# Patient Record
Sex: FEMALE | Race: WHITE | NOT HISPANIC OR LATINO | Employment: OTHER | URBAN - METROPOLITAN AREA
[De-identification: names, ages, dates, MRNs, and addresses within clinical notes are randomized per-mention and may not be internally consistent; named-entity substitution may affect disease eponyms.]

---

## 2023-03-31 ENCOUNTER — HOSPITAL ENCOUNTER (OUTPATIENT)
Dept: DATA CONVERSION | Facility: HOSPITAL | Age: 73
End: 2023-03-31
Attending: PHYSICAL MEDICINE & REHABILITATION | Admitting: PHYSICAL MEDICINE & REHABILITATION
Payer: MEDICARE

## 2023-03-31 DIAGNOSIS — M47.816 SPONDYLOSIS WITHOUT MYELOPATHY OR RADICULOPATHY, LUMBAR REGION: ICD-10-CM

## 2023-03-31 DIAGNOSIS — M54.16 RADICULOPATHY, LUMBAR REGION: ICD-10-CM

## 2023-04-28 ENCOUNTER — HOSPITAL ENCOUNTER (OUTPATIENT)
Dept: DATA CONVERSION | Facility: HOSPITAL | Age: 73
End: 2023-04-28
Attending: PHYSICAL MEDICINE & REHABILITATION | Admitting: PHYSICAL MEDICINE & REHABILITATION
Payer: MEDICARE

## 2023-04-28 DIAGNOSIS — M47.816 SPONDYLOSIS WITHOUT MYELOPATHY OR RADICULOPATHY, LUMBAR REGION: ICD-10-CM

## 2023-04-28 DIAGNOSIS — M47.26 OTHER SPONDYLOSIS WITH RADICULOPATHY, LUMBAR REGION: ICD-10-CM

## 2023-04-28 DIAGNOSIS — M48.062 SPINAL STENOSIS, LUMBAR REGION WITH NEUROGENIC CLAUDICATION: ICD-10-CM

## 2023-05-03 ENCOUNTER — HOSPITAL ENCOUNTER (OUTPATIENT)
Dept: DATA CONVERSION | Facility: HOSPITAL | Age: 73
End: 2023-05-03
Attending: PAIN MEDICINE | Admitting: PAIN MEDICINE
Payer: MEDICARE

## 2023-05-03 DIAGNOSIS — M54.50 LOW BACK PAIN, UNSPECIFIED: ICD-10-CM

## 2023-05-03 DIAGNOSIS — R10.9 UNSPECIFIED ABDOMINAL PAIN: ICD-10-CM

## 2023-05-03 DIAGNOSIS — R10.2 PELVIC AND PERINEAL PAIN: ICD-10-CM

## 2023-06-07 ENCOUNTER — HOSPITAL ENCOUNTER (OUTPATIENT)
Dept: DATA CONVERSION | Facility: HOSPITAL | Age: 73
End: 2023-06-07
Attending: PAIN MEDICINE | Admitting: PAIN MEDICINE
Payer: MEDICARE

## 2023-06-07 DIAGNOSIS — R10.9 UNSPECIFIED ABDOMINAL PAIN: ICD-10-CM

## 2023-06-07 DIAGNOSIS — R10.32 LEFT LOWER QUADRANT PAIN: ICD-10-CM

## 2023-06-07 LAB
ALANINE AMINOTRANSFERASE (SGPT) (U/L) IN SER/PLAS: 23 U/L (ref 7–45)
ALBUMIN (G/DL) IN SER/PLAS: 4.3 G/DL (ref 3.4–5)
ALKALINE PHOSPHATASE (U/L) IN SER/PLAS: 115 U/L (ref 33–136)
ANION GAP IN SER/PLAS: 14 MMOL/L (ref 10–20)
ASPARTATE AMINOTRANSFERASE (SGOT) (U/L) IN SER/PLAS: 24 U/L (ref 9–39)
BILIRUBIN TOTAL (MG/DL) IN SER/PLAS: 0.8 MG/DL (ref 0–1.2)
CALCIUM (MG/DL) IN SER/PLAS: 9.5 MG/DL (ref 8.6–10.3)
CARBON DIOXIDE, TOTAL (MMOL/L) IN SER/PLAS: 26 MMOL/L (ref 21–32)
CHLORIDE (MMOL/L) IN SER/PLAS: 107 MMOL/L (ref 98–107)
CHOLESTEROL (MG/DL) IN SER/PLAS: 184 MG/DL (ref 0–199)
CHOLESTEROL IN HDL (MG/DL) IN SER/PLAS: 44.8 MG/DL
CHOLESTEROL/HDL RATIO: 4.1
COBALAMIN (VITAMIN B12) (PG/ML) IN SER/PLAS: 440 PG/ML (ref 211–911)
CREATININE (MG/DL) IN SER/PLAS: 0.79 MG/DL (ref 0.5–1.05)
ERYTHROCYTE DISTRIBUTION WIDTH (RATIO) BY AUTOMATED COUNT: 12.8 % (ref 11.5–14.5)
ERYTHROCYTE MEAN CORPUSCULAR HEMOGLOBIN CONCENTRATION (G/DL) BY AUTOMATED: 32.7 G/DL (ref 32–36)
ERYTHROCYTE MEAN CORPUSCULAR VOLUME (FL) BY AUTOMATED COUNT: 98 FL (ref 80–100)
ERYTHROCYTES (10*6/UL) IN BLOOD BY AUTOMATED COUNT: 4.88 X10E12/L (ref 4–5.2)
GFR FEMALE: 80 ML/MIN/1.73M2
GLUCOSE (MG/DL) IN SER/PLAS: 94 MG/DL (ref 74–99)
HEMATOCRIT (%) IN BLOOD BY AUTOMATED COUNT: 48 % (ref 36–46)
HEMOGLOBIN (G/DL) IN BLOOD: 15.7 G/DL (ref 12–16)
LDL: 115 MG/DL (ref 0–99)
LEUKOCYTES (10*3/UL) IN BLOOD BY AUTOMATED COUNT: 5.8 X10E9/L (ref 4.4–11.3)
PLATELETS (10*3/UL) IN BLOOD AUTOMATED COUNT: 245 X10E9/L (ref 150–450)
POTASSIUM (MMOL/L) IN SER/PLAS: 4 MMOL/L (ref 3.5–5.3)
PROTEIN TOTAL: 7.2 G/DL (ref 6.4–8.2)
SODIUM (MMOL/L) IN SER/PLAS: 143 MMOL/L (ref 136–145)
THYROTROPIN (MIU/L) IN SER/PLAS BY DETECTION LIMIT <= 0.05 MIU/L: 1.91 MIU/L (ref 0.44–3.98)
TRIGLYCERIDE (MG/DL) IN SER/PLAS: 121 MG/DL (ref 0–149)
UREA NITROGEN (MG/DL) IN SER/PLAS: 23 MG/DL (ref 6–23)
VLDL: 24 MG/DL (ref 0–40)

## 2023-06-28 ENCOUNTER — PATIENT OUTREACH (OUTPATIENT)
Dept: CARE COORDINATION | Facility: CLINIC | Age: 73
End: 2023-06-28
Payer: MEDICARE

## 2023-06-28 SDOH — ECONOMIC STABILITY: GENERAL: WOULD YOU LIKE HELP WITH ANY OF THE FOLLOWING NEEDS?: I DONT NEED HELP WITH ANY OF THESE

## 2023-06-28 SDOH — ECONOMIC STABILITY: FOOD INSECURITY
ARE ANY OF YOUR NEEDS URGENT? FOR EXAMPLE, UNCERTAINTY OF WHERE YOU WILL GET YOUR NEXT MEAL OR NOT HAVING THE MEDICATIONS YOU NEED TO TAKE TOMORROW.: NO

## 2023-09-07 VITALS
DIASTOLIC BLOOD PRESSURE: 68 MMHG | BODY MASS INDEX: 33.61 KG/M2 | SYSTOLIC BLOOD PRESSURE: 138 MMHG | TEMPERATURE: 98.2 F | WEIGHT: 201.72 LBS | RESPIRATION RATE: 16 BRPM | HEIGHT: 65 IN | HEART RATE: 76 BPM

## 2023-09-07 VITALS
RESPIRATION RATE: 16 BRPM | SYSTOLIC BLOOD PRESSURE: 138 MMHG | DIASTOLIC BLOOD PRESSURE: 66 MMHG | BODY MASS INDEX: 33.5 KG/M2 | WEIGHT: 201.06 LBS | HEART RATE: 76 BPM | HEIGHT: 65 IN | TEMPERATURE: 97.7 F

## 2023-09-07 VITALS — WEIGHT: 197.53 LBS | HEIGHT: 65 IN | BODY MASS INDEX: 32.91 KG/M2

## 2023-09-14 NOTE — H&P
History of Present Illness:   History Present Illness:  Reason for surgery: Left lower back pain   HPI:    Left lower back pain refractory to conservative treatment, achy and burning, worse with activity, trying to avoid surgery, had excellent relief following her initial  MBB.  Continues to have left lower quadrant pain and understands this procedure may not/likely not improve that pain.  Rates her pain a 6/10.     Allergies:        Allergies:  ·  No Known Allergies :     Home Medication Review:   Home Medications Reviewed: yes     Impression/Procedure:   ·  Impression and Planned Procedure: Lumbar spondylosis with facet arthropathy  Plan for left L3, L4 medial branch and L5 dorsal rami block       ERAS (Enhanced Recovery After Surgery):  ·  ERAS Patient: no       Vital Signs:  Temperature C: 36.8 degrees C   Temperature F: 98.2 degrees F   Heart Rate: 76 beats per minute   Respiratory Rate: 16 breath per minute   Blood Pressure Systolic: 138 mm/Hg   Blood Pressure Diastolic: 68 mm/Hg     Physical Exam by System:    Constitutional: Well developed, awake/alert/oriented,  no distress, alert and cooperative   Eyes: EOMI, clear sclera   ENMT: mucous membranes moist, no apparent injury,  no lesions seen   Head/Neck: Neck supple, no apparent injury, trachea  midline   Respiratory/Thorax: Patent airways, non-labored breathing  with good chest expansion   Cardiovascular: no sig. edema   Extremities: normal extremities, no cyanosis edema,  contusions or wounds, no clubbing   Psychological: Appropriate mood and behavior   Skin: Warm and dry, no lesions, no rashes     Consent:   COVID-19 Consent:  ·  COVID-19 Risk Consent Surgeon has reviewed key risks related to the risk of alyse COVID-19 and if they contract COVID-19 what the risks are.       Electronic Signatures:  Alexa Terry)  (Signed 31-Mar-2023 10:45)   Authored: History of Present Illness, Allergies, Home  Medication Review, Impression/Procedure,  ERAS, Physical Exam, Consent, Note Completion      Last Updated: 31-Mar-2023 10:45 by Alexa Terry)

## 2023-09-14 NOTE — H&P
History of Present Illness:   History Present Illness:  Reason for surgery: Chronic left lower back pain   HPI:    Chronic left lower back pain refractory to conservative treatment, achy, excellent relief with prior medial branch blocks (over 80%), can be a 6-8/10.  Also has a  plan for pain management for her abdominal pain.     Allergies:        Allergies:  ·  No Known Allergies :     Home Medication Review:   Home Medications Reviewed: yes     Impression/Procedure:   ·  Impression and Planned Procedure: Lumbar spondylosis with facet arthropathy  Plan for left L2, L3, L4 medial branch and L5 dorsal rami RFA       ERAS (Enhanced Recovery After Surgery):  ·  ERAS Patient: no       Vital Signs:  Temperature C: 36.5 degrees C   Temperature F: 97.7 degrees F   Heart Rate: 76 beats per minute   Respiratory Rate: 16 breath per minute   Blood Pressure Systolic: 138 mm/Hg   Blood Pressure Diastolic: 66 mm/Hg     Physical Exam by System:    Constitutional: Well developed, awake/alert/oriented,  no distress, alert and cooperative   Eyes: EOMI, clear sclera   ENMT: mucous membranes moist, no apparent injury,  no lesions seen   Head/Neck: Neck supple, no apparent injury, trachea  midline   Respiratory/Thorax: Patent airways, non-labored breathing  with good chest expansion   Cardiovascular: no sig. edema   Extremities: normal extremities, no cyanosis edema,  contusions or wounds, no clubbing   Psychological: Appropriate mood and behavior   Skin: Warm and dry, no lesions, no rashes     Consent:   COVID-19 Consent:  ·  COVID-19 Risk Consent Surgeon has reviewed key risks related to the risk of alyse COVID-19 and if they contract COVID-19 what the risks are.       Electronic Signatures:  Alexa Terry)  (Signed 28-Apr-2023 11:51)   Authored: History of Present Illness, Allergies, Home  Medication Review, Impression/Procedure, ERAS, Physical Exam, Consent, Note Completion      Last Updated: 28-Apr-2023 11:51 by  Alexa Terry)    1.92

## 2023-10-02 NOTE — OP NOTE
"    Post Operative Note:     PreOp Diagnosis: Lumbar spondylosis with facet arthropathy   Post-Procedure Diagnosis: same   Procedure: 1. Left L2, L3, L4 medial branch and L5  dorsal rami RFA  2.   3.   4.   5.   Surgeon: ANDRES Terry MD   Resident/Fellow/Other Assistant: Danna Sanders DO   Estimated Blood Loss (mL): none   Specimen: no   Findings: NA     Operative Report Dictated:  Dictation: not applicable - note contains Operative  Report   Operative Report:    Procedure(s):Operation: Left L2,  L3 and L4 Medial branch and L5 Dorsal rami Radiofrequency ablation.- Cooled RFA - Tissue was heated to 80 deg. Celsius.      Pre-Op/Pre-Procedure Diagnosis: Lumbosacral spondylosis/facet arthropathy   Post-Op Diagnosis: same     Anesthesia:  Local  2 mg IV versed with intraservice monitoring time     Fluoroscopy time: see documented     Estimated Blood Loss: None  Specimens: None  Drains: None  Complications: None     INDICATIONS: The patient has been referred by my colleague with concordant subjective, objective, and radiologic findings of Facet arthropathy, referred for diagnostic and therapeutic L2, L3, L4 and L5 Medial branch/ Dorsal rami radiofrequency rhizotomy  with failure of prior conservative care with physical therapy and medications alone. At this time, the patient wishes to avoid surgery.     PROCEDURE: After obtaining both verbal and written informed consent, the patient was placed in a prone position on the fluoroscopic table in the procedure room, the patient's posterior lumbosacral spine was prepped and draped in usual sterile fashion  using chlorhexidine. The patient was connected to noninvasive blood pressure, EKG, pulse oximetry monitoring, and monitored by a registered interventional nurse throughout the procedure. Before initiating procedure, all relevant information was verified  in a \"time-out.\"      Skin wheal(s) were raised using 1% preservative-free lidocaine perpendicular to the mammillary " processes at near the junction of the transverse process and superior articular process and sacral ala which was localized by counting from the intersection  of the iliac crest.    A 17g 100 mm radiofrequency introducer needle was placed to the planned anatomic targets guided with intermittent fluoroscopy with a perpendicular approach to terminally place at the junction of the superior articular process and the transverse process  of the L3, L4 ,L5 and the base of the sacral ala for the L5 medial branch nerve. The stylets were removed and radiofrequency probes with a 4mm active tip were then inserted. Needle tip position of the probes was verified in the AP, oblique, and lateral  views . At each site, the medial branch nerve was stimulated at 2 Hz to a maximum 1 - 2 volts determined to finalize safe needle and electrode placement. The patient was awake and responsive during this portion of the procedure. Each target was anesthetized  with 1- 2 mL of  1% preservative free Lidocaine for anesthesia for lesioning and then each target was lesioned at 80 degrees Celsius for 2 minutes and 30 seconds. Tissue impedances were noted to be between 250 and 500 Ohms. Post lesioning we injected  a mix of 40 mg Depomedrol and 0.75% marcaine.  Electrodes and needles were then removed and band ages placed over the needle placement sites, the patient then returned to the supine position on a stretcher and transported to the recovery room without  hemodynamic, neurologic, or allergic reactions. Fluoroscopic images were printed for hard copy recording and digitally archived    There were no twitching or reproduction of leg pain leg pain experienced at any point during the procedure. Multifidus stimulation without sensory radiculopathy was observed on motor stimulation.     Care Instructions: Discharge per protocol.  Medications: See medication section  Appointment: Patient to return 1-2 weeks to clinic with pain diary.  Discharge  Condition: Good condition for discharge.  Patient discharged home when all discharge criterion met.      Attestation:   Note Completion:  Attending Attestation I was present for the entire procedure         Electronic Signatures:  Alexa Terry)  (Signed 28-Apr-2023 12:45)   Authored: Post Operative Note, Note Completion      Last Updated: 28-Apr-2023 12:45 by Alexa Terry)

## 2023-10-02 NOTE — OP NOTE
"    Post Operative Note:     PreOp Diagnosis: Lumbar spondylosis with facet arthropathy   Post-Procedure Diagnosis: same   Procedure: 1. Left L2, L3, L4 medial branch and L5  dorsal rami blocks  2.   3.   4.   5.   Surgeon: ANDRES Terry MD   Resident/Fellow/Other Assistant: NA   Estimated Blood Loss (mL): none   Specimen: no   Findings: NA     Operative Report Dictated:  Dictation: not applicable - note contains Operative  Report   Operative Report:    Procedure(s):Operation: Left L2, L3 and L4  Medial branch blockade and L5 Dorsal rami blockade.     Pre-Op/Pre-Procedure Diagnosis: Lumbosacral spondylosis without myelopathy  Post-Op Diagnosis: same     Anesthesia: Local only    Estimated Blood Loss: None  Specimens: None  Drains: None  Complications: None     INDICATIONS: The patient has been referred by my colleague with concordant subjective, objective, and radiologic findings of Facet arthropathy, referred for diagnostic and therapeutic MBB with failure of prior conservative care with physical therapy and  medications alone. At this time, the patient wishes to avoid surgery.      PROCEDURE: After obtaining both verbal and written informed consent, the patient was placed in a prone position on the fluoroscopic table in the procedure room, the patient's posterior lumbosacral spine was prepped and draped in usual sterile fashion  using chlorhexidine. The patient was connected to noninvasive blood pressure, EKG, pulse oximetry monitoring, and monitored by a registered interventional nurse throughout the procedure. Before initiating procedure, all relevant information was verified  in a \"time-out.\"      Skin wheal(s) were raised using 1% preservative-free lidocaine near the junction of the L4 and L5  transverse process and superior articular process and sacral ala which was localized by counting from the intersection of the iliac crest. Through the skin  wheal a 25-gauge, 3-1/2-inch curved Quincke-tip spinal " needle was inserted and advanced under direct fluoroscopic visualization in the AP, ipsilateral oblique and lateral planes, until the needle tip arrived until the needles arrived at the relevant positions  perpendicular to the L3 and L4  mammillary processes at the vertebral body and the  sacral ala. Proper needle placement was confirmed with 0.2 cc of Omnipaque-180M nonionic contrast confirming contrast near the nerve without any intravascular uptake of  contrast seen under live direct fluoroscopic visualization in the AP, oblique, and lateral planes. At this point 1 cc of a mixture of 0.75 Marcaine and 1.0 % preservative- free Lidocaine were infused at each level. Adequate hemostasis was obtained at  the needle puncture site. The patient's back was cleaned and a sterile dressing was applied. The patient was taken conscious and in stable condition to the recovery room. No complications as a result of this procedure. Post procedure precautions and instructions  were reviewed with the patient who verbalized understanding.     Significant Findings: None  Pre-Op Pain: 6/10  Post-Op Pain: 0-1/10     Care Instructions: Discharge per protocol.  Medications: See medication section  Appointment: Patient to return 1-2 weeks to clinic with pain diary.  Discharge Condition: Good condition for discharge.  Patient discharged home when all discharge criterion met.      Attestation:   Note Completion:  Attending Attestation I performed the procedure without a resident         Electronic Signatures:  Alexa Terry)  (Signed 31-Mar-2023 13:52)   Authored: Post Operative Note, Note Completion      Last Updated: 31-Mar-2023 13:52 by Alexa Terry)

## 2023-10-21 PROBLEM — E55.9 VITAMIN D DEFICIENCY: Status: ACTIVE | Noted: 2023-10-21

## 2023-10-21 PROBLEM — G56.03 BILATERAL CARPAL TUNNEL SYNDROME: Status: ACTIVE | Noted: 2023-10-21

## 2023-10-21 PROBLEM — L82.1 SEBORRHEIC KERATOSES: Status: ACTIVE | Noted: 2023-10-21

## 2023-10-21 PROBLEM — B02.29 HZV (HERPES ZOSTER VIRUS) POST HERPETIC NEURALGIA: Status: ACTIVE | Noted: 2023-10-21

## 2023-10-21 PROBLEM — H93.19 TINNITUS: Status: ACTIVE | Noted: 2023-10-21

## 2023-10-21 PROBLEM — M62.81 MUSCLE WEAKNESS (GENERALIZED): Status: ACTIVE | Noted: 2023-10-21

## 2023-10-21 PROBLEM — R19.5 POSITIVE COLORECTAL CANCER SCREENING USING COLOGUARD TEST: Status: ACTIVE | Noted: 2023-10-21

## 2023-10-21 PROBLEM — M17.11 PRIMARY OSTEOARTHRITIS OF RIGHT KNEE: Status: ACTIVE | Noted: 2023-10-21

## 2023-10-21 PROBLEM — N64.89 BREAST ASYMMETRY: Status: ACTIVE | Noted: 2023-10-21

## 2023-10-21 RX ORDER — HYALURONATE SODIUM 30 MG/2 ML
SYRINGE (ML) INTRAARTICULAR
COMMUNITY
Start: 2023-07-13 | End: 2023-10-24 | Stop reason: ALTCHOICE

## 2023-10-21 RX ORDER — ACETAMINOPHEN 500 MG
1000 TABLET ORAL EVERY 8 HOURS PRN
COMMUNITY
End: 2024-06-04

## 2023-10-21 RX ORDER — CHOLECALCIFEROL (VITAMIN D3) 25 MCG
1000 TABLET ORAL DAILY
Status: ON HOLD | COMMUNITY

## 2023-10-21 RX ORDER — PREDNISONE 5 MG/1
TABLET ORAL
COMMUNITY
Start: 2023-09-27 | End: 2023-10-24 | Stop reason: ALTCHOICE

## 2023-10-21 RX ORDER — PANTOPRAZOLE SODIUM 40 MG/1
40 TABLET, DELAYED RELEASE ORAL DAILY
COMMUNITY
Start: 2023-06-27 | End: 2023-10-24 | Stop reason: ALTCHOICE

## 2023-10-21 RX ORDER — GABAPENTIN 300 MG/1
600 CAPSULE ORAL 3 TIMES DAILY
COMMUNITY
Start: 2023-05-08 | End: 2023-10-24 | Stop reason: ALTCHOICE

## 2023-10-21 RX ORDER — OMEPRAZOLE 40 MG/1
CAPSULE, DELAYED RELEASE ORAL
COMMUNITY
End: 2023-10-24 | Stop reason: ALTCHOICE

## 2023-10-21 RX ORDER — MELOXICAM 15 MG/1
15 TABLET ORAL DAILY PRN
COMMUNITY
End: 2024-04-30 | Stop reason: WASHOUT

## 2023-10-21 RX ORDER — GABAPENTIN 100 MG/1
1 CAPSULE ORAL 3 TIMES DAILY
COMMUNITY
Start: 2023-01-11 | End: 2023-10-24 | Stop reason: ALTCHOICE

## 2023-10-21 RX ORDER — ACETAMINOPHEN AND PHENYLEPHRINE HCL 325; 5 MG/1; MG/1
1 TABLET ORAL DAILY
Status: ON HOLD | COMMUNITY

## 2023-10-24 ENCOUNTER — OFFICE VISIT (OUTPATIENT)
Dept: PRIMARY CARE | Facility: CLINIC | Age: 73
End: 2023-10-24
Payer: MEDICARE

## 2023-10-24 VITALS
HEART RATE: 69 BPM | HEIGHT: 66 IN | WEIGHT: 190.8 LBS | DIASTOLIC BLOOD PRESSURE: 76 MMHG | BODY MASS INDEX: 30.67 KG/M2 | TEMPERATURE: 97.1 F | SYSTOLIC BLOOD PRESSURE: 123 MMHG

## 2023-10-24 DIAGNOSIS — M54.2 CERVICALGIA: ICD-10-CM

## 2023-10-24 DIAGNOSIS — M62.838 CERVICAL PARASPINAL MUSCLE SPASM: ICD-10-CM

## 2023-10-24 DIAGNOSIS — Z78.9 NON-SMOKER: ICD-10-CM

## 2023-10-24 DIAGNOSIS — R10.9 LEFT SIDED ABDOMINAL PAIN: Primary | ICD-10-CM

## 2023-10-24 PROCEDURE — 1159F MED LIST DOCD IN RCRD: CPT | Performed by: FAMILY MEDICINE

## 2023-10-24 PROCEDURE — 3008F BODY MASS INDEX DOCD: CPT | Performed by: FAMILY MEDICINE

## 2023-10-24 PROCEDURE — 1160F RVW MEDS BY RX/DR IN RCRD: CPT | Performed by: FAMILY MEDICINE

## 2023-10-24 PROCEDURE — 99213 OFFICE O/P EST LOW 20 MIN: CPT | Performed by: FAMILY MEDICINE

## 2023-10-24 PROCEDURE — 1125F AMNT PAIN NOTED PAIN PRSNT: CPT | Performed by: FAMILY MEDICINE

## 2023-10-24 PROCEDURE — 1036F TOBACCO NON-USER: CPT | Performed by: FAMILY MEDICINE

## 2023-10-24 RX ORDER — DOCUSATE SODIUM 100 MG/1
100 CAPSULE, LIQUID FILLED ORAL DAILY
Status: ON HOLD | COMMUNITY
Start: 2010-06-29

## 2023-10-24 RX ORDER — CYCLOBENZAPRINE HCL 5 MG
5 TABLET ORAL 3 TIMES DAILY PRN
Qty: 30 TABLET | Refills: 0 | Status: SHIPPED | OUTPATIENT
Start: 2023-10-24 | End: 2023-12-23

## 2023-10-24 ASSESSMENT — PATIENT HEALTH QUESTIONNAIRE - PHQ9
2. FEELING DOWN, DEPRESSED OR HOPELESS: NOT AT ALL
SUM OF ALL RESPONSES TO PHQ9 QUESTIONS 1 AND 2: 0
1. LITTLE INTEREST OR PLEASURE IN DOING THINGS: NOT AT ALL

## 2023-10-24 NOTE — PROGRESS NOTES
Subjective     Patient ID: Khalida Barker is a 73 y.o. female who presents for Abdominal Pain (Patient has had left sided ABD pain for the past year.  ):    Problem List Items Addressed This Visit    None     Past Medical History:   Diagnosis Date    Body mass index (BMI) 31.0-31.9, adult 11/08/2021    BMI 31.0-31.9,adult    Personal history of other endocrine, nutritional and metabolic disease 11/08/2021    History of obesity      Past Surgical History:   Procedure Laterality Date    OTHER SURGICAL HISTORY  11/05/2021    Breast biopsy    OTHER SURGICAL HISTORY  11/05/2021    Anterior cervical vertebral fusion    OTHER SURGICAL HISTORY  11/05/2021    [place] tooth extraction    OTHER SURGICAL HISTORY  11/05/2021    Tonsillectomy with adenoidectomy    OTHER SURGICAL HISTORY  11/05/2021    Skin lesion excision    OTHER SURGICAL HISTORY  11/08/2021    Oral surgery    OTHER SURGICAL HISTORY  10/11/2022    Colonoscopy      Family History   Problem Relation Name Age of Onset    Alzheimer's disease Mother      Heart disease Father      Polymyositis Father      Hypertension Brother      Skin cancer Brother      Other (peripheral artery disease) Brother        Social History     Socioeconomic History    Marital status:      Spouse name: Not on file    Number of children: Not on file    Years of education: Not on file    Highest education level: Not on file   Occupational History    Not on file   Tobacco Use    Smoking status: Not on file    Smokeless tobacco: Not on file   Substance and Sexual Activity    Alcohol use: Not on file    Drug use: Not on file    Sexual activity: Not on file   Other Topics Concern    Not on file   Social History Narrative    Not on file     Social Determinants of Health     Financial Resource Strain: Not on file   Food Insecurity: Not on file   Transportation Needs: Not on file   Physical Activity: Not on file   Stress: Not on file   Social Connections: Not on file   Intimate Partner Violence:  Not on file   Housing Stability: Not on file      Patient has no known allergies.   Current Outpatient Medications   Medication Sig Dispense Refill    acetaminophen (Tylenol Extra Strength) 500 mg tablet 1 tablet (500 mg). EVERY 4 TO 6 HOURS AS NEEDED.      ASPIRIN-ACETAMINOPHEN-CAFFEINE ORAL Take 2 every morning and 1-2 more if needed      BIOTIN ORAL caps      cholecalciferol (Vitamin D-3) 50 mcg (2,000 unit) capsule Take 1 capsule (2,000 Units) by mouth once daily.      docusate sodium (Colace) 100 mg capsule Take 1 capsule (100 mg) by mouth twice a day.      meloxicam (Mobic) 15 mg tablet Take 1 tablet (15 mg) by mouth once daily as needed.      multivit-min/ferrous fumarate (MULTI VITAMIN ORAL) Take 1 tablet by mouth once daily.      polyvinyl alcohol (LIQUID TEARS OPHT) INSTILL 1-2 DROPS INTO AFFECTED EYE(S) 4 TIMES DAILY AS DIRECTED.       No current facility-administered medications for this visit.       Immunization History   Administered Date(s) Administered    Flu vaccine, quadrivalent, high-dose, preservative free, age 65y+ (FLUZONE) 12/07/2020    Influenza, High Dose Seasonal, Preservative Free 11/16/2017, 10/11/2022    Influenza, Seasonal, Quadrivalent, Adjuvanted 10/20/2023    Influenza, Unspecified 10/24/2019, 11/08/2021, 10/11/2022    Influenza, seasonal, injectable 11/04/2016    Moderna COVID-19 vaccine, Fall 2023, 12 yeasrs and older (50mcg/0.5mL) 10/06/2023    Moderna COVID-19 vaccine, bivalent, blue cap/gray label *Check age/dose* 09/08/2022    Moderna SARS-CoV-2 Vaccination 02/11/2021, 03/11/2021, 11/02/2021, 04/14/2022    Pneumococcal Conjugate PCV 7 09/24/2018    Pneumococcal polysaccharide vaccine, 23-valent, age 2 years and older (PNEUMOVAX 23) 10/24/2019    Zoster vaccine, recombinant, adult (SHINGRIX) 09/23/2021, 12/10/2021        Review of Systems     Vitals:    10/24/23 1323   BP: 123/76   Pulse: 69   Temp: 36.2 °C (97.1 °F)     Vitals:    10/24/23 1323   Weight: 86.5 kg (190 lb 12.8  oz)      Physical Exam     ASSESSMENT/PLAN:         Scribe Attestation  By signing my name below, I, Patricia Martinez LPN   , Scribe   attest that this documentation has been prepared under the direction and in the presence of Rocco Rushing MD.

## 2023-10-27 ASSESSMENT — ENCOUNTER SYMPTOMS
PSYCHIATRIC NEGATIVE: 1
CARDIOVASCULAR NEGATIVE: 1
EYES NEGATIVE: 1
ENDOCRINE NEGATIVE: 1
HEMATOLOGIC/LYMPHATIC NEGATIVE: 1
MUSCULOSKELETAL NEGATIVE: 1
RESPIRATORY NEGATIVE: 1
ALLERGIC/IMMUNOLOGIC NEGATIVE: 1
GASTROINTESTINAL NEGATIVE: 1
CONSTITUTIONAL NEGATIVE: 1

## 2023-10-27 NOTE — PROGRESS NOTES
Subjective Khalida is here today with complaints of persistent left-sided abdominal pain that seems to be in the posterior thoracic region and radiates to the left side.  This has been persistent for several months.  She has seen pain management and chiropractic care without help.  Gastroenterology evaluation was unremarkable.  MRI of the abdomen was unremarkable.  Patient also was complaining of neck pain in the left trapezius region with certain movements of her neck.  She has no arm pain numbness or tingling.  Past medical and social history as noted    Patient ID: Khalida Barker is a 73 y.o. female who presents for Abdominal Pain (Patient has had left sided ABD pain for the past year.  ):    Problem List Items Addressed This Visit       Left sided abdominal pain - Primary    Relevant Orders    Referral to Neurology    BMI 30.0-30.9,adult    Cervicalgia    Relevant Medications    cyclobenzaprine (Flexeril) 5 mg tablet    Cervical paraspinal muscle spasm    Relevant Medications    cyclobenzaprine (Flexeril) 5 mg tablet    Non-smoker      Past Medical History:   Diagnosis Date    Body mass index (BMI) 31.0-31.9, adult 11/08/2021    BMI 31.0-31.9,adult    Personal history of other endocrine, nutritional and metabolic disease 11/08/2021    History of obesity      Past Surgical History:   Procedure Laterality Date    OTHER SURGICAL HISTORY  11/05/2021    Breast biopsy    OTHER SURGICAL HISTORY  11/05/2021    Anterior cervical vertebral fusion    OTHER SURGICAL HISTORY  11/05/2021    [place] tooth extraction    OTHER SURGICAL HISTORY  11/05/2021    Tonsillectomy with adenoidectomy    OTHER SURGICAL HISTORY  11/05/2021    Skin lesion excision    OTHER SURGICAL HISTORY  11/08/2021    Oral surgery    OTHER SURGICAL HISTORY  10/11/2022    Colonoscopy      Family History   Problem Relation Name Age of Onset    Alzheimer's disease Mother      Heart disease Father      Polymyositis Father      Hypertension Brother      Skin cancer  Brother      Other (peripheral artery disease) Brother        Social History     Socioeconomic History    Marital status:      Spouse name: Not on file    Number of children: Not on file    Years of education: Not on file    Highest education level: Not on file   Occupational History    Not on file   Tobacco Use    Smoking status: Never    Smokeless tobacco: Never   Substance and Sexual Activity    Alcohol use: Not Currently    Drug use: Never    Sexual activity: Not on file   Other Topics Concern    Not on file   Social History Narrative    Not on file     Social Determinants of Health     Financial Resource Strain: Not on file   Food Insecurity: Not on file   Transportation Needs: Not on file   Physical Activity: Not on file   Stress: Not on file   Social Connections: Not on file   Intimate Partner Violence: Not on file   Housing Stability: Not on file      Patient has no known allergies.   Current Outpatient Medications   Medication Sig Dispense Refill    acetaminophen (Tylenol Extra Strength) 500 mg tablet 1 tablet (500 mg). EVERY 4 TO 6 HOURS AS NEEDED.      ASPIRIN-ACETAMINOPHEN-CAFFEINE ORAL Take 2 every morning and 1-2 more if needed      BIOTIN ORAL caps      cholecalciferol (Vitamin D-3) 50 mcg (2,000 unit) capsule Take 1 capsule (2,000 Units) by mouth once daily.      docusate sodium (Colace) 100 mg capsule Take 1 capsule (100 mg) by mouth twice a day.      meloxicam (Mobic) 15 mg tablet Take 1 tablet (15 mg) by mouth once daily as needed.      multivit-min/ferrous fumarate (MULTI VITAMIN ORAL) Take 1 tablet by mouth once daily.      polyvinyl alcohol (LIQUID TEARS OPHT) INSTILL 1-2 DROPS INTO AFFECTED EYE(S) 4 TIMES DAILY AS DIRECTED.      cyclobenzaprine (Flexeril) 5 mg tablet Take 1 tablet (5 mg) by mouth 3 times a day as needed for muscle spasms. 30 tablet 0     No current facility-administered medications for this visit.       Immunization History   Administered Date(s) Administered    Flu  vaccine, quadrivalent, high-dose, preservative free, age 65y+ (FLUZONE) 12/07/2020    Influenza, High Dose Seasonal, Preservative Free 11/16/2017, 10/11/2022    Influenza, Seasonal, Quadrivalent, Adjuvanted 10/20/2023    Influenza, Unspecified 10/24/2019, 11/08/2021, 10/11/2022    Influenza, seasonal, injectable 11/04/2016    Moderna COVID-19 vaccine, Fall 2023, 12 yeasrs and older (50mcg/0.5mL) 10/06/2023    Moderna COVID-19 vaccine, bivalent, blue cap/gray label *Check age/dose* 09/08/2022    Moderna SARS-CoV-2 Vaccination 02/11/2021, 03/11/2021, 11/02/2021, 04/14/2022    Pneumococcal Conjugate PCV 7 09/24/2018    Pneumococcal polysaccharide vaccine, 23-valent, age 2 years and older (PNEUMOVAX 23) 10/24/2019    Zoster vaccine, recombinant, adult (SHINGRIX) 09/23/2021, 12/10/2021        Review of Systems   Constitutional: Negative.    HENT: Negative.     Eyes: Negative.    Respiratory: Negative.     Cardiovascular: Negative.    Gastrointestinal: Negative.    Endocrine: Negative.    Genitourinary: Negative.    Musculoskeletal: Negative.    Skin: Negative.    Allergic/Immunologic: Negative.    Hematological: Negative.    Psychiatric/Behavioral: Negative.     All other systems reviewed and are negative.       Vitals:    10/24/23 1323   BP: 123/76   Pulse: 69   Temp: 36.2 °C (97.1 °F)     Vitals:    10/24/23 1323   Weight: 86.5 kg (190 lb 12.8 oz)      Physical Exam  Constitutional:       General: She is not in acute distress.     Appearance: Normal appearance.   Cardiovascular:      Rate and Rhythm: Normal rate and regular rhythm.      Pulses: Normal pulses.      Heart sounds: Normal heart sounds.   Pulmonary:      Effort: Pulmonary effort is normal.      Breath sounds: Normal breath sounds.   Neurological:      Mental Status: She is alert.     Neck-there is tenderness and spasm in the left trapezius muscle which does feel slightly more prominent in the right trapezius muscle.  Neck range of motion is full in all  directions without discomfort  ASSESSMENT/PLAN:  Nonspecific left sided flank pain  Neck pain with consideration of cervical strain    P-patient is referred to  neurology for further evaluation.  Continue current meds noted  Begin Flexeril 5 mg 3 times daily or at at bedtime.  We discussed potential side effects especially sedation  May apply moist heat to neck area as needed  Call if any worsening  We will follow-up in 1 to 2 months and call as needed

## 2023-11-13 NOTE — PROGRESS NOTES
Physical Therapy    Physical Therapy Evaluation and Treatment      Patient Name: Khalida Barker  MRN: [id number]  Today's Date: 11/15/2023  Time Calculation  Start Time: 0936  Stop Time: 1030  Time Calculation (min): 54 min    $30 Copay, $300 Ded, $2000 OOP Max, vs med nec, No auth, 1 Eval/180 Days    Assessment:  PT Assessment Results: Decreased strength, Decreased range of motion, Decreased endurance, Impaired balance, Decreased mobility, Pain  Rehab Prognosis: Good  Khalida Barker presents to physical therapy today s/p R TKA on 10/26/23 with resultant ROM and strength and mobility impairments. She will benefit from PT to address this.       Plan:  Treatment/Interventions: Cryotherapy, Education/ Instruction, Gait training, Manual therapy, Neuromuscular re-education, Therapeutic activities, Therapeutic exercises  PT Plan: Skilled PT  PT Frequency:  (1-2x/week, tapering to 1x/week as able)  Duration: 10 visits  Onset Date: 10/26/23  Certification Period Start Date: 11/15/23  Certification Period End Date: 02/13/24  Rehab Potential: Good  Plan of Care Agreement: Patient    Current Problem:   1. S/P total knee arthroplasty, right        2. Contracture, unspecified knee  Referral to Physical Therapy      3. Presence of unspecified artificial hip joint  Referral to Physical Therapy      4. Contracture, unspecified hip  Referral to Physical Therapy      5. Right knee pain, unspecified chronicity        6. Knee stiffness, right            Subjective    General:  General  Reason for Referral: M17.11 Unilateral Primary OA, R Knee & S/P R TKA  Referred By: Portillo Aguiar MD  Pt arrives at session with chief complaint of s/p R TKA 10/26/23 (Pt is 2.6 weeks post-op today). Pt reports following DC from the hospital she had HHCPT for 2 weeks for 3x/week.     Current [facility] PT HEP:   Ankle pumps, Quad sets, Knee extension stretch, LAQs, Seated knee flexion, Heel slides, SAQs, SLRs, Standing marching, Mini squats, Standing hip EXT,  "Standing hip ABD, Standing HS curls, Heel raises     Reports prior R knee ROM measures: 115 deg flexion.   Has been riding her bike at home, did for 20 min and is capable of performing full revolutions around bike.   Had been icing 2x/day for 20 min for the first 2 weeks.     After today's visit she plans on seeing neurologist to assess for her L side pain. Has been ongoing the last year. Has tried seeing a chiropractor for L side pain in which she has tried acupuncture and discussed LLD.     Also reports new onset of L sided neck pain that can feel like \"liquid fire\" going down her L arm.     Has been sleeping supine since TKA, however previously sleeps in sidelying due to knee pain. This has resulted in \"very tight\" buttock muscles in the morning due to sleeping on her back.     Has started driving and drove herself to PT today.     Pt has had 1 falls this year in which she fell at [employer] and fractured her tailbone. Reports she bent over and twisted and fell on her buttocks. Attributes this to the Gabapentin. This was also right after having a RFA for her LBP.   Another fall in 9/2023 in which she landed on her L knee on her deck and injured her L hand. Reports this was on concrete and tripped over her own feet when holding onto her watering can. Was wearing looser yard work clothes that were looser and had to cuff her pants.     Pain #: current: 3/10; at worst: 5/10   Location: R knee   Descriptor: soreness  Better: Excedrin, Tylenol, Meloxicam  Worse: activity, fatigue   Numbness/tingling: neuropathy in toes B- ongoing for a while  Difficult Activities: sidelying for sleep, going up/down stairs normally- in reciprocating pattern, getting up from the ground without kneeling on her R knee   Goals: \"To be able to walk up/down stairs normally and get up off the ground without damaging my new knee. Walk straighter.\"   Reported Functional Level: 50%   PMH/surgical history: cervicalgia, groin pain, abdominal pain L " "lower quadrant, B CTS, L hip pain, post-herpetic neuralgia, obesity, anterior cervical fusion C4-5 2010, skin CA, shingles- in R LE- feels like a sunburn on her R lateral thigh, colonoscopy 9/2022 & endoscopy and was told to take omeprezole- does feel has helped so far for the \"stich in her side\" sensation, neuropathy, headaches    Precautions:  Precautions  STEADI Fall Risk Score (The score of 4 or more indicates an increased risk of falling): 8  Vital Signs:     Pain:  Pain Assessment  Pain Assessment: 0-10  Pain Score: 3  Pain Location: Knee  Pain Orientation: Right  Pain Descriptors: Sore  Home Living:   Multistory home with stairs  Prior Level of Function:   Has been non-reciprocating on stairs for the last 8-9 mos secondary to R knee pain    Objective   Posture:   Fwd head, rounded shoulders, thoracic kyphosis    Extremity/Trunk Assessments:  Hip Musculoskeletal Exam  Gait  Gait additional comments: Pt ambulates MOD I with SPC with hurry cane base.     Range of Motion    Right      Active flexion: 100.       Passive internal rotation: 25.       Passive external rotation: 40.     Left      Passive flexion: 100.       Passive internal rotation: 30.       Passive external rotation: 45.     Range of motion additional comments: HS 90/90 WFL     Strength    Right      Flexion: 4+/5.     Left      Flexion: 4+/5.      Knee Musculoskeletal Exam  Gait  Gait additional comments: Pt ambulates MOD I with SPC with hurry cane base.     Inspection    Right      Erythema: mild        Edema: mild        Previous incision: anterior      Incision: clean, dry, intact and erythema    Range of Motion    Right      Right knee active extension: -1.      Right knee active flexion: 109.    Left      Passive extension: 0      Active flexion: 125    Strength    Right      Extension: 4+/5.       Flexion: 4+/5.     Left      Extension: 5/5.       Flexion: 5/5.     Foot/Ankle Musculoskeletal Exam  Gait  Gait additional comments: Pt ambulates " MOD I with SPC with hurry cane base.     Range of Motion    Right      Active Dorsiflexion: 5      Passive Dorsiflexion: 10    Left      Active Dorsiflexion: 5      Passive dorsiflexion: 8.    Strength    Right      Tibialis anterior: 5/5.     Left      Tibialis anterior: 5/5.     Outcome Measures:  Other Measures  Lower Extremity Funtional Score (LEFS): 31/80     Treatments:  Therapeutic Exercise (09004): 8 minutes  Instruction in initial home exercise program:   Seated HS curls   Continue with HHC exercises     OP EDUCATION:  Education  Individual(s) Educated: Patient  Education Provided: Body Mechanics, POC, Home Exercise Program  Risk and Benefits Discussed with Patient/Caregiver/Other: yes  Patient/Caregiver Demonstrated Understanding: yes  Plan of Care Discussed and Agreed Upon: yes  Patient Response to Education: Patient/Caregiver Verbalized Understanding of Information  Educated pt on importance of icing to manage swelling.   Educated pt on timeframe of typical healing.     Goals:  By discharge, pt will meet the following goals:     Pt will demonstrate independence with home exercise program.  Pt will tolerate increased exercise without adverse reaction.   Pt will increase ROM of R knee to 0 to 123 deg.    Pt will increase strength of R LE to at least 4 to 4+/5.   Pt will improve score of LEFS by 9 points to meet MCID.  Pt will report decrease in pain by 2 points to meet MCID.  Pt will ascend/descend FOS in reciprocating pattern MOD I with rail.   Pt will ambulate with LRAD for 1000ft prior to limitation.

## 2023-11-15 ENCOUNTER — EVALUATION (OUTPATIENT)
Dept: PHYSICAL THERAPY | Facility: CLINIC | Age: 73
End: 2023-11-15
Payer: MEDICARE

## 2023-11-15 DIAGNOSIS — Z96.651 S/P TOTAL KNEE ARTHROPLASTY, RIGHT: Primary | ICD-10-CM

## 2023-11-15 DIAGNOSIS — M24.559 CONTRACTURE, UNSPECIFIED HIP: ICD-10-CM

## 2023-11-15 DIAGNOSIS — M25.561 RIGHT KNEE PAIN, UNSPECIFIED CHRONICITY: ICD-10-CM

## 2023-11-15 DIAGNOSIS — Z96.649 PRESENCE OF UNSPECIFIED ARTIFICIAL HIP JOINT: ICD-10-CM

## 2023-11-15 DIAGNOSIS — M25.661 KNEE STIFFNESS, RIGHT: ICD-10-CM

## 2023-11-15 DIAGNOSIS — M24.569 CONTRACTURE, UNSPECIFIED KNEE: ICD-10-CM

## 2023-11-15 PROCEDURE — 97110 THERAPEUTIC EXERCISES: CPT | Mod: GP

## 2023-11-15 PROCEDURE — 97161 PT EVAL LOW COMPLEX 20 MIN: CPT | Mod: GP

## 2023-11-15 ASSESSMENT — ENCOUNTER SYMPTOMS
LOSS OF SENSATION IN FEET: 1
DEPRESSION: 0
OCCASIONAL FEELINGS OF UNSTEADINESS: 1

## 2023-11-15 ASSESSMENT — PAIN SCALES - GENERAL: PAINLEVEL_OUTOF10: 3

## 2023-11-15 ASSESSMENT — PAIN DESCRIPTION - DESCRIPTORS: DESCRIPTORS: SORE

## 2023-11-15 ASSESSMENT — PAIN - FUNCTIONAL ASSESSMENT: PAIN_FUNCTIONAL_ASSESSMENT: 0-10

## 2023-11-15 NOTE — Clinical Note
November 15, 2023     Patient: Khalida Barker   YOB: 1950   Date of Visit: 11/15/2023       To Whom it May Concern:    Khalida Barker was seen in my clinic on 11/15/2023. She {Return to school/sport:71799}.    If you have any questions or concerns, please don't hesitate to call.         Sincerely,          Lara Hsieh, PT        CC: No Recipients

## 2023-11-15 NOTE — Clinical Note
November 15, 2023     Patient: Khalida Barker   YOB: 1950   Date of Visit: 11/15/2023       To Whom It May Concern:    It is my medical opinion that Khalida Barker {Work release (duty restriction):64777}.    If you have any questions or concerns, please don't hesitate to call.         Sincerely,        Lara Hsieh, PT    CC: No Recipients

## 2023-11-15 NOTE — LETTER
November 15, 2023    Portillo Aguiar MD  [address] [address]    Patient: Khalida Barker   YOB: 1950   Date of Visit: 11/15/2023       Dear Portillo Aguiar Md  [address] [facility] [address]  [place] [address]    The attached plan of care is being sent to you because your patient’s medical reimbursement requires that you certify the plan of care. Your signature is required to allow uninterrupted insurance coverage.      You may indicate your approval by signing below and faxing this form back to us at Dept Fax: [phone].    Please call Dept: [phone] with any questions or concerns.    Thank you for this referral,        Lara Hsieh, PT  [facility] [address] [facility]  [address] [facility] [address]    Payer: Payor: [employer] / Plan: [employer]ICARE / Product Type: *No Product type* /                                                                         Date:     Dear Lara Hsieh, PT,     Re: Ms. Khalida Barker, MRN:[id number]    I certify that I have reviewed the attached plan of care and it is medically necessary for Ms. Khalida Barker (1950) who is under my care.          ______________________________________                    _________________  Provider name and credentials                                           Date and time                                                                                           Plan of Care 11/15/23   Effective from: 11/15/2023  Effective to: 2/13/2024    Plan ID: [id number]            Participants as of Finalize on 11/15/2023    Name Type Comments Contact Info    Portillo Aguiar MD Consulting Physician Pt with Medicare insurance. Please sign to certify her PT POC. [phone]    Lara Hsieh, PT Physical Therapist  [phone]       Last Plan Note     Author: Lara Hsieh PT Status: Sign when Signing Visit Last edited: 11/15/2023  9:30 AM       Physical  Therapy    Physical Therapy Evaluation and Treatment      Patient Name: Khalida Barker  MRN: [id number]  Today's Date: 11/15/2023  Time Calculation  Start Time: 0936  Stop Time: 1030  Time Calculation (min): 54 min    $30 Copay, $300 Ded, $2000 OOP Max, vs med nec, No auth, 1 Eval/180 Days    Assessment:  PT Assessment Results: Decreased strength, Decreased range of motion, Decreased endurance, Impaired balance, Decreased mobility, Pain  Rehab Prognosis: Good  Khalida Barker presents to physical therapy today s/p R TKA on 10/26/23 with resultant ROM and strength and mobility impairments. She will benefit from PT to address this.       Plan:  Treatment/Interventions: Cryotherapy, Education/ Instruction, Gait training, Manual therapy, Neuromuscular re-education, Therapeutic activities, Therapeutic exercises  PT Plan: Skilled PT  PT Frequency:  (1-2x/week, tapering to 1x/week as able)  Duration: 10 visits  Onset Date: 10/26/23  Certification Period Start Date: 11/15/23  Certification Period End Date: 02/13/24  Rehab Potential: Good  Plan of Care Agreement: Patient    Current Problem:   1. S/P total knee arthroplasty, right        2. Contracture, unspecified knee  Referral to Physical Therapy      3. Presence of unspecified artificial hip joint  Referral to Physical Therapy      4. Contracture, unspecified hip  Referral to Physical Therapy      5. Right knee pain, unspecified chronicity        6. Knee stiffness, right            Subjective   General:  General  Reason for Referral: M17.11 Unilateral Primary OA, R Knee & S/P R TKA  Referred By: Portillo Aguiar MD  Pt arrives at session with chief complaint of s/p R TKA 10/26/23 (Pt is 2.6 weeks post-op today). Pt reports following DC from the hospital she had HHCPT for 2 weeks for 3x/week.     Current [facility] PT HEP:   Ankle pumps, Quad sets, Knee extension stretch, LAQs, Seated knee flexion, Heel slides, SAQs, SLRs, Standing marching, Mini squats, Standing hip EXT, Standing hip  "ABD, Standing HS curls, Heel raises     Reports prior R knee ROM measures: 115 deg flexion.   Has been riding her bike at home, did for 20 min and is capable of performing full revolutions around bike.   Had been icing 2x/day for 20 min for the first 2 weeks.     After today's visit she plans on seeing neurologist to assess for her L side pain. Has been ongoing the last year. Has tried seeing a chiropractor for L side pain in which she has tried acupuncture and discussed LLD.     Also reports new onset of L sided neck pain that can feel like \"liquid fire\" going down her L arm.     Has been sleeping supine since TKA, however previously sleeps in sidelying due to knee pain. This has resulted in \"very tight\" buttock muscles in the morning due to sleeping on her back.     Has started driving and drove herself to PT today.     Pt has had 1 falls this year in which she fell at [employer] and fractured her tailbone. Reports she bent over and twisted and fell on her buttocks. Attributes this to the Gabapentin. This was also right after having a RFA for her LBP.   Another fall in 9/2023 in which she landed on her L knee on her deck and injured her L hand. Reports this was on concrete and tripped over her own feet when holding onto her watering can. Was wearing looser yard work clothes that were looser and had to cuff her pants.     Pain #: current: 3/10; at worst: 5/10   Location: R knee   Descriptor: soreness  Better: Excedrin, Tylenol, Meloxicam  Worse: activity, fatigue   Numbness/tingling: neuropathy in toes B- ongoing for a while  Difficult Activities: sidelying for sleep, going up/down stairs normally- in reciprocating pattern, getting up from the ground without kneeling on her R knee   Goals: \"To be able to walk up/down stairs normally and get up off the ground without damaging my new knee. Walk straighter.\"   Reported Functional Level: 50%   PMH/surgical history: cervicalgia, groin pain, abdominal pain L lower quadrant, B " "CTS, L hip pain, post-herpetic neuralgia, obesity, anterior cervical fusion C4-5 2010, skin CA, shingles- in R LE- feels like a sunburn on her R lateral thigh, colonoscopy 9/2022 & endoscopy and was told to take omeprezole- does feel has helped so far for the \"stich in her side\" sensation, neuropathy, headaches    Precautions:  Precautions  STEADI Fall Risk Score (The score of 4 or more indicates an increased risk of falling): 8  Vital Signs:     Pain:  Pain Assessment  Pain Assessment: 0-10  Pain Score: 3  Pain Location: Knee  Pain Orientation: Right  Pain Descriptors: Sore  Home Living:   Multistory home with stairs  Prior Level of Function:   Has been non-reciprocating on stairs for the last 8-9 mos secondary to R knee pain    Objective  Posture:   Fwd head, rounded shoulders, thoracic kyphosis    Extremity/Trunk Assessments:  Hip Musculoskeletal Exam  Gait  Gait additional comments: Pt ambulates MOD I with SPC with hurry cane base.     Range of Motion    Right      Active flexion: 100.       Passive internal rotation: 25.       Passive external rotation: 40.     Left      Passive flexion: 100.       Passive internal rotation: 30.       Passive external rotation: 45.     Range of motion additional comments: HS 90/90 WFL     Strength    Right      Flexion: 4+/5.     Left      Flexion: 4+/5.      Knee Musculoskeletal Exam  Gait  Gait additional comments: Pt ambulates MOD I with SPC with hurry cane base.     Inspection    Right      Erythema: mild        Edema: mild        Previous incision: anterior      Incision: clean, dry, intact and erythema    Range of Motion    Right      Right knee active extension: -1.      Right knee active flexion: 109.    Left      Passive extension: 0      Active flexion: 125    Strength    Right      Extension: 4+/5.       Flexion: 4+/5.     Left      Extension: 5/5.       Flexion: 5/5.     Foot/Ankle Musculoskeletal Exam  Gait  Gait additional comments: Pt ambulates MOD I with SPC " with hurry cane base.     Range of Motion    Right      Active Dorsiflexion: 5      Passive Dorsiflexion: 10    Left      Active Dorsiflexion: 5      Passive dorsiflexion: 8.    Strength    Right      Tibialis anterior: 5/5.     Left      Tibialis anterior: 5/5.     Outcome Measures:  Other Measures  Lower Extremity Funtional Score (LEFS): 31/80     Treatments:  Therapeutic Exercise (51249): 8 minutes  Instruction in initial home exercise program:   Seated HS curls   Continue with HHC exercises     OP EDUCATION:  Education  Individual(s) Educated: Patient  Education Provided: Body Mechanics, POC, Home Exercise Program  Risk and Benefits Discussed with Patient/Caregiver/Other: yes  Patient/Caregiver Demonstrated Understanding: yes  Plan of Care Discussed and Agreed Upon: yes  Patient Response to Education: Patient/Caregiver Verbalized Understanding of Information  Educated pt on importance of icing to manage swelling.   Educated pt on timeframe of typical healing.     Goals:  By discharge, pt will meet the following goals:     Pt will demonstrate independence with home exercise program.  Pt will tolerate increased exercise without adverse reaction.   Pt will increase ROM of R knee to 0 to 123 deg.    Pt will increase strength of R LE to at least 4 to 4+/5.   Pt will improve score of LEFS by 9 points to meet MCID.  Pt will report decrease in pain by 2 points to meet MCID.  Pt will ascend/descend FOS in reciprocating pattern MOD I with rail.   Pt will ambulate with LRAD for 1000ft prior to limitation.           Current Participants as of 11/15/2023    Name Type Comments Contact Info    Portillo Aguiar MD Consulting Physician Pt with Medicare insurance. Please sign to certify her PT POC. [phone]    Signature pending    Lara Hsieh, PT Physical Therapist  [phone]

## 2023-11-16 NOTE — PROGRESS NOTES
"Physical Therapy    Physical Therapy Treatment    Patient Name: Khalida Barker  MRN: [id number]  Today's Date: 11/17/2023     Time Calculation  Start Time: 0920  Stop Time: 1005  Time Calculation (min): 45 min  Visit #2 (of 10)     Assessment:   Pt tolerated session well without adverse effect and is making progress toward goals. Pt's R knee ROM improved to 0 to 120 deg this date. She performs her HEP very well, requiring only minimal cues for optimal performance. Pt will continue to benefit from PT to address her generalized strength and ROM of R LE.     Plan:   Assess & work on stairs.   Mini squats  Wall slides   TKEs  Posterior/lateral step downs short duration  Continue soft tissue   Reverse lunge with slider   Split squats     Current Problem  1. S/P total knee arthroplasty, right        2. Right knee pain, unspecified chronicity        3. Knee stiffness, right            General   Pt states she has noticed some \"zingers\" around the anterior knee/patellar region and occurs ~1x/week when tight in the morning or rarely when feeling fatigued at night or when sitting for a long time. Has been trying to get up hourly.   Is going to be hosting [place] this year however has a friend that is going to help her.   Saw neurology since last visit with [facility]- Dr. Colindres. Was told she has shingles on her abdomen. Was recommended to try Gabapentin vs Lyrica.     Subjective    Precautions  Precautions  STEADI Fall Risk Score (The score of 4 or more indicates an increased risk of falling): 8  Vital Signs     Pain  Pain Assessment  Pain Assessment: 0-10  Pain Score: 3  Pain Location: Knee  Pain Orientation: Right  Pain Descriptors: Sore    Objective   R knee ROM: 0 to 120 deg AAROM    Treatments:  Therapeutic Exercise (51198): 37 minutes  Reviewed Current HEP:   Seated HS stretch 2x30 sec holds    Reviewed Current [facility] PT HEP:   Ankle pumps x10  Quad sets x10 reps, cues for optimal quad contraction x5-10 reps   Knee extension " "stretch x30 sec hold  LAQs  Seated knee flexion x3  Supine Heel slides with strap AAROM x10 reps, cues for longer duration hold   SAQs x10 reps with 2# ankle weight x10 reps   SLRs x10 reps with 10 sec holds, cues to maintain full knee extension   Standing marching x10 reps B   Squats with UE support x8  Standing hip EXT x10 B   Standing hip ABD x10 B   Standing HS curls x10 reps, pt reports noticing \"tightness\" of anterior knee   Heel/toe raises x10    Manual Therapy (12932): 8 minutes  Patellar mobilizations x10 reps laterally & superior/inferior  Soft tissue of peripatellar region and distal quadriceps. X5 min. Pt in supine.       "

## 2023-11-17 ENCOUNTER — TREATMENT (OUTPATIENT)
Dept: PHYSICAL THERAPY | Facility: CLINIC | Age: 73
End: 2023-11-17
Payer: MEDICARE

## 2023-11-17 DIAGNOSIS — M25.561 RIGHT KNEE PAIN, UNSPECIFIED CHRONICITY: ICD-10-CM

## 2023-11-17 DIAGNOSIS — M25.661 KNEE STIFFNESS, RIGHT: ICD-10-CM

## 2023-11-17 DIAGNOSIS — Z96.651 S/P TOTAL KNEE ARTHROPLASTY, RIGHT: Primary | ICD-10-CM

## 2023-11-17 PROCEDURE — 97140 MANUAL THERAPY 1/> REGIONS: CPT | Mod: GP

## 2023-11-17 PROCEDURE — 97110 THERAPEUTIC EXERCISES: CPT | Mod: GP

## 2023-11-17 ASSESSMENT — PAIN SCALES - GENERAL: PAINLEVEL_OUTOF10: 3

## 2023-11-17 ASSESSMENT — PAIN DESCRIPTION - DESCRIPTORS: DESCRIPTORS: SORE

## 2023-11-17 ASSESSMENT — PAIN - FUNCTIONAL ASSESSMENT: PAIN_FUNCTIONAL_ASSESSMENT: 0-10

## 2023-11-19 NOTE — PROGRESS NOTES
"Physical Therapy    Physical Therapy Treatment    Patient Name: Khalida Barker  MRN: [id number]  Today's Date: 11/20/2023     Time Calculation  Start Time: 1332  Stop Time: 1413  Time Calculation (min): 41  Visit #3 (of 10)     Assessment:   Pt tolerated session well without adverse effect and is making progress toward goals. Pt was able to ascend/descend 5 steps today in reciprocating pattern. Also progressed LE strengthening to step ups and lateral step downs at 4\" step. She maintains R knee ROM to 0 to 120 deg. Pt will continue to benefit from PT to address her generalized strength and ROM of R LE.     Plan:   Mini squats  TKEs  Continue soft tissue  Reverse lunge with slider   Split squats     Current Problem  1. S/P total knee arthroplasty, right        2. Right knee pain, unspecified chronicity        3. Knee stiffness, right              General   Pt states she started the Lyrica and is noticing less pain with her L knee. Is still having the shooting pain distal to L patella, but is becoming less frequent.     Subjective    Precautions  Precautions  STEADI Fall Risk Score (The score of 4 or more indicates an increased risk of falling): 8  Vital Signs     Pain  Pain Assessment  Pain Assessment: 0-10  Pain Score: 3  Pain Location: Knee  Pain Orientation: Right  Pain Descriptors: Sore    Objective   R knee ROM: 0 to 120 deg AAROM at start of session  Pt was able to ascend/descend 5 steps with B rails in reciprocating pattern.     Treatments:  Therapeutic Exercise (88328): 36 minutes  Objective measures.     Step ups 3x10  Lateral step downs     Wall slides 2x10     Current HEP:   Seated HS stretch   Quad sets   Knee extension stretch   LAQs  Seated knee flexion   Supine Heel slides with strap AAROM   SLRs   Standing marching   Squats with UE support   Standing hip EXT  Standing hip ABD   Standing HS curls   Heel/toe raises   Step ups (added today)  Lateral step downs at 4\" height (added today)  Wall slides (pt " elects to perform for HEP)    Manual Therapy (87490): 5 minutes  Soft tissue of peripatellar region and distal quadriceps. X5 min. Pt in supine.

## 2023-11-20 ENCOUNTER — TREATMENT (OUTPATIENT)
Dept: PHYSICAL THERAPY | Facility: CLINIC | Age: 73
End: 2023-11-20
Payer: MEDICARE

## 2023-11-20 DIAGNOSIS — Z96.651 S/P TOTAL KNEE ARTHROPLASTY, RIGHT: Primary | ICD-10-CM

## 2023-11-20 DIAGNOSIS — M25.561 RIGHT KNEE PAIN, UNSPECIFIED CHRONICITY: ICD-10-CM

## 2023-11-20 DIAGNOSIS — M25.661 KNEE STIFFNESS, RIGHT: ICD-10-CM

## 2023-11-20 PROCEDURE — 97110 THERAPEUTIC EXERCISES: CPT | Mod: GP

## 2023-11-20 ASSESSMENT — PAIN SCALES - GENERAL: PAINLEVEL_OUTOF10: 3

## 2023-11-20 ASSESSMENT — PAIN DESCRIPTION - DESCRIPTORS: DESCRIPTORS: SORE

## 2023-11-20 ASSESSMENT — PAIN - FUNCTIONAL ASSESSMENT: PAIN_FUNCTIONAL_ASSESSMENT: 0-10

## 2023-11-21 NOTE — PROGRESS NOTES
Physical Therapy    Physical Therapy Treatment    Patient Name: Khalida Barker  MRN: [id number]  Today's Date: 11/22/2023     Time Calculation  Start Time: 0930  Stop Time: 1015   Time Calculation (min): 45  Visit #4 (of 10)     Assessment:   Pt tolerated session well without adverse effect and is making progress toward goals. Despite reported recent fall, pt is without pain and ROM improved to 125 deg. She tolerates progressions of strengthening. Pt will continue to benefit from PT to address her generalized strength and ROM of R LE.     Plan:   Mini squats  TKEs  Reverse lunge with slider   Split squats   FOS for strength/endurance.  Floor <> stand transfers  Walking on uneven    Current Problem  1. S/P total knee arthroplasty, right        2. Right knee pain, unspecified chronicity        3. Knee stiffness, right            General   Pt states she fell since last visit. Reports was in the living room and tripped. Denies pain. Unsure if she landed on her knee. Reports she fell forward and then landed on her back. Was carrying the leaf of the table to set up and tripped over it while walking. This occurred yesterday. Did lay down and put ice on right away. Was able to scoot over to chairs with chairs with arms and was able to use the chair and push up with her L LE. Pt's  was able to provide CGA.     Subjective    Precautions  Precautions  STEADI Fall Risk Score (The score of 4 or more indicates an increased risk of falling): 8  Vital Signs     Pain  Pain Assessment  Pain Assessment: 0-10  Pain Score: 3  Pain Location: Knee  Pain Orientation: Right  Pain Descriptors: Sore    Objective   R knee ROM: 0 to 125 deg AAROM at start of session  Pt was able to ascend/descend 5 steps with B rails in reciprocating pattern.     Treatments:  Therapeutic Exercise (39948): 30 minutes  Objective measures.     Reviewed:   Wall slides 2x10   Step ups 2x10  Lateral step downs 3x5-8 reps, cues for optimal form    Cybex leg press:  "Seat at 3, footplate at 4  2x10 with 70#  2x10 with 40# with R LE    Current HEP:   Seated HS stretch   Quad sets   Knee extension stretch   LAQs  Seated knee flexion   Supine Heel slides with strap AAROM   SLRs   Standing marching   Squats with UE support   Standing hip EXT  Standing hip ABD   Standing HS curls   Heel/toe raises   Step ups   Lateral step downs at 4\" height   Wall slides     Manual Therapy (24043): 15 minutes  Incisional massage to R side. Pt in supine. X10 min.    Soft tissue of peripatellar region and distal quadriceps. X5 min. Pt in supine.     "

## 2023-11-22 ENCOUNTER — TREATMENT (OUTPATIENT)
Dept: PHYSICAL THERAPY | Facility: CLINIC | Age: 73
End: 2023-11-22
Payer: MEDICARE

## 2023-11-22 DIAGNOSIS — Z96.651 S/P TOTAL KNEE ARTHROPLASTY, RIGHT: Primary | ICD-10-CM

## 2023-11-22 DIAGNOSIS — M25.661 KNEE STIFFNESS, RIGHT: ICD-10-CM

## 2023-11-22 DIAGNOSIS — M25.561 RIGHT KNEE PAIN, UNSPECIFIED CHRONICITY: ICD-10-CM

## 2023-11-22 PROCEDURE — 97110 THERAPEUTIC EXERCISES: CPT | Mod: GP

## 2023-11-22 PROCEDURE — 97140 MANUAL THERAPY 1/> REGIONS: CPT | Mod: GP

## 2023-11-22 ASSESSMENT — PAIN - FUNCTIONAL ASSESSMENT: PAIN_FUNCTIONAL_ASSESSMENT: 0-10

## 2023-11-22 ASSESSMENT — PAIN DESCRIPTION - DESCRIPTORS: DESCRIPTORS: SORE

## 2023-11-22 ASSESSMENT — PAIN SCALES - GENERAL: PAINLEVEL_OUTOF10: 3

## 2023-11-26 NOTE — PROGRESS NOTES
"Physical Therapy    Physical Therapy Treatment    Patient Name: Khalida Barker  MRN: [id number]  Today's Date: 11/27/2023     Time Calculation  Start Time: 0830   Stop Time: 0914   Time Calculation (min): 44   Visit #5 (of 10)     Assessment:   Pt tolerated session well without adverse effect and is making progress toward goals. Pt did have HS cramping which she reports is her baseline, especially earlier in the morning. She demo's hip compensations with lateral step downs and was challenged with Y balance quad drill. Pt will continue to benefit from PT to address her generalized strength and ROM of R LE.     Plan:   Walking on uneven surfaces: obstacle course   Floor <> stand transfers  Split squats in // bars  FOS for strength/endurance      Current Problem  1. S/P total knee arthroplasty, right        2. Right knee pain, unspecified chronicity        3. Knee stiffness, right            General   Pt states she did stop taking the Lyrica as this made her feel \"yucky\" and unsteady on her feet. Plans to tell her provider about this. Did notice swelling following eating salty foods. Does feel she tends to \"waddle\" in the morning. Is to see her surgeon 12/1/23.     Subjective    Precautions  Precautions  STEADI Fall Risk Score (The score of 4 or more indicates an increased risk of falling): 8  Vital Signs     Pain  Pain Assessment  Pain Assessment: 0-10  Pain Score: 0 - No pain    Objective   R knee ROM: 0 to 125 deg AAROM at start of session  Pt was able to ascend/descend 5 steps with B rails in reciprocating pattern.     Treatments:  Therapeutic Exercise ([id number]): 34 minutes  Objective measures.     Step ups with R LE x10 at 6\" step  Glute step ups at 6\" step with B UE support 2x10  Lateral step downs at 4\" step 2x10, pt continues to demo hip compensations     Y balance lunge with R LE in stance 2x5 reps all directions   Reverse lunge with slider 2x10    NOT TODAY:   Cybex leg press: Seat at 3, footplate at 4  2x10 with " "70#  2x10 with 40# with R LE    Current HEP:   Seated HS stretch   Quad sets   Knee extension stretch   LAQs  Seated knee flexion   Supine Heel slides with strap AAROM   SLRs   Standing marching   Squats with UE support   Standing hip EXT  Standing hip ABD   Standing HS curls   Heel/toe raises   Step ups   Lateral step downs at 4\" height   Wall slides     Manual Therapy (26325): 10 minutes  Incisional massage to R side. Pt in supine. X5 min.    Soft tissue of peripatellar region and distal quadriceps. X5 min. Pt in supine.     "

## 2023-11-27 ENCOUNTER — TREATMENT (OUTPATIENT)
Dept: PHYSICAL THERAPY | Facility: CLINIC | Age: 73
End: 2023-11-27
Payer: MEDICARE

## 2023-11-27 DIAGNOSIS — Z96.651 S/P TOTAL KNEE ARTHROPLASTY, RIGHT: Primary | ICD-10-CM

## 2023-11-27 DIAGNOSIS — M25.561 RIGHT KNEE PAIN, UNSPECIFIED CHRONICITY: ICD-10-CM

## 2023-11-27 DIAGNOSIS — M25.661 KNEE STIFFNESS, RIGHT: ICD-10-CM

## 2023-11-27 PROCEDURE — 97140 MANUAL THERAPY 1/> REGIONS: CPT | Mod: GP

## 2023-11-27 PROCEDURE — 97110 THERAPEUTIC EXERCISES: CPT | Mod: GP

## 2023-11-27 ASSESSMENT — PAIN SCALES - GENERAL: PAINLEVEL_OUTOF10: 0 - NO PAIN

## 2023-11-27 ASSESSMENT — PAIN - FUNCTIONAL ASSESSMENT: PAIN_FUNCTIONAL_ASSESSMENT: 0-10

## 2023-11-28 NOTE — PROGRESS NOTES
Physical Therapy    Physical Therapy Treatment    Patient Name: Khalida Barker  MRN: [id number]  Today's Date: 11/29/2023     Time Calculation  Start Time: 1434   Stop Time: 1515   Time Calculation (min): 41  Visit #6 (of 10)     Assessment:   Pt tolerated session well without adverse effect and is making progress toward goals. She benefits from cues throughout session for optimal performance of exercises throughout. She tolerates progressions of strengthening well today. Pt will continue to benefit from PT to address her generalized strength and ROM of R LE.     Plan:   Continue obstacle course- potentially harnessed to perform without UE support    Floor <> stand transfers  FOS for strength/endurance  Cybex equipment strengthening for Les     Current Problem  1. S/P total knee arthroplasty, right        2. Right knee pain, unspecified chronicity        3. Knee stiffness, right            General   Pt states she is noticing L lateral thigh pain which has began 2 days ago. She reports this has been pretty constant and tends to notice more when walking.   Has been able to do most of her exercises since last visit and was able to ride the bike for 25 min. She reports this feels like a nerve pain.   She is to follow up with neurology regarding her L side trunk pain on 12/6/23.   Is to have a CT scan for her L sided neck pain as well.   Notices improvement in comfort with sleeping on either side now.     Subjective    Precautions  Precautions  STEADI Fall Risk Score (The score of 4 or more indicates an increased risk of falling): 8  Vital Signs     Pain  Pain Assessment  Pain Assessment: 0-10  Pain Score:  (3-4/10)  Pain Location: Knee (& L buttock & lateral thigh)    Objective     Treatments:  Therapeutic Exercise (61625): 31 minutes  Objective measures.     Y balance lunge with R LE in stance x10 reps all directions, cues for optimal R LE alignment and to avoid dynamic valgus.   Reverse lunge with slider 2x10, cues to  "improve use of glutes throughout     Split squats in // bars with 2 foams stacked x5, progressed to 1 foam for cushion 2x10  Lateral lunges in // bars 2x10 reps each direction    NOT TODAY:  Step ups with R LE x10 at 6\" step  Glute step ups at 6\" step with B UE support 2x10  Lateral step downs at 4\" step 2x10, pt continues to demo hip compensations   Cybex leg press: Seat at 3, footplate at 4  2x10 with 70#  2x10 with 40# with R LE    Current HEP:   Seated HS stretch   Supine Heel slides with strap AAROM   Squats with UE support   Heel/toe raises   Step ups   Lateral step downs at 4\" height   Wall slides     Neuromuscular Re-education (30490): 10 minutes  Walking on uneven surfaces: obstacle course including foams, river rocks. Performed in // bars. Requires hand support, especially on river rocks.   "

## 2023-11-29 ENCOUNTER — TREATMENT (OUTPATIENT)
Dept: PHYSICAL THERAPY | Facility: CLINIC | Age: 73
End: 2023-11-29
Payer: MEDICARE

## 2023-11-29 DIAGNOSIS — Z96.651 S/P TOTAL KNEE ARTHROPLASTY, RIGHT: Primary | ICD-10-CM

## 2023-11-29 DIAGNOSIS — M25.661 KNEE STIFFNESS, RIGHT: ICD-10-CM

## 2023-11-29 DIAGNOSIS — M25.561 RIGHT KNEE PAIN, UNSPECIFIED CHRONICITY: ICD-10-CM

## 2023-11-29 PROCEDURE — 97112 NEUROMUSCULAR REEDUCATION: CPT | Mod: GP

## 2023-11-29 PROCEDURE — 97110 THERAPEUTIC EXERCISES: CPT | Mod: GP

## 2023-11-29 ASSESSMENT — PAIN - FUNCTIONAL ASSESSMENT: PAIN_FUNCTIONAL_ASSESSMENT: 0-10

## 2023-12-04 ENCOUNTER — TREATMENT (OUTPATIENT)
Dept: PHYSICAL THERAPY | Facility: CLINIC | Age: 73
End: 2023-12-04
Payer: MEDICARE

## 2023-12-04 DIAGNOSIS — M25.661 KNEE STIFFNESS, RIGHT: ICD-10-CM

## 2023-12-04 DIAGNOSIS — Z96.651 S/P TOTAL KNEE ARTHROPLASTY, RIGHT: Primary | ICD-10-CM

## 2023-12-04 DIAGNOSIS — M25.561 RIGHT KNEE PAIN, UNSPECIFIED CHRONICITY: ICD-10-CM

## 2023-12-04 PROCEDURE — 97112 NEUROMUSCULAR REEDUCATION: CPT | Mod: GP

## 2023-12-04 PROCEDURE — 97530 THERAPEUTIC ACTIVITIES: CPT | Mod: GP

## 2023-12-04 PROCEDURE — 97110 THERAPEUTIC EXERCISES: CPT | Mod: GP

## 2023-12-04 ASSESSMENT — PAIN - FUNCTIONAL ASSESSMENT: PAIN_FUNCTIONAL_ASSESSMENT: 0-10

## 2023-12-04 NOTE — PROGRESS NOTES
Physical Therapy    Physical Therapy Treatment    Patient Name: Khalida Barker  MRN: [id number]  Today's Date: 12/6/2023     Time Calculation  Start Time: 0932  Stop Time: 1011   Time Calculation (min): 39   Visit #8 (of 10)     Assessment:   Pt tolerated session well without adverse effect and is making progress toward goals. Pt tolerates all LE strengthening well this date and able to progress as expected. She performs harnessed obstacle course with very minimal need for hands on assist of therapist and without UE support. Pt will continue to benefit from PT to address her generalized strength and ROM of R LE.     Plan:   Re-assessment in 2 visits.   Continue with similar session.   Posterior step downs  Lateral step downs      Current Problem  1. S/P total knee arthroplasty, right        2. Right knee pain, unspecified chronicity        3. Knee stiffness, right              General   Pt states no new events. Continues to ice and elevate her R LE following greater activity and after her HEP.     Subjective    Precautions  Precautions  STEADI Fall Risk Score (The score of 4 or more indicates an increased risk of falling): 8  Vital Signs     Pain  Pain Assessment  Pain Assessment: 0-10    Objective     Treatments:  Therapeutic Exercise (34751): 20 minutes  Cybex leg press, single leg with R LE 1 x 15 reps with 40#; 1 x 15 with 50    Cybex knee extension- eccentric focus with 10# 3x10 reps with R LE only     Posterior step downs, performed with L LE x10 reps, discussed with pt performing at home as she feels her L side is now weaker than R with descending stairs.     NOT TODAY:   Y balance lunge with R LE in stance x10 reps all directions, cues for optimal R LE alignment and to avoid dynamic valgus.   Lateral lunges R LE in stance & L foot on slider     Current HEP:   Seated HS stretch   Supine Heel slides with strap AAROM   Squats with UE support   Heel/toe raises   Step ups   Posterior step downs (added today)   Wall  slides     Neuromuscular Re-education (56694): 19 minutes  Harnessed obstacle course: obstacle course including blue foams, river rocks, gray foam. Initial hand support, however was eventually able to perform without UE support. Performs in AP & lateral directions.

## 2023-12-04 NOTE — PROGRESS NOTES
Physical Therapy    Physical Therapy Treatment    Patient Name: Khalida Barker  MRN: [id number]  Today's Date: 12/4/2023     Time Calculation  Start Time: 0904   Stop Time: 0948  Time Calculation (min): 44   Visit #7 (of 10)     Assessment:   Pt tolerated session well without adverse effect and is making progress toward goals. Pt was able to perform floor <> stand transfers independently by end of session. She was able to navigate over river rocks and foams without UE support this date in // bars. Pt will continue to benefit from PT to address her generalized strength and ROM of R LE.     Plan:   Continue obstacle course- potentially harnessed to perform without UE support    FOS for strength/endurance  Cybex equipment strengthening for Les       Current Problem  1. S/P total knee arthroplasty, right        2. Right knee pain, unspecified chronicity        3. Knee stiffness, right            General   Pt states she saw her provider after last visit and was told has sciatica for the L lateral thigh pain. Was given prescription from Prednisone, however is going to wait to see how feels before   Was able to walk around a craft show over the weekend and later that day she did notice increases in pain/hurt when trying to sleep. Reports she did a lot of standing while there, not necessarily walking.   Also was able to iron and ride the bike- was able to tolerate for 30 minutes with resistance at 8/15.     Subjective    Precautions  Precautions  STEADI Fall Risk Score (The score of 4 or more indicates an increased risk of falling): 8  Vital Signs     Pain  Pain Assessment  Pain Assessment: 0-10  Pain Score:  (does not rank on VAS)  Pain Location:  (R knee & L lateral thigh)    Objective     Treatments:  Therapeutic Exercise (50149): 9 minutes  Upright bike level 5 x5 minutes. Seat at 5.     Objective measures. Pt requesting for therapist to assess leg length at end of session. Pt largely symmetrical. Mild appearance of R LE  "longer with R ASIS slightly lower in supine, however <1 cm difference.     NOT TODAY:  Y balance lunge with R LE in stance x10 reps all directions, cues for optimal R LE alignment and to avoid dynamic valgus.   Reverse lunge with slider 2x10, cues to improve use of glutes throughout   Split squats in // bars with 2 foams stacked x5, progressed to 1 foam for cushion 2x10  Lateral lunges in // bars 2x10 reps each direction  Step ups with R LE x10 at 6\" step  Glute step ups at 6\" step with B UE support 2x10  Lateral step downs at 4\" step 2x10, pt continues to demo hip compensations   Cybex leg press: Seat at 3, footplate at 4  2x10 with 70#  2x10 with 40# with R LE    Current HEP:   Seated HS stretch   Supine Heel slides with strap AAROM   Squats with UE support   Heel/toe raises   Step ups   Lateral step downs at 4\" height   Wall slides     Neuromuscular Re-education (35746): 20 minutes  Walking on uneven surfaces in // bars: obstacle course including foams, river rocks. Initial hand support, however was eventually able to perform without UE support. Performs in AP & lateral directions.       Therapeutic Activity (19438): 15 minutes  Worked with pt on floor <> stand transfers. Provided demonstration and cues for LE placement to avoid direct kneeling on R knee. Pt performed x3 reps of transfers, and was able to complete without need for UE support. Performs independently.   "

## 2023-12-06 ENCOUNTER — TREATMENT (OUTPATIENT)
Dept: PHYSICAL THERAPY | Facility: CLINIC | Age: 73
End: 2023-12-06
Payer: MEDICARE

## 2023-12-06 DIAGNOSIS — M25.561 RIGHT KNEE PAIN, UNSPECIFIED CHRONICITY: ICD-10-CM

## 2023-12-06 DIAGNOSIS — Z96.651 S/P TOTAL KNEE ARTHROPLASTY, RIGHT: Primary | ICD-10-CM

## 2023-12-06 DIAGNOSIS — M25.661 KNEE STIFFNESS, RIGHT: ICD-10-CM

## 2023-12-06 PROCEDURE — 97110 THERAPEUTIC EXERCISES: CPT | Mod: GP

## 2023-12-06 PROCEDURE — 97112 NEUROMUSCULAR REEDUCATION: CPT | Mod: GP

## 2023-12-06 ASSESSMENT — PAIN - FUNCTIONAL ASSESSMENT: PAIN_FUNCTIONAL_ASSESSMENT: 0-10

## 2023-12-10 NOTE — PROGRESS NOTES
Physical Therapy    Physical Therapy Treatment    Patient Name: Khalida Barker  MRN: [id number]  Today's Date: 12/11/2023     Time Calculation  Start Time: 0933  Stop Time: 1015  Time Calculation (min): 42   Visit #9 (of 10)     Assessment:   Pt tolerated session well without adverse effect and is making progress toward goals. Initial portion of session, pt inquires about new referral regarding cervical radiculopathy and planning associated with this. She demo's overall excellent improvement thus far regarding R TKA, and plans to re-assess next visit for likely DC, then to begin working on cervical radiculopathy under new episode of care.     Plan:   Re-assessment next visit, likely for DC.     Current Problem  1. S/P total knee arthroplasty, right        2. Right knee pain, unspecified chronicity        3. Knee stiffness, right            General   Pt states she had activities this weekend that required more sitting, and therefore felt more stiff. Had a greater difficulty ascending the steps when she got home in the evening.   Reports she has been able to ascend/descend steps in reciprocating manner, and does feel she needs to continue to use the rails. Reports the more she does it the easier it gets.       Quad strengthening then harnessed obstacle course []    Subjective    Precautions  Precautions  STEADI Fall Risk Score (The score of 4 or more indicates an increased risk of falling): 8  Vital Signs     Pain  Pain Assessment  Pain Assessment: 0-10  Pain Score: 4  Pain Descriptors: Other (Comment) (stiffness)    Objective     Treatments:  Therapeutic Exercise (52048): 32 minutes  Posterior step downs, discussed how to make glute dominant vs quadriceps dominant and performed 2x10 of each  Step ups, discussed how to make glute dominant vs quadriceps dominant and performed 2x10 of each    Side stepping with red TB resistance at knees x2 min bout    Current HEP:   Seated HS stretch   Supine Heel slides with strap AAROM    Squats with UE support   Heel/toe raises   Step ups   Posterior step downs   Wall slides   Side stepping with red TB (added today)     Neuromuscular Re-education (82736): 10 minutes  Obstacle course in // bars: obstacle course including blue foams, river rocks, gray foam. Initial hand support, however was eventually able to perform without UE support. Performs in AP & lateral directions.

## 2023-12-11 ENCOUNTER — TREATMENT (OUTPATIENT)
Dept: PHYSICAL THERAPY | Facility: CLINIC | Age: 73
End: 2023-12-11
Payer: MEDICARE

## 2023-12-11 DIAGNOSIS — M25.561 RIGHT KNEE PAIN, UNSPECIFIED CHRONICITY: ICD-10-CM

## 2023-12-11 DIAGNOSIS — M25.661 KNEE STIFFNESS, RIGHT: ICD-10-CM

## 2023-12-11 DIAGNOSIS — Z96.651 S/P TOTAL KNEE ARTHROPLASTY, RIGHT: Primary | ICD-10-CM

## 2023-12-11 PROCEDURE — 97110 THERAPEUTIC EXERCISES: CPT | Mod: GP

## 2023-12-11 PROCEDURE — 97112 NEUROMUSCULAR REEDUCATION: CPT | Mod: GP

## 2023-12-11 ASSESSMENT — PAIN - FUNCTIONAL ASSESSMENT: PAIN_FUNCTIONAL_ASSESSMENT: 0-10

## 2023-12-11 ASSESSMENT — PAIN DESCRIPTION - DESCRIPTORS: DESCRIPTORS: OTHER (COMMENT)

## 2023-12-11 ASSESSMENT — PAIN SCALES - GENERAL: PAINLEVEL_OUTOF10: 4

## 2023-12-12 NOTE — PROGRESS NOTES
"Physical Therapy    Physical Therapy Discharge    Patient Name: Khalida Barker  MRN: [id number]  Today's Date: 12/13/2023     Time Calculation  Start Time: 0932   Stop Time: 1007   Time Calculation (min): 35   Visit #10 (of 10) DISCHARGE    Assessment:   Pt has met all goals at this time. Her R LE strength and ROM now WFL. She is ambulating without device or with SPC for longer community distances. She is reciprocating on stairs with rail. Pain improved and under control. LEFS score improved by over 20 points from initial. She will be Dc'd to her HEP at this time. Pt in agreement.    By discharge, pt will meet the following goals:      Pt will demonstrate independence with home exercise program.--GOAL MET  Pt will tolerate increased exercise without adverse reaction.--GOAL MET  Pt will increase ROM of R knee to 0 to 123 deg.--GOAL MET   Pt will increase strength of R LE to at least 4 to 4+/5.--GOAL MET  Pt will improve score of LEFS by 9 points to meet MCID.--GOAL MET   Pt will report decrease in pain by 2 points to meet MCID.--GOAL MET  Pt will ascend/descend FOS in reciprocating pattern MOD I with rail.--GOAL MET  Pt will ambulate with LRAD for 1000ft prior to limitation.--GOAL MET    Plan:   DC from PT. Pt in agreement.     Current Problem  1. S/P total knee arthroplasty, right        2. Right knee pain, unspecified chronicity        3. Knee stiffness, right            General   Pt states she overall feels she has done well with PT. Reports her R knee function is improved compared to her TKA, and feels she can reach her 100% as she continues to heal. Feels she is ~70% functional level now, but does feel she can now manage independently on her own. At times notices that her gait can be off and feels she can walk \"like a penguin.\" This typically occurs after prolonged periods of sitting or inactivity, however she also reports there are times where she recognizes she is walking this way and feels she needs to focus on " this more. Reports pain rarely now exceeds 4/10, and feels stiffness is more the issue over pain. Reports she has to be more intentional about getting up and moving with prolonged sitting. Cites strategy of using timers to remind her to get up and move.   Reports she was able to walk for 1.5 hours at a time without needing a seated rest break using SPC.     Subjective    Precautions  Precautions  STEADI Fall Risk Score (The score of 4 or more indicates an increased risk of falling): 8  Vital Signs     Pain  Pain Assessment  Pain Assessment: 0-10  Pain Score: 2  Pain Location: Knee  Pain Descriptors: Tightness    Objective   Posture:   Fwd head, rounded shoulders, thoracic kyphosis     Knee Musculoskeletal Exam  Gait  Gait additional comments: Pt ambulates MOD I with SPC with hurry cane base.   CURRENT PATIENT AMBULATES INDEPENDENTLY      Range of Motion    Right      Right knee active extension: -1.; CURRENT 0 DEG      Right knee active flexion: 109.; CURRENT 125 DEG     Left      Passive extension: 0      Active flexion: 125     Strength    Right      Extension: 4+/5.; CURRENT 5/5      Flexion: 4+/5.; CURRENT 5/5     Left      Extension: 5/5.       Flexion: 5/5.      Outcome Measures:  Other Measures  Lower Extremity Funtional Score (LEFS): 31/80; CURRENT 52/80    Treatments:  Therapeutic Exercise (31080): 35 minutes  Objective measures for DC.     Reviewed Current HEP:   Seated HS stretch   Squats with UE support   Heel/toe raises   Step ups   Posterior step downs   Wall squat  Side stepping with red TB   Comments: provided updated handout with current HEP and tapered.

## 2023-12-13 ENCOUNTER — TREATMENT (OUTPATIENT)
Dept: PHYSICAL THERAPY | Facility: CLINIC | Age: 73
End: 2023-12-13
Payer: MEDICARE

## 2023-12-13 DIAGNOSIS — M25.661 KNEE STIFFNESS, RIGHT: ICD-10-CM

## 2023-12-13 DIAGNOSIS — Z96.651 S/P TOTAL KNEE ARTHROPLASTY, RIGHT: Primary | ICD-10-CM

## 2023-12-13 DIAGNOSIS — M25.561 RIGHT KNEE PAIN, UNSPECIFIED CHRONICITY: ICD-10-CM

## 2023-12-13 PROCEDURE — 97110 THERAPEUTIC EXERCISES: CPT | Mod: GP

## 2023-12-13 ASSESSMENT — PAIN - FUNCTIONAL ASSESSMENT: PAIN_FUNCTIONAL_ASSESSMENT: 0-10

## 2023-12-13 ASSESSMENT — PAIN SCALES - GENERAL: PAINLEVEL_OUTOF10: 2

## 2023-12-13 ASSESSMENT — PAIN DESCRIPTION - DESCRIPTORS: DESCRIPTORS: TIGHTNESS

## 2023-12-17 NOTE — PROGRESS NOTES
"Physical Therapy    Physical Therapy Evaluation and Treatment      Patient Name: Khalida Barker  MRN: [id number]  Today's Date: 12/18/2023  Time Calculation  Start Time: 0934  Stop Time: 1018  Time Calculation (min): 44 min  $30 Copay, $300 Ded, $2000 OOP Max, vs med nec, No auth, 1 Eval/180 Days     Assessment:  PT Assessment  PT Assessment Results: Decreased strength, Decreased range of motion, Pain  Rehab Prognosis: Good   Khalida Barker presents to physical therapy today with L sided pain and paresthesias at L pec minor region which appears consistent with subpectoral nerve entrapment vs more proximal at scalenes (TOS), etc. Pt also appears to have concomitant pain at L ACJ which appears consistent with OA, likely associated with scapular posturing and muscle dominance. Pt will benefit from PT to address these impairments.      Plan:  OP PT Plan  Treatment/Interventions: Dry needling, Education/ Instruction, Manual therapy, Neuromuscular re-education, Therapeutic activities, Therapeutic exercises, Cryotherapy  PT Plan: Skilled PT  PT Frequency: Other (Comment) (1-2x/week)  Duration: 10 visits  Onset Date: 08/01/23  Certification Period Start Date: 12/18/23  Certification Period End Date: 03/17/24  Rehab Potential: Good  Plan of Care Agreement: Patient    Current Problem:   1. Chronic left shoulder pain        2. Cervical radiculopathy  Referral to Physical Therapy      3. Neck pain            Subjective    General:  General  Reason for Referral: M54.12 (ICD-10-CM) - Cervical radiculopathy  Referred By: Donn Mortensen MD  Pt arrives at session with chief complaint of L sided neck pain and tingling which began a few months ago when in the shower. This has now transitioned into a burning pain at anterior chest/shoulder with shorter duration. Now feels more like \"twinges\". This occurred with horizontal adduction reaching and with L trunk rotation. These symptoms are more minor, but can experience symptoms as intense as " "\"liquid fire\".   Reports had a cervical CT scan and referring provider feels this is coming from her neck.   Has not had the \"fire\" symptoms for a while, but most recently occurred ~1 week ago.     Dizziness: (-); Diplopia: (-); Dysarthria: (-); Dysphagia: (-); Drop attacks: (-)    Pt reports R arm dominance.     Pain #: current: 0/10; at worst: 10/10; on average: 6-7/10 with twinges   Location: L anterior chest and medial aspect of shoulder, pec minor muscle  Descriptor: \"fire\" when at its worst; \"twinges\" otherwise  Better: rest  Worse: trunk rotations, horizontal adduction reaching, holding her cat with L arm, may start to occur with shoulder shrugs   Numbness/tingling: has experienced tingling in L UE- chronic carpal tunnel B   Difficult Activities: holding the cat, holding/carrying  Goals: \"To address my current pain.\"   PMH/surgical history: cervicalgia, groin pain, abdominal pain L lower quadrant, B CTS, L hip pain, post-herpetic neuralgia, obesity, anterior cervical fusion C4-5 2010, skin CA, shingles- in R LE- feels like a sunburn on her R lateral thigh, colonoscopy 9/2022 & endoscopy and was told to take omeprezole- does feel has helped so far for the \"stich in her side\" sensation, neuropathy, headaches     Precautions:  Precautions  STEADI Fall Risk Score (The score of 4 or more indicates an increased risk of falling): 3  Vital Signs:     Pain:  Pain Assessment  Pain Assessment: 0-10  Pain Score: 0 - No pain  Home Living:     Prior Level of Function:       Objective   Posture: fwd head, L scapula downwardly rotated, decreased upper thoracic spine mobility     Palpation: TTP over L ACJ, greater tenderness over L pec minor mm belly. Denies greater tenderness over coracoid process L side compared to R. Minor tenderness over levator scapulae     Extremity/Trunk Assessments:  Spine Musculoskeletal Exam    Range of Motion    Cervical Spine       Cervical flexion: normal. Cervical flexion detail: no pain.     " Cervical extension: reduced extension (26-50%). Cervical extension detail: no pain.       Right      Lateral bending: reduced bend (0-25%) (L side rotation during). Lateral bending detail: no pain.       Lateral rotation: reduced rotation (0-25%). Lateral rotation detail: no pain.       Left      Lateral bending: reduced bend (0-25%) (R side rotation during). Lateral bending detail: no pain.       Lateral rotation: reduced rotation (26-50%). Lateral rotation detail: no pain.      Shoulder Musculoskeletal Exam    Range of Motion    Right      Active forward elevation: 130.       Passive external rotation in abduction: 100.       Passive internal rotation in abduction: 30.     Left      Active forward elevation: 130.       Passive external rotation in abduction: 100.       Passive internal rotation in abduction: 30.     Range of motion additional comments: Behind neck reaching: T2 B   Behind back reaching: T10 B     Elbow Musculoskeletal Exam  Hand/Wrist Musculoskeletal Exam      MMT:   Joint Strength /5   Shoulder:  R L   Flexion     Scaption     Extension     ABD 5 4 to 4+, reports chronic nerve damage since her neck surgery    IR @ neutral 4+ 4+   ER @ neutral 4 to 4+ 4 to 4+   IR @ 90 deg ABD     ER @ 90 deg ABD     Horizontal ABD/Middle Trapezius     Lower Trapezius     Serratus Anterior          Elbow: R L   Extension     Flexion     Forearm supination     Forearm pronation          Wrist:  R L   Flexion 4+ to 4 4   Extension 5 4+ to 5   Radial Deviation     Ulnar Deviation          Hand:  R L    strength     Finger ABD/ADD WFL WFL   Thumb extension/ABD  4 4     Cervical Neuro Screen:     Dermatomes:   C4: L= greater sensitivity   Otherwise grossly WFL & same R to L side    Myotomes:     Reflexes:   Deep Tendon Reflexes:   Biceps: R= 2+; L= 2+  Brachioradialis: R= +; L= 2+  Triceps: R= 0; L= 2+    Outcome Measures:  Other Measures  Neck Disability Index: 4/50     EDUCATION:  Outpatient  "Education  Individual(s) Educated: Patient  Education Provided: Anatomy, Body Mechanics, POC  Risk and Benefits Discussed with Patient/Caregiver/Other: yes  Patient/Caregiver Demonstrated Understanding: yes  Plan of Care Discussed and Agreed Upon: yes  Patient Response to Education: Patient/Caregiver Verbalized Understanding of Information  Educated pt on basic anatomy of the neck and shoulder with visuals to facilitate understanding and with focus on pt's current symptoms.     Goals:  By discharge, pt will meet the following goals:     Pt will demonstrate independence with home exercise program.  Pt will tolerate increased exercise without adverse reaction.   Pt will report reduction in frequency or intensity of familiar symptoms by 50%.   Pt will report improved ability to lift and carry without production of familiar symptoms.   Pt will increase strength of L UE to at least 4 to 4+/5 all planes.   Pt will meet self reported goal of: \"Addressing my pain.\"       "

## 2023-12-18 ENCOUNTER — EVALUATION (OUTPATIENT)
Dept: PHYSICAL THERAPY | Facility: CLINIC | Age: 73
End: 2023-12-18
Payer: MEDICARE

## 2023-12-18 DIAGNOSIS — G89.29 CHRONIC LEFT SHOULDER PAIN: Primary | ICD-10-CM

## 2023-12-18 DIAGNOSIS — M25.512 CHRONIC LEFT SHOULDER PAIN: Primary | ICD-10-CM

## 2023-12-18 DIAGNOSIS — M54.12 CERVICAL RADICULOPATHY: ICD-10-CM

## 2023-12-18 DIAGNOSIS — M54.2 NECK PAIN: ICD-10-CM

## 2023-12-18 PROCEDURE — 97164 PT RE-EVAL EST PLAN CARE: CPT | Mod: GP

## 2023-12-18 ASSESSMENT — ENCOUNTER SYMPTOMS
DEPRESSION: 0
OCCASIONAL FEELINGS OF UNSTEADINESS: 0
LOSS OF SENSATION IN FEET: 1

## 2023-12-18 ASSESSMENT — PAIN SCALES - GENERAL: PAINLEVEL_OUTOF10: 0 - NO PAIN

## 2023-12-18 ASSESSMENT — PAIN - FUNCTIONAL ASSESSMENT: PAIN_FUNCTIONAL_ASSESSMENT: 0-10

## 2023-12-18 NOTE — LETTER
December 18, 2023    Donn Mortensen MD    Patient: Khalida Barker   YOB: 1950   Date of Visit: 12/18/2023       Dear Donn Mortensen Md  No address on file    The attached plan of care is being sent to you because your patient’s medical reimbursement requires that you certify the plan of care. Your signature is required to allow uninterrupted insurance coverage.      You may indicate your approval by signing below and faxing this form back to us at Dept Fax: [phone].    Please call Dept: [phone] with any questions or concerns.    Thank you for this referral,        Lara Hsieh, PT  [facility] [address]  [address] [facility] [address]    Payer: Payor: [employer] MEDICARE / Plan: [employer] GONZALEZ MEDICARE / Product Type: *No Product type* /                                                                         Date:     Dear Lara Hsieh PT,     Re: Ms. Khalida Barker, MRN:[id number]    I certify that I have reviewed the attached plan of care and it is medically necessary for Ms. Khalida Barker (1950) who is under my care.          ______________________________________                    _________________  Provider name and credentials                                           Date and time                                                                                           Plan of Care 12/18/23   Effective from: 12/18/2023  Effective to: 3/17/2024    Plan ID: [id number]            Participants as of Finalize on 12/18/2023    Name Type Comments Contact Info    Donn Mortensen MD Referring Provider Pt with Medicare insurance. Please sign to certify initial PT POC.     Lara Hsieh PT Physical Therapist  [phone]       Last Plan Note     Author: Lara Hsieh PT Status: Sign when Signing Visit Last edited: 12/18/2023  9:30 AM       Physical Therapy    Physical Therapy Evaluation and Treatment      Patient Name: Khalida Barker  MRN: [id number]  Today's Date:  "12/18/2023  Time Calculation  Start Time: 0934  Stop Time: 1018  Time Calculation (min): 44 min  $30 Copay, $300 Ded, $2000 OOP Max, vs med nec, No auth, 1 Eval/180 Days     Assessment:  PT Assessment  PT Assessment Results: Decreased strength, Decreased range of motion, Pain  Rehab Prognosis: Good   Khalida Barker presents to physical therapy today with L sided pain and paresthesias at L pec minor region which appears consistent with subpectoral nerve entrapment vs more proximal at scalenes (TOS), etc. Pt also appears to have concomitant pain at L ACJ which appears consistent with OA, likely associated with scapular posturing and muscle dominance. Pt will benefit from PT to address these impairments.      Plan:  OP PT Plan  Treatment/Interventions: Dry needling, Education/ Instruction, Manual therapy, Neuromuscular re-education, Therapeutic activities, Therapeutic exercises, Cryotherapy  PT Plan: Skilled PT  PT Frequency: Other (Comment) (1-2x/week)  Duration: 10 visits  Onset Date: 08/01/23  Certification Period Start Date: 12/18/23  Certification Period End Date: 03/17/24  Rehab Potential: Good  Plan of Care Agreement: Patient    Current Problem:   1. Chronic left shoulder pain        2. Cervical radiculopathy  Referral to Physical Therapy      3. Neck pain            Subjective    General:  General  Reason for Referral: M54.12 (ICD-10-CM) - Cervical radiculopathy  Referred By: Donn Mortensen MD  Pt arrives at session with chief complaint of L sided neck pain and tingling which began a few months ago when in the shower. This has now transitioned into a burning pain at anterior chest/shoulder with shorter duration. Now feels more like \"twinges\". This occurred with horizontal adduction reaching and with L trunk rotation. These symptoms are more minor, but can experience symptoms as intense as \"liquid fire\".   Reports had a cervical CT scan and referring provider feels this is coming from her neck.   Has not had the " "\"fire\" symptoms for a while, but most recently occurred ~1 week ago.     Dizziness: (-); Diplopia: (-); Dysarthria: (-); Dysphagia: (-); Drop attacks: (-)    Pt reports R arm dominance.     Pain #: current: 0/10; at worst: 10/10; on average: 6-7/10 with twinges   Location: L anterior chest and medial aspect of shoulder, pec minor muscle  Descriptor: \"fire\" when at its worst; \"twinges\" otherwise  Better: rest  Worse: trunk rotations, horizontal adduction reaching, holding her cat with L arm, may start to occur with shoulder shrugs   Numbness/tingling: has experienced tingling in L UE- chronic carpal tunnel B   Difficult Activities: holding the cat, holding/carrying  Goals: \"To address my current pain.\"   PMH/surgical history: cervicalgia, groin pain, abdominal pain L lower quadrant, B CTS, L hip pain, post-herpetic neuralgia, obesity, anterior cervical fusion C4-5 2010, skin CA, shingles- in R LE- feels like a sunburn on her R lateral thigh, colonoscopy 9/2022 & endoscopy and was told to take omeprezole- does feel has helped so far for the \"stich in her side\" sensation, neuropathy, headaches     Precautions:  Precautions  STEADI Fall Risk Score (The score of 4 or more indicates an increased risk of falling): 3  Vital Signs:     Pain:  Pain Assessment  Pain Assessment: 0-10  Pain Score: 0 - No pain  Home Living:     Prior Level of Function:       Objective   Posture: fwd head, L scapula downwardly rotated, decreased upper thoracic spine mobility     Palpation: TTP over L ACJ, greater tenderness over L pec minor mm belly. Denies greater tenderness over coracoid process L side compared to R. Minor tenderness over levator scapulae     Extremity/Trunk Assessments:  Spine Musculoskeletal Exam    Range of Motion    Cervical Spine       Cervical flexion: normal. Cervical flexion detail: no pain.     Cervical extension: reduced extension (26-50%). Cervical extension detail: no pain.       Right      Lateral bending: reduced " bend (0-25%) (L side rotation during). Lateral bending detail: no pain.       Lateral rotation: reduced rotation (0-25%). Lateral rotation detail: no pain.       Left      Lateral bending: reduced bend (0-25%) (R side rotation during). Lateral bending detail: no pain.       Lateral rotation: reduced rotation (26-50%). Lateral rotation detail: no pain.      Shoulder Musculoskeletal Exam    Range of Motion    Right      Active forward elevation: 130.       Passive external rotation in abduction: 100.       Passive internal rotation in abduction: 30.     Left      Active forward elevation: 130.       Passive external rotation in abduction: 100.       Passive internal rotation in abduction: 30.     Range of motion additional comments: Behind neck reaching: T2 B   Behind back reaching: T10 B     Elbow Musculoskeletal Exam  Hand/Wrist Musculoskeletal Exam      MMT:   Joint Strength /5   Shoulder:  R L   Flexion     Scaption     Extension     ABD 5 4 to 4+, reports chronic nerve damage since her neck surgery    IR @ neutral 4+ 4+   ER @ neutral 4 to 4+ 4 to 4+   IR @ 90 deg ABD     ER @ 90 deg ABD     Horizontal ABD/Middle Trapezius     Lower Trapezius     Serratus Anterior          Elbow: R L   Extension     Flexion     Forearm supination     Forearm pronation          Wrist:  R L   Flexion 4+ to 4 4   Extension 5 4+ to 5   Radial Deviation     Ulnar Deviation          Hand:  R L    strength     Finger ABD/ADD WFL WFL   Thumb extension/ABD  4 4     Cervical Neuro Screen:     Dermatomes:   C4: L= greater sensitivity   Otherwise grossly WFL & same R to L side    Myotomes:     Reflexes:   Deep Tendon Reflexes:   Biceps: R= 2+; L= 2+  Brachioradialis: R= +; L= 2+  Triceps: R= 0; L= 2+    Outcome Measures:  Other Measures  Neck Disability Index: 4/50     EDUCATION:  Outpatient Education  Individual(s) Educated: Patient  Education Provided: Anatomy, Body Mechanics, POC  Risk and Benefits Discussed with  "Patient/Caregiver/Other: yes  Patient/Caregiver Demonstrated Understanding: yes  Plan of Care Discussed and Agreed Upon: yes  Patient Response to Education: Patient/Caregiver Verbalized Understanding of Information  Educated pt on basic anatomy of the neck and shoulder with visuals to facilitate understanding and with focus on pt's current symptoms.     Goals:  By discharge, pt will meet the following goals:     Pt will demonstrate independence with home exercise program.  Pt will tolerate increased exercise without adverse reaction.   Pt will report reduction in frequency or intensity of familiar symptoms by 50%.   Pt will report improved ability to lift and carry without production of familiar symptoms.   Pt will increase strength of L UE to at least 4 to 4+/5 all planes.   Pt will meet self reported goal of: \"Addressing my pain.\"              Current Participants as of 12/18/2023    Name Type Comments Contact Info    Donn Mortensen MD Referring Provider Pt with Medicare insurance. Please sign to certify initial PT POC.     Signature pending    Lara Hsieh, PT Physical Therapist  [phone]          "

## 2023-12-19 NOTE — PROGRESS NOTES
"Physical Therapy    Physical Therapy Treatment    Patient Name: Khalida Barker  MRN: [id number]  Today's Date: 12/20/2023  Time Calculation  Start Time: 0935  Stop Time: 1015  Time Calculation (min): 40 min  Visit #2 (of 10)   $30 Copay, $300 Ded, $2000 OOP Max, vs med nec, No auth, 1 Eval/180 Days     Assessment:   Pt tolerated session well without adverse effect. Issued initial HEP this date with focus on thoracic outlet. Pt did have production of L UE symptoms with cervical quadrant testing, however her familiar symptoms were not provoked. Noted decreased L cervical rotation and fwd head posturing, which will be addressed with HEP and upcoming sessions.     Plan:   Assess ULTTs  Review new HEP: supine chin tucks,   SNAGs leftward.     Current Problem  1. Chronic left shoulder pain        2. Neck pain            General   Pt states she continues to get her \"twinging\" symptoms of L side when reaching across her body and reaching, however below shoulder height. Since initial, has not experienced her \"fire\" symptoms.      Subjective    Precautions  Precautions  STEADI Fall Risk Score (The score of 4 or more indicates an increased risk of falling): 3  Vital Signs     Pain  Pain Assessment  Pain Assessment: 0-10  Pain Score: 0 - No pain    Objective   Strength assessment:   Middle trapezius: 4/5 B    Lower trapezius: 2/5, significant UT compensations B    Elbow extension: R= 4+/5; L= 4 to 4+/5    Elbow flexion: 5/5 B     Joint mobility assessment:   Thoracic spine: greater stiffness of upper thoracic spine   CTJ: stiffness      Special testing:   --Cervical compression: (-)  --Cervical Quadrant Testing: (-), however did provoke radiating L lateral arm discomfort in end ranges both toward R side neck movements and L side, however does not provoke her familiar symptoms of L lateral chest discomfort    --Cervical rotation, flexion test in supine: less leftward rotation overall  --Cervical flexion, rotation: less leftward " rotation    Treatments:  Therapeutic Exercise (38244): 35 minutes  Objective measures.     Pec minor stretch to L side in sidelying. 3x30 sec holds. Pt in R sidelying.    Current HEP:   Supine chin tucks x10 reps with 10 sec holds  Pec minor stretch in doorway  1st rib mobilization with belt, L side x10 reps    Manual Therapy (35997): 5 minutes  Scapular mobilizations including upward rotation, elevation, abduction to L scapula. Pt in R S/L. X10 reps each plane.

## 2023-12-20 ENCOUNTER — TREATMENT (OUTPATIENT)
Dept: PHYSICAL THERAPY | Facility: CLINIC | Age: 73
End: 2023-12-20
Payer: MEDICARE

## 2023-12-20 ENCOUNTER — APPOINTMENT (OUTPATIENT)
Dept: PHYSICAL THERAPY | Facility: CLINIC | Age: 73
End: 2023-12-20
Payer: MEDICARE

## 2023-12-20 DIAGNOSIS — G89.29 CHRONIC LEFT SHOULDER PAIN: Primary | ICD-10-CM

## 2023-12-20 DIAGNOSIS — M54.2 NECK PAIN: ICD-10-CM

## 2023-12-20 DIAGNOSIS — M25.512 CHRONIC LEFT SHOULDER PAIN: Primary | ICD-10-CM

## 2023-12-20 PROCEDURE — 97110 THERAPEUTIC EXERCISES: CPT | Mod: GP

## 2023-12-20 ASSESSMENT — PAIN SCALES - GENERAL: PAINLEVEL_OUTOF10: 0 - NO PAIN

## 2023-12-20 ASSESSMENT — PAIN - FUNCTIONAL ASSESSMENT: PAIN_FUNCTIONAL_ASSESSMENT: 0-10

## 2024-01-02 ENCOUNTER — APPOINTMENT (OUTPATIENT)
Dept: PHYSICAL THERAPY | Facility: CLINIC | Age: 74
End: 2024-01-02
Payer: MEDICARE

## 2024-01-07 NOTE — PROGRESS NOTES
"Physical Therapy    Physical Therapy Treatment    Patient Name: Khalida Barker  MRN: [id number]  Today's Date: 1/9/2024  Time Calculation  Start Time: 0933  Stop Time: 1015  Time Calculation (min): 42 min  Visit #3 (of 10)   $30 Copay, $300 Ded, $2000 OOP Max, vs med nec, No auth, 1 Eval/180 Days     Assessment:   Pt tolerated session well without adverse effect. Pt benefits from cues for optimal performance of HEP. Challenged with correct form/technique of cervical SNAGs which were assigned due to decreased leftward cervical rotation. She will continue to benefit from PT working on cervical and thoracic posturing.     Plan:   Assess ULTTs  Review new HEP: cervical SNAGs, cervical retractions, scapular retractions.   Upper thoracic extension mobilizations & potentially add to home program.    Periscap strengthening:   S/L flexion  Scapular retraction + GHJ ER with TB   Lower trap sets at wall   GHJ ER   Prone Thoracic PA glides   Prone CTJ mobilizations  Seated EOB CTJ mobilization   Scapular mobilizations    Current Problem  1. Chronic left shoulder pain        2. Neck pain          General   Pt states did have an illness since last visit and this affected her ability to perform her HEP. Reports did have difficulty with R knee secondary to \"overdoing it\" on her bike.   Had only 1-2 episodes of \"fire\" symptoms of L side neck region, one of which occurred when reaching overhead in the library.   Reports her \"fire\" symptoms frequency is about the same as before and feels very random. Has had \"twinges\" that can radiate in the same area which is more frequent than the \"fire\" symptoms, that has radiated to the L side of her neck.     Subjective    Precautions  Precautions  STEADI Fall Risk Score (The score of 4 or more indicates an increased risk of falling): 3  Vital Signs     Pain  Pain Assessment  Pain Assessment: 0-10  Pain Score: 0 - No pain    Objective   Decreased L cervical rotation compared to R side in " sitting.    Treatments:  Therapeutic Exercise (91568): 42 minutes  Objective measures.     Pec minor stretch to L side in sidelying. 3x30 sec holds. Pt in R sidelying.    Reviewed Current HEP:   Supine chin tucks x10 reps with 10 sec holds  Pec minor stretch in doorway  1st rib mobilization with belt, L side x10 reps  Cervical retractions x10 (added today)  Scapular retractions x10 (added today)   SNAGs leftward 3x10 (added today)

## 2024-01-09 ENCOUNTER — TREATMENT (OUTPATIENT)
Dept: PHYSICAL THERAPY | Facility: CLINIC | Age: 74
End: 2024-01-09
Payer: MEDICARE

## 2024-01-09 DIAGNOSIS — G89.29 CHRONIC LEFT SHOULDER PAIN: Primary | ICD-10-CM

## 2024-01-09 DIAGNOSIS — M25.512 CHRONIC LEFT SHOULDER PAIN: Primary | ICD-10-CM

## 2024-01-09 DIAGNOSIS — M54.2 NECK PAIN: ICD-10-CM

## 2024-01-09 PROCEDURE — 97110 THERAPEUTIC EXERCISES: CPT | Mod: GP

## 2024-01-09 ASSESSMENT — PAIN SCALES - GENERAL: PAINLEVEL_OUTOF10: 0 - NO PAIN

## 2024-01-09 ASSESSMENT — PAIN - FUNCTIONAL ASSESSMENT: PAIN_FUNCTIONAL_ASSESSMENT: 0-10

## 2024-01-14 NOTE — PROGRESS NOTES
"Physical Therapy    Physical Therapy Treatment    Patient Name: Khalida Barker  MRN: [id number]  Today's Date: 1/16/2024  Time Calculation  Start Time: 0934  Stop Time: 1017  Time Calculation (min): 43 min  Visit #3 (of 10)   $30 Copay, $300 Ded, $2000 OOP Max, vs med nec, No auth, 1 Eval/180 Days     Assessment:   Pt tolerated session well without adverse effect. Per subjective, pt has not had severe \"fire\" symptoms of L side neck/chest/arm since last visit. Tolerates progressions of periscapular strengthening well. She will continue to benefit from PT working on cervical and thoracic posturing.     Plan:   Review new HEP: thoracic extension at chair, scapular retractions + GHJ ER with red band & shoulder extension + scapular retraction with red TB.   Seated CTJ mobilizations   Prone Thoracic PA glides   Prone CTJ mobilizations  Scapular mobilizations  Periscap strengthening:   S/L flexion  Lower trap sets at wall   GHJ ER     Current Problem  1. Chronic left shoulder pain        2. S/P total knee arthroplasty, right  Follow Up In Physical Therapy      3. Right knee pain, unspecified chronicity  Follow Up In Physical Therapy      4. Knee stiffness, right  Follow Up In Physical Therapy      5. Neck pain            General   Pt states she has been working on her cervical retractions when she thinks about it. Since last visit has not had any of the \"fire\" symptoms of her L UE, however occasional \"twinges.\"     Subjective    Precautions  Precautions  STEADI Fall Risk Score (The score of 4 or more indicates an increased risk of falling): 3  Vital Signs     Pain  Pain Assessment  Pain Assessment: 0-10  Pain Score: 0 - No pain    Objective   Decreased L cervical rotation compared to R side in sitting.    Assessed ULTTs:   L= pt with increases in stretch/pain with median > ulnar > radial  R= slight increase in sensitivity with radial than median or ulnar     Treatments:  Therapeutic Exercise (19261): 38 minutes  Reviewed " Current HEP:   Supine chin tucks  Pec minor stretch in doorway 2x30 sec holds  1st rib mobilization with belt, L side x10 reps  Cervical retractions x10, cues to avoid opening jaw  Scapular retractions x10, tactile cue for full excursion  SNAGs leftward x10   Scapular retraction + GHJ ER with red TB 2x10 (added today)  Scapular retraction + shoulder extension with red TB (added today)   Thoracic extension mobilizations at chair (added today)     Manual Therapy (90941): 5 minutes  Seated thoracic extension mobilizations in chair 3x10

## 2024-01-16 ENCOUNTER — TREATMENT (OUTPATIENT)
Dept: PHYSICAL THERAPY | Facility: CLINIC | Age: 74
End: 2024-01-16
Payer: MEDICARE

## 2024-01-16 DIAGNOSIS — Z96.651 S/P TOTAL KNEE ARTHROPLASTY, RIGHT: ICD-10-CM

## 2024-01-16 DIAGNOSIS — M25.661 KNEE STIFFNESS, RIGHT: ICD-10-CM

## 2024-01-16 DIAGNOSIS — G89.29 CHRONIC LEFT SHOULDER PAIN: Primary | ICD-10-CM

## 2024-01-16 DIAGNOSIS — M25.512 CHRONIC LEFT SHOULDER PAIN: Primary | ICD-10-CM

## 2024-01-16 DIAGNOSIS — M54.2 NECK PAIN: ICD-10-CM

## 2024-01-16 DIAGNOSIS — M25.561 RIGHT KNEE PAIN, UNSPECIFIED CHRONICITY: ICD-10-CM

## 2024-01-16 PROCEDURE — 97110 THERAPEUTIC EXERCISES: CPT | Mod: GP

## 2024-01-16 ASSESSMENT — PAIN SCALES - GENERAL: PAINLEVEL_OUTOF10: 0 - NO PAIN

## 2024-01-16 ASSESSMENT — PAIN - FUNCTIONAL ASSESSMENT: PAIN_FUNCTIONAL_ASSESSMENT: 0-10

## 2024-01-21 NOTE — PROGRESS NOTES
"Physical Therapy    Physical Therapy Treatment    Patient Name: Khalida Barker  MRN: [id number]  Today's Date: 1/23/2024  Time Calculation  Start Time: 0934  Stop Time: 1015  Time Calculation (min): 41 min  Visit #4 (of 10)   $30 Copay, $300 Ded, $2000 OOP Max, vs med nec, No auth, 1 Eval/180 Days     Assessment:   Pt tolerated session well without adverse effect and is making progress toward goals. Per subjective, pt has had a reduction in intensity of familiar L sided chest and neck symptoms, as well as overall low frequency ~1x/day. She may continue to benefit from PT working on cervical and thoracic posturing,     Plan:   Continue with manual therapy.   As able:   Periscap strengthening:   S/L flexion  Lower trap sets at wall   [facility] [place] [facility]   If needed:   Scapular mobilizations      Current Problem  1. Chronic left shoulder pain        2. S/P total knee arthroplasty, right  Follow Up In Physical Therapy      3. Right knee pain, unspecified chronicity  Follow Up In Physical Therapy      4. Knee stiffness, right  Follow Up In Physical Therapy      5. Neck pain          General   Pt states she did have a \"fire\" experience since last visit in which she was descending stairs and on the last step had landed harder with more impact and experienced these symptoms in her L UT/chest region. Has noticed over the last week \"fluttering/little electrical shocks\" over L anterior chest wall, and fewer \"twinges\" or painful symptoms in the same location. Reports the \"fluttering/little electrical shocks have occurred ~3x over the last 3-4. L arm fatigues when performing her HEP.  neurology appt this date was R/S by provider for next week.     Subjective    Precautions  Precautions  STEADI Fall Risk Score (The score of 4 or more indicates an increased risk of falling): 3  Vital Signs  Vital Signs  Heart Rate: 85  SpO2: 95 %  Pain  Pain Assessment  Pain Assessment: 0-10  Pain Score: 0 - No pain    Objective     Treatments:  Therapeutic " Exercise (60289): 21 minutes  Reviewed Current HEP:   Supine chin tucks  Pec minor stretch in doorway   1st rib mobilization with belt, L side   Cervical retractions   Scapular retractions   SNAGs leftward   Scapular retraction + GHJ ER with red TB 2x10, occasional cues for full scapular retraction  Scapular retraction + shoulder extension with red TB 2x10, cues for full scapular retraction and adequate elbow flexion  Thoracic extension mobilizations at chair 2x10    Manual Therapy (58238): 20 minutes  Seated CTJ mobilizations x10 reps   Prone Thoracic PA glides 4x5 reps for lower and mid thoracic spine  Prone CTJ mobilizations 4x10 reps

## 2024-01-23 ENCOUNTER — TREATMENT (OUTPATIENT)
Dept: PHYSICAL THERAPY | Facility: CLINIC | Age: 74
End: 2024-01-23
Payer: MEDICARE

## 2024-01-23 ENCOUNTER — APPOINTMENT (OUTPATIENT)
Dept: NEUROLOGY | Facility: CLINIC | Age: 74
End: 2024-01-23
Payer: MEDICARE

## 2024-01-23 VITALS — OXYGEN SATURATION: 95 % | HEART RATE: 85 BPM

## 2024-01-23 DIAGNOSIS — M54.2 NECK PAIN: ICD-10-CM

## 2024-01-23 DIAGNOSIS — M25.561 RIGHT KNEE PAIN, UNSPECIFIED CHRONICITY: ICD-10-CM

## 2024-01-23 DIAGNOSIS — Z96.651 S/P TOTAL KNEE ARTHROPLASTY, RIGHT: ICD-10-CM

## 2024-01-23 DIAGNOSIS — M25.512 CHRONIC LEFT SHOULDER PAIN: Primary | ICD-10-CM

## 2024-01-23 DIAGNOSIS — M25.661 KNEE STIFFNESS, RIGHT: ICD-10-CM

## 2024-01-23 DIAGNOSIS — G89.29 CHRONIC LEFT SHOULDER PAIN: Primary | ICD-10-CM

## 2024-01-23 PROCEDURE — 97140 MANUAL THERAPY 1/> REGIONS: CPT | Mod: GP

## 2024-01-23 PROCEDURE — 97110 THERAPEUTIC EXERCISES: CPT | Mod: GP

## 2024-01-23 ASSESSMENT — PAIN - FUNCTIONAL ASSESSMENT: PAIN_FUNCTIONAL_ASSESSMENT: 0-10

## 2024-01-23 ASSESSMENT — PAIN SCALES - GENERAL: PAINLEVEL_OUTOF10: 0 - NO PAIN

## 2024-01-30 ENCOUNTER — APPOINTMENT (OUTPATIENT)
Dept: PHYSICAL THERAPY | Facility: CLINIC | Age: 74
End: 2024-01-30
Payer: MEDICARE

## 2024-01-30 NOTE — PROGRESS NOTES
"Physical Therapy    Physical Therapy Treatment    Patient Name: Khalida Barker  MRN: [id number]  Today's Date: 1/31/2024  Time Calculation  Start Time: 0935  Stop Time: 1013  Time Calculation (min): 38 min  Visit #5 (of 10)   $30 Copay, $300 Ded, $2000 OOP Max, vs med nec, No auth, 1 Eval/180 Days     Assessment:   Pt tolerated session well without adverse effect and is making progress toward goals. Per subjective, pt has been without her higher intensity \"fire\" symptoms of L UT region. Continues to have L anterior pectoral \"twinges\" likely associated with TOS, however responding well to current HEP and overall reduced volume. She may continue to benefit from PT working on cervical and thoracic posturing, however likely approaching DC.     Plan:   Likely re-assessment for DC.   Continue with manual therapy.   If needed:   GHJ ER   Scapular mobilizations      Current Problem  1. Chronic left shoulder pain        2. S/P total knee arthroplasty, right  Follow Up In Physical Therapy      3. Right knee pain, unspecified chronicity  Follow Up In Physical Therapy      4. Knee stiffness, right  Follow Up In Physical Therapy      5. Neck pain            General   Pt states she had a weird day yesterday, and wonders if this was due to completing her exercise program for both neck & R knee in a 30 min window. Over the last week she has not had any \"fire\" symptoms but did experience tingling along the back of her L scapula, which she has not experienced before. Reports at the time she was not doing anything in particular. This lasted for <1 minute before resolving. Continues to experience \"twinges\" of L anterior chest wall at same frequency compared to last week.   Has been 2 weeks since experiencing her higher intensity \"fire\" symptoms.     Subjective    Precautions  Precautions  STEADI Fall Risk Score (The score of 4 or more indicates an increased risk of falling): 3  Vital Signs     Pain  Pain Assessment  Pain Assessment: " 0-10  Pain Score: 0 - No pain    Objective     Treatments:  Therapeutic Exercise (84844): 15 minutes  Periscap strengthening:   S/L flexion x10 reps without weight; 2x10 reps with 1# dumbbell  Lower trap sets at wall 2x10    Current HEP:   Supine chin tucks  Pec minor stretch in doorway   1st rib mobilization with belt, L side   Cervical retractions   Scapular retractions   SNAGs leftward   Scapular retraction + GHJ ER with red TB  Scapular retraction + shoulder extension with red TB   Thoracic extension mobilizations at chair     Manual Therapy (91422): 23 minutes  Prone Thoracic PA glides 4x5 reps for lower and mid thoracic spine  Prone CTJ mobilizations 5x10 reps  Seated EOB CTJ mobilizations x10 reps

## 2024-01-31 ENCOUNTER — OFFICE VISIT (OUTPATIENT)
Dept: NEUROLOGY | Facility: CLINIC | Age: 74
End: 2024-01-31
Payer: MEDICARE

## 2024-01-31 ENCOUNTER — TREATMENT (OUTPATIENT)
Dept: PHYSICAL THERAPY | Facility: CLINIC | Age: 74
End: 2024-01-31
Payer: MEDICARE

## 2024-01-31 VITALS
DIASTOLIC BLOOD PRESSURE: 92 MMHG | HEART RATE: 66 BPM | HEIGHT: 65 IN | WEIGHT: 193.7 LBS | SYSTOLIC BLOOD PRESSURE: 156 MMHG | TEMPERATURE: 96.2 F | BODY MASS INDEX: 32.27 KG/M2

## 2024-01-31 DIAGNOSIS — G89.29 CHRONIC LEFT SHOULDER PAIN: Primary | ICD-10-CM

## 2024-01-31 DIAGNOSIS — M54.2 NECK PAIN: ICD-10-CM

## 2024-01-31 DIAGNOSIS — M25.561 RIGHT KNEE PAIN, UNSPECIFIED CHRONICITY: ICD-10-CM

## 2024-01-31 DIAGNOSIS — M54.9 MID BACK PAIN ON LEFT SIDE: Primary | ICD-10-CM

## 2024-01-31 DIAGNOSIS — M25.661 KNEE STIFFNESS, RIGHT: ICD-10-CM

## 2024-01-31 DIAGNOSIS — M25.512 CHRONIC LEFT SHOULDER PAIN: Primary | ICD-10-CM

## 2024-01-31 DIAGNOSIS — Z96.651 S/P TOTAL KNEE ARTHROPLASTY, RIGHT: ICD-10-CM

## 2024-01-31 DIAGNOSIS — R10.9 LEFT SIDED ABDOMINAL PAIN: ICD-10-CM

## 2024-01-31 PROCEDURE — 1125F AMNT PAIN NOTED PAIN PRSNT: CPT | Performed by: PSYCHIATRY & NEUROLOGY

## 2024-01-31 PROCEDURE — 3008F BODY MASS INDEX DOCD: CPT | Performed by: PSYCHIATRY & NEUROLOGY

## 2024-01-31 PROCEDURE — 1036F TOBACCO NON-USER: CPT | Performed by: PSYCHIATRY & NEUROLOGY

## 2024-01-31 PROCEDURE — 99204 OFFICE O/P NEW MOD 45 MIN: CPT | Performed by: PSYCHIATRY & NEUROLOGY

## 2024-01-31 PROCEDURE — 97140 MANUAL THERAPY 1/> REGIONS: CPT | Mod: GP

## 2024-01-31 PROCEDURE — 1159F MED LIST DOCD IN RCRD: CPT | Performed by: PSYCHIATRY & NEUROLOGY

## 2024-01-31 PROCEDURE — 97110 THERAPEUTIC EXERCISES: CPT | Mod: GP

## 2024-01-31 RX ORDER — DIAZEPAM 10 MG/1
TABLET ORAL
Qty: 1 TABLET | Refills: 0 | Status: ON HOLD | OUTPATIENT
Start: 2024-01-31 | End: 2024-05-14 | Stop reason: ALTCHOICE

## 2024-01-31 RX ORDER — NAPROXEN SODIUM 220 MG
220 TABLET ORAL EVERY 12 HOURS PRN
COMMUNITY
Start: 2023-06-12 | End: 2024-05-22 | Stop reason: HOSPADM

## 2024-01-31 ASSESSMENT — LIFESTYLE VARIABLES
HOW OFTEN DO YOU HAVE A DRINK CONTAINING ALCOHOL: NEVER
AUDIT-C TOTAL SCORE: 0
HOW MANY STANDARD DRINKS CONTAINING ALCOHOL DO YOU HAVE ON A TYPICAL DAY: PATIENT DOES NOT DRINK
SKIP TO QUESTIONS 9-10: 1
HOW OFTEN DO YOU HAVE SIX OR MORE DRINKS ON ONE OCCASION: NEVER

## 2024-01-31 ASSESSMENT — PATIENT HEALTH QUESTIONNAIRE - PHQ9
1. LITTLE INTEREST OR PLEASURE IN DOING THINGS: NOT AT ALL
SUM OF ALL RESPONSES TO PHQ9 QUESTIONS 1 & 2: 0
2. FEELING DOWN, DEPRESSED OR HOPELESS: NOT AT ALL

## 2024-01-31 ASSESSMENT — PAIN - FUNCTIONAL ASSESSMENT: PAIN_FUNCTIONAL_ASSESSMENT: 0-10

## 2024-01-31 ASSESSMENT — PAIN SCALES - GENERAL: PAINLEVEL_OUTOF10: 0 - NO PAIN

## 2024-01-31 NOTE — PROGRESS NOTES
Consulting Physician: Dr. Rushing    Chief Complaint: Neuralgia    History Of Present Illness  Khalida Barker is a 73 y.o. female presenting with neuralgia.    In 2022, she developed a pain in her left abdomen radiating to her back.  She describes the pain as a squeezing sensation.  She denies any pins and needles, burning, or electrical shocks in that region.  She denies any numbness in that region.  She does have chronic low back pain radiating down her left leg.  She also has knee pain.  Both these issues cause her difficulty walking.  She never had a rash in that region.  The pain is constant but varies in intensity.  She notes that sitting makes her left sided abdominal pain worse.       Past Medical History  Past Medical History:   Diagnosis Date    Body mass index (BMI) 31.0-31.9, adult 11/08/2021    BMI 31.0-31.9,adult    Personal history of other endocrine, nutritional and metabolic disease 11/08/2021    History of obesity       Surgical History  Past Surgical History:   Procedure Laterality Date    OTHER SURGICAL HISTORY  11/05/2021    Breast biopsy    OTHER SURGICAL HISTORY  11/05/2021    Anterior cervical vertebral fusion    OTHER SURGICAL HISTORY  11/05/2021    [place] tooth extraction    OTHER SURGICAL HISTORY  11/05/2021    Tonsillectomy with adenoidectomy    OTHER SURGICAL HISTORY  11/05/2021    Skin lesion excision    OTHER SURGICAL HISTORY  11/08/2021    Oral surgery    OTHER SURGICAL HISTORY  10/11/2022    Colonoscopy       Family History  Family History   Problem Relation Name Age of Onset    Alzheimer's disease Mother      Heart disease Father      Polymyositis Father      Hypertension Brother      Skin cancer Brother      Other (peripheral artery disease) Brother          Social History   reports that she has never smoked. She has never used smokeless tobacco. She reports that she does not currently use alcohol. She reports that she does not use drugs.     Allergies  Patient has no known  "allergies.    Medications    Current Outpatient Medications:     acetaminophen (Tylenol Extra Strength) 500 mg tablet, 1 tablet (500 mg). EVERY 4 TO 6 HOURS AS NEEDED., Disp: , Rfl:     ASPIRIN-ACETAMINOPHEN-CAFFEINE ORAL, Take 2 every morning and 1-2 more if needed, Disp: , Rfl:     BIOTIN ORAL, caps, Disp: , Rfl:     cholecalciferol (Vitamin D-3) 50 mcg (2,000 unit) capsule, Take 1 capsule (50 mcg) by mouth once daily., Disp: , Rfl:     docusate sodium (Colace) 100 mg capsule, Take 1 capsule (100 mg) by mouth twice a day., Disp: , Rfl:     meloxicam (Mobic) 15 mg tablet, Take 1 tablet (15 mg) by mouth once daily as needed., Disp: , Rfl:     multivit-min/ferrous fumarate (MULTI VITAMIN ORAL), Take 1 tablet by mouth once daily., Disp: , Rfl:     naproxen sodium (Aleve) 220 mg tablet, Take by mouth., Disp: , Rfl:     polyvinyl alcohol (LIQUID TEARS OPHT), INSTILL 1-2 DROPS INTO AFFECTED EYE(S) 4 TIMES DAILY AS DIRECTED., Disp: , Rfl:       Last Recorded Vitals   Blood pressure (!) 156/92, pulse 66, temperature 35.7 °C (96.2 °F), temperature source Temporal, height 1.651 m (5' 5\"), weight 87.9 kg (193 lb 11.2 oz).    Objective:  Gen: NAD  Neuro:  --HIF: A&O X 3, repetition and naming intact  --CN:  PERRLA, EOMI, VFF, no visible facial asymmetry, facial sensation intact, no tongue or palatal deviation, SCM intact  --Motor: Moves all 4 extremities equally; no focal deficits  --Sensory: Intact to light touch, intact to pinprick  --Reflex: 2+ in UE, 2_ in the left patella, absent in the right, absent in both achilles, toes down  --Cerebellum: FTN and HTS intact  --Gait: Antalgic Gait    Relevant Results  Lab Results   Component Value Date    WBC 4.1 (L) 06/27/2023    HGB 14.0 06/27/2023    HCT 42.4 06/27/2023    MCV 97 06/27/2023     06/27/2023       Lab Results   Component Value Date    GLUCOSE 93 06/27/2023    CALCIUM 8.8 06/27/2023     06/27/2023    K 3.8 06/27/2023    CO2 25 06/27/2023     (H) " "06/27/2023    BUN 12 06/27/2023    CREATININE 0.71 06/27/2023       No results found for: \"HGBA1C\"    Lab Results   Component Value Date    CHOL 184 06/07/2023    CHOL 188 09/09/2022    CHOL 191 10/28/2021     Lab Results   Component Value Date    HDL 44.8 06/07/2023    HDL 43.0 09/09/2022    HDL 45.0 10/28/2021     No results found for: \"LDLCALC\"  Lab Results   Component Value Date    TRIG 121 06/07/2023    TRIG 135 09/09/2022    TRIG 107 10/28/2021     No components found for: \"CHOLHDL\"       Assessment:   Mid Back Pain - radiating to her abdomen  - the patient has had pain in her mid back radiating to her mid abdomen for the past 1.5 years; pain is constant and varies in intensity (described as a squeezing sensation)  - symptoms are in the distribution of the T7 dermatome    PLAN:  - MRI Thoracic Spine  - Valium prescription given - patient instructed to have someone drive her to and from the MRI    Levy Perez MD  [facility]  Department of Neurology        A copy of this note was sent to the referring provider.    "

## 2024-02-04 NOTE — PROGRESS NOTES
"Physical Therapy    Physical Therapy Progress Note & Discharge    Patient Name: Khalida Barker  MRN: [id number]  Today's Date: 2/6/2024  Time Calculation  Start Time: 0935  Stop Time: 1013  Time Calculation (min): 38 min  Visit #6 (of 10) DISCHARGE   $30 Copay, $300 Ded, $2000 OOP Max, vs med nec, No auth, 1 Eval/180 Days     Assessment:   Pt has met all goals at this time. Pt reporting 50% reduction in frequency and intensity of familiar symptoms compared to initial evaluation. She is now independent with her HEP at this time. Portion of session also spent assessing pt's gait, as she feels this has been declining. However, noted improvement with cues to increase gait speed. Provided verbal review of exercises from prior episodes of care and encouraged pt to resume. She will be Dc'd to her HEP at this time. Pt in agreement.     Goals:  By discharge, pt will meet the following goals:      Pt will demonstrate independence with home exercise program.--GOAL MET  Pt will tolerate increased exercise without adverse reaction.--GOAL MET  Pt will report reduction in frequency or intensity of familiar symptoms by 50%.--GOAL MET  Pt will report improved ability to lift and carry without production of familiar symptoms.--GOAL MET  Pt will increase strength of L UE to at least 4 to 4+/5 all planes.--GOAL MET  Pt will meet self reported goal of: \"Addressing my pain.\"--GOAL MET    Plan:   Discharge from PT. Pt in agreement.     Current Problem  1. Chronic left shoulder pain        2. S/P total knee arthroplasty, right  Follow Up In Physical Therapy      3. Right knee pain, unspecified chronicity  Follow Up In Physical Therapy      4. Knee stiffness, right  Follow Up In Physical Therapy      5. Neck pain            General   Pt states she saw neurology and was ordered another MRI to assess thoracic spine. Saw orthopedics- Dr. Aguiar and discussed \"waddling\" gait and was told she has hip abductor, ankle DF weakness on R side. He is " "wondering if this is due to R L4 or L5 radiculopathy.   She reports she has not had any \"fire\" symptoms of L anterior chest since last visit. Overall feels her familiar symptoms \"twinging\" of L anterior chest wall that is less frequent now and less severe. Reports she has noticed a reduction in frequency/intensity of symptoms by 50% since initial evaluation. Has been able to  objects and carry without any limitation secondary to pain.   Reports yesterday noticed tingling in her L LE which occurred after using the restroom. Lasted for <10 min.     Subjective    Precautions  Precautions  STEADI Fall Risk Score (The score of 4 or more indicates an increased risk of falling): 3  Vital Signs     Pain  Pain Assessment  Pain Assessment: 0-10  Pain Score: 0 - No pain  Pain Location: Shoulder (top/superior shoulder)  Pain Descriptors: Sore    Objective   MMT:        Joint Strength /5   Shoulder:  R L   Flexion       Scaption       Extension       ABD 5 4 to 4+, reports chronic nerve damage since her neck surgery    IR @ neutral 4+ 4+   ER @ neutral 4 to 4+ 4 to 4+   IR @ 90 deg ABD       ER @ 90 deg ABD       Horizontal ABD/Middle Trapezius       Lower Trapezius       Serratus Anterior               Elbow: R L   Extension       Flexion       Forearm supination       Forearm pronation               Wrist:  R L   Flexion 4+ to 4 4   Extension 5 4+ to 5   Radial Deviation       Ulnar Deviation               Hand:  R L    strength       Finger ABD/ADD WFL WFL   Thumb extension/ABD  4 4     Pt ambulates with decreased gait speed and decreased stance time on L LE. Improved with cues to increase gait speed.     Unable to walk on heels/toes.   Ankle DF MMT: R= 4+/5; L= 4/5     Treatments:  Therapeutic Exercise (46993): 38 minutes  Objective measures, as above.     Current HEP:   Supine chin tucks  Pec minor stretch in doorway   1st rib mobilization with belt, L side   Cervical retractions   Scapular retractions   SNAGs " leftward   Scapular retraction + GHJ ER with red TB  Scapular retraction + shoulder extension with red TB   Thoracic extension mobilizations at chair

## 2024-02-06 ENCOUNTER — TREATMENT (OUTPATIENT)
Dept: PHYSICAL THERAPY | Facility: CLINIC | Age: 74
End: 2024-02-06
Payer: MEDICARE

## 2024-02-06 DIAGNOSIS — M25.661 KNEE STIFFNESS, RIGHT: ICD-10-CM

## 2024-02-06 DIAGNOSIS — M25.512 CHRONIC LEFT SHOULDER PAIN: Primary | ICD-10-CM

## 2024-02-06 DIAGNOSIS — M25.561 RIGHT KNEE PAIN, UNSPECIFIED CHRONICITY: ICD-10-CM

## 2024-02-06 DIAGNOSIS — G89.29 CHRONIC LEFT SHOULDER PAIN: Primary | ICD-10-CM

## 2024-02-06 DIAGNOSIS — M54.2 NECK PAIN: ICD-10-CM

## 2024-02-06 DIAGNOSIS — Z96.651 S/P TOTAL KNEE ARTHROPLASTY, RIGHT: ICD-10-CM

## 2024-02-06 PROCEDURE — 97110 THERAPEUTIC EXERCISES: CPT | Mod: GP

## 2024-02-06 ASSESSMENT — PAIN - FUNCTIONAL ASSESSMENT: PAIN_FUNCTIONAL_ASSESSMENT: 0-10

## 2024-02-06 ASSESSMENT — PAIN SCALES - GENERAL: PAINLEVEL_OUTOF10: 0 - NO PAIN

## 2024-02-06 ASSESSMENT — PAIN DESCRIPTION - DESCRIPTORS: DESCRIPTORS: SORE

## 2024-02-14 ENCOUNTER — HOSPITAL ENCOUNTER (OUTPATIENT)
Dept: RADIOLOGY | Facility: CLINIC | Age: 74
Discharge: HOME | End: 2024-02-14
Payer: MEDICARE

## 2024-02-14 DIAGNOSIS — M54.9 MID BACK PAIN ON LEFT SIDE: ICD-10-CM

## 2024-02-14 PROCEDURE — 72146 MRI CHEST SPINE W/O DYE: CPT

## 2024-02-14 PROCEDURE — 72146 MRI CHEST SPINE W/O DYE: CPT | Performed by: RADIOLOGY

## 2024-02-15 DIAGNOSIS — M48.061 SPINAL STENOSIS OF LUMBAR REGION, UNSPECIFIED WHETHER NEUROGENIC CLAUDICATION PRESENT: Primary | ICD-10-CM

## 2024-02-29 ENCOUNTER — OFFICE VISIT (OUTPATIENT)
Dept: NEUROSURGERY | Facility: CLINIC | Age: 74
End: 2024-02-29
Payer: MEDICARE

## 2024-02-29 VITALS
HEIGHT: 65 IN | SYSTOLIC BLOOD PRESSURE: 142 MMHG | DIASTOLIC BLOOD PRESSURE: 90 MMHG | BODY MASS INDEX: 31.82 KG/M2 | WEIGHT: 191 LBS | HEART RATE: 88 BPM | TEMPERATURE: 97.3 F

## 2024-02-29 DIAGNOSIS — M48.061 SPINAL STENOSIS OF LUMBAR REGION, UNSPECIFIED WHETHER NEUROGENIC CLAUDICATION PRESENT: ICD-10-CM

## 2024-02-29 DIAGNOSIS — M41.35 THORACOGENIC SCOLIOSIS OF THORACOLUMBAR REGION: Primary | ICD-10-CM

## 2024-02-29 PROCEDURE — 99204 OFFICE O/P NEW MOD 45 MIN: CPT | Performed by: STUDENT IN AN ORGANIZED HEALTH CARE EDUCATION/TRAINING PROGRAM

## 2024-02-29 PROCEDURE — 1036F TOBACCO NON-USER: CPT | Performed by: STUDENT IN AN ORGANIZED HEALTH CARE EDUCATION/TRAINING PROGRAM

## 2024-02-29 PROCEDURE — 1125F AMNT PAIN NOTED PAIN PRSNT: CPT | Performed by: STUDENT IN AN ORGANIZED HEALTH CARE EDUCATION/TRAINING PROGRAM

## 2024-02-29 PROCEDURE — 3008F BODY MASS INDEX DOCD: CPT | Performed by: STUDENT IN AN ORGANIZED HEALTH CARE EDUCATION/TRAINING PROGRAM

## 2024-02-29 PROCEDURE — 1159F MED LIST DOCD IN RCRD: CPT | Performed by: STUDENT IN AN ORGANIZED HEALTH CARE EDUCATION/TRAINING PROGRAM

## 2024-02-29 ASSESSMENT — LIFESTYLE VARIABLES
HOW MANY STANDARD DRINKS CONTAINING ALCOHOL DO YOU HAVE ON A TYPICAL DAY: 1 OR 2
AUDIT-C TOTAL SCORE: 1
HOW OFTEN DO YOU HAVE SIX OR MORE DRINKS ON ONE OCCASION: NEVER
HOW OFTEN DO YOU HAVE A DRINK CONTAINING ALCOHOL: MONTHLY OR LESS
SKIP TO QUESTIONS 9-10: 1

## 2024-02-29 ASSESSMENT — PATIENT HEALTH QUESTIONNAIRE - PHQ9
SUM OF ALL RESPONSES TO PHQ9 QUESTIONS 1 & 2: 1
2. FEELING DOWN, DEPRESSED OR HOPELESS: SEVERAL DAYS
10. IF YOU CHECKED OFF ANY PROBLEMS, HOW DIFFICULT HAVE THESE PROBLEMS MADE IT FOR YOU TO DO YOUR WORK, TAKE CARE OF THINGS AT HOME, OR GET ALONG WITH OTHER PEOPLE: SOMEWHAT DIFFICULT
1. LITTLE INTEREST OR PLEASURE IN DOING THINGS: NOT AT ALL

## 2024-02-29 ASSESSMENT — PAIN SCALES - GENERAL: PAINLEVEL: 6

## 2024-02-29 NOTE — PROGRESS NOTES
[facility]  Department of Neurological Surgery  New Patient Visit    History of Present Illness:  Khalida Barker is a 73 y.o. year old female who presents to the spine clinic with left-sided abdominal pain radiating from the left side of the back in the T11-T12 distribution across the abdomen.  She also has left-sided L4 and L5 radiculopathy and back pain.  The more she does the more it hurts.  She is seen our physiatry colleagues chiropractors as well as our physical therapist.  She has had injections in her back in 2023 which did not give her any relief and she has attempted conservative care with medications as well.    Prior Spine Surgeries: None    Physical Therapy: Yes, 2023  Diabetic: None  Osteoporosis: DEXA scan with T-score of 1.2 in the spine, no  Patient's BMI is Body mass index is 31.78 kg/m².    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    Muscle weakness (generalized)    Positive colorectal cancer screening using Cologuard test    Primary osteoarthritis of right knee    Seborrheic keratoses    Tinnitus    Vitamin D deficiency    Left sided abdominal pain    BMI 30.0-30.9,adult    Cervicalgia    Cervical paraspinal muscle spasm    Non-smoker    S/P total knee arthroplasty, right    Right knee pain    Knee stiffness, right    Chronic left shoulder pain    Neck pain     Past Medical History:   Diagnosis Date    Body mass index (BMI) 31.0-31.9, adult 11/08/2021    BMI 31.0-31.9,adult    Personal history of other endocrine, nutritional and metabolic disease 11/08/2021    History of obesity     Past Surgical History:   Procedure Laterality Date    OTHER SURGICAL HISTORY  11/05/2021    Breast biopsy    OTHER SURGICAL HISTORY  11/05/2021    Anterior cervical vertebral fusion    OTHER  SURGICAL HISTORY  11/05/2021    [place] tooth extraction    OTHER SURGICAL HISTORY  11/05/2021    Tonsillectomy with adenoidectomy    OTHER SURGICAL HISTORY  11/05/2021    Skin lesion excision    OTHER SURGICAL HISTORY  11/08/2021    Oral surgery    OTHER SURGICAL HISTORY  10/11/2022    Colonoscopy     Social History     Tobacco Use    Smoking status: Never    Smokeless tobacco: Never   Substance Use Topics    Alcohol use: Not Currently     family history includes Alzheimer's disease in her mother; Heart disease in her father; Hypertension in her brother; Polymyositis in her father; Skin cancer in her brother; peripheral artery disease in her brother.    Current Outpatient Medications:     acetaminophen (Tylenol Extra Strength) 500 mg tablet, 1 tablet (500 mg). EVERY 4 TO 6 HOURS AS NEEDED., Disp: , Rfl:     BIOTIN ORAL, caps, Disp: , Rfl:     cholecalciferol (Vitamin D-3) 50 mcg (2,000 unit) capsule, Take 1 capsule (50 mcg) by mouth once daily., Disp: , Rfl:     docusate sodium (Colace) 100 mg capsule, Take 1 capsule (100 mg) by mouth twice a day., Disp: , Rfl:     multivit-min/ferrous fumarate (MULTI VITAMIN ORAL), Take 1 tablet by mouth once daily., Disp: , Rfl:     naproxen sodium (Aleve) 220 mg tablet, Take by mouth., Disp: , Rfl:     polyvinyl alcohol (LIQUID TEARS OPHT), INSTILL 1-2 DROPS INTO AFFECTED EYE(S) 4 TIMES DAILY AS DIRECTED., Disp: , Rfl:     ASPIRIN-ACETAMINOPHEN-CAFFEINE ORAL, Take 2 every morning and 1-2 more if needed, Disp: , Rfl:     diazePAM (Valium) 10 mg tablet, Please take 10 mg thirty minutes before the MRI, Disp: 1 tablet, Rfl: 0    meloxicam (Mobic) 15 mg tablet, Take 1 tablet (15 mg) by mouth once daily as needed., Disp: , Rfl:   No Known Allergies    Physical Examination    General: Well developed, awake/alert/oriented x3, no distress, alert and cooperative  Skin: Warm and dry, no lesions, no rashes  ENMT: Mucous membranes moist, no apparent injury, no lesions seen  Head/Neck: Neck  Supple, no apparent injury  Respiratory/Thorax: Normal breath sounds with good chest expansion, thorax symmetric  Cardiovascular: No pitting edema, no JVD    Motor Strength: 5/5 Throughout all extremities    Muscle Bulk: Normal and symmetric in all extremities    Posture:   -- Cervical: Normal  -- Thoracic: Kyphoscoliosis  -- Lumbar : Kyphoscoliosis  Paraspinal muscle spasm/tenderness back pain.     Sensation: intact to light touch  Lumbar radiculopathy in the L4 and L5 distribution    Results    I personally reviewed and interpreted the imaging results which included CT scan of the abdomen pelvis from 2023, MRI of the thoracic spine, MRI lumbar spine all reviewed and interpreted she has severe spinal stenosis at L4-L5 with levoscoliosis with a Carpio angle of approximately 25 degrees on her CT scan she also has degenerative disc disease at T11-T12 with collapse of the foramen causing foraminal narrowing at this segment    Assessment and Plan:    Khalida Barker is a 73 y.o. year old female who presents to the spine clinic with left-sided radiculopathy of the thoracic spine as well as the lumbar spine from degenerative scoliosis she appears to have a Carpio angle of approximately 25 degrees on her MRI and CT scan.  I will get standing x-rays to compare to her standing upright x-rays from 2023 to see if she has had progression of her scoliosis.  I counseled her on her surgical options including small surgeries to take pressure off the nerves small fusion surgeries as well as a possible scoliosis reconstructive surgery depending on her scoliosis x-rays and how bad her scoliosis is in the sagittal and coronal plane and her global alignment.  I be hesitant perform small surgeries that I know would fail if we are to pursue any surgical intervention.  I will see her back in the office after she get scoliosis x-rays to discuss her further surgical options in detail.      I have reviewed all prior documentation and reviewed the  electronic medical record since admission. I have personally have reviewed all advanced imaging not just the reports and used my interpretation as documented as the relevant findings. I have reviewed the risks and benefits of all treatment recommendations listed in this note with the patient and family. I spent a total of 45 minutes in service to this patient's care during this date of service.      The above clinical summary has been dictated with voice recognition software. It has not been proofread for grammatical errors, typographical mistakes, or other semantic inconsistencies.    Thank you for visiting our office today. It was our pleasure to take part in your healthcare.     Do not hesitate to call with any questions regarding your plan of care after leaving at [phone] M-F 8am-4pm.     To clinicians, thank you very much for this kind referral. It is a privilege to partner with you in the care of your patients. My office would be delighted to assist you with any further consultations or with questions regarding the plan of care outlined. Do not hesitate to call the office or contact me directly.       Sincerely,      Justin Lu MD  Director, [facility]   of Neurosurgery, [facility] and [facility]  Complex Spine Fellowship Director  , Department of Neurological Surgery  [facility] [facility] [employer]    [facility]  [address] [address]  [address]  [address] [place] [address]  [id number]  [place] [address]    Phone: [phone]  Fax: [phone]

## 2024-03-01 ENCOUNTER — HOSPITAL ENCOUNTER (OUTPATIENT)
Dept: RADIOLOGY | Facility: HOSPITAL | Age: 74
Discharge: HOME | End: 2024-03-01
Payer: MEDICARE

## 2024-03-01 DIAGNOSIS — M41.35 THORACOGENIC SCOLIOSIS OF THORACOLUMBAR REGION: ICD-10-CM

## 2024-03-01 PROCEDURE — 72082 X-RAY EXAM ENTIRE SPI 2/3 VW: CPT | Performed by: RADIOLOGY

## 2024-03-01 PROCEDURE — 72082 X-RAY EXAM ENTIRE SPI 2/3 VW: CPT

## 2024-03-08 ENCOUNTER — OFFICE VISIT (OUTPATIENT)
Dept: NEUROSURGERY | Facility: CLINIC | Age: 74
End: 2024-03-08
Payer: MEDICARE

## 2024-03-08 VITALS
BODY MASS INDEX: 32.64 KG/M2 | WEIGHT: 195.9 LBS | HEART RATE: 85 BPM | DIASTOLIC BLOOD PRESSURE: 78 MMHG | HEIGHT: 65 IN | SYSTOLIC BLOOD PRESSURE: 120 MMHG | TEMPERATURE: 97.7 F

## 2024-03-08 DIAGNOSIS — M41.35 THORACOGENIC SCOLIOSIS OF THORACOLUMBAR REGION: Primary | ICD-10-CM

## 2024-03-08 PROCEDURE — 1125F AMNT PAIN NOTED PAIN PRSNT: CPT | Performed by: STUDENT IN AN ORGANIZED HEALTH CARE EDUCATION/TRAINING PROGRAM

## 2024-03-08 PROCEDURE — 1159F MED LIST DOCD IN RCRD: CPT | Performed by: STUDENT IN AN ORGANIZED HEALTH CARE EDUCATION/TRAINING PROGRAM

## 2024-03-08 PROCEDURE — 3008F BODY MASS INDEX DOCD: CPT | Performed by: STUDENT IN AN ORGANIZED HEALTH CARE EDUCATION/TRAINING PROGRAM

## 2024-03-08 PROCEDURE — 1036F TOBACCO NON-USER: CPT | Performed by: STUDENT IN AN ORGANIZED HEALTH CARE EDUCATION/TRAINING PROGRAM

## 2024-03-08 PROCEDURE — 99215 OFFICE O/P EST HI 40 MIN: CPT | Performed by: STUDENT IN AN ORGANIZED HEALTH CARE EDUCATION/TRAINING PROGRAM

## 2024-03-08 RX ORDER — IBUPROFEN 200 MG
200 TABLET ORAL EVERY 6 HOURS PRN
Status: ON HOLD | COMMUNITY

## 2024-03-08 ASSESSMENT — LIFESTYLE VARIABLES
SKIP TO QUESTIONS 9-10: 1
AUDIT-C TOTAL SCORE: 0
HOW OFTEN DO YOU HAVE A DRINK CONTAINING ALCOHOL: NEVER
HOW MANY STANDARD DRINKS CONTAINING ALCOHOL DO YOU HAVE ON A TYPICAL DAY: PATIENT DOES NOT DRINK
HOW OFTEN DO YOU HAVE SIX OR MORE DRINKS ON ONE OCCASION: NEVER

## 2024-03-08 ASSESSMENT — PATIENT HEALTH QUESTIONNAIRE - PHQ9
1. LITTLE INTEREST OR PLEASURE IN DOING THINGS: NOT AT ALL
2. FEELING DOWN, DEPRESSED OR HOPELESS: SEVERAL DAYS
10. IF YOU CHECKED OFF ANY PROBLEMS, HOW DIFFICULT HAVE THESE PROBLEMS MADE IT FOR YOU TO DO YOUR WORK, TAKE CARE OF THINGS AT HOME, OR GET ALONG WITH OTHER PEOPLE: SOMEWHAT DIFFICULT
SUM OF ALL RESPONSES TO PHQ9 QUESTIONS 1 & 2: 1

## 2024-03-08 ASSESSMENT — PAIN SCALES - GENERAL: PAINLEVEL: 5

## 2024-03-12 DIAGNOSIS — R79.81 ABNORMAL BLOOD-GAS LEVEL: ICD-10-CM

## 2024-03-12 DIAGNOSIS — M41.35 THORACOGENIC SCOLIOSIS OF THORACOLUMBAR REGION: Primary | ICD-10-CM

## 2024-03-12 DIAGNOSIS — R79.1 ABNORMAL COAGULATION PROFILE: ICD-10-CM

## 2024-03-12 RX ORDER — ACETAMINOPHEN 325 MG/1
975 TABLET ORAL ONCE
Status: CANCELLED | OUTPATIENT
Start: 2024-03-12 | End: 2024-03-12

## 2024-03-12 RX ORDER — CELECOXIB 400 MG/1
400 CAPSULE ORAL ONCE
Status: CANCELLED | OUTPATIENT
Start: 2024-03-12 | End: 2024-03-12

## 2024-03-12 RX ORDER — GABAPENTIN 600 MG/1
600 TABLET ORAL ONCE
Status: CANCELLED | OUTPATIENT
Start: 2024-03-12 | End: 2024-03-12

## 2024-03-12 RX ORDER — TRANEXAMIC ACID 650 MG/1
1300 TABLET ORAL ONCE
Status: CANCELLED | OUTPATIENT
Start: 2024-03-12 | End: 2024-03-12

## 2024-04-12 DIAGNOSIS — M41.35 THORACOGENIC SCOLIOSIS OF THORACOLUMBAR REGION: Primary | ICD-10-CM

## 2024-04-12 NOTE — PROGRESS NOTES
Patient is scheduled for upcoming spine surgery.  Her MRI is greater than 12 months old and her insurance plan would not approve her surgery until she has an MRI within 12 months from the date of her surgery.  Will order a MRI today to try to expedite her surgical care.

## 2024-04-16 ENCOUNTER — TELEPHONE (OUTPATIENT)
Dept: NEUROSURGERY | Facility: CLINIC | Age: 74
End: 2024-04-16
Payer: MEDICARE

## 2024-04-16 DIAGNOSIS — M41.35 THORACOGENIC SCOLIOSIS OF THORACOLUMBAR REGION: Primary | ICD-10-CM

## 2024-04-16 RX ORDER — DIAZEPAM 5 MG/1
5 TABLET ORAL
Qty: 1 TABLET | Refills: 0 | Status: ON HOLD | OUTPATIENT
Start: 2024-04-16 | End: 2024-05-14 | Stop reason: ALTCHOICE

## 2024-04-22 ENCOUNTER — APPOINTMENT (OUTPATIENT)
Dept: NEUROSURGERY | Facility: CLINIC | Age: 74
End: 2024-04-22
Payer: MEDICARE

## 2024-04-24 ENCOUNTER — HOSPITAL ENCOUNTER (OUTPATIENT)
Dept: RADIOLOGY | Facility: CLINIC | Age: 74
Discharge: HOME | End: 2024-04-24
Payer: MEDICARE

## 2024-04-24 DIAGNOSIS — M41.35 THORACOGENIC SCOLIOSIS OF THORACOLUMBAR REGION: ICD-10-CM

## 2024-04-24 PROCEDURE — 72148 MRI LUMBAR SPINE W/O DYE: CPT

## 2024-04-24 PROCEDURE — 72148 MRI LUMBAR SPINE W/O DYE: CPT | Performed by: RADIOLOGY

## 2024-04-30 ENCOUNTER — PRE-ADMISSION TESTING (OUTPATIENT)
Dept: PREADMISSION TESTING | Facility: HOSPITAL | Age: 74
End: 2024-04-30
Payer: MEDICARE

## 2024-04-30 ENCOUNTER — HOSPITAL ENCOUNTER (OUTPATIENT)
Dept: RADIOLOGY | Facility: HOSPITAL | Age: 74
Discharge: HOME | End: 2024-04-30
Payer: MEDICARE

## 2024-04-30 VITALS
DIASTOLIC BLOOD PRESSURE: 79 MMHG | WEIGHT: 191 LBS | SYSTOLIC BLOOD PRESSURE: 121 MMHG | HEART RATE: 85 BPM | BODY MASS INDEX: 31.82 KG/M2 | HEIGHT: 65 IN | OXYGEN SATURATION: 95 % | TEMPERATURE: 98.2 F

## 2024-04-30 DIAGNOSIS — R73.09 OTHER ABNORMAL GLUCOSE: ICD-10-CM

## 2024-04-30 DIAGNOSIS — M41.35 THORACOGENIC SCOLIOSIS OF THORACOLUMBAR REGION: ICD-10-CM

## 2024-04-30 DIAGNOSIS — R79.81 ABNORMAL BLOOD-GAS LEVEL: ICD-10-CM

## 2024-04-30 DIAGNOSIS — R79.1 ABNORMAL COAGULATION PROFILE: ICD-10-CM

## 2024-04-30 DIAGNOSIS — Z01.818 PREOP EXAMINATION: Primary | ICD-10-CM

## 2024-04-30 LAB
ABO GROUP (TYPE) IN BLOOD: NORMAL
ANION GAP SERPL CALC-SCNC: 15 MMOL/L (ref 10–20)
ANTIBODY SCREEN: NORMAL
APPEARANCE UR: CLEAR
APTT PPP: 27 SECONDS (ref 27–38)
BASOPHILS # BLD AUTO: 0.06 X10*3/UL (ref 0–0.1)
BASOPHILS NFR BLD AUTO: 1.1 %
BILIRUB UR STRIP.AUTO-MCNC: NEGATIVE MG/DL
BUN SERPL-MCNC: 22 MG/DL (ref 6–23)
CALCIUM SERPL-MCNC: 10.2 MG/DL (ref 8.6–10.6)
CHLORIDE SERPL-SCNC: 107 MMOL/L (ref 98–107)
CO2 SERPL-SCNC: 25 MMOL/L (ref 21–32)
COLOR UR: NORMAL
CREAT SERPL-MCNC: 0.73 MG/DL (ref 0.5–1.05)
EGFRCR SERPLBLD CKD-EPI 2021: 88 ML/MIN/1.73M*2
EOSINOPHIL # BLD AUTO: 0.08 X10*3/UL (ref 0–0.4)
EOSINOPHIL NFR BLD AUTO: 1.4 %
ERYTHROCYTE [DISTWIDTH] IN BLOOD BY AUTOMATED COUNT: 12.4 % (ref 11.5–14.5)
EST. AVERAGE GLUCOSE BLD GHB EST-MCNC: 100 MG/DL
GLUCOSE SERPL-MCNC: 81 MG/DL (ref 74–99)
GLUCOSE UR STRIP.AUTO-MCNC: NORMAL MG/DL
HBA1C MFR BLD: 5.1 %
HCT VFR BLD AUTO: 46 % (ref 36–46)
HGB BLD-MCNC: 15.8 G/DL (ref 12–16)
IMM GRANULOCYTES # BLD AUTO: 0.01 X10*3/UL (ref 0–0.5)
IMM GRANULOCYTES NFR BLD AUTO: 0.2 % (ref 0–0.9)
INR PPP: 1 (ref 0.9–1.1)
KETONES UR STRIP.AUTO-MCNC: NEGATIVE MG/DL
LEUKOCYTE ESTERASE UR QL STRIP.AUTO: NEGATIVE
LYMPHOCYTES # BLD AUTO: 2.57 X10*3/UL (ref 0.8–3)
LYMPHOCYTES NFR BLD AUTO: 46.2 %
MCH RBC QN AUTO: 32.4 PG (ref 26–34)
MCHC RBC AUTO-ENTMCNC: 34.3 G/DL (ref 32–36)
MCV RBC AUTO: 95 FL (ref 80–100)
MONOCYTES # BLD AUTO: 0.55 X10*3/UL (ref 0.05–0.8)
MONOCYTES NFR BLD AUTO: 9.9 %
NEUTROPHILS # BLD AUTO: 2.29 X10*3/UL (ref 1.6–5.5)
NEUTROPHILS NFR BLD AUTO: 41.2 %
NITRITE UR QL STRIP.AUTO: NEGATIVE
NRBC BLD-RTO: 0 /100 WBCS (ref 0–0)
PH UR STRIP.AUTO: 6.5 [PH]
PLATELET # BLD AUTO: 230 X10*3/UL (ref 150–450)
POTASSIUM SERPL-SCNC: 4.5 MMOL/L (ref 3.5–5.3)
PREALB SERPL-MCNC: 32.5 MG/DL (ref 18–40)
PROT UR STRIP.AUTO-MCNC: NEGATIVE MG/DL
PROTHROMBIN TIME: 11.6 SECONDS (ref 9.8–12.8)
RBC # BLD AUTO: 4.87 X10*6/UL (ref 4–5.2)
RBC # UR STRIP.AUTO: NEGATIVE /UL
RH FACTOR (ANTIGEN D): NORMAL
SODIUM SERPL-SCNC: 142 MMOL/L (ref 136–145)
SP GR UR STRIP.AUTO: 1.02
UROBILINOGEN UR STRIP.AUTO-MCNC: NORMAL MG/DL
WBC # BLD AUTO: 5.6 X10*3/UL (ref 4.4–11.3)

## 2024-04-30 PROCEDURE — 86923 COMPATIBILITY TEST ELECTRIC: CPT | Mod: 91

## 2024-04-30 PROCEDURE — 85025 COMPLETE CBC W/AUTO DIFF WBC: CPT

## 2024-04-30 PROCEDURE — 71046 X-RAY EXAM CHEST 2 VIEWS: CPT

## 2024-04-30 PROCEDURE — 86901 BLOOD TYPING SEROLOGIC RH(D): CPT

## 2024-04-30 PROCEDURE — 81003 URINALYSIS AUTO W/O SCOPE: CPT

## 2024-04-30 PROCEDURE — 85610 PROTHROMBIN TIME: CPT

## 2024-04-30 PROCEDURE — 83036 HEMOGLOBIN GLYCOSYLATED A1C: CPT

## 2024-04-30 PROCEDURE — 99214 OFFICE O/P EST MOD 30 MIN: CPT | Performed by: NURSE PRACTITIONER

## 2024-04-30 PROCEDURE — 87081 CULTURE SCREEN ONLY: CPT

## 2024-04-30 PROCEDURE — 84134 ASSAY OF PREALBUMIN: CPT

## 2024-04-30 PROCEDURE — 82374 ASSAY BLOOD CARBON DIOXIDE: CPT

## 2024-04-30 PROCEDURE — 36415 COLL VENOUS BLD VENIPUNCTURE: CPT

## 2024-04-30 RX ORDER — CHLORHEXIDINE GLUCONATE 40 MG/ML
SOLUTION TOPICAL 2 TIMES DAILY
Qty: 473 ML | Refills: 0 | Status: SHIPPED | OUTPATIENT
Start: 2024-04-30 | End: 2024-05-05

## 2024-04-30 RX ORDER — CHLORHEXIDINE GLUCONATE ORAL RINSE 1.2 MG/ML
15 SOLUTION DENTAL SEE ADMIN INSTRUCTIONS
Qty: 473 ML | Refills: 0 | Status: ON HOLD | OUTPATIENT
Start: 2024-04-30 | End: 2024-05-14 | Stop reason: ALTCHOICE

## 2024-04-30 ASSESSMENT — DUKE ACTIVITY SCORE INDEX (DASI)
CAN YOU PARTICIPATE IN MODERATE RECREATIONAL ACTIVITIES LIKE GOLF, BOWLING, DANCING, DOUBLES TENNIS OR THROWING A BASEBALL OR FOOTBALL: NO
CAN YOU CLIMB A FLIGHT OF STAIRS OR WALK UP A HILL: NO
CAN YOU DO HEAVY WORK AROUND THE HOUSE LIKE SCRUBBING FLOORS OR LIFTING AND MOVING HEAVY FURNITURE: NO
CAN YOU DO YARD WORK LIKE RAKING LEAVES, WEEDING OR PUSHING A MOWER: NO
CAN YOU PARTICIPATE IN STRENOUS SPORTS LIKE SWIMMING, SINGLES TENNIS, FOOTBALL, BASKETBALL, OR SKIING: NO
CAN YOU HAVE SEXUAL RELATIONS: YES
CAN YOU WALK A BLOCK OR TWO ON LEVEL GROUND: NO
DASI METS SCORE: 4.7
TOTAL_SCORE: 15.95
CAN YOU WALK INDOORS, SUCH AS AROUND YOUR HOUSE: YES
CAN YOU RUN A SHORT DISTANCE: NO
CAN YOU DO LIGHT WORK AROUND THE HOUSE LIKE DUSTING OR WASHING DISHES: YES
CAN YOU TAKE CARE OF YOURSELF (EAT, DRESS, BATHE, OR USE TOILET): YES
CAN YOU DO MODERATE WORK AROUND THE HOUSE LIKE VACUUMING, SWEEPING FLOORS OR CARRYING GROCERIES: YES

## 2024-04-30 ASSESSMENT — ENCOUNTER SYMPTOMS
FEVER: 0
NECK NEGATIVE: 1
EYES NEGATIVE: 1
RESPIRATORY NEGATIVE: 1
CONSTITUTIONAL NEGATIVE: 1
GASTROINTESTINAL NEGATIVE: 1
MUSCULOSKELETAL NEGATIVE: 1
CHILLS: 0
CARDIOVASCULAR NEGATIVE: 1
ENDOCRINE NEGATIVE: 1
NUMBNESS: 1

## 2024-04-30 ASSESSMENT — LIFESTYLE VARIABLES: SMOKING_STATUS: NONSMOKER

## 2024-04-30 NOTE — H&P (VIEW-ONLY)
CPM/PAT Evaluation       Name: Khalida Barker (Khalida Barker)  /Age: 1950/73 y.o.     Visit Type:   In-Person       Chief Complaint: L2-L3, L3-L4, L4-L5 Extreme Lateral Interbody fusion, L2-S1 Decompression and Fusion, with L5-S1 Transforaminal Lumbar Interbody Fusion     HPI Patient is a 73 year old female, accompanied by significant other,  scheduled for surgery with Dr. Justin Lu on 24 related to Thoracogenic scoliosis of thoracolumbar region     Patient referred by Dr. Lu for preoperative evaluation of arthritis, cervicalgia, spinal stenosis, scoliosis, peripheral neuropathy, and hiatal hernia.     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     OA (osteoarthritis) of knee     Peripheral neuropathy     Scoliosis     Spinal stenosis     Trigger finger of right hand     Vision loss     wears glasses    Vitamin D deficiency      Past Surgical History:   Procedure Laterality Date    ADENOIDECTOMY      BREAST BIOPSY      COLONOSCOPY      KNEE ARTHROPLASTY Right     MOUTH SURGERY  2016    Back left molar removed    MOUTH SURGERY  2021    right molar    OTHER SURGICAL HISTORY      Skin lesion excision    SPINAL FUSION  2010    C4-5    TONSILLECTOMY      WISDOM TOOTH EXTRACTION  1978    lower     Family History   Problem Relation Name Age of Onset    Alzheimer's disease Mother      Heart disease Father      Polymyositis Father      Hypertension Brother      Skin cancer Brother      Other (peripheral artery disease) Brother       No Known Allergies    Prior to Admission medications    Medication Sig Start Date End Date Taking? Authorizing Provider   acetaminophen (Tylenol Extra Strength) 500 mg tablet 1 tablet (500 mg). EVERY 4 TO 6 HOURS AS NEEDED.    Historical Provider, MD   ASPIRIN-ACETAMINOPHEN-CAFFEINE ORAL Take 2 every morning and 1-2  more if needed    Historical Provider, MD   BIOTIN ORAL caps    Historical Provider, MD   cholecalciferol (Vitamin D-3) 50 mcg (2,000 unit) capsule Take 1 capsule (50 mcg) by mouth once daily.    Historical Provider, MD   diazePAM (Valium) 10 mg tablet Please take 10 mg thirty minutes before the MRI  Patient not taking: Reported on 3/8/2024 1/31/24   Levy Perez MD   diazePAM (Valium) 5 mg tablet Take 1 tablet (5 mg) by mouth 1 time if needed for anxiety for up to 1 dose. To be taken prior to MRI 4/16/24 4/16/24  Justin Lu MD   docusate sodium (Colace) 100 mg capsule Take 1 capsule (100 mg) by mouth twice a day. 6/29/10   Historical Provider, MD   ibuprofen 200 mg tablet Take by mouth every 6 hours if needed for mild pain (1 - 3).    Historical Provider, MD   meloxicam (Mobic) 15 mg tablet Take 1 tablet (15 mg) by mouth once daily as needed.    Historical Provider, MD   menthol (Biofreeze, menthol,) 4 % gel gel Apply topically.    Historical Provider, MD   multivit-min/ferrous fumarate (MULTI VITAMIN ORAL) Take 1 tablet by mouth once daily.    Historical Provider, MD   naproxen sodium (Aleve) 220 mg tablet Take by mouth. 6/12/23   Historical Provider, MD   NON FORMULARY Viveran 200mg- pure caffience    Historical Provider, MD   NON FORMULARY Heel Ash    Historical Provider, MD   polyvinyl alcohol (LIQUID TEARS OPHT) INSTILL 1-2 DROPS INTO AFFECTED EYE(S) 4 TIMES DAILY AS DIRECTED.    Historical Provider, MD   pregabalin (Lyrica) 50 mg capsule Take 1 capsule (50 mg) by mouth 2 times a day. 3/18/24   Historical Provider, MD      PAT ROS:   Constitutional:   neg     no fever   no chills  Neuro/Psych:    Numbness and tingling in left leg and hands at night   numbness  Eyes:   neg    Ears:    tinnitus  Nose:   neg    Mouth:   neg    Throat:   neg    Neck:   neg    Cardio:   neg    Respiratory:   neg    Endocrine:   neg    GI:   neg    :   neg    Musculoskeletal:    Uses cane when walking outside of  home  neg    Hematologic:   neg     no history of blood transfusion   no blood clots  Skin:  neg      Physical Exam  Vitals reviewed.   Constitutional:       Appearance: Normal appearance. She is normal weight.   HENT:      Head: Normocephalic and atraumatic.      Nose: Nose normal.      Mouth/Throat:      Mouth: Mucous membranes are moist.      Pharynx: Oropharynx is clear.   Eyes:      Extraocular Movements: Extraocular movements intact.      Pupils: Pupils are equal, round, and reactive to light.   Cardiovascular:      Rate and Rhythm: Normal rate and regular rhythm.      Heart sounds: Normal heart sounds.   Pulmonary:      Effort: Pulmonary effort is normal.      Breath sounds: Normal breath sounds.   Abdominal:      General: Abdomen is flat. Bowel sounds are normal.      Palpations: Abdomen is soft.   Musculoskeletal:         General: Normal range of motion.      Cervical back: Normal range of motion and neck supple.      Comments: Ambulates with cane   Skin:     General: Skin is warm and dry.   Neurological:      Mental Status: She is alert and oriented to person, place, and time.   Psychiatric:         Mood and Affect: Mood normal.         Behavior: Behavior normal.         Thought Content: Thought content normal.         Judgment: Judgment normal.        PAT AIRWAY:   Airway:     Mallampati::  II    Neck ROM::  Full   Crowns, states nothing loose or removable     Visit Vitals  /79   Pulse 85   Temp 36.8 °C (98.2 °F)     DASI Risk Score      Flowsheet Row Most Recent Value   DASI SCORE 15.95   METS Score (Will be calculated only when all the questions are answered) 4.7          Caprini DVT Assessment      Flowsheet Row Most Recent Value   DVT Score 11   Current Status Major surgery planned, lasting over 3 hours   History Prior major surgery   Age 60-75 years   BMI 31-40 (Obesity)          Modified Frailty Index      Flowsheet Row Most Recent Value   Modified Frailty Index Calculator 0          CHADS2  Stroke Risk         N/A 3 to 100%: High Risk   2 to < 3%: Medium Risk   0 to < 2%: Low Risk     Last Change: N/A          This score determines the patient's risk of having a stroke if the patient has atrial fibrillation.        This score is not applicable to this patient. Components are not calculated.          Revised Cardiac Risk Index      Flowsheet Row Most Recent Value   Revised Cardiac Risk Calculator 0          Apfel Simplified Score      Flowsheet Row Most Recent Value   Apfel Simplified Score Calculator 3          Risk Analysis Index Results This Encounter    No data found in the last 1 encounters.       Stop Bang Score      Flowsheet Row Most Recent Value   Do you snore loudly? 1   Do you often feel tired or fatigued after your sleep? 1   Has anyone ever observed you stop breathing in your sleep? 0   Do you have or are you being treated for high blood pressure? 0   Recent BMI (Calculated) 31.8   Is BMI greater than 35 kg/m2? 0=No   Age older than 50 years old? 1=Yes   Is your neck circumference greater than 17 inches (Male) or 16 inches (Female)? 0   Gender - Male 0=No   STOP-BANG Total Score 3          Assessment and Plan:     Neuro:   No diagnosis or significant findings on chart review or clinical presentation and evaluation.    Patient given information on brain exercises and delirium prevention.    HEENT/Airway  No diagnosis or significant findings on chart review or clinical presentation and evaluation.    Cardiovascular  No diagnosis or significant findings on chart review or clinical presentation and evaluation.    RCRI  The patient meets 0-1 RCRI criteria and therefore has a less than 1% risk of major adverse cardiac complications.  METS  The patient's functional capacity is greater than 4 METS.  MACE  30-day risk for MACE of 1 predictor, 6.0% risk for cardiac death, nonfatal myocardial infarction, and nonfatal cardiac arrest  RAMONITA  0.1% risk for 26th to 50th percentile    7-14-23: EKG sinus  rhythm     Pulmonary   No diagnosis or significant findings on chart review or clinical presentation and evaluation.    STOP BANG Score of 3, which places patient at intermediate risk for having WILL.  ARISCAT 58, High, 42.1% risk of in-hospital postoperative pulmonary complications  PRODIGY 15, high of respiratory depression episode.    Patient given information on deep breathing exercises.     Renal  No diagnosis or significant findings on chart review or clinical presentation and evaluation.    Endocrine  No diagnosis or significant findings on chart review or clinical presentation and evaluation.    Hematology  No diagnosis or significant findings on chart review or clinical presentation and evaluation.    Caprini score 11, high risk of VTE    Transfusion Evaluation  A type and screen was obtained given the likelihood for perioperative transfusion of blood or blood products.    Patient given information on DVT prevention.     GI    Hiatal hernia  States no issues or concerns.     Eat 10- 0  Apfel: 3 points 61% risk for post operative nausea/vomiting.     Genitourinary  No diagnosis or significant findings on chart review or clinical presentation and evaluation.    ID  No diagnosis or significant findings on chart review or clinical presentation and evaluation.    MRSA swab ordered  UA with reflex culture ordered  Chlorhexidine mouth wash and body wash ordered    Musculoskeletal    Arthritis, cervicalgia, spinal stenosis, scoliosis, peripheral neuropathy  Currently treated with acetaminophen, ibuprofen, Biofreeze, naproxen sodium, and pregabalin.     -Preoperative medication instructions were provided and reviewed with the patient.  Any additional testing or evaluation was explained to the patient.  NPO Instructions were discussed, and the patient's questions were answered prior to conclusion of this encounter    Labs ordered:    Recent Results (from the past 168 hour(s))   Basic Metabolic Panel    Collection  Time: 04/30/24 10:27 AM   Result Value Ref Range    Glucose 81 74 - 99 mg/dL    Sodium 142 136 - 145 mmol/L    Potassium 4.5 3.5 - 5.3 mmol/L    Chloride 107 98 - 107 mmol/L    Bicarbonate 25 21 - 32 mmol/L    Anion Gap 15 10 - 20 mmol/L    Urea Nitrogen 22 6 - 23 mg/dL    Creatinine 0.73 0.50 - 1.05 mg/dL    eGFR 88 >60 mL/min/1.73m*2    Calcium 10.2 8.6 - 10.6 mg/dL   Prealbumin    Collection Time: 04/30/24 10:27 AM   Result Value Ref Range    Prealbumin 32.5 18.0 - 40.0 mg/dL   CBC and Auto Differential    Collection Time: 04/30/24 10:27 AM   Result Value Ref Range    WBC 5.6 4.4 - 11.3 x10*3/uL    nRBC 0.0 0.0 - 0.0 /100 WBCs    RBC 4.87 4.00 - 5.20 x10*6/uL    Hemoglobin 15.8 12.0 - 16.0 g/dL    Hematocrit 46.0 36.0 - 46.0 %    MCV 95 80 - 100 fL    MCH 32.4 26.0 - 34.0 pg    MCHC 34.3 32.0 - 36.0 g/dL    RDW 12.4 11.5 - 14.5 %    Platelets 230 150 - 450 x10*3/uL    Neutrophils % 41.2 40.0 - 80.0 %    Immature Granulocytes %, Automated 0.2 0.0 - 0.9 %    Lymphocytes % 46.2 13.0 - 44.0 %    Monocytes % 9.9 2.0 - 10.0 %    Eosinophils % 1.4 0.0 - 6.0 %    Basophils % 1.1 0.0 - 2.0 %    Neutrophils Absolute 2.29 1.60 - 5.50 x10*3/uL    Immature Granulocytes Absolute, Automated 0.01 0.00 - 0.50 x10*3/uL    Lymphocytes Absolute 2.57 0.80 - 3.00 x10*3/uL    Monocytes Absolute 0.55 0.05 - 0.80 x10*3/uL    Eosinophils Absolute 0.08 0.00 - 0.40 x10*3/uL    Basophils Absolute 0.06 0.00 - 0.10 x10*3/uL   Coagulation Screen    Collection Time: 04/30/24 10:27 AM   Result Value Ref Range    Protime 11.6 9.8 - 12.8 seconds    INR 1.0 0.9 - 1.1    aPTT 27 27 - 38 seconds   Type And Screen    Collection Time: 04/30/24 10:27 AM   Result Value Ref Range    ABO TYPE O     Rh TYPE POS     ANTIBODY SCREEN NEG    Hemoglobin A1C    Collection Time: 04/30/24 10:27 AM   Result Value Ref Range    Hemoglobin A1C 5.1 see below %    Estimated Average Glucose 100 Not Established mg/dL   Staphylococcus aureus/MRSA colonization, Culture     Collection Time: 04/30/24 10:27 AM    Specimen: Nares/Axilla/Groin; Swab   Result Value Ref Range    Staph/MRSA Screen Culture No Staphylococcus aureus isolated    Urinalysis with Reflex Culture and Microscopic    Collection Time: 04/30/24 10:27 AM   Result Value Ref Range    Color, Urine Light-Yellow Light-Yellow, Yellow, Dark-Yellow    Appearance, Urine Clear Clear    Specific Gravity, Urine 1.021 1.005 - 1.035    pH, Urine 6.5 5.0, 5.5, 6.0, 6.5, 7.0, 7.5, 8.0    Protein, Urine NEGATIVE NEGATIVE, 10 (TRACE), 20 (TRACE) mg/dL    Glucose, Urine Normal Normal mg/dL    Blood, Urine NEGATIVE NEGATIVE    Ketones, Urine NEGATIVE NEGATIVE mg/dL    Bilirubin, Urine NEGATIVE NEGATIVE    Urobilinogen, Urine Normal Normal mg/dL    Nitrite, Urine NEGATIVE NEGATIVE    Leukocyte Esterase, Urine NEGATIVE NEGATIVE   Extra Urine Gray Tube    Collection Time: 04/30/24 10:27 AM   Result Value Ref Range    Extra Tube Hold for add-ons.

## 2024-04-30 NOTE — CPM/PAT H&P
CPM/PAT Evaluation       Name: Khalida Barker (Khalida Barker)  /Age: 1950/73 y.o.     Visit Type:   In-Person       Chief Complaint: L2-L3, L3-L4, L4-L5 Extreme Lateral Interbody fusion, L2-S1 Decompression and Fusion, with L5-S1 Transforaminal Lumbar Interbody Fusion     HPI Patient is a 73 year old female, accompanied by significant other,  scheduled for surgery with Dr. Justin Lu on 24 related to Thoracogenic scoliosis of thoracolumbar region     Patient referred by Dr. Lu for preoperative evaluation of arthritis, cervicalgia, spinal stenosis, scoliosis, peripheral neuropathy, and hiatal hernia.     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     OA (osteoarthritis) of knee     Peripheral neuropathy     Scoliosis     Spinal stenosis     Trigger finger of right hand     Vision loss     wears glasses    Vitamin D deficiency      Past Surgical History:   Procedure Laterality Date    ADENOIDECTOMY      BREAST BIOPSY      COLONOSCOPY      KNEE ARTHROPLASTY Right     MOUTH SURGERY  2016    Back left molar removed    MOUTH SURGERY  2021    right molar    OTHER SURGICAL HISTORY      Skin lesion excision    SPINAL FUSION  2010    C4-5    TONSILLECTOMY      WISDOM TOOTH EXTRACTION  1978    lower     Family History   Problem Relation Name Age of Onset    Alzheimer's disease Mother      Heart disease Father      Polymyositis Father      Hypertension Brother      Skin cancer Brother      Other (peripheral artery disease) Brother       No Known Allergies    Prior to Admission medications    Medication Sig Start Date End Date Taking? Authorizing Provider   acetaminophen (Tylenol Extra Strength) 500 mg tablet 1 tablet (500 mg). EVERY 4 TO 6 HOURS AS NEEDED.    Historical Provider, MD   ASPIRIN-ACETAMINOPHEN-CAFFEINE ORAL Take 2 every morning and 1-2  more if needed    Historical Provider, MD   BIOTIN ORAL caps    Historical Provider, MD   cholecalciferol (Vitamin D-3) 50 mcg (2,000 unit) capsule Take 1 capsule (50 mcg) by mouth once daily.    Historical Provider, MD   diazePAM (Valium) 10 mg tablet Please take 10 mg thirty minutes before the MRI  Patient not taking: Reported on 3/8/2024 1/31/24   Levy Perez MD   diazePAM (Valium) 5 mg tablet Take 1 tablet (5 mg) by mouth 1 time if needed for anxiety for up to 1 dose. To be taken prior to MRI 4/16/24 4/16/24  Justin Lu MD   docusate sodium (Colace) 100 mg capsule Take 1 capsule (100 mg) by mouth twice a day. 6/29/10   Historical Provider, MD   ibuprofen 200 mg tablet Take by mouth every 6 hours if needed for mild pain (1 - 3).    Historical Provider, MD   meloxicam (Mobic) 15 mg tablet Take 1 tablet (15 mg) by mouth once daily as needed.    Historical Provider, MD   menthol (Biofreeze, menthol,) 4 % gel gel Apply topically.    Historical Provider, MD   multivit-min/ferrous fumarate (MULTI VITAMIN ORAL) Take 1 tablet by mouth once daily.    Historical Provider, MD   naproxen sodium (Aleve) 220 mg tablet Take by mouth. 6/12/23   Historical Provider, MD   NON FORMULARY Viveran 200mg- pure caffience    Historical Provider, MD   NON FORMULARY Heel Ackerman    Historical Provider, MD   polyvinyl alcohol (LIQUID TEARS OPHT) INSTILL 1-2 DROPS INTO AFFECTED EYE(S) 4 TIMES DAILY AS DIRECTED.    Historical Provider, MD   pregabalin (Lyrica) 50 mg capsule Take 1 capsule (50 mg) by mouth 2 times a day. 3/18/24   Historical Provider, MD      PAT ROS:   Constitutional:   neg     no fever   no chills  Neuro/Psych:    Numbness and tingling in left leg and hands at night   numbness  Eyes:   neg    Ears:    tinnitus  Nose:   neg    Mouth:   neg    Throat:   neg    Neck:   neg    Cardio:   neg    Respiratory:   neg    Endocrine:   neg    GI:   neg    :   neg    Musculoskeletal:    Uses cane when walking outside of  home  neg    Hematologic:   neg     no history of blood transfusion   no blood clots  Skin:  neg      Physical Exam  Vitals reviewed.   Constitutional:       Appearance: Normal appearance. She is normal weight.   HENT:      Head: Normocephalic and atraumatic.      Nose: Nose normal.      Mouth/Throat:      Mouth: Mucous membranes are moist.      Pharynx: Oropharynx is clear.   Eyes:      Extraocular Movements: Extraocular movements intact.      Pupils: Pupils are equal, round, and reactive to light.   Cardiovascular:      Rate and Rhythm: Normal rate and regular rhythm.      Heart sounds: Normal heart sounds.   Pulmonary:      Effort: Pulmonary effort is normal.      Breath sounds: Normal breath sounds.   Abdominal:      General: Abdomen is flat. Bowel sounds are normal.      Palpations: Abdomen is soft.   Musculoskeletal:         General: Normal range of motion.      Cervical back: Normal range of motion and neck supple.      Comments: Ambulates with cane   Skin:     General: Skin is warm and dry.   Neurological:      Mental Status: She is alert and oriented to person, place, and time.   Psychiatric:         Mood and Affect: Mood normal.         Behavior: Behavior normal.         Thought Content: Thought content normal.         Judgment: Judgment normal.        PAT AIRWAY:   Airway:     Mallampati::  II    Neck ROM::  Full   Crowns, states nothing loose or removable     Visit Vitals  /79   Pulse 85   Temp 36.8 °C (98.2 °F)     DASI Risk Score      Flowsheet Row Most Recent Value   DASI SCORE 15.95   METS Score (Will be calculated only when all the questions are answered) 4.7          Caprini DVT Assessment      Flowsheet Row Most Recent Value   DVT Score 11   Current Status Major surgery planned, lasting over 3 hours   History Prior major surgery   Age 60-75 years   BMI 31-40 (Obesity)          Modified Frailty Index      Flowsheet Row Most Recent Value   Modified Frailty Index Calculator 0          CHADS2  Stroke Risk         N/A 3 to 100%: High Risk   2 to < 3%: Medium Risk   0 to < 2%: Low Risk     Last Change: N/A          This score determines the patient's risk of having a stroke if the patient has atrial fibrillation.        This score is not applicable to this patient. Components are not calculated.          Revised Cardiac Risk Index      Flowsheet Row Most Recent Value   Revised Cardiac Risk Calculator 0          Apfel Simplified Score      Flowsheet Row Most Recent Value   Apfel Simplified Score Calculator 3          Risk Analysis Index Results This Encounter    No data found in the last 1 encounters.       Stop Bang Score      Flowsheet Row Most Recent Value   Do you snore loudly? 1   Do you often feel tired or fatigued after your sleep? 1   Has anyone ever observed you stop breathing in your sleep? 0   Do you have or are you being treated for high blood pressure? 0   Recent BMI (Calculated) 31.8   Is BMI greater than 35 kg/m2? 0=No   Age older than 50 years old? 1=Yes   Is your neck circumference greater than 17 inches (Male) or 16 inches (Female)? 0   Gender - Male 0=No   STOP-BANG Total Score 3          Assessment and Plan:     Neuro:   No diagnosis or significant findings on chart review or clinical presentation and evaluation.    Patient given information on brain exercises and delirium prevention.    HEENT/Airway  No diagnosis or significant findings on chart review or clinical presentation and evaluation.    Cardiovascular  No diagnosis or significant findings on chart review or clinical presentation and evaluation.    RCRI  The patient meets 0-1 RCRI criteria and therefore has a less than 1% risk of major adverse cardiac complications.  METS  The patient's functional capacity is greater than 4 METS.  MACE  30-day risk for MACE of 1 predictor, 6.0% risk for cardiac death, nonfatal myocardial infarction, and nonfatal cardiac arrest  RAMONITA  0.1% risk for 26th to 50th percentile    7-14-23: EKG sinus  rhythm     Pulmonary   No diagnosis or significant findings on chart review or clinical presentation and evaluation.    STOP BANG Score of 3, which places patient at intermediate risk for having WILL.  ARISCAT 58, High, 42.1% risk of in-hospital postoperative pulmonary complications  PRODIGY 15, high of respiratory depression episode.    Patient given information on deep breathing exercises.     Renal  No diagnosis or significant findings on chart review or clinical presentation and evaluation.    Endocrine  No diagnosis or significant findings on chart review or clinical presentation and evaluation.    Hematology  No diagnosis or significant findings on chart review or clinical presentation and evaluation.    Caprini score 11, high risk of VTE    Transfusion Evaluation  A type and screen was obtained given the likelihood for perioperative transfusion of blood or blood products.    Patient given information on DVT prevention.     GI    Hiatal hernia  States no issues or concerns.     Eat 10- 0  Apfel: 3 points 61% risk for post operative nausea/vomiting.     Genitourinary  No diagnosis or significant findings on chart review or clinical presentation and evaluation.    ID  No diagnosis or significant findings on chart review or clinical presentation and evaluation.    MRSA swab ordered  UA with reflex culture ordered  Chlorhexidine mouth wash and body wash ordered    Musculoskeletal    Arthritis, cervicalgia, spinal stenosis, scoliosis, peripheral neuropathy  Currently treated with acetaminophen, ibuprofen, Biofreeze, naproxen sodium, and pregabalin.     -Preoperative medication instructions were provided and reviewed with the patient.  Any additional testing or evaluation was explained to the patient.  NPO Instructions were discussed, and the patient's questions were answered prior to conclusion of this encounter    Labs ordered:    Recent Results (from the past 168 hour(s))   Basic Metabolic Panel    Collection  Time: 04/30/24 10:27 AM   Result Value Ref Range    Glucose 81 74 - 99 mg/dL    Sodium 142 136 - 145 mmol/L    Potassium 4.5 3.5 - 5.3 mmol/L    Chloride 107 98 - 107 mmol/L    Bicarbonate 25 21 - 32 mmol/L    Anion Gap 15 10 - 20 mmol/L    Urea Nitrogen 22 6 - 23 mg/dL    Creatinine 0.73 0.50 - 1.05 mg/dL    eGFR 88 >60 mL/min/1.73m*2    Calcium 10.2 8.6 - 10.6 mg/dL   Prealbumin    Collection Time: 04/30/24 10:27 AM   Result Value Ref Range    Prealbumin 32.5 18.0 - 40.0 mg/dL   CBC and Auto Differential    Collection Time: 04/30/24 10:27 AM   Result Value Ref Range    WBC 5.6 4.4 - 11.3 x10*3/uL    nRBC 0.0 0.0 - 0.0 /100 WBCs    RBC 4.87 4.00 - 5.20 x10*6/uL    Hemoglobin 15.8 12.0 - 16.0 g/dL    Hematocrit 46.0 36.0 - 46.0 %    MCV 95 80 - 100 fL    MCH 32.4 26.0 - 34.0 pg    MCHC 34.3 32.0 - 36.0 g/dL    RDW 12.4 11.5 - 14.5 %    Platelets 230 150 - 450 x10*3/uL    Neutrophils % 41.2 40.0 - 80.0 %    Immature Granulocytes %, Automated 0.2 0.0 - 0.9 %    Lymphocytes % 46.2 13.0 - 44.0 %    Monocytes % 9.9 2.0 - 10.0 %    Eosinophils % 1.4 0.0 - 6.0 %    Basophils % 1.1 0.0 - 2.0 %    Neutrophils Absolute 2.29 1.60 - 5.50 x10*3/uL    Immature Granulocytes Absolute, Automated 0.01 0.00 - 0.50 x10*3/uL    Lymphocytes Absolute 2.57 0.80 - 3.00 x10*3/uL    Monocytes Absolute 0.55 0.05 - 0.80 x10*3/uL    Eosinophils Absolute 0.08 0.00 - 0.40 x10*3/uL    Basophils Absolute 0.06 0.00 - 0.10 x10*3/uL   Coagulation Screen    Collection Time: 04/30/24 10:27 AM   Result Value Ref Range    Protime 11.6 9.8 - 12.8 seconds    INR 1.0 0.9 - 1.1    aPTT 27 27 - 38 seconds   Type And Screen    Collection Time: 04/30/24 10:27 AM   Result Value Ref Range    ABO TYPE O     Rh TYPE POS     ANTIBODY SCREEN NEG    Hemoglobin A1C    Collection Time: 04/30/24 10:27 AM   Result Value Ref Range    Hemoglobin A1C 5.1 see below %    Estimated Average Glucose 100 Not Established mg/dL   Staphylococcus aureus/MRSA colonization, Culture     Collection Time: 04/30/24 10:27 AM    Specimen: Nares/Axilla/Groin; Swab   Result Value Ref Range    Staph/MRSA Screen Culture No Staphylococcus aureus isolated    Urinalysis with Reflex Culture and Microscopic    Collection Time: 04/30/24 10:27 AM   Result Value Ref Range    Color, Urine Light-Yellow Light-Yellow, Yellow, Dark-Yellow    Appearance, Urine Clear Clear    Specific Gravity, Urine 1.021 1.005 - 1.035    pH, Urine 6.5 5.0, 5.5, 6.0, 6.5, 7.0, 7.5, 8.0    Protein, Urine NEGATIVE NEGATIVE, 10 (TRACE), 20 (TRACE) mg/dL    Glucose, Urine Normal Normal mg/dL    Blood, Urine NEGATIVE NEGATIVE    Ketones, Urine NEGATIVE NEGATIVE mg/dL    Bilirubin, Urine NEGATIVE NEGATIVE    Urobilinogen, Urine Normal Normal mg/dL    Nitrite, Urine NEGATIVE NEGATIVE    Leukocyte Esterase, Urine NEGATIVE NEGATIVE   Extra Urine Gray Tube    Collection Time: 04/30/24 10:27 AM   Result Value Ref Range    Extra Tube Hold for add-ons.

## 2024-05-01 LAB — HOLD SPECIMEN: NORMAL

## 2024-05-02 LAB — STAPHYLOCOCCUS SPEC CULT: NORMAL

## 2024-05-06 NOTE — OP NOTE
PREOPERATIVE DIAGNOSIS:  Pelvic abdominal pain.    POSTOPERATIVE DIAGNOSIS:  Pelvic abdominal pain.    OPERATION/PROCEDURE:  Bilateral superior hypogastric nerve block under fluoroscopy guidance.    SURGEON:  Eden Perez MD.    ASSISTANT(S):    ANESTHESIA:  Local and sedation.    COMPLICATION:  None apparently.    CLINICAL NOTE:  This is a 72-year-old female with a chronic periabdominal pain, worse  on the left side, unclear etiology.  She came in today for a  diagnostic superior epigastric block.     DESCRIPTION OF PROCEDURE:  The patient was identified in the [facility] [place].  Risks and benefits  were discussed with the patient in detail and the patient consented.  Intravenous line put in left hand.  The patient was taken to  operating room #[facility] at [facility], and she was put in  prone position on fluoroscopy table.  Regular monitors including EKG,  blood pressure, pulse oximeter applied.  The lumbosacral area  exposed, scrubbed in regular fashion, sterile towel applied.  By  fluoroscopy guidance, the first trial was to go to trans__________ on  the right side using Chiba needle, was not successful, so the trial  was changed to the left side.  By fluoroscopy guidance, the  intradiscal space between L5-S1 was identified with manipulation by  fluoroscopy.  Skin wheal was performed using 0.5% lidocaine and a  7-inch 22-gauge spinal needle inserted, directed by fluoroscopy to  enter the disc space at L5-S1, and once it go through, after getting  out of the disc space, contrast was injected and ropivacaine 0.5% was  injected incrementally up to 15 to 20 cc.  The patient tolerated  procedure well and was taken back to recovery room.  We are going to  keep her in the recovery room to see the effectiveness of the  injection of any pain relief or any side effects.  There was no blood  aspirated, no CSF aspirated, nothing that is way of concern.  We will  follow up with the patient in 1 week  anyhow.       Eden Perez MD    DD:  05/03/2023 10:51:41 EST  DT:  05/03/2023 11:21:53 EST  DICTATION NUMBER:  [id number]  INTERNAL JOB NUMBER:  [id number]        Electronic Signatures:  Eden Perez (MD) (Signed on 05-May-2023 06:52)   Authored  Unsigned, Draft (SYS GENERATED) (Entered on 03-May-2023 11:21)   Entered    Last Updated: 05-May-2023 06:52 by Eden Perez)

## 2024-05-14 ENCOUNTER — ANESTHESIA (OUTPATIENT)
Dept: OPERATING ROOM | Facility: HOSPITAL | Age: 74
DRG: 454 | End: 2024-05-14
Payer: MEDICARE

## 2024-05-14 ENCOUNTER — HOSPITAL ENCOUNTER (INPATIENT)
Facility: HOSPITAL | Age: 74
LOS: 8 days | Discharge: NURSING, REHAB OR LONG-TERM CARE | DRG: 454 | End: 2024-05-22
Attending: STUDENT IN AN ORGANIZED HEALTH CARE EDUCATION/TRAINING PROGRAM | Admitting: STUDENT IN AN ORGANIZED HEALTH CARE EDUCATION/TRAINING PROGRAM
Payer: MEDICARE

## 2024-05-14 ENCOUNTER — APPOINTMENT (OUTPATIENT)
Dept: RADIOLOGY | Facility: HOSPITAL | Age: 74
DRG: 454 | End: 2024-05-14
Payer: MEDICARE

## 2024-05-14 ENCOUNTER — ANESTHESIA EVENT (OUTPATIENT)
Dept: OPERATING ROOM | Facility: HOSPITAL | Age: 74
DRG: 454 | End: 2024-05-14
Payer: MEDICARE

## 2024-05-14 ENCOUNTER — APPOINTMENT (OUTPATIENT)
Dept: CARDIOLOGY | Facility: HOSPITAL | Age: 74
DRG: 454 | End: 2024-05-14
Payer: MEDICARE

## 2024-05-14 DIAGNOSIS — M41.35 THORACOGENIC SCOLIOSIS OF THORACOLUMBAR REGION: Primary | ICD-10-CM

## 2024-05-14 DIAGNOSIS — M54.2 NECK PAIN: ICD-10-CM

## 2024-05-14 DIAGNOSIS — M41.9 SCOLIOSIS DEFORMITY OF SPINE: ICD-10-CM

## 2024-05-14 LAB
ABO GROUP (TYPE) IN BLOOD: NORMAL
ABO GROUP (TYPE) IN BLOOD: NORMAL
ALBUMIN SERPL BCP-MCNC: 3.5 G/DL (ref 3.4–5)
ALBUMIN SERPL BCP-MCNC: 3.7 G/DL (ref 3.4–5)
ANION GAP BLDA CALCULATED.4IONS-SCNC: 10 MMO/L (ref 10–25)
ANION GAP BLDA CALCULATED.4IONS-SCNC: 12 MMO/L (ref 10–25)
ANION GAP BLDA CALCULATED.4IONS-SCNC: 13 MMO/L (ref 10–25)
ANION GAP BLDA CALCULATED.4IONS-SCNC: 13 MMO/L (ref 10–25)
ANION GAP SERPL CALC-SCNC: 12 MMOL/L (ref 10–20)
ANION GAP SERPL CALC-SCNC: 14 MMOL/L (ref 10–20)
ANTIBODY SCREEN: NORMAL
APTT PPP: 23 SECONDS (ref 27–38)
BASE EXCESS BLDA CALC-SCNC: -3.3 MMOL/L (ref -2–3)
BASE EXCESS BLDA CALC-SCNC: -4.1 MMOL/L (ref -2–3)
BASE EXCESS BLDA CALC-SCNC: -4.4 MMOL/L (ref -2–3)
BASE EXCESS BLDA CALC-SCNC: -7 MMOL/L (ref -2–3)
BODY TEMPERATURE: 37 DEGREES CELSIUS
BUN SERPL-MCNC: 22 MG/DL (ref 6–23)
BUN SERPL-MCNC: 23 MG/DL (ref 6–23)
CA-I BLDA-SCNC: 1.07 MMOL/L (ref 1.1–1.33)
CA-I BLDA-SCNC: 1.12 MMOL/L (ref 1.1–1.33)
CA-I BLDA-SCNC: 1.15 MMOL/L (ref 1.1–1.33)
CA-I BLDA-SCNC: 1.16 MMOL/L (ref 1.1–1.33)
CA-I BLDA-SCNC: 1.24 MMOL/L (ref 1.1–1.33)
CA-I BLDA-SCNC: 1.24 MMOL/L (ref 1.1–1.33)
CALCIUM SERPL-MCNC: 8 MG/DL (ref 8.6–10.6)
CALCIUM SERPL-MCNC: 8.9 MG/DL (ref 8.6–10.6)
CARDIAC TROPONIN I PNL SERPL HS: 33 NG/L (ref 0–34)
CHLORIDE BLDA-SCNC: 105 MMOL/L (ref 98–107)
CHLORIDE BLDA-SCNC: 106 MMOL/L (ref 98–107)
CHLORIDE BLDA-SCNC: 108 MMOL/L (ref 98–107)
CHLORIDE BLDA-SCNC: 108 MMOL/L (ref 98–107)
CHLORIDE BLDA-SCNC: 109 MMOL/L (ref 98–107)
CHLORIDE BLDA-SCNC: 110 MMOL/L (ref 98–107)
CHLORIDE SERPL-SCNC: 107 MMOL/L (ref 98–107)
CHLORIDE SERPL-SCNC: 108 MMOL/L (ref 98–107)
CO2 SERPL-SCNC: 22 MMOL/L (ref 21–32)
CO2 SERPL-SCNC: 22 MMOL/L (ref 21–32)
CREAT SERPL-MCNC: 0.54 MG/DL (ref 0.5–1.05)
CREAT SERPL-MCNC: 0.59 MG/DL (ref 0.5–1.05)
EGFRCR SERPLBLD CKD-EPI 2021: >90 ML/MIN/1.73M*2
EGFRCR SERPLBLD CKD-EPI 2021: >90 ML/MIN/1.73M*2
ERYTHROCYTE [DISTWIDTH] IN BLOOD BY AUTOMATED COUNT: 12.4 % (ref 11.5–14.5)
ERYTHROCYTE [DISTWIDTH] IN BLOOD BY AUTOMATED COUNT: 12.7 % (ref 11.5–14.5)
FIBRINOGEN PPP-MCNC: 153 MG/DL (ref 200–400)
GLUCOSE BLDA-MCNC: 107 MG/DL (ref 74–99)
GLUCOSE BLDA-MCNC: 122 MG/DL (ref 74–99)
GLUCOSE BLDA-MCNC: 141 MG/DL (ref 74–99)
GLUCOSE BLDA-MCNC: 155 MG/DL (ref 74–99)
GLUCOSE BLDA-MCNC: 159 MG/DL (ref 74–99)
GLUCOSE BLDA-MCNC: 203 MG/DL (ref 74–99)
GLUCOSE SERPL-MCNC: 195 MG/DL (ref 74–99)
GLUCOSE SERPL-MCNC: 195 MG/DL (ref 74–99)
HCO3 BLDA-SCNC: 18.6 MMOL/L (ref 22–26)
HCO3 BLDA-SCNC: 21 MMOL/L (ref 22–26)
HCO3 BLDA-SCNC: 21.6 MMOL/L (ref 22–26)
HCO3 BLDA-SCNC: 22.1 MMOL/L (ref 22–26)
HCO3 BLDA-SCNC: 22.1 MMOL/L (ref 22–26)
HCO3 BLDA-SCNC: 22.7 MMOL/L (ref 22–26)
HCT VFR BLD AUTO: 22.6 % (ref 36–46)
HCT VFR BLD AUTO: 24.4 % (ref 36–46)
HCT VFR BLD EST: 26 % (ref 36–46)
HCT VFR BLD EST: 28 % (ref 36–46)
HCT VFR BLD EST: 33 % (ref 36–46)
HCT VFR BLD EST: 34 % (ref 36–46)
HCT VFR BLD EST: 40 % (ref 36–46)
HCT VFR BLD EST: 44 % (ref 36–46)
HGB BLD-MCNC: 7.7 G/DL (ref 12–16)
HGB BLD-MCNC: 8.7 G/DL (ref 12–16)
HGB BLDA-MCNC: 10.9 G/DL (ref 12–16)
HGB BLDA-MCNC: 11.4 G/DL (ref 12–16)
HGB BLDA-MCNC: 13.2 G/DL (ref 12–16)
HGB BLDA-MCNC: 14.7 G/DL (ref 12–16)
HGB BLDA-MCNC: 8.8 G/DL (ref 12–16)
HGB BLDA-MCNC: 9.4 G/DL (ref 12–16)
INHALED O2 CONCENTRATION: 44 %
INHALED O2 CONCENTRATION: 45 %
INHALED O2 CONCENTRATION: 50 %
INR PPP: 1.3 (ref 0.9–1.1)
LACTATE BLDA-SCNC: 1 MMOL/L (ref 0.4–2)
LACTATE BLDA-SCNC: 1.4 MMOL/L (ref 0.4–2)
LACTATE BLDA-SCNC: 1.5 MMOL/L (ref 0.4–2)
LACTATE BLDA-SCNC: 1.6 MMOL/L (ref 0.4–2)
LACTATE BLDA-SCNC: 1.6 MMOL/L (ref 0.4–2)
LACTATE BLDA-SCNC: 2.2 MMOL/L (ref 0.4–2)
LACTATE BLDV-SCNC: 3.2 MMOL/L (ref 0.4–2)
MCH RBC QN AUTO: 32.3 PG (ref 26–34)
MCH RBC QN AUTO: 33 PG (ref 26–34)
MCHC RBC AUTO-ENTMCNC: 34.1 G/DL (ref 32–36)
MCHC RBC AUTO-ENTMCNC: 35.7 G/DL (ref 32–36)
MCV RBC AUTO: 91 FL (ref 80–100)
MCV RBC AUTO: 97 FL (ref 80–100)
NRBC BLD-RTO: 0 /100 WBCS (ref 0–0)
NRBC BLD-RTO: 0 /100 WBCS (ref 0–0)
OXYHGB MFR BLDA: 96.9 % (ref 94–98)
OXYHGB MFR BLDA: 97.2 % (ref 94–98)
OXYHGB MFR BLDA: 97.6 % (ref 94–98)
OXYHGB MFR BLDA: 97.7 % (ref 94–98)
OXYHGB MFR BLDA: 97.8 % (ref 94–98)
OXYHGB MFR BLDA: 98 % (ref 94–98)
PCO2 BLDA: 37 MM HG (ref 38–42)
PCO2 BLDA: 39 MM HG (ref 38–42)
PCO2 BLDA: 40 MM HG (ref 38–42)
PCO2 BLDA: 40 MM HG (ref 38–42)
PCO2 BLDA: 41 MM HG (ref 38–42)
PCO2 BLDA: 43 MM HG (ref 38–42)
PH BLDA: 7.31 PH (ref 7.38–7.42)
PH BLDA: 7.33 PH (ref 7.38–7.42)
PH BLDA: 7.33 PH (ref 7.38–7.42)
PH BLDA: 7.34 PH (ref 7.38–7.42)
PH BLDA: 7.35 PH (ref 7.38–7.42)
PH BLDA: 7.35 PH (ref 7.38–7.42)
PHOSPHATE SERPL-MCNC: 4 MG/DL (ref 2.5–4.9)
PHOSPHATE SERPL-MCNC: 4.6 MG/DL (ref 2.5–4.9)
PLATELET # BLD AUTO: 139 X10*3/UL (ref 150–450)
PLATELET # BLD AUTO: 174 X10*3/UL (ref 150–450)
PO2 BLDA: 126 MM HG (ref 85–95)
PO2 BLDA: 177 MM HG (ref 85–95)
PO2 BLDA: 182 MM HG (ref 85–95)
PO2 BLDA: 187 MM HG (ref 85–95)
PO2 BLDA: 194 MM HG (ref 85–95)
PO2 BLDA: 196 MM HG (ref 85–95)
POTASSIUM BLDA-SCNC: 3.6 MMOL/L (ref 3.5–5.3)
POTASSIUM BLDA-SCNC: 3.7 MMOL/L (ref 3.5–5.3)
POTASSIUM BLDA-SCNC: 3.8 MMOL/L (ref 3.5–5.3)
POTASSIUM BLDA-SCNC: 3.8 MMOL/L (ref 3.5–5.3)
POTASSIUM BLDA-SCNC: 4 MMOL/L (ref 3.5–5.3)
POTASSIUM BLDA-SCNC: 4 MMOL/L (ref 3.5–5.3)
POTASSIUM SERPL-SCNC: 4.1 MMOL/L (ref 3.5–5.3)
POTASSIUM SERPL-SCNC: 4.1 MMOL/L (ref 3.5–5.3)
PROTHROMBIN TIME: 14.5 SECONDS (ref 9.8–12.8)
RBC # BLD AUTO: 2.33 X10*6/UL (ref 4–5.2)
RBC # BLD AUTO: 2.69 X10*6/UL (ref 4–5.2)
RH FACTOR (ANTIGEN D): NORMAL
RH FACTOR (ANTIGEN D): NORMAL
SAO2 % BLDA: 100 % (ref 94–100)
SAO2 % BLDA: 100 % (ref 94–100)
SAO2 % BLDA: 99 % (ref 94–100)
SODIUM BLDA-SCNC: 136 MMOL/L (ref 136–145)
SODIUM BLDA-SCNC: 137 MMOL/L (ref 136–145)
SODIUM BLDA-SCNC: 138 MMOL/L (ref 136–145)
SODIUM BLDA-SCNC: 138 MMOL/L (ref 136–145)
SODIUM SERPL-SCNC: 138 MMOL/L (ref 136–145)
SODIUM SERPL-SCNC: 139 MMOL/L (ref 136–145)
WBC # BLD AUTO: 11.7 X10*3/UL (ref 4.4–11.3)
WBC # BLD AUTO: 11.8 X10*3/UL (ref 4.4–11.3)

## 2024-05-14 PROCEDURE — A22634 PR ARTHDSIS POST/POSTERLATRL/POSTINTRBDYADL SPC/SEG: Performed by: ANESTHESIOLOGY

## 2024-05-14 PROCEDURE — 85384 FIBRINOGEN ACTIVITY: CPT

## 2024-05-14 PROCEDURE — P9016 RBC LEUKOCYTES REDUCED: HCPCS

## 2024-05-14 PROCEDURE — 2500000005 HC RX 250 GENERAL PHARMACY W/O HCPCS: Performed by: ANESTHESIOLOGIST ASSISTANT

## 2024-05-14 PROCEDURE — 2500000004 HC RX 250 GENERAL PHARMACY W/ HCPCS (ALT 636 FOR OP/ED): Performed by: STUDENT IN AN ORGANIZED HEALTH CARE EDUCATION/TRAINING PROGRAM

## 2024-05-14 PROCEDURE — 85610 PROTHROMBIN TIME: CPT

## 2024-05-14 PROCEDURE — A4649 SURGICAL SUPPLIES: HCPCS | Performed by: STUDENT IN AN ORGANIZED HEALTH CARE EDUCATION/TRAINING PROGRAM

## 2024-05-14 PROCEDURE — 93005 ELECTROCARDIOGRAM TRACING: CPT

## 2024-05-14 PROCEDURE — C1776 JOINT DEVICE (IMPLANTABLE): HCPCS | Performed by: STUDENT IN AN ORGANIZED HEALTH CARE EDUCATION/TRAINING PROGRAM

## 2024-05-14 PROCEDURE — 22848 INSERT PELV FIXATION DEVICE: CPT | Performed by: STUDENT IN AN ORGANIZED HEALTH CARE EDUCATION/TRAINING PROGRAM

## 2024-05-14 PROCEDURE — 93010 ELECTROCARDIOGRAM REPORT: CPT | Performed by: STUDENT IN AN ORGANIZED HEALTH CARE EDUCATION/TRAINING PROGRAM

## 2024-05-14 PROCEDURE — A4217 STERILE WATER/SALINE, 500 ML: HCPCS | Performed by: STUDENT IN AN ORGANIZED HEALTH CARE EDUCATION/TRAINING PROGRAM

## 2024-05-14 PROCEDURE — 85027 COMPLETE CBC AUTOMATED: CPT

## 2024-05-14 PROCEDURE — 2500000006 HC RX 250 W HCPCS SELF ADMINISTERED DRUGS (ALT 637 FOR ALL PAYERS): Performed by: STUDENT IN AN ORGANIZED HEALTH CARE EDUCATION/TRAINING PROGRAM

## 2024-05-14 PROCEDURE — 63052 LAM FACETC/FRMT ARTHRD LUM 1: CPT | Performed by: STUDENT IN AN ORGANIZED HEALTH CARE EDUCATION/TRAINING PROGRAM

## 2024-05-14 PROCEDURE — 36620 INSERTION CATHETER ARTERY: CPT | Performed by: ANESTHESIOLOGY

## 2024-05-14 PROCEDURE — 0QH104Z INSERTION OF INTERNAL FIXATION DEVICE INTO SACRUM, OPEN APPROACH: ICD-10-PCS | Performed by: STUDENT IN AN ORGANIZED HEALTH CARE EDUCATION/TRAINING PROGRAM

## 2024-05-14 PROCEDURE — 83605 ASSAY OF LACTIC ACID: CPT | Mod: MUE

## 2024-05-14 PROCEDURE — C1713 ANCHOR/SCREW BN/BN,TIS/BN: HCPCS | Performed by: STUDENT IN AN ORGANIZED HEALTH CARE EDUCATION/TRAINING PROGRAM

## 2024-05-14 PROCEDURE — 3600000018 HC OR TIME - INITIAL BASE CHARGE - PROCEDURE LEVEL SIX: Performed by: STUDENT IN AN ORGANIZED HEALTH CARE EDUCATION/TRAINING PROGRAM

## 2024-05-14 PROCEDURE — 2020000001 HC ICU ROOM DAILY

## 2024-05-14 PROCEDURE — 2780000003 HC OR 278 NO HCPCS: Performed by: STUDENT IN AN ORGANIZED HEALTH CARE EDUCATION/TRAINING PROGRAM

## 2024-05-14 PROCEDURE — 2500000005 HC RX 250 GENERAL PHARMACY W/O HCPCS: Performed by: STUDENT IN AN ORGANIZED HEALTH CARE EDUCATION/TRAINING PROGRAM

## 2024-05-14 PROCEDURE — 0SG3071 FUSION OF LUMBOSACRAL JOINT WITH AUTOLOGOUS TISSUE SUBSTITUTE, POSTERIOR APPROACH, POSTERIOR COLUMN, OPEN APPROACH: ICD-10-PCS | Performed by: STUDENT IN AN ORGANIZED HEALTH CARE EDUCATION/TRAINING PROGRAM

## 2024-05-14 PROCEDURE — A22634 PR ARTHDSIS POST/POSTERLATRL/POSTINTRBDYADL SPC/SEG: Performed by: ANESTHESIOLOGIST ASSISTANT

## 2024-05-14 PROCEDURE — 0SG1071 FUSION OF 2 OR MORE LUMBAR VERTEBRAL JOINTS WITH AUTOLOGOUS TISSUE SUBSTITUTE, POSTERIOR APPROACH, POSTERIOR COLUMN, OPEN APPROACH: ICD-10-PCS | Performed by: STUDENT IN AN ORGANIZED HEALTH CARE EDUCATION/TRAINING PROGRAM

## 2024-05-14 PROCEDURE — 2500000001 HC RX 250 WO HCPCS SELF ADMINISTERED DRUGS (ALT 637 FOR MEDICARE OP): Performed by: STUDENT IN AN ORGANIZED HEALTH CARE EDUCATION/TRAINING PROGRAM

## 2024-05-14 PROCEDURE — 22614 ARTHRD PST TQ 1NTRSPC EA ADD: CPT | Performed by: STUDENT IN AN ORGANIZED HEALTH CARE EDUCATION/TRAINING PROGRAM

## 2024-05-14 PROCEDURE — 86850 RBC ANTIBODY SCREEN: CPT | Mod: 91

## 2024-05-14 PROCEDURE — 84132 ASSAY OF SERUM POTASSIUM: CPT | Mod: 91

## 2024-05-14 PROCEDURE — 2500000004 HC RX 250 GENERAL PHARMACY W/ HCPCS (ALT 636 FOR OP/ED): Performed by: ANESTHESIOLOGY

## 2024-05-14 PROCEDURE — 22214 INCIS 1 VERTEBRAL SEG LUMBAR: CPT | Performed by: STUDENT IN AN ORGANIZED HEALTH CARE EDUCATION/TRAINING PROGRAM

## 2024-05-14 PROCEDURE — 0QH004Z INSERTION OF INTERNAL FIXATION DEVICE INTO LUMBAR VERTEBRA, OPEN APPROACH: ICD-10-PCS | Performed by: STUDENT IN AN ORGANIZED HEALTH CARE EDUCATION/TRAINING PROGRAM

## 2024-05-14 PROCEDURE — 86901 BLOOD TYPING SEROLOGIC RH(D): CPT

## 2024-05-14 PROCEDURE — 2500000005 HC RX 250 GENERAL PHARMACY W/O HCPCS

## 2024-05-14 PROCEDURE — 36430 TRANSFUSION BLD/BLD COMPNT: CPT

## 2024-05-14 PROCEDURE — 2500000004 HC RX 250 GENERAL PHARMACY W/ HCPCS (ALT 636 FOR OP/ED)

## 2024-05-14 PROCEDURE — S0109 METHADONE ORAL 5MG: HCPCS | Performed by: ANESTHESIOLOGIST ASSISTANT

## 2024-05-14 PROCEDURE — 22843 INSERT SPINE FIXATION DEVICE: CPT | Performed by: STUDENT IN AN ORGANIZED HEALTH CARE EDUCATION/TRAINING PROGRAM

## 2024-05-14 PROCEDURE — 2500000002 HC RX 250 W HCPCS SELF ADMINISTERED DRUGS (ALT 637 FOR MEDICARE OP, ALT 636 FOR OP/ED): Performed by: ANESTHESIOLOGIST ASSISTANT

## 2024-05-14 PROCEDURE — 22853 INSJ BIOMECHANICAL DEVICE: CPT | Performed by: STUDENT IN AN ORGANIZED HEALTH CARE EDUCATION/TRAINING PROGRAM

## 2024-05-14 PROCEDURE — P9045 ALBUMIN (HUMAN), 5%, 250 ML: HCPCS | Mod: JZ | Performed by: ANESTHESIOLOGIST ASSISTANT

## 2024-05-14 PROCEDURE — 84484 ASSAY OF TROPONIN QUANT: CPT | Performed by: STUDENT IN AN ORGANIZED HEALTH CARE EDUCATION/TRAINING PROGRAM

## 2024-05-14 PROCEDURE — 37799 UNLISTED PX VASCULAR SURGERY: CPT

## 2024-05-14 PROCEDURE — 22216 INCIS ADDL SPINE SEGMENT: CPT | Performed by: STUDENT IN AN ORGANIZED HEALTH CARE EDUCATION/TRAINING PROGRAM

## 2024-05-14 PROCEDURE — 22633 ARTHRD CMBN 1NTRSPC LUMBAR: CPT | Performed by: STUDENT IN AN ORGANIZED HEALTH CARE EDUCATION/TRAINING PROGRAM

## 2024-05-14 PROCEDURE — 3700000002 HC GENERAL ANESTHESIA TIME - EACH INCREMENTAL 1 MINUTE: Performed by: STUDENT IN AN ORGANIZED HEALTH CARE EDUCATION/TRAINING PROGRAM

## 2024-05-14 PROCEDURE — C1889 IMPLANT/INSERT DEVICE, NOC: HCPCS | Performed by: STUDENT IN AN ORGANIZED HEALTH CARE EDUCATION/TRAINING PROGRAM

## 2024-05-14 PROCEDURE — 0SG10AJ FUSION OF 2 OR MORE LUMBAR VERTEBRAL JOINTS WITH INTERBODY FUSION DEVICE, POSTERIOR APPROACH, ANTERIOR COLUMN, OPEN APPROACH: ICD-10-PCS | Performed by: STUDENT IN AN ORGANIZED HEALTH CARE EDUCATION/TRAINING PROGRAM

## 2024-05-14 PROCEDURE — 3700000001 HC GENERAL ANESTHESIA TIME - INITIAL BASE CHARGE: Performed by: STUDENT IN AN ORGANIZED HEALTH CARE EDUCATION/TRAINING PROGRAM

## 2024-05-14 PROCEDURE — 36415 COLL VENOUS BLD VENIPUNCTURE: CPT | Performed by: ANESTHESIOLOGY

## 2024-05-14 PROCEDURE — 84132 ASSAY OF SERUM POTASSIUM: CPT | Mod: 91 | Performed by: ANESTHESIOLOGIST ASSISTANT

## 2024-05-14 PROCEDURE — 2720000007 HC OR 272 NO HCPCS: Performed by: STUDENT IN AN ORGANIZED HEALTH CARE EDUCATION/TRAINING PROGRAM

## 2024-05-14 PROCEDURE — 99291 CRITICAL CARE FIRST HOUR: CPT

## 2024-05-14 PROCEDURE — 3600000017 HC OR TIME - EACH INCREMENTAL 1 MINUTE - PROCEDURE LEVEL SIX: Performed by: STUDENT IN AN ORGANIZED HEALTH CARE EDUCATION/TRAINING PROGRAM

## 2024-05-14 PROCEDURE — 63053 LAM FACTC/FRMT ARTHRD LUM EA: CPT | Performed by: STUDENT IN AN ORGANIZED HEALTH CARE EDUCATION/TRAINING PROGRAM

## 2024-05-14 PROCEDURE — 22634 ARTHRD CMBN 1NTRSPC EA ADDL: CPT | Performed by: STUDENT IN AN ORGANIZED HEALTH CARE EDUCATION/TRAINING PROGRAM

## 2024-05-14 PROCEDURE — 2500000004 HC RX 250 GENERAL PHARMACY W/ HCPCS (ALT 636 FOR OP/ED): Performed by: ANESTHESIOLOGIST ASSISTANT

## 2024-05-14 PROCEDURE — 99100 ANES PT EXTEME AGE<1 YR&>70: CPT | Performed by: ANESTHESIOLOGY

## 2024-05-14 PROCEDURE — C1821 INTERSPINOUS IMPLANT: HCPCS | Performed by: STUDENT IN AN ORGANIZED HEALTH CARE EDUCATION/TRAINING PROGRAM

## 2024-05-14 PROCEDURE — 0SG30AJ FUSION OF LUMBOSACRAL JOINT WITH INTERBODY FUSION DEVICE, POSTERIOR APPROACH, ANTERIOR COLUMN, OPEN APPROACH: ICD-10-PCS | Performed by: STUDENT IN AN ORGANIZED HEALTH CARE EDUCATION/TRAINING PROGRAM

## 2024-05-14 DEVICE — ROD, RELINE-O COCR, 5.5X500MM STRAIGHT: Type: IMPLANTABLE DEVICE | Site: BACK | Status: FUNCTIONAL

## 2024-05-14 DEVICE — SCREW, RELINE-O, 7.5X45MM 2S POLYAXIAL: Type: IMPLANTABLE DEVICE | Site: BACK | Status: FUNCTIONAL

## 2024-05-14 DEVICE — SCREW, RELINE-O, 7.5X50MM 2S POLYAXIAL: Type: IMPLANTABLE DEVICE | Site: BACK | Status: FUNCTIONAL

## 2024-05-14 DEVICE — ROD, BENDING, DIGITIZER ARRAY: Type: IMPLANTABLE DEVICE | Site: SPINE LUMBAR | Status: NON-FUNCTIONAL

## 2024-05-14 DEVICE — SCREW, RELINE-O, 8.5X80MM 2S POLY ILIAC: Type: IMPLANTABLE DEVICE | Site: BACK | Status: FUNCTIONAL

## 2024-05-14 DEVICE — RELINE-O CONN, 5-6/5-6MM O-O MED: Type: IMPLANTABLE DEVICE | Site: BACK | Status: FUNCTIONAL

## 2024-05-14 DEVICE — SCREW, RELINE LOCK, 5.5MM OPEN TULIP: Type: IMPLANTABLE DEVICE | Site: BACK | Status: FUNCTIONAL

## 2024-05-14 DEVICE — SCREW, RELINE-O, 6.5X45MM 2S POLYAXIAL: Type: IMPLANTABLE DEVICE | Site: BACK | Status: FUNCTIONAL

## 2024-05-14 DEVICE — IMPLANTABLE DEVICE: Type: IMPLANTABLE DEVICE | Site: BACK | Status: FUNCTIONAL

## 2024-05-14 DEVICE — COMPONENT, SIGNIFY, BIOACTIVE CRUNCH, 10CC: Type: IMPLANTABLE DEVICE | Site: BACK | Status: FUNCTIONAL

## 2024-05-14 RX ORDER — PHENYLEPHRINE 10 MG/250 ML(40 MCG/ML)IN 0.9 % SOD.CHLORIDE INTRAVENOUS
CONTINUOUS PRN
Status: DISCONTINUED | OUTPATIENT
Start: 2024-05-14 | End: 2024-05-14

## 2024-05-14 RX ORDER — ONDANSETRON HYDROCHLORIDE 2 MG/ML
INJECTION, SOLUTION INTRAVENOUS AS NEEDED
Status: DISCONTINUED | OUTPATIENT
Start: 2024-05-14 | End: 2024-05-14

## 2024-05-14 RX ORDER — TRANEXAMIC ACID 650 MG/1
1300 TABLET ORAL ONCE
Status: COMPLETED | OUTPATIENT
Start: 2024-05-14 | End: 2024-05-14

## 2024-05-14 RX ORDER — LIDOCAINE HYDROCHLORIDE 10 MG/ML
0.1 INJECTION INFILTRATION; PERINEURAL ONCE
Status: DISCONTINUED | OUTPATIENT
Start: 2024-05-14 | End: 2024-05-14

## 2024-05-14 RX ORDER — PHENYLEPHRINE HYDROCHLORIDE 10 MG/ML
INJECTION INTRAVENOUS AS NEEDED
Status: DISCONTINUED | OUTPATIENT
Start: 2024-05-14 | End: 2024-05-14

## 2024-05-14 RX ORDER — ACETAMINOPHEN 325 MG/1
650 TABLET ORAL EVERY 6 HOURS
Status: DISCONTINUED | OUTPATIENT
Start: 2024-05-14 | End: 2024-05-20

## 2024-05-14 RX ORDER — HYDROMORPHONE HCL/0.9% NACL/PF 15 MG/30ML
PATIENT CONTROLLED ANALGESIA SYRINGE INTRAVENOUS CONTINUOUS
Status: DISCONTINUED | OUTPATIENT
Start: 2024-05-14 | End: 2024-05-16

## 2024-05-14 RX ORDER — LABETALOL HYDROCHLORIDE 5 MG/ML
5 INJECTION, SOLUTION INTRAVENOUS EVERY 5 MIN PRN
Status: DISCONTINUED | OUTPATIENT
Start: 2024-05-14 | End: 2024-05-14

## 2024-05-14 RX ORDER — ONDANSETRON HYDROCHLORIDE 2 MG/ML
4 INJECTION, SOLUTION INTRAVENOUS EVERY 8 HOURS PRN
Status: DISCONTINUED | OUTPATIENT
Start: 2024-05-14 | End: 2024-05-22 | Stop reason: HOSPADM

## 2024-05-14 RX ORDER — ACETAMINOPHEN 325 MG/1
975 TABLET ORAL ONCE
Status: DISCONTINUED | OUTPATIENT
Start: 2024-05-14 | End: 2024-05-14 | Stop reason: HOSPADM

## 2024-05-14 RX ORDER — PROPOFOL 10 MG/ML
INJECTION, EMULSION INTRAVENOUS AS NEEDED
Status: DISCONTINUED | OUTPATIENT
Start: 2024-05-14 | End: 2024-05-14

## 2024-05-14 RX ORDER — FENTANYL CITRATE 50 UG/ML
INJECTION, SOLUTION INTRAMUSCULAR; INTRAVENOUS AS NEEDED
Status: DISCONTINUED | OUTPATIENT
Start: 2024-05-14 | End: 2024-05-14

## 2024-05-14 RX ORDER — METHADONE HYDROCHLORIDE 10 MG/1
TABLET ORAL AS NEEDED
Status: DISCONTINUED | OUTPATIENT
Start: 2024-05-14 | End: 2024-05-14

## 2024-05-14 RX ORDER — HYDROMORPHONE HYDROCHLORIDE 1 MG/ML
0.5 INJECTION, SOLUTION INTRAMUSCULAR; INTRAVENOUS; SUBCUTANEOUS EVERY 5 MIN PRN
Status: DISCONTINUED | OUTPATIENT
Start: 2024-05-14 | End: 2024-05-14

## 2024-05-14 RX ORDER — SCOLOPAMINE TRANSDERMAL SYSTEM 1 MG/1
1 PATCH, EXTENDED RELEASE TRANSDERMAL ONCE
Status: COMPLETED | OUTPATIENT
Start: 2024-05-14 | End: 2024-05-17

## 2024-05-14 RX ORDER — GLYCOPYRROLATE 0.2 MG/ML
INJECTION INTRAMUSCULAR; INTRAVENOUS AS NEEDED
Status: DISCONTINUED | OUTPATIENT
Start: 2024-05-14 | End: 2024-05-14

## 2024-05-14 RX ORDER — CEFAZOLIN 1 G/1
INJECTION, POWDER, FOR SOLUTION INTRAVENOUS AS NEEDED
Status: DISCONTINUED | OUTPATIENT
Start: 2024-05-14 | End: 2024-05-14

## 2024-05-14 RX ORDER — PREGABALIN 50 MG/1
50 CAPSULE ORAL 2 TIMES DAILY
Status: DISCONTINUED | OUTPATIENT
Start: 2024-05-14 | End: 2024-05-17

## 2024-05-14 RX ORDER — CYCLOBENZAPRINE HCL 10 MG
10 TABLET ORAL 3 TIMES DAILY
Status: DISCONTINUED | OUTPATIENT
Start: 2024-05-14 | End: 2024-05-22 | Stop reason: HOSPADM

## 2024-05-14 RX ORDER — LIDOCAINE HYDROCHLORIDE 20 MG/ML
INJECTION, SOLUTION INFILTRATION; PERINEURAL AS NEEDED
Status: DISCONTINUED | OUTPATIENT
Start: 2024-05-14 | End: 2024-05-14

## 2024-05-14 RX ORDER — MIDAZOLAM HYDROCHLORIDE 1 MG/ML
1 INJECTION INTRAMUSCULAR; INTRAVENOUS ONCE AS NEEDED
Status: DISCONTINUED | OUTPATIENT
Start: 2024-05-14 | End: 2024-05-14

## 2024-05-14 RX ORDER — SODIUM CHLORIDE, SODIUM LACTATE, POTASSIUM CHLORIDE, CALCIUM CHLORIDE 600; 310; 30; 20 MG/100ML; MG/100ML; MG/100ML; MG/100ML
INJECTION, SOLUTION INTRAVENOUS CONTINUOUS PRN
Status: DISCONTINUED | OUTPATIENT
Start: 2024-05-14 | End: 2024-05-14

## 2024-05-14 RX ORDER — HYDROMORPHONE HYDROCHLORIDE 1 MG/ML
0.2 INJECTION, SOLUTION INTRAMUSCULAR; INTRAVENOUS; SUBCUTANEOUS EVERY 5 MIN PRN
Status: DISCONTINUED | OUTPATIENT
Start: 2024-05-14 | End: 2024-05-14

## 2024-05-14 RX ORDER — DEXTROSE 50 % IN WATER (D50W) INTRAVENOUS SYRINGE
12.5
Status: DISCONTINUED | OUTPATIENT
Start: 2024-05-14 | End: 2024-05-22 | Stop reason: HOSPADM

## 2024-05-14 RX ORDER — ALBUTEROL SULFATE 0.83 MG/ML
2.5 SOLUTION RESPIRATORY (INHALATION) ONCE AS NEEDED
Status: DISCONTINUED | OUTPATIENT
Start: 2024-05-14 | End: 2024-05-14

## 2024-05-14 RX ORDER — NALOXONE HYDROCHLORIDE 0.4 MG/ML
0.2 INJECTION, SOLUTION INTRAMUSCULAR; INTRAVENOUS; SUBCUTANEOUS AS NEEDED
Status: DISCONTINUED | OUTPATIENT
Start: 2024-05-14 | End: 2024-05-17

## 2024-05-14 RX ORDER — CELECOXIB 200 MG/1
400 CAPSULE ORAL ONCE
Status: COMPLETED | OUTPATIENT
Start: 2024-05-14 | End: 2024-05-14

## 2024-05-14 RX ORDER — ROCURONIUM BROMIDE 10 MG/ML
INJECTION, SOLUTION INTRAVENOUS AS NEEDED
Status: DISCONTINUED | OUTPATIENT
Start: 2024-05-14 | End: 2024-05-14

## 2024-05-14 RX ORDER — LIDOCAINE 560 MG/1
2 PATCH PERCUTANEOUS; TOPICAL; TRANSDERMAL DAILY
Status: DISCONTINUED | OUTPATIENT
Start: 2024-05-14 | End: 2024-05-22 | Stop reason: HOSPADM

## 2024-05-14 RX ORDER — HYDROMORPHONE HYDROCHLORIDE 1 MG/ML
INJECTION, SOLUTION INTRAMUSCULAR; INTRAVENOUS; SUBCUTANEOUS AS NEEDED
Status: DISCONTINUED | OUTPATIENT
Start: 2024-05-14 | End: 2024-05-14

## 2024-05-14 RX ORDER — ALBUMIN HUMAN 50 G/1000ML
SOLUTION INTRAVENOUS AS NEEDED
Status: DISCONTINUED | OUTPATIENT
Start: 2024-05-14 | End: 2024-05-14

## 2024-05-14 RX ORDER — POLYETHYLENE GLYCOL 3350 17 G/17G
17 POWDER, FOR SOLUTION ORAL DAILY
Status: DISCONTINUED | OUTPATIENT
Start: 2024-05-14 | End: 2024-05-20

## 2024-05-14 RX ORDER — HEPARIN SODIUM 5000 [USP'U]/ML
5000 INJECTION, SOLUTION INTRAVENOUS; SUBCUTANEOUS EVERY 8 HOURS
Status: DISCONTINUED | OUTPATIENT
Start: 2024-05-15 | End: 2024-05-22 | Stop reason: HOSPADM

## 2024-05-14 RX ORDER — ACETAMINOPHEN 325 MG/1
650 TABLET ORAL EVERY 4 HOURS PRN
Status: DISCONTINUED | OUTPATIENT
Start: 2024-05-14 | End: 2024-05-14

## 2024-05-14 RX ORDER — KETOROLAC TROMETHAMINE 15 MG/ML
15 INJECTION, SOLUTION INTRAMUSCULAR; INTRAVENOUS EVERY 6 HOURS
Status: DISCONTINUED | OUTPATIENT
Start: 2024-05-15 | End: 2024-05-15

## 2024-05-14 RX ORDER — ACETAMINOPHEN 10 MG/ML
1000 INJECTION, SOLUTION INTRAVENOUS ONCE
Status: COMPLETED | OUTPATIENT
Start: 2024-05-14 | End: 2024-05-14

## 2024-05-14 RX ORDER — MEPERIDINE HYDROCHLORIDE 25 MG/ML
12.5 INJECTION INTRAMUSCULAR; INTRAVENOUS; SUBCUTANEOUS EVERY 10 MIN PRN
Status: DISCONTINUED | OUTPATIENT
Start: 2024-05-14 | End: 2024-05-14

## 2024-05-14 RX ORDER — PROCHLORPERAZINE MALEATE 10 MG
5 TABLET ORAL EVERY 6 HOURS PRN
Status: DISCONTINUED | OUTPATIENT
Start: 2024-05-14 | End: 2024-05-16

## 2024-05-14 RX ORDER — GABAPENTIN 300 MG/1
600 CAPSULE ORAL ONCE
Status: COMPLETED | OUTPATIENT
Start: 2024-05-14 | End: 2024-05-14

## 2024-05-14 RX ORDER — ONDANSETRON 4 MG/1
4 TABLET, FILM COATED ORAL EVERY 8 HOURS PRN
Status: DISCONTINUED | OUTPATIENT
Start: 2024-05-14 | End: 2024-05-22 | Stop reason: HOSPADM

## 2024-05-14 RX ORDER — DEXTROSE 50 % IN WATER (D50W) INTRAVENOUS SYRINGE
25
Status: DISCONTINUED | OUTPATIENT
Start: 2024-05-14 | End: 2024-05-22 | Stop reason: HOSPADM

## 2024-05-14 RX ORDER — PHENYLEPHRINE HCL IN 0.9% NACL 0.4MG/10ML
SYRINGE (ML) INTRAVENOUS AS NEEDED
Status: DISCONTINUED | OUTPATIENT
Start: 2024-05-14 | End: 2024-05-14

## 2024-05-14 RX ORDER — NORETHINDRONE AND ETHINYL ESTRADIOL 0.5-0.035
KIT ORAL AS NEEDED
Status: DISCONTINUED | OUTPATIENT
Start: 2024-05-14 | End: 2024-05-14

## 2024-05-14 RX ORDER — NALOXONE HYDROCHLORIDE 0.4 MG/ML
0.2 INJECTION, SOLUTION INTRAMUSCULAR; INTRAVENOUS; SUBCUTANEOUS EVERY 5 MIN PRN
Status: DISCONTINUED | OUTPATIENT
Start: 2024-05-14 | End: 2024-05-22 | Stop reason: HOSPADM

## 2024-05-14 RX ORDER — SODIUM CHLORIDE, SODIUM LACTATE, POTASSIUM CHLORIDE, CALCIUM CHLORIDE 600; 310; 30; 20 MG/100ML; MG/100ML; MG/100ML; MG/100ML
100 INJECTION, SOLUTION INTRAVENOUS CONTINUOUS
Status: DISCONTINUED | OUTPATIENT
Start: 2024-05-14 | End: 2024-05-16

## 2024-05-14 RX ORDER — ONDANSETRON 2 MG/ML
INJECTION INTRAMUSCULAR; INTRAVENOUS AS NEEDED
Status: DISCONTINUED | OUTPATIENT
Start: 2024-05-14 | End: 2024-05-14

## 2024-05-14 RX ADMIN — ACETAMINOPHEN 650 MG: 325 TABLET ORAL at 21:51

## 2024-05-14 RX ADMIN — Medication 80 MCG: at 09:15

## 2024-05-14 RX ADMIN — METHADONE HYDROCHLORIDE 15 MG: 10 TABLET ORAL at 07:33

## 2024-05-14 RX ADMIN — Medication 40 MCG: at 12:13

## 2024-05-14 RX ADMIN — HYDROMORPHONE HYDROCHLORIDE 0.2 MG: 1 INJECTION, SOLUTION INTRAMUSCULAR; INTRAVENOUS; SUBCUTANEOUS at 13:25

## 2024-05-14 RX ADMIN — LIDOCAINE 2 PATCH: 4 PATCH TOPICAL at 16:54

## 2024-05-14 RX ADMIN — ROCURONIUM BROMIDE 10 MG: 10 INJECTION INTRAVENOUS at 10:46

## 2024-05-14 RX ADMIN — CELECOXIB 400 MG: 200 CAPSULE ORAL at 06:50

## 2024-05-14 RX ADMIN — Medication 0.2 MCG/KG/MIN: at 09:57

## 2024-05-14 RX ADMIN — Medication 80 MCG: at 11:09

## 2024-05-14 RX ADMIN — ALBUMIN HUMAN 250 ML: 0.05 INJECTION, SOLUTION INTRAVENOUS at 14:21

## 2024-05-14 RX ADMIN — EPHEDRINE SULFATE 5 MG: 50 INJECTION, SOLUTION INTRAVENOUS at 14:42

## 2024-05-14 RX ADMIN — PROPOFOL 30 MG: 10 INJECTION, EMULSION INTRAVENOUS at 08:14

## 2024-05-14 RX ADMIN — GABAPENTIN 600 MG: 300 CAPSULE ORAL at 06:50

## 2024-05-14 RX ADMIN — EPHEDRINE SULFATE 5 MG: 50 INJECTION, SOLUTION INTRAVENOUS at 15:24

## 2024-05-14 RX ADMIN — EPHEDRINE SULFATE 5 MG: 50 INJECTION, SOLUTION INTRAVENOUS at 13:34

## 2024-05-14 RX ADMIN — Medication 40 MCG: at 09:31

## 2024-05-14 RX ADMIN — EPHEDRINE SULFATE 5 MG: 50 INJECTION, SOLUTION INTRAVENOUS at 15:15

## 2024-05-14 RX ADMIN — Medication 80 MCG: at 12:31

## 2024-05-14 RX ADMIN — ROCURONIUM BROMIDE 10 MG: 10 INJECTION INTRAVENOUS at 11:57

## 2024-05-14 RX ADMIN — PROPOFOL 20 MG: 10 INJECTION, EMULSION INTRAVENOUS at 08:17

## 2024-05-14 RX ADMIN — Medication 40 MCG: at 08:06

## 2024-05-14 RX ADMIN — HYDROMORPHONE HYDROCHLORIDE 0.2 MG: 1 INJECTION, SOLUTION INTRAMUSCULAR; INTRAVENOUS; SUBCUTANEOUS at 12:04

## 2024-05-14 RX ADMIN — DEXAMETHASONE SODIUM PHOSPHATE 4 MG: 4 INJECTION INTRA-ARTICULAR; INTRALESIONAL; INTRAMUSCULAR; INTRAVENOUS; SOFT TISSUE at 07:44

## 2024-05-14 RX ADMIN — ONDANSETRON 4 MG: 2 INJECTION INTRAMUSCULAR; INTRAVENOUS at 15:09

## 2024-05-14 RX ADMIN — CALCIUM CHLORIDE 0.5 G: 100 INJECTION INTRAVENOUS; INTRAVENTRICULAR at 15:39

## 2024-05-14 RX ADMIN — ROCURONIUM BROMIDE 10 MG: 10 INJECTION INTRAVENOUS at 09:06

## 2024-05-14 RX ADMIN — ALBUMIN HUMAN 500 ML: 0.05 INJECTION, SOLUTION INTRAVENOUS at 10:59

## 2024-05-14 RX ADMIN — CEFAZOLIN 2 G: 1 INJECTION, POWDER, FOR SOLUTION INTRAMUSCULAR; INTRAVENOUS at 08:27

## 2024-05-14 RX ADMIN — DEXTROSE MONOHYDRATE 0.5 MG/KG/HR: 50 INJECTION, SOLUTION INTRAVENOUS at 07:53

## 2024-05-14 RX ADMIN — EPHEDRINE SULFATE 5 MG: 50 INJECTION, SOLUTION INTRAVENOUS at 14:58

## 2024-05-14 RX ADMIN — SODIUM CHLORIDE, POTASSIUM CHLORIDE, SODIUM LACTATE AND CALCIUM CHLORIDE 100 ML/HR: 600; 310; 30; 20 INJECTION, SOLUTION INTRAVENOUS at 17:22

## 2024-05-14 RX ADMIN — FENTANYL CITRATE 50 MCG: 50 INJECTION, SOLUTION INTRAMUSCULAR; INTRAVENOUS at 07:42

## 2024-05-14 RX ADMIN — EPHEDRINE SULFATE 5 MG: 50 INJECTION, SOLUTION INTRAVENOUS at 15:43

## 2024-05-14 RX ADMIN — Medication 80 MCG: at 11:23

## 2024-05-14 RX ADMIN — EPHEDRINE SULFATE 2.5 MG: 50 INJECTION, SOLUTION INTRAVENOUS at 14:06

## 2024-05-14 RX ADMIN — CYCLOBENZAPRINE 10 MG: 10 TABLET, FILM COATED ORAL at 21:17

## 2024-05-14 RX ADMIN — FENTANYL CITRATE 25 MCG: 50 INJECTION, SOLUTION INTRAMUSCULAR; INTRAVENOUS at 08:55

## 2024-05-14 RX ADMIN — LIDOCAINE HYDROCHLORIDE 100 MG: 20 INJECTION, SOLUTION INFILTRATION; PERINEURAL at 07:42

## 2024-05-14 RX ADMIN — TRANEXAMIC ACID 1300 MG: 650 TABLET ORAL at 06:50

## 2024-05-14 RX ADMIN — SUGAMMADEX 200 MG: 100 INJECTION, SOLUTION INTRAVENOUS at 15:59

## 2024-05-14 RX ADMIN — PROPOFOL 120 MG: 10 INJECTION, EMULSION INTRAVENOUS at 07:42

## 2024-05-14 RX ADMIN — SODIUM CHLORIDE, POTASSIUM CHLORIDE, SODIUM LACTATE AND CALCIUM CHLORIDE: 600; 310; 30; 20 INJECTION, SOLUTION INTRAVENOUS at 08:05

## 2024-05-14 RX ADMIN — SODIUM CHLORIDE, POTASSIUM CHLORIDE, SODIUM LACTATE AND CALCIUM CHLORIDE 1000 ML: 600; 310; 30; 20 INJECTION, SOLUTION INTRAVENOUS at 18:21

## 2024-05-14 RX ADMIN — EPHEDRINE SULFATE 10 MG: 50 INJECTION, SOLUTION INTRAVENOUS at 15:26

## 2024-05-14 RX ADMIN — EPHEDRINE SULFATE 5 MG: 50 INJECTION, SOLUTION INTRAVENOUS at 15:05

## 2024-05-14 RX ADMIN — ONDANSETRON 4 MG: 2 INJECTION INTRAMUSCULAR; INTRAVENOUS at 17:55

## 2024-05-14 RX ADMIN — ACETAMINOPHEN 1000 MG: 10 INJECTION, SOLUTION INTRAVENOUS at 15:08

## 2024-05-14 RX ADMIN — PHENYLEPHRINE HYDROCHLORIDE 80 MCG: 10 INJECTION INTRAVENOUS at 15:54

## 2024-05-14 RX ADMIN — Medication 80 MCG: at 12:23

## 2024-05-14 RX ADMIN — SODIUM CHLORIDE, SODIUM LACTATE, POTASSIUM CHLORIDE, CALCIUM CHLORIDE: 600; 310; 30; 20 INJECTION, SOLUTION INTRAVENOUS at 07:31

## 2024-05-14 RX ADMIN — CALCIUM CHLORIDE 0.5 G: 100 INJECTION INTRAVENOUS; INTRAVENTRICULAR at 15:29

## 2024-05-14 RX ADMIN — Medication 80 MCG: at 08:02

## 2024-05-14 RX ADMIN — EPHEDRINE SULFATE 2.5 MG: 50 INJECTION, SOLUTION INTRAVENOUS at 14:33

## 2024-05-14 RX ADMIN — Medication 80 MCG: at 09:53

## 2024-05-14 RX ADMIN — ROCURONIUM BROMIDE 10 MG: 10 INJECTION INTRAVENOUS at 13:08

## 2024-05-14 RX ADMIN — SODIUM CHLORIDE, POTASSIUM CHLORIDE, SODIUM LACTATE AND CALCIUM CHLORIDE: 600; 310; 30; 20 INJECTION, SOLUTION INTRAVENOUS at 12:27

## 2024-05-14 RX ADMIN — CEFAZOLIN 2 G: 1 INJECTION, POWDER, FOR SOLUTION INTRAMUSCULAR; INTRAVENOUS at 12:26

## 2024-05-14 RX ADMIN — Medication 40 MCG: at 12:12

## 2024-05-14 RX ADMIN — ROCURONIUM BROMIDE 60 MG: 10 INJECTION INTRAVENOUS at 07:44

## 2024-05-14 RX ADMIN — PREGABALIN 50 MG: 50 CAPSULE ORAL at 21:17

## 2024-05-14 RX ADMIN — ALBUMIN HUMAN 250 ML: 0.05 INJECTION, SOLUTION INTRAVENOUS at 14:41

## 2024-05-14 RX ADMIN — Medication: at 16:54

## 2024-05-14 RX ADMIN — Medication 40 MCG: at 09:47

## 2024-05-14 RX ADMIN — SCOPALAMINE 1 PATCH: 1 PATCH, EXTENDED RELEASE TRANSDERMAL at 21:53

## 2024-05-14 RX ADMIN — GLYCOPYRROLATE 0.1 MG: 0.2 INJECTION, SOLUTION INTRAMUSCULAR; INTRAVENOUS at 07:33

## 2024-05-14 RX ADMIN — FENTANYL CITRATE 25 MCG: 50 INJECTION, SOLUTION INTRAMUSCULAR; INTRAVENOUS at 08:51

## 2024-05-14 RX ADMIN — PROPOFOL 30 MG: 10 INJECTION, EMULSION INTRAVENOUS at 08:20

## 2024-05-14 RX ADMIN — Medication 40 MCG: at 08:32

## 2024-05-14 SDOH — ECONOMIC STABILITY: HOUSING INSECURITY: IN THE LAST 12 MONTHS, WAS THERE A TIME WHEN YOU WERE NOT ABLE TO PAY THE MORTGAGE OR RENT ON TIME?: NO

## 2024-05-14 SDOH — ECONOMIC STABILITY: HOUSING INSECURITY: IN THE LAST 12 MONTHS, HOW MANY PLACES HAVE YOU LIVED?: 1

## 2024-05-14 SDOH — ECONOMIC STABILITY: INCOME INSECURITY: IN THE LAST 12 MONTHS, WAS THERE A TIME WHEN YOU WERE NOT ABLE TO PAY THE MORTGAGE OR RENT ON TIME?: NO

## 2024-05-14 SDOH — ECONOMIC STABILITY: INCOME INSECURITY: HOW HARD IS IT FOR YOU TO PAY FOR THE VERY BASICS LIKE FOOD, HOUSING, MEDICAL CARE, AND HEATING?: NOT HARD AT ALL

## 2024-05-14 SDOH — ECONOMIC STABILITY: HOUSING INSECURITY
IN THE LAST 12 MONTHS, WAS THERE A TIME WHEN YOU DID NOT HAVE A STEADY PLACE TO SLEEP OR SLEPT IN A SHELTER (INCLUDING NOW)?: NO

## 2024-05-14 SDOH — SOCIAL STABILITY: SOCIAL INSECURITY: WERE YOU ABLE TO COMPLETE ALL THE BEHAVIORAL HEALTH SCREENINGS?: YES

## 2024-05-14 SDOH — ECONOMIC STABILITY: FOOD INSECURITY: HOW HARD IS IT FOR YOU TO PAY FOR THE VERY BASICS LIKE FOOD, HOUSING, MEDICAL CARE, AND HEATING?: NOT HARD AT ALL

## 2024-05-14 SDOH — SOCIAL STABILITY: SOCIAL INSECURITY: ABUSE: ADULT

## 2024-05-14 SDOH — HEALTH STABILITY: MENTAL HEALTH: CURRENT SMOKER: 0

## 2024-05-14 SDOH — ECONOMIC STABILITY: TRANSPORTATION INSECURITY
IN THE PAST 12 MONTHS, HAS LACK OF TRANSPORTATION KEPT YOU FROM MEETINGS, WORK, OR FROM GETTING THINGS NEEDED FOR DAILY LIVING?: NO

## 2024-05-14 SDOH — SOCIAL STABILITY: SOCIAL INSECURITY: DOES ANYONE TRY TO KEEP YOU FROM HAVING/CONTACTING OTHER FRIENDS OR DOING THINGS OUTSIDE YOUR HOME?: NO

## 2024-05-14 SDOH — ECONOMIC STABILITY: TRANSPORTATION INSECURITY
IN THE PAST 12 MONTHS, HAS THE LACK OF TRANSPORTATION KEPT YOU FROM MEDICAL APPOINTMENTS OR FROM GETTING MEDICATIONS?: NO

## 2024-05-14 SDOH — ECONOMIC STABILITY: TRANSPORTATION INSECURITY: IN THE PAST 12 MONTHS, HAS LACK OF TRANSPORTATION KEPT YOU FROM MEDICAL APPOINTMENTS OR FROM GETTING MEDICATIONS?: NO

## 2024-05-14 SDOH — SOCIAL STABILITY: SOCIAL INSECURITY: ARE THERE ANY APPARENT SIGNS OF INJURIES/BEHAVIORS THAT COULD BE RELATED TO ABUSE/NEGLECT?: NO

## 2024-05-14 SDOH — SOCIAL STABILITY: SOCIAL INSECURITY: HAVE YOU HAD THOUGHTS OF HARMING ANYONE ELSE?: NO

## 2024-05-14 SDOH — SOCIAL STABILITY: SOCIAL INSECURITY: DO YOU FEEL ANYONE HAS EXPLOITED OR TAKEN ADVANTAGE OF YOU FINANCIALLY OR OF YOUR PERSONAL PROPERTY?: NO

## 2024-05-14 SDOH — SOCIAL STABILITY: SOCIAL INSECURITY: HAS ANYONE EVER THREATENED TO HURT YOUR FAMILY OR YOUR PETS?: NO

## 2024-05-14 SDOH — SOCIAL STABILITY: SOCIAL INSECURITY: DO YOU FEEL UNSAFE GOING BACK TO THE PLACE WHERE YOU ARE LIVING?: NO

## 2024-05-14 SDOH — SOCIAL STABILITY: SOCIAL INSECURITY: ARE YOU OR HAVE YOU BEEN THREATENED OR ABUSED PHYSICALLY, EMOTIONALLY, OR SEXUALLY BY ANYONE?: NO

## 2024-05-14 ASSESSMENT — COGNITIVE AND FUNCTIONAL STATUS - GENERAL
WALKING IN HOSPITAL ROOM: A LOT
TURNING FROM BACK TO SIDE WHILE IN FLAT BAD: A LOT
STANDING UP FROM CHAIR USING ARMS: A LOT
MOBILITY SCORE: 13
MOVING FROM LYING ON BACK TO SITTING ON SIDE OF FLAT BED WITH BEDRAILS: A LITTLE
CLIMB 3 TO 5 STEPS WITH RAILING: A LOT
MOVING TO AND FROM BED TO CHAIR: A LOT

## 2024-05-14 ASSESSMENT — PAIN SCALES - GENERAL
PAINLEVEL_OUTOF10: 1
PAINLEVEL_OUTOF10: 3
PAINLEVEL_OUTOF10: 7
PAINLEVEL_OUTOF10: 2
PAINLEVEL_OUTOF10: 3
PAINLEVEL_OUTOF10: 5 - MODERATE PAIN
PAINLEVEL_OUTOF10: 1
PAINLEVEL_OUTOF10: 2

## 2024-05-14 ASSESSMENT — PAIN - FUNCTIONAL ASSESSMENT
PAIN_FUNCTIONAL_ASSESSMENT: 0-10

## 2024-05-14 ASSESSMENT — ACTIVITIES OF DAILY LIVING (ADL)
DRESSING YOURSELF: INDEPENDENT
FEEDING YOURSELF: INDEPENDENT
GROOMING: INDEPENDENT
BATHING: INDEPENDENT
PATIENT'S MEMORY ADEQUATE TO SAFELY COMPLETE DAILY ACTIVITIES?: YES
EFFECT OF PAIN ON DAILY ACTIVITIES: RELIEF
ADEQUATE_TO_COMPLETE_ADL: YES
LACK_OF_TRANSPORTATION: NO
JUDGMENT_ADEQUATE_SAFELY_COMPLETE_DAILY_ACTIVITIES: YES
HEARING - LEFT EAR: FUNCTIONAL
TOILETING: INDEPENDENT
HEARING - RIGHT EAR: FUNCTIONAL
WALKS IN HOME: INDEPENDENT

## 2024-05-14 ASSESSMENT — COLUMBIA-SUICIDE SEVERITY RATING SCALE - C-SSRS
1. IN THE PAST MONTH, HAVE YOU WISHED YOU WERE DEAD OR WISHED YOU COULD GO TO SLEEP AND NOT WAKE UP?: NO
2. HAVE YOU ACTUALLY HAD ANY THOUGHTS OF KILLING YOURSELF?: NO
6. HAVE YOU EVER DONE ANYTHING, STARTED TO DO ANYTHING, OR PREPARED TO DO ANYTHING TO END YOUR LIFE?: NO

## 2024-05-14 ASSESSMENT — LIFESTYLE VARIABLES
AUDIT-C TOTAL SCORE: 0
HOW OFTEN DO YOU HAVE A DRINK CONTAINING ALCOHOL: NEVER
AUDIT-C TOTAL SCORE: 0
SKIP TO QUESTIONS 9-10: 1
HOW MANY STANDARD DRINKS CONTAINING ALCOHOL DO YOU HAVE ON A TYPICAL DAY: PATIENT DOES NOT DRINK
HOW OFTEN DO YOU HAVE 6 OR MORE DRINKS ON ONE OCCASION: NEVER

## 2024-05-14 ASSESSMENT — PAIN DESCRIPTION - ORIENTATION: ORIENTATION: RIGHT

## 2024-05-14 ASSESSMENT — PAIN DESCRIPTION - DESCRIPTORS: DESCRIPTORS: ACHING;SORE

## 2024-05-14 NOTE — CARE PLAN
Problem: Pain - Adult  Goal: Verbalizes/displays adequate comfort level or baseline comfort level  Outcome: Progressing     Problem: Safety - Adult  Goal: Free from fall injury  Outcome: Progressing     Problem: Skin  Goal: Prevent/manage excess moisture  Outcome: Progressing

## 2024-05-14 NOTE — ANESTHESIA POSTPROCEDURE EVALUATION
Patient: Khalida Barker    Procedure Summary       Date: 05/14/24 Room / Location: [facility] OR 24 / [facility] [place] [facility]    Anesthesia Start: 0731 Anesthesia Stop: 1615    Procedure: L2-L3, L3-L4, L4-L5 Extreme Lateral Interbody fusion, L2-S1 Decompression and Fusion, with L5-S1 Transforaminal Lumbar Interbody Fusion, T11-T12 Decompression Diagnosis:       Thoracogenic scoliosis of thoracolumbar region      (Thoracogenic scoliosis of thoracolumbar region [M41.35])    Surgeons: Justin Lu MD Responsible Provider: James Evans MD PhD    Anesthesia Type: general ASA Status: 3            Anesthesia Type: general        Anesthesia Post Evaluation    Patient location during evaluation: ICU  Patient participation: complete - patient participated  Level of consciousness: awake  Pain management: adequate  Airway patency: patent  Cardiovascular status: acceptable  Respiratory status: acceptable  Hydration status: acceptable  Postoperative Nausea and Vomiting: none        No notable events documented.

## 2024-05-14 NOTE — ANESTHESIA PROCEDURE NOTES
Airway  Date/Time: 5/14/2024 7:48 AM  Urgency: elective    Airway not difficult    Staffing  Performed: VIKA   Authorized by: James Evans MD PhD    Performed by: SHERIDAN Wright Capp  Patient location during procedure: OR    Indications and Patient Condition  Indications for airway management: airway protection and anesthesia  Spontaneous Ventilation: absent  Sedation level: deep  Preoxygenated: yes  Mask difficulty assessment: 1 - vent by mask    Final Airway Details  Final airway type: endotracheal airway      Successful airway: ETT  Cuffed: yes   Successful intubation technique: direct laryngoscopy  Facilitating devices/methods: intubating stylet  Endotracheal tube insertion site: oral  Blade: Crystal  Blade size: #3  ETT size (mm): 7.0  Cormack-Lehane Classification: grade I - full view of glottis  Placement verified by: chest auscultation and capnometry   Measured from: lips  ETT to lips (cm): 21  Number of attempts at approach: 1  Number of other approaches attempted: 0    Additional Comments  Atraumatic Intubation. Lips and teeth in preanesthetic condition.

## 2024-05-14 NOTE — PROGRESS NOTES
Pharmacy Medication History Review    Khalida Barker is a 73 y.o. female admitted for Thoracogenic scoliosis of thoracolumbar region. Pharmacy reviewed the patient's [id number] medications and allergies for accuracy.    The list below reflects the updated PTA list. Comments regarding how patient may be taking medications differently can be found in the Admit Orders Activity  Prior to Admission Medications   Prescriptions Last Dose Informant   NON FORMULARY 5/13/2024 Self   Sig: Take 1 each by mouth once daily. Viveran 200mg- pure caffience   NON FORMULARY 5/13/2024 Self   Sig: Apply 1 each topically once daily as needed. Heel Balm   acetaminophen (Tylenol Extra Strength) 500 mg tablet 5/14/2024 Self   Sig: Take 2 tablets (1,000 mg) by mouth every 8 hours if needed. EVERY 4 TO 6 HOURS AS NEEDED.   aspirin-acetaminophen-caffeine (Excedrin Migraine) 250-250-65 mg tablet Unknown Self   Sig: Take 2 tablets by mouth every 8 hours if needed for headaches.   biotin 10,000 mcg capsule 5/7/2024 Self   Sig: Take 1 capsule (10 mg) by mouth once daily. caps   cholecalciferol (Vitamin D-3) 25 MCG (1000 UT) tablet 5/7/2024 Self   Sig: Take 1 tablet (1,000 Units) by mouth once daily.   docusate sodium (Colace) 100 mg capsule 5/13/2024 Self   Sig: Take 1 capsule (100 mg) by mouth once daily.   ibuprofen 200 mg tablet 5/7/2024 Self   Sig: Take 1 tablet (200 mg) by mouth every 6 hours if needed.   menthol (Biofreeze, menthol,) 4 % gel gel Unknown Self   Sig: Apply 1 Application topically if needed.   multivit-min/ferrous fumarate (MULTI VITAMIN ORAL) 5/13/2024 Self   Sig: Take 1 tablet by mouth once daily.   naproxen sodium (Aleve) 220 mg tablet 5/7/2024 Self   Sig: Take 1 tablet (220 mg) by mouth every 12 hours if needed.   polyvinyl alcohol (LIQUID TEARS OPHT) Unknown Self   Sig: INSTILL 1-2 DROPS INTO AFFECTED EYE(S) 4 TIMES DAILY AS DIRECTED.   pregabalin (Lyrica) 50 mg capsule 4/30/2024 Self   Sig: Take 1 capsule (50 mg) by  mouth 2 times a day.   wheat dextrin (BENEFIBER CLEAR SF, DEXTRIN, ORAL) 5/13/2024 Self   Sig: Take 2 Doses by mouth once daily. 2  teaspoons with 8 ounces water once daily      Facility-Administered Medications: None        The list below reflects the updated allergy list. Please review each documented allergy for additional clarification and justification.  Allergies  Reviewed by Rachel Zaidi RN on 5/14/2024        Severity Reactions Comments    Oxycodone Not Specified Itching     Tramadol Not Specified Itching             Patient declines M2B at discharge. Pharmacy has been updated to [place], [place].    Sources:    -patient interview (had very detailed typed up medication list with all prescription and OTC med names, strengths, doses, instructions)  -outpatient pharmacy dispense history  -Care Everywhere medication lists  -[place]ARRS    Below are additional concerns with the patient's PTA list: none    Bismark Abrams, PharmD  Transitions of Care Pharmacist  [facility]  Please reach out via Secure Chat for questions, or if no response call [employer] or [facility]

## 2024-05-14 NOTE — ANESTHESIA PROCEDURE NOTES
Peripheral IV  Date/Time: 5/14/2024 8:04 AM  Inserted by: SHERIDAN Wright Capp    Placement  Needle size: 18 G  Laterality: right  Location: hand  Local anesthetic: none  Site prep: alcohol  Attempts: 2

## 2024-05-14 NOTE — OP NOTE
[id number] Instrumented Fusion, Correction of Scoliotic Deformity, L1-2, L2-3, L3-4, L4-5, L5-S1 posterior column osteotomies, L3-4 anterior column osteotomy through disc space,  L1-S1 Decompression, with L2-3, L3-4, L4-5, L5-S1 Transforaminal Lumbar Interbody Fusion, T11-T12 Decompression, Navigation, Allograft, Autograft for fusion     Date: 2024  OR Location: [facility] OR    Name: Khalida Barker, : 1950, Age: 73 y.o., MRN: [id number], Sex: female    Diagnosis  Pre-op Diagnosis     * Thoracogenic scoliosis of thoracolumbar region [M41.35] Post-op Diagnosis     * Thoracogenic scoliosis of thoracolumbar region [M41.35]     Procedures  [id number] Instrumented Fusion, Correction of Scoliotic Deformity, L1-2, L2-3, L3-4, L4-5, L5-S1 posterior column osteotomies, L3-4 anterior column osteotomy through disc space,  L1-S1 Decompression, with L2-3, L3-4, L4-5, L5-S1 Transforaminal Lumbar Interbody Fusion, T11-T12 Decompression, Navigation, Allograft, Autograft for fusion     - NE ARTHRD ANT INTERBODY MIN DSC LUMBAR    NE POSTERIOR SEGMENTAL INSTRUMENTATION 3-6 VRT SEG [76919]  NE ALLOGRAFT FOR SPINE SURGERY ONLY MORSELIZED []  NE AUTOGRAFT SPINE SURGERY LOCAL FROM SAME INCISION []  NE INSJ BIOMCHN DEV INTERVERTEBRAL DSC SPC W/ARTHRD [55321]  NE MEAD FACETECTOMY & FORAMOTOMY 1 VRT SGM LUMBAR [46632]  NE MEAD FACETECTOMY&FORAMOT 1 VRT SGM EA ADDL SGM [91239]  NE ARTHRODESIS PST/PSTLAT TQ 1NTRSPC EA ADDL NTRSPC [04198]  NE ARTHRODESIS COMBINED TQ 1NTRSPC LUMBAR [93493]  NE ARTHRD ANT NTRBD MIN DSC EA ADDL INTERSPACE [39892]  NE STEREOTACTIC COMPUTER ASSISTED PX SPINAL [43398]  Surgeons      * Justin Lu - Primary    Resident/Fellow/Other Assistant:  Surgeons and Role:     * Estuardo Whitney MD - Resident - Assisting     * Nav Adhikari MD - Resident - Assisting     * Camacho Elizalde MD - Resident - Assisting    Procedure Summary  Anesthesia: General  ASA: III  Anesthesia Staff:  Anesthesiologist: James Evans MD PhD  CRNA: Sandra Tavarez, APRN-CRNA; Surendra Hunter APRN-CRNA  C-AA: SHERIDAN Wright Capp  Anesthesia Resident: Radha Thompson MD  VIKA: Jorge Solis  Estimated Blood Loss: 2200mL  Intra-op Medications:   Administrations occurring from 0715 to 1115 on 05/14/24:   Medication Name Total Dose   lidocaine-epinephrine PF (Xylocaine W/EPI) 1 %-1:200,000 injection 10 mL   gelatin absorbable (Gelfoam) 100 sponge 1 each   thrombin (recombinant) (Recothrom) topical solution 5,000 Units   polymyxin B 500,000 Units in sodium chloride 0.9 % 1,000 mL irrigation 1,000 mL              Anesthesia Record               Intraprocedure I/O Totals          Intake    Ketamine 0.00 mL    The total shown is the total volume documented since Anesthesia Start was filed.    LR bolus 2800.00 mL    NaCl 0.9 % bolus 0.00 mL    Phenylephrine Drip 300.36 mL    The total shown is the total volume documented since Anesthesia Start was filed.    lactated Ringer's 0.00 mL    Total Intake 3100.36 mL       Output    Urine 350 mL    Est. Blood Loss 2505 mL    Total Output 2855 mL       Net    Net Volume 245.36 mL          Specimen: No specimens collected     Staff:   Circulator: Rodolfo Marshall RN  Relief Circulator: Mendy Pope RN  Relief Scrub: Edouard Townsend  Scrub Person: Beth Edwards RN; Gaurang Wong         Drains and/or Catheters:   Urethral Catheter (Active)       Implants:  Implants       Type Name Action Serial No.      Graft COMPONENT, SIGNIFY, BIOACTIVE CRUNCH, 10CC - [id number] Implanted       TLX20, [id number] mm 20 degree Implanted       TLX20, [id number] mm 20 degree Implanted      Screw DG, BENDING,  ARRAY - [id number], Not Implanted       TLX20, 9x 11 x 31 mm, 20 degree Implanted       TLX20, 7 x 11 x 26 mm 20 degree Implanted      Graft COMPONENT, SIGNIFY, BIOACTIVE CRUNCH, 10CC - [id number] Implanted       UNiD EXP Ti Perc Dg 5.5mm 4+ LV Wasted      Screw SCREW, RELINE-O,  6.5X45MM 2S POLYAXIAL - [id number] Implanted      Screw SCREW, RELINE-O, 7.5X45MM 2S POLYAXIAL - [id number] Implanted      Screw SCREW, RELINE-O, 7.5X50MM 2S POLYAXIAL - [id number] Implanted      Neuro Interventional Implant SCREW, RELINE LOCK, 5.5MM OPEN TULIP - [id number] Implanted      Screw SCREW, RELINE-O, 8.5X80MM 2S POLY ILIAC - [id number] Implanted      Screw RELINE-O CONN, 5-6/5-6MM O-O MED - [id number] Implanted      Screw AWAIS, RELINE-O COCR, 5.8S748ZK STRAIGHT - [id number] Implanted                   Informed Consent:  The risks, benefits, complications, and alternatives were discussed with the patient. I have explained the surgical procedure in detail with expected duration and extent of recovery along risks of surgery that include, but is not limited to bleeding, infection, blood vessel injury or damage, loss of sensation, loss of bladder, bowel or sexual function, nerve injury/damage resulting in weakness/paralysis, malunion, nonunion, CSF leak, brachial plexus injury, peripheral vision blindness, failure of implants/fusion, failure to relieve symptoms, recurrent disease, adjacent segment disease, need to reoperate for any reason and general anesthesia reaction such as stroke, coma, heart attack, delirium, confusion, death as well as worsening of preexisted medical conditions.     I clearly emphasized that while the goal of surgery is to decompress the spinal cord so as to ARREST the progression of neurological deficits - preexisting deficits may or may not improve after surgery. We discussed that many patients do clinically improve in functional and neurological outcomes following decompression of the spinal elements in patients but the extent of which is variable and depends on the severity of pain, numbness, tingling, or weakness. With improvement seen of those symptoms in that order. We did discuss the goal of surgery to alleviate pain first and foremost and hope for recovery of all neurologic function  with time.     All questions were answered and the patient was amenable to proceed.     INDICATIONS FOR THE PROCEDURE: Khalida Barker is an 73 y.o. female who is having surgery for Thoracogenic scoliosis of thoracolumbar region [M41.35]. She has severe scoliosis with a morel angle of greater than 30 degrees with a rotatory component. She also had thoracic pain. We discussed a smaller surgery before today and had decided we may be able to try to do a direct lateral interbody fusion with minimally invasive instrumentation but upon further review of the CT scan imaging and preoperative planning it was evident she had cervicothoracic kyphosis and global compensation as well as autofusion of the L3-4 disc space and severe spondylosis. I had a long discussion with her in preop regarding the operative plan and the decision was made to proceed with a more traditional thoracolumbar scoliosis correction surgery including fixation to T10 given the decompression needed at T11. She understood all the risks and benefits of the procedure and was amenable to proceed.       DESCRIPTION OF THE OPERATION:   The patient was brought to the operating room theater. A verbal huddle was performed confirming the patient by name, date of birth, medical record number, site of surgery. After all team members were in agreement, they underwent anesthesia induction without complication. Two large bore IVs were placed as well as endotracheal tube. Perioperative antibiotic administration was confirmed. The patient was flipped prone onto a adam table with a isidoro frame and their back was prepped and draped in a sterile fashion. Using lateral fluoroscopy we confirmed our levels of interest with a spinal needle and an incision was marked out in the midline.    The skin was then infiltrated with local anesthetic and we then began the procedure by opening the skin with a 10 blade and using combination of Bovie electrocautery and blunt dissection we  exposed the spinous process, pars, lamina, and joints. We then used lateral fluoroscopy again to confirm our levels of interest T10-Pelvis after total exposure and a self-retaining retractor was placed into the incision. Next we turned our attention the instrumentation portion of the procedure. A clamp was attached to the spinous process at the rostral edge of the incision and the O arm was brought into the field. An intraoperative CT scan was performed and merged the patient in three dimensional space. We then use navigated drill and taps to cannulate the pedicles from T10 to the S2 pedicle with a 5.5mm tap. We then placed screws without complication. The screw diameters were 6.5 and 7.5 diameter screws. We then placed 8.5x90mm screws at S2AI for pelvic fixation. All screws were placed without complication.     After placing all our screws. Next we turned our attention to the decompression. To begin a leksell rongeur and a high speed drill was used to remove the spinous process and scar  and lamina exposing the underlying ligamentum flavum at L1-S1 and removed the joints bilaterally completing posterior column osteotomies at L1-2, L2-3, L3-4, L4-5, L5-S1 to restore lordosis. There was osseous overgrowth and severe narrowing in the lateral recess at L4-5 on the right side and L3-4 on the side. A dural tear was encountered secondary to the dense adhesions of the dural to the calcified ligament. This was carefully repaired with a 6-0 prolene suture and dural sealant was placed over the area without complication. There was no active CSF leak at the end of the procedure. After removal of the bone we used kerrison rongeurs, curettes, and blunt dissection to free the traversing nerve root in the lateral recess and performed a foraminotomies as well. After removal of all of the soft tissue and bone we confirmed adequate decompression lateral recess and foraminal decompression with a dimitri and then achieved hemostasis  with a bipolar and floseal. We next took an osteotome and using navigation assistance we completed an anterior column release across the L3-4 disc space where it was autofused on the left side and the convexity of the curve. We were able to completely release the spine anteriorly to allow correction through the disc space.     We then performed a transforaminal lumbar interbody fusion at L2-3, L3-4, L4-5, L5-S1. We prepared the disc spaces by removing the disc with elida and pituitaries we then packed the disc spaces after preparing the end plates for fusion. We packed the disc spaces and then placed expandable cages under fluoroscopic guidance without complication. We expanded the cages to restore further lordosis and correct the spinal deformity.     We then completed our segmental instrumentation with rods and locking caps were placed through our screw tops and final tightened to  specifications. We then performed posterolateral arthrodesis decorticating the remaining joints and transverse processes from T10-Pelvis. We then packed bone graft into the gutters for fusion as well as ceramic allograft. We did compress and restore further lordosis using the bed and manually with equipment at each level. We used 3 cobalt 5.5mm rods in total to reinforce the construct.    2 10 fr round drains were tunneled in a subfascial fashion. AP + Lateral x-rays was performed confirming accurate placement of all of our hardware. Next we copiously irrigated the incision and began our closure, the muscle and fascia were approximated with 0 vicryl and 2-0 vicryl sutures and the skin was approximated with staples. The patient returned to anesthesias care and was extubated and returned to the PACU in stable condition.    Disposition: PACU - hemodynamically stable.    Condition: stable    Attending Attestation: I was present and scrubbed for the key portions of the procedure.      Justin Lu MD, [facility]  Spine  , [facility]  [employer] Art Chair in Spinal Neurosurgery  Neurosurgery , [facility] and [facility]  Complex Spine Surgery Fellowship Director   of Neurological Surgery  [employer]  Office: [phone]  Fax: [phone]

## 2024-05-14 NOTE — PROGRESS NOTES
"    [facility]  NEUROSCIENCE INTENSIVE CARE UNIT  DAILY PROGRESS NOTE       Patient Name: Khalida Barker   MRN: [id number]     Admit Date: 2024     : 1950 AGE: 73 y.o. GENDER: female        Subjective  Khalida Barker is a 73 y.o. female with PMH of osteoarthritis, cervicalgia, spinal stenosis, scoliosis, and peripheral neuropathy presenting for scheduled L2-L3, L3-L4, L4-L5 XLIF, L2-S1 Decompression and Fusion, with L5-S1 TLIF, T11-T12 Decompression with Dr. Lu. Pt was experiencing left-sided abdominal pain radiating from the left side of the back in the T11-T12 distribution across the abdomen. She also had left-sided L4 and L5 radiculopathy (L leg N/T) and back pain. Symptoms unrelieved with physical therapy, medications, chiropractic manipulation, and injections.     : T10-Pelvis Instrumented Fusion, Correction of Scoliotic Deformity, L1-2, L2-3, L3-4, L4-5, L5-S1 posterior column osteotomies, L3-4 anterior column osteotomy through disc space,  L1-S1 Decompression, with L2-3, L3-4, L4-5, L5-S1 Transforaminal Lumbar Interbody Fusion, T11-T12 Decompression. C/b Durotomy s/p repair.     Interval Events: Pt arrived to NSU at 1610 from OR. Handoff received from anesthesia and surgery teams. Pt did well in recovery period with vitals ranging as follows: BP: *128/60 ; HR: 98 : SPO2: 100 ; Temp: 36. Pt arrived on SFM. Initial  Incision c/d/i. Nausea & pain managed.    Airway: Easy Grade I  EBL: 2.5 L  Received 3L crystalloids, 1L 5% albumin, 2g Ancef, no blood products       Objective   VITALS (24H):  Heart Rate:  [82]   Temp:  [36 °C (96.8 °F)]   Resp:  [16]   BP: (137)/(75)   Height:  [165.1 cm (5' 5\")]   Weight:  [86 kg (189 lb 9.6 oz)]   SpO2:  [96 %]    INTAKE/OUTPUT:  No intake or output data in the 24 hours ending 24 0803        PHYSICAL EXAM:  NEURO:  - Alert, oriented x4, follows commands  - EOS, PERRL, EOMI, VFF  - OLIVA 5/5 strength upper, lower wiggles toes and bends knees " bilaterally, no drift,   CV:  - RRR on telemetry, NSR  - Arterial line in place, waveform: good   RESP:  - Regular, unlabored  - Oxygen: Simple face mask   :  -  catheter in place with yellow urine   GI:  - Abdomen NT/ND, soft  SKIN:  - Intact, drains x2 lower back midline with sanguinous output     MEDICATIONS:  Scheduled: PRN: Continuous:   acetaminophen, 975 mg, oral, Once            LAB RESULTS:      IMAGING RESULTS:  FL fluoro images no charge    (Results Pending)   FL less than 1 hour    (Results Pending)   FL less than 1 hour    (Results Pending)         Assessment/Plan    NEURO:  #L2-L3, L3-L4, L4-L5 Extreme Lateral Interbody fusion, L2-S1 Decompression and Fusion, with L5-S1 TLIF, T11-T12 Decompression   #Peripheral Neuropathy  Assessment:  - Home meds: Pregabalin 50 mg BID   Plan:  - NSU  - Neuro Checks: Q1H  - Sedation: None  - Pain: acetaminophen Q6H PRN and Cyclobenzaprine 10 mg TID  - Dilaudid PCA, Toradol 15 mg q6h, lido patches x2  - Nausea: ondansetron  - PT/OT/SLP when medically appropiate  - Continue home Lyrica   - Drain management per neurosurg   - MAP >65  - Q6H coags, Fibrinogen, CBC, RFP    --> hgb >8, plt >100, fibrinogen >200    CARDIOVASCULAR:  #AYAKA  Assessment:  - NSR, BP 120s/60s  Plan:  - Continue to monitor on telemetry  - BP goal: MAP >65    RESPIRATORY:  #AYAKA  Assessment:  - Arrived on simple face mask  Plan:  - Continuous pulse oximetry   - O2 PRN to maintain SpO2 > 94%, wean as tolerated  -  Wean to NC     RENAL/:  #AYAKA  Assessment:  - Baseline BUN/Cr: 12/0.71        Plan:  - Monitor with daily RFP  - Remove catheter by POD#1    FEN/GI:  #Hiatal Hernia   #Diverticulitis   Assessment:  -    -   Plan:  - Monitor and replace electrolytes per protocol  - IVF: LR @ 100 mL/hr  - Diet: regular   - Bowel Regimen: Miralax daily    ENDOCRINE:  #AYAKA  Assessment:     Blood sugar ranging from 120s-150s, no hx of DM  Plan:  - Accuchecks & ISS AC&HS     HEMATOLOGY:  #ABLA  Assessment:  -  Baseline Hgb: 14  - Baseline Plts: 234      Plan:  - Continue to monitor with daily CBC and Coag panel  - q6h CBC, coags  - hgb >8, plt >100, fibrinogen >150    INFECTIOUS DISEASE:  #AYAKA  Assessment:     - Temp (24hrs), Av °C (96.8 °F), Min:36 °C (96.8 °F), Max:36 °C (96.8 °F)     Plan:  - Continue to monitor for s/sx of infection  - Pan culture for temperature > 38.4 C    MUSCULOSKELETAL:  - s/p R total knee arthroplasty     SKIN:  - No acute issues  - Turns and skin care per NSU protocol    ACCESS:  - PIV  - L radial A-line     PROPHYLAXIS:  - DVT Ppx: SCDs and SQH  - GI Ppx: None    RESTRAINTS:  Not indicated/Patient does not meet criteria for restraints    Felisa Pollard APRN-CNP  Neuroscience Intensive Care       Total critical care time of 35 minutes, with > 50% of time spent in direct contact with patient/family for education, counseling and coordination of care.

## 2024-05-14 NOTE — ANESTHESIA PREPROCEDURE EVALUATION
Patient: Khalida Barker    Procedure Information       Date/Time: 05/14/24 0715    Procedure: L2-L3, L3-L4, L4-L5 Extreme Lateral Interbody fusion, L2-S1 Decompression and Fusion, with L5-S1 Transforaminal Lumbar Interbody Fusion, T11-T12 Decompression - 96492, 82086, 58301, 88989 x's 4, 65386, 89750 x's 3, 68105 x's 3, 73496, 69905 x's 2,  32945    Location: [facility] OR 24 / [facility] OR    Surgeons: Justin Lu MD          Chief Complaint: L2-L3, L3-L4, L4-L5 Extreme Lateral Interbody fusion, L2-S1 Decompression and Fusion, with L5-S1 Transforaminal Lumbar Interbody Fusion      HPI Patient is a 73 year old female, accompanied by significant other,  scheduled for surgery with Dr. Justin Lu on 5-14-24 related to Thoracogenic scoliosis of thoracolumbar region      Patient referred by Dr. Lu for preoperative evaluation of arthritis, cervicalgia, spinal stenosis, scoliosis, peripheral neuropathy, and hiatal hernia.     Relevant Problems   Neuro   (+) Bilateral carpal tunnel syndrome      Musculoskeletal   (+) Bilateral carpal tunnel syndrome   (+) Primary osteoarthritis of right knee   (+) Scoliosis deformity of spine   (+) Thoracogenic scoliosis of thoracolumbar region       Clinical information reviewed:   Tobacco  Allergies  Meds   Med Hx  Surg Hx  OB Status  Fam Hx  Soc   Hx      Vitals:    05/14/24 0554   BP: 137/75   Pulse: 82   Resp: 16   Temp: 36 °C (96.8 °F)   SpO2: 96%       Past Surgical History:   Procedure Laterality Date    ADENOIDECTOMY  1956    BREAST BIOPSY  1999    COLONOSCOPY      KNEE ARTHROPLASTY Right     MOUTH SURGERY  2016    Back left molar removed    MOUTH SURGERY  09/28/2021    right molar    OTHER SURGICAL HISTORY      Skin lesion excision    SPINAL FUSION  06/2010    C4-5    TONSILLECTOMY  1956    WISDOM TOOTH EXTRACTION  1978    lower     Past Medical History:   Diagnosis Date    Arthritis     Cervicalgia     Colon polyp     CTS (carpal tunnel syndrome)      Hiatal hernia     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     OA (osteoarthritis) of knee     Peripheral neuropathy     Scoliosis     Spinal stenosis     Trigger finger of right hand     Vision loss     wears glasses    Vitamin D deficiency        Current Facility-Administered Medications:     acetaminophen (Tylenol) tablet 975 mg, 975 mg, oral, Once, Justin Lu MD    celecoxib (CeleBREX) capsule 400 mg, 400 mg, oral, Once, Justin Lu MD    gabapentin (Neurontin) capsule 600 mg, 600 mg, oral, Once, Justin Lu MD    tranexamic acid (Lysteda) tablet 1,300 mg, 1,300 mg, oral, Once, Justin Lu MD  Prior to Admission medications    Medication Sig Start Date End Date Taking? Authorizing Provider   acetaminophen (Tylenol Extra Strength) 500 mg tablet 1 tablet (500 mg). EVERY 4 TO 6 HOURS AS NEEDED.   Yes Historical Provider, MD   ASPIRIN-ACETAMINOPHEN-CAFFEINE ORAL Take 2 every morning and 1-2 more if needed    Historical Provider, MD   BIOTIN ORAL caps    Historical Provider, MD   chlorhexidine (Peridex) 0.12 % solution Use 15 mL in the mouth or throat see administration instructions for 2 doses. Use the night before and morning of surgery. 4/30/24   Diann Butt, APRN-CNP   cholecalciferol (Vitamin D-3) 50 mcg (2,000 unit) capsule Take 1 capsule (50 mcg) by mouth once daily.    Historical Provider, MD   diazePAM (Valium) 10 mg tablet Please take 10 mg thirty minutes before the MRI  Patient not taking: Reported on 3/8/2024 1/31/24   Levy Perez MD   diazePAM (Valium) 5 mg tablet Take 1 tablet (5 mg) by mouth 1 time if needed for anxiety for up to 1 dose. To be taken prior to MRI 4/16/24 4/16/24  Justin Lu MD   docusate sodium (Colace) 100 mg capsule Take 1 capsule (100 mg) by mouth twice a day. 6/29/10   Historical Provider, MD   ibuprofen 200 mg tablet Take by mouth every 6 hours if needed for mild pain (1 - 3).    Historical Provider, MD   menthol (Biofreeze, menthol,) 4  % gel gel Apply topically.    Historical Provider, MD   multivit-min/ferrous fumarate (MULTI VITAMIN ORAL) Take 1 tablet by mouth once daily.    Historical Provider, MD   naproxen sodium (Aleve) 220 mg tablet Take by mouth. 6/12/23   Historical Provider, MD   NON FORMULARY Viveran 200mg- pure caffience    Historical Provider, MD   NON FORMULARY Heel [place]    Historical Provider, MD   polyvinyl alcohol (LIQUID TEARS OPHT) INSTILL 1-2 DROPS INTO AFFECTED EYE(S) 4 TIMES DAILY AS DIRECTED.    Historical Provider, MD   pregabalin (Lyrica) 50 mg capsule Take 1 capsule (50 mg) by mouth 2 times a day. 3/18/24   Historical Provider, MD   wheat dextrin (BENEFIBER CLEAR SF, DEXTRIN, ORAL) Take 2 Doses by mouth once daily.    Historical Provider, MD     Allergies   Allergen Reactions    Oxycodone Itching    Tramadol Itching     Social History     Tobacco Use    Smoking status: Never    Smokeless tobacco: Never   Substance Use Topics    Alcohol use: Not Currently     Comment: rarely         Chemistry    Lab Results   Component Value Date/Time     04/30/2024 1027    K 4.5 04/30/2024 1027     04/30/2024 1027    CO2 25 04/30/2024 1027    BUN 22 04/30/2024 1027    CREATININE 0.73 04/30/2024 1027    Lab Results   Component Value Date/Time    CALCIUM 10.2 04/30/2024 1027    ALKPHOS 100 06/24/2023 0651    AST 22 06/24/2023 0651    ALT 18 06/24/2023 0651    BILITOT 1.2 06/24/2023 0651          Lab Results   Component Value Date/Time    WBC 5.6 04/30/2024 1027    HGB 15.8 04/30/2024 1027    HCT 46.0 04/30/2024 1027     04/30/2024 1027     Lab Results   Component Value Date/Time    PROTIME 11.6 04/30/2024 1027    INR 1.0 04/30/2024 1027     No results found for this or any previous visit (from the past 4464 hour(s)).  No results found for this or any previous visit from the past 1095 days.    NPO Detail:  NPO/Void Status  Carbohydrate Drink Given Prior to Surgery? : Y  Date of Last Liquid: 05/14/24  Time of Last Liquid:  0000  Date of Last Solid: 05/14/24  Time of Last Solid: 0000  Last Intake Type: Carbohydrate drink  Time of Last Void: 0400         Physical Exam    Airway  Mallampati: I  TM distance: >3 FB  Neck ROM: full     Cardiovascular - normal exam     Dental    Pulmonary - normal exam     Abdominal            Anesthesia Plan    History of general anesthesia?: yes  History of complications of general anesthesia?: no    ASA 3     general     The patient is not a current smoker.  Patient did not smoke on day of procedure.    intravenous induction   Postoperative administration of opioids is intended.  Anesthetic plan and risks discussed with patient.  Use of blood products discussed with patient who consented to blood products.    Plan discussed with CAA and attending.    Attending Anesthesiologist Note  Above information reviewed including relevant HPI, PMHx, PSHx, anesthesia history, labs, and imaging.    Summary (from patient interview and chart review):      73 year old female scheduled for  L2-L3, L3-L4, L4-L5 Extreme Lateral Interbody fusion, L2-S1 Decompression and Fusion    PMHx arthritis, cervicalgia, spinal stenosis, scoliosis, peripheral neuropathy, and hiatal hernia.     Relevant Problems   Neuro   (+) Bilateral carpal tunnel syndrome      Musculoskeletal   (+) Bilateral carpal tunnel syndrome   (+) Primary osteoarthritis of right knee   (+) Scoliosis deformity of spine   (+) Thoracogenic scoliosis of thoracolumbar region        Medication Documentation Review Audit       Reviewed by Rachel Zaidi RN (Registered Nurse) on 05/14/24 at 0553      Medication Order Taking? Sig Documenting Provider Last Dose Status   acetaminophen (Tylenol Extra Strength) 500 mg tablet [id number]  1 tablet (500 mg). EVERY 4 TO 6 HOURS AS NEEDED. Historical Provider, MD  Active   ASPIRIN-ACETAMINOPHEN-CAFFEINE ORAL [id number]  Take 2 every morning and 1-2 more if needed Historical Provider, MD  Active   BIOTIN ORAL [id number]  caps  Kin Bunch MD  Active   chlorhexidine (Peridex) 0.12 % solution [id number]  Use 15 mL in the mouth or throat see administration instructions for 2 doses. Use the night before and morning of surgery. SHAMIKA Trevino-CNP  Active   cholecalciferol (Vitamin D-3) 50 mcg (2,000 unit) capsule [id number]  Take 1 capsule (50 mcg) by mouth once daily. Kin Bunch MD  Active   diazePAM (Valium) 10 mg tablet [id number]  Please take 10 mg thirty minutes before the MRI   Patient not taking: Reported on 3/8/2024    Levy Perez MD  Active   diazePAM (Valium) 5 mg tablet [id number]  Take 1 tablet (5 mg) by mouth 1 time if needed for anxiety for up to 1 dose. To be taken prior to MRI Justin Lu MD   24 2359   docusate sodium (Colace) 100 mg capsule [id number]  Take 1 capsule (100 mg) by mouth twice a day. Kin Bunch MD  Active   ibuprofen 200 mg tablet [id number]  Take by mouth every 6 hours if needed for mild pain (1 - 3). Historical MD Julio C  Active   menthol (Biofreeze, menthol,) 4 % gel gel [id number]  Apply topically. Kin Bunch MD  Active   multivit-min/ferrous fumarate (MULTI VITAMIN ORAL) [id number]  Take 1 tablet by mouth once daily. Kin Bunch MD  Active   naproxen sodium (Aleve) 220 mg tablet [id number]  Take by mouth. Kin Bunch MD  Active   NON FORMULARY [id number]  Viveran 200mg- pure caffience Kin Bunch MD  Active   NON FORMULARY [id number]  Heel [place] Kin Bunch MD  Active   polyvinyl alcohol (LIQUID TEARS OPHT) [id number]  INSTILL 1-2 DROPS INTO AFFECTED EYE(S) 4 TIMES DAILY AS DIRECTED. Kin Bunch MD  Active   pregabalin (Lyrica) 50 mg capsule [id number]  Take 1 capsule (50 mg) by mouth 2 times a day. Kin Bunch MD  Active   wheat dextrin (BENEFIBER CLEAR SF, DEXTRIN, ORAL) [id number]  Take 2 Doses by mouth once daily. Kin Bunch MD  Active                    Visit Vitals  /75  "  Pulse 82   Temp 36 °C (96.8 °F) (Temporal)   Resp 16   Ht 1.651 m (5' 5\")   Wt 86 kg (189 lb 9.6 oz)   SpO2 96%   BMI 31.55 kg/m²   Smoking Status Never   BSA 1.99 m²            10/24/2023     1:23 PM 1/23/2024     9:00 AM 1/31/2024     1:11 PM 2/29/2024     9:28 AM 3/8/2024     8:47 AM 4/30/2024    10:15 AM 5/14/2024     5:54 AM   Vitals   Systolic 123  156 142 120 121 137   Diastolic 76  92 90 78 79 75   Heart Rate 69 85 66 88 85 85 82   Temp 36.2 °C (97.1 °F)  35.7 °C (96.2 °F) 36.3 °C (97.3 °F) 36.5 °C (97.7 °F) 36.8 °C (98.2 °F) 36 °C (96.8 °F)   Resp       16   Height (in) 1.676 m (5' 6\")  1.651 m (5' 5\") 1.651 m (5' 5\") 1.651 m (5' 5\") 1.651 m (5' 5\") 1.651 m (5' 5\")   Weight (lb) 190.8  193.7 191 195.9 191 189.6   BMI 30.8 kg/m2  32.23 kg/m2 31.78 kg/m2 32.6 kg/m2 31.78 kg/m2 31.55 kg/m2   BSA (m2) 2.01 m2  2.01 m2 1.99 m2 2.02 m2 1.99 m2 1.99 m2   Visit Report Report  Report Report Report          Recent Labs:    Chemistry    Lab Results   Component Value Date/Time     04/30/2024 1027    K 4.5 04/30/2024 1027     04/30/2024 1027    CO2 25 04/30/2024 1027    BUN 22 04/30/2024 1027    CREATININE 0.73 04/30/2024 1027    Lab Results   Component Value Date/Time    CALCIUM 10.2 04/30/2024 1027    ALKPHOS 100 06/24/2023 0651    AST 22 06/24/2023 0651    ALT 18 06/24/2023 0651    BILITOT 1.2 06/24/2023 0651          Lab Results   Component Value Date/Time    WBC 5.6 04/30/2024 1027    HGB 15.8 04/30/2024 1027    HCT 46.0 04/30/2024 1027     04/30/2024 1027     Lab Results   Component Value Date/Time    PROTIME 11.6 04/30/2024 1027    INR 1.0 04/30/2024 1027     Anesthesia History: no complications  Anesthesia Plan: preop methadone (and celebrex/gabapentin/TXA per surgeon). GA/ETT. Std monitors + a-line. Ketamine intra-op.     I discussed the anesthesia plan with the patient and/or family and reviewed the risks, benefits and alternatives. Agree to proceed.  James Evans MD PhD      "

## 2024-05-14 NOTE — ANESTHESIA PROCEDURE NOTES
Arterial Line:    Date/Time: 5/14/2024 7:57 AM    Staffing  Performed: [facility]   Authorized by: James Evans MD PhD    Performed by: SHERIDAN Wright Capp    An arterial line was placed. in the OR for the following indication(s): continuous blood pressure monitoring and blood sampling needed.    A 20 gauge (size), 1 and 3/4 inch (length), Angiocath (type) catheter was placed into the Left radial artery, secured by Tegaderm,   Seldinger technique not used.  Events:  patient tolerated procedure well with no complications.      Additional notes:  Sterile prep and glove. One attempt. Sterile dressing placed.

## 2024-05-15 LAB
ALBUMIN SERPL BCP-MCNC: 2.8 G/DL (ref 3.4–5)
ALBUMIN SERPL BCP-MCNC: 3.4 G/DL (ref 3.4–5)
ALBUMIN SERPL BCP-MCNC: 3.6 G/DL (ref 3.4–5)
ANION GAP SERPL CALC-SCNC: 12 MMOL/L (ref 10–20)
BUN SERPL-MCNC: 22 MG/DL (ref 6–23)
BUN SERPL-MCNC: 23 MG/DL (ref 6–23)
BUN SERPL-MCNC: 23 MG/DL (ref 6–23)
CA-I BLD-SCNC: 1.19 MMOL/L (ref 1.1–1.33)
CALCIUM SERPL-MCNC: 7.6 MG/DL (ref 8.6–10.6)
CALCIUM SERPL-MCNC: 7.9 MG/DL (ref 8.6–10.6)
CALCIUM SERPL-MCNC: 8 MG/DL (ref 8.6–10.6)
CHLORIDE SERPL-SCNC: 103 MMOL/L (ref 98–107)
CHLORIDE SERPL-SCNC: 106 MMOL/L (ref 98–107)
CHLORIDE SERPL-SCNC: 108 MMOL/L (ref 98–107)
CO2 SERPL-SCNC: 24 MMOL/L (ref 21–32)
CO2 SERPL-SCNC: 24 MMOL/L (ref 21–32)
CO2 SERPL-SCNC: 25 MMOL/L (ref 21–32)
CREAT SERPL-MCNC: 0.57 MG/DL (ref 0.5–1.05)
CREAT SERPL-MCNC: 0.67 MG/DL (ref 0.5–1.05)
CREAT SERPL-MCNC: 0.76 MG/DL (ref 0.5–1.05)
EGFRCR SERPLBLD CKD-EPI 2021: 84 ML/MIN/1.73M*2
EGFRCR SERPLBLD CKD-EPI 2021: >90 ML/MIN/1.73M*2
EGFRCR SERPLBLD CKD-EPI 2021: >90 ML/MIN/1.73M*2
ERYTHROCYTE [DISTWIDTH] IN BLOOD BY AUTOMATED COUNT: 13.6 % (ref 11.5–14.5)
ERYTHROCYTE [DISTWIDTH] IN BLOOD BY AUTOMATED COUNT: 14.5 % (ref 11.5–14.5)
ERYTHROCYTE [DISTWIDTH] IN BLOOD BY AUTOMATED COUNT: 14.6 % (ref 11.5–14.5)
FIBRINOGEN PPP-MCNC: 175 MG/DL (ref 200–400)
FIBRINOGEN PPP-MCNC: 179 MG/DL (ref 200–400)
FIBRINOGEN PPP-MCNC: 186 MG/DL (ref 200–400)
GLUCOSE SERPL-MCNC: 113 MG/DL (ref 74–99)
GLUCOSE SERPL-MCNC: 145 MG/DL (ref 74–99)
GLUCOSE SERPL-MCNC: 184 MG/DL (ref 74–99)
HCT VFR BLD AUTO: 21.3 % (ref 36–46)
HCT VFR BLD AUTO: 24.3 % (ref 36–46)
HCT VFR BLD AUTO: 26.5 % (ref 36–46)
HGB BLD-MCNC: 7 G/DL (ref 12–16)
HGB BLD-MCNC: 8.7 G/DL (ref 12–16)
HGB BLD-MCNC: 8.9 G/DL (ref 12–16)
MAGNESIUM SERPL-MCNC: 1.67 MG/DL (ref 1.6–2.4)
MCH RBC QN AUTO: 31.2 PG (ref 26–34)
MCH RBC QN AUTO: 32.3 PG (ref 26–34)
MCH RBC QN AUTO: 32.4 PG (ref 26–34)
MCHC RBC AUTO-ENTMCNC: 32.8 G/DL (ref 32–36)
MCHC RBC AUTO-ENTMCNC: 32.9 G/DL (ref 32–36)
MCHC RBC AUTO-ENTMCNC: 36.6 G/DL (ref 32–36)
MCV RBC AUTO: 88 FL (ref 80–100)
MCV RBC AUTO: 95 FL (ref 80–100)
MCV RBC AUTO: 98 FL (ref 80–100)
NRBC BLD-RTO: 0 /100 WBCS (ref 0–0)
PHOSPHATE SERPL-MCNC: 2.4 MG/DL (ref 2.5–4.9)
PHOSPHATE SERPL-MCNC: 3.5 MG/DL (ref 2.5–4.9)
PHOSPHATE SERPL-MCNC: 3.8 MG/DL (ref 2.5–4.9)
PLATELET # BLD AUTO: 128 X10*3/UL (ref 150–450)
PLATELET # BLD AUTO: 142 X10*3/UL (ref 150–450)
PLATELET # BLD AUTO: 153 X10*3/UL (ref 150–450)
POTASSIUM SERPL-SCNC: 4.4 MMOL/L (ref 3.5–5.3)
POTASSIUM SERPL-SCNC: 4.6 MMOL/L (ref 3.5–5.3)
POTASSIUM SERPL-SCNC: 4.8 MMOL/L (ref 3.5–5.3)
RBC # BLD AUTO: 2.17 X10*6/UL (ref 4–5.2)
RBC # BLD AUTO: 2.75 X10*6/UL (ref 4–5.2)
RBC # BLD AUTO: 2.79 X10*6/UL (ref 4–5.2)
SODIUM SERPL-SCNC: 135 MMOL/L (ref 136–145)
SODIUM SERPL-SCNC: 138 MMOL/L (ref 136–145)
SODIUM SERPL-SCNC: 139 MMOL/L (ref 136–145)
WBC # BLD AUTO: 11.3 X10*3/UL (ref 4.4–11.3)
WBC # BLD AUTO: 12.7 X10*3/UL (ref 4.4–11.3)
WBC # BLD AUTO: 12.8 X10*3/UL (ref 4.4–11.3)

## 2024-05-15 PROCEDURE — 85384 FIBRINOGEN ACTIVITY: CPT

## 2024-05-15 PROCEDURE — 36430 TRANSFUSION BLD/BLD COMPNT: CPT

## 2024-05-15 PROCEDURE — 2500000004 HC RX 250 GENERAL PHARMACY W/ HCPCS (ALT 636 FOR OP/ED): Performed by: STUDENT IN AN ORGANIZED HEALTH CARE EDUCATION/TRAINING PROGRAM

## 2024-05-15 PROCEDURE — 85384 FIBRINOGEN ACTIVITY: CPT | Mod: 91,MUE

## 2024-05-15 PROCEDURE — 1100000001 HC PRIVATE ROOM DAILY

## 2024-05-15 PROCEDURE — 37799 UNLISTED PX VASCULAR SURGERY: CPT

## 2024-05-15 PROCEDURE — 97530 THERAPEUTIC ACTIVITIES: CPT | Mod: GO

## 2024-05-15 PROCEDURE — P9016 RBC LEUKOCYTES REDUCED: HCPCS

## 2024-05-15 PROCEDURE — 36415 COLL VENOUS BLD VENIPUNCTURE: CPT

## 2024-05-15 PROCEDURE — 85027 COMPLETE CBC AUTOMATED: CPT

## 2024-05-15 PROCEDURE — 97162 PT EVAL MOD COMPLEX 30 MIN: CPT | Mod: GP

## 2024-05-15 PROCEDURE — 82330 ASSAY OF CALCIUM: CPT

## 2024-05-15 PROCEDURE — 51701 INSERT BLADDER CATHETER: CPT

## 2024-05-15 PROCEDURE — 80069 RENAL FUNCTION PANEL: CPT | Mod: 91,MUE

## 2024-05-15 PROCEDURE — 97166 OT EVAL MOD COMPLEX 45 MIN: CPT | Mod: GO

## 2024-05-15 PROCEDURE — 85027 COMPLETE CBC AUTOMATED: CPT | Mod: 91

## 2024-05-15 PROCEDURE — 2500000001 HC RX 250 WO HCPCS SELF ADMINISTERED DRUGS (ALT 637 FOR MEDICARE OP): Performed by: STUDENT IN AN ORGANIZED HEALTH CARE EDUCATION/TRAINING PROGRAM

## 2024-05-15 PROCEDURE — 83735 ASSAY OF MAGNESIUM: CPT

## 2024-05-15 PROCEDURE — 84520 ASSAY OF UREA NITROGEN: CPT | Mod: 91

## 2024-05-15 RX ORDER — KETOROLAC TROMETHAMINE 30 MG/ML
15 INJECTION, SOLUTION INTRAMUSCULAR; INTRAVENOUS EVERY 6 HOURS
Status: DISCONTINUED | OUTPATIENT
Start: 2024-05-15 | End: 2024-05-15

## 2024-05-15 RX ORDER — KETOROLAC TROMETHAMINE 30 MG/ML
15 INJECTION, SOLUTION INTRAMUSCULAR; INTRAVENOUS EVERY 6 HOURS
Status: DISCONTINUED | OUTPATIENT
Start: 2024-05-15 | End: 2024-05-16

## 2024-05-15 RX ADMIN — KETOROLAC TROMETHAMINE 15 MG: 15 INJECTION, SOLUTION INTRAMUSCULAR; INTRAVENOUS at 05:59

## 2024-05-15 RX ADMIN — CYCLOBENZAPRINE 10 MG: 10 TABLET, FILM COATED ORAL at 21:37

## 2024-05-15 RX ADMIN — ACETAMINOPHEN 650 MG: 325 TABLET ORAL at 04:42

## 2024-05-15 RX ADMIN — KETOROLAC TROMETHAMINE 15 MG: 15 INJECTION, SOLUTION INTRAMUSCULAR; INTRAVENOUS at 00:12

## 2024-05-15 RX ADMIN — ACETAMINOPHEN 650 MG: 325 TABLET ORAL at 17:54

## 2024-05-15 RX ADMIN — KETOROLAC TROMETHAMINE 15 MG: 30 INJECTION, SOLUTION INTRAMUSCULAR at 21:37

## 2024-05-15 RX ADMIN — ACETAMINOPHEN 650 MG: 325 TABLET ORAL at 11:03

## 2024-05-15 RX ADMIN — POLYETHYLENE GLYCOL 3350 17 G: 17 POWDER, FOR SOLUTION ORAL at 08:57

## 2024-05-15 RX ADMIN — HEPARIN SODIUM 5000 UNITS: 5000 INJECTION INTRAVENOUS; SUBCUTANEOUS at 17:54

## 2024-05-15 RX ADMIN — HEPARIN SODIUM 5000 UNITS: 5000 INJECTION INTRAVENOUS; SUBCUTANEOUS at 08:57

## 2024-05-15 RX ADMIN — HEPARIN SODIUM 5000 UNITS: 5000 INJECTION INTRAVENOUS; SUBCUTANEOUS at 00:12

## 2024-05-15 ASSESSMENT — ACTIVITIES OF DAILY LIVING (ADL)
ADL_ASSISTANCE: INDEPENDENT
BATHING_ASSISTANCE: MODERATE
ADL_ASSISTANCE: INDEPENDENT

## 2024-05-15 ASSESSMENT — COGNITIVE AND FUNCTIONAL STATUS - GENERAL
DRESSING REGULAR LOWER BODY CLOTHING: A LOT
MOVING FROM LYING ON BACK TO SITTING ON SIDE OF FLAT BED WITH BEDRAILS: A LOT
CLIMB 3 TO 5 STEPS WITH RAILING: TOTAL
HELP NEEDED FOR BATHING: A LOT
TOILETING: A LOT
DAILY ACTIVITIY SCORE: 15
PERSONAL GROOMING: A LITTLE
DRESSING REGULAR UPPER BODY CLOTHING: A LITTLE
MOVING TO AND FROM BED TO CHAIR: TOTAL
MOBILITY SCORE: 8
WALKING IN HOSPITAL ROOM: TOTAL
EATING MEALS: A LITTLE
STANDING UP FROM CHAIR USING ARMS: TOTAL
TURNING FROM BACK TO SIDE WHILE IN FLAT BAD: A LOT

## 2024-05-15 ASSESSMENT — PAIN SCALES - GENERAL
PAINLEVEL_OUTOF10: 5 - MODERATE PAIN
PAINLEVEL_OUTOF10: 0 - NO PAIN
PAINLEVEL_OUTOF10: 8
PAINLEVEL_OUTOF10: 5 - MODERATE PAIN
PAINLEVEL_OUTOF10: 2
PAINLEVEL_OUTOF10: 5 - MODERATE PAIN
PAINLEVEL_OUTOF10: 4

## 2024-05-15 ASSESSMENT — PAIN - FUNCTIONAL ASSESSMENT
PAIN_FUNCTIONAL_ASSESSMENT: 0-10

## 2024-05-15 ASSESSMENT — PAIN DESCRIPTION - DESCRIPTORS
DESCRIPTORS: SORE
DESCRIPTORS: ACHING;SORE

## 2024-05-15 NOTE — PROGRESS NOTES
Pharmacy Admission Order Reconciliation Review    Khalida Barker is a 73 y.o. female admitted for Thoracogenic scoliosis of thoracolumbar region. Pharmacy reviewed the patient's unreconciled admission medications.    Prior to admission medications that were reviewed and acted on by the pharmacist include:  Exedrin Migraine   These medications have been reconciled.     Any other unreconcilied medications have been addressed and will be ordered or held by the patient's medical team. Medications addressed by the pharmacist may be added or changed by the patient's medical team at any time.    Siomara Garrett, Yvrose  Transitions of Care Pharmacist  [facility]  Please reach out via Secure Chat for questions

## 2024-05-15 NOTE — PROGRESS NOTES
Occupational Therapy                 Therapy Communication Note    Patient Name: Khalida Barker  MRN: [id number]  Today's Date: 5/15/2024     Discipline: Occupational Therapy    Missed Visit Reason: Missed Visit Reason:  (OT orders received, chart reviewed, following PT session pt with low BP outside of parameters, will reattempt as appropriate)    Missed Time: Attempt    Comment:

## 2024-05-15 NOTE — PROGRESS NOTES
"Khalida Barker is a 73 y.o. female on day 1 of admission presenting with Thoracogenic scoliosis of thoracolumbar region.    Subjective   Mild nausea overnight       Objective     Physical Exam  Ox3  RLE DF4 ow 5  LLE 5  LLE radiculopathy    Last Recorded Vitals  Blood pressure 105/61, pulse (!) 116, temperature 36.4 °C (97.5 °F), temperature source Temporal, resp. rate 18, height 1.651 m (5' 5\"), weight 95.4 kg (210 lb 5.1 oz), SpO2 97%.  Intake/Output last 3 Shifts:  I/O last 3 completed shifts:  In: 4554.5 (47.7 mL/kg) [I.V.:1375.4 (14.4 mL/kg); Blood:54.2; IV Piggyback:3125]  Out: 4370 (45.8 mL/kg) [Urine:1365 (0.4 mL/kg/hr); Drains:500; Blood:2505]  Weight: 95.4 kg     Relevant Results                             Assessment/Plan   Principal Problem:    Thoracogenic scoliosis of thoracolumbar region  Active Problems:    Scoliosis deformity of spine    H/o severe LBP and LLE radiculopathy and scoliosis 5/14 s/p [id number], multi-level PCOs, L2-S1 TLIF, s/p 1U PRBCs    PLAN    Tele  Void trial  Drains x2 (dc w stitch)  Q8 labs  PTOT  SCDs/SQH             Michelle Mcnulty MD      "

## 2024-05-15 NOTE — PROGRESS NOTES
Social Work Discharge Planning note:    -Patient discussed during interdisciplinary rounds.   -Team members present: Resident and SW  -Plan per medical team: Pt is not medically ready for discharge. Tele status. ADOD may be end of this week.   -Payer: [facility] Medicare  -Status: Inpatient  -Discharge disposition: Pt is currently with PT recommendations for high intensity. SW met with Pt to further discuss discharge planning. Pt was agreeable with acute rehab placement, giving  [facility] as FOC. A referral has been initiated via [facility].   -Support to Pt/family: Pt reported there to be no other issues or concerns at this time. SW will continue to follow for emotional/incidental support to Pt/family.    -Potential Barriers: none  -Anticipated Date of Discharge:  5/17/24    SEN Juarez, LSW

## 2024-05-15 NOTE — PROGRESS NOTES
Occupational Therapy    Evaluation and Treatment    Patient Name: Khalida Barker  MRN: [id number]  Today's Date: 5/15/2024  Room: [address] [place], [place] [address]A  Time Calculation  Start Time: 1330  Stop Time: 1403  Time Calculation (min): 33 min    Assessment  IP OT Assessment  OT Assessment: Khalida is a 74yo female presenting with deficits in ADLs, IADLs, transfers, bed mobility and functional activity tolerance. Pt would benefit from sklled OT intervention to return to OF  Prognosis: Good  Barriers to Discharge: None  Evaluation/Treatment Tolerance: Patient tolerated treatment well  Medical Staff Made Aware: Yes  End of Session Communication: Bedside nurse  End of Session Patient Position: Bed, 3 rail up, Alarm off, not on at start of session  Plan:  Treatment Interventions: ADL retraining, Functional transfer training, UE strengthening/ROM, Endurance training, Patient/family training, Neuromuscular reeducation, Compensatory technique education, Equipment evaluation/education  OT Frequency: 4 times per week  OT Discharge Recommendations: High intensity level of continued care  Equipment Recommended upon Discharge: Wheeled walker  OT Recommended Transfer Status: Assist of 2  OT - OK to Discharge: Yes    Subjective   Current Problem:  1. Thoracogenic scoliosis of thoracolumbar region  celecoxib (CeleBREX) capsule 400 mg    gabapentin (Neurontin) capsule 600 mg    tranexamic acid (Lysteda) tablet 1,300 mg    Admit to inpatient    Admit to inpatient    DISCONTINUED: acetaminophen (Tylenol) tablet 975 mg    CANCELED: Vital Signs    CANCELED: Pulse oximetry, spot    CANCELED: Full code    CANCELED: Vital Signs    CANCELED: Pulse oximetry, spot    CANCELED: Full code      2. Scoliosis deformity of spine          General:  Reason for Referral: T10-Pelvis Instrumented Fusion, Correction of Scoliotic Deformity, L1-2, L2-3, L3-4, L4-5, L5-S1 posterior column osteotomies, L3-4 anterior column osteotomy through disc space,  L1-S1 Decompression,  with L2-3, L3-4, L4-5, L5-S1 Transforaminal Lumbar Interbody Fusion, T11-T12 Decompression, Navigation, Allograft, Autograft for fusion  Past Medical History Relevant to Rehab: osteoarthritis, cervicalgia, spinal stenosis, scoliosis, and peripheral neuropathy  Prior to Session Communication: Bedside nurse  Patient Position Received: Bed, 3 rail up, Alarm off, not on at start of session  Family/Caregiver Present: No  General Comment: Pt supine in bed upon entry to room. RN cleared pt to work wi[facility] therapy. Pt BP remains low but is asymptomatic   Precautions:  Hearing/Visual Limitations: Glasses for reading, hearing WFL  Medical Precautions: Fall precautions, Spinal precautions  Post-Surgical Precautions: Spinal precautions  Precautions Comment: MAP >65  Vital Signs:     Pain:  Pain Assessment  Pain Assessment: 0-10  Pain Score: 2  Lines/Tubes/Drains:  Closed/Suction Drain Medial Back (Active)   Number of days: 1       Closed/Suction Drain Medial Back (Active)   Number of days: 0         Objective   Cognition:  Overall Cognitive Status: Within Functional Limits  Orientation Level: Oriented X4  Attention: Within Functional Limits  Insight: Within function limits  Impulsive: Within functional limits  Processing Speed: Within funtional limits           Home Living:  Type of Home: House  Lives With: Spouse  Home Adaptive Equipment: Cane  Home Layout: Two level, Able to live on main level with bedroom/bathroom, Stairs to alternate level with rails, Laundry in basement  Alternate Level Stairs-Number of Steps: 13 to basement  Home Access: Stairs to enter with rails  Entrance Stairs-Number of Steps: 2  Bathroom Shower/Tub: Walk-in shower  Bathroom Toilet: Standard  Bathroom Equipment: Grab bars in shower   Prior Function:  Level of [place]: Independent with ADLs and functional transfers, Independent with homemaking with ambulation  ADL Assistance: Independent  Homemaking Assistance: Independent  Ambulatory Assistance:  Independent  Vocational: Retired ( at [employer])  Hand Dominance: Right  Prior Function Comments: Drives, reports 5 falls in past year due to dizziness/medication side effects and tripping.     ADL:  Eating Assistance: Independent (anticipated)  Grooming Assistance: Independent (anticipated)  Bathing Assistance: Moderate (anticipated)  UE Dressing Assistance: Minimal (anticipated)  LE Dressing Assistance: Moderate (anticipated)  Toileting Assistance with Device: Moderate (anticipated)  Activity Tolerance:  Endurance: Tolerates 10 - 20 min exercise with multiple rests  Early Mobility/Exercise Safety Screen: Proceed with mobilization - No exclusion criteria met  Balance:     Bed Mobility/Transfers: Bed Mobility/Transfers: Bed Mobility  Bed Mobility: Yes  Bed Mobility 1  Bed Mobility 1: Supine to sitting, Log roll  Level of Assistance 1: Moderate assistance (x1)  Bed Mobility Comments 1: HOB flat, completed x2  Bed Mobility 2  Bed Mobility  2: Sitting to supine, Log roll  Level of Assistance 2: Moderate assistance (x1)  Bed Mobility Comments 2: HOB flat, assist for BLE, completed x2  Bed Mobility 3  Bed Mobility 3: Rolling right  Level of Assistance 3: Moderate assistance (x1)  Bed Mobility Comments 3: use of draw sheet (x3)  Bed Mobility 4  Bed Mobility 4: Rolling left  Level of Assistance 4: Minimum assistance  Bed Mobility Comments 4: use of draw sheet (x3)  Bed Mobility 5  Bed Mobility 5: Scooting  Level of Assistance 5: Dependent (x2)  Bed Mobility Comments 5: HOB flat, boost to HOB   and Transfers  Transfer: Yes  Transfer 1  Transfer From 1: Stand to, Sit to  Transfer to 1: Stand, Sit  Technique 1: Stand to sit, Sit to stand  Transfer Level of Assistance 1: Moderate assistance (x1)  Trials/Comments 1: x3 trials, first trial, use of walker, with Min A but pt TAD knees buckle and requires MOD to return to bed. x2 trials with TAD arm in arm and TAD knees blocking. MOD A but unable to maintain  longer than 5 seconds prior to fatigue  IADL's:      Vision: Vision - Basic Assessment  Current Vision: Wears glasses all the time   and    Sensation:  Sensation Comment: reports BLE from knees down feel numb, but light touch intact  Strength:  Strength Comments: BUE 4/5, BLE >/= 3+/5  Perception:     Coordination:  Movements are Fluid and Coordinated: Yes   Hand Function:  Hand Function  Gross Grasp: Functional  Extremities:   RUE   RUE : Within Functional Limits (AROM), LUE   LUE: Within Functional Limits (AROM), RLE   RLE : Within Functional Limits (AROM), and LLE   LLE : Within Functional Limits (AROM)      Treatment Completed on Evaluation    Therapy/Activity:     Therapeutic Activity  Therapeutic Activity Performed: Yes  Therapeutic Activity 1: Completed bed mobility and multiple sit to stand transfers. Pt eager to transfer to [facility] but unable 2/2 BLE weakness.    Outcome Measures: [facility] Daily Activity  Putting on and taking off regular lower body clothing: A lot  Bathing (including washing, rinsing, drying): A lot  Putting on and taking off regular upper body clothing: A little  Toileting, which includes using toilet, bedpan or urinal: A lot  Taking care of personal grooming such as brushing teeth: A little  Eating Meals: A little  Daily Activity - Total Score: 15    Confusion Assessment Method-ICU (CAM-ICU)  Feature 1: Acute Onset or Fluctuating Course: Negative  Feature 2: Inattention: Negative  Overall CAM-ICU: Negative   ICU Mobility Screen  Early Mobility/Exercise Safety Screen: Proceed with mobilization - No exclusion criteria met,          Education Documentation  Body Mechanics, taught by Melly Deshpande OT at 5/15/2024  3:54 PM.  Learner: Patient  Readiness: Acceptance  Method: Explanation  Response: Verbalizes Understanding    Precautions, taught by Melly Deshpande OT at 5/15/2024  3:54 PM.  Learner: Patient  Readiness: Acceptance  Method: Explanation  Response: Verbalizes Understanding    ADL  Training, taught by Melly Deshpande OT at 5/15/2024  3:54 PM.  Learner: Patient  Readiness: Acceptance  Method: Explanation  Response: Verbalizes Understanding    Education Comments  No comments found.        Goals:   Encounter Problems       Encounter Problems (Active)       ADLs       Patient with complete lower body dressing with minimal assist  level of assistance donning and doffing all LE clothes  with PRN adaptive equipment while edge of bed  (Progressing)       Start:  05/15/24    Expected End:  06/05/24            Patient will complete toileting including hygiene clothing management/hygiene with contact guard assist level of assistance and grab bars. (Progressing)       Start:  05/15/24    Expected End:  06/05/24               BALANCE       Patientt will maintain static standing balance during ADL task with contact guard assist level of assistance drop down in order to demonstrate decreased risk of falling and improved postural control. (Progressing)       Start:  05/15/24    Expected End:  06/05/24               COGNITION/SAFETY       Patient will recall and adhere to spinal precautions during all functional mobility/ADL tasks in order to demonstrate improved understanding and promote healing post op (Progressing)       Start:  05/15/24    Expected End:  06/05/24               TRANSFERS       Patient will perform bed mobility minimal assist  level of assistance and bed rails in order to improve safety and independence with mobility (Progressing)       Start:  05/15/24    Expected End:  06/05/24            Patient will complete functional transfer to [facility] with least restrictive device with contact guard assist level of assistance. (Progressing)       Start:  05/15/24    Expected End:  06/05/24                 05/15/24 at 3:55 PM   Melly Deshpande OT   Rehab Office: [phone]

## 2024-05-15 NOTE — PROGRESS NOTES
Physical Therapy    Physical Therapy Evaluation    Patient Name: Khalida Barker  MRN: [id number]  Today's Date: 5/15/2024   Room: 04/04  Time Calculation  Start Time: 0906  Stop Time: 0930  Time Calculation (min): 24 min    Assessment/Plan   PT Assessment  PT Assessment Results: Decreased strength, Decreased endurance, Impaired balance, Decreased mobility, Pain  Evaluation/Treatment Tolerance: Patient limited by pain  Medical Staff Made Aware: Yes  Strengths: Attitude of self  End of Session Communication: Bedside nurse  Assessment Comment: Pt to benefit from ongoing PT services to address the above limitations and to prepare pt for timely return to prior level of function.  End of Session Patient Position: Bed, 3 rail up, Alarm off, not on at start of session  IP OR SWING BED PT PLAN  Inpatient or Swing Bed: Inpatient  PT Plan  Treatment/Interventions: Bed mobility, Transfer training, Gait training, Stair training, Balance training, Neuromuscular re-education, Strengthening, Endurance training, Range of motion, Therapeutic exercise, Therapeutic activity, Home exercise program, Postural re-education  PT Plan: Skilled PT  PT Frequency: Daily  PT Discharge Recommendations: High intensity level of continued care  Equipment Recommended upon Discharge: Wheeled walker  PT Recommended Transfer Status: Assist x2  PT - OK to Discharge: Yes (When medically ready.)      Subjective   General Visit Information:  Reason for Referral: T10-Pelvis Instrumented Fusion, Correction of Scoliotic Deformity, L1-2, L2-3, L3-4, L4-5, L5-S1 posterior column osteotomies, L3-4 anterior column osteotomy through disc space,  L1-S1 Decompression, with L2-3, L3-4, L4-5, L5-S1 Transforaminal Lumbar Interbody Fusion, T11-T12 Decompression, Navigation, Allograft, Autograft for fusion  Past Medical History Relevant to Rehab: osteoarthritis, cervicalgia, spinal stenosis, scoliosis, and peripheral neuropathy  Co-Treatment:  (Utilized rehab aide for room  setup and line mgmt)  Prior to Session Communication: Bedside nurse  Patient Position Received: Bed, 3 rail up, Alarm off, not on at start of session   Home Living:  Home Living  Type of Home: House  Lives With: Spouse  Home Adaptive Equipment: Cane  Home Layout: Two level, Able to live on main level with bedroom/bathroom, Stairs to alternate level with rails, Laundry in basement  Alternate Level Stairs-Number of Steps: 13 steps down to basement  Home Access: Stairs to enter with rails  Entrance Stairs-Number of Steps: 2  Bathroom Shower/Tub: Walk-in shower  Bathroom Equipment: Grab bars in shower  Prior Level of Function:  Prior Function Per Pt/Caregiver Report  Level of [place]: Independent with ADLs and functional transfers, Independent with homemaking with ambulation  ADL Assistance: Independent  Homemaking Assistance: Independent  Ambulatory Assistance: Independent  Vocational: Retired ( at [employer])  Prior Function Comments: Drives, reports 5 falls in past year due to dizziness/medication side effects and tripping.  Precautions:  Precautions  Hearing/Visual Limitations: Glasses for reading, hearing WFL  Medical Precautions: Fall precautions, Spinal precautions  Precautions Comment: MAP >65  Vital Signs:  Vital Signs  Heart Rate: (!) 113 (118 during, 103 post)  SpO2: 96 % (91-98%)  BP: 126/90 (81/58 (63) initially upon sitting up to edge of bed. MAP increaes to 67 within 2 min. After returning to supine: 82/45 (57) RN notified of this. Pt denies dizziness/lightheadedness thoughout session.)  MAP (mmHg): 100  BP Method: Automatic    Objective   Lines/Tubes/Drains:  Closed/Suction Drain Medial Back (Active)   Number of days: 0       Closed/Suction Drain Medial Back (Active)   Number of days: 0     Continuous Medications/Drips:  HYDROmorphone,   lactated Ringer's, 100 mL/hr, Last Rate: 100 mL/hr (05/15/24 0600)      Oxygen:    Pain:  Pain Assessment  Pain Assessment: 0-10  Pain Score:  8  Pain Type: Surgical pain  Pain Location: Back  Pain Interventions:  (Mobility + rest)  Response to Interventions: Pain unchanged  Cognition:  Cognition  Overall Cognitive Status: Within Functional Limits  Orientation Level: Oriented X4  Insight: Within function limits  Impulsive: Within functional limits  Processing Speed: Within funtional limits      Extremity/Trunk Assessments:  Strength:     RUE   RUE : Within Functional Limits  LUE   LUE: Within Functional Limits  RLE   RLE : Exceptions to WFL (ROM WFL)  Strength RLE  R Hip Flexion: 3/5  R Knee Flexion: 3+/5  R Knee Extension: 3+/5  R Ankle Dorsiflexion: 3+/5  R Ankle Plantar Flexion: 3+/5  LLE   LLE : Exceptions to WFL (ROM WFL)  Strength LLE  L Hip Flexion: 3/5  L Knee Flexion: 4/5  L Knee Extension: 4/5  L Ankle Dorsiflexion: 4/5  L Ankle Plantar Flexion: 4/5    General Assessments:         Activity Tolerance  Endurance: Tolerates 10 - 20 min exercise with multiple rests  Early Mobility/Exercise Safety Screen: Proceed with mobilization - No exclusion criteria met  Sensation  Light Touch: No apparent deficits  Proprioception: Partial deficits in the RLE (Difficulty sensing ankle plantarflexion/dorsiflexion)     Static Sitting Balance  Static Sitting-Balance Support: Bilateral upper extremity supported, Feet unsupported  Static Sitting-Level of Assistance: Close supervision  Dynamic Sitting Balance  Dynamic Sitting-Balance Support: Bilateral upper extremity supported, Feet supported  Dynamic Sitting-Comments: CGA to MIN A x1  Static Standing Balance  Static Standing-Balance Support: Bilateral upper extremity supported  Static Standing-Level of Assistance: Minimum assistance (x2)  Dynamic Standing Balance  Dynamic Standing-Balance Support:  (Deferred dynamic out of bed mobility 2/2 low SBP/MAP after standing.)    Functional Assessments:  Bed Mobility  Bed Mobility: Yes  Bed Mobility 1  Bed Mobility 1: Supine to sitting, Sitting to supine, Log roll  Level of  Assistance 1: Moderate assistance (x1)  Bed Mobility Comments 1: HOB 30 deg. Cues for proper task sequencing.  Transfers  Transfer: Yes  Transfer 1  Technique 1: Sit to stand, Stand to sit  Transfer Device 1:  (BUE arm in arm assist)  Transfer Level of Assistance 1: Minimum assistance (x2)  Trials/Comments 1: Cues for proper UE support and controlling speed.  Ambulation/Gait Training  Ambulation/Gait Training Performed: No  Stairs  Stairs: No       Outcome Measures:  [facility] Basic Mobility  Turning from your back to your side while in a flat bed without using bedrails: A lot  Moving from lying on your back to sitting on the side of a flat bed without using bedrails: A lot  Moving to and from bed to chair (including a wheelchair): Total  Standing up from a chair using your arms (e.g. wheelchair or bedside chair): Total  To walk in hospital room: Total  Climbing 3-5 steps with railing: Total  Basic Mobility - Total Score: 8           FSS-ICU  Ambulation: Unable to attempt due to weakness  Rolling: Moderate assistance (performs 50 - 74% of task)  Sitting: Minimal assistance (performs 75% or more of task)  Transfer Sit-to-Stand: Total assistance (performs 25% or requires another person)  Transfer Supine-to-Sit: Moderate assistance (performs 50 - 74% of task)  Total Score: 11  ICU Mobility Screen  Early Mobility/Exercise Safety Screen: Proceed with mobilization - No exclusion criteria met  ICU Mobility Scale: Standing                Encounter Problems       Encounter Problems (Active)       PT Problem       Patient will perform bed mobility with </= close sup to reduce risk of developing decubitus ulcers.        Start:  05/15/24    Expected End:  05/29/24            Patient will perform sit to stand and stand to sit transfers with </= close sup and LRD to increase functional strength.        Start:  05/15/24    Expected End:  05/29/24            Patient will ambulate at least 100  ft. with </= close sup and LRD to improve  tolerance of community distances.          Start:  05/15/24    Expected End:  05/29/24            Patient will ascend and descend 4 steps with railing and </= close sup to facilitate safe navigation of stairs in the home.          Start:  05/15/24    Expected End:  05/29/24            Patient will perform introductory home exercise program as prescribed without cues.         Start:  05/15/24    Expected End:  05/29/24               Pain - Adult              Education Documentation  Precautions, taught by Kayleen Rollins PT at 5/15/2024 10:59 AM.  Learner: Patient  Readiness: Acceptance  Method: Explanation  Response: Verbalizes Understanding    Body Mechanics, taught by Kayleen Rollins PT at 5/15/2024 10:59 AM.  Learner: Patient  Readiness: Acceptance  Method: Explanation  Response: Verbalizes Understanding    Mobility Training, taught by Kayleen Rollins PT at 5/15/2024 10:59 AM.  Learner: Patient  Readiness: Acceptance  Method: Explanation  Response: Verbalizes Understanding    Education Comments  No comments found.            05/15/24 at 10:59 AM   Kayleen Rollins PT   Rehab Office: [phone]

## 2024-05-16 LAB
ALBUMIN SERPL BCP-MCNC: 3 G/DL (ref 3.4–5)
ANION GAP SERPL CALC-SCNC: 8 MMOL/L (ref 10–20)
BLOOD EXPIRATION DATE: NORMAL
BUN SERPL-MCNC: 22 MG/DL (ref 6–23)
CALCIUM SERPL-MCNC: 7.7 MG/DL (ref 8.6–10.6)
CHLORIDE SERPL-SCNC: 106 MMOL/L (ref 98–107)
CO2 SERPL-SCNC: 28 MMOL/L (ref 21–32)
CREAT SERPL-MCNC: 0.75 MG/DL (ref 0.5–1.05)
DISPENSE STATUS: NORMAL
EGFRCR SERPLBLD CKD-EPI 2021: 85 ML/MIN/1.73M*2
ERYTHROCYTE [DISTWIDTH] IN BLOOD BY AUTOMATED COUNT: 15.1 % (ref 11.5–14.5)
ERYTHROCYTE [DISTWIDTH] IN BLOOD BY AUTOMATED COUNT: 15.8 % (ref 11.5–14.5)
GLUCOSE SERPL-MCNC: 119 MG/DL (ref 74–99)
HCT VFR BLD AUTO: 23.7 % (ref 36–46)
HCT VFR BLD AUTO: 27.9 % (ref 36–46)
HGB BLD-MCNC: 7.6 G/DL (ref 12–16)
HGB BLD-MCNC: 9 G/DL (ref 12–16)
MCH RBC QN AUTO: 30.4 PG (ref 26–34)
MCH RBC QN AUTO: 32.3 PG (ref 26–34)
MCHC RBC AUTO-ENTMCNC: 32.1 G/DL (ref 32–36)
MCHC RBC AUTO-ENTMCNC: 32.3 G/DL (ref 32–36)
MCV RBC AUTO: 100 FL (ref 80–100)
MCV RBC AUTO: 95 FL (ref 80–100)
NRBC BLD-RTO: 0 /100 WBCS (ref 0–0)
NRBC BLD-RTO: 0 /100 WBCS (ref 0–0)
PHOSPHATE SERPL-MCNC: 2.1 MG/DL (ref 2.5–4.9)
PLATELET # BLD AUTO: 111 X10*3/UL (ref 150–450)
PLATELET # BLD AUTO: 113 X10*3/UL (ref 150–450)
POTASSIUM SERPL-SCNC: 4.5 MMOL/L (ref 3.5–5.3)
PRODUCT BLOOD TYPE: 5100
PRODUCT CODE: NORMAL
RBC # BLD AUTO: 2.5 X10*6/UL (ref 4–5.2)
RBC # BLD AUTO: 2.79 X10*6/UL (ref 4–5.2)
SODIUM SERPL-SCNC: 137 MMOL/L (ref 136–145)
UNIT ABO: NORMAL
UNIT NUMBER: NORMAL
UNIT RH: NORMAL
UNIT VOLUME: 276
UNIT VOLUME: 300
UNIT VOLUME: 350
WBC # BLD AUTO: 9.2 X10*3/UL (ref 4.4–11.3)
WBC # BLD AUTO: 9.6 X10*3/UL (ref 4.4–11.3)
XM INTEP: NORMAL

## 2024-05-16 PROCEDURE — 36430 TRANSFUSION BLD/BLD COMPNT: CPT

## 2024-05-16 PROCEDURE — 2500000005 HC RX 250 GENERAL PHARMACY W/O HCPCS: Performed by: STUDENT IN AN ORGANIZED HEALTH CARE EDUCATION/TRAINING PROGRAM

## 2024-05-16 PROCEDURE — 2500000004 HC RX 250 GENERAL PHARMACY W/ HCPCS (ALT 636 FOR OP/ED): Performed by: STUDENT IN AN ORGANIZED HEALTH CARE EDUCATION/TRAINING PROGRAM

## 2024-05-16 PROCEDURE — 85027 COMPLETE CBC AUTOMATED: CPT | Performed by: STUDENT IN AN ORGANIZED HEALTH CARE EDUCATION/TRAINING PROGRAM

## 2024-05-16 PROCEDURE — 2500000001 HC RX 250 WO HCPCS SELF ADMINISTERED DRUGS (ALT 637 FOR MEDICARE OP): Performed by: NURSE PRACTITIONER

## 2024-05-16 PROCEDURE — 1100000001 HC PRIVATE ROOM DAILY

## 2024-05-16 PROCEDURE — 80069 RENAL FUNCTION PANEL: CPT | Performed by: STUDENT IN AN ORGANIZED HEALTH CARE EDUCATION/TRAINING PROGRAM

## 2024-05-16 PROCEDURE — 36415 COLL VENOUS BLD VENIPUNCTURE: CPT

## 2024-05-16 PROCEDURE — 2500000001 HC RX 250 WO HCPCS SELF ADMINISTERED DRUGS (ALT 637 FOR MEDICARE OP): Performed by: STUDENT IN AN ORGANIZED HEALTH CARE EDUCATION/TRAINING PROGRAM

## 2024-05-16 PROCEDURE — 97530 THERAPEUTIC ACTIVITIES: CPT | Mod: GP

## 2024-05-16 PROCEDURE — 85027 COMPLETE CBC AUTOMATED: CPT | Mod: 91

## 2024-05-16 PROCEDURE — 2500000004 HC RX 250 GENERAL PHARMACY W/ HCPCS (ALT 636 FOR OP/ED)

## 2024-05-16 PROCEDURE — 97110 THERAPEUTIC EXERCISES: CPT | Mod: GP

## 2024-05-16 PROCEDURE — P9016 RBC LEUKOCYTES REDUCED: HCPCS

## 2024-05-16 RX ORDER — OXYCODONE AND ACETAMINOPHEN 10; 325 MG/1; MG/1
2 TABLET ORAL EVERY 4 HOURS PRN
Status: DISCONTINUED | OUTPATIENT
Start: 2024-05-16 | End: 2024-05-16 | Stop reason: CLARIF

## 2024-05-16 RX ORDER — OXYCODONE HYDROCHLORIDE 5 MG/1
10 TABLET ORAL EVERY 4 HOURS PRN
Status: DISCONTINUED | OUTPATIENT
Start: 2024-05-16 | End: 2024-05-16

## 2024-05-16 RX ORDER — BETHANECHOL CHLORIDE 10 MG/1
5 TABLET ORAL 3 TIMES DAILY
Status: DISCONTINUED | OUTPATIENT
Start: 2024-05-16 | End: 2024-05-22 | Stop reason: HOSPADM

## 2024-05-16 RX ORDER — OXYCODONE HYDROCHLORIDE 5 MG/1
5 TABLET ORAL EVERY 4 HOURS PRN
Status: DISCONTINUED | OUTPATIENT
Start: 2024-05-16 | End: 2024-05-16

## 2024-05-16 RX ORDER — OXYCODONE AND ACETAMINOPHEN 5; 325 MG/1; MG/1
1 TABLET ORAL EVERY 4 HOURS PRN
Status: DISCONTINUED | OUTPATIENT
Start: 2024-05-16 | End: 2024-05-20

## 2024-05-16 RX ORDER — OXYCODONE AND ACETAMINOPHEN 10; 325 MG/1; MG/1
2 TABLET ORAL EVERY 6 HOURS PRN
Status: DISCONTINUED | OUTPATIENT
Start: 2024-05-16 | End: 2024-05-16

## 2024-05-16 RX ORDER — TALC
3 POWDER (GRAM) TOPICAL DAILY
Status: DISCONTINUED | OUTPATIENT
Start: 2024-05-16 | End: 2024-05-22 | Stop reason: HOSPADM

## 2024-05-16 RX ORDER — OXYCODONE HYDROCHLORIDE 5 MG/1
5 TABLET ORAL EVERY 4 HOURS PRN
Status: DISCONTINUED | OUTPATIENT
Start: 2024-05-16 | End: 2024-05-20

## 2024-05-16 RX ORDER — DIPHENHYDRAMINE HYDROCHLORIDE 50 MG/ML
25 INJECTION INTRAMUSCULAR; INTRAVENOUS EVERY 6 HOURS PRN
Status: DISCONTINUED | OUTPATIENT
Start: 2024-05-16 | End: 2024-05-22 | Stop reason: HOSPADM

## 2024-05-16 RX ORDER — HYDROMORPHONE HYDROCHLORIDE 1 MG/ML
0.2 INJECTION, SOLUTION INTRAMUSCULAR; INTRAVENOUS; SUBCUTANEOUS
Status: DISCONTINUED | OUTPATIENT
Start: 2024-05-16 | End: 2024-05-16

## 2024-05-16 RX ORDER — SODIUM CHLORIDE, SODIUM LACTATE, POTASSIUM CHLORIDE, CALCIUM CHLORIDE 600; 310; 30; 20 MG/100ML; MG/100ML; MG/100ML; MG/100ML
100 INJECTION, SOLUTION INTRAVENOUS CONTINUOUS
Status: DISCONTINUED | OUTPATIENT
Start: 2024-05-16 | End: 2024-05-17

## 2024-05-16 RX ORDER — HYDROMORPHONE HYDROCHLORIDE 1 MG/ML
0.2 INJECTION, SOLUTION INTRAMUSCULAR; INTRAVENOUS; SUBCUTANEOUS
Status: DISCONTINUED | OUTPATIENT
Start: 2024-05-16 | End: 2024-05-17

## 2024-05-16 RX ADMIN — HEPARIN SODIUM 5000 UNITS: 5000 INJECTION INTRAVENOUS; SUBCUTANEOUS at 01:57

## 2024-05-16 RX ADMIN — BETHANECHOL CHLORIDE 5 MG: 10 TABLET ORAL at 17:57

## 2024-05-16 RX ADMIN — ACETAMINOPHEN 650 MG: 325 TABLET ORAL at 11:37

## 2024-05-16 RX ADMIN — CYCLOBENZAPRINE 10 MG: 10 TABLET, FILM COATED ORAL at 16:23

## 2024-05-16 RX ADMIN — ACETAMINOPHEN 650 MG: 325 TABLET ORAL at 23:34

## 2024-05-16 RX ADMIN — HEPARIN SODIUM 5000 UNITS: 5000 INJECTION INTRAVENOUS; SUBCUTANEOUS at 23:34

## 2024-05-16 RX ADMIN — ACETAMINOPHEN 650 MG: 325 TABLET ORAL at 16:22

## 2024-05-16 RX ADMIN — SODIUM CHLORIDE, POTASSIUM CHLORIDE, SODIUM LACTATE AND CALCIUM CHLORIDE 100 ML/HR: 600; 310; 30; 20 INJECTION, SOLUTION INTRAVENOUS at 18:00

## 2024-05-16 RX ADMIN — HEPARIN SODIUM 5000 UNITS: 5000 INJECTION INTRAVENOUS; SUBCUTANEOUS at 08:00

## 2024-05-16 RX ADMIN — DIBASIC SODIUM PHOSPHATE, MONOBASIC POTASSIUM PHOSPHATE AND MONOBASIC SODIUM PHOSPHATE 250 MG: 852; 155; 130 TABLET ORAL at 20:29

## 2024-05-16 RX ADMIN — ACETAMINOPHEN 650 MG: 325 TABLET ORAL at 00:14

## 2024-05-16 RX ADMIN — KETOROLAC TROMETHAMINE 15 MG: 30 INJECTION, SOLUTION INTRAMUSCULAR at 01:53

## 2024-05-16 RX ADMIN — OXYCODONE HYDROCHLORIDE AND ACETAMINOPHEN 1 TABLET: 5; 325 TABLET ORAL at 17:57

## 2024-05-16 RX ADMIN — POLYETHYLENE GLYCOL 3350 17 G: 17 POWDER, FOR SOLUTION ORAL at 09:00

## 2024-05-16 RX ADMIN — HEPARIN SODIUM 5000 UNITS: 5000 INJECTION INTRAVENOUS; SUBCUTANEOUS at 16:23

## 2024-05-16 RX ADMIN — LIDOCAINE 2 PATCH: 4 PATCH TOPICAL at 09:00

## 2024-05-16 RX ADMIN — CYCLOBENZAPRINE 10 MG: 10 TABLET, FILM COATED ORAL at 20:29

## 2024-05-16 RX ADMIN — CYCLOBENZAPRINE 10 MG: 10 TABLET, FILM COATED ORAL at 09:00

## 2024-05-16 RX ADMIN — Medication 3 MG: at 20:28

## 2024-05-16 RX ADMIN — ACETAMINOPHEN 650 MG: 325 TABLET ORAL at 06:50

## 2024-05-16 ASSESSMENT — COGNITIVE AND FUNCTIONAL STATUS - GENERAL
DAILY ACTIVITIY SCORE: 15
STANDING UP FROM CHAIR USING ARMS: A LOT
TOILETING: A LOT
HELP NEEDED FOR BATHING: A LOT
MOBILITY SCORE: 11
WALKING IN HOSPITAL ROOM: TOTAL
TURNING FROM BACK TO SIDE WHILE IN FLAT BAD: A LOT
STANDING UP FROM CHAIR USING ARMS: TOTAL
DAILY ACTIVITIY SCORE: 16
DRESSING REGULAR UPPER BODY CLOTHING: A LITTLE
TURNING FROM BACK TO SIDE WHILE IN FLAT BAD: A LOT
MOVING FROM LYING ON BACK TO SITTING ON SIDE OF FLAT BED WITH BEDRAILS: A LOT
PERSONAL GROOMING: A LITTLE
MOVING FROM LYING ON BACK TO SITTING ON SIDE OF FLAT BED WITH BEDRAILS: A LITTLE
WALKING IN HOSPITAL ROOM: TOTAL
MOBILITY SCORE: 8
WALKING IN HOSPITAL ROOM: TOTAL
STANDING UP FROM CHAIR USING ARMS: A LOT
TURNING FROM BACK TO SIDE WHILE IN FLAT BAD: A LOT
CLIMB 3 TO 5 STEPS WITH RAILING: TOTAL
HELP NEEDED FOR BATHING: A LOT
MOBILITY SCORE: 9
CLIMB 3 TO 5 STEPS WITH RAILING: TOTAL
TOILETING: A LOT
MOBILITY SCORE: 11
MOVING FROM LYING ON BACK TO SITTING ON SIDE OF FLAT BED WITH BEDRAILS: A LITTLE
PERSONAL GROOMING: A LITTLE
DRESSING REGULAR LOWER BODY CLOTHING: A LOT
CLIMB 3 TO 5 STEPS WITH RAILING: TOTAL
CLIMB 3 TO 5 STEPS WITH RAILING: TOTAL
TURNING FROM BACK TO SIDE WHILE IN FLAT BAD: A LOT
MOVING TO AND FROM BED TO CHAIR: A LOT
MOVING TO AND FROM BED TO CHAIR: A LOT
MOVING TO AND FROM BED TO CHAIR: TOTAL
WALKING IN HOSPITAL ROOM: TOTAL
DRESSING REGULAR UPPER BODY CLOTHING: A LOT
MOVING TO AND FROM BED TO CHAIR: A LOT
DRESSING REGULAR LOWER BODY CLOTHING: A LOT
STANDING UP FROM CHAIR USING ARMS: TOTAL
MOVING FROM LYING ON BACK TO SITTING ON SIDE OF FLAT BED WITH BEDRAILS: A LOT

## 2024-05-16 ASSESSMENT — PAIN DESCRIPTION - LOCATION
LOCATION: HEAD
LOCATION: BACK

## 2024-05-16 ASSESSMENT — PAIN - FUNCTIONAL ASSESSMENT
PAIN_FUNCTIONAL_ASSESSMENT: 0-10

## 2024-05-16 ASSESSMENT — PAIN SCALES - GENERAL
PAINLEVEL_OUTOF10: 6
PAINLEVEL_OUTOF10: 3
PAINLEVEL_OUTOF10: 5 - MODERATE PAIN
PAINLEVEL_OUTOF10: 8
PAINLEVEL_OUTOF10: 4
PAINLEVEL_OUTOF10: 5 - MODERATE PAIN
PAINLEVEL_OUTOF10: 4
PAINLEVEL_OUTOF10: 9
PAINLEVEL_OUTOF10: 7

## 2024-05-16 NOTE — NURSING NOTE
Today at 1110 pt c/o severe abdm pain 10/10 stating she felt like she needed to void. Pt voided on bedpan earlier today at 0855 for an output of 20mL. RN placed pt on bedpan since she was unable to get to BSC with astx2. Pt did not void at this time. RN asked pt to be bladder scanned and she refused. Pt stated to place gray in her. Pt stated she was straight cathed yesterday and did not want that again. NP Noelle Son  made aware of situation. RN educated pt on the increased risk of infection inserting a gray. Pt stated understanding. NP placed order for gray catheter.

## 2024-05-16 NOTE — CARE PLAN
The patient's goals for the shift include      The clinical goals for the shift include Pt will maintain hemodynamic stability throughout this shift        Problem: Pain - Adult  Goal: Verbalizes/displays adequate comfort level or baseline comfort level  Outcome: Progressing     Problem: Safety - Adult  Goal: Free from fall injury  Outcome: Progressing     Problem: Discharge Planning  Goal: Discharge to home or other facility with appropriate resources  Outcome: Progressing     Problem: Chronic Conditions and Co-morbidities  Goal: Patient's chronic conditions and co-morbidity symptoms are monitored and maintained or improved  Outcome: Progressing     Problem: Skin  Goal: Decreased wound size/increased tissue granulation at next dressing change  Outcome: Progressing  Goal: Participates in plan/prevention/treatment measures  Outcome: Progressing  Goal: Prevent/manage excess moisture  Outcome: Progressing  Goal: Prevent/minimize sheer/friction injuries  Outcome: Progressing  Goal: Promote/optimize nutrition  Outcome: Progressing  Goal: Promote skin healing  Outcome: Progressing

## 2024-05-16 NOTE — PROGRESS NOTES
"Khalida Barker is a 73 y.o. female on day 2 of admission presenting with Thoracogenic scoliosis of thoracolumbar region.    Subjective   Patient had to have gray replaced    Objective     Physical Exam  Ox3  RLE DF4 ow 5  LLE 5  LLE radiculopathy    Last Recorded Vitals  Blood pressure 89/56, pulse 88, temperature 36.5 °C (97.7 °F), temperature source Temporal, resp. rate 20, height 1.651 m (5' 5\"), weight 95.4 kg (210 lb 5.1 oz), SpO2 96%.  Intake/Output last 3 Shifts:  I/O last 3 completed shifts:  In: 5604.5 (58.7 mL/kg) [P.O.:250; I.V.:2175.4 (22.8 mL/kg); Blood:54.2; IV Piggyback:3125]  Out: 4875 (51.1 mL/kg) [Urine:1445 (0.4 mL/kg/hr); Drains:925; Blood:2505]  Weight: 95.4 kg     Relevant Results      Assessment/Plan   Principal Problem:    Thoracogenic scoliosis of thoracolumbar region  Active Problems:    Scoliosis deformity of spine    H/o severe LBP and LLE radiculopathy and scoliosis 5/14 s/p [id number], multi-level PCOs, L2-S1 TLIF, s/p 1U PRBCs    PLAN    Tele  Continue void trial  Drains x2  Labs  1u pRBC, f/u post-transfusion CBC   PTOT  SCDs/SQH             Jos Cartagena MD      "

## 2024-05-16 NOTE — PROGRESS NOTES
Physical Therapy    Physical Therapy Treatment    Patient Name: Khalida Barker  MRN: [id number]  Today's Date: 5/16/2024  Time Calculation  Start Time: 1458  Stop Time: 1522  Time Calculation (min): 24 min    Assessment/Plan   PT Assessment  End of Session Communication: Bedside nurse  Assessment Comment: Patient tolerated session well but remains below baseline. pt able to progress to taking steps this date. continue to rec high  End of Session Patient Position: Bed, 3 rail up, Alarm off, not on at start of session (chair position)     PT Plan  Treatment/Interventions: Bed mobility, Transfer training, Gait training, Stair training, Balance training, Neuromuscular re-education, Strengthening, Endurance training, Range of motion, Therapeutic exercise, Therapeutic activity, Home exercise program, Postural re-education  PT Plan: Skilled PT  PT Frequency: Daily  PT Discharge Recommendations: High intensity level of continued care  Equipment Recommended upon Discharge: Wheeled walker  PT Recommended Transfer Status: Assist x2  PT - OK to Discharge: Yes (When medically ready.)      General Visit Information:   PT  Visit  PT Received On: 05/16/24  Response to Previous Treatment: Patient with no complaints from previous session.  General  Prior to Session Communication: Bedside nurse  Patient Position Received: Alarm off, not on at start of session (sitting EOB)  General Comment: pt sitting EOB, agreeable to PT. RN cleared    Subjective   Precautions:  Precautions  Medical Precautions: Fall precautions  Post-Surgical Precautions: Spinal precautions      Objective   Pain:  Pain Assessment  Pain Assessment: 0-10  Pain Score: 7  Pain Type: Surgical pain, Acute pain  Pain Location: Generalized  Cognition:  Cognition  Overall Cognitive Status: Within Functional Limits  Orientation Level: Oriented X4    Activity Tolerance:  Activity Tolerance  Endurance: Tolerates 10 - 20 min exercise with multiple rests  Treatments:  Therapeutic  Exercise  Therapeutic Exercise Performed: Yes  Therapeutic Exercise Activity 1: sitting EOB x10: ankle pumps and LAQs. bilaterally    Therapeutic Activity  Therapeutic Activity Performed: Yes  Therapeutic Activity 1: pt  completed 2 stands from EOB, mod A. lateral steps along EOB, with seated rest break between    Bed Mobility  Bed Mobility: Yes  Bed Mobility 1  Bed Mobility 1: Sitting to supine, Log roll  Level of Assistance 1: Moderate assistance  Bed Mobility Comments 1: cues for log roll, assist with LEs    Ambulation/Gait Training  Ambulation/Gait Training Performed: Yes  Ambulation/Gait Training 1  Surface 1: Level tile  Device 1: Rolling walker  Assistance 1: Moderate assistance  Quality of Gait 1: Shuffling gait, Decreased step length, Diminished heel strike  Comments/Distance (ft) 1: difficulty clearing foot from floor, 2 lateral steps along EOB, seated rest break and 2 more lateral steps.  Transfers  Transfer: Yes  Transfer 1  Transfer From 1: Stand to, Sit to  Transfer to 1: Stand, Sit  Technique 1: Stand to sit, Sit to stand  Transfer Level of Assistance 1: Moderate assistance  Trials/Comments 1: pt stood from EOB with FWW, cues for hand placement for transfer. bed height elevated.    Outcome Measures:  [facility] Basic Mobility  Turning from your back to your side while in a flat bed without using bedrails: A little  Moving from lying on your back to sitting on the side of a flat bed without using bedrails: A lot  Moving to and from bed to chair (including a wheelchair): A lot  Standing up from a chair using your arms (e.g. wheelchair or bedside chair): A lot  To walk in hospital room: Total  Climbing 3-5 steps with railing: Total  Basic Mobility - Total Score: 11    Education Documentation  Precautions, taught by Khalida Clayton PT at 5/16/2024  3:26 PM.  Learner: Patient  Readiness: Acceptance  Method: Explanation  Response: Verbalizes Understanding  Comment: edu on importance of OOB, role of PT, dc  plan    Body Mechanics, taught by Khalida Clayton PT at 5/16/2024  3:26 PM.  Learner: Patient  Readiness: Acceptance  Method: Explanation  Response: Verbalizes Understanding  Comment: edu on importance of OOB, role of PT, dc plan    Mobility Training, taught by Khalida Clayton PT at 5/16/2024  3:26 PM.  Learner: Patient  Readiness: Acceptance  Method: Explanation  Response: Verbalizes Understanding  Comment: edu on importance of OOB, role of PT, dc plan    Education Comments  No comments found.        OP EDUCATION:       Encounter Problems       Encounter Problems (Active)       PT Problem       Patient will perform bed mobility with </= close sup to reduce risk of developing decubitus ulcers.  (Progressing)       Start:  05/15/24    Expected End:  05/29/24            Patient will perform sit to stand and stand to sit transfers with </= close sup and LRD to increase functional strength.  (Progressing)       Start:  05/15/24    Expected End:  05/29/24            Patient will ambulate at least 100  ft. with </= close sup and LRD to improve tolerance of community distances.    (Progressing)       Start:  05/15/24    Expected End:  05/29/24            Patient will ascend and descend 4 steps with railing and </= close sup to facilitate safe navigation of stairs in the home.    (Progressing)       Start:  05/15/24    Expected End:  05/29/24            Patient will perform introductory home exercise program as prescribed without cues.   (Progressing)       Start:  05/15/24    Expected End:  05/29/24               Pain - Adult

## 2024-05-17 LAB
ALBUMIN SERPL BCP-MCNC: 2.8 G/DL (ref 3.4–5)
ANION GAP SERPL CALC-SCNC: 11 MMOL/L (ref 10–20)
ATRIAL RATE: 98 BPM
BUN SERPL-MCNC: 10 MG/DL (ref 6–23)
CALCIUM SERPL-MCNC: 7.9 MG/DL (ref 8.6–10.6)
CHLORIDE SERPL-SCNC: 105 MMOL/L (ref 98–107)
CO2 SERPL-SCNC: 28 MMOL/L (ref 21–32)
CREAT SERPL-MCNC: 0.57 MG/DL (ref 0.5–1.05)
EGFRCR SERPLBLD CKD-EPI 2021: >90 ML/MIN/1.73M*2
ERYTHROCYTE [DISTWIDTH] IN BLOOD BY AUTOMATED COUNT: 15 % (ref 11.5–14.5)
GLUCOSE BLD MANUAL STRIP-MCNC: 142 MG/DL (ref 74–99)
GLUCOSE SERPL-MCNC: 139 MG/DL (ref 74–99)
HCT VFR BLD AUTO: 24.8 % (ref 36–46)
HGB BLD-MCNC: 8.1 G/DL (ref 12–16)
MCH RBC QN AUTO: 31.8 PG (ref 26–34)
MCHC RBC AUTO-ENTMCNC: 32.7 G/DL (ref 32–36)
MCV RBC AUTO: 97 FL (ref 80–100)
NRBC BLD-RTO: 0 /100 WBCS (ref 0–0)
P AXIS: 45 DEGREES
P OFFSET: 197 MS
P ONSET: 145 MS
PHOSPHATE SERPL-MCNC: 2 MG/DL (ref 2.5–4.9)
PLATELET # BLD AUTO: 118 X10*3/UL (ref 150–450)
POTASSIUM SERPL-SCNC: 3.7 MMOL/L (ref 3.5–5.3)
PR INTERVAL: 140 MS
Q ONSET: 215 MS
QRS COUNT: 16 BEATS
QRS DURATION: 90 MS
QT INTERVAL: 376 MS
QTC CALCULATION(BAZETT): 480 MS
QTC FREDERICIA: 443 MS
R AXIS: 6 DEGREES
RBC # BLD AUTO: 2.55 X10*6/UL (ref 4–5.2)
SODIUM SERPL-SCNC: 140 MMOL/L (ref 136–145)
T AXIS: 34 DEGREES
T OFFSET: 403 MS
VENTRICULAR RATE: 98 BPM
WBC # BLD AUTO: 6.4 X10*3/UL (ref 4.4–11.3)

## 2024-05-17 PROCEDURE — 1100000001 HC PRIVATE ROOM DAILY

## 2024-05-17 PROCEDURE — 82947 ASSAY GLUCOSE BLOOD QUANT: CPT

## 2024-05-17 PROCEDURE — 2500000001 HC RX 250 WO HCPCS SELF ADMINISTERED DRUGS (ALT 637 FOR MEDICARE OP): Performed by: NURSE PRACTITIONER

## 2024-05-17 PROCEDURE — 97530 THERAPEUTIC ACTIVITIES: CPT | Mod: GP,CQ

## 2024-05-17 PROCEDURE — 2500000001 HC RX 250 WO HCPCS SELF ADMINISTERED DRUGS (ALT 637 FOR MEDICARE OP): Performed by: PHYSICIAN ASSISTANT

## 2024-05-17 PROCEDURE — 80069 RENAL FUNCTION PANEL: CPT | Performed by: STUDENT IN AN ORGANIZED HEALTH CARE EDUCATION/TRAINING PROGRAM

## 2024-05-17 PROCEDURE — 2500000001 HC RX 250 WO HCPCS SELF ADMINISTERED DRUGS (ALT 637 FOR MEDICARE OP): Performed by: STUDENT IN AN ORGANIZED HEALTH CARE EDUCATION/TRAINING PROGRAM

## 2024-05-17 PROCEDURE — 2500000004 HC RX 250 GENERAL PHARMACY W/ HCPCS (ALT 636 FOR OP/ED): Performed by: STUDENT IN AN ORGANIZED HEALTH CARE EDUCATION/TRAINING PROGRAM

## 2024-05-17 PROCEDURE — 97110 THERAPEUTIC EXERCISES: CPT | Mod: GP,CQ

## 2024-05-17 PROCEDURE — 36415 COLL VENOUS BLD VENIPUNCTURE: CPT | Performed by: STUDENT IN AN ORGANIZED HEALTH CARE EDUCATION/TRAINING PROGRAM

## 2024-05-17 PROCEDURE — 2500000005 HC RX 250 GENERAL PHARMACY W/O HCPCS: Performed by: STUDENT IN AN ORGANIZED HEALTH CARE EDUCATION/TRAINING PROGRAM

## 2024-05-17 PROCEDURE — 85027 COMPLETE CBC AUTOMATED: CPT | Performed by: STUDENT IN AN ORGANIZED HEALTH CARE EDUCATION/TRAINING PROGRAM

## 2024-05-17 RX ORDER — POLYETHYLENE GLYCOL 3350 17 G/17G
17 POWDER, FOR SOLUTION ORAL DAILY
Status: CANCELLED
Start: 2024-05-18

## 2024-05-17 RX ORDER — INSULIN LISPRO 100 [IU]/ML
0-5 INJECTION, SOLUTION INTRAVENOUS; SUBCUTANEOUS
Status: DISCONTINUED | OUTPATIENT
Start: 2024-05-17 | End: 2024-05-22 | Stop reason: HOSPADM

## 2024-05-17 RX ORDER — AMOXICILLIN 250 MG
1 CAPSULE ORAL NIGHTLY
Status: DISCONTINUED | OUTPATIENT
Start: 2024-05-17 | End: 2024-05-20

## 2024-05-17 RX ORDER — PREGABALIN 50 MG/1
50 CAPSULE ORAL NIGHTLY
Status: DISCONTINUED | OUTPATIENT
Start: 2024-05-17 | End: 2024-05-22 | Stop reason: HOSPADM

## 2024-05-17 RX ORDER — PREGABALIN 50 MG/1
50 CAPSULE ORAL 2 TIMES DAILY
Status: DISCONTINUED | OUTPATIENT
Start: 2024-05-17 | End: 2024-05-17

## 2024-05-17 RX ORDER — AMOXICILLIN 250 MG
1 CAPSULE ORAL NIGHTLY
Status: CANCELLED
Start: 2024-05-17

## 2024-05-17 RX ORDER — OXYCODONE AND ACETAMINOPHEN 5; 325 MG/1; MG/1
1 TABLET ORAL EVERY 6 HOURS PRN
Status: CANCELLED
Start: 2024-05-17

## 2024-05-17 RX ORDER — PANTOPRAZOLE SODIUM 40 MG/1
40 TABLET, DELAYED RELEASE ORAL
Status: DISCONTINUED | OUTPATIENT
Start: 2024-05-18 | End: 2024-05-22 | Stop reason: HOSPADM

## 2024-05-17 RX ADMIN — CYCLOBENZAPRINE 10 MG: 10 TABLET, FILM COATED ORAL at 08:22

## 2024-05-17 RX ADMIN — ACETAMINOPHEN 650 MG: 325 TABLET ORAL at 21:06

## 2024-05-17 RX ADMIN — PREGABALIN 50 MG: 50 CAPSULE ORAL at 21:06

## 2024-05-17 RX ADMIN — DEXAMETHASONE SODIUM PHOSPHATE 10 MG: 4 INJECTION INTRA-ARTICULAR; INTRALESIONAL; INTRAMUSCULAR; INTRAVENOUS; SOFT TISSUE at 12:54

## 2024-05-17 RX ADMIN — BETHANECHOL CHLORIDE 5 MG: 10 TABLET ORAL at 01:52

## 2024-05-17 RX ADMIN — BETHANECHOL CHLORIDE 5 MG: 10 TABLET ORAL at 16:36

## 2024-05-17 RX ADMIN — ACETAMINOPHEN 650 MG: 325 TABLET ORAL at 05:22

## 2024-05-17 RX ADMIN — DIBASIC SODIUM PHOSPHATE, MONOBASIC POTASSIUM PHOSPHATE AND MONOBASIC SODIUM PHOSPHATE 250 MG: 852; 155; 130 TABLET ORAL at 12:55

## 2024-05-17 RX ADMIN — BETHANECHOL CHLORIDE 5 MG: 10 TABLET ORAL at 08:22

## 2024-05-17 RX ADMIN — OXYCODONE HYDROCHLORIDE 5 MG: 5 TABLET ORAL at 04:26

## 2024-05-17 RX ADMIN — SENNOSIDES AND DOCUSATE SODIUM 1 TABLET: 50; 8.6 TABLET ORAL at 21:06

## 2024-05-17 RX ADMIN — CYCLOBENZAPRINE 10 MG: 10 TABLET, FILM COATED ORAL at 21:06

## 2024-05-17 RX ADMIN — HEPARIN SODIUM 5000 UNITS: 5000 INJECTION INTRAVENOUS; SUBCUTANEOUS at 08:22

## 2024-05-17 RX ADMIN — BETHANECHOL CHLORIDE 5 MG: 10 TABLET ORAL at 21:06

## 2024-05-17 RX ADMIN — POLYETHYLENE GLYCOL 3350 17 G: 17 POWDER, FOR SOLUTION ORAL at 08:22

## 2024-05-17 RX ADMIN — CYCLOBENZAPRINE 10 MG: 10 TABLET, FILM COATED ORAL at 16:34

## 2024-05-17 RX ADMIN — DIBASIC SODIUM PHOSPHATE, MONOBASIC POTASSIUM PHOSPHATE AND MONOBASIC SODIUM PHOSPHATE 250 MG: 852; 155; 130 TABLET ORAL at 16:34

## 2024-05-17 RX ADMIN — ACETAMINOPHEN 650 MG: 325 TABLET ORAL at 12:54

## 2024-05-17 RX ADMIN — LIDOCAINE 2 PATCH: 4 PATCH TOPICAL at 08:24

## 2024-05-17 RX ADMIN — Medication 3 MG: at 21:06

## 2024-05-17 RX ADMIN — DEXAMETHASONE SODIUM PHOSPHATE 2 MG: 4 INJECTION, SOLUTION INTRA-ARTICULAR; INTRALESIONAL; INTRAMUSCULAR; INTRAVENOUS; SOFT TISSUE at 21:06

## 2024-05-17 RX ADMIN — HEPARIN SODIUM 5000 UNITS: 5000 INJECTION INTRAVENOUS; SUBCUTANEOUS at 16:42

## 2024-05-17 ASSESSMENT — COGNITIVE AND FUNCTIONAL STATUS - GENERAL
CLIMB 3 TO 5 STEPS WITH RAILING: TOTAL
MOBILITY SCORE: 11
MOVING FROM LYING ON BACK TO SITTING ON SIDE OF FLAT BED WITH BEDRAILS: A LITTLE
STANDING UP FROM CHAIR USING ARMS: A LOT
DRESSING REGULAR LOWER BODY CLOTHING: A LOT
DAILY ACTIVITIY SCORE: 15
MOBILITY SCORE: 11
TURNING FROM BACK TO SIDE WHILE IN FLAT BAD: A LOT
TURNING FROM BACK TO SIDE WHILE IN FLAT BAD: A LOT
MOVING TO AND FROM BED TO CHAIR: A LOT
STANDING UP FROM CHAIR USING ARMS: A LOT
HELP NEEDED FOR BATHING: A LOT
DRESSING REGULAR UPPER BODY CLOTHING: A LITTLE
CLIMB 3 TO 5 STEPS WITH RAILING: TOTAL
MOVING TO AND FROM BED TO CHAIR: A LOT
WALKING IN HOSPITAL ROOM: TOTAL
CLIMB 3 TO 5 STEPS WITH RAILING: TOTAL
MOBILITY SCORE: 11
STANDING UP FROM CHAIR USING ARMS: A LOT
TOILETING: A LOT
EATING MEALS: A LITTLE
WALKING IN HOSPITAL ROOM: TOTAL
MOVING FROM LYING ON BACK TO SITTING ON SIDE OF FLAT BED WITH BEDRAILS: A LITTLE
MOVING TO AND FROM BED TO CHAIR: A LOT
PERSONAL GROOMING: A LITTLE
WALKING IN HOSPITAL ROOM: TOTAL
MOVING FROM LYING ON BACK TO SITTING ON SIDE OF FLAT BED WITH BEDRAILS: A LITTLE
TURNING FROM BACK TO SIDE WHILE IN FLAT BAD: A LOT

## 2024-05-17 ASSESSMENT — PAIN - FUNCTIONAL ASSESSMENT
PAIN_FUNCTIONAL_ASSESSMENT: 0-10

## 2024-05-17 ASSESSMENT — PAIN SCALES - GENERAL
PAINLEVEL_OUTOF10: 5 - MODERATE PAIN
PAINLEVEL_OUTOF10: 5 - MODERATE PAIN
PAINLEVEL_OUTOF10: 7
PAINLEVEL_OUTOF10: 4
PAINLEVEL_OUTOF10: 7

## 2024-05-17 NOTE — PROGRESS NOTES
Physical Therapy    Physical Therapy Treatment    Patient Name: Khalida Barker  MRN: [id number]  Today's Date: 5/17/2024  Time Calculation  Start Time: 1113  Stop Time: 1151  Time Calculation (min): 38 min    Assessment/Plan   PT Assessment  End of Session Communication: Bedside nurse  Assessment Comment: .  End of Session Patient Position: Bed, 3 rail up, Alarm off, not on at start of session     PT Plan  Treatment/Interventions: Bed mobility, Transfer training, Gait training, Stair training, Balance training, Neuromuscular re-education, Strengthening, Endurance training, Range of motion, Therapeutic exercise, Therapeutic activity, Home exercise program, Postural re-education  PT Plan: Skilled PT  PT Frequency: Daily  PT Discharge Recommendations: High intensity level of continued care  Equipment Recommended upon Discharge: Wheeled walker  PT Recommended Transfer Status: Assist x2  PT - OK to Discharge: Yes (When medically ready.)      General Visit Information:   PT  Visit  PT Received On: 05/17/24  General  Prior to Session Communication: Bedside nurse  Patient Position Received: Alarm off, not on at start of session  General Comment: pt sitting EOB, agreeable to PT.    Subjective   Precautions:  Precautions  Medical Precautions: Fall precautions  Post-Surgical Precautions: Spinal precautions  Vital Signs:       Objective   Pain:  Pain Assessment  Pain Score: 5 - Moderate pain  Pain Interventions: Relaxation technique  Cognition:  Cognition  Orientation Level: Oriented X4       Treatments:  Therapeutic Exercise  Therapeutic Exercise Activity 1: supine heel slides AAROM x10 reps  Therapeutic Exercise Activity 2: sitting AP and LAQ x10 reps AROM    Bed Mobility 1  Bed Mobility 1: Sitting to supine, Log roll  Level of Assistance 1: Moderate assistance  Bed Mobility Comments 1: cues to maintain spnal precautions    Ambulation/Gait Training 1  Surface 1: Level tile  Device 1: Rolling walker  Assistance 1: Maximum  assistance  Comments/Distance (ft) 1: pt unable to advance LEs during attempts for for and for lateral steps requiring max assist to maintain balance and advance LEs 1'  Transfer 1  Technique 1: Stand to sit, Sit to stand  Transfer Device 1: Walker  Transfer Level of Assistance 1: Minimum assistance  Trials/Comments 1: x3 trials. pt requiring mod assist to maintain balance on 3rd trial         Outcome Measures:  [facility] Basic Mobility  Turning from your back to your side while in a flat bed without using bedrails: A little  Moving from lying on your back to sitting on the side of a flat bed without using bedrails: A lot  Moving to and from bed to chair (including a wheelchair): A lot  Standing up from a chair using your arms (e.g. wheelchair or bedside chair): A lot  To walk in hospital room: Total  Climbing 3-5 steps with railing: Total  Basic Mobility - Total Score: 11    Education Documentation  Precautions, taught by Tanmay Veronica PTA at 5/17/2024 12:05 PM.  Learner: Patient  Readiness: Acceptance  Method: Explanation  Response: Verbalizes Understanding    Body Mechanics, taught by Tanmay Veronica PTA at 5/17/2024 12:05 PM.  Learner: Patient  Readiness: Acceptance  Method: Explanation  Response: Verbalizes Understanding    Mobility Training, taught by Tanmay Veronica PTA at 5/17/2024 12:05 PM.  Learner: Patient  Readiness: Acceptance  Method: Explanation  Response: Verbalizes Understanding    Education Comments  No comments found.        OP EDUCATION:       Encounter Problems       Encounter Problems (Active)       PT Problem       Patient will perform bed mobility with </= close sup to reduce risk of developing decubitus ulcers.  (Progressing)       Start:  05/15/24    Expected End:  05/29/24            Patient will perform sit to stand and stand to sit transfers with </= close sup and LRD to increase functional strength.  (Progressing)       Start:  05/15/24    Expected End:  05/29/24            Patient  will ambulate at least 100  ft. with </= close sup and LRD to improve tolerance of community distances.    (Progressing)       Start:  05/15/24    Expected End:  05/29/24            Patient will ascend and descend 4 steps with railing and </= close sup to facilitate safe navigation of stairs in the home.    (Progressing)       Start:  05/15/24    Expected End:  05/29/24            Patient will perform introductory home exercise program as prescribed without cues.   (Progressing)       Start:  05/15/24    Expected End:  05/29/24               Pain - Adult

## 2024-05-17 NOTE — HOSPITAL COURSE
Khalida Barker is a 73 y.o. female with a past medical history of severe low back pain and LLE radiculopathy and scoliosis who was taken to the OR on 5/14 for T10-pelvis fusion, multilevel PCOs, and L2-S1 TLIF. Required 1 unit PRBC transfusion directly post op. 5/15 Hbg 7 for which patient given 2 units PRBCs. 5/16 Hbg 7.6 for which patient given 1 additional unit PRBC with post transfusion CBC appropriate increment of Hbg to 9.0. On 5/17 surgical drain auto DC'd and additional drain removed 5/19. Mcmahon replaced 5/16 due to failed trial of void. Attempted trial of void additionally completed 5/19 with ultimate replacement of Mcmahon due to continued urinary retention. Patient without BM x7 days despite substantial bowel regimen, KUB obtained 5/21 with concern for ileus , with extensive bowel regimen patient had multiple bowel movement 5/21-5/22. PT/OT evaluated patient and recommended high intensity level of continued care for which a referral was placed for acute rehab. On the day of discharge, the patient was seen and evaluated by the neurosurgery team and deemed suitable for discharge to acute rehab. On day of discharge the patient was tolerating a diet, pain was controlled on PO pain medication, was ambulating well and voiding spontaneously. The patient was given detailed discharge instructions and were scheduled to follow up as an outpatient.

## 2024-05-17 NOTE — PROGRESS NOTES
"Khalida Barker is a 73 y.o. female on day 3 of admission presenting with Thoracogenic scoliosis of thoracolumbar region.    Subjective   No events overnight    Objective     Physical Exam  AOx3  RLE DF4 ow 5  LLE HF 4 ow 5  LLE radiculopathy, RLE (toe numbness)    Last Recorded Vitals  Blood pressure 120/86, pulse 67, temperature 36.7 °C (98.1 °F), temperature source Temporal, resp. rate 17, height 1.651 m (5' 5\"), weight 95.4 kg (210 lb 5.1 oz), SpO2 96%.  Intake/Output last 3 Shifts:  I/O last 3 completed shifts:  In: 1425 (14.9 mL/kg) [P.O.:250; I.V.:800 (8.4 mL/kg); Blood:375]  Out: 4090 (42.9 mL/kg) [Urine:3300 (1 mL/kg/hr); Drains:790]  Weight: 95.4 kg     Relevant Results  Lab Results   Component Value Date    WBC 9.2 05/16/2024    HGB 9.0 (L) 05/16/2024    HCT 27.9 (L) 05/16/2024     05/16/2024     (L) 05/16/2024     Lab Results   Component Value Date    GLUCOSE 119 (H) 05/16/2024    CALCIUM 7.7 (L) 05/16/2024     05/16/2024    K 4.5 05/16/2024    CO2 28 05/16/2024     05/16/2024    BUN 22 05/16/2024    CREATININE 0.75 05/16/2024     Assessment/Plan   Principal Problem:    Thoracogenic scoliosis of thoracolumbar region  Active Problems:    Scoliosis deformity of spine    H/o severe LBP and LLE radiculopathy and scoliosis 5/14 s/p [id number], multi-level PCOs, L2-S1 TLIF, s/p 1U PRBCs    PLAN  Tele  Continue void trial  Drains x2, off suction  Will obtain EOS when able  AM labs  PTOT-rehab  SCDs/SQH             Jos Cartagena MD      "

## 2024-05-17 NOTE — PROGRESS NOTES
Social Work Discharge Planning note:    -Patient discussed during interdisciplinary rounds.   -Team members present: NP, PA, TCC, and SW  -Plan per medical team: Pt is not medically ready for discharge.   -Payer: t Medicare  -Status: Inpatient  -Discharge disposition:  [facility] willing to accept, pending precert. As of 17:20 today, precert has not yet been received. SW will plan to follow up with discharge planning on Monday.   -Support to Pt/family: Pt was informed of the above.   -Potential Barriers: none  -Anticipated Date of Discharge:  5/21/24    SEN Juarez, LSW

## 2024-05-18 LAB
ALBUMIN SERPL BCP-MCNC: 3 G/DL (ref 3.4–5)
ANION GAP SERPL CALC-SCNC: 14 MMOL/L (ref 10–20)
BUN SERPL-MCNC: 13 MG/DL (ref 6–23)
CALCIUM SERPL-MCNC: 8.2 MG/DL (ref 8.6–10.6)
CHLORIDE SERPL-SCNC: 106 MMOL/L (ref 98–107)
CO2 SERPL-SCNC: 24 MMOL/L (ref 21–32)
CREAT SERPL-MCNC: 0.58 MG/DL (ref 0.5–1.05)
EGFRCR SERPLBLD CKD-EPI 2021: >90 ML/MIN/1.73M*2
ERYTHROCYTE [DISTWIDTH] IN BLOOD BY AUTOMATED COUNT: 14.7 % (ref 11.5–14.5)
GLUCOSE BLD MANUAL STRIP-MCNC: 141 MG/DL (ref 74–99)
GLUCOSE BLD MANUAL STRIP-MCNC: 159 MG/DL (ref 74–99)
GLUCOSE BLD MANUAL STRIP-MCNC: 182 MG/DL (ref 74–99)
GLUCOSE BLD MANUAL STRIP-MCNC: 192 MG/DL (ref 74–99)
GLUCOSE SERPL-MCNC: 223 MG/DL (ref 74–99)
HCT VFR BLD AUTO: 27.6 % (ref 36–46)
HGB BLD-MCNC: 9.2 G/DL (ref 12–16)
MCH RBC QN AUTO: 31.3 PG (ref 26–34)
MCHC RBC AUTO-ENTMCNC: 33.3 G/DL (ref 32–36)
MCV RBC AUTO: 94 FL (ref 80–100)
NRBC BLD-RTO: 0 /100 WBCS (ref 0–0)
PHOSPHATE SERPL-MCNC: 2.9 MG/DL (ref 2.5–4.9)
PLATELET # BLD AUTO: 167 X10*3/UL (ref 150–450)
POTASSIUM SERPL-SCNC: 3.8 MMOL/L (ref 3.5–5.3)
RBC # BLD AUTO: 2.94 X10*6/UL (ref 4–5.2)
SODIUM SERPL-SCNC: 140 MMOL/L (ref 136–145)
WBC # BLD AUTO: 7.8 X10*3/UL (ref 4.4–11.3)

## 2024-05-18 PROCEDURE — 36415 COLL VENOUS BLD VENIPUNCTURE: CPT | Performed by: STUDENT IN AN ORGANIZED HEALTH CARE EDUCATION/TRAINING PROGRAM

## 2024-05-18 PROCEDURE — 2500000004 HC RX 250 GENERAL PHARMACY W/ HCPCS (ALT 636 FOR OP/ED): Performed by: STUDENT IN AN ORGANIZED HEALTH CARE EDUCATION/TRAINING PROGRAM

## 2024-05-18 PROCEDURE — 2500000002 HC RX 250 W HCPCS SELF ADMINISTERED DRUGS (ALT 637 FOR MEDICARE OP, ALT 636 FOR OP/ED): Performed by: PHYSICIAN ASSISTANT

## 2024-05-18 PROCEDURE — 2500000001 HC RX 250 WO HCPCS SELF ADMINISTERED DRUGS (ALT 637 FOR MEDICARE OP): Performed by: NURSE PRACTITIONER

## 2024-05-18 PROCEDURE — 80069 RENAL FUNCTION PANEL: CPT | Performed by: STUDENT IN AN ORGANIZED HEALTH CARE EDUCATION/TRAINING PROGRAM

## 2024-05-18 PROCEDURE — 85027 COMPLETE CBC AUTOMATED: CPT | Performed by: STUDENT IN AN ORGANIZED HEALTH CARE EDUCATION/TRAINING PROGRAM

## 2024-05-18 PROCEDURE — 2500000001 HC RX 250 WO HCPCS SELF ADMINISTERED DRUGS (ALT 637 FOR MEDICARE OP): Performed by: STUDENT IN AN ORGANIZED HEALTH CARE EDUCATION/TRAINING PROGRAM

## 2024-05-18 PROCEDURE — 2500000001 HC RX 250 WO HCPCS SELF ADMINISTERED DRUGS (ALT 637 FOR MEDICARE OP): Performed by: PHYSICIAN ASSISTANT

## 2024-05-18 PROCEDURE — 97110 THERAPEUTIC EXERCISES: CPT | Mod: GP

## 2024-05-18 PROCEDURE — 97530 THERAPEUTIC ACTIVITIES: CPT | Mod: GP

## 2024-05-18 PROCEDURE — 82947 ASSAY GLUCOSE BLOOD QUANT: CPT

## 2024-05-18 PROCEDURE — 2500000005 HC RX 250 GENERAL PHARMACY W/O HCPCS: Performed by: STUDENT IN AN ORGANIZED HEALTH CARE EDUCATION/TRAINING PROGRAM

## 2024-05-18 PROCEDURE — 1100000001 HC PRIVATE ROOM DAILY

## 2024-05-18 RX ADMIN — INSULIN LISPRO 1 UNITS: 100 INJECTION, SOLUTION INTRAVENOUS; SUBCUTANEOUS at 09:15

## 2024-05-18 RX ADMIN — CYCLOBENZAPRINE 10 MG: 10 TABLET, FILM COATED ORAL at 17:10

## 2024-05-18 RX ADMIN — DEXAMETHASONE SODIUM PHOSPHATE 2 MG: 4 INJECTION, SOLUTION INTRA-ARTICULAR; INTRALESIONAL; INTRAMUSCULAR; INTRAVENOUS; SOFT TISSUE at 05:33

## 2024-05-18 RX ADMIN — HEPARIN SODIUM 5000 UNITS: 5000 INJECTION INTRAVENOUS; SUBCUTANEOUS at 00:34

## 2024-05-18 RX ADMIN — INSULIN LISPRO 1 UNITS: 100 INJECTION, SOLUTION INTRAVENOUS; SUBCUTANEOUS at 13:01

## 2024-05-18 RX ADMIN — BETHANECHOL CHLORIDE 5 MG: 10 TABLET ORAL at 21:33

## 2024-05-18 RX ADMIN — SENNOSIDES AND DOCUSATE SODIUM 1 TABLET: 50; 8.6 TABLET ORAL at 21:33

## 2024-05-18 RX ADMIN — CYCLOBENZAPRINE 10 MG: 10 TABLET, FILM COATED ORAL at 09:15

## 2024-05-18 RX ADMIN — Medication 3 MG: at 17:10

## 2024-05-18 RX ADMIN — HEPARIN SODIUM 5000 UNITS: 5000 INJECTION INTRAVENOUS; SUBCUTANEOUS at 17:10

## 2024-05-18 RX ADMIN — BETHANECHOL CHLORIDE 5 MG: 10 TABLET ORAL at 09:14

## 2024-05-18 RX ADMIN — OXYCODONE HYDROCHLORIDE AND ACETAMINOPHEN 1 TABLET: 5; 325 TABLET ORAL at 21:34

## 2024-05-18 RX ADMIN — ACETAMINOPHEN 650 MG: 325 TABLET ORAL at 17:10

## 2024-05-18 RX ADMIN — HEPARIN SODIUM 5000 UNITS: 5000 INJECTION INTRAVENOUS; SUBCUTANEOUS at 09:14

## 2024-05-18 RX ADMIN — DEXAMETHASONE SODIUM PHOSPHATE 2 MG: 4 INJECTION, SOLUTION INTRA-ARTICULAR; INTRALESIONAL; INTRAMUSCULAR; INTRAVENOUS; SOFT TISSUE at 13:02

## 2024-05-18 RX ADMIN — CYCLOBENZAPRINE 10 MG: 10 TABLET, FILM COATED ORAL at 21:33

## 2024-05-18 RX ADMIN — LIDOCAINE 2 PATCH: 4 PATCH TOPICAL at 09:16

## 2024-05-18 RX ADMIN — PANTOPRAZOLE SODIUM 40 MG: 40 TABLET, DELAYED RELEASE ORAL at 06:23

## 2024-05-18 RX ADMIN — ACETAMINOPHEN 650 MG: 325 TABLET ORAL at 05:33

## 2024-05-18 RX ADMIN — ACETAMINOPHEN 650 MG: 325 TABLET ORAL at 21:59

## 2024-05-18 RX ADMIN — DEXAMETHASONE SODIUM PHOSPHATE 2 MG: 4 INJECTION, SOLUTION INTRA-ARTICULAR; INTRALESIONAL; INTRAMUSCULAR; INTRAVENOUS; SOFT TISSUE at 21:17

## 2024-05-18 RX ADMIN — BETHANECHOL CHLORIDE 5 MG: 10 TABLET ORAL at 17:10

## 2024-05-18 RX ADMIN — PREGABALIN 50 MG: 50 CAPSULE ORAL at 21:33

## 2024-05-18 RX ADMIN — POLYETHYLENE GLYCOL 3350 17 G: 17 POWDER, FOR SOLUTION ORAL at 09:15

## 2024-05-18 RX ADMIN — ACETAMINOPHEN 650 MG: 325 TABLET ORAL at 09:14

## 2024-05-18 ASSESSMENT — COGNITIVE AND FUNCTIONAL STATUS - GENERAL
MOVING TO AND FROM BED TO CHAIR: A LOT
TOILETING: A LOT
TURNING FROM BACK TO SIDE WHILE IN FLAT BAD: A LOT
PERSONAL GROOMING: A LITTLE
WALKING IN HOSPITAL ROOM: A LOT
DAILY ACTIVITIY SCORE: 15
WALKING IN HOSPITAL ROOM: TOTAL
PERSONAL GROOMING: A LITTLE
CLIMB 3 TO 5 STEPS WITH RAILING: TOTAL
STANDING UP FROM CHAIR USING ARMS: A LOT
WALKING IN HOSPITAL ROOM: A LOT
TOILETING: A LOT
DRESSING REGULAR LOWER BODY CLOTHING: A LOT
HELP NEEDED FOR BATHING: A LOT
TURNING FROM BACK TO SIDE WHILE IN FLAT BAD: A LOT
CLIMB 3 TO 5 STEPS WITH RAILING: TOTAL
MOVING FROM LYING ON BACK TO SITTING ON SIDE OF FLAT BED WITH BEDRAILS: A LITTLE
HELP NEEDED FOR BATHING: A LITTLE
MOBILITY SCORE: 12
MOBILITY SCORE: 11
MOVING TO AND FROM BED TO CHAIR: A LOT
DAILY ACTIVITIY SCORE: 17
EATING MEALS: A LITTLE
STANDING UP FROM CHAIR USING ARMS: A LOT
MOBILITY SCORE: 12
TURNING FROM BACK TO SIDE WHILE IN FLAT BAD: A LOT
MOVING FROM LYING ON BACK TO SITTING ON SIDE OF FLAT BED WITH BEDRAILS: A LITTLE
STANDING UP FROM CHAIR USING ARMS: A LOT
MOVING TO AND FROM BED TO CHAIR: A LOT
DRESSING REGULAR UPPER BODY CLOTHING: A LITTLE
DRESSING REGULAR UPPER BODY CLOTHING: A LITTLE
CLIMB 3 TO 5 STEPS WITH RAILING: TOTAL
MOVING FROM LYING ON BACK TO SITTING ON SIDE OF FLAT BED WITH BEDRAILS: A LITTLE
DRESSING REGULAR LOWER BODY CLOTHING: A LOT

## 2024-05-18 ASSESSMENT — PAIN SCALES - GENERAL
PAINLEVEL_OUTOF10: 8
PAINLEVEL_OUTOF10: 0 - NO PAIN
PAINLEVEL_OUTOF10: 2
PAINLEVEL_OUTOF10: 8

## 2024-05-18 ASSESSMENT — PAIN - FUNCTIONAL ASSESSMENT
PAIN_FUNCTIONAL_ASSESSMENT: 0-10
PAIN_FUNCTIONAL_ASSESSMENT: 0-10

## 2024-05-18 NOTE — PROGRESS NOTES
"Khalida Barker is a 73 y.o. female on day 4 of admission presenting with Thoracogenic scoliosis of thoracolumbar region.    Subjective   Left drain auto dc'd overnight    Objective     Physical Exam  AOx3  RLE DF4 ow 5  LLE HF 4 ow 5  LLE radiculopathy, RLE (toe numbness)    Last Recorded Vitals  Blood pressure 115/64, pulse 76, temperature 36.3 °C (97.3 °F), temperature source Temporal, resp. rate 19, height 1.651 m (5' 5\"), weight 95.4 kg (210 lb 5.1 oz), SpO2 95%.  Intake/Output last 3 Shifts:  I/O last 3 completed shifts:  In: 1305 (13.7 mL/kg) [P.O.:240; I.V.:690 (7.2 mL/kg); Blood:375]  Out: 6030 (63.2 mL/kg) [Urine:5620 (1.6 mL/kg/hr); Drains:410]  Weight: 95.4 kg     Relevant Results  Lab Results   Component Value Date    WBC 6.4 05/17/2024    HGB 8.1 (L) 05/17/2024    HCT 24.8 (L) 05/17/2024    MCV 97 05/17/2024     (L) 05/17/2024     Lab Results   Component Value Date    GLUCOSE 139 (H) 05/17/2024    CALCIUM 7.9 (L) 05/17/2024     05/17/2024    K 3.7 05/17/2024    CO2 28 05/17/2024     05/17/2024    BUN 10 05/17/2024    CREATININE 0.57 05/17/2024     Assessment/Plan   Principal Problem:    Thoracogenic scoliosis of thoracolumbar region  Active Problems:    Scoliosis deformity of spine    H/o severe LBP and LLE radiculopathy and scoliosis 5/14 s/p [id number], multi-level PCOs, L2-S1 TLIF, s/p 1U PRBCs    5/17 s/p 10IV dex, 5/17 L drain auto dc'd, s/p stitch    PLAN  Tele  Continue void trial  Drain, off suction  Will obtain EOS when able  AM labs  PTOT-rehab  SCDs/SQH             Salvador Pradhan MD      "

## 2024-05-18 NOTE — PROGRESS NOTES
Physical Therapy    Physical Therapy Treatment    Patient Name: Khalida Barker  MRN: [id number]  Today's Date: 5/18/2024  Time Calculation  Start Time: 1341  Stop Time: 1502  Time Calculation (min): 81 min    Assessment/Plan   PT Assessment  End of Session Communication: Bedside nurse  Assessment Comment: Pt tolerated ther ex well with decreased L LE strength seen with L heel slides and SLR.  Pt transferred bed to chair Mod Ax2 for STS transfer and side stepping.  Pt transferred chair to stand Min Ax2, bed to stand again Mod Ax2.  Pt continues to benefit from skilled PT and remains appropriate for high intensity PT upon discharge  End of Session Patient Position: Alarm off, not on at start of session, Bed, 3 rail up  PT Plan  Inpatient/Swing Bed or Outpatient: Inpatient  PT Plan  Treatment/Interventions: Bed mobility, Transfer training, Gait training, Stair training, Balance training, Neuromuscular re-education, Strengthening, Endurance training, Range of motion, Therapeutic exercise, Therapeutic activity, Home exercise program, Postural re-education  PT Plan: Skilled PT  PT Frequency: Daily  PT Discharge Recommendations: High intensity level of continued care  Equipment Recommended upon Discharge: Wheeled walker  PT Recommended Transfer Status: Assist x2, Assistive device (RW)  PT - OK to Discharge: Yes (eval complete and discharge recommendations made)      General Visit Information:   PT  Visit  PT Received On: 05/18/24  General  Reason for Referral: T10-Pelvis Instrumented Fusion, Correction of Scoliotic Deformity, L1-2, L2-3, L3-4, L4-5, L5-S1 posterior column osteotomies, L3-4 anterior column osteotomy through disc space,  L1-S1 Decompression, with L2-3, L3-4, L4-5, L5-S1 Transforaminal Lumbar Interbody Fusion, T11-T12 Decompression, Navigation, Allograft, Autograft for fusion  Past Medical History Relevant to Rehab: osteoarthritis, cervicalgia, spinal stenosis, scoliosis, and peripheral neuropathy  Prior to  Session Communication: Bedside nurse  Patient Position Received: Bed, 4 rail up, Alarm off, not on at start of session  General Comment: Pt cleared for PT by RN.  Pt alert and agreeable to PT. +hemovac drain Split session completed 1341-14:08 and 14:50-15:02 due to skilled transfer required for transfer back to bed.    Subjective   Precautions:  Precautions  Medical Precautions: Fall precautions, Cardiac precautions, Spinal precautions  Post-Surgical Precautions: Spinal precautions  Vital Signs:  Vital Signs  Heart Rate:  (during ther ex:96, post:85)  Heart Rate Source: Monitor    Objective   Pain:  Pain Assessment  Pain Assessment: 0-10  Pain Score: 2 (reports N/T in B LEs worse in sitting than supine)  Pain Interventions: Repositioned, Rest (transfers)  Response to Interventions: no change in pain  Cognition:  Cognition  Overall Cognitive Status: Within Functional Limits  Orientation Level: Oriented X4  Coordination:  Movements are Fluid and Coordinated: Yes (except L LE due to weakness)  Postural Control:  Postural Control  Postural Control: Within Functional Limits  Static Sitting Balance  Static Sitting-Balance Support: Bilateral upper extremity supported, Feet supported  Static Sitting-Level of Assistance: Close supervision  Dynamic Sitting Balance  Dynamic Sitting-Balance Support: Bilateral upper extremity supported, Feet supported  Dynamic Sitting-Balance: Forward lean  Dynamic Sitting-Comments: CGA  Static Standing Balance  Static Standing-Balance Support: Bilateral upper extremity supported (RW)  Static Standing-Level of Assistance: Minimum assistance (x2)  Dynamic Standing Balance  Dynamic Standing-Balance Support: Bilateral upper extremity supported (RW)  Dynamic Standing-Balance: Turning  Dynamic Standing-Comments: Mod Ax2  Activity Tolerance:  Activity Tolerance  Endurance: Tolerates 10 - 20 min exercise with multiple rests  Treatments:  Therapeutic Exercise  Therapeutic Exercise Performed:  "Yes  Therapeutic Exercise Activity 1: supine heel slides (AAROM L LE), ankle pumps, quad sets, hip abd/add, SLR(minimal movement of L LE), seated marches(decreased AROM L LE), LAQx3\" hold x10 each LE    Therapeutic Activity  Therapeutic Activity Performed: Yes  Therapeutic Activity 1: bed mobility, transfers, sidestepping along EOB, and pivoting to/from chair    Bed Mobility  Bed Mobility: Yes  Bed Mobility 1  Bed Mobility 1: Rolling right  Level of Assistance 1: Minimum assistance  Bed Mobility 2  Bed Mobility  2: Side lying right to sit  Level of Assistance 2: Moderate assistance  Bed Mobility 3  Bed Mobility 3: Sitting to supine  Level of Assistance 3: Contact guard  Bed Mobility Comments 3: 2x throughout session    Ambulation/Gait Training  Ambulation/Gait Training Performed: Yes  Ambulation/Gait Training 1  Surface 1: Level tile  Device 1: Rolling walker  Assistance 1: Moderate assistance (x2)  Quality of Gait 1: Shuffling gait, Decreased step length, Diminished heel strike, Knee(s) buckle (B knees buckling intermittently)  Comments/Distance (ft) 1: 1ft side stepping to R and pivoting to chair  Ambulation/Gait Training 2  Surface 2: Level tile  Device 2: Rolling walker  Assistance 2: Moderate assistance (x2)  Quality of Gait 2: Decreased step length, Shuffling gait, Knee(s) buckle (intermittent B knee buckling)  Comments/Distance (ft) 2: 1ft pivoting chair to bed with many small steps and significant R sided lean initially improving with steps.  Transfers  Transfer: Yes  Transfer 1  Transfer From 1: Bed to  Transfer to 1: Stand  Technique 1: Sit to stand, Stand to sit  Transfer Device 1: Walker  Transfer Level of Assistance 1: Moderate assistance, +2  Transfers 2  Transfer From 2: Chair with arms to  Transfer to 2: Stand  Technique 2: Sit to stand  Transfer Device 2: Walker  Transfer Level of Assistance 2: Minimum assistance (x2)  Transfers 3  Transfer From 3: Bed to  Transfer to 3: Stand  Technique 3: Sit to " stand, Stand to sit  Transfer Device 3: Walker  Transfer Level of Assistance 3: Moderate assistance (x2)    Outcome Measures:  [facility] Basic Mobility  Turning from your back to your side while in a flat bed without using bedrails: A little  Moving from lying on your back to sitting on the side of a flat bed without using bedrails: A lot  Moving to and from bed to chair (including a wheelchair): A lot  Standing up from a chair using your arms (e.g. wheelchair or bedside chair): A lot  To walk in hospital room: A lot  Climbing 3-5 steps with railing: Total  Basic Mobility - Total Score: 12    Education Documentation  Home Exercise Program, taught by Itzel Gasca V PT at 5/18/2024  2:43 PM.  Learner: Patient  Readiness: Acceptance  Method: Explanation  Response: Verbalizes Understanding  Comment: Spine precautions    Precautions, taught by Itzel CHOU PT at 5/18/2024  2:43 PM.  Learner: Patient  Readiness: Acceptance  Method: Explanation  Response: Verbalizes Understanding  Comment: Spine precautions    Body Mechanics, taught by Itzel Gasca V PT at 5/18/2024  2:43 PM.  Learner: Patient  Readiness: Acceptance  Method: Explanation  Response: Verbalizes Understanding  Comment: Spine precautions    Mobility Training, taught by Itzel CHOU PT at 5/18/2024  2:43 PM.  Learner: Patient  Readiness: Acceptance  Method: Explanation  Response: Verbalizes Understanding  Comment: Spine precautions    Education Comments  No comments found.        Encounter Problems       Encounter Problems (Active)       PT Problem       Patient will perform bed mobility with </= close sup to reduce risk of developing decubitus ulcers.  (Progressing)       Start:  05/15/24    Expected End:  05/29/24            Patient will perform sit to stand and stand to sit transfers with </= close sup and LRD to increase functional strength.  (Progressing)       Start:  05/15/24    Expected End:  05/29/24            Patient will ambulate at least  100  ft. with </= close sup and LRD to improve tolerance of community distances.    (Progressing)       Start:  05/15/24    Expected End:  05/29/24            Patient will ascend and descend 4 steps with railing and </= close sup to facilitate safe navigation of stairs in the home.    (Not Progressing)       Start:  05/15/24    Expected End:  05/29/24            Patient will perform introductory home exercise program as prescribed without cues.   (Progressing)       Start:  05/15/24    Expected End:  05/29/24               Pain - Adult

## 2024-05-18 NOTE — CARE PLAN
Problem: Pain - Adult  Goal: Verbalizes/displays adequate comfort level or baseline comfort level  Outcome: Progressing     Problem: Safety - Adult  Goal: Free from fall injury  Outcome: Progressing     Problem: Discharge Planning  Goal: Discharge to home or other facility with appropriate resources  Outcome: Progressing     Problem: Chronic Conditions and Co-morbidities  Goal: Patient's chronic conditions and co-morbidity symptoms are monitored and maintained or improved  Outcome: Progressing     Problem: Skin  Goal: Decreased wound size/increased tissue granulation at next dressing change  Outcome: Progressing   The patient's goals for the shift include      The clinical goals for the shift include patient will report tolerable pain level this shift.

## 2024-05-19 LAB
ALBUMIN SERPL BCP-MCNC: 3.3 G/DL (ref 3.4–5)
ANION GAP SERPL CALC-SCNC: 18 MMOL/L (ref 10–20)
BUN SERPL-MCNC: 19 MG/DL (ref 6–23)
CALCIUM SERPL-MCNC: 8.6 MG/DL (ref 8.6–10.6)
CHLORIDE SERPL-SCNC: 108 MMOL/L (ref 98–107)
CO2 SERPL-SCNC: 20 MMOL/L (ref 21–32)
CREAT SERPL-MCNC: 0.63 MG/DL (ref 0.5–1.05)
EGFRCR SERPLBLD CKD-EPI 2021: >90 ML/MIN/1.73M*2
ERYTHROCYTE [DISTWIDTH] IN BLOOD BY AUTOMATED COUNT: 14.6 % (ref 11.5–14.5)
GLUCOSE BLD MANUAL STRIP-MCNC: 122 MG/DL (ref 74–99)
GLUCOSE BLD MANUAL STRIP-MCNC: 127 MG/DL (ref 74–99)
GLUCOSE BLD MANUAL STRIP-MCNC: 226 MG/DL (ref 74–99)
GLUCOSE SERPL-MCNC: 216 MG/DL (ref 74–99)
HCT VFR BLD AUTO: 28.5 % (ref 36–46)
HGB BLD-MCNC: 9.8 G/DL (ref 12–16)
MCH RBC QN AUTO: 30.7 PG (ref 26–34)
MCHC RBC AUTO-ENTMCNC: 34.4 G/DL (ref 32–36)
MCV RBC AUTO: 89 FL (ref 80–100)
NRBC BLD-RTO: 0 /100 WBCS (ref 0–0)
PHOSPHATE SERPL-MCNC: 2.6 MG/DL (ref 2.5–4.9)
PLATELET # BLD AUTO: 238 X10*3/UL (ref 150–450)
POTASSIUM SERPL-SCNC: 3.8 MMOL/L (ref 3.5–5.3)
RBC # BLD AUTO: 3.19 X10*6/UL (ref 4–5.2)
SODIUM SERPL-SCNC: 142 MMOL/L (ref 136–145)
WBC # BLD AUTO: 7.7 X10*3/UL (ref 4.4–11.3)

## 2024-05-19 PROCEDURE — 97110 THERAPEUTIC EXERCISES: CPT | Mod: GP,CQ

## 2024-05-19 PROCEDURE — 2500000001 HC RX 250 WO HCPCS SELF ADMINISTERED DRUGS (ALT 637 FOR MEDICARE OP): Performed by: NURSE PRACTITIONER

## 2024-05-19 PROCEDURE — 97530 THERAPEUTIC ACTIVITIES: CPT | Mod: GO | Performed by: OCCUPATIONAL THERAPIST

## 2024-05-19 PROCEDURE — 2500000001 HC RX 250 WO HCPCS SELF ADMINISTERED DRUGS (ALT 637 FOR MEDICARE OP): Performed by: PHYSICIAN ASSISTANT

## 2024-05-19 PROCEDURE — 51701 INSERT BLADDER CATHETER: CPT

## 2024-05-19 PROCEDURE — 2500000005 HC RX 250 GENERAL PHARMACY W/O HCPCS: Performed by: STUDENT IN AN ORGANIZED HEALTH CARE EDUCATION/TRAINING PROGRAM

## 2024-05-19 PROCEDURE — 2500000001 HC RX 250 WO HCPCS SELF ADMINISTERED DRUGS (ALT 637 FOR MEDICARE OP): Performed by: STUDENT IN AN ORGANIZED HEALTH CARE EDUCATION/TRAINING PROGRAM

## 2024-05-19 PROCEDURE — 97530 THERAPEUTIC ACTIVITIES: CPT | Mod: GP,CQ

## 2024-05-19 PROCEDURE — 2500000004 HC RX 250 GENERAL PHARMACY W/ HCPCS (ALT 636 FOR OP/ED): Performed by: STUDENT IN AN ORGANIZED HEALTH CARE EDUCATION/TRAINING PROGRAM

## 2024-05-19 PROCEDURE — 1100000001 HC PRIVATE ROOM DAILY

## 2024-05-19 PROCEDURE — 36415 COLL VENOUS BLD VENIPUNCTURE: CPT | Performed by: STUDENT IN AN ORGANIZED HEALTH CARE EDUCATION/TRAINING PROGRAM

## 2024-05-19 PROCEDURE — 80069 RENAL FUNCTION PANEL: CPT | Performed by: STUDENT IN AN ORGANIZED HEALTH CARE EDUCATION/TRAINING PROGRAM

## 2024-05-19 PROCEDURE — 85027 COMPLETE CBC AUTOMATED: CPT | Performed by: STUDENT IN AN ORGANIZED HEALTH CARE EDUCATION/TRAINING PROGRAM

## 2024-05-19 PROCEDURE — 2500000002 HC RX 250 W HCPCS SELF ADMINISTERED DRUGS (ALT 637 FOR MEDICARE OP, ALT 636 FOR OP/ED): Performed by: PHYSICIAN ASSISTANT

## 2024-05-19 PROCEDURE — 82947 ASSAY GLUCOSE BLOOD QUANT: CPT

## 2024-05-19 RX ADMIN — LIDOCAINE 2 PATCH: 4 PATCH TOPICAL at 08:23

## 2024-05-19 RX ADMIN — CYCLOBENZAPRINE 10 MG: 10 TABLET, FILM COATED ORAL at 21:49

## 2024-05-19 RX ADMIN — CYCLOBENZAPRINE 10 MG: 10 TABLET, FILM COATED ORAL at 08:21

## 2024-05-19 RX ADMIN — INSULIN LISPRO 2 UNITS: 100 INJECTION, SOLUTION INTRAVENOUS; SUBCUTANEOUS at 08:39

## 2024-05-19 RX ADMIN — ACETAMINOPHEN 650 MG: 325 TABLET ORAL at 15:39

## 2024-05-19 RX ADMIN — CYCLOBENZAPRINE 10 MG: 10 TABLET, FILM COATED ORAL at 15:38

## 2024-05-19 RX ADMIN — PREGABALIN 50 MG: 50 CAPSULE ORAL at 21:44

## 2024-05-19 RX ADMIN — SENNOSIDES AND DOCUSATE SODIUM 1 TABLET: 50; 8.6 TABLET ORAL at 21:45

## 2024-05-19 RX ADMIN — OXYCODONE HYDROCHLORIDE AND ACETAMINOPHEN 1 TABLET: 5; 325 TABLET ORAL at 15:38

## 2024-05-19 RX ADMIN — ACETAMINOPHEN 650 MG: 325 TABLET ORAL at 11:32

## 2024-05-19 RX ADMIN — OXYCODONE HYDROCHLORIDE AND ACETAMINOPHEN 1 TABLET: 5; 325 TABLET ORAL at 08:19

## 2024-05-19 RX ADMIN — ACETAMINOPHEN 650 MG: 325 TABLET ORAL at 05:01

## 2024-05-19 RX ADMIN — HEPARIN SODIUM 5000 UNITS: 5000 INJECTION INTRAVENOUS; SUBCUTANEOUS at 08:20

## 2024-05-19 RX ADMIN — HEPARIN SODIUM 5000 UNITS: 5000 INJECTION INTRAVENOUS; SUBCUTANEOUS at 15:40

## 2024-05-19 RX ADMIN — BETHANECHOL CHLORIDE 5 MG: 10 TABLET ORAL at 21:47

## 2024-05-19 RX ADMIN — POLYETHYLENE GLYCOL 3350 17 G: 17 POWDER, FOR SOLUTION ORAL at 08:24

## 2024-05-19 RX ADMIN — DEXAMETHASONE SODIUM PHOSPHATE 2 MG: 4 INJECTION, SOLUTION INTRA-ARTICULAR; INTRALESIONAL; INTRAMUSCULAR; INTRAVENOUS; SOFT TISSUE at 04:15

## 2024-05-19 RX ADMIN — BETHANECHOL CHLORIDE 5 MG: 10 TABLET ORAL at 08:23

## 2024-05-19 RX ADMIN — Medication 3 MG: at 17:46

## 2024-05-19 RX ADMIN — BETHANECHOL CHLORIDE 5 MG: 10 TABLET ORAL at 15:38

## 2024-05-19 RX ADMIN — HEPARIN SODIUM 5000 UNITS: 5000 INJECTION INTRAVENOUS; SUBCUTANEOUS at 00:58

## 2024-05-19 RX ADMIN — PANTOPRAZOLE SODIUM 40 MG: 40 TABLET, DELAYED RELEASE ORAL at 08:23

## 2024-05-19 RX ADMIN — ACETAMINOPHEN 650 MG: 325 TABLET ORAL at 21:45

## 2024-05-19 ASSESSMENT — PAIN SCALES - GENERAL
PAINLEVEL_OUTOF10: 0 - NO PAIN
PAINLEVEL_OUTOF10: 5 - MODERATE PAIN
PAINLEVEL_OUTOF10: 2
PAINLEVEL_OUTOF10: 5 - MODERATE PAIN
PAINLEVEL_OUTOF10: 8

## 2024-05-19 ASSESSMENT — PAIN - FUNCTIONAL ASSESSMENT
PAIN_FUNCTIONAL_ASSESSMENT: 0-10

## 2024-05-19 ASSESSMENT — COGNITIVE AND FUNCTIONAL STATUS - GENERAL
DRESSING REGULAR LOWER BODY CLOTHING: A LOT
HELP NEEDED FOR BATHING: A LITTLE
MOBILITY SCORE: 13
PERSONAL GROOMING: A LITTLE
DRESSING REGULAR UPPER BODY CLOTHING: A LITTLE
MOVING TO AND FROM BED TO CHAIR: A LOT
DRESSING REGULAR UPPER BODY CLOTHING: A LITTLE
WALKING IN HOSPITAL ROOM: A LOT
PERSONAL GROOMING: A LITTLE
DRESSING REGULAR LOWER BODY CLOTHING: A LOT
PERSONAL GROOMING: A LITTLE
MOVING FROM LYING ON BACK TO SITTING ON SIDE OF FLAT BED WITH BEDRAILS: A LITTLE
DAILY ACTIVITIY SCORE: 18
WALKING IN HOSPITAL ROOM: A LOT
TOILETING: A LOT
MOVING FROM LYING ON BACK TO SITTING ON SIDE OF FLAT BED WITH BEDRAILS: A LITTLE
DRESSING REGULAR UPPER BODY CLOTHING: A LITTLE
MOVING TO AND FROM BED TO CHAIR: A LOT
CLIMB 3 TO 5 STEPS WITH RAILING: TOTAL
DRESSING REGULAR LOWER BODY CLOTHING: A LITTLE
DAILY ACTIVITIY SCORE: 17
STANDING UP FROM CHAIR USING ARMS: A LOT
TURNING FROM BACK TO SIDE WHILE IN FLAT BAD: A LOT
TURNING FROM BACK TO SIDE WHILE IN FLAT BAD: A LITTLE
MOBILITY SCORE: 13
MOVING TO AND FROM BED TO CHAIR: A LOT
TOILETING: A LOT
DAILY ACTIVITIY SCORE: 17
CLIMB 3 TO 5 STEPS WITH RAILING: A LOT
WALKING IN HOSPITAL ROOM: A LOT
MOBILITY SCORE: 13
STANDING UP FROM CHAIR USING ARMS: A LOT
HELP NEEDED FOR BATHING: A LITTLE
TURNING FROM BACK TO SIDE WHILE IN FLAT BAD: A LITTLE
HELP NEEDED FOR BATHING: A LITTLE
CLIMB 3 TO 5 STEPS WITH RAILING: TOTAL
TOILETING: A LOT
STANDING UP FROM CHAIR USING ARMS: A LOT
MOVING FROM LYING ON BACK TO SITTING ON SIDE OF FLAT BED WITH BEDRAILS: A LITTLE

## 2024-05-19 ASSESSMENT — PAIN DESCRIPTION - LOCATION: LOCATION: BACK

## 2024-05-19 NOTE — CARE PLAN
Problem: COGNITION/SAFETY  Goal: Patient will recall and adhere to spinal precautions during all functional mobility/ADL tasks in order to demonstrate improved understanding and promote healing post op  Outcome: Progressing     Problem: TRANSFERS  Goal: Patient will perform bed mobility minimal assist  level of assistance and bed rails in order to improve safety and independence with mobility  Outcome: Progressing  Goal: Patient will complete functional transfer to [facility] with least restrictive device with contact guard assist level of assistance.  Outcome: Progressing     Problem: BALANCE  Goal: Patientt will maintain static standing balance during ADL task with contact guard assist level of assistance drop down in order to demonstrate decreased risk of falling and improved postural control.  Outcome: Progressing

## 2024-05-19 NOTE — PROGRESS NOTES
Occupational Therapy    OT Treatment    Patient Name: Khalida Barker  MRN: [id number]  Today's Date: 5/19/2024  Time Calculation  Start Time: 0850  Stop Time: 0912  Time Calculation (min): 22 min         Assessment:  End of Session Communication: Bedside nurse  End of Session Patient Position: Up in chair, Alarm off, not on at start of session     Plan:  Treatment Interventions: ADL retraining, Functional transfer training, UE strengthening/ROM, Endurance training, Patient/family training, Neuromuscular reeducation, Compensatory technique education, Equipment evaluation/education  OT Frequency: 4 times per week  OT Discharge Recommendations: High intensity level of continued care  Equipment Recommended upon Discharge: Wheeled walker  OT Recommended Transfer Status: Assist of 2  OT - OK to Discharge: Yes  Treatment Interventions: ADL retraining, Functional transfer training, UE strengthening/ROM, Endurance training, Patient/family training, Neuromuscular reeducation, Compensatory technique education, Equipment evaluation/education    Subjective   Previous Visit Info:  OT Last Visit  OT Received On: 05/19/24  General:  General  Co-Treatment: PT  Co-Treatment Reason: facilitate pt and staff safety with transfer training  Prior to Session Communication: Bedside nurse  Patient Position Received: Bed, 4 rail up, Alarm off, not on at start of session  General Comment: Pt pleasant and agreeable to therapy. drains recently removed.  Precautions:  Medical Precautions: Fall precautions  Post-Surgical Precautions: Spinal precautions  Precautions Comment: Reviewed spinal precautions, pt able to state 3/3, requires min cues for compliance  Vital Signs:     Pain:  Pain Assessment  Pain Assessment: 0-10  Pain Score: 5 - Moderate pain  Pain Location: Back    Objective    Cognition:  Cognition  Overall Cognitive Status: Within Functional Limits  Orientation Level: Oriented X4  Coordination:     Activities of Daily Living: LE Dressing  LE  Dressing:  (discussed AE, patient numb and emotional re: surgery.  Did not attempt this date)  Functional Standing Tolerance:     Bed Mobility/Transfers: Bed Mobility  Bed Mobility: Yes  Bed Mobility 1  Bed Mobility 1: Supine to sitting  Level of Assistance 1: Minimum assistance, Moderate verbal cues  Bed Mobility Comments 1: cues for log roll technique    Transfers  Transfer: Yes  Transfer 1  Transfer From 1: Sit to, Stand to  Transfer to 1: Sit  Technique 1: Sit to stand, Stand to sit  Transfer Device 1: Walker  Transfer Level of Assistance 1: Minimum assistance  Transfers 2  Transfer From 2: Bed to  Transfer to 2: Chair with arms  Technique 2: Stand pivot  Transfer Device 2: Walker, Gait belt  Transfer Level of Assistance 2: Moderate assistance  Transfers 3  Transfer From 3: Sit to, Stand to  Transfer to 3: Sit  Technique 3: Sit to stand, Stand to sit  Transfer Device 3: Walker, Gait belt  Transfer Level of Assistance 3: Maximum assistance  Trials/Comments 3:  (from chair)      Functional Mobility:     Outcome Measures:[facility] Daily Activity  Putting on and taking off regular lower body clothing: A lot  Bathing (including washing, rinsing, drying): A little  Putting on and taking off regular upper body clothing: A little  Toileting, which includes using toilet, bedpan or urinal: A lot  Taking care of personal grooming such as brushing teeth: A little  Eating Meals: None  Daily Activity - Total Score: 17        Education Documentation  Body Mechanics, taught by Arely Ford OT at 5/19/2024 12:39 PM.  Learner: Patient  Readiness: Acceptance  Method: Explanation, Demonstration  Response: Verbalizes Understanding, Needs Reinforcement    Precautions, taught by Arely Ford OT at 5/19/2024 12:39 PM.  Learner: Patient  Readiness: Acceptance  Method: Explanation, Demonstration  Response: Verbalizes Understanding, Needs Reinforcement    ADL Training, taught by Arely Ford OT at 5/19/2024 12:39 PM.  Learner:  Patient  Readiness: Acceptance  Method: Explanation, Demonstration  Response: Verbalizes Understanding, Needs Reinforcement    Education Comments  No comments found.        OP EDUCATION:       Goals:  Encounter Problems       Encounter Problems (Active)       ADLs       Patient with complete lower body dressing with minimal assist  level of assistance donning and doffing all LE clothes  with PRN adaptive equipment while edge of bed  (Progressing)       Start:  05/15/24    Expected End:  06/05/24            Patient will complete toileting including hygiene clothing management/hygiene with contact guard assist level of assistance and grab bars. (Progressing)       Start:  05/15/24    Expected End:  06/05/24               BALANCE       Patientt will maintain static standing balance during ADL task with contact guard assist level of assistance drop down in order to demonstrate decreased risk of falling and improved postural control. (Progressing)       Start:  05/15/24    Expected End:  06/05/24               COGNITION/SAFETY       Patient will recall and adhere to spinal precautions during all functional mobility/ADL tasks in order to demonstrate improved understanding and promote healing post op (Progressing)       Start:  05/15/24    Expected End:  06/05/24               TRANSFERS       Patient will perform bed mobility minimal assist  level of assistance and bed rails in order to improve safety and independence with mobility (Progressing)       Start:  05/15/24    Expected End:  06/05/24            Patient will complete functional transfer to [facility] with least restrictive device with contact guard assist level of assistance. (Progressing)       Start:  05/15/24    Expected End:  06/05/24

## 2024-05-19 NOTE — PROGRESS NOTES
"Khalida Barker is a 73 y.o. female on day 5 of admission presenting with Thoracogenic scoliosis of thoracolumbar region.    Subjective   No acute events, said she is annoyed by her drain    Objective     Physical Exam  AOx3  RLE DF4 ow 5  LLE HF 4 ow 5  LLE radiculopathy, RLE (toe numbness)    Last Recorded Vitals  Blood pressure 125/58, pulse 63, temperature 36.4 °C (97.5 °F), resp. rate 17, height 1.651 m (5' 5\"), weight 95.4 kg (210 lb 5.1 oz), SpO2 96%.  Intake/Output last 3 Shifts:  I/O last 3 completed shifts:  In: 840 (8.8 mL/kg) [P.O.:840]  Out: 3750 (39.3 mL/kg) [Urine:3750 (1.1 mL/kg/hr)]  Weight: 95.4 kg     Relevant Results  Lab Results   Component Value Date    WBC 7.8 05/18/2024    HGB 9.2 (L) 05/18/2024    HCT 27.6 (L) 05/18/2024    MCV 94 05/18/2024     05/18/2024     Lab Results   Component Value Date    GLUCOSE 223 (H) 05/18/2024    CALCIUM 8.2 (L) 05/18/2024     05/18/2024    K 3.8 05/18/2024    CO2 24 05/18/2024     05/18/2024    BUN 13 05/18/2024    CREATININE 0.58 05/18/2024     Assessment/Plan   Principal Problem:    Thoracogenic scoliosis of thoracolumbar region  Active Problems:    Scoliosis deformity of spine    H/o severe LBP and LLE radiculopathy and scoliosis 5/14 s/p [id number], multi-level PCOs, L2-S1 TLIF, s/p 1U PRBCs    5/17 s/p 10IV dex, 5/17 L drain auto dc'd, s/p stitch    PLAN  Tele  void trial  Drain, off suction-possible dc today  Will obtain EOS when able  AM labs  PTOT-rehab  SCDs/SQH      Salvador Pradhan MD      "

## 2024-05-19 NOTE — PROGRESS NOTES
Physical Therapy    Physical Therapy Treatment    Patient Name: Khalida Barker  MRN: [id number]  Today's Date: 5/19/2024  Time Calculation  Start Time: 0850  Stop Time: 0959  Time Calculation (min): 33 min split session- pt seen from 306-912, then returned [phone]    Assessment/Plan   PT Assessment  PT Assessment Results: Decreased strength, Decreased endurance, Impaired balance, Decreased mobility, Decreased coordination  Rehab Prognosis: Excellent  Evaluation/Treatment Tolerance: Patient tolerated treatment well  Medical Staff Made Aware: Yes  End of Session Communication: Bedside nurse  End of Session Patient Position: Up in chair, Alarm off, not on at start of session  PT Plan  Inpatient/Swing Bed or Outpatient: Inpatient  PT Plan  Treatment/Interventions: Bed mobility, Transfer training, Gait training, Stair training, Balance training, Neuromuscular re-education, Strengthening, Endurance training, Range of motion, Therapeutic exercise, Therapeutic activity, Home exercise program, Postural re-education  PT Plan: Skilled PT  PT Frequency: Daily  PT Discharge Recommendations: High intensity level of continued care  Equipment Recommended upon Discharge: Wheeled walker  PT Recommended Transfer Status: Assist x2, Assistive device (RW)  PT - OK to Discharge: Yes (eval complete and discharge recommendations made)      General Visit Information:   PT  Visit  PT Received On: 05/19/24  Response to Previous Treatment: Patient with no complaints from previous session.  General  Co-Treatment: OT  Co-Treatment Reason: Partial co-tx to facilitate pt and staff safety with transfer training  Prior to Session Communication: Bedside nurse  Patient Position Received: Bed, 4 rail up, Alarm off, not on at start of session  General Comment: Pt pleasant and agreeable to therapy. drains recently removed.  Per handoff with RN, pt is appropriate for therapy, vitals are stable and pain is controlled. Other concerns prior to tx are:  none    Subjective   Precautions:  Precautions  Medical Precautions: Fall precautions  Post-Surgical Precautions: Spinal precautions  Precautions Comment: Reviewed spinal precautions, pt able to state 3/3, requires min cues for compliance    Objective   Pain:  Pain Assessment  Pain Assessment: 0-10  Pain Score: 2  Cognition:  Cognition  Overall Cognitive Status: Within Functional Limits  Orientation Level: Oriented X4    Treatments:  Therapeutic Exercise  Therapeutic Exercise Performed: Yes  Therapeutic Exercise Activity 1: Seated LAQs 10x/LE, pt educated on supine SAQs, quad sets as HEP    Therapeutic Activity  Therapeutic Activity Performed: Yes  Therapeutic Activity 1: Pt assisted up to chair by PTA/OT. After 30min, this PTA returned to pt room, pt reports being very comfortable up in chair, requesting to remain OOB. Pt and RN educated on safe transfer technique with unit staff when ready to return to bed.    Bed Mobility  Bed Mobility: Yes  Bed Mobility 1  Bed Mobility 1: Supine to sitting  Level of Assistance 1: Minimum assistance, Moderate verbal cues  Bed Mobility Comments 1: cues to sequence logroll technique       Transfers  Transfer: Yes  Transfer 1  Transfer From 1: Sit to, Stand to  Transfer to 1: Sit  Technique 1: Sit to stand, Stand to sit  Transfer Device 1: Walker  Transfer Level of Assistance 1: Minimum assistance (assist x2 from EOB)  Transfers 2  Transfer From 2: Bed to  Transfer to 2: Chair with arms  Technique 2: Stand pivot  Transfer Device 2: Walker, Gait belt  Transfer Level of Assistance 2: Moderate assistance (assist x2 due to R knee buckling, tactile stim at R quad, max cues to sequence, [place] for AD management)  Transfers 3  Transfer From 3: Sit to, Stand to  Transfer to 3: Sit  Technique 3: Sit to stand, Stand to sit  Transfer Device 3: Walker, Gait belt  Transfer Level of Assistance 3: Maximum assistance (x1)  Trials/Comments 3: from chair    Outcome Measures:     [facility] Basic  Mobility  Turning from your back to your side while in a flat bed without using bedrails: A little  Moving from lying on your back to sitting on the side of a flat bed without using bedrails: A little  Moving to and from bed to chair (including a wheelchair): A lot  Standing up from a chair using your arms (e.g. wheelchair or bedside chair): A lot  To walk in hospital room: A lot  Climbing 3-5 steps with railing: Total  Basic Mobility - Total Score: 13    Education Documentation  Home Exercise Program, taught by Valeire Mckeon PTA at 5/19/2024 10:24 AM.  Learner: Patient  Readiness: Acceptance  Method: Explanation, Demonstration  Response: Verbalizes Understanding, Demonstrated Understanding    Precautions, taught by Valerie Mckeon PTA at 5/19/2024 10:24 AM.  Learner: Patient  Readiness: Acceptance  Method: Explanation, Demonstration  Response: Verbalizes Understanding, Demonstrated Understanding    Body Mechanics, taught by Valerie Mckeon PTA at 5/19/2024 10:24 AM.  Learner: Patient  Readiness: Acceptance  Method: Explanation, Demonstration  Response: Verbalizes Understanding, Demonstrated Understanding    Mobility Training, taught by Valerie Mckeon PTA at 5/19/2024 10:24 AM.  Learner: Patient  Readiness: Acceptance  Method: Explanation, Demonstration  Response: Verbalizes Understanding, Demonstrated Understanding    Education Comments  No comments found.        OP EDUCATION:       Encounter Problems       Encounter Problems (Active)       PT Problem       Patient will perform bed mobility with </= close sup to reduce risk of developing decubitus ulcers.  (Progressing)       Start:  05/15/24    Expected End:  05/29/24            Patient will perform sit to stand and stand to sit transfers with </= close sup and LRD to increase functional strength.  (Progressing)       Start:  05/15/24    Expected End:  05/29/24            Patient will ambulate at least 100  ft. with </= close sup and LRD to improve  tolerance of community distances.    (Progressing)       Start:  05/15/24    Expected End:  05/29/24            Patient will ascend and descend 4 steps with railing and </= close sup to facilitate safe navigation of stairs in the home.    (Progressing)       Start:  05/15/24    Expected End:  05/29/24            Patient will perform introductory home exercise program as prescribed without cues.   (Progressing)       Start:  05/15/24    Expected End:  05/29/24               Pain - Adult            JACQUELYN Brizuela

## 2024-05-20 LAB
ALBUMIN SERPL BCP-MCNC: 3.2 G/DL (ref 3.4–5)
ANION GAP SERPL CALC-SCNC: 15 MMOL/L (ref 10–20)
BUN SERPL-MCNC: 23 MG/DL (ref 6–23)
CALCIUM SERPL-MCNC: 8.4 MG/DL (ref 8.6–10.6)
CHLORIDE SERPL-SCNC: 108 MMOL/L (ref 98–107)
CO2 SERPL-SCNC: 23 MMOL/L (ref 21–32)
CREAT SERPL-MCNC: 0.71 MG/DL (ref 0.5–1.05)
EGFRCR SERPLBLD CKD-EPI 2021: >90 ML/MIN/1.73M*2
ERYTHROCYTE [DISTWIDTH] IN BLOOD BY AUTOMATED COUNT: 15 % (ref 11.5–14.5)
GLUCOSE BLD MANUAL STRIP-MCNC: 105 MG/DL (ref 74–99)
GLUCOSE BLD MANUAL STRIP-MCNC: 130 MG/DL (ref 74–99)
GLUCOSE BLD MANUAL STRIP-MCNC: 90 MG/DL (ref 74–99)
GLUCOSE SERPL-MCNC: 141 MG/DL (ref 74–99)
HCT VFR BLD AUTO: 32.5 % (ref 36–46)
HGB BLD-MCNC: 9.7 G/DL (ref 12–16)
MCH RBC QN AUTO: 29.8 PG (ref 26–34)
MCHC RBC AUTO-ENTMCNC: 29.8 G/DL (ref 32–36)
MCV RBC AUTO: 100 FL (ref 80–100)
NRBC BLD-RTO: 0.3 /100 WBCS (ref 0–0)
PHOSPHATE SERPL-MCNC: 3.1 MG/DL (ref 2.5–4.9)
PLATELET # BLD AUTO: 244 X10*3/UL (ref 150–450)
POTASSIUM SERPL-SCNC: 3.7 MMOL/L (ref 3.5–5.3)
RBC # BLD AUTO: 3.26 X10*6/UL (ref 4–5.2)
SODIUM SERPL-SCNC: 142 MMOL/L (ref 136–145)
WBC # BLD AUTO: 6.6 X10*3/UL (ref 4.4–11.3)

## 2024-05-20 PROCEDURE — 2500000004 HC RX 250 GENERAL PHARMACY W/ HCPCS (ALT 636 FOR OP/ED): Performed by: STUDENT IN AN ORGANIZED HEALTH CARE EDUCATION/TRAINING PROGRAM

## 2024-05-20 PROCEDURE — 2500000001 HC RX 250 WO HCPCS SELF ADMINISTERED DRUGS (ALT 637 FOR MEDICARE OP): Performed by: STUDENT IN AN ORGANIZED HEALTH CARE EDUCATION/TRAINING PROGRAM

## 2024-05-20 PROCEDURE — 85027 COMPLETE CBC AUTOMATED: CPT | Performed by: STUDENT IN AN ORGANIZED HEALTH CARE EDUCATION/TRAINING PROGRAM

## 2024-05-20 PROCEDURE — 1100000001 HC PRIVATE ROOM DAILY

## 2024-05-20 PROCEDURE — 2500000005 HC RX 250 GENERAL PHARMACY W/O HCPCS: Performed by: STUDENT IN AN ORGANIZED HEALTH CARE EDUCATION/TRAINING PROGRAM

## 2024-05-20 PROCEDURE — 82947 ASSAY GLUCOSE BLOOD QUANT: CPT

## 2024-05-20 PROCEDURE — 2500000001 HC RX 250 WO HCPCS SELF ADMINISTERED DRUGS (ALT 637 FOR MEDICARE OP): Performed by: PHYSICIAN ASSISTANT

## 2024-05-20 PROCEDURE — 80069 RENAL FUNCTION PANEL: CPT | Performed by: STUDENT IN AN ORGANIZED HEALTH CARE EDUCATION/TRAINING PROGRAM

## 2024-05-20 PROCEDURE — 36415 COLL VENOUS BLD VENIPUNCTURE: CPT | Performed by: STUDENT IN AN ORGANIZED HEALTH CARE EDUCATION/TRAINING PROGRAM

## 2024-05-20 PROCEDURE — 2500000001 HC RX 250 WO HCPCS SELF ADMINISTERED DRUGS (ALT 637 FOR MEDICARE OP): Performed by: NURSE PRACTITIONER

## 2024-05-20 PROCEDURE — 97530 THERAPEUTIC ACTIVITIES: CPT | Mod: GP,CQ

## 2024-05-20 PROCEDURE — 97116 GAIT TRAINING THERAPY: CPT | Mod: GP,CQ

## 2024-05-20 RX ORDER — OXYCODONE HYDROCHLORIDE 5 MG/1
2.5 TABLET ORAL EVERY 6 HOURS PRN
Status: DISCONTINUED | OUTPATIENT
Start: 2024-05-20 | End: 2024-05-20

## 2024-05-20 RX ORDER — METHYLPREDNISOLONE 4 MG/1
4 TABLET ORAL ONCE
Status: DISCONTINUED | OUTPATIENT
Start: 2024-05-25 | End: 2024-05-22 | Stop reason: HOSPADM

## 2024-05-20 RX ORDER — BISACODYL 5 MG
5 TABLET, DELAYED RELEASE (ENTERIC COATED) ORAL DAILY PRN
Status: DISCONTINUED | OUTPATIENT
Start: 2024-05-20 | End: 2024-05-22 | Stop reason: HOSPADM

## 2024-05-20 RX ORDER — METHYLPREDNISOLONE 4 MG/1
24 TABLET ORAL ONCE
Status: COMPLETED | OUTPATIENT
Start: 2024-05-20 | End: 2024-05-20

## 2024-05-20 RX ORDER — OXYCODONE HYDROCHLORIDE 5 MG/1
5 TABLET ORAL EVERY 6 HOURS PRN
Status: DISCONTINUED | OUTPATIENT
Start: 2024-05-20 | End: 2024-05-20

## 2024-05-20 RX ORDER — METHYLPREDNISOLONE 4 MG/1
12 TABLET ORAL ONCE
Status: DISCONTINUED | OUTPATIENT
Start: 2024-05-23 | End: 2024-05-22 | Stop reason: HOSPADM

## 2024-05-20 RX ORDER — METHYLPREDNISOLONE 4 MG/1
20 TABLET ORAL ONCE
Status: COMPLETED | OUTPATIENT
Start: 2024-05-21 | End: 2024-05-21

## 2024-05-20 RX ORDER — OXYCODONE AND ACETAMINOPHEN 5; 325 MG/1; MG/1
1 TABLET ORAL EVERY 4 HOURS PRN
Status: DISCONTINUED | OUTPATIENT
Start: 2024-05-20 | End: 2024-05-22 | Stop reason: HOSPADM

## 2024-05-20 RX ORDER — DEXTROMETHORPHAN POLISTIREX 30 MG/5 ML
1 SUSPENSION, EXTENDED RELEASE 12 HR ORAL ONCE
Status: COMPLETED | OUTPATIENT
Start: 2024-05-20 | End: 2024-05-20

## 2024-05-20 RX ORDER — METHYLPREDNISOLONE 4 MG/1
16 TABLET ORAL ONCE
Status: COMPLETED | OUTPATIENT
Start: 2024-05-22 | End: 2024-05-22

## 2024-05-20 RX ORDER — POLYETHYLENE GLYCOL 3350 17 G/17G
17 POWDER, FOR SOLUTION ORAL 3 TIMES DAILY
Status: DISCONTINUED | OUTPATIENT
Start: 2024-05-20 | End: 2024-05-22 | Stop reason: HOSPADM

## 2024-05-20 RX ORDER — METHYLPREDNISOLONE 4 MG/1
8 TABLET ORAL ONCE
Status: DISCONTINUED | OUTPATIENT
Start: 2024-05-24 | End: 2024-05-22 | Stop reason: HOSPADM

## 2024-05-20 RX ORDER — AMOXICILLIN 250 MG
2 CAPSULE ORAL 2 TIMES DAILY
Status: DISCONTINUED | OUTPATIENT
Start: 2024-05-20 | End: 2024-05-22 | Stop reason: HOSPADM

## 2024-05-20 RX ADMIN — CYCLOBENZAPRINE 10 MG: 10 TABLET, FILM COATED ORAL at 21:22

## 2024-05-20 RX ADMIN — SENNOSIDES AND DOCUSATE SODIUM 2 TABLET: 50; 8.6 TABLET ORAL at 21:21

## 2024-05-20 RX ADMIN — POLYETHYLENE GLYCOL 3350 17 G: 17 POWDER, FOR SOLUTION ORAL at 08:47

## 2024-05-20 RX ADMIN — HEPARIN SODIUM 5000 UNITS: 5000 INJECTION INTRAVENOUS; SUBCUTANEOUS at 00:56

## 2024-05-20 RX ADMIN — HEPARIN SODIUM 5000 UNITS: 5000 INJECTION INTRAVENOUS; SUBCUTANEOUS at 08:48

## 2024-05-20 RX ADMIN — SENNOSIDES AND DOCUSATE SODIUM 2 TABLET: 50; 8.6 TABLET ORAL at 08:47

## 2024-05-20 RX ADMIN — POLYETHYLENE GLYCOL 3350 17 G: 17 POWDER, FOR SOLUTION ORAL at 21:21

## 2024-05-20 RX ADMIN — CYCLOBENZAPRINE 10 MG: 10 TABLET, FILM COATED ORAL at 16:23

## 2024-05-20 RX ADMIN — PREGABALIN 50 MG: 50 CAPSULE ORAL at 21:21

## 2024-05-20 RX ADMIN — BETHANECHOL CHLORIDE 5 MG: 10 TABLET ORAL at 08:47

## 2024-05-20 RX ADMIN — BETHANECHOL CHLORIDE 5 MG: 10 TABLET ORAL at 16:18

## 2024-05-20 RX ADMIN — MINERAL OIL 1 ENEMA: 100 ENEMA RECTAL at 16:17

## 2024-05-20 RX ADMIN — OXYCODONE HYDROCHLORIDE AND ACETAMINOPHEN 1 TABLET: 5; 325 TABLET ORAL at 05:49

## 2024-05-20 RX ADMIN — HEPARIN SODIUM 5000 UNITS: 5000 INJECTION INTRAVENOUS; SUBCUTANEOUS at 16:17

## 2024-05-20 RX ADMIN — CYCLOBENZAPRINE 10 MG: 10 TABLET, FILM COATED ORAL at 08:48

## 2024-05-20 RX ADMIN — OXYCODONE HYDROCHLORIDE AND ACETAMINOPHEN 1 TABLET: 5; 325 TABLET ORAL at 00:53

## 2024-05-20 RX ADMIN — OXYCODONE HYDROCHLORIDE AND ACETAMINOPHEN 1 TABLET: 5; 325 TABLET ORAL at 13:22

## 2024-05-20 RX ADMIN — METHYLPREDNISOLONE 24 MG: 4 TABLET ORAL at 16:17

## 2024-05-20 RX ADMIN — POLYETHYLENE GLYCOL 3350 17 G: 17 POWDER, FOR SOLUTION ORAL at 16:23

## 2024-05-20 RX ADMIN — PANTOPRAZOLE SODIUM 40 MG: 40 TABLET, DELAYED RELEASE ORAL at 08:48

## 2024-05-20 RX ADMIN — LIDOCAINE 2 PATCH: 4 PATCH TOPICAL at 08:48

## 2024-05-20 RX ADMIN — Medication 3 MG: at 21:22

## 2024-05-20 RX ADMIN — BETHANECHOL CHLORIDE 5 MG: 10 TABLET ORAL at 21:22

## 2024-05-20 ASSESSMENT — COGNITIVE AND FUNCTIONAL STATUS - GENERAL
MOVING FROM LYING ON BACK TO SITTING ON SIDE OF FLAT BED WITH BEDRAILS: A LITTLE
HELP NEEDED FOR BATHING: A LITTLE
MOBILITY SCORE: 13
DAILY ACTIVITIY SCORE: 18
TURNING FROM BACK TO SIDE WHILE IN FLAT BAD: A LOT
MOVING FROM LYING ON BACK TO SITTING ON SIDE OF FLAT BED WITH BEDRAILS: A LITTLE
MOBILITY SCORE: 12
STANDING UP FROM CHAIR USING ARMS: A LOT
MOBILITY SCORE: 12
TOILETING: A LOT
DRESSING REGULAR UPPER BODY CLOTHING: A LITTLE
DRESSING REGULAR LOWER BODY CLOTHING: A LITTLE
DRESSING REGULAR UPPER BODY CLOTHING: A LITTLE
WALKING IN HOSPITAL ROOM: A LOT
CLIMB 3 TO 5 STEPS WITH RAILING: TOTAL
PERSONAL GROOMING: A LITTLE
WALKING IN HOSPITAL ROOM: A LOT
DRESSING REGULAR LOWER BODY CLOTHING: A LITTLE
PERSONAL GROOMING: A LITTLE
WALKING IN HOSPITAL ROOM: A LOT
TURNING FROM BACK TO SIDE WHILE IN FLAT BAD: A LOT
CLIMB 3 TO 5 STEPS WITH RAILING: TOTAL
MOVING TO AND FROM BED TO CHAIR: A LOT
STANDING UP FROM CHAIR USING ARMS: A LOT
HELP NEEDED FOR BATHING: A LITTLE
TOILETING: A LOT
MOVING FROM LYING ON BACK TO SITTING ON SIDE OF FLAT BED WITH BEDRAILS: A LITTLE
CLIMB 3 TO 5 STEPS WITH RAILING: A LOT
MOVING TO AND FROM BED TO CHAIR: A LOT
DAILY ACTIVITIY SCORE: 18
MOVING TO AND FROM BED TO CHAIR: A LOT
TURNING FROM BACK TO SIDE WHILE IN FLAT BAD: A LOT
STANDING UP FROM CHAIR USING ARMS: A LOT

## 2024-05-20 ASSESSMENT — PAIN SCALES - GENERAL
PAINLEVEL_OUTOF10: 4
PAINLEVEL_OUTOF10: 7
PAINLEVEL_OUTOF10: 8
PAINLEVEL_OUTOF10: 7
PAINLEVEL_OUTOF10: 8

## 2024-05-20 ASSESSMENT — PAIN - FUNCTIONAL ASSESSMENT
PAIN_FUNCTIONAL_ASSESSMENT: 0-10
PAIN_FUNCTIONAL_ASSESSMENT: 0-10

## 2024-05-20 ASSESSMENT — PAIN SCALES - WONG BAKER: WONGBAKER_NUMERICALRESPONSE: HURTS LITTLE BIT

## 2024-05-20 NOTE — PROGRESS NOTES
"Khalida Barker is a 73 y.o. female on day 6 of admission presenting with Thoracogenic scoliosis of thoracolumbar region.    Subjective   No acute events    Objective     Physical Exam  AOx3  RLE DF4 ow 5  LLE HF 4 ow 5  LLE radiculopathy, RLE (toe numbness)    Last Recorded Vitals  Blood pressure 103/62, pulse 64, temperature 37.2 °C (99 °F), temperature source Temporal, resp. rate 16, height 1.651 m (5' 5\"), weight 95.4 kg (210 lb 5.1 oz), SpO2 96%.  Intake/Output last 3 Shifts:  I/O last 3 completed shifts:  In: 360 (3.8 mL/kg) [P.O.:360]  Out: 2150 (22.5 mL/kg) [Urine:2150 (0.6 mL/kg/hr)]  Weight: 95.4 kg     Relevant Results  Lab Results   Component Value Date    WBC 7.7 05/19/2024    HGB 9.8 (L) 05/19/2024    HCT 28.5 (L) 05/19/2024    MCV 89 05/19/2024     05/19/2024     Lab Results   Component Value Date    GLUCOSE 216 (H) 05/19/2024    CALCIUM 8.6 05/19/2024     05/19/2024    K 3.8 05/19/2024    CO2 20 (L) 05/19/2024     (H) 05/19/2024    BUN 19 05/19/2024    CREATININE 0.63 05/19/2024     Assessment/Plan   Principal Problem:    Thoracogenic scoliosis of thoracolumbar region  Active Problems:    Scoliosis deformity of spine    H/o severe LBP and LLE radiculopathy and scoliosis 5/14 s/p [id number], multi-level PCOs, L2-S1 TLIF, s/p 1U PRBCs    5/17 s/p 10IV dex, 5/17 L drain auto dc'd, s/p stitch  5/18 R drain dc'd w stitch    PLAN  Tele  void trial  Will obtain EOS today  AM labs  PTOT-rehab  SCDs/SQH    Medically ready for discharge to rehab    Michelle Mcnulty MD      "

## 2024-05-20 NOTE — CARE PLAN
Problem: Pain - Adult  Goal: Verbalizes/displays adequate comfort level or baseline comfort level  5/20/2024 1405 by Judy Camarena RN  Outcome: Progressing  5/20/2024 1236 by Judy Camarena RN  Outcome: Progressing     Problem: Safety - Adult  Goal: Free from fall injury  5/20/2024 1405 by Judy Camarena RN  Outcome: Progressing  5/20/2024 1236 by Judy Camarena RN  Outcome: Progressing     Problem: Discharge Planning  Goal: Discharge to home or other facility with appropriate resources  5/20/2024 1405 by Judy Camarena RN  Outcome: Progressing  5/20/2024 1236 by Judy Camarena RN  Outcome: Progressing     Problem: Chronic Conditions and Co-morbidities  Goal: Patient's chronic conditions and co-morbidity symptoms are monitored and maintained or improved  5/20/2024 1405 by Judy Camarena RN  Outcome: Progressing  5/20/2024 1236 by Judy Camarena RN  Outcome: Progressing     Problem: Pain  Goal: Takes deep breaths with improved pain control throughout the shift  5/20/2024 1405 by Judy Camarena RN  Outcome: Progressing  5/20/2024 1236 by Judy Camarena RN  Outcome: Progressing  Goal: Turns in bed with improved pain control throughout the shift  5/20/2024 1405 by Judy Camarena RN  Outcome: Progressing  5/20/2024 1236 by Judy Camarena RN  Outcome: Progressing  Goal: Walks with improved pain control throughout the shift  5/20/2024 1405 by Judy Camarena RN  Outcome: Progressing  5/20/2024 1236 by Judy Camarena RN  Outcome: Progressing  Goal: Performs ADL's with improved pain control throughout shift  5/20/2024 1405 by Judy Camarena RN  Outcome: Progressing  5/20/2024 1236 by Judy Camarena RN  Outcome: Progressing  Goal: Participates in PT with improved pain control throughout the shift  5/20/2024 1405 by Judy Camarena RN  Outcome: Progressing  5/20/2024 1236 by Judy Camaerna RN  Outcome: Progressing  Goal: Free from opioid side effects throughout the shift  5/20/2024 1405 by Judy Camarena, RN  Outcome:  Progressing  5/20/2024 1236 by Judy Camarena RN  Outcome: Progressing  Goal: Free from acute confusion related to pain meds throughout the shift  5/20/2024 1405 by Judy Camarena RN  Outcome: Progressing  5/20/2024 1236 by Judy Camarena RN  Outcome: Progressing   The patient's goals for the shift include      The clinical goals for the shift include Patient will void and get up to the chair with assistance.

## 2024-05-20 NOTE — PROGRESS NOTES
Social Work Discharge Planning note:    -Patient discussed during interdisciplinary rounds.   -Team members present: PA, NP, TCC, and SW  -Plan per medical team: Pt will be medically ready for discharge after she has a BM.   -Payer[facility]  -Status: Inpatient  -Discharge disposition: Pt's insurance initially denied acute rehab level of care, but a P2P was done earlier today and the denial was overturned for Pt to be able to go to  [facility] [place]. SW will continue to follow for discharge planning.   -Potential Barriers: none  -Anticipated Date of Discharge:  5/22/24    SEN Juarez, LSW

## 2024-05-20 NOTE — PROGRESS NOTES
Physical Therapy    Physical Therapy Treatment    Patient Name: Khalida Barker  MRN: [id number]  Today's Date: 5/20/2024  Time Calculation  Start Time: 1627  Stop Time: 1650  Time Calculation (min): 23 min    Assessment/Plan   PT Assessment  End of Session Communication: Bedside nurse  Assessment Comment: .  End of Session Patient Position: Up in chair, Alarm off, not on at start of session  PT Plan  Inpatient/Swing Bed or Outpatient: Inpatient  PT Plan  Treatment/Interventions: Bed mobility, Transfer training, Gait training, Stair training, Balance training, Neuromuscular re-education, Strengthening, Endurance training, Range of motion, Therapeutic exercise, Therapeutic activity, Home exercise program, Postural re-education  PT Plan: Skilled PT  PT Frequency: Daily  PT Discharge Recommendations: High intensity level of continued care  Equipment Recommended upon Discharge: Wheeled walker  PT Recommended Transfer Status: Assist x2, Assistive device (RW)  PT - OK to Discharge: Yes (eval complete and discharge recommendations made)      General Visit Information:   PT  Visit  PT Received On: 05/20/24  General  Prior to Session Communication: Bedside nurse  Patient Position Received: Up in chair  General Comment: Pt pleasant and agreeable to therapy. pt reports increased pain from prolonged sitting. RN present at end of session to administer enima. pt on commode. pt able to negotiate 3' lateral steps as well as 3' forward/retro with min assist    Subjective   Precautions:  Precautions  Medical Precautions: Fall precautions  Post-Surgical Precautions: Spinal precautions  Vital Signs:       Objective   Pain:  Pain Assessment  Pain Score: 7  Pain Interventions: Repositioned  Cognition:  Cognition  Orientation Level: Oriented X4       Treatments:            Ambulation/Gait Training 1  Surface 1: Level tile  Device 1: Rolling walker  Assistance 1: Moderate assistance  Quality of Gait 1: Shuffling gait, Decreased step length,  Diminished heel strike, Knee(s) buckle  Comments/Distance (ft) 1: 3' side steps/ 3' forward/retro. cues for walker safety and weight shifts  Transfer 1  Technique 1: Sit to stand, Stand to sit  Transfer Device 1: Walker  Transfer Level of Assistance 1: Minimum assistance  Trials/Comments 1: cues for safe pacing         Outcome Measures:  [facility] Basic Mobility  Turning from your back to your side while in a flat bed without using bedrails: A little  Moving from lying on your back to sitting on the side of a flat bed without using bedrails: A lot  Moving to and from bed to chair (including a wheelchair): A lot  Standing up from a chair using your arms (e.g. wheelchair or bedside chair): A lot  To walk in hospital room: A lot  Climbing 3-5 steps with railing: Total  Basic Mobility - Total Score: 12    Education Documentation  Body Mechanics, taught by Tanmay Veronica PTA at 5/20/2024  5:33 PM.  Learner: Patient  Readiness: Acceptance  Method: Explanation  Response: Verbalizes Understanding    Education Comments  No comments found.        OP EDUCATION:       Encounter Problems       Encounter Problems (Active)       PT Problem       Patient will perform bed mobility with </= close sup to reduce risk of developing decubitus ulcers.  (Progressing)       Start:  05/15/24    Expected End:  05/29/24            Patient will perform sit to stand and stand to sit transfers with </= close sup and LRD to increase functional strength.  (Progressing)       Start:  05/15/24    Expected End:  05/29/24            Patient will ambulate at least 100  ft. with </= close sup and LRD to improve tolerance of community distances.    (Progressing)       Start:  05/15/24    Expected End:  05/29/24            Patient will ascend and descend 4 steps with railing and </= close sup to facilitate safe navigation of stairs in the home.    (Progressing)       Start:  05/15/24    Expected End:  05/29/24            Patient will perform introductory  home exercise program as prescribed without cues.   (Progressing)       Start:  05/15/24    Expected End:  05/29/24               Pain - Adult

## 2024-05-20 NOTE — CARE PLAN
Problem: Pain - Adult  Goal: Verbalizes/displays adequate comfort level or baseline comfort level  Outcome: Progressing     Problem: Safety - Adult  Goal: Free from fall injury  Outcome: Progressing     Problem: Discharge Planning  Goal: Discharge to home or other facility with appropriate resources  Outcome: Progressing     Problem: Chronic Conditions and Co-morbidities  Goal: Patient's chronic conditions and co-morbidity symptoms are monitored and maintained or improved  Outcome: Progressing     Problem: Skin  Goal: Decreased wound size/increased tissue granulation at next dressing change  Outcome: Progressing  Goal: Participates in plan/prevention/treatment measures  Outcome: Progressing  Goal: Prevent/manage excess moisture  Outcome: Progressing  Goal: Prevent/minimize sheer/friction injuries  Outcome: Progressing  Goal: Promote/optimize nutrition  Outcome: Progressing  Goal: Promote skin healing  Outcome: Progressing     Problem: Pain  Goal: Takes deep breaths with improved pain control throughout the shift  Outcome: Progressing  Goal: Turns in bed with improved pain control throughout the shift  Outcome: Progressing  Goal: Walks with improved pain control throughout the shift  Outcome: Progressing  Goal: Performs ADL's with improved pain control throughout shift  Outcome: Progressing  Goal: Participates in PT with improved pain control throughout the shift  Outcome: Progressing  Goal: Free from opioid side effects throughout the shift  Outcome: Progressing  Goal: Free from acute confusion related to pain meds throughout the shift  Outcome: Progressing   The patient's goals for the shift include      The clinical goals for the shift include Patient will void and get up to the chair with assistance.

## 2024-05-21 ENCOUNTER — APPOINTMENT (OUTPATIENT)
Dept: RADIOLOGY | Facility: HOSPITAL | Age: 74
DRG: 454 | End: 2024-05-21
Payer: MEDICARE

## 2024-05-21 LAB
GLUCOSE BLD MANUAL STRIP-MCNC: 152 MG/DL (ref 74–99)
GLUCOSE BLD MANUAL STRIP-MCNC: 154 MG/DL (ref 74–99)
GLUCOSE BLD MANUAL STRIP-MCNC: 161 MG/DL (ref 74–99)
GLUCOSE BLD MANUAL STRIP-MCNC: 169 MG/DL (ref 74–99)

## 2024-05-21 PROCEDURE — 2500000001 HC RX 250 WO HCPCS SELF ADMINISTERED DRUGS (ALT 637 FOR MEDICARE OP): Performed by: NURSE PRACTITIONER

## 2024-05-21 PROCEDURE — 2500000004 HC RX 250 GENERAL PHARMACY W/ HCPCS (ALT 636 FOR OP/ED): Performed by: STUDENT IN AN ORGANIZED HEALTH CARE EDUCATION/TRAINING PROGRAM

## 2024-05-21 PROCEDURE — 97530 THERAPEUTIC ACTIVITIES: CPT | Mod: GP,CQ

## 2024-05-21 PROCEDURE — 2500000001 HC RX 250 WO HCPCS SELF ADMINISTERED DRUGS (ALT 637 FOR MEDICARE OP): Performed by: STUDENT IN AN ORGANIZED HEALTH CARE EDUCATION/TRAINING PROGRAM

## 2024-05-21 PROCEDURE — 74019 RADEX ABDOMEN 2 VIEWS: CPT | Performed by: RADIOLOGY

## 2024-05-21 PROCEDURE — 82947 ASSAY GLUCOSE BLOOD QUANT: CPT

## 2024-05-21 PROCEDURE — 1100000001 HC PRIVATE ROOM DAILY

## 2024-05-21 PROCEDURE — 2500000001 HC RX 250 WO HCPCS SELF ADMINISTERED DRUGS (ALT 637 FOR MEDICARE OP): Performed by: PHYSICIAN ASSISTANT

## 2024-05-21 PROCEDURE — 97116 GAIT TRAINING THERAPY: CPT | Mod: GP,CQ

## 2024-05-21 PROCEDURE — 74018 RADEX ABDOMEN 1 VIEW: CPT

## 2024-05-21 PROCEDURE — 2500000005 HC RX 250 GENERAL PHARMACY W/O HCPCS: Performed by: STUDENT IN AN ORGANIZED HEALTH CARE EDUCATION/TRAINING PROGRAM

## 2024-05-21 RX ORDER — POLYETHYLENE GLYCOL 3350, SODIUM CHLORIDE, SODIUM BICARBONATE, POTASSIUM CHLORIDE 420; 11.2; 5.72; 1.48 G/4L; G/4L; G/4L; G/4L
4000 POWDER, FOR SOLUTION ORAL ONCE
Status: COMPLETED | OUTPATIENT
Start: 2024-05-21 | End: 2024-05-21

## 2024-05-21 RX ORDER — DEXAMETHASONE 2 MG/1
2 TABLET ORAL
Qty: 10 TABLET | Refills: 0 | Status: CANCELLED | OUTPATIENT
Start: 2024-05-21 | End: 2024-05-26

## 2024-05-21 RX ORDER — ACETAMINOPHEN 325 MG/1
650 TABLET ORAL EVERY 6 HOURS PRN
Status: DISCONTINUED | OUTPATIENT
Start: 2024-05-21 | End: 2024-05-22 | Stop reason: HOSPADM

## 2024-05-21 RX ADMIN — METHYLPREDNISOLONE 20 MG: 4 TABLET ORAL at 09:14

## 2024-05-21 RX ADMIN — CYCLOBENZAPRINE 10 MG: 10 TABLET, FILM COATED ORAL at 09:13

## 2024-05-21 RX ADMIN — CYCLOBENZAPRINE 10 MG: 10 TABLET, FILM COATED ORAL at 15:00

## 2024-05-21 RX ADMIN — HEPARIN SODIUM 5000 UNITS: 5000 INJECTION INTRAVENOUS; SUBCUTANEOUS at 01:39

## 2024-05-21 RX ADMIN — BETHANECHOL CHLORIDE 5 MG: 10 TABLET ORAL at 15:00

## 2024-05-21 RX ADMIN — Medication 3 MG: at 20:11

## 2024-05-21 RX ADMIN — ACETAMINOPHEN 650 MG: 325 TABLET ORAL at 18:11

## 2024-05-21 RX ADMIN — HEPARIN SODIUM 5000 UNITS: 5000 INJECTION INTRAVENOUS; SUBCUTANEOUS at 09:14

## 2024-05-21 RX ADMIN — SENNOSIDES AND DOCUSATE SODIUM 2 TABLET: 50; 8.6 TABLET ORAL at 09:13

## 2024-05-21 RX ADMIN — POLYETHYLENE GLYCOL 3350, SODIUM SULFATE ANHYDROUS, SODIUM BICARBONATE, SODIUM CHLORIDE, POTASSIUM CHLORIDE 4000 ML: 236; 22.74; 6.74; 5.86; 2.97 POWDER, FOR SOLUTION ORAL at 10:15

## 2024-05-21 RX ADMIN — POLYETHYLENE GLYCOL 3350 17 G: 17 POWDER, FOR SOLUTION ORAL at 09:14

## 2024-05-21 RX ADMIN — PANTOPRAZOLE SODIUM 40 MG: 40 TABLET, DELAYED RELEASE ORAL at 06:47

## 2024-05-21 RX ADMIN — LIDOCAINE 2 PATCH: 4 PATCH TOPICAL at 09:14

## 2024-05-21 RX ADMIN — BETHANECHOL CHLORIDE 5 MG: 10 TABLET ORAL at 09:14

## 2024-05-21 RX ADMIN — BETHANECHOL CHLORIDE 5 MG: 10 TABLET ORAL at 20:11

## 2024-05-21 RX ADMIN — PREGABALIN 50 MG: 50 CAPSULE ORAL at 20:11

## 2024-05-21 RX ADMIN — CYCLOBENZAPRINE 10 MG: 10 TABLET, FILM COATED ORAL at 20:10

## 2024-05-21 ASSESSMENT — PAIN SCALES - GENERAL
PAINLEVEL_OUTOF10: 7
PAINLEVEL_OUTOF10: 0 - NO PAIN
PAINLEVEL_OUTOF10: 4
PAINLEVEL_OUTOF10: 5 - MODERATE PAIN

## 2024-05-21 ASSESSMENT — PAIN - FUNCTIONAL ASSESSMENT
PAIN_FUNCTIONAL_ASSESSMENT: 0-10

## 2024-05-21 ASSESSMENT — COGNITIVE AND FUNCTIONAL STATUS - GENERAL
CLIMB 3 TO 5 STEPS WITH RAILING: TOTAL
WALKING IN HOSPITAL ROOM: A LOT
MOBILITY SCORE: 12
TURNING FROM BACK TO SIDE WHILE IN FLAT BAD: A LOT
MOVING TO AND FROM BED TO CHAIR: A LOT
HELP NEEDED FOR BATHING: A LITTLE
MOBILITY SCORE: 12
WALKING IN HOSPITAL ROOM: A LOT
STANDING UP FROM CHAIR USING ARMS: A LOT
DRESSING REGULAR LOWER BODY CLOTHING: A LITTLE
MOVING FROM LYING ON BACK TO SITTING ON SIDE OF FLAT BED WITH BEDRAILS: A LITTLE
TURNING FROM BACK TO SIDE WHILE IN FLAT BAD: A LOT
DRESSING REGULAR UPPER BODY CLOTHING: A LITTLE
DAILY ACTIVITIY SCORE: 18
STANDING UP FROM CHAIR USING ARMS: A LOT
MOVING FROM LYING ON BACK TO SITTING ON SIDE OF FLAT BED WITH BEDRAILS: A LITTLE
TOILETING: A LOT
MOVING TO AND FROM BED TO CHAIR: A LOT
CLIMB 3 TO 5 STEPS WITH RAILING: TOTAL
PERSONAL GROOMING: A LITTLE

## 2024-05-21 ASSESSMENT — PAIN DESCRIPTION - LOCATION: LOCATION: BACK

## 2024-05-21 NOTE — PROGRESS NOTES
Physical Therapy    Physical Therapy Treatment    Patient Name: Khalida Barker  MRN: [id number]  Today's Date: 5/21/2024  Time Calculation  Start Time: 1021  Stop Time: 1046  Time Calculation (min): 25 min    Assessment/Plan   PT Assessment  End of Session Communication: Bedside nurse  Assessment Comment: .  End of Session Patient Position: Up in chair, Alarm off, not on at start of session  PT Plan  Inpatient/Swing Bed or Outpatient: Inpatient  PT Plan  Treatment/Interventions: Bed mobility, Transfer training, Gait training, Stair training, Balance training, Neuromuscular re-education, Strengthening, Endurance training, Range of motion, Therapeutic exercise, Therapeutic activity, Home exercise program, Postural re-education  PT Plan: Skilled PT  PT Frequency: Daily  PT Discharge Recommendations: High intensity level of continued care  Equipment Recommended upon Discharge: Wheeled walker  PT Recommended Transfer Status: Assist x2, Assistive device (RW)  PT - OK to Discharge: Yes (eval complete and discharge recommendations made)      General Visit Information:   PT  Visit  PT Received On: 05/21/24  General  Prior to Session Communication: Bedside nurse  Patient Position Received: Up in chair  General Comment: Pt pleasant and agreeable to therapy. p tlimited by onset of dizzienss with prolonged standing however able to ambulate 2x7' trials using wheeled walker. pt requires addtional time to complete distance and continued cues for safe techniques during transfers    Subjective   Precautions:  Precautions  Medical Precautions: Fall precautions  Post-Surgical Precautions: Spinal precautions  Vital Signs:       Objective   Pain:  Pain Assessment  Pain Score: 5 - Moderate pain  Pain Interventions: Repositioned  Cognition:  Cognition  Orientation Level: Oriented X4    Treatments:  Therapeutic Exercise  Therapeutic Exercise Activity 1: Seated LAQ and hip flexion x15 reps AROM         Ambulation/Gait Training 1  Surface 1:  Level tile  Device 1: Rolling walker  Assistance 1: Minimum assistance  Quality of Gait 1: Shuffling gait, Decreased step length, Diminished heel strike, Knee(s) buckle  Comments/Distance (ft) 1: 2x7', cues for walker safety and increased step length  Transfer 1  Technique 1: Sit to stand, Stand to sit  Transfer Device 1: Walker  Transfer Level of Assistance 1: Minimum assistance  Trials/Comments 1: cues for safe pacing and hand placement         Outcome Measures:  [facility] Basic Mobility  Turning from your back to your side while in a flat bed without using bedrails: A little  Moving from lying on your back to sitting on the side of a flat bed without using bedrails: A lot  Moving to and from bed to chair (including a wheelchair): A lot  Standing up from a chair using your arms (e.g. wheelchair or bedside chair): A lot  To walk in hospital room: A lot  Climbing 3-5 steps with railing: Total  Basic Mobility - Total Score: 12    Education Documentation  Precautions, taught by Tanmay Veronica PTA at 5/21/2024 12:31 PM.  Learner: Patient  Readiness: Acceptance  Method: Explanation  Response: Verbalizes Understanding    Education Comments  No comments found.        OP EDUCATION:       Encounter Problems       Encounter Problems (Active)       PT Problem       Patient will perform bed mobility with </= close sup to reduce risk of developing decubitus ulcers.  (Progressing)       Start:  05/15/24    Expected End:  05/29/24            Patient will perform sit to stand and stand to sit transfers with </= close sup and LRD to increase functional strength.  (Progressing)       Start:  05/15/24    Expected End:  05/29/24            Patient will ambulate at least 100  ft. with </= close sup and LRD to improve tolerance of community distances.    (Progressing)       Start:  05/15/24    Expected End:  05/29/24            Patient will ascend and descend 4 steps with railing and </= close sup to facilitate safe navigation of stairs  in the home.    (Progressing)       Start:  05/15/24    Expected End:  05/29/24            Patient will perform introductory home exercise program as prescribed without cues.   (Progressing)       Start:  05/15/24    Expected End:  05/29/24               Pain - Adult

## 2024-05-21 NOTE — PROGRESS NOTES
"Khalida Barker is a 73 y.o. female on day 7 of admission presenting with Thoracogenic scoliosis of thoracolumbar region.    Subjective   No acute events    Objective     Physical Exam  AOx3  RLE DF4 ow 5  LLE HF 4 ow 5  LLE radiculopathy, RLE (toe numbness)    Last Recorded Vitals  Blood pressure 103/65, pulse 81, temperature 36.5 °C (97.7 °F), temperature source Temporal, resp. rate 16, height 1.651 m (5' 5\"), weight 95.4 kg (210 lb 5.1 oz), SpO2 95%.  Intake/Output last 3 Shifts:  I/O last 3 completed shifts:  In: - (0 mL/kg)   Out: 3400 (35.6 mL/kg) [Urine:3400 (1 mL/kg/hr)]  Weight: 95.4 kg     Relevant Results  Lab Results   Component Value Date    WBC 6.6 05/20/2024    HGB 9.7 (L) 05/20/2024    HCT 32.5 (L) 05/20/2024     05/20/2024     05/20/2024     Lab Results   Component Value Date    GLUCOSE 141 (H) 05/20/2024    CALCIUM 8.4 (L) 05/20/2024     05/20/2024    K 3.7 05/20/2024    CO2 23 05/20/2024     (H) 05/20/2024    BUN 23 05/20/2024    CREATININE 0.71 05/20/2024     Assessment/Plan   Principal Problem:    Thoracogenic scoliosis of thoracolumbar region  Active Problems:    Scoliosis deformity of spine    H/o severe LBP and LLE radiculopathy and scoliosis 5/14 s/p A04-loivpa, multi-level PCOs, L2-S1 TLIF, s/p 1U PRBCs    5/17 s/p 10IV dex, 5/17 L drain auto dc'd, s/p stitch  5/18 R drain dc'd w stitch    PLAN  Tele  Needs bowel movement  Will obtain EOS today  AM labs  PTOT-rehab  SCDs/SQH    Medically ready for discharge to rehab    Salvador Pradhan MD      "

## 2024-05-21 NOTE — CARE PLAN
The clinical goals for the shift include Pt will maintain safety, free from falls and injuries throughout this shift      Problem: Pain - Adult  Goal: Verbalizes/displays adequate comfort level or baseline comfort level  Outcome: Progressing     Problem: Safety - Adult  Goal: Free from fall injury  Outcome: Progressing     Problem: Discharge Planning  Goal: Discharge to home or other facility with appropriate resources  Outcome: Progressing     Problem: Chronic Conditions and Co-morbidities  Goal: Patient's chronic conditions and co-morbidity symptoms are monitored and maintained or improved  Outcome: Progressing     Problem: Skin  Goal: Decreased wound size/increased tissue granulation at next dressing change  Outcome: Progressing  Goal: Participates in plan/prevention/treatment measures  Outcome: Progressing  Goal: Prevent/manage excess moisture  Outcome: Progressing  Goal: Prevent/minimize sheer/friction injuries  Outcome: Progressing  Goal: Promote/optimize nutrition  Outcome: Progressing  Goal: Promote skin healing  Outcome: Progressing     Problem: Pain  Goal: Takes deep breaths with improved pain control throughout the shift  Outcome: Progressing  Goal: Turns in bed with improved pain control throughout the shift  Outcome: Progressing  Goal: Walks with improved pain control throughout the shift  Outcome: Progressing  Goal: Performs ADL's with improved pain control throughout shift  Outcome: Progressing  Goal: Participates in PT with improved pain control throughout the shift  Outcome: Progressing  Goal: Free from opioid side effects throughout the shift  Outcome: Progressing  Goal: Free from acute confusion related to pain meds throughout the shift  Outcome: Progressing

## 2024-05-22 ENCOUNTER — TELEPHONE (OUTPATIENT)
Dept: REHABILITATION | Facility: REHABILITATION | Age: 74
End: 2024-05-22
Payer: MEDICARE

## 2024-05-22 VITALS
SYSTOLIC BLOOD PRESSURE: 105 MMHG | HEART RATE: 79 BPM | RESPIRATION RATE: 16 BRPM | WEIGHT: 210.32 LBS | TEMPERATURE: 98.2 F | BODY MASS INDEX: 35.04 KG/M2 | HEIGHT: 65 IN | OXYGEN SATURATION: 96 % | DIASTOLIC BLOOD PRESSURE: 66 MMHG

## 2024-05-22 LAB
ALBUMIN SERPL BCP-MCNC: 3.3 G/DL (ref 3.4–5)
ANION GAP SERPL CALC-SCNC: 13 MMOL/L (ref 10–20)
BUN SERPL-MCNC: 20 MG/DL (ref 6–23)
CALCIUM SERPL-MCNC: 8.7 MG/DL (ref 8.6–10.6)
CHLORIDE SERPL-SCNC: 103 MMOL/L (ref 98–107)
CO2 SERPL-SCNC: 25 MMOL/L (ref 21–32)
CREAT SERPL-MCNC: 0.73 MG/DL (ref 0.5–1.05)
EGFRCR SERPLBLD CKD-EPI 2021: 88 ML/MIN/1.73M*2
ERYTHROCYTE [DISTWIDTH] IN BLOOD BY AUTOMATED COUNT: 14.9 % (ref 11.5–14.5)
GLUCOSE BLD MANUAL STRIP-MCNC: 101 MG/DL (ref 74–99)
GLUCOSE BLD MANUAL STRIP-MCNC: 130 MG/DL (ref 74–99)
GLUCOSE BLD MANUAL STRIP-MCNC: 169 MG/DL (ref 74–99)
GLUCOSE BLD MANUAL STRIP-MCNC: 176 MG/DL (ref 74–99)
GLUCOSE SERPL-MCNC: 176 MG/DL (ref 74–99)
HCT VFR BLD AUTO: 34.4 % (ref 36–46)
HGB BLD-MCNC: 10.5 G/DL (ref 12–16)
MCH RBC QN AUTO: 29.9 PG (ref 26–34)
MCHC RBC AUTO-ENTMCNC: 30.5 G/DL (ref 32–36)
MCV RBC AUTO: 98 FL (ref 80–100)
NRBC BLD-RTO: 0 /100 WBCS (ref 0–0)
PHOSPHATE SERPL-MCNC: 2.4 MG/DL (ref 2.5–4.9)
PLATELET # BLD AUTO: 324 X10*3/UL (ref 150–450)
POTASSIUM SERPL-SCNC: 3.6 MMOL/L (ref 3.5–5.3)
RBC # BLD AUTO: 3.51 X10*6/UL (ref 4–5.2)
SODIUM SERPL-SCNC: 137 MMOL/L (ref 136–145)
WBC # BLD AUTO: 11.1 X10*3/UL (ref 4.4–11.3)

## 2024-05-22 PROCEDURE — 82947 ASSAY GLUCOSE BLOOD QUANT: CPT

## 2024-05-22 PROCEDURE — 36415 COLL VENOUS BLD VENIPUNCTURE: CPT

## 2024-05-22 PROCEDURE — 97116 GAIT TRAINING THERAPY: CPT | Mod: GP,CQ

## 2024-05-22 PROCEDURE — 85027 COMPLETE CBC AUTOMATED: CPT

## 2024-05-22 PROCEDURE — 2500000001 HC RX 250 WO HCPCS SELF ADMINISTERED DRUGS (ALT 637 FOR MEDICARE OP): Performed by: STUDENT IN AN ORGANIZED HEALTH CARE EDUCATION/TRAINING PROGRAM

## 2024-05-22 PROCEDURE — 2500000004 HC RX 250 GENERAL PHARMACY W/ HCPCS (ALT 636 FOR OP/ED): Performed by: STUDENT IN AN ORGANIZED HEALTH CARE EDUCATION/TRAINING PROGRAM

## 2024-05-22 PROCEDURE — 2500000005 HC RX 250 GENERAL PHARMACY W/O HCPCS: Performed by: STUDENT IN AN ORGANIZED HEALTH CARE EDUCATION/TRAINING PROGRAM

## 2024-05-22 PROCEDURE — 2500000001 HC RX 250 WO HCPCS SELF ADMINISTERED DRUGS (ALT 637 FOR MEDICARE OP): Performed by: NURSE PRACTITIONER

## 2024-05-22 PROCEDURE — 2500000002 HC RX 250 W HCPCS SELF ADMINISTERED DRUGS (ALT 637 FOR MEDICARE OP, ALT 636 FOR OP/ED): Performed by: PHYSICIAN ASSISTANT

## 2024-05-22 PROCEDURE — 97535 SELF CARE MNGMENT TRAINING: CPT | Mod: GO

## 2024-05-22 PROCEDURE — 97530 THERAPEUTIC ACTIVITIES: CPT | Mod: GP,CQ

## 2024-05-22 PROCEDURE — 2500000001 HC RX 250 WO HCPCS SELF ADMINISTERED DRUGS (ALT 637 FOR MEDICARE OP): Performed by: PHYSICIAN ASSISTANT

## 2024-05-22 PROCEDURE — 84100 ASSAY OF PHOSPHORUS: CPT

## 2024-05-22 RX ORDER — BISACODYL 5 MG
5 TABLET, DELAYED RELEASE (ENTERIC COATED) ORAL DAILY PRN
Status: ON HOLD
Start: 2024-05-22

## 2024-05-22 RX ORDER — HEPARIN SODIUM 5000 [USP'U]/ML
5000 INJECTION, SOLUTION INTRAVENOUS; SUBCUTANEOUS EVERY 8 HOURS
Status: ON HOLD
Start: 2024-05-22

## 2024-05-22 RX ORDER — BETHANECHOL CHLORIDE 5 MG/1
5 TABLET ORAL 3 TIMES DAILY
Status: ON HOLD
Start: 2024-05-22

## 2024-05-22 RX ORDER — INSULIN LISPRO 100 [IU]/ML
0-5 INJECTION, SOLUTION INTRAVENOUS; SUBCUTANEOUS
Status: ON HOLD
Start: 2024-05-22

## 2024-05-22 RX ORDER — PREGABALIN 50 MG/1
50 CAPSULE ORAL NIGHTLY
Status: ON HOLD
Start: 2024-05-22

## 2024-05-22 RX ORDER — PREGABALIN 50 MG/1
50 CAPSULE ORAL ONCE
Status: COMPLETED | OUTPATIENT
Start: 2024-05-22 | End: 2024-05-22

## 2024-05-22 RX ORDER — POLYETHYLENE GLYCOL 3350 17 G/17G
17 POWDER, FOR SOLUTION ORAL 3 TIMES DAILY PRN
Status: ON HOLD
Start: 2024-05-22

## 2024-05-22 RX ORDER — PANTOPRAZOLE SODIUM 40 MG/1
40 TABLET, DELAYED RELEASE ORAL
Status: ON HOLD
Start: 2024-05-22

## 2024-05-22 RX ORDER — CYCLOBENZAPRINE HCL 10 MG
10 TABLET ORAL 3 TIMES DAILY PRN
Status: ON HOLD
Start: 2024-05-22

## 2024-05-22 RX ORDER — METHYLPREDNISOLONE 4 MG/1
TABLET ORAL DAILY
Qty: 10 TABLET | Refills: 0 | Status: SHIPPED | OUTPATIENT
Start: 2024-05-22 | End: 2024-05-26

## 2024-05-22 RX ORDER — AMOXICILLIN 250 MG
2 CAPSULE ORAL 2 TIMES DAILY
Status: ON HOLD
Start: 2024-05-22

## 2024-05-22 RX ADMIN — INSULIN LISPRO 1 UNITS: 100 INJECTION, SOLUTION INTRAVENOUS; SUBCUTANEOUS at 16:30

## 2024-05-22 RX ADMIN — SENNOSIDES AND DOCUSATE SODIUM 2 TABLET: 50; 8.6 TABLET ORAL at 20:49

## 2024-05-22 RX ADMIN — CYCLOBENZAPRINE 10 MG: 10 TABLET, FILM COATED ORAL at 20:48

## 2024-05-22 RX ADMIN — METHYLPREDNISOLONE 16 MG: 4 TABLET ORAL at 09:33

## 2024-05-22 RX ADMIN — Medication 3 MG: at 20:48

## 2024-05-22 RX ADMIN — HEPARIN SODIUM 5000 UNITS: 5000 INJECTION INTRAVENOUS; SUBCUTANEOUS at 16:20

## 2024-05-22 RX ADMIN — BETHANECHOL CHLORIDE 5 MG: 10 TABLET ORAL at 20:48

## 2024-05-22 RX ADMIN — PREGABALIN 50 MG: 50 CAPSULE ORAL at 20:49

## 2024-05-22 RX ADMIN — HEPARIN SODIUM 5000 UNITS: 5000 INJECTION INTRAVENOUS; SUBCUTANEOUS at 09:33

## 2024-05-22 RX ADMIN — CYCLOBENZAPRINE 10 MG: 10 TABLET, FILM COATED ORAL at 09:33

## 2024-05-22 RX ADMIN — LIDOCAINE 2 PATCH: 4 PATCH TOPICAL at 09:32

## 2024-05-22 RX ADMIN — SENNOSIDES AND DOCUSATE SODIUM 2 TABLET: 50; 8.6 TABLET ORAL at 09:33

## 2024-05-22 RX ADMIN — CYCLOBENZAPRINE 10 MG: 10 TABLET, FILM COATED ORAL at 16:20

## 2024-05-22 RX ADMIN — BETHANECHOL CHLORIDE 5 MG: 10 TABLET ORAL at 16:20

## 2024-05-22 RX ADMIN — PREGABALIN 50 MG: 50 CAPSULE ORAL at 16:20

## 2024-05-22 RX ADMIN — BETHANECHOL CHLORIDE 5 MG: 10 TABLET ORAL at 09:33

## 2024-05-22 RX ADMIN — HEPARIN SODIUM 5000 UNITS: 5000 INJECTION INTRAVENOUS; SUBCUTANEOUS at 00:10

## 2024-05-22 RX ADMIN — PANTOPRAZOLE SODIUM 40 MG: 40 TABLET, DELAYED RELEASE ORAL at 09:38

## 2024-05-22 ASSESSMENT — COGNITIVE AND FUNCTIONAL STATUS - GENERAL
CLIMB 3 TO 5 STEPS WITH RAILING: TOTAL
PERSONAL GROOMING: A LITTLE
CLIMB 3 TO 5 STEPS WITH RAILING: TOTAL
EATING MEALS: A LITTLE
STANDING UP FROM CHAIR USING ARMS: A LOT
TURNING FROM BACK TO SIDE WHILE IN FLAT BAD: A LOT
DRESSING REGULAR LOWER BODY CLOTHING: A LOT
MOBILITY SCORE: 12
STANDING UP FROM CHAIR USING ARMS: A LOT
EATING MEALS: A LITTLE
MOVING TO AND FROM BED TO CHAIR: A LOT
MOVING FROM LYING ON BACK TO SITTING ON SIDE OF FLAT BED WITH BEDRAILS: A LITTLE
DRESSING REGULAR UPPER BODY CLOTHING: A LITTLE
MOVING TO AND FROM BED TO CHAIR: A LOT
MOBILITY SCORE: 12
HELP NEEDED FOR BATHING: A LOT
DRESSING REGULAR UPPER BODY CLOTHING: A LITTLE
WALKING IN HOSPITAL ROOM: A LOT
HELP NEEDED FOR BATHING: A LOT
WALKING IN HOSPITAL ROOM: A LOT
DRESSING REGULAR LOWER BODY CLOTHING: A LOT
DAILY ACTIVITIY SCORE: 14
TURNING FROM BACK TO SIDE WHILE IN FLAT BAD: A LOT
TOILETING: TOTAL
TOILETING: TOTAL
DAILY ACTIVITIY SCORE: 14
MOVING FROM LYING ON BACK TO SITTING ON SIDE OF FLAT BED WITH BEDRAILS: A LITTLE
PERSONAL GROOMING: A LITTLE

## 2024-05-22 ASSESSMENT — PAIN - FUNCTIONAL ASSESSMENT
PAIN_FUNCTIONAL_ASSESSMENT: 0-10

## 2024-05-22 ASSESSMENT — PAIN SCALES - GENERAL
PAINLEVEL_OUTOF10: 0 - NO PAIN
PAINLEVEL_OUTOF10: 4
PAINLEVEL_OUTOF10: 0 - NO PAIN

## 2024-05-22 ASSESSMENT — ACTIVITIES OF DAILY LIVING (ADL): HOME_MANAGEMENT_TIME_ENTRY: 18

## 2024-05-22 NOTE — DISCHARGE SUMMARY
Discharge Diagnosis  Thoracogenic scoliosis of thoracolumbar region    Issues Requiring Follow-Up  Routine post operative follow up   Ensure continues having bowel movements    Test Results Pending At Discharge  Pending Labs       No current pending labs.            Hospital Course  Khalida Barker is a 73 y.o. female with a past medical history of severe low back pain and LLE radiculopathy and scoliosis who was taken to the OR on 5/14 for T10-pelvis fusion, multilevel PCOs, and L2-S1 TLIF. Required 1 unit PRBC transfusion directly post op. 5/15 Hbg 7 for which patient given 2 units PRBCs. 5/16 Hbg 7.6 for which patient given 1 additional unit PRBC with post transfusion CBC appropriate increment of Hbg to 9.0. On 5/17 surgical drain auto DC'd and additional drain removed 5/19. Mcmahon replaced 5/16 due to failed trial of void. Attempted trial of void additionally completed 5/19 with ultimate replacement of Mcmahon due to continued urinary retention. Patient without BM x7 days despite substantial bowel regimen, KUB obtained 5/21 with concern for ileus , with extensive bowel regimen patient had multiple bowel movement 5/21-5/22. PT/OT evaluated patient and recommended high intensity level of continued care for which a referral was placed for acute rehab. On the day of discharge, the patient was seen and evaluated by the neurosurgery team and deemed suitable for discharge to acute rehab. On day of discharge the patient was tolerating a diet, pain was controlled on PO pain medication, was ambulating well and voiding spontaneously. The patient was given detailed discharge instructions and were scheduled to follow up as an outpatient.     Pertinent Physical Exam At Time of Discharge  Physical Exam    Home Medications     Medication List      START taking these medications     bethanechol 5 mg tablet; Commonly known as: Urecholine; Take 1 tablet (5   mg) by mouth 3 times a day.   bisacodyl 5 mg EC tablet; Commonly known as:  Dulcolax; Take 1 tablet (5   mg) by mouth once daily as needed for constipation. Do not crush, chew, or   split.   cyclobenzaprine 10 mg tablet; Commonly known as: Flexeril; Take 1 tablet   (10 mg) by mouth 3 times a day as needed for muscle spasms.   heparin (porcine) 5,000 unit/mL injection; Inject 1 mL (5,000 Units)   under the skin every 8 hours.   insulin lispro 100 unit/mL injection; Commonly known as: HumaLOG; Inject   0-0.05 mL (0-5 Units) under the skin 3 times daily (morning, midday, late   afternoon). For use while on steroids. Okay to DC after completion of   steroids.  Take as directed per insulin instructions.   methylPREDNISolone 4 mg tablet; Commonly known as: Medrol; Take 4   tablets (16 mg) by mouth once daily for 1 day, THEN 3 tablets (12 mg) once   daily for 1 day, THEN 2 tablets (8 mg) once daily for 1 day, THEN 1 tablet   (4 mg) once daily for 1 day.; Start taking on: May 22, 2024   pantoprazole 40 mg EC tablet; Commonly known as: ProtoNix; Take 1 tablet   (40 mg) by mouth once daily in the morning. Take before meals. Do not   crush, chew, or split.   polyethylene glycol 17 gram packet; Commonly known as: Glycolax,   Miralax; Take 17 g by mouth 3 times a day as needed (constipation).   sennosides-docusate sodium 8.6-50 mg tablet; Commonly known as:   Maggie-Colace; Take 2 tablets by mouth 2 times a day.     CHANGE how you take these medications     aspirin-acetaminophen-caffeine 250-250-65 mg tablet; Commonly known as:   Excedrin Migraine; Take 2 tablets by mouth every 8 hours if needed for   headaches. Do not resume until discussed restart of aspirin portion of   medication at neurosurgery follow up appointment.; What changed:   additional instructions   pregabalin 50 mg capsule; Commonly known as: Lyrica; Take 1 capsule (50   mg) by mouth once daily at bedtime.; What changed: when to take this     CONTINUE taking these medications     BENEFIBER CLEAR SF (DEXTRIN) ORAL   Biofreeze (menthol) 4 %  gel gel; Generic drug: menthol   biotin 10,000 mcg capsule   cholecalciferol 25 MCG (1000 UT) tablet; Commonly known as: Vitamin D-3   docusate sodium 100 mg capsule; Commonly known as: Colace   ibuprofen 200 mg tablet   LIQUID TEARS OPHT   MULTI VITAMIN ORAL   NON FORMULARY   NON FORMULARY   Tylenol Extra Strength 500 mg tablet; Generic drug: acetaminophen     STOP taking these medications     naproxen sodium 220 mg tablet; Commonly known as: Aleve       Outpatient Follow-Up  Future Appointments   Date Time Provider Department Center   5/28/2024 10:30 AM Gaurang Russell PA-C [id number] [place]   6/19/2024  9:00 AM Rocco Rushing MD [id number] [facility]   8/2/2024  9:00 AM Justin Lu MD [id number] [facility]       Bailee Lopez MD

## 2024-05-22 NOTE — PROGRESS NOTES
"Khalida Barker is a 73 y.o. female on day 8 of admission presenting with Thoracogenic scoliosis of thoracolumbar region.    Subjective   Patient had several BMs yesterday after drinking nulytely. She would like to work with PT and OT more frequently. She is eager to go to rehab to improve.     Objective     Physical Exam  AOx3  RLE DF4 ow 5  LLE HF 4 ow 5  LLE radiculopathy, RLE (toe numbness)    Last Recorded Vitals  Blood pressure 96/57, pulse 77, temperature 36.4 °C (97.5 °F), resp. rate 16, height 1.651 m (5' 5\"), weight 95.4 kg (210 lb 5.1 oz), SpO2 97%.  Intake/Output last 3 Shifts:  I/O last 3 completed shifts:  In: - (0 mL/kg)   Out: 4250 (44.5 mL/kg) [Urine:4250 (1.2 mL/kg/hr)]  Weight: 95.4 kg     Relevant Results  Lab Results   Component Value Date    WBC 6.6 05/20/2024    HGB 9.7 (L) 05/20/2024    HCT 32.5 (L) 05/20/2024     05/20/2024     05/20/2024     Lab Results   Component Value Date    GLUCOSE 141 (H) 05/20/2024    CALCIUM 8.4 (L) 05/20/2024     05/20/2024    K 3.7 05/20/2024    CO2 23 05/20/2024     (H) 05/20/2024    BUN 23 05/20/2024    CREATININE 0.71 05/20/2024     Assessment/Plan   Principal Problem:    Thoracogenic scoliosis of thoracolumbar region  Active Problems:    Scoliosis deformity of spine    H/o severe LBP and LLE radiculopathy and scoliosis 5/14 s/p C29-[id number], multi-level PCOs, L2-S1 TLIF, s/p 1U PRBCs    5/17 s/p 10IV dex, 5/17 L drain auto dc'd, s/p stitch  5/18 R drain dc'd w stitch  5/21 s/p several BMs    PLAN  Tele  EOS re-ordered  AM labs  PTOT-rehab  SCDs/SQH    Medically ready for discharge to rehab    Jos Cartagena MD      "

## 2024-05-22 NOTE — PROGRESS NOTES
Occupational Therapy    Occupational Therapy Treatment    Name: Khalida Barker  MRN: [id number]  : 1950  Date: 24  Room: [address] [place] [address]      Time Calculation  Start Time: 1057  Stop Time: 1115  Time Calculation (min): 18 min    Assessment:  OT Assessment: presenting with deficits in ADLs, IADLs, transfers, bed mobility and functional activity tolerance. Pt would benefit from sklled OT intervention to return to PLOF  Evaluation/Treatment Tolerance: Patient tolerated treatment well  End of Session Communication: Bedside nurse  End of Session Patient Position: Up in chair, Alarm off, not on at start of session  Plan:  Treatment Interventions: ADL retraining, Functional transfer training, UE strengthening/ROM, Endurance training, Compensatory technique education  OT Frequency: 4 times per week  OT Discharge Recommendations: High intensity level of continued care  Equipment Recommended upon Discharge: Wheeled walker  OT Recommended Transfer Status: Assist of 2  OT - OK to Discharge: Yes    Subjective   General:  OT Last Visit  OT Received On: 24  Reason for Referral: T10-Pelvis Instrumented Fusion, Correction of Scoliotic Deformity, L1-2, L2-3, L3-4, L4-5, L5-S1 posterior column osteotomies, L3-4 anterior column osteotomy through disc space,  L1-S1 Decompression, with L2-3, L3-4, L4-5, L5-S1 Transforaminal Lumbar Interbody Fusion, T11-T12 Decompression, Navigation, Allograft, Autograft for fusion  Past Medical History Relevant to Rehab: osteoarthritis, cervicalgia, spinal stenosis, scoliosis, and peripheral neuropathy  Co-Treatment: PT  Co-Treatment Reason: partial co-tx to facilitate safety, therapeutic success and d/c  Prior to Session Communication: Bedside nurse  Patient Position Received: Up in chair, Alarm off, not on at start of session  Family/Caregiver Present: Yes  Caregiver Feedback:  present and supportive  General Comment: pleasant and cooperative, agreeable to OT. N/T B LE    Precautions:  Hearing/Visual Limitations: WFL  Medical Precautions: Fall precautions  Post-Surgical Precautions: Spinal precautions    Lines/Tubes/Drains:  Urethral Catheter 16 Fr. (Active)   Number of days: 2       Cognition:  Overall Cognitive Status: Within Functional Limits  Orientation Level: Oriented X4  Safety/Judgement: Within Functional Limits    Pain Assessment:  Pain Assessment  Pain Assessment: 0-10  Pain Type: Surgical pain  Pain Location: Back     Objective   Activities of Daily Living:        Grooming  Grooming Comments: Pt perforemd hair washing with shampoor cap, drying hair with towel, hair brushing SBA with setup seated in chair     Bed Mobility/Transfers:      Transfers  Transfer: Yes  Transfer 1  Transfer From 1: Chair with arms to  Transfer to 1: Stand  Technique 1: Sit to stand  Transfer Device 1: Walker  Transfer Level of Assistance 1: Minimum assistance, Minimal verbal cues  Trials/Comments 1: 1x; VC for hand palcement  Transfers 2  Transfer From 2: Stand to  Transfer to 2: Chair with arms  Technique 2: Stand to sit  Transfer Device 2: Walker  Transfer Level of Assistance 2: Minimum assistance, Minimal verbal cues  Trials/Comments 2: 1x     Therapy/Activity:      Therapeutic Activity  Therapeutic Activity Performed: Yes  Therapeutic Activity 1: Pt performed functional mobility ~8ft forward and back to chair with FWW min A, unsteady gait and VC for sequencing     Outcome Measures:  [facility] Daily Activity  Putting on and taking off regular lower body clothing: A lot  Bathing (including washing, rinsing, drying): A lot  Putting on and taking off regular upper body clothing: A little  Toileting, which includes using toilet, bedpan or urinal: Total  Taking care of personal grooming such as brushing teeth: A little  Eating Meals: A little  Daily Activity - Total Score: 14     Education Documentation  Body Mechanics, taught by Nevin Crowell OT at 5/22/2024 11:27 AM.  Learner: Patient  Readiness:  Acceptance  Method: Explanation  Response: Verbalizes Understanding    Precautions, taught by Nevin Crowell OT at 5/22/2024 11:27 AM.  Learner: Patient  Readiness: Acceptance  Method: Explanation  Response: Verbalizes Understanding    ADL Training, taught by Nevin Crowell OT at 5/22/2024 11:27 AM.  Learner: Patient  Readiness: Acceptance  Method: Explanation  Response: Verbalizes Understanding    Goals:  Encounter Problems       Encounter Problems (Active)       ADLs       Patient with complete lower body dressing with minimal assist  level of assistance donning and doffing all LE clothes  with PRN adaptive equipment while edge of bed  (Progressing)       Start:  05/15/24    Expected End:  06/05/24            Patient will complete toileting including hygiene clothing management/hygiene with contact guard assist level of assistance and grab bars. (Progressing)       Start:  05/15/24    Expected End:  06/05/24               BALANCE       Patientt will maintain static standing balance during ADL task with contact guard assist level of assistance drop down in order to demonstrate decreased risk of falling and improved postural control. (Progressing)       Start:  05/15/24    Expected End:  06/05/24               TRANSFERS       Patient will perform bed mobility minimal assist  level of assistance and bed rails in order to improve safety and independence with mobility (Progressing)       Start:  05/15/24    Expected End:  06/05/24            Patient will complete functional transfer to [facility] with least restrictive device with contact guard assist level of assistance. (Progressing)       Start:  05/15/24    Expected End:  06/05/24                  Encounter Problems (Resolved)       COGNITION/SAFETY       Patient will recall and adhere to spinal precautions during all functional mobility/ADL tasks in order to demonstrate improved understanding and promote healing post op (Met)       Start:  05/15/24    Expected End:   06/05/24    Resolved:  05/22/24 05/22/24 at 11:27 AM   Nevin Crowell, OT   [phone]

## 2024-05-22 NOTE — PROGRESS NOTES
Physical Therapy    Physical Therapy Treatment    Patient Name: Khalida Barker  MRN: [id number]  Today's Date: 5/22/2024  Time Calculation  Start Time: 1040  Stop Time: 1103  Time Calculation (min): 23 min    Assessment/Plan   PT Assessment  End of Session Communication: Bedside nurse  End of Session Patient Position: Up in chair, Alarm off, not on at start of session  PT Plan  Inpatient/Swing Bed or Outpatient: Inpatient  PT Plan  Treatment/Interventions: Bed mobility, Transfer training, Gait training, Stair training, Balance training, Neuromuscular re-education, Strengthening, Endurance training, Range of motion, Therapeutic exercise, Therapeutic activity, Home exercise program, Postural re-education  PT Plan: Skilled PT  PT Frequency: Daily  PT Discharge Recommendations: High intensity level of continued care  Equipment Recommended upon Discharge: Wheeled walker  PT Recommended Transfer Status: Assist x2, Assistive device (RW)  PT - OK to Discharge: Yes (eval complete and discharge recommendations made)      General Visit Information:      General  Co-Treatment: OT  Co-Treatment Reason: partial co-treat to maximize pt function and do to time contraints with ASAP notes requested  Prior to Session Communication: Bedside nurse  Patient Position Received: Up in chair  General Comment: Pt pleasant and agreeable to therapy. pt tolerated 2x10' trials and is now able to negotiate turns with min assist. pt demonstrates continued weakness however demonstrates imropved foot clearence since prior session    Subjective   Precautions:  Precautions  Medical Precautions: Fall precautions  Post-Surgical Precautions: Spinal precautions  Vital Signs:       Objective   Pain:     Cognition:  Cognition  Orientation Level: Oriented X4    Treatments:            Ambulation/Gait Training 1  Surface 1: Level tile  Device 1: Rolling walker  Assistance 1: Minimum assistance  Quality of Gait 1: Shuffling gait, Decreased step length, Diminished  heel strike, Knee(s) buckle  Comments/Distance (ft) 1: 2x10, cues for walker safety and to increase B hip flexion  Transfer 1  Technique 1: Sit to stand, Stand to sit  Transfer Device 1: Walker  Transfer Level of Assistance 1: Minimum assistance  Trials/Comments 1: cues for hand palcement and pacing         Outcome Measures:  [facility] Basic Mobility  Turning from your back to your side while in a flat bed without using bedrails: A little  Moving from lying on your back to sitting on the side of a flat bed without using bedrails: A lot  Moving to and from bed to chair (including a wheelchair): A lot  Standing up from a chair using your arms (e.g. wheelchair or bedside chair): A lot  To walk in hospital room: A lot  Climbing 3-5 steps with railing: Total  Basic Mobility - Total Score: 12    Education Documentation  Body Mechanics, taught by Tanmay Veronica PTA at 5/22/2024 12:27 PM.  Learner: Patient  Readiness: Acceptance  Method: Explanation  Response: Verbalizes Understanding    Education Comments  No comments found.        OP EDUCATION:       Encounter Problems       Encounter Problems (Active)       PT Problem       Patient will perform bed mobility with </= close sup to reduce risk of developing decubitus ulcers.  (Progressing)       Start:  05/15/24    Expected End:  05/29/24            Patient will perform sit to stand and stand to sit transfers with </= close sup and LRD to increase functional strength.  (Progressing)       Start:  05/15/24    Expected End:  05/29/24            Patient will ambulate at least 100  ft. with </= close sup and LRD to improve tolerance of community distances.    (Progressing)       Start:  05/15/24    Expected End:  05/29/24            Patient will ascend and descend 4 steps with railing and </= close sup to facilitate safe navigation of stairs in the home.    (Progressing)       Start:  05/15/24    Expected End:  05/29/24            Patient will perform introductory home exercise  program as prescribed without cues.   (Progressing)       Start:  05/15/24    Expected End:  05/29/24               Pain - Adult

## 2024-05-22 NOTE — PROGRESS NOTES
Social Work Discharge Planning note:    -Patient discussed during interdisciplinary rounds.   -Team members present: Resident, TCC, and SW  -Plan per medical team: Pt is medically ready for discharge to acute rehab placement.   -Payer: [employer]  -Status: Inpatient  -Discharge disposition:  [facility] was updated and they are ready to accept Pt today. Pt was updated and was agreeable with transfer. Transport time had originally been set for 18:00, but [facility] called and reported they had to change it to 20:30. Pt and  Rehab have been updated.   -Anticipated Date of Discharge:  5/22/2024    SEN Juarez, LSW

## 2024-05-28 ENCOUNTER — OFFICE VISIT (OUTPATIENT)
Dept: NEUROSURGERY | Facility: CLINIC | Age: 74
End: 2024-05-28
Payer: MEDICARE

## 2024-05-28 VITALS
BODY MASS INDEX: 35 KG/M2 | HEIGHT: 65 IN | TEMPERATURE: 97.7 F | DIASTOLIC BLOOD PRESSURE: 68 MMHG | SYSTOLIC BLOOD PRESSURE: 114 MMHG

## 2024-05-28 DIAGNOSIS — M41.35 THORACOGENIC SCOLIOSIS OF THORACOLUMBAR REGION: Primary | ICD-10-CM

## 2024-05-28 DIAGNOSIS — M48.061 SPINAL STENOSIS OF LUMBAR REGION, UNSPECIFIED WHETHER NEUROGENIC CLAUDICATION PRESENT: ICD-10-CM

## 2024-05-28 PROCEDURE — 1111F DSCHRG MED/CURRENT MED MERGE: CPT | Performed by: PHYSICIAN ASSISTANT

## 2024-05-28 PROCEDURE — 1159F MED LIST DOCD IN RCRD: CPT | Performed by: PHYSICIAN ASSISTANT

## 2024-05-28 PROCEDURE — 1125F AMNT PAIN NOTED PAIN PRSNT: CPT | Performed by: PHYSICIAN ASSISTANT

## 2024-05-28 PROCEDURE — 1036F TOBACCO NON-USER: CPT | Performed by: PHYSICIAN ASSISTANT

## 2024-05-28 PROCEDURE — 99024 POSTOP FOLLOW-UP VISIT: CPT | Performed by: PHYSICIAN ASSISTANT

## 2024-05-28 PROCEDURE — 3008F BODY MASS INDEX DOCD: CPT | Performed by: PHYSICIAN ASSISTANT

## 2024-05-28 ASSESSMENT — PATIENT HEALTH QUESTIONNAIRE - PHQ9
1. LITTLE INTEREST OR PLEASURE IN DOING THINGS: SEVERAL DAYS
10. IF YOU CHECKED OFF ANY PROBLEMS, HOW DIFFICULT HAVE THESE PROBLEMS MADE IT FOR YOU TO DO YOUR WORK, TAKE CARE OF THINGS AT HOME, OR GET ALONG WITH OTHER PEOPLE: NOT DIFFICULT AT ALL
SUM OF ALL RESPONSES TO PHQ9 QUESTIONS 1 & 2: 2
2. FEELING DOWN, DEPRESSED OR HOPELESS: SEVERAL DAYS

## 2024-05-28 ASSESSMENT — PAIN SCALES - GENERAL: PAINLEVEL: 10-WORST PAIN EVER

## 2024-05-28 NOTE — PROGRESS NOTES
[facility] [place]  Department of Neurological Surgery  Post Operative Patient Visit      History of Present Illness:  Khalida Barker is a 73 y.o. year old female who presents post T11 through S1 posterior decompression and fusion by Dr. Justin Lu on May 14, 2024.  She continues in considerable operative location pain and is managing currently without opioid pain medication secondary to significant constipation.  Would be possible to attempt tramadol usage while monitoring for any signs of rash/itch/worsening constipation.  Was given Medrol Dosepak and discharged on this which has now completed its course and may consider restarting if medically appropriate.  Preoperative numbness and tingling left lower extremity slightly improved however she continues with aching in both as well as degree of right and left foot drop at this time with difficulty ambulating even through use of wheeled walker.  She is likely to continue at inpatient acute rehab for 1 more week and would recommend this to continue as long as allowable.  At home physical therapy to continue afterwards.  Inspection of her incision shows mildly erythemic likely reaction to staples as she also has edin-incisional rash from tape application.  Approximately one third of staples removed today with patient tolerating well no complications encountered.  Will have her return in approximately 1 week for remainder staple removal.        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    Muscle weakness (generalized)    Positive colorectal cancer screening using Cologuard test    Primary osteoarthritis of right knee    Seborrheic keratoses    Tinnitus    Vitamin D deficiency    Left sided abdominal pain    BMI 30.0-30.9,adult    Cervicalgia     Cervical paraspinal muscle spasm    Non-smoker    S/P total knee arthroplasty, right    Right knee pain    Knee stiffness, right    Chronic left shoulder pain    Neck pain    Thoracogenic scoliosis of thoracolumbar region    Scoliosis deformity of spine     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     OA (osteoarthritis) of knee     Peripheral neuropathy     Scoliosis     Spinal stenosis     Trigger finger of right hand     Vision loss     wears glasses    Vitamin D deficiency      Past Surgical History:   Procedure Laterality Date    ADENOIDECTOMY  1956    BREAST BIOPSY  1999    COLONOSCOPY      KNEE ARTHROPLASTY Right     MOUTH SURGERY  2016    Back left molar removed    MOUTH SURGERY  09/28/2021    right molar    OTHER SURGICAL HISTORY      Skin lesion excision    SPINAL FUSION  06/2010    C4-5    TONSILLECTOMY  1956    WISDOM TOOTH EXTRACTION  1978    lower     Social History     Tobacco Use    Smoking status: Never    Smokeless tobacco: Never   Substance Use Topics    Alcohol use: Not Currently     Comment: rarely     family history includes Alzheimer's disease in her mother; Heart disease in her father; Hypertension in her brother; Polymyositis in her father; Skin cancer in her brother; peripheral artery disease in her brother.    Current Outpatient Medications:     acetaminophen (Tylenol Extra Strength) 500 mg tablet, Take 2 tablets (1,000 mg) by mouth every 8 hours if needed. EVERY 4 TO 6 HOURS AS NEEDED., Disp: , Rfl:     aspirin-acetaminophen-caffeine (Excedrin Migraine) 250-250-65 mg tablet, Take 2 tablets by mouth every 8 hours if needed for headaches. Do not resume until discussed restart of aspirin portion of medication at neurosurgery follow up appointment., Disp: 30 tablet, Rfl: 0    bethanechol (Urecholine) 5 mg tablet, Take 1 tablet (5 mg) by mouth 3  times a day., Disp: , Rfl:     biotin 10,000 mcg capsule, Take 1 capsule (10 mg) by mouth once daily. caps, Disp: , Rfl:     bisacodyl (Dulcolax) 5 mg EC tablet, Take 1 tablet (5 mg) by mouth once daily as needed for constipation. Do not crush, chew, or split., Disp: , Rfl:     cholecalciferol (Vitamin D-3) 25 MCG (1000 UT) tablet, Take 1 tablet (1,000 Units) by mouth once daily., Disp: , Rfl:     cyclobenzaprine (Flexeril) 10 mg tablet, Take 1 tablet (10 mg) by mouth 3 times a day as needed for muscle spasms., Disp: , Rfl:     docusate sodium (Colace) 100 mg capsule, Take 1 capsule (100 mg) by mouth once daily., Disp: , Rfl:     heparin sodium,porcine (heparin, porcine,) 5,000 unit/mL injection, Inject 1 mL (5,000 Units) under the skin every 8 hours., Disp: , Rfl:     ibuprofen 200 mg tablet, Take 1 tablet (200 mg) by mouth every 6 hours if needed., Disp: , Rfl:     insulin lispro (HumaLOG) 100 unit/mL injection, Inject 0-0.05 mL (0-5 Units) under the skin 3 times daily (morning, midday, late afternoon). For use while on steroids. Okay to DC after completion of steroids.  Take as directed per insulin instructions., Disp: , Rfl:     menthol (Biofreeze, menthol,) 4 % gel gel, Apply 1 Application topically if needed., Disp: , Rfl:     multivit-min/ferrous fumarate (MULTI VITAMIN ORAL), Take 1 tablet by mouth once daily., Disp: , Rfl:     NON FORMULARY, Take 1 each by mouth once daily. Viveran 200mg- pure caffience, Disp: , Rfl:     NON FORMULARY, Apply 1 each topically once daily as needed. Heel Timbo, Disp: , Rfl:     pantoprazole (ProtoNix) 40 mg EC tablet, Take 1 tablet (40 mg) by mouth once daily in the morning. Take before meals. Do not crush, chew, or split., Disp: , Rfl:     polyethylene glycol (Glycolax, Miralax) 17 gram packet, Take 17 g by mouth 3 times a day as needed (constipation)., Disp: , Rfl:     polyvinyl alcohol (LIQUID TEARS OPHT), INSTILL 1-2 DROPS INTO AFFECTED EYE(S) 4 TIMES DAILY AS DIRECTED.,  Disp: , Rfl:     pregabalin (Lyrica) 50 mg capsule, Take 1 capsule (50 mg) by mouth once daily at bedtime., Disp: , Rfl:     sennosides-docusate sodium (Maggie-Colace) 8.6-50 mg tablet, Take 2 tablets by mouth 2 times a day., Disp: , Rfl:     wheat dextrin (BENEFIBER CLEAR SF, DEXTRIN, ORAL), Take 2 Doses by mouth once daily. 2  teaspoons with 8 ounces water once daily, Disp: , Rfl:   Allergies   Allergen Reactions    Oxycodone Itching    Tramadol Itching         The above clinical summary has been dictated with voice recognition software. It has not been proofread for grammatical errors, typographical mistakes, or other semantic inconsistencies.    Thank you for visiting our office today. It was our pleasure to take part in your healthcare.     Do not hesitate to call with any questions regarding your plan of care after leaving at [phone] M-F 8am-4pm.     To clinicians, thank you very much for this kind referral. It is a privilege to partner with you in the care of your patients. My office would be delighted to assist you with any further consultations or with questions regarding the plan of care outlined. Do not hesitate to call the office or contact me directly.       Sincerely,      ZACK Ty, PAPradeepC  Associate Physician Assistant, Neurosurgery  Clinical   [facility] [employer] [facility]    [address]  [address] [place] [address]    Phone: [phone]  Fax: [phone]

## 2024-06-04 ENCOUNTER — APPOINTMENT (OUTPATIENT)
Dept: RADIOLOGY | Facility: HOSPITAL | Age: 74
End: 2024-06-04
Payer: MEDICARE

## 2024-06-04 ENCOUNTER — HOSPITAL ENCOUNTER (EMERGENCY)
Facility: HOSPITAL | Age: 74
Discharge: HOME | End: 2024-06-04
Attending: EMERGENCY MEDICINE
Payer: MEDICARE

## 2024-06-04 ENCOUNTER — HOSPITAL ENCOUNTER (INPATIENT)
Facility: HOSPITAL | Age: 74
End: 2024-06-04
Attending: EMERGENCY MEDICINE | Admitting: STUDENT IN AN ORGANIZED HEALTH CARE EDUCATION/TRAINING PROGRAM
Payer: MEDICARE

## 2024-06-04 ENCOUNTER — APPOINTMENT (OUTPATIENT)
Dept: NEUROSURGERY | Facility: CLINIC | Age: 74
End: 2024-06-04
Payer: MEDICARE

## 2024-06-04 VITALS
WEIGHT: 180 LBS | RESPIRATION RATE: 18 BRPM | TEMPERATURE: 97.5 F | HEART RATE: 86 BPM | HEIGHT: 65 IN | BODY MASS INDEX: 29.99 KG/M2 | OXYGEN SATURATION: 99 % | DIASTOLIC BLOOD PRESSURE: 66 MMHG | SYSTOLIC BLOOD PRESSURE: 124 MMHG

## 2024-06-04 DIAGNOSIS — K59.00 CONSTIPATION, UNSPECIFIED CONSTIPATION TYPE: ICD-10-CM

## 2024-06-04 DIAGNOSIS — M41.35 THORACOGENIC SCOLIOSIS OF THORACOLUMBAR REGION: ICD-10-CM

## 2024-06-04 DIAGNOSIS — L08.9 SKIN INFECTION: ICD-10-CM

## 2024-06-04 DIAGNOSIS — A49.02 MRSA INFECTION: Primary | ICD-10-CM

## 2024-06-04 DIAGNOSIS — Z91.89 AT RISK FOR SURGICAL SITE INFECTION: ICD-10-CM

## 2024-06-04 DIAGNOSIS — S62.101A CLOSED FRACTURE OF RIGHT WRIST, INITIAL ENCOUNTER: ICD-10-CM

## 2024-06-04 DIAGNOSIS — S62.101A CLOSED FRACTURE OF RIGHT WRIST, INITIAL ENCOUNTER: Primary | ICD-10-CM

## 2024-06-04 DIAGNOSIS — S52.501A CLOSED FRACTURE OF DISTAL END OF RIGHT RADIUS, UNSPECIFIED FRACTURE MORPHOLOGY, INITIAL ENCOUNTER: ICD-10-CM

## 2024-06-04 DIAGNOSIS — T81.49XA SURGICAL SITE INFECTION: ICD-10-CM
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
## 2024-06-04 PROBLEM — L90.5 PAINFUL SCAR: Status: RESOLVED | Noted: 2017-09-20 | Resolved: 2024-06-04

## 2024-06-04 PROBLEM — F41.9 ANXIETY: Status: ACTIVE | Noted: 2024-06-04

## 2024-06-04 PROBLEM — G56.00 CARPAL TUNNEL SYNDROME: Status: ACTIVE | Noted: 2024-06-04

## 2024-06-04 PROBLEM — M43.28 ANKYLOSIS OF SACROILIAC JOINT: Status: RESOLVED | Noted: 2024-06-04 | Resolved: 2024-06-04

## 2024-06-04 PROBLEM — M54.50 LOW BACK PAIN, UNSPECIFIED: Status: ACTIVE | Noted: 2023-06-27

## 2024-06-04 PROBLEM — K57.92 DIVERTICULITIS: Status: RESOLVED | Noted: 2023-06-27 | Resolved: 2024-06-04

## 2024-06-04 PROBLEM — R52 PAINFUL SCAR: Status: RESOLVED | Noted: 2017-09-20 | Resolved: 2024-06-04

## 2024-06-04 PROBLEM — R53.1 WEAKNESS: Status: ACTIVE | Noted: 2024-06-04

## 2024-06-04 LAB
ALBUMIN SERPL BCP-MCNC: 3 G/DL (ref 3.4–5)
ALP SERPL-CCNC: 154 U/L (ref 33–136)
ALT SERPL W P-5'-P-CCNC: 14 U/L (ref 7–45)
ANION GAP SERPL CALC-SCNC: 19 MMOL/L (ref 10–20)
AST SERPL W P-5'-P-CCNC: 21 U/L (ref 9–39)
BASOPHILS # BLD AUTO: 0.03 X10*3/UL (ref 0–0.1)
BASOPHILS NFR BLD AUTO: 0.3 %
BILIRUB SERPL-MCNC: 1 MG/DL (ref 0–1.2)
BUN SERPL-MCNC: 15 MG/DL (ref 6–23)
CALCIUM SERPL-MCNC: 8.5 MG/DL (ref 8.6–10.3)
CHLORIDE SERPL-SCNC: 100 MMOL/L (ref 98–107)
CO2 SERPL-SCNC: 18 MMOL/L (ref 21–32)
CREAT SERPL-MCNC: 0.59 MG/DL (ref 0.5–1.05)
EGFRCR SERPLBLD CKD-EPI 2021: >90 ML/MIN/1.73M*2
EOSINOPHIL # BLD AUTO: 0.01 X10*3/UL (ref 0–0.4)
EOSINOPHIL NFR BLD AUTO: 0.1 %
ERYTHROCYTE [DISTWIDTH] IN BLOOD BY AUTOMATED COUNT: 14.5 % (ref 11.5–14.5)
GLUCOSE SERPL-MCNC: 145 MG/DL (ref 74–99)
HCT VFR BLD AUTO: 32.6 % (ref 36–46)
HGB BLD-MCNC: 10 G/DL (ref 12–16)
HOLD SPECIMEN: NORMAL
IMM GRANULOCYTES # BLD AUTO: 0.09 X10*3/UL (ref 0–0.5)
IMM GRANULOCYTES NFR BLD AUTO: 0.8 % (ref 0–0.9)
LACTATE SERPL-SCNC: 1.4 MMOL/L (ref 0.4–2)
LYMPHOCYTES # BLD AUTO: 0.63 X10*3/UL (ref 0.8–3)
LYMPHOCYTES NFR BLD AUTO: 5.7 %
MCH RBC QN AUTO: 29 PG (ref 26–34)
MCHC RBC AUTO-ENTMCNC: 30.7 G/DL (ref 32–36)
MCV RBC AUTO: 95 FL (ref 80–100)
MONOCYTES # BLD AUTO: 0.63 X10*3/UL (ref 0.05–0.8)
MONOCYTES NFR BLD AUTO: 5.7 %
NEUTROPHILS # BLD AUTO: 9.72 X10*3/UL (ref 1.6–5.5)
NEUTROPHILS NFR BLD AUTO: 87.4 %
NRBC BLD-RTO: 0 /100 WBCS (ref 0–0)
PLATELET # BLD AUTO: 262 X10*3/UL (ref 150–450)
POTASSIUM SERPL-SCNC: 3.7 MMOL/L (ref 3.5–5.3)
PROT SERPL-MCNC: 6.2 G/DL (ref 6.4–8.2)
RBC # BLD AUTO: 3.45 X10*6/UL (ref 4–5.2)
SODIUM SERPL-SCNC: 133 MMOL/L (ref 136–145)
WBC # BLD AUTO: 11.1 X10*3/UL (ref 4.4–11.3)

## 2024-06-04 PROCEDURE — 99222 1ST HOSP IP/OBS MODERATE 55: CPT | Performed by: STUDENT IN AN ORGANIZED HEALTH CARE EDUCATION/TRAINING PROGRAM

## 2024-06-04 PROCEDURE — 29125 APPL SHORT ARM SPLINT STATIC: CPT | Mod: RT

## 2024-06-04 PROCEDURE — 96375 TX/PRO/DX INJ NEW DRUG ADDON: CPT

## 2024-06-04 PROCEDURE — 73130 X-RAY EXAM OF HAND: CPT | Mod: RT

## 2024-06-04 PROCEDURE — 73110 X-RAY EXAM OF WRIST: CPT | Mod: RT

## 2024-06-04 PROCEDURE — 96372 THER/PROPH/DIAG INJ SC/IM: CPT | Performed by: STUDENT IN AN ORGANIZED HEALTH CARE EDUCATION/TRAINING PROGRAM

## 2024-06-04 PROCEDURE — 2500000004 HC RX 250 GENERAL PHARMACY W/ HCPCS (ALT 636 FOR OP/ED): Performed by: EMERGENCY MEDICINE

## 2024-06-04 PROCEDURE — 2500000001 HC RX 250 WO HCPCS SELF ADMINISTERED DRUGS (ALT 637 FOR MEDICARE OP): Performed by: STUDENT IN AN ORGANIZED HEALTH CARE EDUCATION/TRAINING PROGRAM

## 2024-06-04 PROCEDURE — G0378 HOSPITAL OBSERVATION PER HR: HCPCS

## 2024-06-04 PROCEDURE — 99284 EMERGENCY DEPT VISIT MOD MDM: CPT | Mod: 25

## 2024-06-04 PROCEDURE — 80053 COMPREHEN METABOLIC PANEL: CPT | Performed by: EMERGENCY MEDICINE

## 2024-06-04 PROCEDURE — 83605 ASSAY OF LACTIC ACID: CPT | Performed by: STUDENT IN AN ORGANIZED HEALTH CARE EDUCATION/TRAINING PROGRAM

## 2024-06-04 PROCEDURE — 73090 X-RAY EXAM OF FOREARM: CPT | Mod: RIGHT SIDE | Performed by: RADIOLOGY

## 2024-06-04 PROCEDURE — 36415 COLL VENOUS BLD VENIPUNCTURE: CPT | Performed by: EMERGENCY MEDICINE

## 2024-06-04 PROCEDURE — 36415 COLL VENOUS BLD VENIPUNCTURE: CPT | Performed by: STUDENT IN AN ORGANIZED HEALTH CARE EDUCATION/TRAINING PROGRAM

## 2024-06-04 PROCEDURE — 73090 X-RAY EXAM OF FOREARM: CPT | Mod: RT

## 2024-06-04 PROCEDURE — 2500000002 HC RX 250 W HCPCS SELF ADMINISTERED DRUGS (ALT 637 FOR MEDICARE OP, ALT 636 FOR OP/ED): Performed by: STUDENT IN AN ORGANIZED HEALTH CARE EDUCATION/TRAINING PROGRAM

## 2024-06-04 PROCEDURE — 2500000004 HC RX 250 GENERAL PHARMACY W/ HCPCS (ALT 636 FOR OP/ED)

## 2024-06-04 PROCEDURE — 2500000004 HC RX 250 GENERAL PHARMACY W/ HCPCS (ALT 636 FOR OP/ED): Performed by: STUDENT IN AN ORGANIZED HEALTH CARE EDUCATION/TRAINING PROGRAM

## 2024-06-04 PROCEDURE — 85025 COMPLETE CBC W/AUTO DIFF WBC: CPT | Performed by: EMERGENCY MEDICINE

## 2024-06-04 PROCEDURE — 99285 EMERGENCY DEPT VISIT HI MDM: CPT | Performed by: EMERGENCY MEDICINE

## 2024-06-04 PROCEDURE — 99285 EMERGENCY DEPT VISIT HI MDM: CPT

## 2024-06-04 PROCEDURE — 96374 THER/PROPH/DIAG INJ IV PUSH: CPT

## 2024-06-04 RX ORDER — MORPHINE SULFATE 4 MG/ML
4 INJECTION, SOLUTION INTRAMUSCULAR; INTRAVENOUS ONCE
Status: COMPLETED | OUTPATIENT
Start: 2024-06-04 | End: 2024-06-04

## 2024-06-04 RX ORDER — HYDROMORPHONE HYDROCHLORIDE 1 MG/ML
0.6 INJECTION, SOLUTION INTRAMUSCULAR; INTRAVENOUS; SUBCUTANEOUS EVERY 4 HOURS PRN
Status: DISCONTINUED | OUTPATIENT
Start: 2024-06-04 | End: 2024-06-08

## 2024-06-04 RX ORDER — TRAMADOL HYDROCHLORIDE 50 MG/1
50 TABLET ORAL EVERY 6 HOURS PRN
COMMUNITY
End: 2024-06-15 | Stop reason: HOSPADM

## 2024-06-04 RX ORDER — SULFAMETHOXAZOLE AND TRIMETHOPRIM 800; 160 MG/1; MG/1
1 TABLET ORAL 2 TIMES DAILY
Qty: 20 TABLET | Refills: 0 | Status: ON HOLD | OUTPATIENT
Start: 2024-06-04 | End: 2024-06-14

## 2024-06-04 RX ORDER — METHOCARBAMOL 100 MG/ML
1000 INJECTION, SOLUTION INTRAMUSCULAR; INTRAVENOUS EVERY 8 HOURS PRN
Status: DISCONTINUED | OUTPATIENT
Start: 2024-06-04 | End: 2024-06-04

## 2024-06-04 RX ORDER — MULTIVIT-MIN/IRON FUM/FOLIC AC 7.5 MG-4
1 TABLET ORAL DAILY
COMMUNITY

## 2024-06-04 RX ORDER — POLYETHYLENE GLYCOL 3350 17 G/17G
17 POWDER, FOR SOLUTION ORAL DAILY PRN
Status: DISCONTINUED | OUTPATIENT
Start: 2024-06-04 | End: 2024-06-05

## 2024-06-04 RX ORDER — ACETAMINOPHEN 325 MG/1
650 TABLET ORAL EVERY 6 HOURS PRN
Status: DISCONTINUED | OUTPATIENT
Start: 2024-06-04 | End: 2024-06-04

## 2024-06-04 RX ORDER — ACETAMINOPHEN 500 MG
5 TABLET ORAL NIGHTLY PRN
Status: DISCONTINUED | OUTPATIENT
Start: 2024-06-04 | End: 2024-06-15 | Stop reason: HOSPADM

## 2024-06-04 RX ORDER — ACETAMINOPHEN 325 MG/1
650 TABLET ORAL ONCE
Status: COMPLETED | OUTPATIENT
Start: 2024-06-04 | End: 2024-06-04

## 2024-06-04 RX ORDER — ACETAMINOPHEN 500 MG
500 TABLET ORAL EVERY 8 HOURS PRN
Qty: 30 TABLET | Refills: 0 | Status: ON HOLD | OUTPATIENT
Start: 2024-06-04

## 2024-06-04 RX ORDER — HYDROCODONE BITARTRATE AND ACETAMINOPHEN 5; 325 MG/1; MG/1
1 TABLET ORAL EVERY 8 HOURS PRN
Qty: 12 TABLET | Refills: 0 | Status: ON HOLD | OUTPATIENT
Start: 2024-06-04 | End: 2024-06-07

## 2024-06-04 RX ORDER — SULFAMETHOXAZOLE AND TRIMETHOPRIM 800; 160 MG/1; MG/1
1 TABLET ORAL 2 TIMES DAILY
Qty: 14 TABLET | Refills: 0 | Status: SHIPPED | OUTPATIENT
Start: 2024-06-04 | End: 2024-06-04

## 2024-06-04 RX ORDER — MORPHINE SULFATE 4 MG/ML
INJECTION, SOLUTION INTRAMUSCULAR; INTRAVENOUS
Status: COMPLETED
Start: 2024-06-04 | End: 2024-06-04

## 2024-06-04 RX ORDER — KETOROLAC TROMETHAMINE 15 MG/ML
15 INJECTION, SOLUTION INTRAMUSCULAR; INTRAVENOUS EVERY 6 HOURS PRN
Status: DISCONTINUED | OUTPATIENT
Start: 2024-06-04 | End: 2024-06-08

## 2024-06-04 RX ORDER — SULFAMETHOXAZOLE AND TRIMETHOPRIM 800; 160 MG/1; MG/1
1 TABLET ORAL EVERY 12 HOURS SCHEDULED
Status: DISCONTINUED | OUTPATIENT
Start: 2024-06-04 | End: 2024-06-06

## 2024-06-04 RX ORDER — ORPHENADRINE CITRATE 30 MG/ML
60 INJECTION INTRAMUSCULAR; INTRAVENOUS ONCE
Status: COMPLETED | OUTPATIENT
Start: 2024-06-04 | End: 2024-06-04

## 2024-06-04 RX ORDER — ENOXAPARIN SODIUM 100 MG/ML
40 INJECTION SUBCUTANEOUS EVERY 24 HOURS
Status: DISCONTINUED | OUTPATIENT
Start: 2024-06-04 | End: 2024-06-08

## 2024-06-04 RX ORDER — POLYETHYLENE GLYCOL 3350 17 G/17G
17 POWDER, FOR SOLUTION ORAL DAILY
Status: DISCONTINUED | OUTPATIENT
Start: 2024-06-04 | End: 2024-06-08

## 2024-06-04 RX ORDER — FENTANYL CITRATE 50 UG/ML
50 INJECTION, SOLUTION INTRAMUSCULAR; INTRAVENOUS ONCE
Status: COMPLETED | OUTPATIENT
Start: 2024-06-04 | End: 2024-06-04

## 2024-06-04 RX ORDER — ACETAMINOPHEN 160 MG/5ML
650 SOLUTION ORAL ONCE
Status: DISCONTINUED | OUTPATIENT
Start: 2024-06-04 | End: 2024-06-04

## 2024-06-04 RX ORDER — SENNOSIDES 8.6 MG/1
1 TABLET ORAL NIGHTLY
Status: DISCONTINUED | OUTPATIENT
Start: 2024-06-04 | End: 2024-06-08

## 2024-06-04 RX ADMIN — MORPHINE SULFATE 4 MG: 4 INJECTION, SOLUTION INTRAMUSCULAR; INTRAVENOUS at 13:10

## 2024-06-04 RX ADMIN — HYDROMORPHONE HYDROCHLORIDE 0.6 MG: 1 INJECTION, SOLUTION INTRAMUSCULAR; INTRAVENOUS; SUBCUTANEOUS at 22:09

## 2024-06-04 RX ADMIN — Medication 5 MG: at 23:08

## 2024-06-04 RX ADMIN — ENOXAPARIN SODIUM 40 MG: 40 INJECTION SUBCUTANEOUS at 22:34

## 2024-06-04 RX ADMIN — FENTANYL CITRATE 50 MCG: 50 INJECTION, SOLUTION INTRAMUSCULAR; INTRAVENOUS at 08:59

## 2024-06-04 RX ADMIN — SULFAMETHOXAZOLE AND TRIMETHOPRIM 1 TABLET: 800; 160 TABLET ORAL at 23:08

## 2024-06-04 RX ADMIN — ACETAMINOPHEN 650 MG: 325 TABLET ORAL at 18:44

## 2024-06-04 RX ADMIN — ORPHENADRINE CITRATE 60 MG: 30 INJECTION INTRAMUSCULAR; INTRAVENOUS at 18:40

## 2024-06-04 RX ADMIN — SODIUM CHLORIDE, POTASSIUM CHLORIDE, SODIUM LACTATE AND CALCIUM CHLORIDE 1000 ML: 600; 310; 30; 20 INJECTION, SOLUTION INTRAVENOUS at 23:08

## 2024-06-04 SDOH — SOCIAL STABILITY: SOCIAL INSECURITY: ARE THERE ANY APPARENT SIGNS OF INJURIES/BEHAVIORS THAT COULD BE RELATED TO ABUSE/NEGLECT?: NO

## 2024-06-04 SDOH — SOCIAL STABILITY: SOCIAL INSECURITY: HAS ANYONE EVER THREATENED TO HURT YOUR FAMILY OR YOUR PETS?: NO

## 2024-06-04 SDOH — SOCIAL STABILITY: SOCIAL INSECURITY: DO YOU FEEL ANYONE HAS EXPLOITED OR TAKEN ADVANTAGE OF YOU FINANCIALLY OR OF YOUR PERSONAL PROPERTY?: NO

## 2024-06-04 SDOH — SOCIAL STABILITY: SOCIAL INSECURITY: HAVE YOU HAD ANY THOUGHTS OF HARMING ANYONE ELSE?: NO

## 2024-06-04 SDOH — SOCIAL STABILITY: SOCIAL INSECURITY: DO YOU FEEL UNSAFE GOING BACK TO THE PLACE WHERE YOU ARE LIVING?: NO

## 2024-06-04 SDOH — SOCIAL STABILITY: SOCIAL INSECURITY: ABUSE: ADULT

## 2024-06-04 SDOH — SOCIAL STABILITY: SOCIAL INSECURITY: DOES ANYONE TRY TO KEEP YOU FROM HAVING/CONTACTING OTHER FRIENDS OR DOING THINGS OUTSIDE YOUR HOME?: NO

## 2024-06-04 SDOH — SOCIAL STABILITY: SOCIAL INSECURITY: WERE YOU ABLE TO COMPLETE ALL THE BEHAVIORAL HEALTH SCREENINGS?: YES

## 2024-06-04 SDOH — SOCIAL STABILITY: SOCIAL INSECURITY: HAVE YOU HAD THOUGHTS OF HARMING ANYONE ELSE?: NO

## 2024-06-04 SDOH — SOCIAL STABILITY: SOCIAL INSECURITY: ARE YOU OR HAVE YOU BEEN THREATENED OR ABUSED PHYSICALLY, EMOTIONALLY, OR SEXUALLY BY ANYONE?: NO

## 2024-06-04 ASSESSMENT — COGNITIVE AND FUNCTIONAL STATUS - GENERAL
HELP NEEDED FOR BATHING: A LITTLE
PATIENT BASELINE BEDBOUND: NO
CLIMB 3 TO 5 STEPS WITH RAILING: A LOT
DRESSING REGULAR UPPER BODY CLOTHING: A LITTLE
DAILY ACTIVITIY SCORE: 20
DRESSING REGULAR LOWER BODY CLOTHING: A LITTLE
STANDING UP FROM CHAIR USING ARMS: A LITTLE
WALKING IN HOSPITAL ROOM: A LOT
MOVING TO AND FROM BED TO CHAIR: A LITTLE
TURNING FROM BACK TO SIDE WHILE IN FLAT BAD: A LITTLE
MOBILITY SCORE: 16
TOILETING: A LITTLE
MOVING FROM LYING ON BACK TO SITTING ON SIDE OF FLAT BED WITH BEDRAILS: A LITTLE

## 2024-06-04 ASSESSMENT — PAIN DESCRIPTION - PROGRESSION: CLINICAL_PROGRESSION: NOT CHANGED

## 2024-06-04 ASSESSMENT — PAIN - FUNCTIONAL ASSESSMENT
PAIN_FUNCTIONAL_ASSESSMENT: 0-10

## 2024-06-04 ASSESSMENT — ACTIVITIES OF DAILY LIVING (ADL)
WALKS IN HOME: NEEDS ASSISTANCE
DRESSING YOURSELF: NEEDS ASSISTANCE
PATIENT'S MEMORY ADEQUATE TO SAFELY COMPLETE DAILY ACTIVITIES?: YES
LACK_OF_TRANSPORTATION: NO
GROOMING: NEEDS ASSISTANCE
HEARING - LEFT EAR: FUNCTIONAL
ASSISTIVE_DEVICE: WALKER
HEARING - RIGHT EAR: FUNCTIONAL
PATIENT'S MEMORY ADEQUATE TO SAFELY COMPLETE DAILY ACTIVITIES?: YES
DRESSING YOURSELF: NEEDS ASSISTANCE
FEEDING YOURSELF: INDEPENDENT
ADEQUATE_TO_COMPLETE_ADL: YES
TOILETING: NEEDS ASSISTANCE
BATHING: NEEDS ASSISTANCE
HEARING - LEFT EAR: FUNCTIONAL
JUDGMENT_ADEQUATE_SAFELY_COMPLETE_DAILY_ACTIVITIES: YES
FEEDING YOURSELF: INDEPENDENT
JUDGMENT_ADEQUATE_SAFELY_COMPLETE_DAILY_ACTIVITIES: YES
TOILETING: NEEDS ASSISTANCE
WALKS IN HOME: NEEDS ASSISTANCE
ADEQUATE_TO_COMPLETE_ADL: YES
HEARING - RIGHT EAR: FUNCTIONAL
GROOMING: NEEDS ASSISTANCE
BATHING: NEEDS ASSISTANCE
ASSISTIVE_DEVICE: WALKER

## 2024-06-04 ASSESSMENT — LIFESTYLE VARIABLES
SUBSTANCE_ABUSE_PAST_12_MONTHS: NO
SKIP TO QUESTIONS 9-10: 1
HAVE YOU EVER FELT YOU SHOULD CUT DOWN ON YOUR DRINKING: NO
AUDIT-C TOTAL SCORE: 0
EVER HAD A DRINK FIRST THING IN THE MORNING TO STEADY YOUR NERVES TO GET RID OF A HANGOVER: NO
HAVE PEOPLE ANNOYED YOU BY CRITICIZING YOUR DRINKING: NO
PRESCIPTION_ABUSE_PAST_12_MONTHS: NO
EVER FELT BAD OR GUILTY ABOUT YOUR DRINKING: NO
EVER FELT BAD OR GUILTY ABOUT YOUR DRINKING: NO
HAVE YOU EVER FELT YOU SHOULD CUT DOWN ON YOUR DRINKING: NO
HOW OFTEN DO YOU HAVE A DRINK CONTAINING ALCOHOL: NEVER
TOTAL SCORE: 0
AUDIT-C TOTAL SCORE: 0
HAVE PEOPLE ANNOYED YOU BY CRITICIZING YOUR DRINKING: NO
HOW OFTEN DO YOU HAVE 6 OR MORE DRINKS ON ONE OCCASION: NEVER
HOW MANY STANDARD DRINKS CONTAINING ALCOHOL DO YOU HAVE ON A TYPICAL DAY: PATIENT DOES NOT DRINK
TOTAL SCORE: 0
EVER HAD A DRINK FIRST THING IN THE MORNING TO STEADY YOUR NERVES TO GET RID OF A HANGOVER: NO

## 2024-06-04 ASSESSMENT — PAIN SCALES - GENERAL
PAINLEVEL_OUTOF10: 0 - NO PAIN
PAINLEVEL_OUTOF10: 3
PAINLEVEL_OUTOF10: 2
PAINLEVEL_OUTOF10: 10 - WORST POSSIBLE PAIN
PAINLEVEL_OUTOF10: 0 - NO PAIN
PAINLEVEL_OUTOF10: 7

## 2024-06-04 ASSESSMENT — COLUMBIA-SUICIDE SEVERITY RATING SCALE - C-SSRS
2. HAVE YOU ACTUALLY HAD ANY THOUGHTS OF KILLING YOURSELF?: NO
6. HAVE YOU EVER DONE ANYTHING, STARTED TO DO ANYTHING, OR PREPARED TO DO ANYTHING TO END YOUR LIFE?: NO
1. IN THE PAST MONTH, HAVE YOU WISHED YOU WERE DEAD OR WISHED YOU COULD GO TO SLEEP AND NOT WAKE UP?: NO

## 2024-06-04 ASSESSMENT — PATIENT HEALTH QUESTIONNAIRE - PHQ9
SUM OF ALL RESPONSES TO PHQ9 QUESTIONS 1 & 2: 0
1. LITTLE INTEREST OR PLEASURE IN DOING THINGS: NOT AT ALL
2. FEELING DOWN, DEPRESSED OR HOPELESS: NOT AT ALL

## 2024-06-04 ASSESSMENT — PAIN DESCRIPTION - LOCATION: LOCATION: WRIST

## 2024-06-04 ASSESSMENT — PAIN DESCRIPTION - PAIN TYPE: TYPE: ACUTE PAIN

## 2024-06-04 ASSESSMENT — PAIN DESCRIPTION - ORIENTATION: ORIENTATION: RIGHT

## 2024-06-04 NOTE — ED PROVIDER NOTES
HPI   Chief Complaint   Patient presents with    Weakness, Gen       73-year-old female presenting for inability to care for self after recent back surgery as well as right distal radius fracture with intra-articular extension.  Advised to follow-up with outpatient orthopedic surgery and placed in sugar-tong splint earlier today by this emergency department.  Upon returning home she had a home care aide between the 2 of them they decided that they would not able to take care of her at home and her  is additionally elderly and unable to assist.  She denies any new weakness, numbness or tingling.  Denies use of anticoagulation or any headache, blurry vision or altered mental status.  She is asking for a glass of water and something for pain for the right wrist.  She endorses allergy to narcotics.                          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     OA (osteoarthritis) of knee     Peripheral neuropathy     Scoliosis     Spinal stenosis     Trigger finger of right hand     Vision loss     wears glasses    Vitamin D deficiency      Past Surgical History:   Procedure Laterality Date    ADENOIDECTOMY  1956    BREAST BIOPSY  1999    COLONOSCOPY      KNEE ARTHROPLASTY Right     MOUTH SURGERY  2016    Back left molar removed    MOUTH SURGERY  09/28/2021    right molar    OTHER SURGICAL HISTORY      Skin lesion excision    SPINAL FUSION  06/2010    C4-5    TONSILLECTOMY  1956    WISDOM TOOTH EXTRACTION  1978    lower     Family History   Problem Relation Name Age of Onset    Alzheimer's disease Mother      Heart disease Father      Polymyositis Father      Hypertension Brother      Skin cancer Brother      Other (peripheral artery disease) Brother       Social History     Tobacco Use     Smoking status: Never    Smokeless tobacco: Never   Vaping Use    Vaping status: Never Used   Substance Use Topics    Alcohol use: Not Currently     Comment: rarely    Drug use: Never       Physical Exam   ED Triage Vitals [06/04/24 1821]   Temperature Heart Rate Respirations BP   36 °C (96.8 °F) 100 20 126/56      Pulse Ox Temp src Heart Rate Source Patient Position   98 % -- Monitor Sitting      BP Location FiO2 (%)     Left arm --       Physical Exam  Vitals and nursing note reviewed.   Constitutional:       General: She is not in acute distress.     Appearance: She is well-developed. She is not ill-appearing.   HENT:      Head: Normocephalic and atraumatic.      Nose: No congestion or rhinorrhea.      Mouth/Throat:      Mouth: Mucous membranes are moist.      Pharynx: No oropharyngeal exudate or posterior oropharyngeal erythema.   Eyes:      Conjunctiva/sclera: Conjunctivae normal.   Cardiovascular:      Rate and Rhythm: Normal rate and regular rhythm.      Pulses: Normal pulses.      Heart sounds: No murmur heard.     No gallop.   Pulmonary:      Effort: Pulmonary effort is normal. No respiratory distress.      Breath sounds: Normal breath sounds. No stridor. No wheezing, rhonchi or rales.   Abdominal:      General: Bowel sounds are normal. There is no distension.      Palpations: Abdomen is soft.      Tenderness: There is no abdominal tenderness. There is no guarding or rebound.   Musculoskeletal:         General: No swelling.      Cervical back: Neck supple.      Comments: Right upper extremity wrist in sugar-tong splint.  Cap for less than 2 seconds.  Stable to flex and extend all digits.  Full range of motion of the left upper extremity, bilateral lower extremities with no deformities appreciated.   Skin:     General: Skin is warm and dry.      Capillary Refill: Capillary refill takes less than 2 seconds.      Findings: No rash.      Comments: Posterior back incisional site appreciated.  No surrounding  erythema or purulent drainage.  No foul smell.  Staples in place.   Neurological:      General: No focal deficit present.      Mental Status: She is alert and oriented to person, place, and time.      Cranial Nerves: No cranial nerve deficit.      Sensory: No sensory deficit.      Gait: Gait normal.   Psychiatric:         Mood and Affect: Mood normal.         Behavior: Behavior normal.         Thought Content: Thought content normal.         ED Course & MDM   ED Course as of 06/04/24 1946 Tue Jun 04, 2024   1838 Per Merry of the  team unable to place patient at this time without precertification/physical therapy/Occupational Therapy evaluation.  Call placed internal medicine team for admission. [TL]      ED Course User Index  [TL] Christophe Elias DO         Diagnoses as of 06/04/24 1946   Closed fracture of distal end of right radius, unspecified fracture morphology, initial encounter       Medical Decision Making  73-year-old female present to the emergency room for inability to care for self after recent back surgery and fall with distal radius fracture.  Plan for CBC, metabolic panel and social work consult.  I expect given the time of day as well as lack of physical therapy/Occupational Therapy evaluation ability as a [facility] home for the day the patient will require hospitalization for case management and further disposition planning.  No sign of neurovascular compromise the right upper extremity or other further complication.        Procedure  Procedures     Christophe Elias DO  06/04/24 1946

## 2024-06-04 NOTE — ED TRIAGE NOTES
Pt to ED after a fall today, pt had back surgery 3 weeks ago has flat foot on the right, fell today with R wrist deformity.

## 2024-06-04 NOTE — PROGRESS NOTES
"Khalida Barker is a 73 y.o. female on day 0 of admission presenting with No Principal Problem: There is no principal problem currently on the Problem List. Please update the Problem List and refresh..    Subjective   Postoperative visit. Patient was scheduled in office today at 2pm. While on rounds, ED notified me that she was in for fall and fractured wrist. She was scheduled to monitor for likely postoperative staple removal.        Objective   Incision currently with only mild erythema on superior half as well as tape-related skin abrasions more latera.  Mild edema under lower 1/4 of incision with mild fluid exudate, clear in nature, and unlikelihood for solid fibrotic tissue underneath.         Physical Exam    Last Recorded Vitals  Blood pressure 119/56, pulse 84, temperature 36.4 °C (97.5 °F), resp. rate 16, height 1.651 m (5' 5\"), weight 81.6 kg (180 lb), SpO2 99%.  Intake/Output last 3 Shifts:  No intake/output data recorded.    Relevant Results              Assessment/Plan   Active Problems:  There are no active Hospital Problems.    Staples were not removed today  Likely seroma underneath lower portion of incision  Antibiotics begun to mitigate infective process  Will schedule patient for office follow up in ~1 week, possible debridement and reinforcement at that time.          Gaurang Russell PA-C      "

## 2024-06-04 NOTE — H&P
History Of Present Illness  Ms. Barker is a 73-year-old woman with recent T10-pelvis fusion, multilevel PCO, L2-S1 TLIF with Dr. Lu 5/14 admitted for placement.      Patient discharged from her procedure 5/22 to acute rehab and was subsequently discharged to home 6/3. Patient states that since her surgery she has had LE weakness and R foot drop. She has been ambulating with a walker. She states she tripped over her R foot this morning and fell on an outstretched R hand, sustaining a right intra-articular distal radius fracture.  She presented for care in the emergency department earlier this morning.  A sugar-tong splint was applied and she was instructed to follow-up with orthopedic surgery.  Neurosurgery was also consulted in the ER to assess her wound and recommended leaving staples in place and initiating a course of Bactrim for possible developing cellulitis.  Patient desired to return home from ED visit.     Unfortunately, when patient returned home, she determined in conjunction with home health aide that it would be too difficult to care for herself at home, so she re-presented for placement.     On my interview patient reports significant RUE pain at site of fracture. She also reports constipation x3 days, and states she is unable to void. She states that bed pain and purewick unsuccessful. Denies dyspnea, fever/chills.     On arrival to ED, patient afebrile, heart rate 100, normotensive, satting well on room air.  Labs with mild hyponatremia 133, Hagg, with bicarb 18, BUN/SCR 15/1.59, hypoalbuminemia 3.0, alk phos 154, otherwise within normal limits.  No leukocytosis, hemoglobin stable.  Patient given Tylenol and Norflex and admitted.    PMhx: Scoliosis, chronic back pain with lumbar radiculopathy status post T10-pelvis fusion, multilevel PCO, L2-S1 TLIF, diverticulosis, GERD, carpal tunnel syndrome, osteoarthritis, vitamin D deficiency    Meds: Med rec to be done    All: oxycodone, tramadol    FamHx:  "Reviewed and not pertinent to presenting complaint    SocHx: Denies alcohol, tobacco, recreational drug use    ROS: 12 point review of systems is reviewed and is negative except as noted in HPI          Last Recorded Vitals  Blood pressure (!) 112/42, pulse 98, temperature 36 °C (96.8 °F), resp. rate 18, height 1.651 m (5' 5\"), SpO2 96%.    Physical exam:   Gen: well appearing, no acute distress  HEENT: MMM, EOMI, no scleral icterus  Neck: supple, no LAD  CV: RRR, no murmurs appreciated  Lungs: good air entry b/l, no rhonchi/wheezes appreciated, appears to have some tachypnea that is pain related  Abd: soft, suprapubic tenderness, nondistended, normoactive BS  Ext: WWP, RUE splinted, sensation intact in digits  Skin: no rashes/lesions  Neuro: Cns III-XII grossly intact, alert and oriented x3  Psych: appropriate mood and affect       Relevant Results  Scheduled medications  polyethylene glycol, 17 g, oral, Daily  sennosides, 1 tablet, oral, Nightly  sulfamethoxazole-trimethoprim, 1 tablet, oral, q12h NUNU      Continuous medications     PRN medications  PRN medications: HYDROmorphone, ketorolac    Results for orders placed or performed during the hospital encounter of 06/04/24 (from the past 24 hour(s))   CBC and Auto Differential   Result Value Ref Range    WBC 11.1 4.4 - 11.3 x10*3/uL    nRBC 0.0 0.0 - 0.0 /100 WBCs    RBC 3.45 (L) 4.00 - 5.20 x10*6/uL    Hemoglobin 10.0 (L) 12.0 - 16.0 g/dL    Hematocrit 32.6 (L) 36.0 - 46.0 %    MCV 95 80 - 100 fL    MCH 29.0 26.0 - 34.0 pg    MCHC 30.7 (L) 32.0 - 36.0 g/dL    RDW 14.5 11.5 - 14.5 %    Platelets 262 150 - 450 x10*3/uL    Neutrophils % 87.4 40.0 - 80.0 %    Immature Granulocytes %, Automated 0.8 0.0 - 0.9 %    Lymphocytes % 5.7 13.0 - 44.0 %    Monocytes % 5.7 2.0 - 10.0 %    Eosinophils % 0.1 0.0 - 6.0 %    Basophils % 0.3 0.0 - 2.0 %    Neutrophils Absolute 9.72 (H) 1.60 - 5.50 x10*3/uL    Immature Granulocytes Absolute, Automated 0.09 0.00 - 0.50 x10*3/uL    " Lymphocytes Absolute 0.63 (L) 0.80 - 3.00 x10*3/uL    Monocytes Absolute 0.63 0.05 - 0.80 x10*3/uL    Eosinophils Absolute 0.01 0.00 - 0.40 x10*3/uL    Basophils Absolute 0.03 0.00 - 0.10 x10*3/uL   Comprehensive metabolic panel   Result Value Ref Range    Glucose 145 (H) 74 - 99 mg/dL    Sodium 133 (L) 136 - 145 mmol/L    Potassium 3.7 3.5 - 5.3 mmol/L    Chloride 100 98 - 107 mmol/L    Bicarbonate 18 (L) 21 - 32 mmol/L    Anion Gap 19 10 - 20 mmol/L    Urea Nitrogen 15 6 - 23 mg/dL    Creatinine 0.59 0.50 - 1.05 mg/dL    eGFR >90 >60 mL/min/1.73m*2    Calcium 8.5 (L) 8.6 - 10.3 mg/dL    Albumin 3.0 (L) 3.4 - 5.0 g/dL    Alkaline Phosphatase 154 (H) 33 - 136 U/L    Total Protein 6.2 (L) 6.4 - 8.2 g/dL    AST 21 9 - 39 U/L    Bilirubin, Total 1.0 0.0 - 1.2 mg/dL    ALT 14 7 - 45 U/L       XR forearm right 2 views    Result Date: 6/4/2024  Interpreted By:  Tiffanie Perry, STUDY: XR FOREARM RIGHT 2 VIEWS 6/4/2024 9:20 am   INDICATION: Signs/Symptoms:FOOSH, R wrist deformity. Pain and fall.   COMPARISON: None available.   ACCESSION NUMBER(S): [id number]   ORDERING CLINICIAN: MELINDA OBREGON   TECHNIQUE: Two views of right forearm are completed.   FINDINGS: There is comminuted intra-articular fracture of the distal radius noted with no additional fracture of the radius or ulna seen.       Comminuted intra-articular fracture of the distal radius.   Signed by: Tiffanie Perry 6/4/2024 9:54 AM Dictation workstation:   [id number]    XR wrist right 3+ views    Result Date: 6/4/2024  Interpreted By:  Tiffanie Perry, STUDY: XR WRIST RIGHT 3+ VIEWS; XR HAND RIGHT 3+ VIEWS 6/4/2024 9:20 am   INDICATION: Signs/Symptoms:FOOSH, R wrist deformity. Pain and fall   COMPARISON: None available.   ACCESSION NUMBER(S): [id number]; [id number]   ORDERING CLINICIAN: MELINDA OBREGON   TECHNIQUE: Four views of right wrist are completed.   FINDINGS: There is an acute comminuted intra-articular fracture of the distal radius noted with some very  slight impaction at the fracture site. The distal ulna is intact. There is no fracture or dislocation of the carpal or metacarpal bones. Soft tissue swelling of the wrist is seen. There is osteoarthritis of the 1st CMC joint with joint space narrowing and osteophytosis.   There is no fracture or dislocation of the bones of the right hand. There is osteoarthritis of distal interphalangeal joints most pronounced about the DIP joint of the little finger. Osteoarthritis also affects the interphalangeal joint of the thumb.       Acute comminuted intra-articular fracture of the distal radius with associated soft tissue swelling.   Osteoarthritis of the 1st CMC joint. Osteoarthritis also affects distal interphalangeal joints of the right hand particularly of the little finger with osteoarthritis of the interphalangeal joint of right thumb noted as well.   Signed by: Tiffanie Perry 6/4/2024 9:52 AM Dictation workstation:   [id number]    XR hand right 3+ views    Result Date: 6/4/2024  Interpreted By:  Tiffanie Perry, STUDY: XR WRIST RIGHT 3+ VIEWS; XR HAND RIGHT 3+ VIEWS 6/4/2024 9:20 am   INDICATION: Signs/Symptoms:FOOSH, R wrist deformity. Pain and fall   COMPARISON: None available.   ACCESSION NUMBER(S): [id number]; [id number]   ORDERING CLINICIAN: MELINDA OBREGON   TECHNIQUE: Four views of right wrist are completed.   FINDINGS: There is an acute comminuted intra-articular fracture of the distal radius noted with some very slight impaction at the fracture site. The distal ulna is intact. There is no fracture or dislocation of the carpal or metacarpal bones. Soft tissue swelling of the wrist is seen. There is osteoarthritis of the 1st CMC joint with joint space narrowing and osteophytosis.   There is no fracture or dislocation of the bones of the right hand. There is osteoarthritis of distal interphalangeal joints most pronounced about the DIP joint of the little finger. Osteoarthritis also affects the interphalangeal joint  of the thumb.       Acute comminuted intra-articular fracture of the distal radius with associated soft tissue swelling.   Osteoarthritis of the 1st CMC joint. Osteoarthritis also affects distal interphalangeal joints of the right hand particularly of the little finger with osteoarthritis of the interphalangeal joint of right thumb noted as well.   Signed by: Tiffanie Perry 6/4/2024 9:52 AM Dictation workstation:   [id number]        Assessment/Plan   Ms. Barker is a 73-year-old woman with recent T10-pelvis fusion, multilevel PCO, L2-S1 TLIF with Dr. Lu 5/14 admitted for placement following fall c/b R radial fracture on return home from acute rehab.     #recent NSGY procedure, ?possible SSTI   #R radial fracture  #mechanical fall   -PT/OT consults pending  -NSGY evaluated in ED - recommended course of Bactrim for SSTI  -NSGY consulted to follow in house per patient preference  -RUE splinted  -orthopedic surgery consulted in house per patient preference  -analgesia with tylenol, toradol, dilaudid  -bowel regimen with miralax, senna  -appreciate CM assistance with placement    #HAGMA  -ddx includes lactic acidosis due to hypovolemia (patient states she has not eaten all day, also appears to have hypovolemic hyponatremia), less likely infection, patient does not appear septic. Ddx also includes metabolic compensation for tachypnea due to pain  -follow up lactate  -1L LR  -if acidosis persists in AM despite above interventions consider obtaining blood gas to further characterize    FEN/GI: regular diet  Access: PIV  Ppx: lovenox  Code: full            Arlen Ro MD

## 2024-06-04 NOTE — ED PROVIDER NOTES
HPI   Chief Complaint   Patient presents with    Fall       73-year-old female with past medical history of LLE radiculopathy and scoliosis s/p T10-pelvis fusion, multilevel PCO and L2-S1 TLIF on 5/14 with Dr. Lu presenting to the ED after a fall today.  Patient states she has chronic weakness in her left lower extremity and has chronic numbness in her right foot due to a prior knee surgery.  Typically uses a walker for ambulation.  Today she was reaching forward with her reacher while using her walker and she lost her balance and fell onto her right side.  Felt a crack in her right wrist and had immediate pain so she called EMS.  No head injury or LOC.  She is not anticoagulated.  She denies neck pain, headache or any worsening of her back pain.                          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     OA (osteoarthritis) of knee     Peripheral neuropathy     Scoliosis     Spinal stenosis     Trigger finger of right hand     Vision loss     wears glasses    Vitamin D deficiency      Past Surgical History:   Procedure Laterality Date    ADENOIDECTOMY  1956    BREAST BIOPSY  1999    COLONOSCOPY      KNEE ARTHROPLASTY Right     MOUTH SURGERY  2016    Back left molar removed    MOUTH SURGERY  09/28/2021    right molar    OTHER SURGICAL HISTORY      Skin lesion excision    SPINAL FUSION  06/2010    C4-5    TONSILLECTOMY  1956    WISDOM TOOTH EXTRACTION  1978    lower     Family History   Problem Relation Name Age of Onset    Alzheimer's disease Mother      Heart disease Father      Polymyositis Father      Hypertension Brother      Skin cancer Brother      Other (peripheral artery disease) Brother       Social History     Tobacco Use    Smoking status: Never    Smokeless tobacco: Never   Vaping  Use    Vaping status: Never Used   Substance Use Topics    Alcohol use: Not Currently     Comment: rarely    Drug use: Never       Physical Exam   ED Triage Vitals   Temperature Heart Rate Respirations BP   06/04/24 0851 06/04/24 0851 06/04/24 0851 06/04/24 0853   36.4 °C (97.5 °F) 76 18 124/59      Pulse Ox Temp src Heart Rate Source Patient Position   06/04/24 0853 -- 06/04/24 0851 06/04/24 0853   100 %  Monitor Lying      BP Location FiO2 (%)     06/04/24 0853 --     Right arm        Physical Exam  Vitals and nursing note reviewed.   Constitutional:       General: She is not in acute distress.     Appearance: She is not ill-appearing or toxic-appearing.   HENT:      Head: Normocephalic and atraumatic.      Nose: Nose normal.      Mouth/Throat:      Mouth: Mucous membranes are moist.   Eyes:      Extraocular Movements: Extraocular movements intact.      Conjunctiva/sclera: Conjunctivae normal.   Cardiovascular:      Rate and Rhythm: Normal rate and regular rhythm.      Pulses: Normal pulses.      Heart sounds: Normal heart sounds.   Pulmonary:      Effort: Pulmonary effort is normal.      Breath sounds: Normal breath sounds.   Abdominal:      General: There is no distension.      Palpations: Abdomen is soft.      Tenderness: There is no abdominal tenderness.   Musculoskeletal:         General: Deformity (Deformity to the right wrist.  2+ radial pulses.  Sensation intact to dull touch.  Motor strength intact.) present. No tenderness (No thoracic or lumbar midline tenderness.). Normal range of motion.      Cervical back: Normal range of motion and neck supple. No tenderness.      Right lower leg: No edema.      Left lower leg: No edema.   Skin:     General: Skin is warm and dry.      Capillary Refill: Capillary refill takes less than 2 seconds.   Neurological:      General: No focal deficit present.      Mental Status: She is alert and oriented to person, place, and time. Mental status is at baseline.         ED  Course & MDM   Diagnoses as of 06/04/24 1438   Closed fracture of right wrist, initial encounter   Fall, initial encounter   Skin infection       Medical Decision Making  73-year-old female presenting to the ED for right wrist pain after mechanical fall today.  She is alert and at her baseline.  She is hemodynamically stable.  She is neurovascularly intact to the right upper extremity however does have an apparent deformity.  No head injury or LOC and she is not anticoagulated.  She is neurologically intact.  No indication for head CT as suspicion for intracranial injury is low.    Imaging obtained with x-ray of the right forearm, wrist and hand.  There is a comminuted intra-articular fracture of the distal radius.  This was placed in a sugar-tong splint.  She was neurovascularly intact post application.  She was given prescriptions for prednisone and Tylenol for pain control.  She will follow-up with orthopedics for further management.    Of note patient was supposed to have the staples from her recent back surgery removed today.  She was evaluated in the ED by neurosurgery PAGaurang.  Decision was made to leave the staples in place due to the wound needing additional time for proper healing.  She had evidence of a seroma and yellow crusting around the suture sites.  She was placed on a course of Bactrim for concerns of developing cellulitis.  She will follow-up in 1 week with neurosurgery in the outpatient clinic for reevaluation of her wound.  She will return to the ED for worsening symptoms.        Procedure  Procedures     Kenan Srinivasan DO  Resident  06/04/24 1438

## 2024-06-05 LAB
ANION GAP SERPL CALC-SCNC: 14 MMOL/L (ref 10–20)
BUN SERPL-MCNC: 12 MG/DL (ref 6–23)
CALCIUM SERPL-MCNC: 8.2 MG/DL (ref 8.6–10.3)
CHLORIDE SERPL-SCNC: 101 MMOL/L (ref 98–107)
CO2 SERPL-SCNC: 23 MMOL/L (ref 21–32)
CREAT SERPL-MCNC: 0.54 MG/DL (ref 0.5–1.05)
EGFRCR SERPLBLD CKD-EPI 2021: >90 ML/MIN/1.73M*2
GLUCOSE SERPL-MCNC: 116 MG/DL (ref 74–99)
POTASSIUM SERPL-SCNC: 3.4 MMOL/L (ref 3.5–5.3)
SODIUM SERPL-SCNC: 135 MMOL/L (ref 136–145)

## 2024-06-05 PROCEDURE — 2500000005 HC RX 250 GENERAL PHARMACY W/O HCPCS: Performed by: PHYSICIAN ASSISTANT

## 2024-06-05 PROCEDURE — 36415 COLL VENOUS BLD VENIPUNCTURE: CPT | Performed by: STUDENT IN AN ORGANIZED HEALTH CARE EDUCATION/TRAINING PROGRAM

## 2024-06-05 PROCEDURE — 2500000001 HC RX 250 WO HCPCS SELF ADMINISTERED DRUGS (ALT 637 FOR MEDICARE OP): Performed by: STUDENT IN AN ORGANIZED HEALTH CARE EDUCATION/TRAINING PROGRAM

## 2024-06-05 PROCEDURE — 2500000004 HC RX 250 GENERAL PHARMACY W/ HCPCS (ALT 636 FOR OP/ED): Performed by: STUDENT IN AN ORGANIZED HEALTH CARE EDUCATION/TRAINING PROGRAM

## 2024-06-05 PROCEDURE — 80048 BASIC METABOLIC PNL TOTAL CA: CPT | Performed by: STUDENT IN AN ORGANIZED HEALTH CARE EDUCATION/TRAINING PROGRAM

## 2024-06-05 PROCEDURE — 2500000002 HC RX 250 W HCPCS SELF ADMINISTERED DRUGS (ALT 637 FOR MEDICARE OP, ALT 636 FOR OP/ED): Performed by: STUDENT IN AN ORGANIZED HEALTH CARE EDUCATION/TRAINING PROGRAM

## 2024-06-05 PROCEDURE — 2500000002 HC RX 250 W HCPCS SELF ADMINISTERED DRUGS (ALT 637 FOR MEDICARE OP, ALT 636 FOR OP/ED): Performed by: NURSE PRACTITIONER

## 2024-06-05 PROCEDURE — 97162 PT EVAL MOD COMPLEX 30 MIN: CPT | Mod: GP | Performed by: PHYSICAL THERAPIST

## 2024-06-05 PROCEDURE — 97530 THERAPEUTIC ACTIVITIES: CPT | Mod: GP | Performed by: PHYSICAL THERAPIST

## 2024-06-05 PROCEDURE — 97165 OT EVAL LOW COMPLEX 30 MIN: CPT | Mod: GO | Performed by: OCCUPATIONAL THERAPIST

## 2024-06-05 PROCEDURE — G0378 HOSPITAL OBSERVATION PER HR: HCPCS

## 2024-06-05 RX ORDER — LIDOCAINE HYDROCHLORIDE AND EPINEPHRINE 5; 5 MG/ML; UG/ML
20 INJECTION, SOLUTION INFILTRATION; PERINEURAL ONCE
Status: COMPLETED | OUTPATIENT
Start: 2024-06-05 | End: 2024-06-05

## 2024-06-05 RX ORDER — LIDOCAINE HYDROCHLORIDE AND EPINEPHRINE 10; 10 MG/ML; UG/ML
10 INJECTION, SOLUTION INFILTRATION; PERINEURAL ONCE
Status: DISCONTINUED | OUTPATIENT
Start: 2024-06-05 | End: 2024-06-05

## 2024-06-05 RX ORDER — CHOLECALCIFEROL (VITAMIN D3) 25 MCG
1000 TABLET ORAL DAILY
Status: DISCONTINUED | OUTPATIENT
Start: 2024-06-05 | End: 2024-06-15 | Stop reason: HOSPADM

## 2024-06-05 RX ORDER — PANTOPRAZOLE SODIUM 40 MG/1
40 TABLET, DELAYED RELEASE ORAL
Status: DISCONTINUED | OUTPATIENT
Start: 2024-06-05 | End: 2024-06-15 | Stop reason: HOSPADM

## 2024-06-05 RX ORDER — POTASSIUM CHLORIDE 20 MEQ/1
20 TABLET, EXTENDED RELEASE ORAL ONCE
Status: COMPLETED | OUTPATIENT
Start: 2024-06-05 | End: 2024-06-05

## 2024-06-05 RX ORDER — POLYETHYLENE GLYCOL 3350 17 G/17G
17 POWDER, FOR SOLUTION ORAL DAILY
Status: DISCONTINUED | OUTPATIENT
Start: 2024-06-05 | End: 2024-06-05

## 2024-06-05 RX ORDER — PREGABALIN 50 MG/1
50 CAPSULE ORAL NIGHTLY
Status: DISCONTINUED | OUTPATIENT
Start: 2024-06-05 | End: 2024-06-15 | Stop reason: HOSPADM

## 2024-06-05 RX ORDER — METHOCARBAMOL 500 MG/1
500 TABLET, FILM COATED ORAL EVERY 8 HOURS PRN
Status: DISCONTINUED | OUTPATIENT
Start: 2024-06-05 | End: 2024-06-08

## 2024-06-05 RX ORDER — TAMSULOSIN HYDROCHLORIDE 0.4 MG/1
0.4 CAPSULE ORAL DAILY
Status: DISCONTINUED | OUTPATIENT
Start: 2024-06-05 | End: 2024-06-15 | Stop reason: HOSPADM

## 2024-06-05 RX ADMIN — Medication 1000 UNITS: at 09:01

## 2024-06-05 RX ADMIN — KETOROLAC TROMETHAMINE 15 MG: 15 INJECTION, SOLUTION INTRAMUSCULAR; INTRAVENOUS at 09:01

## 2024-06-05 RX ADMIN — SENNOSIDES 8.6 MG: 8.6 TABLET, FILM COATED ORAL at 21:44

## 2024-06-05 RX ADMIN — PANTOPRAZOLE SODIUM 40 MG: 40 TABLET, DELAYED RELEASE ORAL at 06:46

## 2024-06-05 RX ADMIN — HYDROMORPHONE HYDROCHLORIDE 0.6 MG: 1 INJECTION, SOLUTION INTRAMUSCULAR; INTRAVENOUS; SUBCUTANEOUS at 17:01

## 2024-06-05 RX ADMIN — TAMSULOSIN HYDROCHLORIDE 0.4 MG: 0.4 CAPSULE ORAL at 11:16

## 2024-06-05 RX ADMIN — HYDROMORPHONE HYDROCHLORIDE 0.6 MG: 1 INJECTION, SOLUTION INTRAMUSCULAR; INTRAVENOUS; SUBCUTANEOUS at 21:44

## 2024-06-05 RX ADMIN — SULFAMETHOXAZOLE AND TRIMETHOPRIM 1 TABLET: 800; 160 TABLET ORAL at 09:01

## 2024-06-05 RX ADMIN — SULFAMETHOXAZOLE AND TRIMETHOPRIM 1 TABLET: 800; 160 TABLET ORAL at 21:44

## 2024-06-05 RX ADMIN — HYDROMORPHONE HYDROCHLORIDE 0.6 MG: 1 INJECTION, SOLUTION INTRAMUSCULAR; INTRAVENOUS; SUBCUTANEOUS at 06:46

## 2024-06-05 RX ADMIN — HYDROMORPHONE HYDROCHLORIDE 0.6 MG: 1 INJECTION, SOLUTION INTRAMUSCULAR; INTRAVENOUS; SUBCUTANEOUS at 02:43

## 2024-06-05 RX ADMIN — POLYETHYLENE GLYCOL 3350 17 G: 17 POWDER, FOR SOLUTION ORAL at 09:45

## 2024-06-05 RX ADMIN — HYDROMORPHONE HYDROCHLORIDE 0.6 MG: 1 INJECTION, SOLUTION INTRAMUSCULAR; INTRAVENOUS; SUBCUTANEOUS at 11:16

## 2024-06-05 RX ADMIN — POTASSIUM CHLORIDE 20 MEQ: 1500 TABLET, EXTENDED RELEASE ORAL at 09:01

## 2024-06-05 RX ADMIN — LIDOCAINE HYDROCHLORIDE,EPINEPHRINE BITARTRATE 20 ML: 5; .005 INJECTION, SOLUTION INFILTRATION; PERINEURAL at 09:45

## 2024-06-05 ASSESSMENT — PAIN SCALES - GENERAL
PAINLEVEL_OUTOF10: 5 - MODERATE PAIN
PAINLEVEL_OUTOF10: 3
PAINLEVEL_OUTOF10: 9
PAINLEVEL_OUTOF10: 5 - MODERATE PAIN
PAINLEVEL_OUTOF10: 7
PAINLEVEL_OUTOF10: 10 - WORST POSSIBLE PAIN

## 2024-06-05 ASSESSMENT — PAIN - FUNCTIONAL ASSESSMENT
PAIN_FUNCTIONAL_ASSESSMENT: 0-10

## 2024-06-05 ASSESSMENT — COGNITIVE AND FUNCTIONAL STATUS - GENERAL
TURNING FROM BACK TO SIDE WHILE IN FLAT BAD: A LOT
STANDING UP FROM CHAIR USING ARMS: A LOT
TURNING FROM BACK TO SIDE WHILE IN FLAT BAD: A LOT
MOVING TO AND FROM BED TO CHAIR: A LOT
MOBILITY SCORE: 11
MOBILITY SCORE: 10
MOVING FROM LYING ON BACK TO SITTING ON SIDE OF FLAT BED WITH BEDRAILS: A LOT
CLIMB 3 TO 5 STEPS WITH RAILING: TOTAL
EATING MEALS: A LITTLE
DRESSING REGULAR LOWER BODY CLOTHING: A LOT
TOILETING: A LOT
STANDING UP FROM CHAIR USING ARMS: A LOT
DRESSING REGULAR LOWER BODY CLOTHING: TOTAL
WALKING IN HOSPITAL ROOM: TOTAL
EATING MEALS: A LITTLE
MOVING TO AND FROM BED TO CHAIR: A LOT
HELP NEEDED FOR BATHING: A LOT
HELP NEEDED FOR BATHING: A LOT
DRESSING REGULAR UPPER BODY CLOTHING: A LOT
PERSONAL GROOMING: A LITTLE
MOVING FROM LYING ON BACK TO SITTING ON SIDE OF FLAT BED WITH BEDRAILS: A LOT
DRESSING REGULAR UPPER BODY CLOTHING: A LOT
WALKING IN HOSPITAL ROOM: A LOT
DAILY ACTIVITIY SCORE: 13
CLIMB 3 TO 5 STEPS WITH RAILING: TOTAL
DAILY ACTIVITIY SCORE: 14
TOILETING: A LOT
PERSONAL GROOMING: A LITTLE

## 2024-06-05 NOTE — CARE PLAN
Problem: Pain  Goal: My pain/discomfort is manageable  Outcome: Progressing     Problem: Safety  Goal: Patient will be injury free during hospitalization  Outcome: Progressing  Goal: I will remain free of falls  Outcome: Progressing     Problem: Daily Care  Goal: Daily care needs are met  Outcome: Progressing     Problem: Psychosocial Needs  Goal: Demonstrates ability to cope with hospitalization/illness  Outcome: Progressing  Goal: Collaborate with me, my family, and caregiver to identify my specific goals  Outcome: Progressing  Flowsheets (Taken 6/4/2024 2108)  Cultural Requests During Hospitalization: none  Spiritual Requests During Hospitalization: none     Problem: Discharge Barriers  Goal: My discharge needs are met  Outcome: Progressing     Problem: Skin  Goal: Decreased wound size/increased tissue granulation at next dressing change  Outcome: Progressing  Flowsheets (Taken 6/5/2024 0440)  Decreased wound size/increased tissue granulation at next dressing change: Protective dressings over bony prominences  Goal: Participates in plan/prevention/treatment measures  Outcome: Progressing  Flowsheets (Taken 6/5/2024 0440)  Participates in plan/prevention/treatment measures:   Elevate heels   Discuss with provider PT/OT consult  Goal: Prevent/manage excess moisture  Outcome: Progressing  Flowsheets (Taken 6/5/2024 0440)  Prevent/manage excess moisture: Cleanse incontinence/protect with barrier cream  Goal: Prevent/minimize sheer/friction injuries  Outcome: Progressing  Flowsheets (Taken 6/5/2024 0440)  Prevent/minimize sheer/friction injuries: Use pull sheet  Goal: Promote/optimize nutrition  Outcome: Progressing  Flowsheets (Taken 6/5/2024 0440)  Promote/optimize nutrition: Offer water/supplements/favorite foods  Goal: Promote skin healing  Outcome: Progressing  Flowsheets (Taken 6/5/2024 0440)  Promote skin healing: Protective dressings over bony prominences   The patient's goals for the shift include      The  clinical goals for the shift include Pt will receive adequate rest.    Over the shift, the patient did receive rest.

## 2024-06-05 NOTE — PROGRESS NOTES
Occupational Therapy    Evaluation    Patient Name: Khalida Barker  MRN: [id number]  Today's Date: 6/5/2024  Time Calculation  Start Time: 1455  Stop Time: 1525  Time Calculation (min): 30 min  4120/4120-A  Eval only     Assessment  IP OT Assessment  Prognosis: Good  End of Session Communication: Bedside nurse  End of Session Patient Position: Bed, 3 rail up, Alarm on  Patient presents with decline in ADLs, functional transfers, and functional mobility tasks and would benefit from OT during acute stay to maximize functional independence and safety.  Patient requires 24 hours hands on assistance.  Recommend moderate intensity OT to maximize functional independence and safety.       RN spoke with neurosurgery regarding drainage coming from post-op site. Neurosurgery plans to evaluate patient in morning for possible intervention.  Will defer further treatment until neurosurgery assesses patient and definitive plan is made.     Plan:  Treatment Interventions: ADL retraining, Functional transfer training, Patient/family training, Equipment evaluation/education, Compensatory technique education  OT Frequency: 3 times per week  OT Discharge Recommendations: Moderate intensity level of continued care  OT - OK to Discharge: Yes from acute care OT services to the next level of care when cleared by medical team      Subjective   Current Problem:  1. Closed fracture of distal end of right radius, unspecified fracture morphology, initial encounter            General:  General  Reason for Referral: ADLs  Referred By: Nilesh Ambrocio DO  Past Medical History Relevant to Rehab: Per EMR: 73-year-old female presenting for inability to care for self after recent back surgery as well as right distal radius fracture with intra-articular extension.  Advised to follow-up with outpatient orthopedic surgery and placed in sugar-tong splint earlier today by this emergency department.  Upon returning home she had a home care aide between the 2 of  them they decided that they would not able to take care of her at home and her  is additionally elderly and unable to assist.  She denies any new weakness, numbness or tingling.  Denies use of anticoagulation or any headache, blurry vision or altered mental status.    Surgery was 5/14/2024 at [facility] where patient had the following completed by Dr Lu: [id number] Instrumented Fusion, Correction of Scoliotic Deformity, L1-2, L2-3, L3-4, L4-5, L5-S1 posterior column osteotomies, L3-4 anterior column osteotomy through disc space,  L1-S1 Decompression, with L2-3, L3-4, L4-5, L5-S1 Transforaminal Lumbar Interbody Fusion, T11-T12 Decompression.  Patient was in the hospital from 5/14-5/22 and discharged to acute rehab to home. Patient was only home for 1 day prior to return to this hospital.  Co-Treatment: PT  Co-Treatment Reason: for safety  Prior to Session Communication: Bedside nurse  Patient Position Received: Bed, 3 rail up, Alarm on  Preferred Learning Style: verbal  General Comment: Patient in long legged sitting in bed and agreeable to particpate in OT evaluation.  Patient cleared by neurosurgery for therapy.    Precautions:  UE Weight Bearing Status: Right Non-Weight Bearing (sling to right UE)  Medical Precautions: Fall precautions (spinal precautions)    Pain:  Pain Assessment  Pain Assessment: 0-10  Pain Score:  (reported back pain with activity but patient did not rate)  Pain Interventions: Rest    Objective   Cognition:  Overall Cognitive Status: Within Functional Limits  Orientation Level: Oriented X4    Home Living:  Home Living Comments: Lives in 2 story home with spouse. Has 1st floor setup. Reports having WIS with GB.  Is able to stay on first floor     Prior Function:  Prior Function Comments: Was independent with ADLs and used WW for functional mobility tasks.    ADL:  Grooming Assistance: Stand by (to wash face and brush teeth seated at edge of bed)  UE Dressing Assistance: Maximal (doanalia and don  pull over gown)  LE Dressing Assistance: Total  ADL Comments: Educated patient on comp strategies for upper body dressing and patient verbalized understanding    Activity Tolerance:  Endurance: Decreased tolerance for upright activites    Bed Mobility/Transfers:   Bed Mobility  Bed Mobility:  (max assist of 2 supine <>sit with log roll technique. Once seated at edge of bed, RN in to assess dressing on back. Dressing removed, patient appears to have drainage from incision.  RN notifed neurosurgery.)  Transfers  Transfer:  (max assist of 2 sit <>stand. Unable to take side steps. Patient assisted with Catia Stedy to move higher in the bed. Patient reported that she was having too much pain to attempt to transfer via Catia Stedy to bedside chair. Assisted patient back to bed.    Extremities: RUE   RUE : Within Functional Limits and LUE   LUE: Within Functional Limits    Outcome Measures: [facility] Daily Activity  Putting on and taking off regular lower body clothing: Total  Bathing (including washing, rinsing, drying): A lot  Putting on and taking off regular upper body clothing: A lot  Toileting, which includes using toilet, bedpan or urinal: A lot  Taking care of personal grooming such as brushing teeth: A little  Eating Meals: A little  Daily Activity - Total Score: 13    EDUCATION:  Education  Individual(s) Educated: Patient  Education Provided: Fall precautons  Patient Response to Education: Patient/Caregiver Verbalized Understanding of Information    Goals:   Encounter Problems       Encounter Problems (Active)       Dressings Lower Extremities       STG - Patient will complete lower body dressing with mod assist with comp strategies and AE  (Progressing)       Start:  06/05/24    Expected End:  06/19/24               Functional Balance       STG-Patient will be min assist static stand task >5 minutes for ADL completion  (Progressing)       Start:  06/05/24    Expected End:  06/19/24               OT Transfers        STG-Patient will be mod assist with functional transfers demonstrating good safety   (Progressing)       Start:  06/05/24    Expected End:  06/19/24               Safety       STG-Patient will independently verbalize spine precautions with all functional tasks   (Progressing)       Start:  06/05/24    Expected End:  06/19/24

## 2024-06-05 NOTE — PROGRESS NOTES
This patient was referred to social work on this date at the request of the medical team and family to assist with having Hospice involved and admitting the patient into GIP status.   A referral was [facility] to [facility].   I requested they contact the family this evening and assess.

## 2024-06-05 NOTE — CONSULTS
Reason For Consult  Consult for incision inspection    History Of Present Illness  Khalida Barker is a 73 y.o. female [place] presented through emergency department following discharge after previous note written secondary to ambulatory dysfunction and hopeful placement.  She is continue with lower extremity numbness and weakness including mild right foot drop postoperatively.  She endorses some improvement made while at inpatient rehab previously though no further progress upon discharge.  Staples not removed upon ED visit prior day though reevaluated today.  She denies any overall new symptoms versus exam yesterday.        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    Surgical History  She has a past surgical history that includes Other surgical history; Knee Arthroplasty (Right); Colonoscopy; Tonsillectomy (1956); Adenoidectomy (1956); Spinal fusion (06/2010); Breast biopsy (1999); Mouth surgery (2016); Mouth surgery (09/28/2021); and McCl[place] tooth extraction (1978).     Social History  She reports that she has never smoked. She has never used smokeless tobacco. She reports that she does not currently use alcohol. She reports that she does not use drugs.    Family History  Family History   Problem Relation Name Age of Onset    Alzheimer's disease Mother      Heart disease Father      Polymyositis Father      Hypertension Brother      Skin cancer Brother      Other (peripheral artery disease) Brother          Allergies  Oxycodone and Tramadol    Review  "of Systems  14/14 systems reviewed and negative other than what is listed in the history of present illness       Physical Exam  General: Well developed, awake/alert/oriented x3, no distress, alert and cooperative  Skin: Warm and dry, no lesions, no rashes  ENMT: Mucous membranes moist, no apparent injury, no lesions seen  Head/Neck: Neck Supple, no apparent injury  Respiratory/Thorax: Normal breath sounds with good chest expansion, thorax symmetric  Cardiovascular: No pitting edema, no JVD    Motor Strength: 4/5 right hip flexion, knee flex/ext, ankle dorsiflexion     Muscle Bulk: Normal and symmetric in all extremities       Incision intact with staples in place         Last Recorded Vitals  Blood pressure 144/62, pulse 95, temperature 37.3 °C (99.1 °F), temperature source Temporal, resp. rate 16, height 1.651 m (5' 5\"), SpO2 93%.        Assessment/Plan     Staples removed bedside, patient tolerating well and no complications encountered  For fear of dehiscence lower portion of surgical incision reapproximated  Following skin infiltration with lidocaine and appropriate numbing sutures placed in vertical mattress and simple fashion.  Patient tolerated well and no complications encountered.  Will follow-up in office in 2 weeks for further eval         Gaurang Russell PA-C    "

## 2024-06-05 NOTE — PROGRESS NOTES
Physical Therapy    Physical Therapy Evaluation    Patient Name: Khalida Barker  MRN: [id number]  Today's Date: 6/5/2024   Time Calculation  Start Time: 1456  Stop Time: 1526  Time Calculation (min): 30 min  4120/4120-A    Assessment/Plan   PT Assessment  PT Assessment Results: Decreased strength, Decreased endurance, Impaired balance, Decreased mobility, Pain, Orthopedic restrictions  Rehab Prognosis: Good  Evaluation/Treatment Tolerance: Patient tolerated treatment well, Patient limited by pain  Medical Staff Made Aware: Yes  End of Session Communication: Bedside nurse  Assessment Comment: Pt is a 73 y.o. female admitted for Closed fracture of distal end of right radius, unspecified fracture morphology, initial encounter [S52.501A]  Weakness [R53.1] on 6/4/2024. Pt below functional level and will benefit from skilled therapy during stay to improve overall functional mobility, strength, ROM, endurance and safety awareness. Upon discharge pt will benefit from moderate intensity therapy for continued improvement in functional mobility. Therapy will continue to follow and reassess each session.      Due to patient's wound drainage, patient will likely need further care. Will hold further PT until neurosurgery clears patient for therapy.    End of Session Patient Position: Bed, 3 rail up, Alarm on  IP OR SWING BED PT PLAN  Inpatient or Swing Bed: Inpatient  PT Plan  Treatment/Interventions: Bed mobility, Transfer training, Gait training, Balance training, Strengthening, Therapeutic exercise, Therapeutic activity, Endurance training  PT Plan: Skilled PT  PT Frequency: 3 times per week  PT Discharge Recommendations: Moderate intensity level of continued care  Equipment Recommended upon Discharge: Wheeled walker  PT Recommended Transfer Status: Assist x1  PT - OK to Discharge: Yes    Subjective     Current Problem:  1. Closed fracture of distal end of right radius, unspecified fracture morphology, initial encounter           Patient Active Problem List   Diagnosis    Bilateral carpal tunnel syndrome    Breast asymmetry    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    Muscle weakness (generalized)    Positive colorectal cancer screening using Cologuard test    Primary osteoarthritis of right knee    Seborrheic keratoses    Tinnitus    Vitamin D deficiency    Left sided abdominal pain    BMI 30.0-30.9,adult    Cervicalgia    Cervical paraspinal muscle spasm    Non-smoker    S/P total knee arthroplasty, right    Right knee pain    Knee stiffness, right    Chronic left shoulder pain    Neck pain    Thoracogenic scoliosis of thoracolumbar region    Scoliosis deformity of spine    Anxiety    C6 radiculopathy    Carpal tunnel syndrome    Low back pain, unspecified    Closed fracture of distal end of right radius    Weakness       General Visit Information:  General  Reason for Referral: impaired mobility  Referred By: Nilesh Ambrocio DO  Past Medical History Relevant to Rehab: Per EMR: 73-year-old female presenting for inability to care for self after recent back surgery as well as right distal radius fracture with intra-articular extension.  Advised to follow-up with outpatient orthopedic surgery and placed in sugar-tong splint earlier today by this emergency department.  Upon returning home she had a home care aide between the 2 of them they decided that they would not able to take care of her at home and her  is additionally elderly and unable to assist.  She denies any new weakness, numbness or tingling.  Denies use of anticoagulation or any headache, blurry vision or altered mental status.    [place] [facility] the following completed by Dr Lu: [id number] Instrumented Fusion, Correction of Scoliotic Deformity, L1-2, L2-3, L3-4, L4-5, L5-S1 posterior column osteotomies, L3-4 anterior column osteotomy through disc space,  L1-S1 Decompression, with L2-3, L3-4, L4-5, L5-S1 Transforaminal Lumbar  Interbody Fusion, T11-T12 Decompression.  Patient was in the hospital from 5/14-5/22 and discharged to acute rehab to home. Patient was only home for 1 day prior to return to this hospital.  Co-Treatment: OT  Co-Treatment Reason: Patient safety  Prior to Session Communication: Bedside nurse  Patient Position Received: Bed, 3 rail up, Alarm on  Preferred Learning Style: verbal  General Comment: Pt agreeable to therapy desiring to sit up.  Patient cleared by neurosurgery for therapy.    Home Living:  Home Living  Type of Home: House  Lives With: Spouse  Home Adaptive Equipment: Walker rolling or standard  Home Layout: Two level, Able to live on main level with bedroom/bathroom, Full bath main level  Bathroom Shower/Tub: Walk-in shower  Bathroom Equipment: Grab bars in shower    Prior Level of Function:  Prior Function Per Pt/Caregiver Report  Level of [place]: Independent with ADLs and functional transfers, Independent with homemaking with ambulation (prior to surgery, however since then, she has required assistance wtih ADLs and IADLs. Right foot drop/weakness resulting in fall)    Precautions:  Precautions  UE Weight Bearing Status: Right Non-Weight Bearing (sling to right UE in sugar tong splint)  Medical Precautions: Fall precautions (spinal precautions from spinal surgery on 5/14 at [facility] by Dr. Virgil Lu)    Vital Signs:     Objective     Pain:  Pain Assessment  Pain Assessment: 0-10  Pain Score:  (generalized pain in right arm and back, describes as pressure no pain rated)    Cognition:  Cognition  Orientation Level: Oriented X4    General Assessments:      Activity Tolerance  Endurance: Decreased tolerance for upright activites     Static Sitting Balance  Static Sitting-Comment/Number of Minutes: fair+  Dynamic Sitting Balance  Dynamic Sitting-Comments: fair-  Static Standing Balance  Static Standing-Comment/Number of Minutes: poor  Dynamic Standing Balance  Dynamic Standing-Comments: poor    Functional  Assessments:     Bed Mobility  Bed Mobility: Yes (max assist of 2 supine <>sit with log roll technique. Once seated at edge of bed, RN in to assess dressing on back. Dressing removed, patient appears to have tan, malodorous drainage from incision.  RN changed dressing and notifed neurosurgery.)  Transfers  Transfer:  (max assist of 2 sit <>stand. Unable to take side steps. Attempted to use the carole steady however when she stood erect in the carole steady, she was unable to sit on the pads due to pain and pressure on her back. Pt assist back to bed.)     Extremity/Trunk Assessments:        RLE   RLE : Within Functional Limits  LLE   LLE : Within Functional Limits    Outcome Measures:     [facility] Basic Mobility  Turning from your back to your side while in a flat bed without using bedrails: A lot  Moving from lying on your back to sitting on the side of a flat bed without using bedrails: A lot  Moving to and from bed to chair (including a wheelchair): A lot  Standing up from a chair using your arms (e.g. wheelchair or bedside chair): A lot  To walk in hospital room: Total  Climbing 3-5 steps with railing: Total  Basic Mobility - Total Score: 10     Goals:  Encounter Problems       Encounter Problems (Active)       PT Problem       Pt will demonstrate [place] for all bed mobility   (Progressing)       Start:  06/05/24    Expected End:  06/19/24            Pt will demonstrate [place] for all transfers with WW  (Progressing)       Start:  06/05/24    Expected End:  06/19/24            Pt will ambulate 10 ft with platform attachment on right due to NWB status R UE.  (Progressing)       Start:  06/05/24    Expected End:  06/19/24            Pt will demonstrate good dynamic standing balance while performing a functional task.   (Progressing)       Start:  06/05/24    Expected End:  06/19/24                 Education Documentation  Home Exercise Program, taught by Melody Rivas, PT at 6/5/2024  6:09 PM.  Learner:  Patient  Readiness: Acceptance  Method: Explanation  Response: Verbalizes Understanding, Needs Reinforcement    Precautions, taught by Melody Rivas PT at 6/5/2024  6:09 PM.  Learner: Patient  Readiness: Acceptance  Method: Explanation  Response: Verbalizes Understanding, Needs Reinforcement    Body Mechanics, taught by Melody Rivas PT at 6/5/2024  6:09 PM.  Learner: Patient  Readiness: Acceptance  Method: Explanation  Response: Verbalizes Understanding, Needs Reinforcement    Mobility Training, taught by Melody Rivas PT at 6/5/2024  6:09 PM.  Learner: Patient  Readiness: Acceptance  Method: Explanation  Response: Verbalizes Understanding, Needs Reinforcement    Education Comments  No comments found.

## 2024-06-05 NOTE — PROGRESS NOTES
Patient with an operative fracture of the right distal radius with substantial shortening and articular involvement.  Alignment is currently at a point where if the fracture is healed in its current position this would go on to radiographic union with relatively good functional result.    I discussed extensively with patient both operative and nonoperative measures to address this injury.    Patient is scheduled to return to the operating room tomorrow for washout of the lumbar spine.  If this is an active infection would not recommend operative intervention for concern for development or seeding of infection in the setting of potential bacteremia.    Full dictation to follow      Dale Gonzalez MD

## 2024-06-05 NOTE — PROGRESS NOTES
Negative culture, no change in current treatment plan.  This patient was referred to social work this evening for consultation with regard to if we could place her from the ED into a snf.   He insurance is showing [employer] and will require a precert. PT/OT is needed and this was dicussed with  Who will order.   Awaiting determination if she will be admitted.   May need placement.

## 2024-06-05 NOTE — PROGRESS NOTES
"Khalida Barker is a 73 y.o. female on day 0 of admission presenting with Closed fracture of distal end of right radius.    Subjective   Patient seen and examined  States having difficult time with the pain, post op and now right hand  With recent fall and right wrist fracture she is unable to use a walker  Having urinary retention having been straight cath x1  States no bowel movement for 4 days, has eaten much and taking narcotics for pain-endorses passing a lot of gas  Denies any chest pain, shortness of breath, nausea, vomiting    Objective   All systems negative other than those listed above     Physical Exam  Gen: well appearing, no acute distress  HEENT: MMM, EOMI, no scleral icterus  Neck: supple, no LAD  CV: RRR, no murmurs appreciated  Lungs: good air entry b/l, no rhonchi/wheezes appreciated  Abd: soft, suprapubic tenderness, nondistended, normoactive BS  Ext: WWP, RUE splinted, sensation intact in digits  Skin: no rashes/lesions  Neuro: Cns III-XII grossly intact, alert and oriented x3  Psych: appropriate mood and affect     Last Recorded Vitals  Blood pressure 144/62, pulse 95, temperature 37.3 °C (99.1 °F), temperature source Temporal, resp. rate 16, height 1.651 m (5' 5\"), SpO2 93%.  Intake/Output last 3 Shifts:    Relevant Results  Scheduled medications  cholecalciferol, 1,000 Units, oral, Daily  enoxaparin, 40 mg, subcutaneous, q24h  lidocaine-epinephrine, 20 mL, subcutaneous, Once  pantoprazole, 40 mg, oral, Daily before breakfast  polyethylene glycol, 17 g, oral, Daily  pregabalin, 50 mg, oral, Nightly  sennosides, 1 tablet, oral, Nightly  sulfamethoxazole-trimethoprim, 1 tablet, oral, q12h NUNU      Continuous medications     PRN medications  PRN medications: HYDROmorphone, ketorolac, melatonin, polyethylene glycol  Results from last 7 days   Lab Units 06/04/24  1903 06/03/24  0541   WBC AUTO x10*3/uL 11.1 7.3   RBC AUTO x10*6/uL 3.45* 3.30*   HEMOGLOBIN g/dL 10.0* 9.9*     Results from last 7 days "   Lab Units 06/05/24  0546 06/04/24  1903 06/03/24  0541   SODIUM mmol/L 135* 133* 136   POTASSIUM mmol/L 3.4* 3.7 3.6   CHLORIDE mmol/L 101 100 102   CO2 mmol/L 23 18* 24   BUN mg/dL 12 15 15   CREATININE mg/dL 0.54 0.59 0.66   CALCIUM mg/dL 8.2* 8.5* 8.4*   BILIRUBIN TOTAL mg/dL  --  1.0  --    ALT U/L  --  14  --    AST U/L  --  21  --        XR forearm right 2 views    Result Date: 6/4/2024  Interpreted By:  Tiffanie Perry, STUDY: XR FOREARM RIGHT 2 VIEWS 6/4/2024 9:20 am   INDICATION: Signs/Symptoms:FOOSH, R wrist deformity. Pain and fall.   COMPARISON: None available.   ACCESSION NUMBER(S): [id number]   ORDERING CLINICIAN: MELINDA OBREGON   TECHNIQUE: Two views of right forearm are completed.   FINDINGS: There is comminuted intra-articular fracture of the distal radius noted with no additional fracture of the radius or ulna seen.       Comminuted intra-articular fracture of the distal radius.   Signed by: Tiffanie Perry 6/4/2024 9:54 AM Dictation workstation:   [id number]    XR wrist right 3+ views    Result Date: 6/4/2024  Interpreted By:  Tiffanie Perry, STUDY: XR WRIST RIGHT 3+ VIEWS; XR HAND RIGHT 3+ VIEWS 6/4/2024 9:20 am   INDICATION: Signs/Symptoms:FOOSH, R wrist deformity. Pain and fall   COMPARISON: None available.   ACCESSION NUMBER(S): [id number]; [id number]   ORDERING CLINICIAN: MELINDA OBREGON   TECHNIQUE: Four views of right wrist are completed.   FINDINGS: There is an acute comminuted intra-articular fracture of the distal radius noted with some very slight impaction at the fracture site. The distal ulna is intact. There is no fracture or dislocation of the carpal or metacarpal bones. Soft tissue swelling of the wrist is seen. There is osteoarthritis of the 1st CMC joint with joint space narrowing and osteophytosis.   There is no fracture or dislocation of the bones of the right hand. There is osteoarthritis of distal interphalangeal joints most pronounced about the DIP joint of the little finger.  Osteoarthritis also affects the interphalangeal joint of the thumb.       Acute comminuted intra-articular fracture of the distal radius with associated soft tissue swelling.   Osteoarthritis of the 1st CMC joint. Osteoarthritis also affects distal interphalangeal joints of the right hand particularly of the little finger with osteoarthritis of the interphalangeal joint of right thumb noted as well.   Signed by: Tiffanie Perry 6/4/2024 9:52 AM Dictation workstation:   [id number]    XR hand right 3+ views    Result Date: 6/4/2024  Interpreted By:  Tiffanie Perry, STUDY: XR WRIST RIGHT 3+ VIEWS; XR HAND RIGHT 3+ VIEWS 6/4/2024 9:20 am   INDICATION: Signs/Symptoms:FOOSH, R wrist deformity. Pain and fall   COMPARISON: None available.   ACCESSION NUMBER(S): [id number]; [id number]   ORDERING CLINICIAN: MELINDA OBREGON   TECHNIQUE: Four views of right wrist are completed.   FINDINGS: There is an acute comminuted intra-articular fracture of the distal radius noted with some very slight impaction at the fracture site. The distal ulna is intact. There is no fracture or dislocation of the carpal or metacarpal bones. Soft tissue swelling of the wrist is seen. There is osteoarthritis of the 1st CMC joint with joint space narrowing and osteophytosis.   There is no fracture or dislocation of the bones of the right hand. There is osteoarthritis of distal interphalangeal joints most pronounced about the DIP joint of the little finger. Osteoarthritis also affects the interphalangeal joint of the thumb.       Acute comminuted intra-articular fracture of the distal radius with associated soft tissue swelling.   Osteoarthritis of the 1st CMC joint. Osteoarthritis also affects distal interphalangeal joints of the right hand particularly of the little finger with osteoarthritis of the interphalangeal joint of right thumb noted as well.   Signed by: Tiffanie Perry 6/4/2024 9:52 AM Dictation workstation:   [id number]      Assessment/Plan    Principal Problem:    Closed fracture of distal end of right radius  Active Problems:    Weakness  #recent NSGY procedure, ?possible SSTI   #R radial fracture  #mechanical fall   #HAGMA , resolved  Hypokalemia    Plan:    -Neurosurgery on consult - recommended course of Bactrim for SSTI -nursing to upload surgical photo   -orthopedic surgery consulted: right radius fracture-RUE splinted  -Toradol and Dilaudid PRN for pain  -PT/OT consults pending  -patient declined going back to [facility], prefers [facility] , [facility] aware  -20 mEq potassium ordered  -currently on Senokot, wants to avoid suppository at this time, willing to try Miralax scheduled  -add 0.4 mg Flomax, bladder scan every 4 hours, straight cath-if necessary can place [facility]-patient describes abdominal discomfort when she cannot void  -dvtp: heparin subcutaneous  -am labs includign cbc, bmp, mag  -POC discussed with patient and attending  -Dispo: follow NS and ortho recommendations, patient will need SNF on discharge     I spent >35 minutes in the professional and overall care of this patient.      SHAMIKA [facility]  [address] [place], [place]  [address]  Phone: [phone] Fax: [phone]

## 2024-06-05 NOTE — PROGRESS NOTES
Spiritual Care Visit    Clinical Encounter Type  Visited With: Patient  Routine Visit: Introduction  Continue Visiting: No                                            Taxonomy  Intended Effects: Preserve dignity and respect, Aracelis affirmation, Promote sense of peace, Helping someone feel comforted  Methods: Offer spiritual/Bahai support  Interventions: Share words of hope and inspiration, [place]    Patient is Judaism.  Patient shared she lives at home with her  and talked about her physical troubles.   listened and prayed at her request.

## 2024-06-05 NOTE — CARE PLAN
The patient's goals for the shift include      The clinical goals for the shift include Pt will receive adequate rest.      Problem: Skin  Goal: Decreased wound size/increased tissue granulation at next dressing change  Flowsheets (Taken 6/5/2024 2547)  Decreased wound size/increased tissue granulation at next dressing change:   Promote sleep for wound healing   Protective dressings over bony prominences   Utilize specialty bed per algorithm

## 2024-06-06 ENCOUNTER — ANESTHESIA EVENT (OUTPATIENT)
Dept: OPERATING ROOM | Facility: HOSPITAL | Age: 74
End: 2024-06-06
Payer: MEDICARE

## 2024-06-06 ENCOUNTER — APPOINTMENT (OUTPATIENT)
Dept: RADIOLOGY | Facility: HOSPITAL | Age: 74
End: 2024-06-06
Payer: MEDICARE

## 2024-06-06 ENCOUNTER — ANESTHESIA (OUTPATIENT)
Dept: OPERATING ROOM | Facility: HOSPITAL | Age: 74
End: 2024-06-06
Payer: MEDICARE

## 2024-06-06 LAB
ANION GAP SERPL CALC-SCNC: 12 MMOL/L (ref 10–20)
BUN SERPL-MCNC: 12 MG/DL (ref 6–23)
CALCIUM SERPL-MCNC: 7.8 MG/DL (ref 8.6–10.3)
CHLORIDE SERPL-SCNC: 101 MMOL/L (ref 98–107)
CO2 SERPL-SCNC: 23 MMOL/L (ref 21–32)
CREAT SERPL-MCNC: 0.55 MG/DL (ref 0.5–1.05)
EGFRCR SERPLBLD CKD-EPI 2021: >90 ML/MIN/1.73M*2
ERYTHROCYTE [DISTWIDTH] IN BLOOD BY AUTOMATED COUNT: 14.7 % (ref 11.5–14.5)
GLUCOSE SERPL-MCNC: 115 MG/DL (ref 74–99)
HCT VFR BLD AUTO: 27.7 % (ref 36–46)
HGB BLD-MCNC: 8.7 G/DL (ref 12–16)
MAGNESIUM SERPL-MCNC: 1.92 MG/DL (ref 1.6–2.4)
MCH RBC QN AUTO: 29.7 PG (ref 26–34)
MCHC RBC AUTO-ENTMCNC: 31.4 G/DL (ref 32–36)
MCV RBC AUTO: 95 FL (ref 80–100)
NRBC BLD-RTO: 0 /100 WBCS (ref 0–0)
PHOSPHATE SERPL-MCNC: 3.1 MG/DL (ref 2.5–4.9)
PLATELET # BLD AUTO: 331 X10*3/UL (ref 150–450)
POTASSIUM SERPL-SCNC: 4.1 MMOL/L (ref 3.5–5.3)
RBC # BLD AUTO: 2.93 X10*6/UL (ref 4–5.2)
SODIUM SERPL-SCNC: 132 MMOL/L (ref 136–145)
WBC # BLD AUTO: 8.5 X10*3/UL (ref 4.4–11.3)

## 2024-06-06 PROCEDURE — 3700000001 HC GENERAL ANESTHESIA TIME - INITIAL BASE CHARGE: Performed by: STUDENT IN AN ORGANIZED HEALTH CARE EDUCATION/TRAINING PROGRAM

## 2024-06-06 PROCEDURE — 80048 BASIC METABOLIC PNL TOTAL CA: CPT | Performed by: NURSE PRACTITIONER

## 2024-06-06 PROCEDURE — 85027 COMPLETE CBC AUTOMATED: CPT | Performed by: NURSE PRACTITIONER

## 2024-06-06 PROCEDURE — 2500000005 HC RX 250 GENERAL PHARMACY W/O HCPCS: Performed by: NURSE ANESTHETIST, CERTIFIED REGISTERED

## 2024-06-06 PROCEDURE — 2500000004 HC RX 250 GENERAL PHARMACY W/ HCPCS (ALT 636 FOR OP/ED): Performed by: STUDENT IN AN ORGANIZED HEALTH CARE EDUCATION/TRAINING PROGRAM

## 2024-06-06 PROCEDURE — 2500000004 HC RX 250 GENERAL PHARMACY W/ HCPCS (ALT 636 FOR OP/ED): Performed by: INTERNAL MEDICINE

## 2024-06-06 PROCEDURE — 0QB00ZZ EXCISION OF LUMBAR VERTEBRA, OPEN APPROACH: ICD-10-PCS | Performed by: STUDENT IN AN ORGANIZED HEALTH CARE EDUCATION/TRAINING PROGRAM

## 2024-06-06 PROCEDURE — 7100000002 HC RECOVERY ROOM TIME - EACH INCREMENTAL 1 MINUTE: Performed by: STUDENT IN AN ORGANIZED HEALTH CARE EDUCATION/TRAINING PROGRAM

## 2024-06-06 PROCEDURE — 2500000001 HC RX 250 WO HCPCS SELF ADMINISTERED DRUGS (ALT 637 FOR MEDICARE OP): Performed by: STUDENT IN AN ORGANIZED HEALTH CARE EDUCATION/TRAINING PROGRAM

## 2024-06-06 PROCEDURE — 36415 COLL VENOUS BLD VENIPUNCTURE: CPT | Performed by: NURSE PRACTITIONER

## 2024-06-06 PROCEDURE — 7100000001 HC RECOVERY ROOM TIME - INITIAL BASE CHARGE: Performed by: STUDENT IN AN ORGANIZED HEALTH CARE EDUCATION/TRAINING PROGRAM

## 2024-06-06 PROCEDURE — 87070 CULTURE OTHR SPECIMN AEROBIC: CPT | Mod: 91,STJLAB | Performed by: STUDENT IN AN ORGANIZED HEALTH CARE EDUCATION/TRAINING PROGRAM

## 2024-06-06 PROCEDURE — 2720000007 HC OR 272 NO HCPCS: Performed by: STUDENT IN AN ORGANIZED HEALTH CARE EDUCATION/TRAINING PROGRAM

## 2024-06-06 PROCEDURE — 84100 ASSAY OF PHOSPHORUS: CPT | Performed by: NURSE PRACTITIONER

## 2024-06-06 PROCEDURE — 3600000008 HC OR TIME - EACH INCREMENTAL 1 MINUTE - PROCEDURE LEVEL THREE: Performed by: STUDENT IN AN ORGANIZED HEALTH CARE EDUCATION/TRAINING PROGRAM

## 2024-06-06 PROCEDURE — 2500000005 HC RX 250 GENERAL PHARMACY W/O HCPCS: Performed by: STUDENT IN AN ORGANIZED HEALTH CARE EDUCATION/TRAINING PROGRAM

## 2024-06-06 PROCEDURE — 74018 RADEX ABDOMEN 1 VIEW: CPT | Performed by: RADIOLOGY

## 2024-06-06 PROCEDURE — 3700000002 HC GENERAL ANESTHESIA TIME - EACH INCREMENTAL 1 MINUTE: Performed by: STUDENT IN AN ORGANIZED HEALTH CARE EDUCATION/TRAINING PROGRAM

## 2024-06-06 PROCEDURE — 87186 SC STD MICRODIL/AGAR DIL: CPT | Mod: STJLAB | Performed by: INTERNAL MEDICINE

## 2024-06-06 PROCEDURE — 83735 ASSAY OF MAGNESIUM: CPT | Performed by: NURSE PRACTITIONER

## 2024-06-06 PROCEDURE — 3600000003 HC OR TIME - INITIAL BASE CHARGE - PROCEDURE LEVEL THREE: Performed by: STUDENT IN AN ORGANIZED HEALTH CARE EDUCATION/TRAINING PROGRAM

## 2024-06-06 PROCEDURE — 2500000002 HC RX 250 W HCPCS SELF ADMINISTERED DRUGS (ALT 637 FOR MEDICARE OP, ALT 636 FOR OP/ED): Performed by: STUDENT IN AN ORGANIZED HEALTH CARE EDUCATION/TRAINING PROGRAM

## 2024-06-06 PROCEDURE — 2780000003 HC OR 278 NO HCPCS: Performed by: STUDENT IN AN ORGANIZED HEALTH CARE EDUCATION/TRAINING PROGRAM

## 2024-06-06 PROCEDURE — 74018 RADEX ABDOMEN 1 VIEW: CPT

## 2024-06-06 PROCEDURE — 22015 I&D ABSCESS P-SPINE L/S/LS: CPT | Performed by: STUDENT IN AN ORGANIZED HEALTH CARE EDUCATION/TRAINING PROGRAM

## 2024-06-06 PROCEDURE — 2500000002 HC RX 250 W HCPCS SELF ADMINISTERED DRUGS (ALT 637 FOR MEDICARE OP, ALT 636 FOR OP/ED): Mod: MUE | Performed by: NURSE PRACTITIONER

## 2024-06-06 PROCEDURE — 2500000004 HC RX 250 GENERAL PHARMACY W/ HCPCS (ALT 636 FOR OP/ED): Performed by: NURSE ANESTHETIST, CERTIFIED REGISTERED

## 2024-06-06 PROCEDURE — G0378 HOSPITAL OBSERVATION PER HR: HCPCS

## 2024-06-06 RX ORDER — ROCURONIUM BROMIDE 10 MG/ML
INJECTION, SOLUTION INTRAVENOUS AS NEEDED
Status: DISCONTINUED | OUTPATIENT
Start: 2024-06-06 | End: 2024-06-06

## 2024-06-06 RX ORDER — VANCOMYCIN HYDROCHLORIDE 1 G/200ML
1000 INJECTION, SOLUTION INTRAVENOUS EVERY 12 HOURS
Status: DISCONTINUED | OUTPATIENT
Start: 2024-06-06 | End: 2024-06-08 | Stop reason: DRUGHIGH

## 2024-06-06 RX ORDER — VANCOMYCIN HYDROCHLORIDE 1 G/20ML
INJECTION, POWDER, LYOPHILIZED, FOR SOLUTION INTRAVENOUS DAILY PRN
Status: DISCONTINUED | OUTPATIENT
Start: 2024-06-06 | End: 2024-06-15 | Stop reason: HOSPADM

## 2024-06-06 RX ORDER — SUCCINYLCHOLINE/SOD CL,ISO/PF 100 MG/5ML
SYRINGE (ML) INTRAVENOUS AS NEEDED
Status: DISCONTINUED | OUTPATIENT
Start: 2024-06-06 | End: 2024-06-06

## 2024-06-06 RX ORDER — FENTANYL CITRATE 50 UG/ML
INJECTION, SOLUTION INTRAMUSCULAR; INTRAVENOUS AS NEEDED
Status: DISCONTINUED | OUTPATIENT
Start: 2024-06-06 | End: 2024-06-06

## 2024-06-06 RX ORDER — SODIUM CHLORIDE 9 MG/ML
75 INJECTION, SOLUTION INTRAVENOUS CONTINUOUS
Status: DISCONTINUED | OUTPATIENT
Start: 2024-06-06 | End: 2024-06-07

## 2024-06-06 RX ORDER — LIDOCAINE HYDROCHLORIDE 10 MG/ML
0.1 INJECTION INFILTRATION; PERINEURAL ONCE
Status: DISCONTINUED | OUTPATIENT
Start: 2024-06-06 | End: 2024-06-06 | Stop reason: HOSPADM

## 2024-06-06 RX ORDER — MIDAZOLAM HYDROCHLORIDE 1 MG/ML
INJECTION, SOLUTION INTRAMUSCULAR; INTRAVENOUS AS NEEDED
Status: DISCONTINUED | OUTPATIENT
Start: 2024-06-06 | End: 2024-06-06

## 2024-06-06 RX ORDER — ONDANSETRON HYDROCHLORIDE 2 MG/ML
4 INJECTION, SOLUTION INTRAVENOUS ONCE AS NEEDED
Status: DISCONTINUED | OUTPATIENT
Start: 2024-06-06 | End: 2024-06-06 | Stop reason: HOSPADM

## 2024-06-06 RX ORDER — ONDANSETRON HYDROCHLORIDE 2 MG/ML
INJECTION, SOLUTION INTRAVENOUS AS NEEDED
Status: DISCONTINUED | OUTPATIENT
Start: 2024-06-06 | End: 2024-06-06

## 2024-06-06 RX ORDER — HYDROMORPHONE HYDROCHLORIDE 1 MG/ML
INJECTION, SOLUTION INTRAMUSCULAR; INTRAVENOUS; SUBCUTANEOUS AS NEEDED
Status: DISCONTINUED | OUTPATIENT
Start: 2024-06-06 | End: 2024-06-06

## 2024-06-06 RX ORDER — LIDOCAINE HYDROCHLORIDE 20 MG/ML
INJECTION, SOLUTION EPIDURAL; INFILTRATION; INTRACAUDAL; PERINEURAL AS NEEDED
Status: DISCONTINUED | OUTPATIENT
Start: 2024-06-06 | End: 2024-06-06

## 2024-06-06 RX ORDER — FENTANYL CITRATE 50 UG/ML
25 INJECTION, SOLUTION INTRAMUSCULAR; INTRAVENOUS EVERY 5 MIN PRN
Status: DISCONTINUED | OUTPATIENT
Start: 2024-06-06 | End: 2024-06-06 | Stop reason: HOSPADM

## 2024-06-06 RX ORDER — VANCOMYCIN HYDROCHLORIDE 1 G/20ML
INJECTION, POWDER, LYOPHILIZED, FOR SOLUTION INTRAVENOUS AS NEEDED
Status: DISCONTINUED | OUTPATIENT
Start: 2024-06-06 | End: 2024-06-06 | Stop reason: HOSPADM

## 2024-06-06 RX ORDER — SODIUM CHLORIDE, SODIUM LACTATE, POTASSIUM CHLORIDE, CALCIUM CHLORIDE 600; 310; 30; 20 MG/100ML; MG/100ML; MG/100ML; MG/100ML
75 INJECTION, SOLUTION INTRAVENOUS CONTINUOUS
Status: DISCONTINUED | OUTPATIENT
Start: 2024-06-06 | End: 2024-06-06

## 2024-06-06 RX ORDER — OXYCODONE HYDROCHLORIDE 5 MG/1
5 TABLET ORAL EVERY 4 HOURS PRN
Status: DISCONTINUED | OUTPATIENT
Start: 2024-06-06 | End: 2024-06-06 | Stop reason: HOSPADM

## 2024-06-06 RX ORDER — PROPOFOL 10 MG/ML
INJECTION, EMULSION INTRAVENOUS AS NEEDED
Status: DISCONTINUED | OUTPATIENT
Start: 2024-06-06 | End: 2024-06-06

## 2024-06-06 RX ORDER — ACETAMINOPHEN 325 MG/1
650 TABLET ORAL EVERY 4 HOURS PRN
Status: DISCONTINUED | OUTPATIENT
Start: 2024-06-06 | End: 2024-06-06 | Stop reason: HOSPADM

## 2024-06-06 RX ORDER — SODIUM CHLORIDE, SODIUM LACTATE, POTASSIUM CHLORIDE, CALCIUM CHLORIDE 600; 310; 30; 20 MG/100ML; MG/100ML; MG/100ML; MG/100ML
INJECTION, SOLUTION INTRAVENOUS CONTINUOUS PRN
Status: DISCONTINUED | OUTPATIENT
Start: 2024-06-06 | End: 2024-06-06

## 2024-06-06 RX ORDER — SODIUM CHLORIDE, SODIUM LACTATE, POTASSIUM CHLORIDE, CALCIUM CHLORIDE 600; 310; 30; 20 MG/100ML; MG/100ML; MG/100ML; MG/100ML
100 INJECTION, SOLUTION INTRAVENOUS CONTINUOUS
Status: DISCONTINUED | OUTPATIENT
Start: 2024-06-06 | End: 2024-06-06 | Stop reason: HOSPADM

## 2024-06-06 RX ORDER — VANCOMYCIN HYDROCHLORIDE 1 G/20ML
INJECTION, POWDER, LYOPHILIZED, FOR SOLUTION INTRAVENOUS AS NEEDED
Status: DISCONTINUED | OUTPATIENT
Start: 2024-06-06 | End: 2024-06-06

## 2024-06-06 RX ORDER — LABETALOL HYDROCHLORIDE 5 MG/ML
5 INJECTION, SOLUTION INTRAVENOUS ONCE AS NEEDED
Status: DISCONTINUED | OUTPATIENT
Start: 2024-06-06 | End: 2024-06-06 | Stop reason: HOSPADM

## 2024-06-06 RX ADMIN — ONDANSETRON 4 MG: 2 INJECTION INTRAMUSCULAR; INTRAVENOUS at 15:54

## 2024-06-06 RX ADMIN — HYDROMORPHONE HYDROCHLORIDE 0.6 MG: 1 INJECTION, SOLUTION INTRAMUSCULAR; INTRAVENOUS; SUBCUTANEOUS at 01:51

## 2024-06-06 RX ADMIN — Medication 8 L/MIN: at 16:44

## 2024-06-06 RX ADMIN — SULFAMETHOXAZOLE AND TRIMETHOPRIM 1 TABLET: 800; 160 TABLET ORAL at 08:58

## 2024-06-06 RX ADMIN — HYDROMORPHONE HYDROCHLORIDE 0.6 MG: 1 INJECTION, SOLUTION INTRAMUSCULAR; INTRAVENOUS; SUBCUTANEOUS at 10:26

## 2024-06-06 RX ADMIN — Medication 100 MG: at 14:09

## 2024-06-06 RX ADMIN — LIDOCAINE HYDROCHLORIDE 60 MG: 20 INJECTION, SOLUTION EPIDURAL; INFILTRATION; INTRACAUDAL; PERINEURAL at 14:09

## 2024-06-06 RX ADMIN — PIPERACILLIN SODIUM AND TAZOBACTAM SODIUM 3.38 G: 3; .375 INJECTION, SOLUTION INTRAVENOUS at 21:34

## 2024-06-06 RX ADMIN — DEXAMETHASONE SODIUM PHOSPHATE 4 MG: 4 INJECTION, SOLUTION INTRAMUSCULAR; INTRAVENOUS at 15:54

## 2024-06-06 RX ADMIN — TAMSULOSIN HYDROCHLORIDE 0.4 MG: 0.4 CAPSULE ORAL at 08:58

## 2024-06-06 RX ADMIN — SODIUM CHLORIDE, POTASSIUM CHLORIDE, SODIUM LACTATE AND CALCIUM CHLORIDE: 600; 310; 30; 20 INJECTION, SOLUTION INTRAVENOUS at 14:00

## 2024-06-06 RX ADMIN — METHOCARBAMOL 500 MG: 500 TABLET ORAL at 21:34

## 2024-06-06 RX ADMIN — ROCURONIUM BROMIDE 45 MG: 10 INJECTION INTRAVENOUS at 14:32

## 2024-06-06 RX ADMIN — SENNOSIDES 8.6 MG: 8.6 TABLET, FILM COATED ORAL at 21:31

## 2024-06-06 RX ADMIN — FENTANYL CITRATE 50 MCG: 50 INJECTION, SOLUTION INTRAMUSCULAR; INTRAVENOUS at 14:09

## 2024-06-06 RX ADMIN — MIDAZOLAM 2 MG: 1 INJECTION INTRAMUSCULAR; INTRAVENOUS at 14:09

## 2024-06-06 RX ADMIN — HYDROMORPHONE HYDROCHLORIDE 1 MG: 1 INJECTION, SOLUTION INTRAMUSCULAR; INTRAVENOUS; SUBCUTANEOUS at 15:19

## 2024-06-06 RX ADMIN — FENTANYL CITRATE 50 MCG: 50 INJECTION, SOLUTION INTRAMUSCULAR; INTRAVENOUS at 15:17

## 2024-06-06 RX ADMIN — PIPERACILLIN SODIUM AND TAZOBACTAM SODIUM 3.38 G: 3; .375 INJECTION, SOLUTION INTRAVENOUS at 14:51

## 2024-06-06 RX ADMIN — PROPOFOL 100 MG: 10 INJECTION, EMULSION INTRAVENOUS at 15:39

## 2024-06-06 RX ADMIN — SODIUM CHLORIDE, POTASSIUM CHLORIDE, SODIUM LACTATE AND CALCIUM CHLORIDE 75 ML/HR: 600; 310; 30; 20 INJECTION, SOLUTION INTRAVENOUS at 01:51

## 2024-06-06 RX ADMIN — PROPOFOL 100 MG: 10 INJECTION, EMULSION INTRAVENOUS at 14:09

## 2024-06-06 RX ADMIN — VANCOMYCIN HYDROCHLORIDE 1 G: 1 INJECTION, POWDER, LYOPHILIZED, FOR SOLUTION INTRAVENOUS at 14:51

## 2024-06-06 RX ADMIN — SUGAMMADEX 200 MG: 100 INJECTION, SOLUTION INTRAVENOUS at 16:29

## 2024-06-06 RX ADMIN — HYDROMORPHONE HYDROCHLORIDE 0.6 MG: 1 INJECTION, SOLUTION INTRAMUSCULAR; INTRAVENOUS; SUBCUTANEOUS at 06:04

## 2024-06-06 RX ADMIN — ROCURONIUM BROMIDE 5 MG: 10 INJECTION INTRAVENOUS at 14:09

## 2024-06-06 RX ADMIN — PANTOPRAZOLE SODIUM 40 MG: 40 TABLET, DELAYED RELEASE ORAL at 06:04

## 2024-06-06 SDOH — HEALTH STABILITY: MENTAL HEALTH: CURRENT SMOKER: 0

## 2024-06-06 ASSESSMENT — COGNITIVE AND FUNCTIONAL STATUS - GENERAL
STANDING UP FROM CHAIR USING ARMS: A LOT
TOILETING: A LITTLE
DAILY ACTIVITIY SCORE: 15
DRESSING REGULAR UPPER BODY CLOTHING: A LOT
WALKING IN HOSPITAL ROOM: A LOT
MOBILITY SCORE: 11
DRESSING REGULAR LOWER BODY CLOTHING: A LOT
EATING MEALS: A LITTLE
MOVING TO AND FROM BED TO CHAIR: A LOT
HELP NEEDED FOR BATHING: A LOT
PERSONAL GROOMING: A LITTLE
TURNING FROM BACK TO SIDE WHILE IN FLAT BAD: A LOT
CLIMB 3 TO 5 STEPS WITH RAILING: TOTAL
MOVING FROM LYING ON BACK TO SITTING ON SIDE OF FLAT BED WITH BEDRAILS: A LOT

## 2024-06-06 ASSESSMENT — PAIN - FUNCTIONAL ASSESSMENT
PAIN_FUNCTIONAL_ASSESSMENT: 0-10

## 2024-06-06 ASSESSMENT — PAIN SCALES - GENERAL
PAINLEVEL_OUTOF10: 0 - NO PAIN
PAINLEVEL_OUTOF10: 5 - MODERATE PAIN
PAINLEVEL_OUTOF10: 0 - NO PAIN
PAINLEVEL_OUTOF10: 10 - WORST POSSIBLE PAIN
PAINLEVEL_OUTOF10: 5 - MODERATE PAIN
PAINLEVEL_OUTOF10: 5 - MODERATE PAIN
PAINLEVEL_OUTOF10: 10 - WORST POSSIBLE PAIN
PAINLEVEL_OUTOF10: 9

## 2024-06-06 NOTE — ANESTHESIA PROCEDURE NOTES
Airway  Date/Time: 6/6/2024 2:10 PM  Urgency: elective    Airway not difficult    Staffing  Performed: CRNA   Authorized by: Corby Doran DO    Performed by: SHAMIKA Marshall-NIVIA  Patient location during procedure: OR    Indications and Patient Condition  Indications for airway management: anesthesia and airway protection  Spontaneous ventilation: present  Sedation level: deep  Preoxygenated: yes  Patient position: sniffing  MILS maintained throughout  Mask difficulty assessment: 0 - not attempted  No planned trial extubation    Final Airway Details  Final airway type: endotracheal airway      Successful airway: ETT  Cuffed: yes   Successful intubation technique: video laryngoscopy  Facilitating devices/methods: intubating stylet  Endotracheal tube insertion site: oral  Blade: Crystal  Blade size: #4  ETT size (mm): 7.0  Cormack-Lehane Classification: grade I - full view of glottis  Placement verified by: chest auscultation and capnometry   Measured from: lips  Number of attempts at approach: 1  Ventilation between attempts: none  Number of other approaches attempted: 0

## 2024-06-06 NOTE — CONSULTS
Vancomycin Dosing by Pharmacy- INITIAL    Khalida Barker is a 73 y.o. year old female who Pharmacy has been consulted for vancomycin dosing for cellulitis, skin and soft tissue. Based on the patient's indication and renal status this patient will be dosed based on a goal AUC of 400-600.     Renal function is currently stable.    Visit Vitals  BP (!) 101/47 (Patient Position: Lying)   Pulse 88   Temp 36 °C (96.8 °F) (Temporal)   Resp 16        Lab Results   Component Value Date    CREATININE 0.55 2024    CREATININE 0.54 2024    CREATININE 0.59 2024    CREATININE 0.66 2024        Patient weight is as follows: There were no vitals filed for this visit.    Cultures:  No results found for the encounter in last 14 days.        I/O last 3 completed shifts:  In: 1240 [P.O.:240; IV Piggyback:1000]  Out: 1450 [Urine:1450]  I/O during current shift:  No intake/output data recorded.    Temp (24hrs), Av.3 °C (97.4 °F), Min:36 °C (96.8 °F), Max:36.9 °C (98.4 °F)         Assessment/Plan     Patient will not be given a loading dose.  Will initiate vancomycin maintenance,  1000 mg every 12 hours.    This dosing regimen is predicted by [employer] to result in the following pharmacokinetic parameters:  Loading dose: N/A  Regimen: 1000 mg IV every 12 hours.  Start time: 12:46 on 2024  Exposure target: AUC24 (range)400-600 mg/L.hr   AUC24,ss: 437 mg/L.hr  Probability of AUC24 > 400: 60 %  Ctrough,ss: 13.3 mg/L  Probability of Ctrough,ss > 20: 18 %  Probability of nephrotoxicity (Lodise CAYDEN ): 8 %    Follow-up level will be ordered on 24 at 0600 unless clinically indicated sooner.  Will continue to monitor renal function daily while on vancomycin and order serum creatinine at least every 48 hours if not already ordered.  Follow for continued vancomycin needs, clinical response, and signs/symptoms of toxicity.       Fariha Vogt, PharmD

## 2024-06-06 NOTE — SIGNIFICANT EVENT
Neurosurgery update:    S/p wound washout and revision. Suprafascial and subfascial purulence seen. Cultures sent. No active CSF leak noted.     Recommendations  Maintain suprafascial/subfascial drains and prevena (please plug prevena in)  Started vanc/cefepime intra-op. Antibiotic management per ID team  Recommend q2 hour turning to keep pressure off the incision   Mobilize with PTOT  Rest of care per primary team

## 2024-06-06 NOTE — CARE PLAN
Problem: Pain  Goal: My pain/discomfort is manageable  Outcome: Progressing     Problem: Safety  Goal: Patient will be injury free during hospitalization  Outcome: Progressing  Goal: I will remain free of falls  Outcome: Progressing     Problem: Daily Care  Goal: Daily care needs are met  Outcome: Progressing     Problem: Psychosocial Needs  Goal: Demonstrates ability to cope with hospitalization/illness  Outcome: Progressing  Goal: Collaborate with me, my family, and caregiver to identify my specific goals  Outcome: Progressing     Problem: Discharge Barriers  Goal: My discharge needs are met  Outcome: Progressing     Problem: Skin  Goal: Decreased wound size/increased tissue granulation at next dressing change  Outcome: Progressing  Goal: Participates in plan/prevention/treatment measures  Outcome: Progressing  Goal: Prevent/manage excess moisture  Outcome: Progressing  Goal: Prevent/minimize sheer/friction injuries  Outcome: Progressing  Goal: Promote/optimize nutrition  Outcome: Progressing  Goal: Promote skin healing  Outcome: Progressing     Problem: Dressings Lower Extremities  Goal: STG - Patient will complete lower body dressing with mod assist with comp strategies and AE   Outcome: Progressing   The patient's goals for the shift include      The clinical goals for the shift include Pt will have minimal pain this shift, be able to void on her own and receive adequate rest.    Over the shift, the patient did receive rest.

## 2024-06-06 NOTE — PROGRESS NOTES
"Khalida Barker is a 73 y.o. female on day 0 of admission presenting with Closed fracture of distal end of right radius. Also noted ambulatory dysfunction and hopeful placement. She  continues with lower extremity numbness and weakness including mild right foot drop postoperatively. Recent T10-pelvis fusion, multilevel PCO, L2-S1 TLIF with Dr. Lu 5/14 .   Patient discharged from her procedure 5/22 to acute rehab and was subsequently discharged to home 6/3. Scheduled for formal I and D today with Neurosurgery    Subjective   Patient seen and examined  States still no bowel movement for past 5 days despite bowel rege min, hx SBO-will obtain KUB-passing very little flatulence this am  Denies any chest pain, shortness of breath, n/v, abd pain.   Has right wrist pain, currently splinted, motor sensory intact  Likely going to ER today for post surgical incision drainage    Objective   All systems negative other than those listed above     Physical Exam  Gen: well appearing, no acute distress  HEENT: MMM, EOMI, no scleral icterus  Neck: supple, no LAD  CV: RRR, no murmurs appreciated  Lungs: good air entry b/l, no rhonchi/wheezes appreciated  Abd: soft, slightly distended,tricking BS  Ext: WWP, RUE splinted, sensation intact in digits  Skin: back surgical incision   Neuro: Cns III-XII grossly intact, alert and oriented x3  Psych: appropriate mood and affect     Last Recorded Vitals  Blood pressure (!) 101/47, pulse 88, temperature 36 °C (96.8 °F), temperature source Temporal, resp. rate 16, height 1.651 m (5' 5\"), SpO2 98%.  Intake/Output last 3 Shifts:    Relevant Results  Scheduled medications  cholecalciferol, 1,000 Units, oral, Daily  [Held by provider] enoxaparin, 40 mg, subcutaneous, q24h  pantoprazole, 40 mg, oral, Daily before breakfast  polyethylene glycol, 17 g, oral, Daily  pregabalin, 50 mg, oral, Nightly  sennosides, 1 tablet, oral, Nightly  sulfamethoxazole-trimethoprim, 1 tablet, oral, q12h NUNU  tamsulosin, " 0.4 mg, oral, Daily      Continuous medications  lactated Ringer's, 75 mL/hr, Last Rate: 75 mL/hr (06/06/24 0151)      PRN medications  PRN medications: HYDROmorphone, ketorolac, melatonin, methocarbamol  Results from last 7 days   Lab Units 06/06/24  0536 06/04/24  1903 06/03/24  0541   WBC AUTO x10*3/uL 8.5 11.1 7.3   RBC AUTO x10*6/uL 2.93* 3.45* 3.30*   HEMOGLOBIN g/dL 8.7* 10.0* 9.9*     Results from last 7 days   Lab Units 06/06/24  0536 06/05/24  0546 06/04/24  1903   SODIUM mmol/L 132* 135* 133*   POTASSIUM mmol/L 4.1 3.4* 3.7   CHLORIDE mmol/L 101 101 100   CO2 mmol/L 23 23 18*   BUN mg/dL 12 12 15   CREATININE mg/dL 0.55 0.54 0.59   CALCIUM mg/dL 7.8* 8.2* 8.5*   PHOSPHORUS mg/dL 3.1  --   --    MAGNESIUM mg/dL 1.92  --   --    BILIRUBIN TOTAL mg/dL  --   --  1.0   ALT U/L  --   --  14   AST U/L  --   --  21       No results found.     Assessment/Plan   Principal Problem:    Closed fracture of distal end of right radius  Active Problems:    Weakness    Thoracogenic scoliosis of thoracolumbar region  #recent NSGY procedure, ?possible SSTI   #R radial fracture  #mechanical fall   #HAGMA , resolved  Hyponatremia    Plan:    -Neurosurgery on consult - recommended course of Bactrim for SSTI -per NS today, continue Bactrim, likely able to get wound cultures today in OR-scheduled for formal I+D today   -NS agree with needing consult for ID, ID placed on consult  -orthopedic surgery consulted: right radius fracture-RUE splinted-patient wanted time to discuss surgery options with her  and is at this time wanting to proceed with surgery  -Toradol and Dilaudid PRN for pain  -currently on Senokot, wants to avoid suppository at this time, willing to try Miralax scheduled-history of SBO, bowel sounds trickling today will obtain KUB  -despite Flomax and straight cath x 3, and will be pre op-placed Mcmahon today  -dvtp: heparin subcutaneous  -sodium 132, changed LR to NS 75 ml hour today  -currently NPO  -hemoglobin  8.7, likely dilutional -cont to monitor  -am labs including cbc, bmp, mag  -POC discussed with patient and attending  -Dispo: need SNF on discharge- -patient declined going back to [facility] [place] [facility], prefers [facility] , [facility] aware    I spent >35 minutes in the professional and overall care of this patient.    Full Code    SHAMIKA Chaidez-[facility]  [address] [address]  Phone: [phone] Fax: [phone]

## 2024-06-06 NOTE — NURSING NOTE
2236: Pt unable to void at this time. Pt bladder scanned for 312ml, pt denies any discomfort. This RN notified Dr. Ro, will give pt more time to void on her own and continue to monitor pt.

## 2024-06-06 NOTE — OP NOTE
Spine Lumbar Inicision and Debridement Operative Note     Date: 2024 - 2024  OR Location: [facility] OR    Name: Khalida Barker : 1950, Age: 73 y.o., MRN: [id number], Sex: female    Diagnosis  Pre-op Diagnosis     * Thoracogenic scoliosis of thoracolumbar region [M41.35] Post-op Diagnosis     * Thoracogenic scoliosis of thoracolumbar region [M41.35]     Procedures  Spine Lumbar I  and  D  24545 - VT I&D DEEP ABSCESS PST SPINE LUMBAR SAC/LUMBOSAC      Surgeons      * Justin Lu - Primary    Resident/Fellow/Other Assistant:  Surgeons and Role:     * Peter Del Cid MD - Resident - Assisting    Procedure Summary  Anesthesia: General  ASA: II  Anesthesia Staff: Anesthesiologist: Corby Doran DO  CRNA: SHAMIKA Marshall-CRNA  Estimated Blood Loss: 250mL  Intra-op Medications: Administrations occurring from 1618 to 1803 on 24:  * No intraprocedure medications in log *        Specimen: No specimens collected     Staff:   Circulator: Taiwo  Scrub Person: Lizzy Chaoub Person: Yoselin  Scrub Person: Saadia Henry Circulator: Efraín         Drains and/or Catheters:   Urethral Catheter Latex 16 Fr. (Active)       Implants:         Informed Consent:  The risks, benefits, complications, and alternatives were discussed with the patient. I have explained the surgical procedure in detail with expected duration and extent of recovery along risks of surgery that include, but is not limited to bleeding, infection, blood vessel injury or damage, loss of sensation, loss of bladder, bowel or sexual function, nerve injury/damage resulting in weakness/paralysis, malunion, nonunion, CSF leak, brachial plexus injury, peripheral vision blindness, failure of implants/fusion, failure to relieve symptoms, recurrent disease, adjacent segment disease, need to reoperate for any reason and general anesthesia reaction such as stroke, coma, heart attack, delirium, confusion, death as well as worsening of preexisted medical  conditions.     I clearly emphasized that while the goal of surgery is to decompress the spinal cord so as to ARREST the progression of neurological deficits - preexisting deficits may or may not improve after surgery. We discussed that many patients do clinically improve in functional and neurological outcomes following decompression of the spinal elements in patients but the extent of which is variable and depends on the severity of pain, numbness, tingling, or weakness. With improvement seen of those symptoms in that order. We did discuss the goal of surgery to alleviate pain first and foremost and hope for recovery of all neurologic function with time.     All questions were answered and the patient was amenable to proceed.     INDICATIONS FOR THE PROCEDURE: Khalida Barker is an 73 y.o. female who is having surgery for Thoracogenic scoliosis of thoracolumbar region [M41.35].     DESCRIPTION OF THE OPERATION:  The patient was brought to the operating room theater. A verbal huddle was performed confirming the patient by name, date of birth, medical record number, site of surgery. After all team members were in agreement, they underwent anesthesia induction without complication. Two large bore IVs were placed as well as endotracheal tube. Perioperative antibiotic administration was confirmed. The patient was flipped prone onto a adam table and their back was prepped and draped in a sterile fashion. The old incision was opened, sapna purulence was encountered above and below the fascia. Cultures were taken of both comparetments. The entire incision was opened from T10-Pelvis.     The tissue was debrided from skin down to bone. We then used pulse evac lavage to clean the tissue with antibiotic irrigation. We then used iricept and regular irrigation. We then tunneled two 10 fr round drains.      Next we copiously irrigated the incision and began our closure, the muscle and fascia were approximated with 0 vicryl,  stratfix suture, and 2-0 vicryl sutures and the skin was approximated with staples. The patient returned to anesthesias care and was extubated and returned to the PACU in stable condition. A negative pressure wound vac was placed then onto the skin.      Disposition: PACU - hemodynamically stable.    Condition: stable    Attending Attestation: I was present and scrubbed for the key portions of the procedure.      Justin Lu MD, [facility]  Spine , [facility]  [employer] Chair in Spinal Neurosurgery  Neurosurgery , [facility] and [facility]  Complex Spine Surgery Fellowship Director   of Neurological Surgery  [employer]  Office: [phone]  Fax: [phone]

## 2024-06-06 NOTE — CONSULTS
Reason For Consult  Right distal radius fracture    History Of Present Illness  Khalida Barker is a 73 y.o. female presenting with mechanical fall sustained due to weakness status post spinal surgery.  Patient noted pain and deformity to her right wrist and was subsequently taken to [facility] where she was treated for a right intra-articular distal radius fracture.  Patient was also scheduled to follow-up with neurosurgery due to concern for superficial surgical site infection about the lower back.  Patient was subsequently examined by neurosurgery and is being treated with local wound care and a course of Bactrim for potential cellulitis.  Patient is a right-hand-dominant individual.  Patient denies any current numbness or tingling of her affected extremity.     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    Surgical History  She has a past surgical history that includes Other surgical history; Knee Arthroplasty (Right); Colonoscopy; Tonsillectomy (1956); Adenoidectomy (1956); Spinal fusion (06/2010); Breast biopsy (1999); Mouth surgery (2016); Mouth surgery (09/28/2021); and [place] tooth extraction (1978).     Social History  She reports that she has never smoked. She has never used smokeless tobacco. She reports that she does not currently use alcohol. She reports that she does not use drugs.    Family History  Family History   Problem Relation Name Age of Onset    Alzheimer's disease Mother      Heart disease Father       "Polymyositis Father      Hypertension Brother      Skin cancer Brother      Other (peripheral artery disease) Brother          Allergies  Oxycodone and Tramadol    Review of Systems  Patient denies any current chest pain, upper extremity numbness or tingling, nausea, vomiting and denies any shortness of breath     Physical Exam  Right upper extremity: Currently in a sugar-tong splint about the right forearm and elbow.  Motors fingers and thumb appropriately.  Brisk cap refill in the digits.  Nontender palpation about the arm.  Mildly tender to palpation about the shoulder girdle.     Last Recorded Vitals  Blood pressure (!) 106/44, pulse 87, temperature 36 °C (96.8 °F), temperature source Temporal, resp. rate 16, height 1.651 m (5' 5\"), SpO2 94%.    Relevant Results  Pertinent imaging includes PA and lateral imaging of the right wrist    Imaging demonstrates substantially impacted right intra-articular distal radius fracture with apex volar angulation approximately 10 degrees past neutral.      Scheduled medications  cholecalciferol, 1,000 Units, oral, Daily  [Held by provider] enoxaparin, 40 mg, subcutaneous, q24h  pantoprazole, 40 mg, oral, Daily before breakfast  polyethylene glycol, 17 g, oral, Daily  pregabalin, 50 mg, oral, Nightly  sennosides, 1 tablet, oral, Nightly  sulfamethoxazole-trimethoprim, 1 tablet, oral, q12h NUNU  tamsulosin, 0.4 mg, oral, Daily      Continuous medications     PRN medications  PRN medications: HYDROmorphone, ketorolac, melatonin, methocarbamol  Results for orders placed or performed during the hospital encounter of 06/04/24 (from the past 24 hour(s))   Basic metabolic panel   Result Value Ref Range    Glucose 116 (H) 74 - 99 mg/dL    Sodium 135 (L) 136 - 145 mmol/L    Potassium 3.4 (L) 3.5 - 5.3 mmol/L    Chloride 101 98 - 107 mmol/L    Bicarbonate 23 21 - 32 mmol/L    Anion Gap 14 10 - 20 mmol/L    Urea Nitrogen 12 6 - 23 mg/dL    Creatinine 0.54 0.50 - 1.05 mg/dL    eGFR >90 >60 " mL/min/1.73m*2    Calcium 8.2 (L) 8.6 - 10.3 mg/dL        Assessment/Plan     Right intra-articular distal radius fracture    Complex situation for patient given her need to utilize the right upper extremity for safe ambulation in the setting of recent spinal surgery it would be reasonable to perform operative intervention in the form of open reduction internal fixation of the right distal radius fracture.  This would allow for a shorter splint and forearm weightbearing for platform walker utilization about the right upper extremity.  This is in contrast to nonoperative intervention which would involve continued splinting in the sugar-tong splint and transition to short arm cast once in office.  Patient will remain immobilized substantially longer through the nonoperative measurement course however would avoid risks associated with surgical intervention.  Both treatment options were discussed at length with patient send both operative and nonoperative measures would be reasonable to address this distal radius fracture.  Patient made aware that with nonoperative measures this would result in likely persistent shortening and mild deformity appreciated at the wrist with mild loss of  strength however relatively similar functional outcome compared with ORIF at this time.  Predominant reason to fix this fracture would be to allow for early range of motion with more limited restrictions in the extremity while recovering from spinal surgery.  Patient is going to discuss this further with her  in regards to treatment options.      Dale Gonzalez MD

## 2024-06-06 NOTE — ANESTHESIA PREPROCEDURE EVALUATION
Patient: Khalida Barker    Procedure Information       Date/Time: 06/06/24 1618    Procedure: Spine Lumbar I  and  D    Location: [facility] 09 / [facility]    Surgeons: Justin Lu MD            Relevant Problems   Anesthesia (within normal limits)      Neuro   (+) Anxiety   (+) Bilateral carpal tunnel syndrome   (+) C6 radiculopathy   (+) Carpal tunnel syndrome      Musculoskeletal   (+) Bilateral carpal tunnel syndrome   (+) Carpal tunnel syndrome   (+) Primary osteoarthritis of right knee   (+) Scoliosis deformity of spine   (+) Thoracogenic scoliosis of thoracolumbar region       Clinical information reviewed:    Allergies  Meds  Problems              NPO Detail:  No data recorded     Physical Exam    Airway  Mallampati: II  TM distance: >3 FB  Neck ROM: limited     Cardiovascular - normal exam  Rhythm: regular  Rate: normal     Dental    Pulmonary - normal exam     Abdominal - normal exam             Anesthesia Plan    History of general anesthesia?: yes  History of complications of general anesthesia?: no    ASA 2     general     The patient is not a current smoker.  Patient was not previously instructed to abstain from smoking on day of procedure.  Patient did not smoke on day of procedure.    intravenous induction   Postoperative administration of opioids is intended.  Trial extubation is planned.  Anesthetic plan and risks discussed with patient.  Use of blood products discussed with patient who consented to blood products.    Plan discussed with CRNA.    Attending Anesthesiologist Note  Above information reviewed including relevant HPI, PMHx, PSHx, anesthesia history, labs, and imaging.    Summary (from patient interview and chart review):   73 year old female who had lumbar  L2-L3, L3-L4, L4-L5 Extreme Lateral Interbody fusion, L2-S1 Decompression and Fusion May 14 2024. Arrived to ED today after a fall with wrist injury. Noted to have purulent drainage from lumbar incision. Planned now for wound  washout. Denies dyspnea, exertaional angina. Has noted difficulty swallowing in the last few days with poor PO intake secondary to dry mouth.    Relevant Problems   Anesthesia (within normal limits)      Neuro   (+) Anxiety   (+) Bilateral carpal tunnel syndrome   (+) C6 radiculopathy   (+) Carpal tunnel syndrome      Musculoskeletal   (+) Bilateral carpal tunnel syndrome   (+) Carpal tunnel syndrome   (+) Primary osteoarthritis of right knee   (+) Scoliosis deformity of spine   (+) Thoracogenic scoliosis of thoracolumbar region        Medication Documentation Review Audit       Reviewed by Rod Hernandez (Technician) on 06/04/24 at 1937      Medication Order Taking? Sig Documenting Provider Last Dose Status   acetaminophen (Tylenol) 500 mg tablet [id number] Yes Take 1 tablet (500 mg) by mouth every 8 hours if needed for mild pain (1 - 3) or moderate pain (4 - 6) for up to 30 doses. Norm Garcia MD 6/4/2024 am Active   aspirin-acetaminophen-caffeine (Excedrin Migraine) 250-250-65 mg tablet [id number] No Take 2 tablets by mouth every 8 hours if needed for headaches. Do not resume until discussed restart of aspirin portion of medication at neurosurgery follow up appointment.   Patient not taking: Reported on 6/4/2024    Bailee Lopez MD Not Taking Active   bethanechol (Urecholine) 5 mg tablet [id number] No Take 1 tablet (5 mg) by mouth 3 times a day.   Patient not taking: Reported on 6/4/2024    Bailee Lopez MD Not Taking Active   biotin 10,000 mcg capsule [id number] Yes Take 1 capsule (10 mg) by mouth once daily. caps Historical Provider, MD 6/4/2024 am Active   bisacodyl (Dulcolax) 5 mg EC tablet [id number] No Take 1 tablet (5 mg) by mouth once daily as needed for constipation. Do not crush, chew, or split.   Patient not taking: Reported on 6/4/2024    Bailee Lopez MD Not Taking Active   cholecalciferol (Vitamin D-3) 25 MCG (1000 UT) tablet [id number] Yes Take 1 tablet (1,000 Units) by mouth once daily.  Kin Bunch MD 6/4/2024 am Active   cyclobenzaprine (Flexeril) 10 mg tablet [id number] No Take 1 tablet (10 mg) by mouth 3 times a day as needed for muscle spasms.   Patient not taking: Reported on 6/4/2024    Bailee Lopez MD Not Taking Active   docusate sodium (Colace) 100 mg capsule [id number] Yes Take 1 capsule (100 mg) by mouth once daily. Kin Bunch MD 6/4/2024 am Active   heparin sodium,porcine (heparin, porcine,) 5,000 unit/mL injection [id number] No Inject 1 mL (5,000 Units) under the skin every 8 hours.   Patient not taking: Reported on 6/4/2024    Bailee Lopez MD Not Taking Active   HYDROcodone-acetaminophen (Norco) 5-325 mg tablet [id number]  Take 1 tablet by mouth every 8 hours if needed for severe pain (7 - 10) for up to 3 days. Norm Garcia MD  Active   ibuprofen 200 mg tablet [id number] Yes Take 1 tablet (200 mg) by mouth every 6 hours if needed. Kin Bunch MD 6/4/2024 am Active   insulin lispro (HumaLOG) 100 unit/mL injection [id number] No Inject 0-0.05 mL (0-5 Units) under the skin 3 times daily (morning, midday, late afternoon). For use while on steroids. Okay to DC after completion of steroids.   Take as directed per insulin instructions.   Patient not taking: Reported on 6/4/2024    Bailee Lopez MD Not Taking Active   multivitamin with minerals tablet [id number] Yes Take 1 tablet by mouth once daily. Kin Bunch MD 6/4/2024 am Active   NON FORMULARY [id number] Yes Take 1 each by mouth once daily. Viveran 200mg- pure caffience Kin Bunch MD 6/4/2024 a, Active   pantoprazole (ProtoNix) 40 mg EC tablet [id number] No Take 1 tablet (40 mg) by mouth once daily in the morning. Take before meals. Do not crush, chew, or split.   Patient not taking: Reported on 6/4/2024    Bailee Lopez MD Not Taking Active   polyethylene glycol (Glycolax, Miralax) 17 gram packet [id number] No Take 17 g by mouth 3 times a day as needed (constipation).   Patient not  "taking: Reported on 6/4/2024    Bailee Lopez MD Not Taking Active   pregabalin (Lyrica) 50 mg capsule [id number] No Take 1 capsule (50 mg) by mouth once daily at bedtime.   Patient not taking: Reported on 6/4/2024    Bailee Lopez MD Not Taking Active   sennosides-docusate sodium (Maggie-Colace) 8.6-50 mg tablet [id number] No Take 2 tablets by mouth 2 times a day.   Patient not taking: Reported on 6/4/2024    Bailee Lopez MD Not Taking Active   sulfamethoxazole-trimethoprim (Bactrim DS) 800-160 mg tablet [id number]  Take 1 tablet by mouth 2 times a day for 10 days. Norm Garcia MD  Active   traMADol (Ultram) 50 mg tablet [id number] Yes Take 1 tablet (50 mg) by mouth every 6 hours if needed for severe pain (7 - 10). Historical Provider, MD 6/4/2024 am Active                    Visit Vitals  BP (!) 101/47 (Patient Position: Lying)   Pulse 88   Temp 36 °C (96.8 °F) (Temporal)   Resp 16   Ht 1.651 m (5' 5\")   SpO2 98%   BMI 29.95 kg/m²   Smoking Status Never   BSA 1.93 m²            6/5/2024     4:00 AM 6/5/2024     8:00 AM 6/5/2024    12:00 PM 6/5/2024     4:00 PM 6/5/2024     8:00 PM 6/6/2024    12:00 AM 6/6/2024     8:00 AM   Vitals   Systolic 111 144 100 111 106 113 101   Diastolic 63 62 53 43 44 57 47   Heart Rate 95 95 90 90 87 90 88   Temp 37.6 °C (99.7 °F) 37.3 °C (99.1 °F) 36.8 °C (98.2 °F) 36.4 °C (97.5 °F) 36 °C (96.8 °F) 36.9 °C (98.4 °F) 36 °C (96.8 °F)   Resp 17 16 16 18 16 16 16        Recent Labs:    Chemistry    Lab Results   Component Value Date/Time     (L) 06/06/2024 0536    K 4.1 06/06/2024 0536     06/06/2024 0536    CO2 23 06/06/2024 0536    BUN 12 06/06/2024 0536    CREATININE 0.55 06/06/2024 0536    Lab Results   Component Value Date/Time    CALCIUM 7.8 (L) 06/06/2024 0536    ALKPHOS 154 (H) 06/04/2024 1903    AST 21 06/04/2024 1903    ALT 14 06/04/2024 1903    BILITOT 1.0 06/04/2024 1903          Lab Results   Component Value Date/Time    WBC 8.5 06/06/2024 0536    HGB 8.7 " (L) 06/06/2024 0536    HCT 27.7 (L) 06/06/2024 0536     06/06/2024 0536     Lab Results   Component Value Date/Time    PROTIME 14.5 (H) 05/14/2024 1631    INR 1.3 (H) 05/14/2024 1631       Electrocardiogram:  Encounter Date: 05/14/24   ECG 12 lead   Result Value    Ventricular Rate 98    Atrial Rate 98    DC Interval 140    QRS Duration 90    QT Interval 376    QTC Calculation(Bazett) 480    P Axis 45    R Axis 6    T Axis 34    QRS Count 16    Q Onset 215    P Onset 145    P Offset 197    T Offset 403    QTC Fredericia 443    Narrative    Normal sinus rhythm  Possible Lateral infarct (cited on or before 26-JUN-2023)  Possible Inferior infarct (cited on or before 26-JUN-2023)  Abnormal ECG  When compared with ECG of 14-MAY-2024 18:14,  No significant change was found  Confirmed by Terrance Pool ([id number]) on 5/17/2024 9:06:47 AM         Anesthesia History: no complications from May 14  Anesthesia Plan: GETA, standard ASA monitors, zofran/decadron for PONV prevention, multimodal analgesia. To administer abx after wound cultures collected by surgical team    I discussed the anesthesia plan with the patient and/or family and reviewed the risks, benefits and alternatives. Agree to proceed.

## 2024-06-06 NOTE — PROGRESS NOTES
"Neurological Surgery    Daily Progress Note    History of Present Illness and Assessment and Plan   Khalida Barker is a 73 y.o. female who is s/p scoliosis reconstructive surgery, had a fall at rehab.     Wound with purulent drainage this AM after bedside debridement yesterday.     I d/w patient, will need formal I+D today. Will plan for OR this afternoon.  Will d/w Dr. Gonzalez plan for wrist/arm.     Last Recorded Vitals  Blood pressure (!) 101/47, pulse 88, temperature 36 °C (96.8 °F), temperature source Temporal, resp. rate 16, height 1.651 m (5' 5\"), SpO2 98%.    Relevant Results  Scheduled medications  cholecalciferol, 1,000 Units, oral, Daily  [Held by provider] enoxaparin, 40 mg, subcutaneous, q24h  pantoprazole, 40 mg, oral, Daily before breakfast  polyethylene glycol, 17 g, oral, Daily  pregabalin, 50 mg, oral, Nightly  sennosides, 1 tablet, oral, Nightly  sulfamethoxazole-trimethoprim, 1 tablet, oral, q12h NUNU  tamsulosin, 0.4 mg, oral, Daily      Continuous medications  lactated Ringer's, 75 mL/hr, Last Rate: 75 mL/hr (06/06/24 0151)      PRN medications  PRN medications: HYDROmorphone, ketorolac, melatonin, methocarbamol    Results for orders placed or performed during the hospital encounter of 06/04/24 (from the past 96 hour(s))   CBC and Auto Differential   Result Value Ref Range    WBC 11.1 4.4 - 11.3 x10*3/uL    nRBC 0.0 0.0 - 0.0 /100 WBCs    RBC 3.45 (L) 4.00 - 5.20 x10*6/uL    Hemoglobin 10.0 (L) 12.0 - 16.0 g/dL    Hematocrit 32.6 (L) 36.0 - 46.0 %    MCV 95 80 - 100 fL    MCH 29.0 26.0 - 34.0 pg    MCHC 30.7 (L) 32.0 - 36.0 g/dL    RDW 14.5 11.5 - 14.5 %    Platelets 262 150 - 450 x10*3/uL    Neutrophils % 87.4 40.0 - 80.0 %    Immature Granulocytes %, Automated 0.8 0.0 - 0.9 %    Lymphocytes % 5.7 13.0 - 44.0 %    Monocytes % 5.7 2.0 - 10.0 %    Eosinophils % 0.1 0.0 - 6.0 %    Basophils % 0.3 0.0 - 2.0 %    Neutrophils Absolute 9.72 (H) 1.60 - 5.50 x10*3/uL    Immature Granulocytes Absolute, " Automated 0.09 0.00 - 0.50 x10*3/uL    Lymphocytes Absolute 0.63 (L) 0.80 - 3.00 x10*3/uL    Monocytes Absolute 0.63 0.05 - 0.80 x10*3/uL    Eosinophils Absolute 0.01 0.00 - 0.40 x10*3/uL    Basophils Absolute 0.03 0.00 - 0.10 x10*3/uL   Comprehensive metabolic panel   Result Value Ref Range    Glucose 145 (H) 74 - 99 mg/dL    Sodium 133 (L) 136 - 145 mmol/L    Potassium 3.7 3.5 - 5.3 mmol/L    Chloride 100 98 - 107 mmol/L    Bicarbonate 18 (L) 21 - 32 mmol/L    Anion Gap 19 10 - 20 mmol/L    Urea Nitrogen 15 6 - 23 mg/dL    Creatinine 0.59 0.50 - 1.05 mg/dL    eGFR >90 >60 mL/min/1.73m*2    Calcium 8.5 (L) 8.6 - 10.3 mg/dL    Albumin 3.0 (L) 3.4 - 5.0 g/dL    Alkaline Phosphatase 154 (H) 33 - 136 U/L    Total Protein 6.2 (L) 6.4 - 8.2 g/dL    AST 21 9 - 39 U/L    Bilirubin, Total 1.0 0.0 - 1.2 mg/dL    ALT 14 7 - 45 U/L   Lactate   Result Value Ref Range    Lactate 1.4 0.4 - 2.0 mmol/L   Lavender Top   Result Value Ref Range    Extra Tube Hold for add-ons.    SST TOP   Result Value Ref Range    Extra Tube Hold for add-ons.    PST Top   Result Value Ref Range    Extra Tube Hold for add-ons.    Basic metabolic panel   Result Value Ref Range    Glucose 116 (H) 74 - 99 mg/dL    Sodium 135 (L) 136 - 145 mmol/L    Potassium 3.4 (L) 3.5 - 5.3 mmol/L    Chloride 101 98 - 107 mmol/L    Bicarbonate 23 21 - 32 mmol/L    Anion Gap 14 10 - 20 mmol/L    Urea Nitrogen 12 6 - 23 mg/dL    Creatinine 0.54 0.50 - 1.05 mg/dL    eGFR >90 >60 mL/min/1.73m*2    Calcium 8.2 (L) 8.6 - 10.3 mg/dL   CBC   Result Value Ref Range    WBC 8.5 4.4 - 11.3 x10*3/uL    nRBC 0.0 0.0 - 0.0 /100 WBCs    RBC 2.93 (L) 4.00 - 5.20 x10*6/uL    Hemoglobin 8.7 (L) 12.0 - 16.0 g/dL    Hematocrit 27.7 (L) 36.0 - 46.0 %    MCV 95 80 - 100 fL    MCH 29.7 26.0 - 34.0 pg    MCHC 31.4 (L) 32.0 - 36.0 g/dL    RDW 14.7 (H) 11.5 - 14.5 %    Platelets 331 150 - 450 x10*3/uL   Basic Metabolic Panel   Result Value Ref Range    Glucose 115 (H) 74 - 99 mg/dL    Sodium 132  (L) 136 - 145 mmol/L    Potassium 4.1 3.5 - 5.3 mmol/L    Chloride 101 98 - 107 mmol/L    Bicarbonate 23 21 - 32 mmol/L    Anion Gap 12 10 - 20 mmol/L    Urea Nitrogen 12 6 - 23 mg/dL    Creatinine 0.55 0.50 - 1.05 mg/dL    eGFR >90 >60 mL/min/1.73m*2    Calcium 7.8 (L) 8.6 - 10.3 mg/dL   Magnesium   Result Value Ref Range    Magnesium 1.92 1.60 - 2.40 mg/dL   Phosphorus   Result Value Ref Range    Phosphorus 3.1 2.5 - 4.9 mg/dL         Intake/Output Summary (Last 24 hours) at 6/6/2024 1101  Last data filed at 6/6/2024 0146  Gross per 24 hour   Intake 240 ml   Output 1000 ml   Net -760 ml                  Signature    Justin Lu MD, [facility]  Spine , [facility]  [employer] Chair in Spinal Neurosurgery  Neurosurgery , [facility]  Complex Spine Surgery Fellowship Director   of Neurological Surgery  [employer] [facility] [employer]  Office: [phone]  Fax: [phone]

## 2024-06-06 NOTE — CONSULTS
Consults  Referred by Chikis Garcia MD: Rocco Rushing MD    Reason For Consult  Wound with purulent drainage, status post T10-pelvis fusion, PCO N02-S1 TLIF.    History Of Present Illness  Khalida Barker is a 73 y.o. female presenting with a fall and injury to her right wrist, with a comminuted distal radius fracture.    This patient underwent elective thoracic lumbar surgery with T10 to pelvis fusion multilevel posterior column osteotomies and transforaminal interbody fusions, [facility], 5/14/2024.  She has been seen then for the last couple of days with drainage in the wounds, thought of possible dehiscence.  Nevertheless for concern for infection was also present and Bactrim was begun on 6/4.  Stable room removed yesterday but sutures were placed along the lumbar area of the wound, as it was .  Today significant purulent drainage apparently is noted from this area and there are plans for the patient to go to surgery this afternoon for neurosurgery to explore and drain this further.  Patient is continued on Bactrim.  There are no cultures performed.    The patient did fall and has a comminuted fracture.  It is recommended that she go through surgery since this would stabilize it faster and she is agreeable but that will depend upon the back wound.    Patient has chronic left lower extremity weakness and numbness in her right foot from the backyard problems and other issues perhaps.  She notes no specific change in any of the pains or functioning regarding her back surgery.    Patient has had carpal tunnel syndrome, do not know if there surgery was ever performed.  She has had long-lasting pain after herpes zoster.  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     Past Medical History  She has a past medical history of Ankylosis of  sacroiliac joint (06/04/2024), Arthritis, Cervicalgia, Colon polyp, CTS (carpal tunnel syndrome), Diverticulitis (06/27/2023), Hallux valgus, acquired (01/19/2013), Hiatal hernia, 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, OA (osteoarthritis) of knee, Painful scar (09/20/2017), Peripheral neuropathy, Scoliosis, Spinal stenosis, Traumatic ecchymosis of foot (01/19/2013), Trigger finger of right hand, Vision loss, and Vitamin D deficiency.    Surgical History  She has a past surgical history that includes Other surgical history; Knee Arthroplasty (Right); Colonoscopy; Tonsillectomy (1956); Adenoidectomy (1956); Spinal fusion (06/2010); Breast biopsy (1999); Mouth surgery (2016); Mouth surgery (09/28/2021); and [facility] tooth extraction (1978).     Social History     Occupational History    Not on file   Tobacco Use    Smoking status: Never    Smokeless tobacco: Never   Vaping Use    Vaping status: Never Used   Substance and Sexual Activity    Alcohol use: Not Currently     Comment: rarely    Drug use: Never    Sexual activity: Defer     Travel History   Travel since 05/06/24    No documented travel since 05/06/24              Family History  Family History   Problem Relation Name Age of Onset    Alzheimer's disease Mother      Heart disease Father      Polymyositis Father      Hypertension Brother      Skin cancer Brother      Other (peripheral artery disease) Brother       Allergies  Oxycodone and Tramadol     Immunization History   Administered Date(s) Administered    Flu vaccine, quadrivalent, high-dose, preservative free, age 65y+ (FLUZONE) 12/07/2020    Influenza, High Dose Seasonal, Preservative Free 11/16/2017, 10/11/2022    Influenza, Seasonal, Quadrivalent, Adjuvanted 10/20/2023    Influenza, Unspecified 10/24/2019, 11/08/2021, 10/11/2022    Influenza, seasonal, injectable 11/04/2016    Moderna COVID-19 vaccine, Fall 2023, 12 yeasrs and older (50mcg/0.5mL) 10/06/2023    Moderna COVID-19 vaccine,  bivalent, blue cap/gray label *Check age/dose* 09/08/2022    Moderna SARS-CoV-2 Vaccination 02/11/2021, 03/11/2021, 11/02/2021, 04/14/2022    Pneumococcal Conjugate PCV 7 09/24/2018    Pneumococcal polysaccharide vaccine, 23-valent, age 2 years and older (PNEUMOVAX 23) 10/24/2019    Zoster vaccine, recombinant, adult (SHINGRIX) 09/23/2021, 12/10/2021     Medications  Home medications:  Medications Prior to Admission   Medication Sig Dispense Refill Last Dose    acetaminophen (Tylenol) 500 mg tablet Take 1 tablet (500 mg) by mouth every 8 hours if needed for mild pain (1 - 3) or moderate pain (4 - 6) for up to 30 doses. 30 tablet 0 6/4/2024 at am    biotin 10,000 mcg capsule Take 1 capsule (10 mg) by mouth once daily. caps   6/4/2024 at am    cholecalciferol (Vitamin D-3) 25 MCG (1000 UT) tablet Take 1 tablet (1,000 Units) by mouth once daily.   6/4/2024 at am    docusate sodium (Colace) 100 mg capsule Take 1 capsule (100 mg) by mouth once daily.   6/4/2024 at am    ibuprofen 200 mg tablet Take 1 tablet (200 mg) by mouth every 6 hours if needed.   6/4/2024 at am    multivitamin with minerals tablet Take 1 tablet by mouth once daily.   6/4/2024 at am    NON FORMULARY Take 1 each by mouth once daily. Viveran 200mg- pure caffience   6/4/2024 at a,    traMADol (Ultram) 50 mg tablet Take 1 tablet (50 mg) by mouth every 6 hours if needed for severe pain (7 - 10).   6/4/2024 at am    aspirin-acetaminophen-caffeine (Excedrin Migraine) 250-250-65 mg tablet Take 2 tablets by mouth every 8 hours if needed for headaches. Do not resume until discussed restart of aspirin portion of medication at neurosurgery follow up appointment. (Patient not taking: Reported on 6/4/2024) 30 tablet 0 Not Taking    bethanechol (Urecholine) 5 mg tablet Take 1 tablet (5 mg) by mouth 3 times a day. (Patient not taking: Reported on 6/4/2024)   Not Taking    bisacodyl (Dulcolax) 5 mg EC tablet Take 1 tablet (5 mg) by mouth once daily as needed for  constipation. Do not crush, chew, or split. (Patient not taking: Reported on 6/4/2024)   Not Taking    cyclobenzaprine (Flexeril) 10 mg tablet Take 1 tablet (10 mg) by mouth 3 times a day as needed for muscle spasms. (Patient not taking: Reported on 6/4/2024)   Not Taking    heparin sodium,porcine (heparin, porcine,) 5,000 unit/mL injection Inject 1 mL (5,000 Units) under the skin every 8 hours. (Patient not taking: Reported on 6/4/2024)   Not Taking    HYDROcodone-acetaminophen (Norco) 5-325 mg tablet Take 1 tablet by mouth every 8 hours if needed for severe pain (7 - 10) for up to 3 days. 12 tablet 0     insulin lispro (HumaLOG) 100 unit/mL injection Inject 0-0.05 mL (0-5 Units) under the skin 3 times daily (morning, midday, late afternoon). For use while on steroids. Okay to DC after completion of steroids.   Take as directed per insulin instructions. (Patient not taking: Reported on 6/4/2024)   Not Taking    pantoprazole (ProtoNix) 40 mg EC tablet Take 1 tablet (40 mg) by mouth once daily in the morning. Take before meals. Do not crush, chew, or split. (Patient not taking: Reported on 6/4/2024)   Not Taking    polyethylene glycol (Glycolax, Miralax) 17 gram packet Take 17 g by mouth 3 times a day as needed (constipation). (Patient not taking: Reported on 6/4/2024)   Not Taking    pregabalin (Lyrica) 50 mg capsule Take 1 capsule (50 mg) by mouth once daily at bedtime. (Patient not taking: Reported on 6/4/2024)   Not Taking    sennosides-docusate sodium (Maggie-Colace) 8.6-50 mg tablet Take 2 tablets by mouth 2 times a day. (Patient not taking: Reported on 6/4/2024)   Not Taking    sulfamethoxazole-trimethoprim (Bactrim DS) 800-160 mg tablet Take 1 tablet by mouth 2 times a day for 10 days. 20 tablet 0      Current medications:  Scheduled medications  cholecalciferol, 1,000 Units, oral, Daily  [Held by provider] enoxaparin, 40 mg, subcutaneous, q24h  pantoprazole, 40 mg, oral, Daily before breakfast  polyethylene  glycol, 17 g, oral, Daily  pregabalin, 50 mg, oral, Nightly  sennosides, 1 tablet, oral, Nightly  sulfamethoxazole-trimethoprim, 1 tablet, oral, q12h NUNU  tamsulosin, 0.4 mg, oral, Daily      Continuous medications  sodium chloride 0.9%, 75 mL/hr      PRN medications  PRN medications: HYDROmorphone, ketorolac, melatonin, methocarbamol    Review of Systems     Objective  Range of Vitals (last 24 hours)  Heart Rate:  [87-90]   Temp:  [36 °C (96.8 °F)-36.9 °C (98.4 °F)]   Resp:  [16-18]   BP: (101-113)/(43-57)   SpO2:  [93 %-98 %]   Daily Weight  06/04/24 : 81.6 kg (180 lb)    Body mass index is 29.95 kg/m².     Physical Exam    HEENT-thrush no thyromegaly no adenopathy  Lungs clear to auscultation anterior posteriorly  Cardiovascular-no murmur rub or gallop  Abdomen soft nontender no masses  Extremities-right wrist in dressing and sling.  Skin-back incision approximated however compression over the back incision shows yellow cloudy fluid thin cloudy fluid emanating from the lower wound, and on exploration with a E swab shows 20 to 50 cc of fluid coming out from the culture obtained.     Relevant Results    Labs  Results from last 72 hours   Lab Units 06/06/24  0536 06/04/24  1903   WBC AUTO x10*3/uL 8.5 11.1   HEMOGLOBIN g/dL 8.7* 10.0*   HEMATOCRIT % 27.7* 32.6*   PLATELETS AUTO x10*3/uL 331 262   NEUTROS PCT AUTO %  --  87.4   LYMPHS PCT AUTO %  --  5.7   MONOS PCT AUTO %  --  5.7   EOS PCT AUTO %  --  0.1     Results from last 72 hours   Lab Units 06/06/24  0536 06/05/24  0546 06/04/24  1903   SODIUM mmol/L 132* 135* 133*   POTASSIUM mmol/L 4.1 3.4* 3.7   CHLORIDE mmol/L 101 101 100   CO2 mmol/L 23 23 18*   BUN mg/dL 12 12 15   CREATININE mg/dL 0.55 0.54 0.59   GLUCOSE mg/dL 115* 116* 145*   CALCIUM mg/dL 7.8* 8.2* 8.5*   ANION GAP mmol/L 12 14 19   EGFR mL/min/1.73m*2 >90 >90 >90   PHOSPHORUS mg/dL 3.1  --   --      Results from last 72 hours   Lab Units 06/04/24  1903   ALK PHOS U/L 154*   BILIRUBIN TOTAL mg/dL  "1.0   PROTEIN TOTAL g/dL 6.2*   ALT U/L 14   AST U/L 21   ALBUMIN g/dL 3.0*     Estimated Creatinine Clearance: 98.9 mL/min (by C-G formula based on SCr of 0.55 mg/dL).  No results found for: \"CRP\", \"SEDRATE\"  No results found for: \"HIV1X2\", \"HIVCONF\", \"LEXAUC2QK\"  No results found for: \"HEPCABINIT\", \"HEPCAB\", \"HCVPCRQUANT\"  Microbiology  No results found for the last 90 days.      Imaging     Assessment/Plan     Wound infection, thoracolumbar wound    Assessment: There is significant drainage coming out of this wound so there is a wound infection of uncertain depth.  Depth will be easier to tell and exploration.  Wound culture is just been obtained, antibiotic should be directed at what is found there.  Any empiric antibiotic could be used temporarily, Bactrim can continue, but I probably switch to intravenous antibiotics, vancomycin and Zosyn    Plan: Wound culture, vancomycin and Zosyn empirically, adjust antibiotics based on cultures    Plan:    I spent 60 minutes in the professional and overall care of this patient.      Alejandro Swan MD  "

## 2024-06-07 PROBLEM — S52.501A CLOSED FRACTURE OF DISTAL END OF RIGHT RADIUS, UNSPECIFIED FRACTURE MORPHOLOGY, INITIAL ENCOUNTER: Status: ACTIVE | Noted: 2024-06-07

## 2024-06-07 LAB
ANION GAP SERPL CALC-SCNC: 12 MMOL/L (ref 10–20)
BASOPHILS # BLD AUTO: 0.01 X10*3/UL (ref 0–0.1)
BASOPHILS NFR BLD AUTO: 0.1 %
BUN SERPL-MCNC: 9 MG/DL (ref 6–23)
CALCIUM SERPL-MCNC: 7.8 MG/DL (ref 8.6–10.3)
CHLORIDE SERPL-SCNC: 102 MMOL/L (ref 98–107)
CO2 SERPL-SCNC: 24 MMOL/L (ref 21–32)
CREAT SERPL-MCNC: 0.53 MG/DL (ref 0.5–1.05)
EGFRCR SERPLBLD CKD-EPI 2021: >90 ML/MIN/1.73M*2
EOSINOPHIL # BLD AUTO: 0 X10*3/UL (ref 0–0.4)
EOSINOPHIL NFR BLD AUTO: 0 %
ERYTHROCYTE [DISTWIDTH] IN BLOOD BY AUTOMATED COUNT: 14.4 % (ref 11.5–14.5)
GLUCOSE SERPL-MCNC: 179 MG/DL (ref 74–99)
HCT VFR BLD AUTO: 27.2 % (ref 36–46)
HGB BLD-MCNC: 8.3 G/DL (ref 12–16)
IMM GRANULOCYTES # BLD AUTO: 0.06 X10*3/UL (ref 0–0.5)
IMM GRANULOCYTES NFR BLD AUTO: 0.7 % (ref 0–0.9)
LYMPHOCYTES # BLD AUTO: 0.49 X10*3/UL (ref 0.8–3)
LYMPHOCYTES NFR BLD AUTO: 5.4 %
MAGNESIUM SERPL-MCNC: 2.05 MG/DL (ref 1.6–2.4)
MCH RBC QN AUTO: 29.1 PG (ref 26–34)
MCHC RBC AUTO-ENTMCNC: 30.5 G/DL (ref 32–36)
MCV RBC AUTO: 95 FL (ref 80–100)
MONOCYTES # BLD AUTO: 0.48 X10*3/UL (ref 0.05–0.8)
MONOCYTES NFR BLD AUTO: 5.3 %
NEUTROPHILS # BLD AUTO: 8.06 X10*3/UL (ref 1.6–5.5)
NEUTROPHILS NFR BLD AUTO: 88.5 %
NRBC BLD-RTO: 0 /100 WBCS (ref 0–0)
PHOSPHATE SERPL-MCNC: 3.2 MG/DL (ref 2.5–4.9)
PLATELET # BLD AUTO: 307 X10*3/UL (ref 150–450)
POTASSIUM SERPL-SCNC: 4.3 MMOL/L (ref 3.5–5.3)
RBC # BLD AUTO: 2.85 X10*6/UL (ref 4–5.2)
SODIUM SERPL-SCNC: 134 MMOL/L (ref 136–145)
VANCOMYCIN SERPL-MCNC: 15 UG/ML (ref 5–20)
WBC # BLD AUTO: 9.1 X10*3/UL (ref 4.4–11.3)

## 2024-06-07 PROCEDURE — 2500000002 HC RX 250 W HCPCS SELF ADMINISTERED DRUGS (ALT 637 FOR MEDICARE OP, ALT 636 FOR OP/ED): Performed by: STUDENT IN AN ORGANIZED HEALTH CARE EDUCATION/TRAINING PROGRAM

## 2024-06-07 PROCEDURE — 83735 ASSAY OF MAGNESIUM: CPT | Performed by: STUDENT IN AN ORGANIZED HEALTH CARE EDUCATION/TRAINING PROGRAM

## 2024-06-07 PROCEDURE — 2500000004 HC RX 250 GENERAL PHARMACY W/ HCPCS (ALT 636 FOR OP/ED): Performed by: STUDENT IN AN ORGANIZED HEALTH CARE EDUCATION/TRAINING PROGRAM

## 2024-06-07 PROCEDURE — 99232 SBSQ HOSP IP/OBS MODERATE 35: CPT | Performed by: STUDENT IN AN ORGANIZED HEALTH CARE EDUCATION/TRAINING PROGRAM

## 2024-06-07 PROCEDURE — 97530 THERAPEUTIC ACTIVITIES: CPT | Mod: GO

## 2024-06-07 PROCEDURE — 2500000001 HC RX 250 WO HCPCS SELF ADMINISTERED DRUGS (ALT 637 FOR MEDICARE OP): Performed by: STUDENT IN AN ORGANIZED HEALTH CARE EDUCATION/TRAINING PROGRAM

## 2024-06-07 PROCEDURE — 80048 BASIC METABOLIC PNL TOTAL CA: CPT | Performed by: STUDENT IN AN ORGANIZED HEALTH CARE EDUCATION/TRAINING PROGRAM

## 2024-06-07 PROCEDURE — 80202 ASSAY OF VANCOMYCIN: CPT | Performed by: STUDENT IN AN ORGANIZED HEALTH CARE EDUCATION/TRAINING PROGRAM

## 2024-06-07 PROCEDURE — 36415 COLL VENOUS BLD VENIPUNCTURE: CPT | Performed by: STUDENT IN AN ORGANIZED HEALTH CARE EDUCATION/TRAINING PROGRAM

## 2024-06-07 PROCEDURE — 1100000001 HC PRIVATE ROOM DAILY

## 2024-06-07 PROCEDURE — 97164 PT RE-EVAL EST PLAN CARE: CPT | Mod: GP

## 2024-06-07 PROCEDURE — 97168 OT RE-EVAL EST PLAN CARE: CPT | Mod: GO

## 2024-06-07 PROCEDURE — 2500000004 HC RX 250 GENERAL PHARMACY W/ HCPCS (ALT 636 FOR OP/ED): Mod: JZ | Performed by: INTERNAL MEDICINE

## 2024-06-07 PROCEDURE — 85025 COMPLETE CBC W/AUTO DIFF WBC: CPT | Performed by: STUDENT IN AN ORGANIZED HEALTH CARE EDUCATION/TRAINING PROGRAM

## 2024-06-07 PROCEDURE — 97530 THERAPEUTIC ACTIVITIES: CPT | Mod: GP

## 2024-06-07 PROCEDURE — 84100 ASSAY OF PHOSPHORUS: CPT | Performed by: STUDENT IN AN ORGANIZED HEALTH CARE EDUCATION/TRAINING PROGRAM

## 2024-06-07 PROCEDURE — 2500000004 HC RX 250 GENERAL PHARMACY W/ HCPCS (ALT 636 FOR OP/ED): Performed by: NURSE PRACTITIONER

## 2024-06-07 RX ORDER — VANCOMYCIN HYDROCHLORIDE 1 G/200ML
1000 INJECTION, SOLUTION INTRAVENOUS EVERY 12 HOURS
Status: CANCELLED | OUTPATIENT
Start: 2024-06-07

## 2024-06-07 RX ORDER — CEFAZOLIN SODIUM 2 G/100ML
2 INJECTION, SOLUTION INTRAVENOUS EVERY 8 HOURS
Status: DISCONTINUED | OUTPATIENT
Start: 2024-06-07 | End: 2024-06-15 | Stop reason: HOSPADM

## 2024-06-07 RX ORDER — VANCOMYCIN HYDROCHLORIDE 1 G/20ML
INJECTION, POWDER, LYOPHILIZED, FOR SOLUTION INTRAVENOUS DAILY PRN
Status: CANCELLED | OUTPATIENT
Start: 2024-06-07

## 2024-06-07 RX ADMIN — POLYETHYLENE GLYCOL 3350 17 G: 17 POWDER, FOR SOLUTION ORAL at 09:04

## 2024-06-07 RX ADMIN — VANCOMYCIN HYDROCHLORIDE 1000 MG: 1 INJECTION, SOLUTION INTRAVENOUS at 16:47

## 2024-06-07 RX ADMIN — PIPERACILLIN SODIUM AND TAZOBACTAM SODIUM 3.38 G: 3; .375 INJECTION, SOLUTION INTRAVENOUS at 13:09

## 2024-06-07 RX ADMIN — SENNOSIDES 8.6 MG: 8.6 TABLET, FILM COATED ORAL at 20:40

## 2024-06-07 RX ADMIN — HYDROMORPHONE HYDROCHLORIDE 0.6 MG: 1 INJECTION, SOLUTION INTRAMUSCULAR; INTRAVENOUS; SUBCUTANEOUS at 06:11

## 2024-06-07 RX ADMIN — Medication 5 MG: at 20:40

## 2024-06-07 RX ADMIN — CEFAZOLIN SODIUM 2 G: 2 INJECTION, SOLUTION INTRAVENOUS at 20:40

## 2024-06-07 RX ADMIN — VANCOMYCIN HYDROCHLORIDE 1000 MG: 1 INJECTION, SOLUTION INTRAVENOUS at 02:02

## 2024-06-07 RX ADMIN — HYDROMORPHONE HYDROCHLORIDE 0.6 MG: 1 INJECTION, SOLUTION INTRAMUSCULAR; INTRAVENOUS; SUBCUTANEOUS at 10:44

## 2024-06-07 RX ADMIN — SODIUM CHLORIDE 75 ML/HR: 9 INJECTION, SOLUTION INTRAVENOUS at 01:21

## 2024-06-07 RX ADMIN — PIPERACILLIN SODIUM AND TAZOBACTAM SODIUM 3.38 G: 3; .375 INJECTION, SOLUTION INTRAVENOUS at 06:11

## 2024-06-07 RX ADMIN — TAMSULOSIN HYDROCHLORIDE 0.4 MG: 0.4 CAPSULE ORAL at 09:04

## 2024-06-07 RX ADMIN — PANTOPRAZOLE SODIUM 40 MG: 40 TABLET, DELAYED RELEASE ORAL at 06:11

## 2024-06-07 RX ADMIN — KETOROLAC TROMETHAMINE 15 MG: 15 INJECTION, SOLUTION INTRAMUSCULAR; INTRAVENOUS at 01:12

## 2024-06-07 RX ADMIN — KETOROLAC TROMETHAMINE 15 MG: 15 INJECTION, SOLUTION INTRAMUSCULAR; INTRAVENOUS at 16:47

## 2024-06-07 RX ADMIN — METHOCARBAMOL 500 MG: 500 TABLET ORAL at 20:40

## 2024-06-07 RX ADMIN — Medication 1000 UNITS: at 09:04

## 2024-06-07 ASSESSMENT — PAIN - FUNCTIONAL ASSESSMENT
PAIN_FUNCTIONAL_ASSESSMENT: 0-10

## 2024-06-07 ASSESSMENT — COGNITIVE AND FUNCTIONAL STATUS - GENERAL
WALKING IN HOSPITAL ROOM: TOTAL
DRESSING REGULAR LOWER BODY CLOTHING: TOTAL
MOVING FROM LYING ON BACK TO SITTING ON SIDE OF FLAT BED WITH BEDRAILS: A LOT
DRESSING REGULAR UPPER BODY CLOTHING: A LOT
PERSONAL GROOMING: A LITTLE
TURNING FROM BACK TO SIDE WHILE IN FLAT BAD: TOTAL
MOVING TO AND FROM BED TO CHAIR: TOTAL
STANDING UP FROM CHAIR USING ARMS: TOTAL
EATING MEALS: A LITTLE
CLIMB 3 TO 5 STEPS WITH RAILING: TOTAL
TOILETING: A LOT
HELP NEEDED FOR BATHING: A LOT
DAILY ACTIVITIY SCORE: 13
MOBILITY SCORE: 7

## 2024-06-07 ASSESSMENT — PAIN SCALES - GENERAL
PAINLEVEL_OUTOF10: 4
PAINLEVEL_OUTOF10: 6
PAINLEVEL_OUTOF10: 8
PAINLEVEL_OUTOF10: 6
PAINLEVEL_OUTOF10: 4
PAIN_LEVEL: 1
PAINLEVEL_OUTOF10: 3
PAINLEVEL_OUTOF10: 8
PAINLEVEL_OUTOF10: 7
PAINLEVEL_OUTOF10: 8

## 2024-06-07 ASSESSMENT — ACTIVITIES OF DAILY LIVING (ADL)
ADL_ASSISTANCE: INDEPENDENT
ADL_ASSISTANCE: INDEPENDENT

## 2024-06-07 ASSESSMENT — PAIN DESCRIPTION - LOCATION
LOCATION: BACK
LOCATION: BACK

## 2024-06-07 NOTE — PROGRESS NOTES
"Khalida Barker is a 73 y.o. female on day 0 of admission presenting with Closed fracture of distal end of right radius and wound infection     Subjective   Patient doing well this morning.  Reports that back pain has improved since lumbar washout yesterday.       Objective     Physical Exam  General: Well developed, awake/alert/oriented x3, no distress, alert and cooperative  ENMT: Mucous membranes moist, no apparent injury, no lesions seen  Head/Neck: Neck Supple, no apparent injury  Respiratory/Thorax: Normal breath sounds with good chest expansion, thorax symmetric  Cardiovascular: No pitting edema, no JVD     Motor Strength: 4/5 right hip flexion, knee flex/ext, ankle dorsiflexion otherwise 5/5     Muscle Bulk: Normal and symmetric in all extremities       Prevena holding suction, drains in place     Last Recorded Vitals  Blood pressure 105/56, pulse 63, temperature 35.6 °C (96.1 °F), temperature source Temporal, resp. rate 18, height 1.651 m (5' 5\"), SpO2 96%.  Intake/Output last 3 Shifts:  I/O last 3 completed shifts:  In: 1650 [I.V.:1650]  Out: 2540 [Urine:2200; Drains:190; Blood:150]    Relevant Results                           Assessment/Plan   Principal Problem:    Closed fracture of distal end of right radius  Active Problems:    Weakness    Closed fracture of distal end of right radius, unspecified fracture morphology, initial encounter    Thoracogenic scoliosis of thoracolumbar region    73 year old with h/o severe LBP and LLE radiculopathy and scoliosis, 5/14 s/p T31-fqbzod, multi-level PCOs, L2-S1 TLIF, 6/5 presents after fall, found to have wound infection and R wrist fracture     6/6 s/p wound washout and revision, prevena placed     Plan:   Medicine primary   Maintain drains  Appreciate infectious disease recommendations for antibiotics.  Recommend aggressive bowel regimen as patient had postop ileus after prior surgery and may  Recommend q2 hour turning to keep pressure off the incision   Mobilize " with PTOT  We will continue to follow           Jose Abrams MD

## 2024-06-07 NOTE — PROGRESS NOTES
Physical Therapy    Physical Therapy Re-Evaluation and Treatment    Patient Name: Khalida Barker  MRN: [id number]  Today's Date: 6/7/2024   Time Calculation  Start Time: 1114  Stop Time: 1154  Time Calculation (min): 40 min  4112/4112-A    Assessment/Plan   PT Assessment  PT Assessment Results: Decreased strength, Decreased range of motion, Decreased endurance, Impaired balance, Decreased mobility, Pain, Orthopedic restrictions, Impaired sensation, Decreased coordination  Rehab Prognosis: Good  Evaluation/Treatment Tolerance: Patient tolerated treatment well  Medical Staff Made Aware: Yes  End of Session Communication: Bedside nurse  Assessment Comment: Pt presents today below baseline level of function and requires continued PT during hospital stay. Pt requires 24 hour physical assist for all mobility to prevent falls. Pt is unsafe to navigate home environment at this time with available support and assist. Pt requires PT at a high intensity level at discharge to maximize functional mobility and safety. Anticipate pt will tolerate 3 hours of therapy daily. Pt is motivated to participate and requires daily, intensive, and short term therapy to return to high prior functional level.       End of Session Patient Position: Up in chair, Alarm on  IP OR SWING BED PT PLAN  Inpatient or Swing Bed: Inpatient  PT Plan  Treatment/Interventions: Bed mobility, Transfer training, Gait training, Balance training, Neuromuscular re-education, Strengthening, Endurance training, Range of motion, Therapeutic exercise, Therapeutic activity, Home exercise program  PT Plan: Skilled PT  PT Frequency: 5 times per week  PT Discharge Recommendations: High intensity level of continued care  Equipment Recommended upon Discharge: Wheeled walker  PT Recommended Transfer Status: Assist x2 (Catia Steady with OOB transfers)  PT - OK to Discharge: Yes (To next level of care when cleared by medical team   )    Subjective       General Visit  Information:  General  Reason for Referral: impaired mobility  Referred By: Rik Lawson MD  Past Medical History Relevant to Rehab: To hospital for inability to care for self after recent back surgery as well as right distal radius fracture. 5/14/24 pt underwent T10-Pelvis Instrumented Fusion, Correction of Scoliotic Deformity, L1-2, L2-3, L3-4, L4-5, L5-S1 posterior column osteotomies, L3-4 anterior column osteotomy through disc space,  L1-S1 Decompression, with L2-3, L3-4, L4-5, L5-S1 Transforaminal Lumbar Interbody Fusion, T11-T12 Decompression. Pt underwent lumbar spine I & D 6/6/24. PMH: left LE radiculopathy and scoliosis, right TKA  Family/Caregiver Present: No  Co-Treatment: OT  Co-Treatment Reason: for safety  Prior to Session Communication: Bedside nurse  Patient Position Received: Bed, 3 rail up, Alarm on  Preferred Learning Style: verbal  General Comment: Pt agreeable to PT, nursing cleared for treatment. Re-evaluation indicated due to lumbar spine I and D performed 6/6/24.    Home Living:  Home Living  Type of Home: House  Lives With: Spouse  Home Adaptive Equipment: Walker rolling or standard  Home Layout: Two level, Able to live on main level with bedroom/bathroom, Full bath main level  Home Access: Stairs to enter with rails  Entrance Stairs-Number of Steps: 2  Bathroom Shower/Tub: Walk-in shower  Bathroom Equipment: Grab bars in shower    Prior Level of Function:  Prior Function Per Pt/Caregiver Report  Level of [place]: Independent with ADLs and functional transfers, Independent with homemaking with ambulation (prior to surgery, however since then, she has required assistance wtih ADLs and IADLs. Right foot drop/weakness resulting in fall)  ADL Assistance: Independent  Homemaking Assistance: Independent  Ambulatory Assistance: Independent  Prior Function Comments: Since spine surgery in 5/24 patient has required increased assist for ADLs and was using FWW    Precautions:  Precautions  UE Weight  Bearing Status: Right Non-Weight Bearing  Medical Precautions: Fall precautions  Post-Surgical Precautions: Spinal precautions  Splinting: cast to right UE  Precautions Comment: wound vac and 2 hemovac drains, gray    Vital Signs:     Objective     Pain:  Pain Assessment  Pain Assessment: 0-10  Pain Score: 8  Pain Type: Acute pain, Surgical pain  Pain Location: Back (right wrist)  Pain Orientation: Posterior  Pain Interventions: Repositioned, Ambulation/increased activity, Rest    Cognition:  Cognition  Overall Cognitive Status: Within Functional Limits  Orientation Level: Oriented X4    General Assessments:      Activity Tolerance  Endurance: Tolerates 10 - 20 min exercise with multiple rests  Sensation  Sensation Comment: denies numbness and tingling              Static Sitting Balance  Static Sitting-Comment/Number of Minutes: good  Dynamic Sitting Balance  Dynamic Sitting-Comments: good  Static Standing Balance  Static Standing-Comment/Number of Minutes: fair  Dynamic Standing Balance  Dynamic Standing-Comments: fair    Functional Assessments:     Bed Mobility  Bed Mobility: Yes  Bed Mobility 1  Bed Mobility 1: Supine to sitting  Level of Assistance 1: Maximum assistance  Bed Mobility Comments 1: x 2, using log roll and draw sheet  Transfers  Transfer: Yes  Transfer 1  Transfer From 1: Sit to  Transfer to 1: Stand  Transfer Device 1: Catia Stedy (left arm in arm assist)  Transfer Level of Assistance 1: Maximum assistance, Moderate assistance, +2  Trials/Comments 1: decreasing to mod A of 2 with left arm in arm assist on second trial and upon third trial with use of Catia Steady to pull to stand  Transfers 2  Transfer From 2: Stand to  Transfer to 2: Sit  Transfer Device 2: Catia Stedy  Transfer Level of Assistance 2: Maximum assistance, Moderate assistance, +2  Transfers 3  Transfer From 3: Bed to  Transfer to 3: Chair with arms  Transfer Device 3: Catia Stedy  Transfer Level of Assistance 3: Contact  guard  Trials/Comments 3: CGA with pt seated on Catia Steady  Ambulation/Gait Training  Ambulation/Gait Training Performed: No (unable to perform this date due to weakness)        Treatment    Pt educated on spinal precautions and log roll technique. Cues given for task sequencing for completion of bed mobility. Cues for placing feet flat on floor at EOB and squaring hips to maximize safety. Instruction provided in 2 x 10 seated ankle pumps, LAQ's, and seated marches BLE in order to maximize strength; cues given for proper technique and parameters. Cues for proper hand placement with use of left arm in arm assist for 2 standing trials. For third standing trial and bed to chair transfer, instructed pt and PCA/RN on use of Catia Steady. Transferred pt to chair safely in Catia Steady.     Extremity/Trunk Assessments:        RLE   RLE : Exceptions to WFL  AROM RLE (degrees)  RLE AROM Comment: WFL except ankle impaired  R Ankle Dorsiflexion 0-20:  (trace movement noted)  R Ankle Plantar Flexion 0-45:  (75%)  Strength RLE  RLE Overall Strength: Greater than or equal to 3/5 as evidenced by functional mobility (except ankle)  R Ankle Dorsiflexion: 1/5  R Ankle Plantar Flexion: 3-/5  LLE   LLE : Exceptions to WFL  AROM LLE (degrees)  LLE AROM Comment: WFL except hip impaired  L Hip Flexion 0-125:  (25-50%)  Strength LLE  L Hip Flexion: 2+/5  L Knee Flexion: 3/5  L Knee Extension: 3/5  L Ankle Dorsiflexion: 5/5  L Ankle Plantar Flexion: 5/5    Outcome Measures:     [facility] Basic Mobility  Turning from your back to your side while in a flat bed without using bedrails: A lot  Moving from lying on your back to sitting on the side of a flat bed without using bedrails: Total  Moving to and from bed to chair (including a wheelchair): Total  Standing up from a chair using your arms (e.g. wheelchair or bedside chair): Total  To walk in hospital room: Total  Climbing 3-5 steps with railing: Total  Basic Mobility - Total Score: 7                                         Goals:  Encounter Problems       Encounter Problems (Active)       PT Problem       Pt will demonstrate [place] for all bed mobility   (Progressing)       Start:  06/05/24    Expected End:  06/19/24            Pt will demonstrate good dynamic standing balance while performing a functional task.   (Progressing)       Start:  06/05/24    Expected End:  06/19/24            Pt will ambulate 30 feet min A using hemiwalker or quad cane with no LOB.       Start:  06/07/24    Expected End:  06/21/24                Pt will progress to completing 3 x 20 supine/seated exercises in order to increase strength and improve gait mechanics.         Start:  06/07/24    Expected End:  06/21/24                Pt will complete sit <> stand and bed <> chair transfers with min A.         Start:  06/07/24    Expected End:  06/21/24                     Education Documentation  Home Exercise Program, taught by Lizzy Cleary PT at 6/7/2024  2:46 PM.  Learner: Patient  Readiness: Acceptance  Method: Explanation  Response: Verbalizes Understanding    Precautions, taught by Lizzy Cleary PT at 6/7/2024  2:46 PM.  Learner: Patient  Readiness: Acceptance  Method: Explanation  Response: Verbalizes Understanding    Body Mechanics, taught by Lizzy Cleary PT at 6/7/2024  2:46 PM.  Learner: Patient  Readiness: Acceptance  Method: Explanation  Response: Verbalizes Understanding    Mobility Training, taught by Lizzy Cleary PT at 6/7/2024  2:46 PM.  Learner: Patient  Readiness: Acceptance  Method: Explanation  Response: Verbalizes Understanding    Education Comments  No comments found.

## 2024-06-07 NOTE — ANESTHESIA POSTPROCEDURE EVALUATION
Patient: Khalida Barker    Procedure Summary       Date: 06/06/24 Room / Location: [facility] OR 09 / Virtual [facility] OR    Anesthesia Start: 1400 Anesthesia Stop: 1653    Procedure: Spine Lumbar I  and  D (Spine Lumbar) Diagnosis:       Thoracogenic scoliosis of thoracolumbar region      (Thoracogenic scoliosis of thoracolumbar region [M41.35])    Surgeons: Justin Lu MD Responsible Provider: Corby Doran DO    Anesthesia Type: general ASA Status: 2            Anesthesia Type: general    Vitals Value Taken Time   /49 06/06/24 1750   Temp 36 °C (96.8 °F) 06/06/24 1750   Pulse 80 06/06/24 1750   Resp 11 06/06/24 1750   SpO2 97 % 06/06/24 1750       Anesthesia Post Evaluation    Patient location during evaluation: PACU  Patient participation: complete - patient participated  Level of consciousness: awake and alert  Pain score: 1  Pain management: adequate  Airway patency: patent  Cardiovascular status: acceptable  Respiratory status: acceptable  Hydration status: acceptable  Postoperative Nausea and Vomiting: none        No notable events documented.

## 2024-06-07 NOTE — PROGRESS NOTES
Discussion was had with Dr. Virgil Lu who is addressing patient's spinal condition.  We are both in agreement that patient's ability to adequately recover from her spinal surgery is limited by extensive immobilization and restricted use of the right upper extremity.  Patient also wishes to proceed with surgical stabilization of the right wrist.  Given the occurrence of infection in the lumbar spine and risk for bacteremic contamination I do feel that patient would benefit from at least several days of IV antibiotic therapy prior to proceeding with open reduction internal fixation of the right distal radius fracture.  Will plan for surgery to occur 6/11/2024 so long as patient's clinical course continues to improve in regards to her lumbar spine and improvement from an infection standpoint.      Dale Gonzalez MD

## 2024-06-07 NOTE — PROGRESS NOTES
Occupational Therapy    Occupational Therapy    Re-Evaluation    Patient Name: Khalida Barker  MRN: [id number]  Today's Date: 6/7/2024  Time Calculation  Start Time: 1113  Stop Time: 1154  Time Calculation (min): 41 min  4112/4112-A    Assessment  IP OT Assessment  OT Assessment:  (Pt. presents with decreased balance, endurance and NWB of right UE impacting pt. ability to complete ADLs and mobility independently)  Prognosis: Good  Evaluation/Treatment Tolerance: Patient limited by pain  End of Session Communication: Bedside nurse  End of Session Patient Position: Up in chair, Alarm on    Plan:  Treatment Interventions: ADL retraining, Functional transfer training, UE strengthening/ROM, Endurance training  OT Frequency: 5 times per week  OT Discharge Recommendations: High intensity level of continued care  Equipment Recommended upon Discharge: Wheeled walker  OT Recommended Transfer Status: Assist of 2  OT - OK to Discharge: Yes (Next level of care when cleared by medical team)    Subjective    Per EMR:   73 year old with h/o severe LBP and LLE radiculopathy and scoliosis, 5/14 s/p [id number], multi-level PCOs, L2-S1 TLIF, 6/5 presents after fall, found to have wound infection and R wrist fracture      6/6 s/p wound washout and revision, prevena placed     Closed fracture of distal end of right radius, unspecified fracture sto have surgery to right wrist next week.    Current Problem:  1. Closed fracture of distal end of right radius, unspecified fracture morphology, initial encounter        2. Thoracogenic scoliosis of thoracolumbar region  Case Request Operating Room: Spine Lumbar I  and  D    Case Request Operating Room: Spine Lumbar I  and  D    Tissue/Wound Culture/Smear    Tissue/Wound Culture/Smear    Tissue/Wound Culture/Smear    Tissue/Wound Culture/Smear    Tissue/Wound Culture/Smear    Tissue/Wound Culture/Smear    Tissue/Wound Culture/Smear    Tissue/Wound Culture/Smear    CANCELED: Tissue/Wound Culture/Smear     CANCELED: Tissue/Wound Culture/Smear          General:  General  Reason for Referral: ADLs, discharge planning  Referred By: Dr. Ambrocio  Family/Caregiver Present: No  Co-Treatment: PT  Co-Treatment Reason: Safety  Prior to Session Communication: Bedside nurse  Patient Position Received: Bed, 3 rail up, Alarm on    Precautions:  UE Weight Bearing Status: Right Non-Weight Bearing  Medical Precautions: Fall precautions  Post-Surgical Precautions: Spinal precautions  Splinting: Cast to right UE  Precautions Comment: Wound vac and drains to back         Pain:  Pain Assessment  Pain Assessment: 0-10  Pain Score: 8  Pain Type: Acute pain, Surgical pain  Pain Location: Back (right wrist)    Objective     Cognition:  Overall Cognitive Status: Within Functional Limits  Orientation Level: Oriented X4  Safety/Judgement: Within Functional Limits  Insight: Within function limits             Prior Function:  Level of [place]: Independent with ADLs and functional transfers, Independent with homemaking with ambulation (Right sided L5-S1 Microdiscectomy)  Receives Help From: Family  ADL Assistance: Independent  Hand Dominance: Right        ADL:  Grooming Assistance: Moderate  UE Dressing Assistance: Maximal  LE Dressing Assistance: Total  Toileting Assistance with Device: Total  Toileting Deficit: Urinary Catheter    Activity Tolerance:  Endurance: Tolerates 10 - 20 min exercise with multiple rests    Bed Mobility/Transfers:   Bed Mobility  Bed Mobility:  (supine to sit max assist x 2 with use of draw sheet)  Transfers  Transfer:  (sit<>stand x 3 trials; 1st trial max assist x 2; 2nd and 3rd mod assist x 2; use of Nevin steady to transfer pt. to chair)      Sitting Balance:  Static Sitting Balance  Static Sitting-Balance Support: Left upper extremity supported  Static Sitting-Level of Assistance: Close supervision    Standing Balance:  Static Standing Balance  Static Standing-Balance Support: Bilateral upper extremity  supported  Static Standing-Level of Assistance: Maximum assistance      Sensation:  Sensation Comment: Numbness to bilateral feet      Hand Function:  Hand Function  Gross Grasp: Functional  Coordination: Functional    Extremities: RUE   RUE :  (able to move fingers; unable to move elbow due to cast; difficulty with AROM shoulder flexion due to stiffness; PROM to shoulder WFL) and LUE   LUE: Within Functional Limits    Outcome Measures: [facility] Daily Activity  Putting on and taking off regular lower body clothing: Total  Bathing (including washing, rinsing, drying): A lot  Putting on and taking off regular upper body clothing: A lot  Toileting, which includes using toilet, bedpan or urinal: A lot  Taking care of personal grooming such as brushing teeth: A little  Eating Meals: A little  Daily Activity - Total Score: 13             EDUCATION:  Education  Individual(s) Educated: Patient  Education Provided: Fall precautons, Risk and benefits of OT discussed with patient or other, POC discussed and agreed upon  Patient Response to Education: Patient/Caregiver Verbalized Understanding of Information      Goals:   Encounter Problems       Encounter Problems (Active)       Dressings Lower Extremities       STG - Patient will complete lower body dressing with mod assist with comp strategies and AE  (Progressing)       Start:  06/05/24    Expected End:  06/19/24               Functional Balance       STG-Patient will be min assist static stand task >5 minutes for ADL completion  (Progressing)       Start:  06/05/24    Expected End:  06/19/24               OT Transfers       STG-Patient will be mod assist with functional transfers demonstrating good safety   (Progressing)       Start:  06/05/24    Expected End:  06/19/24               Safety       STG-Patient will independently verbalize spine precautions with all functional tasks   (Progressing)       Start:  06/05/24    Expected End:  06/19/24

## 2024-06-07 NOTE — CARE PLAN
The patient's goals for the shift include      The clinical goals for the shift include Pt will have minimal pain this shift and receive adequate rest.      Problem: Pain  Goal: My pain/discomfort is manageable  6/7/2024 1029 by Denisse Ro RN  Outcome: Progressing  6/7/2024 1029 by Denisse Ro RN  Outcome: Progressing     Problem: Safety  Goal: Patient will be injury free during hospitalization  6/7/2024 1029 by Denisse Ro RN  Outcome: Progressing  6/7/2024 1029 by Denisse Ro RN  Outcome: Progressing     Problem: Daily Care  Goal: Daily care needs are met  6/7/2024 1029 by Denisse Ro RN  Outcome: Progressing  6/7/2024 1029 by Denisse Ro RN  Outcome: Progressing     Problem: Psychosocial Needs  Goal: Demonstrates ability to cope with hospitalization/illness  6/7/2024 1029 by Denisse Ro RN  Outcome: Progressing  6/7/2024 1029 by Denisse Ro RN  Outcome: Progressing     Problem: Discharge Barriers  Goal: My discharge needs are met  6/7/2024 1029 by Denisse Ro RN  Outcome: Progressing  6/7/2024 1029 by Denisse Ro RN  Outcome: Progressing     Problem: Skin  Goal: Decreased wound size/increased tissue granulation at next dressing change  6/7/2024 1029 by Denisse Ro RN  Outcome: Progressing  6/7/2024 1029 by Denisse Ro RN  Outcome: Progressing     Problem: Dressings Lower Extremities  Goal: STG - Patient will complete lower body dressing with mod assist with comp strategies and AE   6/7/2024 1029 by Denisse Ro RN  Outcome: Progressing  6/7/2024 1029 by Denisse Ro RN  Outcome: Progressing

## 2024-06-07 NOTE — PROGRESS NOTES
"Khalida Barker is a 73 y.o. female on day 0 of admission presenting with Closed fracture of distal end of right radius.    Subjective   Patient seen and examined, no acute events overnight.   at bedside, all questions answered.  Patient states that she is clinically feeling better, eager to work with PT OT and sitting the chair if possible.       Objective   Constitutional: Well developed, awake/alert/oriented x3, no distress  ENMT: mucous membranes moist  Head/Neck: Neck supple  Respiratory/Thorax: CTA b/l.   Cardiovascular: Regular, rate and rhythm, no murmurs  Gastrointestinal: Nondistended, soft, non-tender  Musculoskeletal: ROM intact  Skin: prevena in place  Extremities: no edema        Last Recorded Vitals  Blood pressure (!) 92/48, pulse 73, temperature 35.6 °C (96.1 °F), temperature source Temporal, resp. rate 17, height 1.651 m (5' 5\"), SpO2 96%.  Intake/Output last 3 Shifts:  I/O last 3 completed shifts:  In: 1650 [I.V.:1650]  Out: 2540 [Urine:2200; Drains:190; Blood:150]    Relevant Results  Scheduled medications  cholecalciferol, 1,000 Units, oral, Daily  [Held by provider] enoxaparin, 40 mg, subcutaneous, q24h  pantoprazole, 40 mg, oral, Daily before breakfast  piperacillin-tazobactam, 3.375 g, intravenous, q8h  polyethylene glycol, 17 g, oral, Daily  pregabalin, 50 mg, oral, Nightly  sennosides, 1 tablet, oral, Nightly  tamsulosin, 0.4 mg, oral, Daily  vancomycin, 1,000 mg, intravenous, q12h      Continuous medications  sodium chloride 0.9%, 75 mL/hr, Last Rate: 75 mL/hr (06/07/24 0121)      PRN medications  PRN medications: HYDROmorphone, ketorolac, melatonin, methocarbamol, vancomycin  Results from last 7 days   Lab Units 06/07/24  0531 06/06/24  0536 06/04/24  1903   WBC AUTO x10*3/uL 9.1 8.5 11.1   RBC AUTO x10*6/uL 2.85* 2.93* 3.45*   HEMOGLOBIN g/dL 8.3* 8.7* 10.0*     Results from last 7 days   Lab Units 06/07/24  0531 06/06/24  0536 06/05/24  0546 06/04/24  1903   SODIUM mmol/L 134* 132* " 135* 133*   POTASSIUM mmol/L 4.3 4.1 3.4* 3.7   CHLORIDE mmol/L 102 101 101 100   CO2 mmol/L 24 23 23 18*   BUN mg/dL 9 12 12 15   CREATININE mg/dL 0.53 0.55 0.54 0.59   CALCIUM mg/dL 7.8* 7.8* 8.2* 8.5*   PHOSPHORUS mg/dL 3.2 3.1  --   --    MAGNESIUM mg/dL 2.05 1.92  --   --    BILIRUBIN TOTAL mg/dL  --   --   --  1.0   ALT U/L  --   --   --  14   AST U/L  --   --   --  21         Assessment/Plan   Principal Problem:    Closed fracture of distal end of right radius  Active Problems:    Weakness    Closed fracture of distal end of right radius, unspecified fracture morphology, initial encounter    Thoracogenic scoliosis of thoracolumbar region    Plan  -Neurosurgery on consult , s/p wound washout and revision with prevena placement on 6/6  -NS agree with needing consult for ID, ID placed on consult  -orthopedic surgery consulted: right radius fracture-RUE splinted-patient wanted time to discuss surgery options with her  and is at this time wanting to proceed with surgery  -patient was seen by orthopedic team, in light of limited support for mobility due to recent back surgery, agree with proceeding for surgery correction, however would like to continue iv abx for few days to prevent infection complications, tentatively surger planned for 6/11 per ortho note.   -Toradol and Dilaudid PRN for pain  -currently on Senokot, wants to avoid suppository at this time, willing to try Miralax scheduled-history of SBO, bowel sounds trickling today will obtain KUB  -KUB showed nonobstructive gas pattern   -despite Flomax and straight cath x 3, gray catheter placed in the setting of surgery (and planned surgery on 6/11)  -dvtp: heparin subcutaneous  -hemoglobin 8.3, likely dilutional, no signs of bleeding -cont to monitor  -am labs including cbc, bmp, mag  -Dispo: need SNF on discharge- -patient declined going back to [facility], prefers [facility] , [facility] aware       I spent 35 minutes in the professional and overall  care of this patient.      Rik Lawson MD

## 2024-06-07 NOTE — CARE PLAN
Problem: Pain  Goal: My pain/discomfort is manageable  Outcome: Progressing     Problem: Safety  Goal: Patient will be injury free during hospitalization  Outcome: Progressing  Goal: I will remain free of falls  Outcome: Progressing     Problem: Daily Care  Goal: Daily care needs are met  Outcome: Progressing     Problem: Psychosocial Needs  Goal: Demonstrates ability to cope with hospitalization/illness  Outcome: Progressing  Goal: Collaborate with me, my family, and caregiver to identify my specific goals  Outcome: Progressing     Problem: Discharge Barriers  Goal: My discharge needs are met  Outcome: Progressing     Problem: Skin  Goal: Decreased wound size/increased tissue granulation at next dressing change  Outcome: Progressing  Goal: Participates in plan/prevention/treatment measures  Outcome: Progressing  Goal: Prevent/manage excess moisture  Outcome: Progressing  Goal: Prevent/minimize sheer/friction injuries  Outcome: Progressing  Goal: Promote/optimize nutrition  Outcome: Progressing  Goal: Promote skin healing  Outcome: Progressing     Problem: Dressings Lower Extremities  Goal: STG - Patient will complete lower body dressing with mod assist with comp strategies and AE   Outcome: Progressing   The patient's goals for the shift include      The clinical goals for the shift include Pt will have minimal pain this shift and receive adequate rest.    Over the shift, the patient did receive rest.

## 2024-06-07 NOTE — HOSPITAL COURSE
73 year old with h/o severe LBP and LLE radiculopathy and scoliosis, 5/14 s/p [id number], multi-level PCOs, L2-S1 TLIF, 6/5 presents after fall, found to have wound infection and R wrist fracture     6/6 s/p wound washout

## 2024-06-07 NOTE — PROGRESS NOTES
Vancomycin Dosing by Pharmacy- FOLLOW UP    Khalida Barker is a 73 y.o. year old female who Pharmacy has been consulted for vancomycin dosing for cellulitis, skin and soft tissue. Based on the patient's indication and renal status this patient is being dosed based on a goal AUC of 400-600.     Renal function is currently stable.    Current vancomycin dose: 1000 mg given every 12 hours    Estimated vancomycin AUC on current dose: 434 mg/L.hr     Visit Vitals  /56 (BP Location: Left arm, Patient Position: Lying)   Pulse 63   Temp 35.6 °C (96.1 °F) (Temporal)   Resp 18        Lab Results   Component Value Date    CREATININE 0.53 2024    CREATININE 0.55 2024    CREATININE 0.54 2024    CREATININE 0.59 2024        Patient weight is as follows: There were no vitals filed for this visit.    Cultures:  No results found for the encounter in last 14 days.       I/O last 3 completed shifts:  In: 1650 [I.V.:1650]  Out: 2540 [Urine:2200; Drains:190; Blood:150]  I/O during current shift:  No intake/output data recorded.    Temp (24hrs), Av °C (96.8 °F), Min:35.6 °C (96.1 °F), Max:36.2 °C (97.2 °F)      Assessment/Plan    Within goal AUC range. Continue current vancomycin regimen.    This dosing regimen is predicted by [employer] to result in the following pharmacokinetic parameters:  Regimen: 1000 mg IV every 12 hours.  Start time: 14:02 on 2024  Exposure target: AUC24 (range)400-600 mg/L.hr   AUC24,ss: 434 mg/L.hr  Probability of AUC24 > 400: 63 %  Ctrough,ss: 13 mg/L  Probability of Ctrough,ss > 20: 10 %  Probability of nephrotoxicity (Lodise CAYDEN ): 8 %      The next level will be obtained on 6/10 at 0600. May be obtained sooner if clinically indicated.   Will continue to monitor renal function daily while on vancomycin and order serum creatinine at least every 48 hours if not already ordered.  Follow for continued vancomycin needs, clinical response, and signs/symptoms of toxicity.        Alexander Haynes, PharmD

## 2024-06-07 NOTE — PROGRESS NOTES
"INPATIENT PROGRESS NOTES    PATIENT NAME: Khalida Barker    MRN: [id number]  SERVICE DATE:  6/7/2024   SERVICE TIME:  5:25 PM    SIGNATURE: Alejandro Swan MD      SUBJECTIVE  Afebrile  No events overnight   Wound debrided yesterday, apparently down to bone and hardware.  Gram stain shows GPC's and culture 4+ Staph aureus, CHRIS is pending.      OBJECTIVE  PHYSICAL EXAM:   Patient Vitals for the past 24 hrs:   BP Temp Temp src Pulse Resp SpO2   06/07/24 1438 110/50 -- -- 76 -- 94 %   06/07/24 0800 (!) 92/48 35.6 °C (96.1 °F) Temporal 73 17 96 %   06/07/24 0400 105/56 35.6 °C (96.1 °F) Temporal 63 18 96 %   06/07/24 0000 94/52 36.2 °C (97.2 °F) Temporal 74 18 97 %   06/06/24 2125 94/50 35.8 °C (96.4 °F) Temporal 82 18 95 %   06/06/24 2000 101/62 36 °C (96.8 °F) Temporal 89 16 95 %   06/06/24 1810 106/55 36.2 °C (97.2 °F) Temporal 64 16 100 %   06/06/24 1750 (!) 110/49 36 °C (96.8 °F) Temporal 80 11 97 %   06/06/24 1745 -- -- -- 82 14 96 %   06/06/24 1735 (!) 112/49 -- -- 86 23 97 %   06/06/24 1730 (!) 112/49 36.1 °C (97 °F) -- 88 20 98 %         Gen: NAD  Neck: symmetric, no mass  Cardiovascular: RRR  Respiratory: No distress   GI: Abd soft, nontender, non-distended  Extremities: no leg edema  Skin: Dressing on.  Neuro: alert and oriented times 3    Labs:  Lab Results   Component Value Date    WBC 9.1 06/07/2024    HGB 8.3 (L) 06/07/2024    HCT 27.2 (L) 06/07/2024    MCV 95 06/07/2024     06/07/2024     Lab Results   Component Value Date    GLUCOSE 179 (H) 06/07/2024    CALCIUM 7.8 (L) 06/07/2024     (L) 06/07/2024    K 4.3 06/07/2024    CO2 24 06/07/2024     06/07/2024    BUN 9 06/07/2024    CREATININE 0.53 06/07/2024   ESR: --No results found for: \"SEDRATE\"No results found for: \"CRP\"  Lab Results   Component Value Date    ALT 14 06/04/2024    AST 21 06/04/2024    ALKPHOS 154 (H) 06/04/2024    BILITOT 1.0 06/04/2024         DATA:   Diagnostic tests reviewed for today's visit:    Labs this admission " reviewed  Imagings this admission reviewed  Cultures: Reviewed        ASSESSMENT :     Deep thoracolumbar wound infection, Staph aureus    Assessment: Areas well debrided, antibiotics are reasonable.  This with staff however I will go ahead and stop the Zosyn and use cefazolin, continue Vanco, but likely only one of the other will be needed once sensitivities are out.  I will be away next week, Dr. Tang will be covering and will be aware    Plan: Change antibiotics as above, adjust antibiotics based on cultures      PLAN:      Alejandro Swan MD   Infectious Diseases     This note was partially created with voice recognition software and may contain voice recognition errors.

## 2024-06-07 NOTE — PROGRESS NOTES
06/07/24 0923   Discharge Planning   Living Arrangements Spouse/significant other   Support Systems Spouse/significant other   Type of Residence Private residence   Home or Post Acute Services Post acute facilities (Rehab/SNF/etc)   Type of Post Acute Facility Services Rehab   Patient expects to be discharged to: [facility]   Does the patient need discharge transport arranged? Yes   RoundTrip coordination needed? Yes     Pt accepted at [facility]. Pt to have surgery on Monday for right arm fracture. Will start precert closer to discharge date.

## 2024-06-08 PROBLEM — D62 ACUTE BLOOD LOSS AS CAUSE OF POSTOPERATIVE ANEMIA: Status: ACTIVE | Noted: 2024-06-08

## 2024-06-08 LAB
ANION GAP SERPL CALC-SCNC: 12 MMOL/L (ref 10–20)
BACTERIA SPEC CULT: ABNORMAL
BUN SERPL-MCNC: 11 MG/DL (ref 6–23)
CALCIUM SERPL-MCNC: 7.8 MG/DL (ref 8.6–10.3)
CHLORIDE SERPL-SCNC: 105 MMOL/L (ref 98–107)
CO2 SERPL-SCNC: 25 MMOL/L (ref 21–32)
CREAT SERPL-MCNC: 0.8 MG/DL (ref 0.5–1.05)
EGFRCR SERPLBLD CKD-EPI 2021: 79 ML/MIN/1.73M*2
ERYTHROCYTE [DISTWIDTH] IN BLOOD BY AUTOMATED COUNT: 14.3 % (ref 11.5–14.5)
ERYTHROCYTE [DISTWIDTH] IN BLOOD BY AUTOMATED COUNT: 14.4 % (ref 11.5–14.5)
GLUCOSE SERPL-MCNC: 145 MG/DL (ref 74–99)
GRAM STN SPEC: ABNORMAL
GRAM STN SPEC: ABNORMAL
HCT VFR BLD AUTO: 24.6 % (ref 36–46)
HCT VFR BLD AUTO: 27.2 % (ref 36–46)
HGB BLD-MCNC: 7.2 G/DL (ref 12–16)
HGB BLD-MCNC: 8.3 G/DL (ref 12–16)
MAGNESIUM SERPL-MCNC: 2.12 MG/DL (ref 1.6–2.4)
MCH RBC QN AUTO: 28.2 PG (ref 26–34)
MCH RBC QN AUTO: 28.7 PG (ref 26–34)
MCHC RBC AUTO-ENTMCNC: 29.3 G/DL (ref 32–36)
MCHC RBC AUTO-ENTMCNC: 30.5 G/DL (ref 32–36)
MCV RBC AUTO: 94 FL (ref 80–100)
MCV RBC AUTO: 97 FL (ref 80–100)
NRBC BLD-RTO: 0 /100 WBCS (ref 0–0)
NRBC BLD-RTO: 0 /100 WBCS (ref 0–0)
PLATELET # BLD AUTO: 350 X10*3/UL (ref 150–450)
PLATELET # BLD AUTO: 483 X10*3/UL (ref 150–450)
POTASSIUM SERPL-SCNC: 3.9 MMOL/L (ref 3.5–5.3)
RBC # BLD AUTO: 2.55 X10*6/UL (ref 4–5.2)
RBC # BLD AUTO: 2.89 X10*6/UL (ref 4–5.2)
SODIUM SERPL-SCNC: 138 MMOL/L (ref 136–145)
WBC # BLD AUTO: 7.1 X10*3/UL (ref 4.4–11.3)
WBC # BLD AUTO: 7.7 X10*3/UL (ref 4.4–11.3)

## 2024-06-08 PROCEDURE — 2500000001 HC RX 250 WO HCPCS SELF ADMINISTERED DRUGS (ALT 637 FOR MEDICARE OP): Performed by: STUDENT IN AN ORGANIZED HEALTH CARE EDUCATION/TRAINING PROGRAM

## 2024-06-08 PROCEDURE — 2500000002 HC RX 250 W HCPCS SELF ADMINISTERED DRUGS (ALT 637 FOR MEDICARE OP, ALT 636 FOR OP/ED): Performed by: STUDENT IN AN ORGANIZED HEALTH CARE EDUCATION/TRAINING PROGRAM

## 2024-06-08 PROCEDURE — 36415 COLL VENOUS BLD VENIPUNCTURE: CPT | Performed by: STUDENT IN AN ORGANIZED HEALTH CARE EDUCATION/TRAINING PROGRAM

## 2024-06-08 PROCEDURE — 2500000004 HC RX 250 GENERAL PHARMACY W/ HCPCS (ALT 636 FOR OP/ED): Performed by: STUDENT IN AN ORGANIZED HEALTH CARE EDUCATION/TRAINING PROGRAM

## 2024-06-08 PROCEDURE — 83735 ASSAY OF MAGNESIUM: CPT | Performed by: STUDENT IN AN ORGANIZED HEALTH CARE EDUCATION/TRAINING PROGRAM

## 2024-06-08 PROCEDURE — 2500000004 HC RX 250 GENERAL PHARMACY W/ HCPCS (ALT 636 FOR OP/ED): Mod: JZ | Performed by: INTERNAL MEDICINE

## 2024-06-08 PROCEDURE — 85027 COMPLETE CBC AUTOMATED: CPT | Mod: 91 | Performed by: STUDENT IN AN ORGANIZED HEALTH CARE EDUCATION/TRAINING PROGRAM

## 2024-06-08 PROCEDURE — 97530 THERAPEUTIC ACTIVITIES: CPT | Mod: GO,CO

## 2024-06-08 PROCEDURE — 97530 THERAPEUTIC ACTIVITIES: CPT | Mod: GP,CQ

## 2024-06-08 PROCEDURE — 97535 SELF CARE MNGMENT TRAINING: CPT | Mod: GO,CO

## 2024-06-08 PROCEDURE — 80048 BASIC METABOLIC PNL TOTAL CA: CPT | Performed by: STUDENT IN AN ORGANIZED HEALTH CARE EDUCATION/TRAINING PROGRAM

## 2024-06-08 PROCEDURE — 1100000001 HC PRIVATE ROOM DAILY

## 2024-06-08 PROCEDURE — 85027 COMPLETE CBC AUTOMATED: CPT | Performed by: STUDENT IN AN ORGANIZED HEALTH CARE EDUCATION/TRAINING PROGRAM

## 2024-06-08 PROCEDURE — 99232 SBSQ HOSP IP/OBS MODERATE 35: CPT | Performed by: STUDENT IN AN ORGANIZED HEALTH CARE EDUCATION/TRAINING PROGRAM

## 2024-06-08 RX ORDER — VANCOMYCIN HYDROCHLORIDE 750 MG/150ML
750 INJECTION, SOLUTION INTRAVENOUS EVERY 12 HOURS
Status: DISCONTINUED | OUTPATIENT
Start: 2024-06-08 | End: 2024-06-11

## 2024-06-08 RX ORDER — POLYETHYLENE GLYCOL 3350 17 G/17G
17 POWDER, FOR SOLUTION ORAL 2 TIMES DAILY
Status: DISCONTINUED | OUTPATIENT
Start: 2024-06-08 | End: 2024-06-15 | Stop reason: HOSPADM

## 2024-06-08 RX ORDER — HYDROMORPHONE HYDROCHLORIDE 0.2 MG/ML
0.2 INJECTION INTRAMUSCULAR; INTRAVENOUS; SUBCUTANEOUS
Status: DISCONTINUED | OUTPATIENT
Start: 2024-06-08 | End: 2024-06-15 | Stop reason: HOSPADM

## 2024-06-08 RX ORDER — KETOROLAC TROMETHAMINE 15 MG/ML
15 INJECTION, SOLUTION INTRAMUSCULAR; INTRAVENOUS EVERY 6 HOURS
Status: DISPENSED | OUTPATIENT
Start: 2024-06-08 | End: 2024-06-10

## 2024-06-08 RX ORDER — DIPHENHYDRAMINE HCL 25 MG
25 TABLET ORAL EVERY 6 HOURS PRN
Status: DISCONTINUED | OUTPATIENT
Start: 2024-06-08 | End: 2024-06-15 | Stop reason: HOSPADM

## 2024-06-08 RX ORDER — AMOXICILLIN 250 MG
1 CAPSULE ORAL NIGHTLY
Status: DISCONTINUED | OUTPATIENT
Start: 2024-06-08 | End: 2024-06-15 | Stop reason: HOSPADM

## 2024-06-08 RX ORDER — TRAMADOL HYDROCHLORIDE 50 MG/1
50 TABLET ORAL EVERY 6 HOURS PRN
Status: DISCONTINUED | OUTPATIENT
Start: 2024-06-08 | End: 2024-06-10

## 2024-06-08 RX ORDER — OXYCODONE HYDROCHLORIDE 5 MG/1
5 TABLET ORAL EVERY 6 HOURS PRN
Status: DISCONTINUED | OUTPATIENT
Start: 2024-06-08 | End: 2024-06-08

## 2024-06-08 RX ORDER — BISACODYL 10 MG/1
10 SUPPOSITORY RECTAL ONCE
Status: COMPLETED | OUTPATIENT
Start: 2024-06-08 | End: 2024-06-08

## 2024-06-08 RX ORDER — ACETAMINOPHEN 325 MG/1
650 TABLET ORAL EVERY 6 HOURS
Status: DISCONTINUED | OUTPATIENT
Start: 2024-06-08 | End: 2024-06-15 | Stop reason: HOSPADM

## 2024-06-08 RX ORDER — ENOXAPARIN SODIUM 100 MG/ML
40 INJECTION SUBCUTANEOUS DAILY
Status: DISCONTINUED | OUTPATIENT
Start: 2024-06-08 | End: 2024-06-15 | Stop reason: HOSPADM

## 2024-06-08 RX ORDER — CYCLOBENZAPRINE HCL 5 MG
5 TABLET ORAL 3 TIMES DAILY
Status: DISCONTINUED | OUTPATIENT
Start: 2024-06-08 | End: 2024-06-15 | Stop reason: HOSPADM

## 2024-06-08 RX ORDER — OXYCODONE HYDROCHLORIDE 10 MG/1
10 TABLET ORAL EVERY 6 HOURS PRN
Status: DISCONTINUED | OUTPATIENT
Start: 2024-06-08 | End: 2024-06-08

## 2024-06-08 RX ADMIN — Medication 1000 UNITS: at 10:25

## 2024-06-08 RX ADMIN — KETOROLAC TROMETHAMINE 15 MG: 15 INJECTION, SOLUTION INTRAMUSCULAR; INTRAVENOUS at 00:50

## 2024-06-08 RX ADMIN — CYCLOBENZAPRINE HYDROCHLORIDE 5 MG: 5 TABLET, FILM COATED ORAL at 21:17

## 2024-06-08 RX ADMIN — KETOROLAC TROMETHAMINE 15 MG: 15 INJECTION, SOLUTION INTRAMUSCULAR; INTRAVENOUS at 16:38

## 2024-06-08 RX ADMIN — PANTOPRAZOLE SODIUM 40 MG: 40 TABLET, DELAYED RELEASE ORAL at 05:24

## 2024-06-08 RX ADMIN — POLYETHYLENE GLYCOL 3350 17 G: 17 POWDER, FOR SOLUTION ORAL at 10:26

## 2024-06-08 RX ADMIN — ACETAMINOPHEN 650 MG: 325 TABLET ORAL at 11:55

## 2024-06-08 RX ADMIN — Medication 5 MG: at 21:20

## 2024-06-08 RX ADMIN — BISACODYL 10 MG: 10 SUPPOSITORY RECTAL at 11:55

## 2024-06-08 RX ADMIN — VANCOMYCIN HYDROCHLORIDE 1000 MG: 1 INJECTION, SOLUTION INTRAVENOUS at 01:54

## 2024-06-08 RX ADMIN — VANCOMYCIN HYDROCHLORIDE 750 MG: 750 INJECTION, SOLUTION INTRAVENOUS at 15:54

## 2024-06-08 RX ADMIN — ENOXAPARIN SODIUM 40 MG: 40 INJECTION SUBCUTANEOUS at 11:55

## 2024-06-08 RX ADMIN — SENNOSIDES AND DOCUSATE SODIUM 1 TABLET: 50; 8.6 TABLET ORAL at 21:17

## 2024-06-08 RX ADMIN — TAMSULOSIN HYDROCHLORIDE 0.4 MG: 0.4 CAPSULE ORAL at 10:25

## 2024-06-08 RX ADMIN — CEFAZOLIN SODIUM 2 G: 2 INJECTION, SOLUTION INTRAVENOUS at 05:24

## 2024-06-08 RX ADMIN — KETOROLAC TROMETHAMINE 15 MG: 15 INJECTION, SOLUTION INTRAMUSCULAR; INTRAVENOUS at 10:24

## 2024-06-08 RX ADMIN — METHOCARBAMOL 500 MG: 500 TABLET ORAL at 10:25

## 2024-06-08 RX ADMIN — CEFAZOLIN SODIUM 2 G: 2 INJECTION, SOLUTION INTRAVENOUS at 21:17

## 2024-06-08 RX ADMIN — CYCLOBENZAPRINE HYDROCHLORIDE 5 MG: 5 TABLET, FILM COATED ORAL at 15:54

## 2024-06-08 RX ADMIN — POLYETHYLENE GLYCOL 3350 17 G: 17 POWDER, FOR SOLUTION ORAL at 21:17

## 2024-06-08 RX ADMIN — CEFAZOLIN SODIUM 2 G: 2 INJECTION, SOLUTION INTRAVENOUS at 11:55

## 2024-06-08 ASSESSMENT — COGNITIVE AND FUNCTIONAL STATUS - GENERAL
MOVING FROM LYING ON BACK TO SITTING ON SIDE OF FLAT BED WITH BEDRAILS: A LOT
DRESSING REGULAR LOWER BODY CLOTHING: TOTAL
MOBILITY SCORE: 8
CLIMB 3 TO 5 STEPS WITH RAILING: TOTAL
DAILY ACTIVITIY SCORE: 10
EATING MEALS: A LOT
TURNING FROM BACK TO SIDE WHILE IN FLAT BAD: A LOT
TOILETING: TOTAL
DRESSING REGULAR UPPER BODY CLOTHING: A LOT
TURNING FROM BACK TO SIDE WHILE IN FLAT BAD: A LOT
EATING MEALS: A LOT
MOVING FROM LYING ON BACK TO SITTING ON SIDE OF FLAT BED WITH BEDRAILS: TOTAL
PERSONAL GROOMING: A LOT
PERSONAL GROOMING: A LOT
MOVING FROM LYING ON BACK TO SITTING ON SIDE OF FLAT BED WITH BEDRAILS: TOTAL
MOVING TO AND FROM BED TO CHAIR: TOTAL
HELP NEEDED FOR BATHING: A LOT
DAILY ACTIVITIY SCORE: 10
STANDING UP FROM CHAIR USING ARMS: A LOT
HELP NEEDED FOR BATHING: A LOT
MOVING TO AND FROM BED TO CHAIR: TOTAL
TOILETING: TOTAL
MOBILITY SCORE: 9
CLIMB 3 TO 5 STEPS WITH RAILING: TOTAL
WALKING IN HOSPITAL ROOM: TOTAL
STANDING UP FROM CHAIR USING ARMS: A LOT
WALKING IN HOSPITAL ROOM: TOTAL
DRESSING REGULAR UPPER BODY CLOTHING: A LOT
DRESSING REGULAR LOWER BODY CLOTHING: TOTAL

## 2024-06-08 ASSESSMENT — PAIN DESCRIPTION - LOCATION
LOCATION: BACK
LOCATION: BACK

## 2024-06-08 ASSESSMENT — PAIN SCALES - GENERAL
PAINLEVEL_OUTOF10: 3
PAINLEVEL_OUTOF10: 4
PAINLEVEL_OUTOF10: 6
PAINLEVEL_OUTOF10: 8
PAINLEVEL_OUTOF10: 0 - NO PAIN
PAINLEVEL_OUTOF10: 9
PAINLEVEL_OUTOF10: 6
PAINLEVEL_OUTOF10: 7

## 2024-06-08 ASSESSMENT — PAIN - FUNCTIONAL ASSESSMENT
PAIN_FUNCTIONAL_ASSESSMENT: 0-10

## 2024-06-08 ASSESSMENT — ACTIVITIES OF DAILY LIVING (ADL): HOME_MANAGEMENT_TIME_ENTRY: 15

## 2024-06-08 NOTE — PROGRESS NOTES
Physical Therapy    Physical Therapy Treatment    Patient Name: Khalida Barker  MRN: [id number]  Today's Date: 6/8/2024  Time Calculation  Start Time: 1324  Stop Time: 1409  Time Calculation (min): 45 min     4112/4112-A     06/08/24 1324   PT  Visit   PT Received On 06/08/24   Response to Previous Treatment Patient with no complaints from previous session.   General   Reason for Referral impaired mobility   Referred By Rik Lawson MD   Past Medical History Relevant to Rehab To hospital for inability to care for self after recent back surgery as well as right distal radius fracture. 5/14/24 pt underwent T10-Pelvis Instrumented Fusion, Correction of Scoliotic Deformity, L1-2, L2-3, L3-4, L4-5, L5-S1 posterior column osteotomies, L3-4 anterior column osteotomy through disc space,  L1-S1 Decompression, with L2-3, L3-4, L4-5, L5-S1 Transforaminal Lumbar Interbody Fusion, T11-T12 Decompression. Pt underwent lumbar spine I & D 6/6/24. PMH: left LE radiculopathy and scoliosis, right TKA   Family/Caregiver Present No   Co-Treatment OT   Co-Treatment Reason for safety   Prior to Session Communication Bedside nurse   Patient Position Received Bed, 3 rail up;Alarm on   Preferred Learning Style verbal   General Comment Pt. was in bed when I arrived. She c/o being uncomfortable, Not able to move without assist, legs feel stiff and heavy.   Precautions   UE Weight Bearing Status   (NWB R UE, sling donned when sitting on the EOB.Propped on pillow when I bed or seated in the chair.)   Medical Precautions Fall precautions   Post-Surgical Precautions Spinal precautions   Precautions Comment wound vac and 2 hemovac drains, gray   Pain Assessment   Pain Score 8   Pain Type Surgical pain- nurse informed of pain level.   Cognition   Orientation Level Oriented x 4   Bed Mobility   Bed Mobility Yes   Bed Mobility 1   Bed Mobility 1 Supine to sitting   Level of Assistance 1 Maximum assistance;  Dependent;+2   Bed Mobility Comments 1 Max  cues for  supine to sit.  Max/dependent to scoot to get feet on the floor.   Transfers   Transfer Yes   Transfer 1   Technique 1 Sit to stand;Stand to sit   Trials/Comments 1 Sit to stand from the bed x 3 attempts. First attempt was mod/max assist x2. Second attempt  min/mod x2. 3 attempt was mod x2.   Transfers 2   Transfer From 2 Bed to   Transfer to 2 Chair with arms   Technique 2 Squat pivot   Transfer Level of Assistance 2 Maximum assistance;  Dependent;+2   Trials/Comments 2 Transfer bed to chair to her left wiht max/dep x2. Left knee blocked to prevent buckling.   Activity Tolerance   Endurance Tolerates 10 - 20 min exercise with multiple rests   PT Assessment   PT Assessment Results Decreased strength;Decreased range of motion;Decreased endurance;Impaired balance;Decreased mobility;Pain;Orthopedic restrictions;Impaired sensation;Decreased coordination   Rehab Prognosis Good   End of Session Communication Bedside nurse   End of Session Patient Position Up in chair;Alarm on   PT Plan   PT Plan Skilled PT   PT Frequency 5 times per week   PT Discharge Recommendations High intensity level of continued care   Equipment Recommended upon Discharge Wheeled walker       Assessment/Plan   PT Assessment  PT Assessment Results: Decreased strength, Decreased range of motion, Decreased endurance, Impaired balance, Decreased mobility, Pain, Orthopedic restrictions, Impaired sensation, Decreased coordination  Rehab Prognosis: Good  End of Session Communication: Bedside nurse  End of Session Patient Position: Up in chair, Alarm on     PT Plan  Treatment/Interventions: Bed mobility, Transfer training, Gait training, Balance training, Neuromuscular re-education, Strengthening, Endurance training, Range of motion, Therapeutic exercise, Therapeutic activity, Home exercise program  PT Plan: Skilled PT  PT Frequency: 5 times per week  PT Discharge Recommendations: High intensity level of continued care  Equipment Recommended upon Discharge:  Wheeled walker  PT Recommended Transfer Status: Assist x2 (Catia Steady with OOB transfers)  PT - OK to Discharge: Yes    Outcome Measures:  [facility] Basic Mobility  Turning from your back to your side while in a flat bed without using bedrails: Total  Moving from lying on your back to sitting on the side of a flat bed without using bedrails: A lot  Moving to and from bed to chair (including a wheelchair): Total  Standing up from a chair using your arms (e.g. wheelchair or bedside chair): A lot  To walk in hospital room: Total  Climbing 3-5 steps with railing: Total  Basic Mobility - Total Score: 8                         EDUCATION:     Education Documentation  Home Exercise Program, taught by Raul Jorgensen PTA at 6/8/2024  5:58 PM.  Learner: Patient  Readiness: Acceptance  Method: Explanation  Response: Needs Reinforcement, Verbalizes Understanding    Precautions, taught by Raul Jorgensen PTA at 6/8/2024  5:58 PM.  Learner: Patient  Readiness: Acceptance  Method: Explanation  Response: Needs Reinforcement, Verbalizes Understanding    Body Mechanics, taught by Raul Jorgensen PTA at 6/8/2024  5:58 PM.  Learner: Patient  Readiness: Acceptance  Method: Explanation  Response: Needs Reinforcement, Verbalizes Understanding    Mobility Training, taught by Raul Jorgensen PTA at 6/8/2024  5:58 PM.  Learner: Patient  Readiness: Acceptance  Method: Explanation  Response: Needs Reinforcement, Verbalizes Understanding    Education Comments  No comments found.        GOALS:  Encounter Problems       Encounter Problems (Active)       PT Problem       Pt will demonstrate Odell for all bed mobility   (Progressing)       Start:  06/05/24    Expected End:  06/19/24            Pt will demonstrate good dynamic standing balance while performing a functional task.   (Progressing)       Start:  06/05/24    Expected End:  06/19/24            Pt will ambulate 30 feet min A using hemiwalker or quad cane with no LOB.       Start:  06/07/24     Expected End:  06/21/24                Pt will progress to completing 3 x 20 supine/seated exercises in order to increase strength and improve gait mechanics.         Start:  06/07/24    Expected End:  06/21/24                Pt will complete sit <> stand and bed <> chair transfers with min A.         Start:  06/07/24    Expected End:  06/21/24

## 2024-06-08 NOTE — CARE PLAN
Patient VSS and NVI. Tolerating diet appropriately however having very little PO intake due to bloating and discomfort. Wound vac CDI. Hemovac x2 intact and maintained. Mcmahon catheter maintained. Pain managed with medication and repositioning. Patient did sit in chair for awhile this afternoon. No BM today and had suppository this morning. Worked with PT today, stand pivot assist. No acute events during this shift.     The clinical goals for the shift include Patient will have bm by end of shift and verbalize reduced dicomfort in abdomen

## 2024-06-08 NOTE — PROGRESS NOTES
"Khalida Barker is a 73 y.o. female on day 1 of admission presenting with Closed fracture of distal end of right radius.    Subjective   Patient seen and examined, this morning she states she feels more fatigued and tired, otherwise no specific complaints.  Explained to the patient regarding her gradual drop in hemoglobin, suspected sources likely from operative loss versus from the drain, patient expressed understanding.  Will obtain repeat CBC this afternoon, if hemoglobin drops below 7, she will need transfusion, patient is aware.  Patient did mention that she still has not had a bowel movement, Dulcolax suppository ordered.  Patient agreeable to enema if needed later.       Objective     Constitutional: Well developed, awake/alert/oriented x3, no distress  ENMT: mucous membranes moist  Head/Neck: Neck supple  Respiratory/Thorax: CTA b/l.   Cardiovascular: Regular, rate and rhythm, no murmurs  Gastrointestinal: Nondistended, soft, non-tender  Musculoskeletal: ROM intact  Skin: prevena drain in place   Extremities: R-forearm in a splint    Last Recorded Vitals  Blood pressure 106/55, pulse 75, temperature 36.2 °C (97.2 °F), temperature source Temporal, resp. rate 16, height 1.651 m (5' 5\"), SpO2 100%.  Intake/Output last 3 Shifts:  I/O last 3 completed shifts:  In: 300 [IV Piggyback:300]  Out: 3090 [Urine:2200; Drains:890]    Relevant Results  Scheduled medications  acetaminophen, 650 mg, oral, q6h  ceFAZolin, 2 g, intravenous, q8h  cholecalciferol, 1,000 Units, oral, Daily  cyclobenzaprine, 5 mg, oral, TID  enoxaparin, 40 mg, subcutaneous, Daily  ketorolac, 15 mg, intravenous, q6h  pantoprazole, 40 mg, oral, Daily before breakfast  polyethylene glycol, 17 g, oral, BID  pregabalin, 50 mg, oral, Nightly  sennosides-docusate sodium, 1 tablet, oral, Nightly  tamsulosin, 0.4 mg, oral, Daily  vancomycin, 750 mg, intravenous, q12h      Continuous medications     PRN medications  PRN medications: diphenhydrAMINE, " HYDROmorphone, melatonin, oxyCODONE, oxyCODONE, vancomycin  Results from last 7 days   Lab Units 06/08/24  0608 06/07/24  0531 06/06/24  0536   WBC AUTO x10*3/uL 7.1 9.1 8.5   RBC AUTO x10*6/uL 2.55* 2.85* 2.93*   HEMOGLOBIN g/dL 7.2* 8.3* 8.7*     Results from last 7 days   Lab Units 06/08/24  0607 06/07/24  0531 06/06/24  0536 06/05/24  0546 06/04/24  1903   SODIUM mmol/L 138 134* 132*   < > 133*   POTASSIUM mmol/L 3.9 4.3 4.1   < > 3.7   CHLORIDE mmol/L 105 102 101   < > 100   CO2 mmol/L 25 24 23   < > 18*   BUN mg/dL 11 9 12   < > 15   CREATININE mg/dL 0.80 0.53 0.55   < > 0.59   CALCIUM mg/dL 7.8* 7.8* 7.8*   < > 8.5*   PHOSPHORUS mg/dL  --  3.2 3.1  --   --    MAGNESIUM mg/dL 2.12 2.05 1.92  --   --    BILIRUBIN TOTAL mg/dL  --   --   --   --  1.0   ALT U/L  --   --   --   --  14   AST U/L  --   --   --   --  21    < > = values in this interval not displayed.         Assessment/Plan   Principal Problem:    Closed fracture of distal end of right radius  Active Problems:    Weakness    Closed fracture of distal end of right radius, unspecified fracture morphology, initial encounter    Acute blood loss as cause of postoperative anemia    Thoracogenic scoliosis of thoracolumbar region    Plan  -Neurosurgery on consult , s/p wound washout and revision with prevena placement on 6/6  -NS agree with needing consult for ID, ID placed on consult, currently on Vanc + Cefazoin  -Wound culture grew MRSA  -orthopedic surgery consulted: right radius fracture-RUE splinted-patient wanted time to discuss surgery options with her  and is at this time wanting to proceed with surgery  -patient was seen by orthopedic team, in light of limited support for mobility due to recent back surgery, agree with proceeding for surgery correction, however would like to continue iv abx for few days to prevent infection complications, tentatively surger planned for 6/11 per ortho note.   -Hemoglobin continues to downtrend, down to 7.2  from 8.3 yesterday, still no clear signs of bleeding besides surgical drain.  Will repeat labs this afternoon, if hemoglobin drop below 7, patient will need transfusion  -Toradol and Dilaudid PRN for pain  -Currently on MiraLAX/Maggie-Colace for constipation, suppository given today due to persistent constipation. May consider enema if still no BM  -KUB showed nonobstructive gas pattern   -despite Flomax and straight cath x 3, gray catheter placed in the setting of surgery (and planned surgery on 6/11)  -dvtp: heparin subcutaneous  -am labs including cbc, bmp, mag  -Dispo: need SNF on discharge- -patient declined going back to [facility], prefers [facility] , [facility] aware             I spent 35 minutes in the professional and overall care of this patient.      Rik Lawson MD

## 2024-06-08 NOTE — PROGRESS NOTES
Vancomycin Dosing by Pharmacy- FOLLOW UP    Khalida Barker is a 73 y.o. year old female who Pharmacy has been consulted for vancomycin dosing for cellulitis, skin and soft tissue. Based on the patient's indication and renal status this patient is being dosed based on a goal AUC of 400-600.     Renal function is currently stable.    Current vancomycin dose: 1000 mg given every 12 hours    Estimated vancomycin AUC on current dose: 602 mg/L.hr     Visit Vitals  /55 (BP Location: Left arm, Patient Position: Lying)   Pulse 75   Temp 36.2 °C (97.2 °F) (Temporal)   Resp 16        Lab Results   Component Value Date    CREATININE 0.80 2024    CREATININE 0.53 2024    CREATININE 0.55 2024    CREATININE 0.54 2024        Patient weight is as follows: There were no vitals filed for this visit.    Cultures:  Susceptibility data for the encounter in last 14 days.  Collected Specimen Info Organism   24 Tissue/Biopsy from Wound/Tissue Staphylococcus aureus        I/O last 3 completed shifts:  In: 300 [IV Piggyback:300]  Out: 3090 [Urine:2200; Drains:890]  I/O during current shift:  No intake/output data recorded.    Temp (24hrs), Av.2 °C (97.2 °F), Min:36 °C (96.8 °F), Max:36.5 °C (97.7 °F)      Assessment/Plan    Above goal AUC. Orders placed for new vancomcyin regimen of 750mg every 12 hours to begin at today 68 @ 14:00.    This dosing regimen is predicted by [employer] to result in the following pharmacokinetic parameters:  Loading dose: N/A  Regimen: 750 mg IV every 12 hours.  Start time: 09:54 on 2024  Exposure target: AUC24 (range)400-600 mg/L.hr   AUC24,ss: 458 mg/L.hr  Probability of AUC24 > 400: 70 %  Ctrough,ss: 15 mg/L  Probability of Ctrough,ss > 20: 19 %  Probability of nephrotoxicity (Lodise CAYDEN ): 10 %      The next level will be obtained on 6/10 at 0500. May be obtained sooner if clinically indicated.   Will continue to monitor renal function daily while on vancomycin  and order serum creatinine at least every 48 hours if not already ordered.  Follow for continued vancomycin needs, clinical response, and signs/symptoms of toxicity.       Estefani Moran, PharmD

## 2024-06-08 NOTE — PROGRESS NOTES
Patient seen this morning. Reports back pain issues and constipation. Stays she has stable RLE DF weakness and stable LLE KE weakness    Exam  Awake  Ox3  BUE 4/5  RLE 4/5 except 3/5 DF  LLE HF4 KE2 Df/PF4+    A&P  Patient is s/p wound washout on 6/6  Medicine primary  Cont drain  Cont prevena  Follow up on Cx  Pain medications readjusted.   Bowel regimen readjusted.  Scd sqh  Work with pt and ot  Spend as much time in chair as possible  Please page us if you have any questions

## 2024-06-08 NOTE — PROGRESS NOTES
Occupational Therapy    OT Treatment    Patient Name: Khalida Barker  MRN: [id number]  Today's Date: 2024  Time Calculation  Start Time: 1323  Stop Time: 1408  Time Calculation (min): 45 min       Assessment:  End of Session Communication: Bedside nurse  End of Session Patient Position: Up in chair, Alarm on     Plan:  Treatment Interventions: ADL retraining, Functional transfer training, UE strengthening/ROM, Endurance training  OT Frequency: 5 times per week  Treatment Interventions: ADL retraining, Functional transfer training, UE strengthening/ROM, Endurance training    Subjective   Previous Visit Info:  OT Last Visit  OT Received On: 24  General:  General  Reason for Referral: recent I&D 24, plan for ORIF right wrist 24  Co-Treatment: PT  Co-Treatment Reason: Safety  Prior to Session Communication: Bedside nurse  Patient Position Received: Bed, 3 rail up, Alarm on  General Comment: patient self limiting, requires max encouragement and redirection throughout  Precautions:  UE Weight Bearing Status: Right Non-Weight Bearing  Medical Precautions: Fall precautions  Post-Surgical Precautions: Spinal precautions  Splintin step splint to RUE     Pain:  Pain Assessment  Pain Assessment: 0-10  Pain Score: 9  Pain Type: Surgical pain  Pain Location:  (back and ABD)    Objective       Activities of Daily Living: UE Dressing  UE Dressing Level of Assistance:  (donned sling with mod a after max encouragement with verbal cues for seq and comp tech)  UE Dressing Where Assessed: Edge of bed  Functional Standing Tolerance:  Functional Standing Tolerance Comments: patient stood for a total of 2 mins this date with poor/poor+ balance with 1 ue support at all times  Bed Mobility/Transfers: Bed Mobility 1  Bed Mobility 1: Supine to sitting  Level of Assistance 1: +2, Maximum tactile cues, Maximum verbal cues, Dependent  Bed Mobility 2  Bed Mobility  2: Scooting (to EOB)  Level of Assistance 2:  Dependent    Transfer 1  Technique 1: Sit to stand  Transfer Level of Assistance 1: Arm in arm assistance (#1 max a x2  #2-4 min ax2)  Trials/Comments 1: RUE in sling for support and prevent from using/WB RUE  Transfers 2  Transfer From 2: Bed to  Transfer to 2: Chair with arms  Technique 2: Squat pivot  Transfer Level of Assistance 2: Dependent, +2, Maximum tactile cues, Maximum verbal cues    Sitting Balance:  Dynamic Sitting Balance  Dynamic Sitting-Balance:  (fair/fair+)  Standing Balance:  Static Standing Balance  Static Standing-Level of Assistance:  (poor/poor+ wiht right lateral and posterior lean)    Therapy/Activity: Therapeutic Activity  Therapeutic Activity Performed: Yes  Therapeutic Activity 1: patient sat EOB for a total of 15 mins this date with fair/fair+ balance with 1-0 ue support    Outcome Measures:[facility] Daily Activity  Putting on and taking off regular lower body clothing: Total  Bathing (including washing, rinsing, drying): A lot  Putting on and taking off regular upper body clothing: A lot  Toileting, which includes using toilet, bedpan or urinal: Total  Taking care of personal grooming such as brushing teeth: A lot  Eating Meals: A lot  Daily Activity - Total Score: 10    Education Documentation  Body Mechanics, taught by KAITLIN Sy at 6/8/2024  2:53 PM.  Learner: Patient  Readiness: Acceptance  Method: Explanation  Response: Needs Reinforcement    Precautions, taught by KAITLIN Sy at 6/8/2024  2:53 PM.  Learner: Patient  Readiness: Acceptance  Method: Explanation  Response: Needs Reinforcement    ADL Training, taught by KAITLIN Sy at 6/8/2024  2:53 PM.  Learner: Patient  Readiness: Acceptance  Method: Explanation  Response: Needs Reinforcement    OP EDUCATION:  Education  Individual(s) Educated: Patient  Education Provided: Diagnosis & Precautions, Symptom management, Ergonomics and postural realignment, Joint protection and energy conservation, Fall precautons,  Risk and benefits of OT discussed with patient or other, POC discussed and agreed upon  Diagnosis and Precautions: spinal precautions, NWB RUE  Patient/Caregiver Demonstrated Understanding: yes  Plan of Care Discussed and Agreed Upon: yes  Patient Response to Education: Patient/Caregiver Verbalized Understanding of Information    Goals:  Encounter Problems       Encounter Problems (Active)       Dressings Lower Extremities       STG - Patient will complete lower body dressing with mod assist with comp strategies and AE  (Progressing)       Start:  06/05/24    Expected End:  06/19/24               Functional Balance       STG-Patient will be min assist static stand task >5 minutes for ADL completion  (Progressing)       Start:  06/05/24    Expected End:  06/19/24               OT Transfers       STG-Patient will be mod assist with functional transfers demonstrating good safety   (Progressing)       Start:  06/05/24    Expected End:  06/19/24               Safety       STG-Patient will independently verbalize spine precautions with all functional tasks   (Progressing)       Start:  06/05/24    Expected End:  06/19/24

## 2024-06-09 LAB
ANION GAP SERPL CALC-SCNC: 13 MMOL/L (ref 10–20)
BACTERIA SPEC CULT: ABNORMAL
BACTERIA SPEC CULT: NORMAL
BUN SERPL-MCNC: 11 MG/DL (ref 6–23)
CALCIUM SERPL-MCNC: 7.9 MG/DL (ref 8.6–10.3)
CHLORIDE SERPL-SCNC: 106 MMOL/L (ref 98–107)
CO2 SERPL-SCNC: 24 MMOL/L (ref 21–32)
CREAT SERPL-MCNC: 0.76 MG/DL (ref 0.5–1.05)
EGFRCR SERPLBLD CKD-EPI 2021: 84 ML/MIN/1.73M*2
ERYTHROCYTE [DISTWIDTH] IN BLOOD BY AUTOMATED COUNT: 14.6 % (ref 11.5–14.5)
GLUCOSE SERPL-MCNC: 109 MG/DL (ref 74–99)
GRAM STN SPEC: ABNORMAL
GRAM STN SPEC: NORMAL
GRAM STN SPEC: NORMAL
HCT VFR BLD AUTO: 25.5 % (ref 36–46)
HGB BLD-MCNC: 7.7 G/DL (ref 12–16)
MAGNESIUM SERPL-MCNC: 1.88 MG/DL (ref 1.6–2.4)
MCH RBC QN AUTO: 28.7 PG (ref 26–34)
MCHC RBC AUTO-ENTMCNC: 30.2 G/DL (ref 32–36)
MCV RBC AUTO: 95 FL (ref 80–100)
NRBC BLD-RTO: 0 /100 WBCS (ref 0–0)
PLATELET # BLD AUTO: 394 X10*3/UL (ref 150–450)
POTASSIUM SERPL-SCNC: 3.7 MMOL/L (ref 3.5–5.3)
RBC # BLD AUTO: 2.68 X10*6/UL (ref 4–5.2)
SODIUM SERPL-SCNC: 139 MMOL/L (ref 136–145)
WBC # BLD AUTO: 5.3 X10*3/UL (ref 4.4–11.3)

## 2024-06-09 PROCEDURE — 85027 COMPLETE CBC AUTOMATED: CPT | Performed by: STUDENT IN AN ORGANIZED HEALTH CARE EDUCATION/TRAINING PROGRAM

## 2024-06-09 PROCEDURE — 2500000001 HC RX 250 WO HCPCS SELF ADMINISTERED DRUGS (ALT 637 FOR MEDICARE OP): Performed by: STUDENT IN AN ORGANIZED HEALTH CARE EDUCATION/TRAINING PROGRAM

## 2024-06-09 PROCEDURE — 80048 BASIC METABOLIC PNL TOTAL CA: CPT | Performed by: STUDENT IN AN ORGANIZED HEALTH CARE EDUCATION/TRAINING PROGRAM

## 2024-06-09 PROCEDURE — 2500000004 HC RX 250 GENERAL PHARMACY W/ HCPCS (ALT 636 FOR OP/ED): Performed by: STUDENT IN AN ORGANIZED HEALTH CARE EDUCATION/TRAINING PROGRAM

## 2024-06-09 PROCEDURE — 2500000002 HC RX 250 W HCPCS SELF ADMINISTERED DRUGS (ALT 637 FOR MEDICARE OP, ALT 636 FOR OP/ED): Mod: MUE | Performed by: STUDENT IN AN ORGANIZED HEALTH CARE EDUCATION/TRAINING PROGRAM

## 2024-06-09 PROCEDURE — 97530 THERAPEUTIC ACTIVITIES: CPT | Mod: GO,CO

## 2024-06-09 PROCEDURE — 2500000004 HC RX 250 GENERAL PHARMACY W/ HCPCS (ALT 636 FOR OP/ED): Mod: JZ | Performed by: INTERNAL MEDICINE

## 2024-06-09 PROCEDURE — 97535 SELF CARE MNGMENT TRAINING: CPT | Mod: GO,CO

## 2024-06-09 PROCEDURE — 83735 ASSAY OF MAGNESIUM: CPT | Performed by: STUDENT IN AN ORGANIZED HEALTH CARE EDUCATION/TRAINING PROGRAM

## 2024-06-09 PROCEDURE — 36415 COLL VENOUS BLD VENIPUNCTURE: CPT | Performed by: STUDENT IN AN ORGANIZED HEALTH CARE EDUCATION/TRAINING PROGRAM

## 2024-06-09 PROCEDURE — 1100000001 HC PRIVATE ROOM DAILY

## 2024-06-09 PROCEDURE — 99232 SBSQ HOSP IP/OBS MODERATE 35: CPT | Performed by: STUDENT IN AN ORGANIZED HEALTH CARE EDUCATION/TRAINING PROGRAM

## 2024-06-09 RX ADMIN — PANTOPRAZOLE SODIUM 40 MG: 40 TABLET, DELAYED RELEASE ORAL at 06:31

## 2024-06-09 RX ADMIN — HYDROMORPHONE HYDROCHLORIDE 0.2 MG: 0.2 INJECTION, SOLUTION INTRAMUSCULAR; INTRAVENOUS; SUBCUTANEOUS at 18:30

## 2024-06-09 RX ADMIN — ENOXAPARIN SODIUM 40 MG: 40 INJECTION SUBCUTANEOUS at 09:37

## 2024-06-09 RX ADMIN — KETOROLAC TROMETHAMINE 15 MG: 15 INJECTION, SOLUTION INTRAMUSCULAR; INTRAVENOUS at 18:30

## 2024-06-09 RX ADMIN — TAMSULOSIN HYDROCHLORIDE 0.4 MG: 0.4 CAPSULE ORAL at 09:36

## 2024-06-09 RX ADMIN — VANCOMYCIN HYDROCHLORIDE 750 MG: 750 INJECTION, SOLUTION INTRAVENOUS at 13:56

## 2024-06-09 RX ADMIN — HYDROMORPHONE HYDROCHLORIDE 0.2 MG: 0.2 INJECTION, SOLUTION INTRAMUSCULAR; INTRAVENOUS; SUBCUTANEOUS at 21:17

## 2024-06-09 RX ADMIN — KETOROLAC TROMETHAMINE 15 MG: 15 INJECTION, SOLUTION INTRAMUSCULAR; INTRAVENOUS at 06:31

## 2024-06-09 RX ADMIN — CEFAZOLIN SODIUM 2 G: 2 INJECTION, SOLUTION INTRAVENOUS at 06:31

## 2024-06-09 RX ADMIN — VANCOMYCIN HYDROCHLORIDE 750 MG: 750 INJECTION, SOLUTION INTRAVENOUS at 01:44

## 2024-06-09 RX ADMIN — ACETAMINOPHEN 650 MG: 325 TABLET ORAL at 16:45

## 2024-06-09 RX ADMIN — POLYETHYLENE GLYCOL 3350 17 G: 17 POWDER, FOR SOLUTION ORAL at 09:37

## 2024-06-09 RX ADMIN — Medication 5 MG: at 21:16

## 2024-06-09 RX ADMIN — KETOROLAC TROMETHAMINE 15 MG: 15 INJECTION, SOLUTION INTRAMUSCULAR; INTRAVENOUS at 12:24

## 2024-06-09 RX ADMIN — POLYETHYLENE GLYCOL 3350 17 G: 17 POWDER, FOR SOLUTION ORAL at 21:09

## 2024-06-09 RX ADMIN — KETOROLAC TROMETHAMINE 15 MG: 15 INJECTION, SOLUTION INTRAMUSCULAR; INTRAVENOUS at 00:36

## 2024-06-09 RX ADMIN — Medication 1000 UNITS: at 09:36

## 2024-06-09 RX ADMIN — CYCLOBENZAPRINE HYDROCHLORIDE 5 MG: 5 TABLET, FILM COATED ORAL at 21:09

## 2024-06-09 RX ADMIN — SENNOSIDES AND DOCUSATE SODIUM 1 TABLET: 50; 8.6 TABLET ORAL at 21:09

## 2024-06-09 RX ADMIN — CYCLOBENZAPRINE HYDROCHLORIDE 5 MG: 5 TABLET, FILM COATED ORAL at 15:33

## 2024-06-09 RX ADMIN — CYCLOBENZAPRINE HYDROCHLORIDE 5 MG: 5 TABLET, FILM COATED ORAL at 09:37

## 2024-06-09 RX ADMIN — HYDROMORPHONE HYDROCHLORIDE 0.2 MG: 0.2 INJECTION, SOLUTION INTRAMUSCULAR; INTRAVENOUS; SUBCUTANEOUS at 12:24

## 2024-06-09 RX ADMIN — HYDROMORPHONE HYDROCHLORIDE 0.2 MG: 0.2 INJECTION, SOLUTION INTRAMUSCULAR; INTRAVENOUS; SUBCUTANEOUS at 15:33

## 2024-06-09 ASSESSMENT — COGNITIVE AND FUNCTIONAL STATUS - GENERAL
HELP NEEDED FOR BATHING: A LOT
DRESSING REGULAR UPPER BODY CLOTHING: A LOT
EATING MEALS: A LITTLE
TOILETING: TOTAL
DAILY ACTIVITIY SCORE: 14
PERSONAL GROOMING: A LOT

## 2024-06-09 ASSESSMENT — PAIN SCALES - GENERAL
PAINLEVEL_OUTOF10: 3
PAINLEVEL_OUTOF10: 8
PAINLEVEL_OUTOF10: 1
PAINLEVEL_OUTOF10: 10 - WORST POSSIBLE PAIN
PAINLEVEL_OUTOF10: 7
PAINLEVEL_OUTOF10: 8

## 2024-06-09 ASSESSMENT — PAIN - FUNCTIONAL ASSESSMENT
PAIN_FUNCTIONAL_ASSESSMENT: 0-10

## 2024-06-09 ASSESSMENT — PAIN DESCRIPTION - DESCRIPTORS: DESCRIPTORS: ACHING

## 2024-06-09 ASSESSMENT — ACTIVITIES OF DAILY LIVING (ADL): HOME_MANAGEMENT_TIME_ENTRY: 20

## 2024-06-09 ASSESSMENT — PAIN DESCRIPTION - LOCATION: LOCATION: GROIN

## 2024-06-09 NOTE — NURSING NOTE
Around 5:30 am, this Pt C/O still has no BM for a days. She had suppository in the day and some stool softener and miralax this evening , they did not work for her. she also said she needs some nutrition through IV since she did not eat well for a few days. This RN notified Dr. Corona, enema order is in. This RN talked to Pt about the enema, Pt said she wants to talk to doctor in the morning first before trying the enema. Report gave to day RN.

## 2024-06-09 NOTE — PROGRESS NOTES
"Khalida Barker is a 73 y.o. female on day 2 of admission presenting with Closed fracture of distal end of right radius.    Subjective   Patient seen and examined, sitting the chair this morning, no complaints.  Patient had a enema this morning followed by a bowel movement.  Patient is concerned about her Mcmahon catheter and is causing her discomfort.  Discussed with neurosurgery team, okay to remove the Mcmahon catheter with plan for voiding trial after.       Objective       Constitutional: Well developed, awake/alert/oriented x3, no distress  ENMT: mucous membranes moist  Head/Neck: Neck supple  Respiratory/Thorax: CTA b/l.   Cardiovascular: Regular, rate and rhythm, no murmurs  Gastrointestinal: Nondistended, soft, non-tender  Musculoskeletal: ROM intact  Skin: prevena drain in place   Extremities: R-forearm in a splint    Last Recorded Vitals  Blood pressure 104/58, pulse 85, temperature 36.8 °C (98.2 °F), temperature source Temporal, resp. rate 16, height 1.651 m (5' 5\"), SpO2 96%.  Intake/Output last 3 Shifts:  I/O last 3 completed shifts:  In: 860 [P.O.:460; IV Piggyback:400]  Out: 3685 [Urine:3300; Drains:385]    Relevant Results  Scheduled medications  acetaminophen, 650 mg, oral, q6h  [Held by provider] ceFAZolin, 2 g, intravenous, q8h  cholecalciferol, 1,000 Units, oral, Daily  cyclobenzaprine, 5 mg, oral, TID  enoxaparin, 40 mg, subcutaneous, Daily  ketorolac, 15 mg, intravenous, q6h  pantoprazole, 40 mg, oral, Daily before breakfast  polyethylene glycol, 17 g, oral, BID  pregabalin, 50 mg, oral, Nightly  sennosides-docusate sodium, 1 tablet, oral, Nightly  tamsulosin, 0.4 mg, oral, Daily  vancomycin, 750 mg, intravenous, q12h      Continuous medications     PRN medications  PRN medications: diphenhydrAMINE, HYDROmorphone, melatonin, traMADol, vancomycin  Results from last 7 days   Lab Units 06/09/24  0554 06/08/24  1354 06/08/24  0608   WBC AUTO x10*3/uL 5.3 7.7 7.1   RBC AUTO x10*6/uL 2.68* 2.89* 2.55* "   HEMOGLOBIN g/dL 7.7* 8.3* 7.2*     Results from last 7 days   Lab Units 06/09/24  0554 06/08/24  0607 06/07/24  0531 06/06/24  0536 06/05/24  0546 06/04/24  1903   SODIUM mmol/L 139 138 134* 132*   < > 133*   POTASSIUM mmol/L 3.7 3.9 4.3 4.1   < > 3.7   CHLORIDE mmol/L 106 105 102 101   < > 100   CO2 mmol/L 24 25 24 23   < > 18*   BUN mg/dL 11 11 9 12   < > 15   CREATININE mg/dL 0.76 0.80 0.53 0.55   < > 0.59   CALCIUM mg/dL 7.9* 7.8* 7.8* 7.8*   < > 8.5*   PHOSPHORUS mg/dL  --   --  3.2 3.1  --   --    MAGNESIUM mg/dL 1.88 2.12 2.05 1.92   < >  --    BILIRUBIN TOTAL mg/dL  --   --   --   --   --  1.0   ALT U/L  --   --   --   --   --  14   AST U/L  --   --   --   --   --  21    < > = values in this interval not displayed.         Assessment/Plan   Principal Problem:    Closed fracture of distal end of right radius  Active Problems:    Weakness    Closed fracture of distal end of right radius, unspecified fracture morphology, initial encounter    Acute blood loss as cause of postoperative anemia    Thoracogenic scoliosis of thoracolumbar region      Plan  -Neurosurgery on consult , s/p wound washout and revision with prevena placement on 6/6  -NS agree with needing consult for ID, ID placed on consult,   -Patient was on Vanc+ Cefazolin, Wound culture grew MRSA, Cefazolin discontinued on 6/9, will wait for ID for further recommendation regarding abx/duration  -Mcmahon removed on 6/9 for voiding trial  -orthopedic surgery consulted: right radius fracture-RUE splinted-patient wanted time to discuss surgery options with her  and is at this time wanting to proceed with surgery  -patient was seen by orthopedic team, in light of limited support for mobility due to recent back surgery, agree with proceeding for surgery correction, however would like to continue iv abx for few days to prevent infection complications, tentatively surger planned for 6/11 per ortho note.   -Hemoglobin continues to downtrend, down to 7.2  from 8.3 yesterday, still no clear signs of bleeding besides surgical drain.  Repeat Hgb has remained stable, continue monitor closely  -Toradol and Dilaudid PRN for pain  -Currently on MiraLAX/Maggie-Colace for constipation, had suppository, ultimately enema on 6/9 and had a BM.  -KUB showed nonobstructive gas pattern   -despite Flomax and straight cath x 3, gray catheter placed in the setting of surgery (and planned surgery on 6/11)  -dvtp: heparin subcutaneous  -am labs including cbc, bmp, mag  -Dispo: need SNF on discharge- -patient declined going back to [facility], prefers [facility] aware             I spent 35 minutes in the professional and overall care of this patient.      Rik Lawson MD

## 2024-06-09 NOTE — PROGRESS NOTES
"Khalida Barker is a 73 y.o. female on day 2 of admission presenting with Closed fracture of distal end of right radius and wound infection     Subjective   Patient reports being uncomfortable this morning.  Patient reports not having a bowel movement 4 days.  In addition patient has not had good p.o. intake secondary to being uncomfortable.       Objective     Physical Exam  Awake  Ox3  BUE 4/5  RLE 4/5 except 3/5 DF  LLE HF4 KE3 Df/PF4+    Last Recorded Vitals  Blood pressure 104/58, pulse 85, temperature 36.8 °C (98.2 °F), temperature source Temporal, resp. rate 16, height 1.651 m (5' 5\"), SpO2 96%.  Intake/Output last 3 Shifts:  I/O last 3 completed shifts:  In: 860 [P.O.:460; IV Piggyback:400]  Out: 3685 [Urine:3300; Drains:385]    Relevant Results                           Assessment/Plan   Principal Problem:    Closed fracture of distal end of right radius  Active Problems:    Weakness    Closed fracture of distal end of right radius, unspecified fracture morphology, initial encounter    Acute blood loss as cause of postoperative anemia    Thoracogenic scoliosis of thoracolumbar region    73 year old with h/o severe LBP and LLE radiculopathy and scoliosis, 5/14 s/p [id number], multi-level PCOs, L2-S1 TLIF, 6/5 presents after fall, found to have wound infection and R wrist fracture     6/6 s/p wound washout and revision, prevena placed     Plan:   Medicine primary   Maintain drains, continue prevena   Appreciate infectious disease recommendations for antibiotics.  Recommend aggressive bowel regimen as patient had postop ileus after prior surgery. Recommend continue enema and consider GI consult if constipation persists.   Recommend q2 hour turning to keep pressure off the incision   Mobilize with PTOT  We will continue to follow           Jose Abrams MD      "

## 2024-06-09 NOTE — CARE PLAN
The clinical goals for the shift include Pt will have BM by end of the shift.    Over the shift, the patient did make progress toward the following goals.       Problem: Pain  Goal: My pain/discomfort is manageable  Outcome: Progressing     Problem: Safety  Goal: Patient will be injury free during hospitalization  Outcome: Progressing  Goal: I will remain free of falls  Outcome: Progressing     Problem: Daily Care  Goal: Daily care needs are met  Outcome: Progressing     Problem: Psychosocial Needs  Goal: Demonstrates ability to cope with hospitalization/illness  Outcome: Progressing  Goal: Collaborate with me, my family, and caregiver to identify my specific goals  Outcome: Progressing     Problem: Discharge Barriers  Goal: My discharge needs are met  Outcome: Progressing     Problem: Skin  Goal: Decreased wound size/increased tissue granulation at next dressing change  Outcome: Progressing  Flowsheets (Taken 6/9/2024 0568)  Decreased wound size/increased tissue granulation at next dressing change:   Promote sleep for wound healing   Protective dressings over bony prominences  Goal: Participates in plan/prevention/treatment measures  Outcome: Progressing  Flowsheets (Taken 6/5/2024 0440 by Patricia Carranza RN)  Participates in plan/prevention/treatment measures:   Elevate heels   Discuss with provider PT/OT consult  Goal: Prevent/manage excess moisture  Outcome: Progressing  Flowsheets (Taken 6/9/2024 0537)  Prevent/manage excess moisture:   Cleanse incontinence/protect with barrier cream   Monitor for/manage infection if present   Follow provider orders for dressing changes   Moisturize dry skin  Goal: Prevent/minimize sheer/friction injuries  Outcome: Progressing  Flowsheets (Taken 6/9/2024 0592)  Prevent/minimize sheer/friction injuries:   Use pull sheet   HOB 30 degrees or less   Turn/reposition every 2 hours/use positioning/transfer devices   Increase activity/out of bed for meals  Goal: Promote/optimize  nutrition  Outcome: Progressing  Flowsheets (Taken 6/9/2024 0549)  Promote/optimize nutrition:   Assist with feeding   Monitor/record intake including meals   Offer water/supplements/favorite foods  Goal: Promote skin healing  Outcome: Progressing  Flowsheets (Taken 6/9/2024 0549)  Promote skin healing:   Assess skin/pad under line(s)/device(s)   Ensure correct size (line/device) and apply per  instructions   Rotate device position/do not position patient on device   Protective dressings over bony prominences     Problem: Dressings Lower Extremities  Goal: STG - Patient will complete lower body dressing with mod assist with comp strategies and AE   Outcome: Progressing

## 2024-06-09 NOTE — PROGRESS NOTES
Spiritual Care Visit    Clinical Encounter Type  Visited With: Patient and family together  Routine Visit: Introduction  Continue Visiting: No                                            Taxonomy  Intended Effects: Establish rapport and connectedness, Aracelis affirmation, Build relationship of care and support, Demonstrate caring and concern    The patient didn't want a visit, the sacraments, or a prayer.

## 2024-06-09 NOTE — PROGRESS NOTES
Occupational Therapy    OT Treatment    Patient Name: Khalida Barker  MRN: [id number]  Today's Date: 6/9/2024  Time Calculation  Start Time: 0807  Stop Time: 0840  Time Calculation (min): 33 min       4112/4112-A    Assessment:  OT Assessment: Patient emotional throughout visit, support provided, pt tolerated tasks well, pt fatigues easily, rest breaks required.  Prognosis: Good  Evaluation/Treatment Tolerance: Patient limited by pain  End of Session Communication: Bedside nurse  End of Session Patient Position: Up in bathroom, Held/seated with caregiver  OT Assessment Results: Decreased ADL status, Decreased upper extremity range of motion, Decreased upper extremity strength, Decreased endurance, Decreased functional mobility  Prognosis: Good  Evaluation/Treatment Tolerance: Patient limited by pain    Plan:  Treatment Interventions: ADL retraining, Functional transfer training  OT Frequency: 5 times per week  Treatment Interventions: ADL retraining, Functional transfer training  Subjective        06/09/24 0807   OT Last Visit   OT Received On 06/09/24   General   Family/Caregiver Present No   Prior to Session Communication Bedside nurse   Patient Position Received Bed, 3 rail up;Alarm on   Preferred Learning Style verbal;visual   General Comment Patient emotional throughout session, support provided, agreeable to therapy   Precautions   UE Weight Bearing Status Right Non-Weight Bearing   Medical Precautions Fall precautions   Post-Surgical Precautions Spinal precautions   Precautions Comment wound vac and 2 hemovac drains, gray   Pain Assessment   Pain Assessment 0-10   Pain Score 8   Pain Type Surgical pain   Pain Location Back   Pain Descriptors Aching   Pain Frequency Constant/continuous   Pain Interventions Medication (See MAR);Repositioned   Response to Interventions no change noted   Cognition   Overall Cognitive Status WFL   Orientation Level Oriented X4   Grooming   Grooming Level of Assistance Contact guard    Grooming Where Assessed Bed level   Grooming Comments facial hygiene   Toileting   Toileting Level of Assistance Maximum assistance   Where Assessed Bedside commode   Toileting Comments assist x2   Bed Mobility   Bed Mobility Yes   Bed Mobility 1   Bed Mobility 1 Supine to sitting   Level of Assistance 1 +2;Maximum tactile cues;Maximum verbal cues;Dependent   Bed Mobility Comments 1 assist x2, assist with line mgmt, upright positioning, verbal cues given for hand placement on bedrail   Transfers   Transfer Yes   Transfer 1   Transfer From 1 Sit to   Transfer to 1 Stand   Technique 1 Sit to stand   Transfer Device 1 Catia Stedy   Transfer Level of Assistance 1 Arm in arm assistance   Trials/Comments 1 assist x2 for technique, pt following weight bearing precautions   Transfers 2   Transfer From 2 Bed to   Transfer to 2 Commode-standard   Technique 2 Squat pivot   Transfer Device 2 Catia Stedy   Transfer Level of Assistance 2 Dependent;+2;Maximum tactile cues;Maximum verbal cues   Trials/Comments 2 assist x2, assist with safe hand/foot placement   Therapeutic Activity   Therapeutic Activity Performed Yes   Therapeutic Activity 1 functional mobility within room, multiple sit to stand transfers   IP OT Assessment   OT Assessment Patient emotional throughout visit, support provided, pt tolerated tasks well, pt fatigues easily, rest breaks required.   Prognosis Good   Evaluation/Treatment Tolerance Patient limited by pain   End of Session Communication Bedside nurse   End of Session Patient Position Up in bathroom;Held/seated with caregiver   OT Assessment   OT Assessment Results Decreased ADL status;Decreased upper extremity range of motion;Decreased upper extremity strength;Decreased endurance;Decreased functional mobility   Education   Individual(s) Educated Patient   Education Provided Diagnosis & Precautions;Symptom management;Ergonomics and postural realignment;Joint protection and energy conservation;Fall  precautons;Risk and benefits of OT discussed with patient or other;POC discussed and agreed upon   Diagnosis and Precautions spinal precautions, NWB RUE   Patient/Caregiver Demonstrated Understanding yes   Plan of Care Discussed and Agreed Upon yes   Patient Response to Education Patient/Caregiver Verbalized Understanding of Information   Inpatient Plan   Treatment Interventions ADL retraining;Functional transfer training   OT Frequency 5 times per week       Outcome Measures:[facility] Daily Activity  Putting on and taking off regular lower body clothing: None  Bathing (including washing, rinsing, drying): A lot  Putting on and taking off regular upper body clothing: A lot  Toileting, which includes using toilet, bedpan or urinal: Total  Taking care of personal grooming such as brushing teeth: A lot  Eating Meals: A little  Daily Activity - Total Score: 14  Education Documentation  Body Mechanics, taught by KAITLIN Hough at 6/9/2024  9:14 AM.  Learner: Patient  Readiness: Acceptance  Method: Explanation  Response: Verbalizes Understanding    Precautions, taught by KAITLIN Hough at 6/9/2024  9:14 AM.  Learner: Patient  Readiness: Acceptance  Method: Explanation  Response: Verbalizes Understanding    ADL Training, taught by KAITLIN Hough at 6/9/2024  9:14 AM.  Learner: Patient  Readiness: Acceptance  Method: Explanation  Response: Verbalizes Understanding    Education Comments  No comments found.      Goals:  Encounter Problems       Encounter Problems (Active)       Dressings Lower Extremities       STG - Patient will complete lower body dressing with mod assist with comp strategies and AE  (Progressing)       Start:  06/05/24    Expected End:  06/19/24               Functional Balance       STG-Patient will be min assist static stand task >5 minutes for ADL completion  (Progressing)       Start:  06/05/24    Expected End:  06/19/24               OT Transfers       STG-Patient will be mod assist with  functional transfers demonstrating good safety   (Progressing)       Start:  06/05/24    Expected End:  06/19/24               Safety       STG-Patient will independently verbalize spine precautions with all functional tasks   (Progressing)       Start:  06/05/24    Expected End:  06/19/24

## 2024-06-10 LAB
ANION GAP SERPL CALC-SCNC: 13 MMOL/L (ref 10–20)
BUN SERPL-MCNC: 10 MG/DL (ref 6–23)
CALCIUM SERPL-MCNC: 7.8 MG/DL (ref 8.6–10.3)
CHLORIDE SERPL-SCNC: 105 MMOL/L (ref 98–107)
CO2 SERPL-SCNC: 24 MMOL/L (ref 21–32)
CREAT SERPL-MCNC: 0.74 MG/DL (ref 0.5–1.05)
EGFRCR SERPLBLD CKD-EPI 2021: 87 ML/MIN/1.73M*2
ERYTHROCYTE [DISTWIDTH] IN BLOOD BY AUTOMATED COUNT: 14.8 % (ref 11.5–14.5)
GLUCOSE SERPL-MCNC: 97 MG/DL (ref 74–99)
HCT VFR BLD AUTO: 25.2 % (ref 36–46)
HGB BLD-MCNC: 7.6 G/DL (ref 12–16)
MAGNESIUM SERPL-MCNC: 2.03 MG/DL (ref 1.6–2.4)
MCH RBC QN AUTO: 28.6 PG (ref 26–34)
MCHC RBC AUTO-ENTMCNC: 30.2 G/DL (ref 32–36)
MCV RBC AUTO: 95 FL (ref 80–100)
NRBC BLD-RTO: 0 /100 WBCS (ref 0–0)
PLATELET # BLD AUTO: 399 X10*3/UL (ref 150–450)
POTASSIUM SERPL-SCNC: 3.8 MMOL/L (ref 3.5–5.3)
RBC # BLD AUTO: 2.66 X10*6/UL (ref 4–5.2)
SODIUM SERPL-SCNC: 138 MMOL/L (ref 136–145)
VANCOMYCIN SERPL-MCNC: 22 UG/ML (ref 5–20)
WBC # BLD AUTO: 6 X10*3/UL (ref 4.4–11.3)

## 2024-06-10 PROCEDURE — 83735 ASSAY OF MAGNESIUM: CPT | Performed by: STUDENT IN AN ORGANIZED HEALTH CARE EDUCATION/TRAINING PROGRAM

## 2024-06-10 PROCEDURE — 2500000004 HC RX 250 GENERAL PHARMACY W/ HCPCS (ALT 636 FOR OP/ED): Performed by: STUDENT IN AN ORGANIZED HEALTH CARE EDUCATION/TRAINING PROGRAM

## 2024-06-10 PROCEDURE — 80202 ASSAY OF VANCOMYCIN: CPT | Performed by: STUDENT IN AN ORGANIZED HEALTH CARE EDUCATION/TRAINING PROGRAM

## 2024-06-10 PROCEDURE — 1100000001 HC PRIVATE ROOM DAILY

## 2024-06-10 PROCEDURE — 97110 THERAPEUTIC EXERCISES: CPT | Mod: GP,CQ

## 2024-06-10 PROCEDURE — 2500000001 HC RX 250 WO HCPCS SELF ADMINISTERED DRUGS (ALT 637 FOR MEDICARE OP): Performed by: STUDENT IN AN ORGANIZED HEALTH CARE EDUCATION/TRAINING PROGRAM

## 2024-06-10 PROCEDURE — 36415 COLL VENOUS BLD VENIPUNCTURE: CPT | Performed by: STUDENT IN AN ORGANIZED HEALTH CARE EDUCATION/TRAINING PROGRAM

## 2024-06-10 PROCEDURE — 99232 SBSQ HOSP IP/OBS MODERATE 35: CPT | Performed by: STUDENT IN AN ORGANIZED HEALTH CARE EDUCATION/TRAINING PROGRAM

## 2024-06-10 PROCEDURE — 2500000002 HC RX 250 W HCPCS SELF ADMINISTERED DRUGS (ALT 637 FOR MEDICARE OP, ALT 636 FOR OP/ED): Mod: MUE | Performed by: STUDENT IN AN ORGANIZED HEALTH CARE EDUCATION/TRAINING PROGRAM

## 2024-06-10 PROCEDURE — 97535 SELF CARE MNGMENT TRAINING: CPT | Mod: GO,CO

## 2024-06-10 PROCEDURE — 80048 BASIC METABOLIC PNL TOTAL CA: CPT | Performed by: STUDENT IN AN ORGANIZED HEALTH CARE EDUCATION/TRAINING PROGRAM

## 2024-06-10 PROCEDURE — 97116 GAIT TRAINING THERAPY: CPT | Mod: GP,CQ

## 2024-06-10 PROCEDURE — 85027 COMPLETE CBC AUTOMATED: CPT | Performed by: STUDENT IN AN ORGANIZED HEALTH CARE EDUCATION/TRAINING PROGRAM

## 2024-06-10 RX ORDER — HYDROCODONE BITARTRATE AND ACETAMINOPHEN 5; 325 MG/1; MG/1
1 TABLET ORAL EVERY 6 HOURS PRN
Status: DISCONTINUED | OUTPATIENT
Start: 2024-06-10 | End: 2024-06-15 | Stop reason: HOSPADM

## 2024-06-10 RX ORDER — OXYCODONE AND ACETAMINOPHEN 5; 325 MG/1; MG/1
1 TABLET ORAL EVERY 6 HOURS PRN
Status: DISCONTINUED | OUTPATIENT
Start: 2024-06-10 | End: 2024-06-10

## 2024-06-10 RX ADMIN — ACETAMINOPHEN 650 MG: 325 TABLET ORAL at 09:48

## 2024-06-10 RX ADMIN — ENOXAPARIN SODIUM 40 MG: 40 INJECTION SUBCUTANEOUS at 08:55

## 2024-06-10 RX ADMIN — HYDROMORPHONE HYDROCHLORIDE 0.2 MG: 0.2 INJECTION, SOLUTION INTRAMUSCULAR; INTRAVENOUS; SUBCUTANEOUS at 19:53

## 2024-06-10 RX ADMIN — KETOROLAC TROMETHAMINE 15 MG: 15 INJECTION, SOLUTION INTRAMUSCULAR; INTRAVENOUS at 06:08

## 2024-06-10 RX ADMIN — VANCOMYCIN HYDROCHLORIDE 750 MG: 750 INJECTION, SOLUTION INTRAVENOUS at 02:27

## 2024-06-10 RX ADMIN — HYDROMORPHONE HYDROCHLORIDE 0.2 MG: 0.2 INJECTION, SOLUTION INTRAMUSCULAR; INTRAVENOUS; SUBCUTANEOUS at 12:50

## 2024-06-10 RX ADMIN — POLYETHYLENE GLYCOL 3350 17 G: 17 POWDER, FOR SOLUTION ORAL at 21:45

## 2024-06-10 RX ADMIN — HYDROCODONE BITARTRATE AND ACETAMINOPHEN 1 TABLET: 5; 325 TABLET ORAL at 22:49

## 2024-06-10 RX ADMIN — HYDROMORPHONE HYDROCHLORIDE 0.2 MG: 0.2 INJECTION, SOLUTION INTRAMUSCULAR; INTRAVENOUS; SUBCUTANEOUS at 09:48

## 2024-06-10 RX ADMIN — CYCLOBENZAPRINE HYDROCHLORIDE 5 MG: 5 TABLET, FILM COATED ORAL at 08:54

## 2024-06-10 RX ADMIN — PANTOPRAZOLE SODIUM 40 MG: 40 TABLET, DELAYED RELEASE ORAL at 06:08

## 2024-06-10 RX ADMIN — Medication 1000 UNITS: at 08:55

## 2024-06-10 RX ADMIN — TAMSULOSIN HYDROCHLORIDE 0.4 MG: 0.4 CAPSULE ORAL at 08:55

## 2024-06-10 RX ADMIN — HYDROCODONE BITARTRATE AND ACETAMINOPHEN 1 TABLET: 5; 325 TABLET ORAL at 16:44

## 2024-06-10 RX ADMIN — VANCOMYCIN HYDROCHLORIDE 750 MG: 750 INJECTION, SOLUTION INTRAVENOUS at 13:55

## 2024-06-10 RX ADMIN — HYDROMORPHONE HYDROCHLORIDE 0.2 MG: 0.2 INJECTION, SOLUTION INTRAMUSCULAR; INTRAVENOUS; SUBCUTANEOUS at 06:07

## 2024-06-10 RX ADMIN — Medication 5 MG: at 21:45

## 2024-06-10 RX ADMIN — KETOROLAC TROMETHAMINE 15 MG: 15 INJECTION, SOLUTION INTRAMUSCULAR; INTRAVENOUS at 00:46

## 2024-06-10 RX ADMIN — POLYETHYLENE GLYCOL 3350 17 G: 17 POWDER, FOR SOLUTION ORAL at 08:55

## 2024-06-10 RX ADMIN — HYDROMORPHONE HYDROCHLORIDE 0.2 MG: 0.2 INJECTION, SOLUTION INTRAMUSCULAR; INTRAVENOUS; SUBCUTANEOUS at 00:46

## 2024-06-10 RX ADMIN — SENNOSIDES AND DOCUSATE SODIUM 1 TABLET: 50; 8.6 TABLET ORAL at 21:45

## 2024-06-10 RX ADMIN — CYCLOBENZAPRINE HYDROCHLORIDE 5 MG: 5 TABLET, FILM COATED ORAL at 21:45

## 2024-06-10 ASSESSMENT — PAIN SCALES - GENERAL
PAINLEVEL_OUTOF10: 9
PAINLEVEL_OUTOF10: 7
PAINLEVEL_OUTOF10: 9
PAINLEVEL_OUTOF10: 7
PAINLEVEL_OUTOF10: 6
PAINLEVEL_OUTOF10: 6
PAINLEVEL_OUTOF10: 7
PAINLEVEL_OUTOF10: 9
PAINLEVEL_OUTOF10: 0 - NO PAIN
PAINLEVEL_OUTOF10: 2
PAINLEVEL_OUTOF10: 7
PAINLEVEL_OUTOF10: 0 - NO PAIN
PAINLEVEL_OUTOF10: 9
PAINLEVEL_OUTOF10: 7
PAINLEVEL_OUTOF10: 8

## 2024-06-10 ASSESSMENT — ACTIVITIES OF DAILY LIVING (ADL): HOME_MANAGEMENT_TIME_ENTRY: 39

## 2024-06-10 ASSESSMENT — COGNITIVE AND FUNCTIONAL STATUS - GENERAL
DAILY ACTIVITIY SCORE: 13
TURNING FROM BACK TO SIDE WHILE IN FLAT BAD: A LOT
MOVING TO AND FROM BED TO CHAIR: A LOT
HELP NEEDED FOR BATHING: A LOT
DRESSING REGULAR UPPER BODY CLOTHING: A LOT
CLIMB 3 TO 5 STEPS WITH RAILING: TOTAL
TOILETING: A LOT
WALKING IN HOSPITAL ROOM: A LOT
PERSONAL GROOMING: A LITTLE
STANDING UP FROM CHAIR USING ARMS: A LOT
EATING MEALS: A LITTLE
MOVING FROM LYING ON BACK TO SITTING ON SIDE OF FLAT BED WITH BEDRAILS: A LOT
MOBILITY SCORE: 11
DRESSING REGULAR LOWER BODY CLOTHING: TOTAL

## 2024-06-10 ASSESSMENT — PAIN - FUNCTIONAL ASSESSMENT
PAIN_FUNCTIONAL_ASSESSMENT: 0-10

## 2024-06-10 ASSESSMENT — PAIN SCALES - PAIN ASSESSMENT IN ADVANCED DEMENTIA (PAINAD): TOTALSCORE: MEDICATION (SEE MAR)

## 2024-06-10 ASSESSMENT — PAIN DESCRIPTION - ORIENTATION
ORIENTATION: RIGHT;LEFT
ORIENTATION: RIGHT;LEFT
ORIENTATION: LOWER

## 2024-06-10 ASSESSMENT — PAIN DESCRIPTION - LOCATION
LOCATION: BACK
LOCATION: BACK
LOCATION: LEG
LOCATION: ABDOMEN
LOCATION: LEG

## 2024-06-10 NOTE — PROGRESS NOTES
Physical Therapy    Physical Therapy    Physical Therapy Treatment    Patient Name: Khalida Barker  MRN: [id number]  Today's Date: 6/10/2024  Time Calculation  Start Time: 0932  Stop Time: 1010  Time Calculation (min): 38 min     4112/4112-A    Assessment/Plan   PT Assessment  PT Assessment Results: Decreased strength, Decreased range of motion, Decreased endurance, Impaired balance, Decreased mobility, Pain, Orthopedic restrictions, Impaired sensation, Decreased coordination  End of Session Communication: Bedside nurse  End of Session Patient Position: Up in chair, Alarm on     PT Plan  Treatment/Interventions: Bed mobility, Transfer training, Gait training, Balance training, Neuromuscular re-education, Strengthening, Endurance training, Range of motion, Therapeutic exercise, Therapeutic activity, Home exercise program  PT Plan: Skilled PT  PT Frequency: 5 times per week  PT Discharge Recommendations: High intensity level of continued care  Equipment Recommended upon Discharge: Wheeled walker  PT Recommended Transfer Status: Assist x2 (Catia Steady with OOB transfers)  PT - OK to Discharge: Yes (     06/10/24 0932   PT  Visit   PT Received On 06/10/24   Response to Previous Treatment Patient with no complaints from previous session.   General   Reason for Referral recent I&D 6/6/24, plan for ORIF right wrist 6/11/24   Referred By Rik Lawson MD   Past Medical History Relevant to Rehab To hospital for inability to care for self after recent back surgery as well as right distal radius fracture. 5/14/24 pt underwent T10-Pelvis Instrumented Fusion, Correction of Scoliotic Deformity, L1-2, L2-3, L3-4, L4-5, L5-S1 posterior column osteotomies, L3-4 anterior column osteotomy through disc space,  L1-S1 Decompression, with L2-3, L3-4, L4-5, L5-S1 Transforaminal Lumbar Interbody Fusion, T11-T12 Decompression. Pt underwent lumbar spine I & D 6/6/24. PMH: left LE radiculopathy and scoliosis, right TKA   General Comment Patient on bed  agreeable to PT session has just been medicated for pain. Is motivated  (wound vac and drains iin place)   Precautions   UE Weight Bearing Status Right Non-Weight Bearing   Pain Assessment   Pain Assessment 0-10   Pain Score 7   Pain Type Surgical pain   Pain Location Shoulder   Pain Orientation Right   Multiple Pain Sites Two   Pain 2   Pain Score 2 7   Pain Type 2 Surgical pain   Pain Location 2 Back   Cognition   Overall Cognitive Status WFL   General Observation   General Observation Patient is fearful of B LE weakness  needs encouragement to attempt standing and side steps   Therapeutic Exercise   Therapeutic Exercise Performed Yes   Therapeutic Exercise Activity 1 B LE therex ap, qs,gs, heel slides hip abduction.  seated LAQ, marching  all x 20 reps each  (some discoordination in LE)   Bed Mobility   Bed Mobility Yes   Bed Mobility 1   Bed Mobility 1 Supine to sitting   Level of Assistance 1 Moderate assistance  (x2 with verbal cues)   Ambulation/Gait Training   Ambulation/Gait Training Performed Yes   Ambulation/Gait Training 1   Surface 1 Level tile   Device 1 Jesus Walker   Assistance 1 Moderate assistance  (x2 assist to manuever hemiwalker verbal cues every step of the way block L knee in stance)   Comments/Distance (ft) 1 4 side steps, 5 side/turning steps   Transfers   Transfer Yes   Transfer 1   Technique 1 Sit to stand;Stand to sit   Transfer Device 1 Jesus-walker   Transfer Level of Assistance 1 Moderate assistance  (x2 assist to block L knee)   Trials/Comments 1 x2   PT Assessment   PT Assessment Results Decreased strength;Decreased range of motion;Decreased endurance;Impaired balance;Decreased mobility;Pain;Orthopedic restrictions;Impaired sensation;Decreased coordination   End of Session Communication Bedside nurse   End of Session Patient Position Up in chair;Alarm on     Outcome Measures:  [facility] Basic Mobility  Turning from your back to your side while in a flat bed without using bedrails: A  lot  Moving from lying on your back to sitting on the side of a flat bed without using bedrails: A lot  Moving to and from bed to chair (including a wheelchair): A lot  Standing up from a chair using your arms (e.g. wheelchair or bedside chair): A lot  To walk in hospital room: A lot  Climbing 3-5 steps with railing: Total  Basic Mobility - Total Score: 11                             EDUCATION:     Education Documentation  No documentation found.  Education Comments  No comments found.        GOALS:  Encounter Problems       Encounter Problems (Active)       PT Problem       Pt will demonstrate [place] for all bed mobility   (Progressing)       Start:  06/05/24    Expected End:  06/19/24            Pt will demonstrate good dynamic standing balance while performing a functional task.   (Progressing)       Start:  06/05/24    Expected End:  06/19/24            Pt will ambulate 30 feet min A using hemiwalker or quad cane with no LOB.       Start:  06/07/24    Expected End:  06/21/24                Pt will progress to completing 3 x 20 supine/seated exercises in order to increase strength and improve gait mechanics.         Start:  06/07/24    Expected End:  06/21/24                Pt will complete sit <> stand and bed <> chair transfers with min A.         Start:  06/07/24    Expected End:  06/21/24                   Pain - Adult

## 2024-06-10 NOTE — PROGRESS NOTES
"Khalida Barker is a 73 y.o. female on day 3 of admission presenting with Closed fracture of distal end of right radius and wound infection     Subjective   Patient states she is feeling uncomfortable this morning.  Patient reports having a bowl movement after 4 days of not having one. Catheter was taken out, however, placed back in due to inability to void and discomfort. In addition, patient states food tastes unsavory. She denies vision changes and changes in her hearing. Patient states she has a headache that occasionally comes and goes.     Objective     Physical Exam  Awake  Ox3  LUE 4/5  RLE 3/5  LLE Df/PF 3/5    Last Recorded Vitals  Blood pressure 152/78, pulse 92, temperature 36.3 °C (97.3 °F), temperature source Temporal, resp. rate 16, height 1.651 m (5' 5\"), SpO2 100%.  Intake/Output last 3 Shifts:  I/O last 3 completed shifts:  In: 1520 [P.O.:720; IV Piggyback:800]  Out: 2865 [Urine:2750; Drains:115]      Assessment/Plan   Principal Problem:    Closed fracture of distal end of right radius  Active Problems:    Weakness    Closed fracture of distal end of right radius, unspecified fracture morphology, initial encounter    Acute blood loss as cause of postoperative anemia    Thoracogenic scoliosis of thoracolumbar region    73 year old with h/o severe LBP and LLE radiculopathy and scoliosis, 5/14 s/p [id number], multi-level PCOs, L2-S1 TLIF, 6/5 presents after fall, found to have wound infection and R wrist fracture     6/6 s/p wound washout and revision, prevena placed     Plan:   Medicine primary   Maintain drains, continue prevena   Appreciate infectious disease recommendations for antibiotics.  Recommend continuation of a bowel regimen as patient had postop ileus after prior surgery.   Recommend q2 hour turning to keep pressure off the incision   Mobilize with PTOT  We will continue to follow           Gaurang Russell PA-C      "

## 2024-06-10 NOTE — PROGRESS NOTES
"Occupational Therapy    OT Treatment    Patient Name: Khalida Barker  MRN: [id number]  Today's Date: 6/10/2024  Time Calculation  Start Time: 0949  Stop Time: 1028  Time Calculation (min): 39 min       4112/4112-A    Assessment:  End of Session Communication: Bedside nurse  End of Session Patient Position: Up in chair, Alarm on       Plan:  Treatment Interventions: ADL retraining, Compensatory technique education, Functional transfer training  OT Frequency: 5 times per week  OT Discharge Recommendations: High intensity level of continued care  Treatment Interventions: ADL retraining, Compensatory technique education, Functional transfer training  Subjective      06/10/24 0949   OT Last Visit   OT Received On 06/10/24   General   Reason for Referral recent I&D 6/6/24, plan for ORIF right wrist 6/11/24   Referred By Rik Lawson MD   Prior to Session Communication Bedside nurse   Patient Position Received Bed, 3 rail up;Alarm on   General Comment Pt agreeable to tx, cleared for participation.    Noted pt slightly labored breathing with exertion, pt reports tends to \"chauhan and puff\" cued for PLB. Spo2 99% RA.   Precautions   UE Weight Bearing Status Right Non-Weight Bearing  (splint/sling)   Medical Precautions Fall precautions   Post-Surgical Precautions Spinal precautions   Precautions Comment wound vac and 2 hemovac drains, gray   Pain Assessment   Pain Score   (\"general pains\")   Cognition   Overall Cognitive Status WFL   Grooming   Grooming Level of Assistance Setup;Minimal verbal cues   Grooming Where Assessed Recliner   Grooming Comments Pt educated in comp one-handed techniques for opening containers with fair follow through. Pt completed light grooming tasks while seated following setup of supplies.   UE Dressing   UE Dressing Level of Assistance Maximum assistance   UE Dressing Where Assessed Edge of bed   UE Dressing Comments to don sling   Bed Mobility 1   Bed Mobility 1 Supine to sitting   Level of Assistance 1 " Moderate assistance;+2   Bed Mobility Comments 1 cues for log roll   Transfer 1   Technique 1 Sit to stand;Stand pivot   Transfer Device 1 Jesus-walker   Transfer Level of Assistance 1 Moderate assistance;+2;Moderate verbal cues   Trials/Comments 1 STS from EOB level x2 trials with cues for proper UE/LE placement,cues for upright stance. Pt ambulated turning steps bed>recliner placed on L side with Mod A x2, use of jesus walker with max cues and occasional hands-on assist for AD management.   IP OT Assessment   End of Session Communication Bedside nurse   End of Session Patient Position Up in chair;Alarm on   Inpatient Plan   Treatment Interventions ADL retraining;Compensatory technique education;Functional transfer training   OT Frequency 5 times per week   OT Discharge Recommendations High intensity level of continued care     Outcome Measures:[facility] Daily Activity  Putting on and taking off regular lower body clothing: Total  Bathing (including washing, rinsing, drying): A lot  Putting on and taking off regular upper body clothing: A lot  Toileting, which includes using toilet, bedpan or urinal: A lot  Taking care of personal grooming such as brushing teeth: A little  Eating Meals: A little  Daily Activity - Total Score: 13  Education Documentation  No documentation found.  Education Comments  No comments found.            Goals:  Encounter Problems       Encounter Problems (Active)       Dressings Lower Extremities       STG - Patient will complete lower body dressing with mod assist with comp strategies and AE  (Progressing)       Start:  06/05/24    Expected End:  06/19/24               Functional Balance       STG-Patient will be min assist static stand task >5 minutes for ADL completion  (Progressing)       Start:  06/05/24    Expected End:  06/19/24               OT Transfers       STG-Patient will be mod assist with functional transfers demonstrating good safety   (Progressing)       Start:  06/05/24     Expected End:  06/19/24               Safety       STG-Patient will independently verbalize spine precautions with all functional tasks   (Progressing)       Start:  06/05/24    Expected End:  06/19/24

## 2024-06-10 NOTE — PROGRESS NOTES
06/10/24 0856   Discharge Planning   Living Arrangements Spouse/significant other   Support Systems Spouse/significant other   Type of Residence Private residence   Home or Post Acute Services Post acute facilities (Rehab/SNF/etc)   Type of Post Acute Facility Services Rehab   Patient expects [facility]: [facility]   Does the patient need discharge transport arranged? Yes   RoundTrip coordination needed? Yes     Requested direct precert team start auth for [facility]. Pt to have surgery today. ADOD 6/11.

## 2024-06-10 NOTE — NURSING NOTE
2210 : Pt C/O can't void self, felt pressure in her lower abdomen and C/O groin and pubic area sharp pain, did bladder scan showed 709 mL. Offered extremal cathter and straight cath, Pt refused and said they do not work for her and she requested to place a gray cathter. Dr. Corona was notified, insert gray cathter order in.     2230: Gray cathter placed, get 650 mL out, pt felt relieved and calmed down.

## 2024-06-10 NOTE — PROGRESS NOTES
"Khalida Barker is a 73 y.o. female on day 3 of admission presenting with Closed fracture of distal end of right radius.    Subjective   Patient seen and examined, no acute events overnight.  Patient worked with PT OT this morning, sitting the chair at the time of evaluation, no complaints.       Objective     Constitutional: Well developed, awake/alert/oriented x3, no distress  ENMT: mucous membranes moist  Head/Neck: Neck supple  Respiratory/Thorax: CTA b/l.   Cardiovascular: Regular, rate and rhythm, no murmurs  Gastrointestinal: Nondistended, soft, non-tender  Musculoskeletal: ROM intact  Skin: prevena drain in place   Extremities: R-forearm in a splint    Last Recorded Vitals  Blood pressure 120/65, pulse 87, temperature 36.8 °C (98.2 °F), temperature source Temporal, resp. rate 16, height 1.651 m (5' 5\"), SpO2 97%.  Intake/Output last 3 Shifts:  I/O last 3 completed shifts:  In: 1520 [P.O.:720; IV Piggyback:800]  Out: 2865 [Urine:2750; Drains:115]    Relevant Results  Scheduled medications  acetaminophen, 650 mg, oral, q6h  [Held by provider] ceFAZolin, 2 g, intravenous, q8h  cholecalciferol, 1,000 Units, oral, Daily  cyclobenzaprine, 5 mg, oral, TID  enoxaparin, 40 mg, subcutaneous, Daily  HYDROmorphone, 0.4 mg, intravenous, Once  ketorolac, 15 mg, intravenous, q6h  pantoprazole, 40 mg, oral, Daily before breakfast  polyethylene glycol, 17 g, oral, BID  pregabalin, 50 mg, oral, Nightly  sennosides-docusate sodium, 1 tablet, oral, Nightly  tamsulosin, 0.4 mg, oral, Daily  vancomycin, 750 mg, intravenous, q12h      Continuous medications     PRN medications  PRN medications: diphenhydrAMINE, HYDROmorphone, melatonin, traMADol, vancomycin  Results from last 7 days   Lab Units 06/10/24  0520 06/09/24  0554 06/08/24  1354   WBC AUTO x10*3/uL 6.0 5.3 7.7   RBC AUTO x10*6/uL 2.66* 2.68* 2.89*   HEMOGLOBIN g/dL 7.6* 7.7* 8.3*     Results from last 7 days   Lab Units 06/10/24  0520 06/09/24  0554 06/08/24  0607 " 06/07/24  0531 06/06/24  0536 06/05/24  0546 06/04/24  1903   SODIUM mmol/L 138 139 138 134* 132*   < > 133*   POTASSIUM mmol/L 3.8 3.7 3.9 4.3 4.1   < > 3.7   CHLORIDE mmol/L 105 106 105 102 101   < > 100   CO2 mmol/L 24 24 25 24 23   < > 18*   BUN mg/dL 10 11 11 9 12   < > 15   CREATININE mg/dL 0.74 0.76 0.80 0.53 0.55   < > 0.59   CALCIUM mg/dL 7.8* 7.9* 7.8* 7.8* 7.8*   < > 8.5*   PHOSPHORUS mg/dL  --   --   --  3.2 3.1  --   --    MAGNESIUM mg/dL 2.03 1.88 2.12 2.05 1.92   < >  --    BILIRUBIN TOTAL mg/dL  --   --   --   --   --   --  1.0   ALT U/L  --   --   --   --   --   --  14   AST U/L  --   --   --   --   --   --  21    < > = values in this interval not displayed.         Assessment/Plan   Principal Problem:    Closed fracture of distal end of right radius  Active Problems:    Weakness    Closed fracture of distal end of right radius, unspecified fracture morphology, initial encounter    Acute blood loss as cause of postoperative anemia    Thoracogenic scoliosis of thoracolumbar region        Plan  -Neurosurgery on consult , s/p wound washout and revision with prevena placement on 6/6  -NS agree with needing consult for ID, ID placed on consult,   -Patient was on Vanc+ Cefazolin, Wound culture grew MRSA, Cefazolin discontinued on 6/9, will wait for ID for further recommendation regarding abx/duration  -Gray removed on 6/9 for voiding trial, but unable to void, gray replaced on 6/9 night.   -orthopedic surgery consulted: right radius fracture-RUE splinted-patient wanted time to discuss surgery options with her  and is at this time wanting to proceed with surgery  -patient was seen by orthopedic team, in light of limited support for mobility due to recent back surgery, agree with proceeding for surgery correction, however would like to continue iv abx for few days to prevent infection complications, tentatively surger planned for 6/11 per ortho note.   -Hemoglobin continues to downtrend, but  remained stable ~7.6. continue to monitor. May need transfusion w/ pending wrist surgery  -Toradol and Dilaudid PRN for pain  -Currently on MiraLAX/Maggie-Colace for constipation, had suppository, ultimately enema on 6/9 and had a BM.  -KUB showed nonobstructive gas pattern   -despite Flomax and straight cath x 3, gray catheter placed in the setting of surgery (and planned surgery on 6/11)  -dvtp: heparin subcutaneous  -am labs including cbc, bmp, mag  -Dispo: need SNF on discharge- -patient declined going back to [facility], prefers [facility] aware          I spent 25 minutes in the professional and overall care of this patient.      Rik Lawson MD

## 2024-06-10 NOTE — CARE PLAN
The clinical goals for the shift include Pt will rate pain as tolerable throu the night.    Over the shift, the patient did  make progress toward the following goals.       Problem: Pain  Goal: My pain/discomfort is manageable  Outcome: Progressing     Problem: Safety  Goal: Patient will be injury free during hospitalization  Outcome: Progressing  Goal: I will remain free of falls  Outcome: Progressing     Problem: Daily Care  Goal: Daily care needs are met  Outcome: Progressing     Problem: Psychosocial Needs  Goal: Demonstrates ability to cope with hospitalization/illness  Outcome: Progressing  Goal: Collaborate with me, my family, and caregiver to identify my specific goals  Outcome: Progressing     Problem: Discharge Barriers  Goal: My discharge needs are met  Outcome: Progressing     Problem: Skin  Goal: Decreased wound size/increased tissue granulation at next dressing change  Outcome: Progressing  Goal: Participates in plan/prevention/treatment measures  Outcome: Progressing  Goal: Prevent/manage excess moisture  Outcome: Progressing  Goal: Prevent/minimize sheer/friction injuries  Outcome: Progressing  Goal: Promote/optimize nutrition  Outcome: Progressing  Goal: Promote skin healing  Outcome: Progressing     Problem: Dressings Lower Extremities  Goal: STG - Patient will complete lower body dressing with mod assist with comp strategies and AE   Outcome: Progressing     Problem: Pain - Adult  Goal: Verbalizes/displays adequate comfort level or baseline comfort level  Outcome: Progressing     Problem: Safety - Adult  Goal: Free from fall injury  Outcome: Progressing     Problem: Discharge Planning  Goal: Discharge to home or other facility with appropriate resources  Outcome: Progressing     Problem: Chronic Conditions and Co-morbidities  Goal: Patient's chronic conditions and co-morbidity symptoms are monitored and maintained or improved  Outcome: Progressing     Problem: Pain  Goal: Walks with improved  pain control throughout the shift  Outcome: Progressing

## 2024-06-10 NOTE — PROGRESS NOTES
Vancomycin Dosing by Pharmacy- FOLLOW UP    Khalida Barker is a 73 y.o. year old female who Pharmacy has been consulted for vancomycin dosing for cellulitis, skin and soft tissue. Based on the patient's indication and renal status this patient is being dosed based on a goal AUC of 400-600.     Renal function is currently stable.    Current vancomycin dose: 750 mg given every 12 hours    Estimated vancomycin AUC on current dose: 433 mg/L.hr     Visit Vitals  /78 (BP Location: Left arm, Patient Position: Lying)   Pulse 92   Temp 36.3 °C (97.3 °F) (Temporal)   Resp 16        Lab Results   Component Value Date    CREATININE 0.74 06/10/2024    CREATININE 0.76 2024    CREATININE 0.80 2024    CREATININE 0.53 2024        Patient weight is as follows: There were no vitals filed for this visit.    Cultures:  Susceptibility data for the encounter in last 14 days.  Collected Specimen Info Organism Clindamycin Erythromycin Oxacillin Tetracycline Trimethoprim/Sulfamethoxazole Vancomycin   24 Swab from [facility] Staphylococcus aureus         24 Swab from [facility] Staphylococcus aureus         24 Swab from [facility] Mixed Skin Microorganisms          24 Swab from [facility] Staphylococcus aureus         24 Tissue/Biopsy from Wound/Tissue Methicillin Resistant Staphylococcus aureus (MRSA)  S  R  R  S  S  S        I/O last 3 completed shifts:  In: 1520 [P.O.:720; IV Piggyback:800]  Out: 2865 [Urine:2750; Drains:115]  I/O during current shift:  No intake/output data recorded.    Temp (24hrs), Av.4 °C (97.6 °F), Min:36.3 °C (97.3 °F), Max:36.7 °C (98.1 °F)      Assessment/Plan    Within goal AUC range. Continue current vancomycin regimen.    This dosing regimen is predicted by [employer] to result in the following pharmacokinetic parameters:  Loading dose: N/A  Regimen: 750 mg IV every 12 hours.  Start time: 14:27 on 06/10/2024  Exposure target: AUC24 (range)400-600 mg/L.hr   AUC24,ss: 433  mg/L.hr  Probability of AUC24 > 400: 69 %  Ctrough,ss: 14 mg/L  Probability of Ctrough,ss > 20: 7 %  Probability of nephrotoxicity (Lodise CAYDEN 2009): 9 %      The next level will be obtained on 6/13 at 0600. May be obtained sooner if clinically indicated.   Will continue to monitor renal function daily while on vancomycin and order serum creatinine at least every 48 hours if not already ordered.  Follow for continued vancomycin needs, clinical response, and signs/symptoms of toxicity.       Adryan Dawn, PharmD

## 2024-06-11 ENCOUNTER — APPOINTMENT (OUTPATIENT)
Dept: RADIOLOGY | Facility: HOSPITAL | Age: 74
End: 2024-06-11
Payer: MEDICARE

## 2024-06-11 ENCOUNTER — ANESTHESIA (OUTPATIENT)
Dept: OPERATING ROOM | Facility: HOSPITAL | Age: 74
End: 2024-06-11
Payer: MEDICARE

## 2024-06-11 ENCOUNTER — ANESTHESIA EVENT (OUTPATIENT)
Dept: OPERATING ROOM | Facility: HOSPITAL | Age: 74
End: 2024-06-11
Payer: MEDICARE

## 2024-06-11 VITALS
BODY MASS INDEX: 29.95 KG/M2 | TEMPERATURE: 96.8 F | RESPIRATION RATE: 16 BRPM | SYSTOLIC BLOOD PRESSURE: 114 MMHG | HEIGHT: 65 IN | OXYGEN SATURATION: 96 % | HEART RATE: 81 BPM | DIASTOLIC BLOOD PRESSURE: 54 MMHG

## 2024-06-11 LAB
ABO GROUP (TYPE) IN BLOOD: NORMAL
ANION GAP SERPL CALC-SCNC: 12 MMOL/L (ref 10–20)
ANTIBODY SCREEN: NORMAL
BUN SERPL-MCNC: 11 MG/DL (ref 6–23)
CALCIUM SERPL-MCNC: 7.9 MG/DL (ref 8.6–10.3)
CHLORIDE SERPL-SCNC: 104 MMOL/L (ref 98–107)
CO2 SERPL-SCNC: 24 MMOL/L (ref 21–32)
CREAT SERPL-MCNC: 0.65 MG/DL (ref 0.5–1.05)
EGFRCR SERPLBLD CKD-EPI 2021: >90 ML/MIN/1.73M*2
ERYTHROCYTE [DISTWIDTH] IN BLOOD BY AUTOMATED COUNT: 15.2 % (ref 11.5–14.5)
GLUCOSE SERPL-MCNC: 97 MG/DL (ref 74–99)
HCT VFR BLD AUTO: 25.8 % (ref 36–46)
HGB BLD-MCNC: 7.7 G/DL (ref 12–16)
MAGNESIUM SERPL-MCNC: 2.06 MG/DL (ref 1.6–2.4)
MCH RBC QN AUTO: 28.5 PG (ref 26–34)
MCHC RBC AUTO-ENTMCNC: 29.8 G/DL (ref 32–36)
MCV RBC AUTO: 96 FL (ref 80–100)
NRBC BLD-RTO: 0 /100 WBCS (ref 0–0)
PLATELET # BLD AUTO: 381 X10*3/UL (ref 150–450)
POTASSIUM SERPL-SCNC: 3.7 MMOL/L (ref 3.5–5.3)
RBC # BLD AUTO: 2.7 X10*6/UL (ref 4–5.2)
RH FACTOR (ANTIGEN D): NORMAL
SODIUM SERPL-SCNC: 136 MMOL/L (ref 136–145)
VANCOMYCIN SERPL-MCNC: 17.2 UG/ML (ref 5–20)
WBC # BLD AUTO: 6.1 X10*3/UL (ref 4.4–11.3)

## 2024-06-11 PROCEDURE — 83735 ASSAY OF MAGNESIUM: CPT | Performed by: STUDENT IN AN ORGANIZED HEALTH CARE EDUCATION/TRAINING PROGRAM

## 2024-06-11 PROCEDURE — 2500000001 HC RX 250 WO HCPCS SELF ADMINISTERED DRUGS (ALT 637 FOR MEDICARE OP): Performed by: ORTHOPAEDIC SURGERY

## 2024-06-11 PROCEDURE — C1713 ANCHOR/SCREW BN/BN,TIS/BN: HCPCS | Performed by: ORTHOPAEDIC SURGERY

## 2024-06-11 PROCEDURE — 86901 BLOOD TYPING SEROLOGIC RH(D): CPT | Performed by: STUDENT IN AN ORGANIZED HEALTH CARE EDUCATION/TRAINING PROGRAM

## 2024-06-11 PROCEDURE — 3700000002 HC GENERAL ANESTHESIA TIME - EACH INCREMENTAL 1 MINUTE: Performed by: ORTHOPAEDIC SURGERY

## 2024-06-11 PROCEDURE — 7100000001 HC RECOVERY ROOM TIME - INITIAL BASE CHARGE: Performed by: ORTHOPAEDIC SURGERY

## 2024-06-11 PROCEDURE — 2720000007 HC OR 272 NO HCPCS: Performed by: ORTHOPAEDIC SURGERY

## 2024-06-11 PROCEDURE — 99232 SBSQ HOSP IP/OBS MODERATE 35: CPT | Performed by: STUDENT IN AN ORGANIZED HEALTH CARE EDUCATION/TRAINING PROGRAM

## 2024-06-11 PROCEDURE — 3600000004 HC OR TIME - INITIAL BASE CHARGE - PROCEDURE LEVEL FOUR: Performed by: ORTHOPAEDIC SURGERY

## 2024-06-11 PROCEDURE — 80202 ASSAY OF VANCOMYCIN: CPT | Performed by: STUDENT IN AN ORGANIZED HEALTH CARE EDUCATION/TRAINING PROGRAM

## 2024-06-11 PROCEDURE — 2500000004 HC RX 250 GENERAL PHARMACY W/ HCPCS (ALT 636 FOR OP/ED): Performed by: STUDENT IN AN ORGANIZED HEALTH CARE EDUCATION/TRAINING PROGRAM

## 2024-06-11 PROCEDURE — 80048 BASIC METABOLIC PNL TOTAL CA: CPT | Performed by: STUDENT IN AN ORGANIZED HEALTH CARE EDUCATION/TRAINING PROGRAM

## 2024-06-11 PROCEDURE — 2500000004 HC RX 250 GENERAL PHARMACY W/ HCPCS (ALT 636 FOR OP/ED): Performed by: ORTHOPAEDIC SURGERY

## 2024-06-11 PROCEDURE — 1100000001 HC PRIVATE ROOM DAILY

## 2024-06-11 PROCEDURE — 2780000003 HC OR 278 NO HCPCS: Performed by: ORTHOPAEDIC SURGERY

## 2024-06-11 PROCEDURE — 36415 COLL VENOUS BLD VENIPUNCTURE: CPT | Performed by: STUDENT IN AN ORGANIZED HEALTH CARE EDUCATION/TRAINING PROGRAM

## 2024-06-11 PROCEDURE — 87081 CULTURE SCREEN ONLY: CPT | Mod: STJLAB | Performed by: INTERNAL MEDICINE

## 2024-06-11 PROCEDURE — 7100000002 HC RECOVERY ROOM TIME - EACH INCREMENTAL 1 MINUTE: Performed by: ORTHOPAEDIC SURGERY

## 2024-06-11 PROCEDURE — 3700000001 HC GENERAL ANESTHESIA TIME - INITIAL BASE CHARGE: Performed by: ORTHOPAEDIC SURGERY

## 2024-06-11 PROCEDURE — 3600000009 HC OR TIME - EACH INCREMENTAL 1 MINUTE - PROCEDURE LEVEL FOUR: Performed by: ORTHOPAEDIC SURGERY

## 2024-06-11 PROCEDURE — 2500000005 HC RX 250 GENERAL PHARMACY W/O HCPCS: Performed by: ANESTHESIOLOGIST ASSISTANT

## 2024-06-11 PROCEDURE — 0PSH04Z REPOSITION RIGHT RADIUS WITH INTERNAL FIXATION DEVICE, OPEN APPROACH: ICD-10-PCS | Performed by: ORTHOPAEDIC SURGERY

## 2024-06-11 PROCEDURE — 85027 COMPLETE CBC AUTOMATED: CPT | Performed by: STUDENT IN AN ORGANIZED HEALTH CARE EDUCATION/TRAINING PROGRAM

## 2024-06-11 PROCEDURE — 2500000001 HC RX 250 WO HCPCS SELF ADMINISTERED DRUGS (ALT 637 FOR MEDICARE OP): Performed by: STUDENT IN AN ORGANIZED HEALTH CARE EDUCATION/TRAINING PROGRAM

## 2024-06-11 PROCEDURE — 2500000004 HC RX 250 GENERAL PHARMACY W/ HCPCS (ALT 636 FOR OP/ED): Performed by: ANESTHESIOLOGIST ASSISTANT

## 2024-06-11 DEVICE — SCREW, LOCKING, 2.4MM X 14MM: Type: IMPLANTABLE DEVICE | Site: WRIST | Status: FUNCTIONAL

## 2024-06-11 DEVICE — SCREW, LOCKING, 2.4MM X 18MM: Type: IMPLANTABLE DEVICE | Site: WRIST | Status: FUNCTIONAL

## 2024-06-11 DEVICE — IMPLANTABLE DEVICE: Type: IMPLANTABLE DEVICE | Site: WRIST | Status: FUNCTIONAL

## 2024-06-11 DEVICE — SCREW, LOCKING, 2.4MM X 22MM: Type: IMPLANTABLE DEVICE | Site: WRIST | Status: FUNCTIONAL

## 2024-06-11 DEVICE — SCREW, LOCKING, 2.4MM X 20MM: Type: IMPLANTABLE DEVICE | Site: WRIST | Status: FUNCTIONAL

## 2024-06-11 DEVICE — WIRE, KIRSHNER 1.25MM X 150MM: Type: IMPLANTABLE DEVICE | Site: WRIST | Status: NON-FUNCTIONAL

## 2024-06-11 RX ORDER — VANCOMYCIN HYDROCHLORIDE 1 G/200ML
1 INJECTION, SOLUTION INTRAVENOUS EVERY 12 HOURS
Qty: 14400 ML | Refills: 0 | Status: SHIPPED | OUTPATIENT
Start: 2024-06-12 | End: 2024-06-13

## 2024-06-11 RX ORDER — PROPOFOL 10 MG/ML
INJECTION, EMULSION INTRAVENOUS AS NEEDED
Status: DISCONTINUED | OUTPATIENT
Start: 2024-06-11 | End: 2024-06-11

## 2024-06-11 RX ORDER — ROCURONIUM BROMIDE 10 MG/ML
INJECTION, SOLUTION INTRAVENOUS AS NEEDED
Status: DISCONTINUED | OUTPATIENT
Start: 2024-06-11 | End: 2024-06-11

## 2024-06-11 RX ORDER — LIDOCAINE HYDROCHLORIDE 10 MG/ML
5 INJECTION, SOLUTION EPIDURAL; INFILTRATION; INTRACAUDAL; PERINEURAL ONCE
Status: DISCONTINUED | OUTPATIENT
Start: 2024-06-11 | End: 2024-06-15 | Stop reason: HOSPADM

## 2024-06-11 RX ORDER — FENTANYL CITRATE 50 UG/ML
25 INJECTION, SOLUTION INTRAMUSCULAR; INTRAVENOUS EVERY 5 MIN PRN
Status: DISCONTINUED | OUTPATIENT
Start: 2024-06-11 | End: 2024-06-11 | Stop reason: HOSPADM

## 2024-06-11 RX ORDER — CEFAZOLIN SODIUM 2 G/100ML
INJECTION, SOLUTION INTRAVENOUS AS NEEDED
Status: DISCONTINUED | OUTPATIENT
Start: 2024-06-11 | End: 2024-06-11

## 2024-06-11 RX ORDER — MEPERIDINE HYDROCHLORIDE 50 MG/ML
12.5 INJECTION INTRAMUSCULAR; INTRAVENOUS; SUBCUTANEOUS EVERY 10 MIN PRN
Status: DISCONTINUED | OUTPATIENT
Start: 2024-06-11 | End: 2024-06-11 | Stop reason: HOSPADM

## 2024-06-11 RX ORDER — LIDOCAINE HYDROCHLORIDE 10 MG/ML
0.1 INJECTION INFILTRATION; PERINEURAL ONCE
Status: DISCONTINUED | OUTPATIENT
Start: 2024-06-11 | End: 2024-06-11 | Stop reason: HOSPADM

## 2024-06-11 RX ORDER — LIDOCAINE HYDROCHLORIDE 20 MG/ML
INJECTION, SOLUTION EPIDURAL; INFILTRATION; INTRACAUDAL; PERINEURAL AS NEEDED
Status: DISCONTINUED | OUTPATIENT
Start: 2024-06-11 | End: 2024-06-11

## 2024-06-11 RX ORDER — OXYCODONE HYDROCHLORIDE 5 MG/1
5 TABLET ORAL EVERY 4 HOURS PRN
Status: DISCONTINUED | OUTPATIENT
Start: 2024-06-11 | End: 2024-06-11 | Stop reason: HOSPADM

## 2024-06-11 RX ORDER — SODIUM CHLORIDE, SODIUM LACTATE, POTASSIUM CHLORIDE, CALCIUM CHLORIDE 600; 310; 30; 20 MG/100ML; MG/100ML; MG/100ML; MG/100ML
INJECTION, SOLUTION INTRAVENOUS CONTINUOUS PRN
Status: DISCONTINUED | OUTPATIENT
Start: 2024-06-11 | End: 2024-06-11

## 2024-06-11 RX ORDER — SODIUM CHLORIDE, SODIUM LACTATE, POTASSIUM CHLORIDE, CALCIUM CHLORIDE 600; 310; 30; 20 MG/100ML; MG/100ML; MG/100ML; MG/100ML
100 INJECTION, SOLUTION INTRAVENOUS CONTINUOUS
Status: DISCONTINUED | OUTPATIENT
Start: 2024-06-11 | End: 2024-06-11 | Stop reason: HOSPADM

## 2024-06-11 RX ORDER — LABETALOL HYDROCHLORIDE 5 MG/ML
5 INJECTION, SOLUTION INTRAVENOUS ONCE AS NEEDED
Status: DISCONTINUED | OUTPATIENT
Start: 2024-06-11 | End: 2024-06-11 | Stop reason: HOSPADM

## 2024-06-11 RX ORDER — ONDANSETRON HYDROCHLORIDE 2 MG/ML
INJECTION, SOLUTION INTRAVENOUS AS NEEDED
Status: DISCONTINUED | OUTPATIENT
Start: 2024-06-11 | End: 2024-06-11

## 2024-06-11 RX ORDER — PHENYLEPHRINE HCL IN 0.9% NACL 1 MG/10 ML
SYRINGE (ML) INTRAVENOUS AS NEEDED
Status: DISCONTINUED | OUTPATIENT
Start: 2024-06-11 | End: 2024-06-11

## 2024-06-11 RX ORDER — VANCOMYCIN HYDROCHLORIDE 1 G/200ML
1000 INJECTION, SOLUTION INTRAVENOUS EVERY 12 HOURS
Status: DISCONTINUED | OUTPATIENT
Start: 2024-06-11 | End: 2024-06-13

## 2024-06-11 RX ORDER — MIDAZOLAM HYDROCHLORIDE 1 MG/ML
INJECTION, SOLUTION INTRAMUSCULAR; INTRAVENOUS AS NEEDED
Status: DISCONTINUED | OUTPATIENT
Start: 2024-06-11 | End: 2024-06-11

## 2024-06-11 RX ORDER — ONDANSETRON HYDROCHLORIDE 2 MG/ML
4 INJECTION, SOLUTION INTRAVENOUS ONCE AS NEEDED
Status: DISCONTINUED | OUTPATIENT
Start: 2024-06-11 | End: 2024-06-11 | Stop reason: HOSPADM

## 2024-06-11 RX ORDER — HYDROMORPHONE HYDROCHLORIDE 1 MG/ML
INJECTION, SOLUTION INTRAMUSCULAR; INTRAVENOUS; SUBCUTANEOUS AS NEEDED
Status: DISCONTINUED | OUTPATIENT
Start: 2024-06-11 | End: 2024-06-11

## 2024-06-11 RX ORDER — CEFAZOLIN SODIUM 2 G/100ML
2 INJECTION, SOLUTION INTRAVENOUS EVERY 8 HOURS
Status: DISCONTINUED | OUTPATIENT
Start: 2024-06-11 | End: 2024-06-11

## 2024-06-11 RX ORDER — ALBUTEROL SULFATE 0.83 MG/ML
2.5 SOLUTION RESPIRATORY (INHALATION) ONCE AS NEEDED
Status: DISCONTINUED | OUTPATIENT
Start: 2024-06-11 | End: 2024-06-11 | Stop reason: HOSPADM

## 2024-06-11 RX ORDER — FENTANYL CITRATE 50 UG/ML
INJECTION, SOLUTION INTRAMUSCULAR; INTRAVENOUS AS NEEDED
Status: DISCONTINUED | OUTPATIENT
Start: 2024-06-11 | End: 2024-06-11

## 2024-06-11 RX ADMIN — SENNOSIDES AND DOCUSATE SODIUM 1 TABLET: 50; 8.6 TABLET ORAL at 21:22

## 2024-06-11 RX ADMIN — CEFAZOLIN SODIUM 2 G: 2 INJECTION, SOLUTION INTRAVENOUS at 14:30

## 2024-06-11 RX ADMIN — HYDROCODONE BITARTRATE AND ACETAMINOPHEN 1 TABLET: 5; 325 TABLET ORAL at 06:54

## 2024-06-11 RX ADMIN — POLYETHYLENE GLYCOL 3350 17 G: 17 POWDER, FOR SOLUTION ORAL at 21:21

## 2024-06-11 RX ADMIN — SUGAMMADEX 200 MG: 100 INJECTION, SOLUTION INTRAVENOUS at 12:24

## 2024-06-11 RX ADMIN — ONDANSETRON 4 MG: 2 INJECTION INTRAMUSCULAR; INTRAVENOUS at 12:14

## 2024-06-11 RX ADMIN — HYDROMORPHONE HYDROCHLORIDE 0.5 MG: 1 INJECTION, SOLUTION INTRAMUSCULAR; INTRAVENOUS; SUBCUTANEOUS at 12:38

## 2024-06-11 RX ADMIN — VANCOMYCIN HYDROCHLORIDE 750 MG: 750 INJECTION, SOLUTION INTRAVENOUS at 03:11

## 2024-06-11 RX ADMIN — FENTANYL CITRATE 25 MCG: 50 INJECTION, SOLUTION INTRAMUSCULAR; INTRAVENOUS at 13:14

## 2024-06-11 RX ADMIN — ROCURONIUM BROMIDE 50 MG: 10 INJECTION INTRAVENOUS at 11:09

## 2024-06-11 RX ADMIN — FENTANYL CITRATE 25 MCG: 50 INJECTION, SOLUTION INTRAMUSCULAR; INTRAVENOUS at 13:02

## 2024-06-11 RX ADMIN — ACETAMINOPHEN 650 MG: 325 TABLET ORAL at 16:45

## 2024-06-11 RX ADMIN — Medication 100 MCG: at 11:34

## 2024-06-11 RX ADMIN — CYCLOBENZAPRINE HYDROCHLORIDE 5 MG: 5 TABLET, FILM COATED ORAL at 21:22

## 2024-06-11 RX ADMIN — HYDROCODONE BITARTRATE AND ACETAMINOPHEN 1 TABLET: 5; 325 TABLET ORAL at 14:56

## 2024-06-11 RX ADMIN — FENTANYL CITRATE 25 MCG: 50 INJECTION, SOLUTION INTRAMUSCULAR; INTRAVENOUS at 11:08

## 2024-06-11 RX ADMIN — LIDOCAINE HYDROCHLORIDE 50 MG: 20 INJECTION, SOLUTION EPIDURAL; INFILTRATION; INTRACAUDAL; PERINEURAL at 11:09

## 2024-06-11 RX ADMIN — Medication 100 MCG: at 11:25

## 2024-06-11 RX ADMIN — FENTANYL CITRATE 50 MCG: 50 INJECTION, SOLUTION INTRAMUSCULAR; INTRAVENOUS at 12:22

## 2024-06-11 RX ADMIN — HYDROMORPHONE HYDROCHLORIDE 0.5 MG: 1 INJECTION, SOLUTION INTRAMUSCULAR; INTRAVENOUS; SUBCUTANEOUS at 12:43

## 2024-06-11 RX ADMIN — HYDROCODONE BITARTRATE AND ACETAMINOPHEN 1 TABLET: 5; 325 TABLET ORAL at 21:22

## 2024-06-11 RX ADMIN — VANCOMYCIN HYDROCHLORIDE 1000 MG: 1 INJECTION, SOLUTION INTRAVENOUS at 14:00

## 2024-06-11 RX ADMIN — FENTANYL CITRATE 25 MCG: 50 INJECTION, SOLUTION INTRAMUSCULAR; INTRAVENOUS at 11:43

## 2024-06-11 RX ADMIN — MIDAZOLAM 1 MG: 1 INJECTION INTRAMUSCULAR; INTRAVENOUS at 11:01

## 2024-06-11 RX ADMIN — PROPOFOL 100 MG: 10 INJECTION, EMULSION INTRAVENOUS at 11:09

## 2024-06-11 RX ADMIN — DEXAMETHASONE SODIUM PHOSPHATE 4 MG: 4 INJECTION, SOLUTION INTRAMUSCULAR; INTRAVENOUS at 11:26

## 2024-06-11 RX ADMIN — CYCLOBENZAPRINE HYDROCHLORIDE 5 MG: 5 TABLET, FILM COATED ORAL at 14:56

## 2024-06-11 RX ADMIN — CEFAZOLIN SODIUM 2 G: 2 INJECTION, SOLUTION INTRAVENOUS at 11:25

## 2024-06-11 RX ADMIN — SODIUM CHLORIDE, POTASSIUM CHLORIDE, SODIUM LACTATE AND CALCIUM CHLORIDE: 600; 310; 30; 20 INJECTION, SOLUTION INTRAVENOUS at 10:36

## 2024-06-11 SDOH — HEALTH STABILITY: MENTAL HEALTH: CURRENT SMOKER: 0

## 2024-06-11 ASSESSMENT — PAIN SCALES - GENERAL
PAINLEVEL_OUTOF10: 4
PAINLEVEL_OUTOF10: 2
PAINLEVEL_OUTOF10: 8
PAINLEVEL_OUTOF10: 5 - MODERATE PAIN
PAIN_LEVEL: 0
PAINLEVEL_OUTOF10: 6
PAINLEVEL_OUTOF10: 8
PAINLEVEL_OUTOF10: 0 - NO PAIN
PAINLEVEL_OUTOF10: 8
PAINLEVEL_OUTOF10: 6
PAINLEVEL_OUTOF10: 5 - MODERATE PAIN
PAINLEVEL_OUTOF10: 3
PAINLEVEL_OUTOF10: 0 - NO PAIN

## 2024-06-11 ASSESSMENT — PAIN - FUNCTIONAL ASSESSMENT
PAIN_FUNCTIONAL_ASSESSMENT: 0-10

## 2024-06-11 ASSESSMENT — COGNITIVE AND FUNCTIONAL STATUS - GENERAL
PERSONAL GROOMING: A LITTLE
MOBILITY SCORE: 11
TOILETING: A LOT
EATING MEALS: A LITTLE
MOVING FROM LYING ON BACK TO SITTING ON SIDE OF FLAT BED WITH BEDRAILS: A LOT
STANDING UP FROM CHAIR USING ARMS: A LOT
DAILY ACTIVITIY SCORE: 14
TURNING FROM BACK TO SIDE WHILE IN FLAT BAD: A LOT
DRESSING REGULAR LOWER BODY CLOTHING: A LOT
DRESSING REGULAR UPPER BODY CLOTHING: A LOT
CLIMB 3 TO 5 STEPS WITH RAILING: TOTAL
HELP NEEDED FOR BATHING: A LOT
WALKING IN HOSPITAL ROOM: A LOT
MOVING TO AND FROM BED TO CHAIR: A LOT

## 2024-06-11 ASSESSMENT — PAIN DESCRIPTION - ORIENTATION: ORIENTATION: LEFT;UPPER

## 2024-06-11 ASSESSMENT — PAIN DESCRIPTION - LOCATION: LOCATION: ABDOMEN

## 2024-06-11 NOTE — CARE PLAN
Problem: Pain  Goal: My pain/discomfort is manageable  Outcome: Progressing     Problem: Safety  Goal: Patient will be injury free during hospitalization  Outcome: Progressing  Goal: I will remain free of falls  Outcome: Progressing     Problem: Daily Care  Goal: Daily care needs are met  Outcome: Progressing     Problem: Psychosocial Needs  Goal: Demonstrates ability to cope with hospitalization/illness  Outcome: Progressing  Goal: Collaborate with me, my family, and caregiver to identify my specific goals  Outcome: Progressing     Problem: Discharge Barriers  Goal: My discharge needs are met  Outcome: Progressing     Problem: Skin  Goal: Decreased wound size/increased tissue granulation at next dressing change  Outcome: Progressing  Flowsheets (Taken 6/11/2024 0431)  Decreased wound size/increased tissue granulation at next dressing change: Promote sleep for wound healing  Goal: Participates in plan/prevention/treatment measures  Outcome: Progressing  Flowsheets (Taken 6/11/2024 0431)  Participates in plan/prevention/treatment measures: Elevate heels  Goal: Prevent/manage excess moisture  Outcome: Progressing  Flowsheets (Taken 6/11/2024 0431)  Prevent/manage excess moisture: Monitor for/manage infection if present  Goal: Prevent/minimize sheer/friction injuries  Outcome: Progressing  Flowsheets (Taken 6/11/2024 0431)  Prevent/minimize sheer/friction injuries: Use pull sheet  Goal: Promote/optimize nutrition  Outcome: Progressing  Flowsheets (Taken 6/11/2024 0431)  Promote/optimize nutrition:   Consume > 50% meals/supplements   Monitor/record intake including meals  Goal: Promote skin healing  Outcome: Progressing  Flowsheets (Taken 6/11/2024 0431)  Promote skin healing: Assess skin/pad under line(s)/device(s)     Problem: Dressings Lower Extremities  Goal: STG - Patient will complete lower body dressing with mod assist with comp strategies and AE   Outcome: Progressing     Problem: Pain - Adult  Goal:  Verbalizes/displays adequate comfort level or baseline comfort level  Outcome: Progressing     Problem: Safety - Adult  Goal: Free from fall injury  Outcome: Progressing     Problem: Discharge Planning  Goal: Discharge to home or other facility with appropriate resources  Outcome: Progressing     Problem: Chronic Conditions and Co-morbidities  Goal: Patient's chronic conditions and co-morbidity symptoms are monitored and maintained or improved  Outcome: Progressing     Problem: Pain  Goal: Walks with improved pain control throughout the shift  Outcome: Progressing   The patient's goals for the shift include      The clinical goals for the shift include Pain management    Patient remained safe and free of falls/injury. Patient remained alert and oriented. Had c/o lower back pain that was managed with routine and PRN pain meds. Wound vac in place to midback. Homovac present x2 with small amount of output noted. Mcmahon to continuous drainage for acute retention. Pt has been NPO since midnight for surgery on her right arm today. Splint and ace wrap remains in place.

## 2024-06-11 NOTE — CARE PLAN
Problem: Pain  Goal: My pain/discomfort is manageable  Outcome: Progressing     Problem: Safety  Goal: Patient will be injury free during hospitalization  Outcome: Progressing  Goal: I will remain free of falls  Outcome: Progressing   The patient's goals for the shift include      The clinical goals for the shift include Pain management    Problem: Daily Care  Goal: Daily care needs are met  Outcome: Progressing     Problem: Psychosocial Needs  Goal: Demonstrates ability to cope with hospitalization/illness  Outcome: Progressing  Goal: Collaborate with me, my family, and caregiver to identify my specific goals  Outcome: Progressing     Problem: Skin  Goal: Decreased wound size/increased tissue granulation at next dressing change  Outcome: Progressing

## 2024-06-11 NOTE — PROGRESS NOTES
"Khalida Barker is a 73 y.o. female on day 4 of admission presenting with Closed fracture of distal end of right radius and wound infection     Subjective   No new changes or concerns    Objective     Physical Exam  Awake  Ox3  LUE 4/5  RLE 3/5  LLE Df/PF 3/5    Last Recorded Vitals  Blood pressure 110/55, pulse 75, temperature 36 °C (96.8 °F), temperature source Temporal, resp. rate 18, height 1.651 m (5' 5\"), SpO2 94%.  Intake/Output last 3 Shifts:  I/O last 3 completed shifts:  In: 1360 [P.O.:360; IV Piggyback:1000]  Out: 2060 [Urine:2050; Drains:10]      Assessment/Plan   Principal Problem:    Closed fracture of distal end of right radius  Active Problems:    Weakness    Closed fracture of distal end of right radius, unspecified fracture morphology, initial encounter    Acute blood loss as cause of postoperative anemia    Thoracogenic scoliosis of thoracolumbar region    73 year old with h/o severe LBP and LLE radiculopathy and scoliosis, 5/14 s/p [id number], multi-level PCOs, L2-S1 TLIF, 6/5 presents after fall, found to have wound infection and R wrist fracture     6/6 s/p wound washout and revision, prevena placed     Plan:   No appreciable changes  Planned for OR with orthopedics today  Medicine primary   Maintain drains, continue prevena   Appreciate infectious disease recommendations for antibiotics.   Recommend q2 hour turning to keep pressure off the incision   Mobilize with PTOT  We will continue to follow               Gaurang Russell PA-C      "

## 2024-06-11 NOTE — OP NOTE
Open Reduction Internal Fixation Radius (R) Operative Note     Date: 2024 - 2024  OR Location: [facility] OR    Name: Khalida Barker : 1950, Age: 73 y.o., MRN: [id number], Sex: female    Diagnosis  Pre-op Diagnosis  Closed right intra-articular fracture distal radius 3 more parts Post-op Diagnosis  Closed right intra-articular fracture distal radius 3 more parts       Procedures  Open Reduction Internal Fixation Radius  64698 - RI OPTX DSTL RADL I-ARTIC FX/EPIPHYSL SEP 3 FRAG      Surgeons      * Dale Gonzalez - Primary    Resident/Fellow/Other Assistant:  Darby Heard SA    Procedure Summary  Anesthesia: General  ASA: II  Anesthesia Staff: Anesthesiologist: Everett Guzman DO  C-AA: SHERIDAN Colon  VIKA: Sharon Medrano  Estimated Blood Loss: 10 mL  Intra-op Medications:   Administrations occurring from 1030 to 1200 on 24:   Medication Name Total Dose   acetaminophen (Tylenol) tablet 650 mg Cannot be calculated   cholecalciferol (Vitamin D-3) tablet 1,000 Units Cannot be calculated   cyclobenzaprine (Flexeril) tablet 5 mg Cannot be calculated   diphenhydrAMINE (Sominex) tablet 25 mg Cannot be calculated   enoxaparin (Lovenox) syringe 40 mg Cannot be calculated   HYDROcodone-acetaminophen (Norco) 5-325 mg per tablet 1 tablet Cannot be calculated   HYDROmorphone (Dilaudid) injection 0.4 mg Cannot be calculated   HYDROmorphone PF (Dilaudid) injection 0.2 mg Cannot be calculated   melatonin tablet 5 mg Cannot be calculated   pantoprazole (ProtoNix) EC tablet 40 mg Cannot be calculated   polyethylene glycol (Glycolax, Miralax) packet 17 g Cannot be calculated   pregabalin (Lyrica) capsule 50 mg Cannot be calculated   sennosides-docusate sodium (Maggie-Colace) 8.6-50 mg per tablet 1 tablet Cannot be calculated   tamsulosin (Flomax) 24 hr capsule 0.4 mg Cannot be calculated   vancomycin (Vancocin) pharmacy to dose - pharmacy monitoring Cannot be calculated   vancomycin in dextrose 5 % (Vancocin) IVPB  750 mg Cannot be calculated              Anesthesia Record               Intraprocedure I/O Totals       None           Specimen: No specimens collected     Staff:   Circulator: Jose F  Scrub Person: Darby  Scrub Person: Lara         Drains and/or Catheters:   Closed/Suction Drain Lateral;Right;Superior Back (Active)   Site Description Healing 06/11/24 0800   Dressing Status Clean;Dry 06/11/24 0800   Drainage Appearance Bloody 06/11/24 0800   Status To bulb suction 06/11/24 0800   Output (mL) 10 mL 06/09/24 2000       Closed/Suction Drain Superior;Medial (Active)   Site Description Healing 06/11/24 0800   Dressing Status Clean;Dry 06/11/24 0800   Drainage Appearance Bloody 06/11/24 0800   Status To bulb suction 06/11/24 0800   Output (mL) 10 mL 06/09/24 1700       Urethral Catheter Straight-tip (Active)   Site Assessment Clean;Skin intact 06/11/24 0800   Collection Container Urometer 06/11/24 0800   Securement Method Securing device (Describe) 06/11/24 0800   Reason for Continuing Urinary Catheterization acute urinary retention 06/11/24 0800   Output (mL) 250 mL 06/11/24 0000       Tourniquet Times:         Implants:  Implants       Type Name Action Serial No.       7 hole plate Implanted      Screw SCREW, LOCKING, 2.4 X 10MM - SN/A - [id number] Implanted N/A     Screw SCREW, LOCKING, 2.4MM X 12MM - SN/A - [id number] Implanted N/A     Screw SCREW, LOCKING, 2.4MM X 14MM - SN/A - [id number] Implanted N/A     Screw SCREW, LOCKING, 2.4MM X 18MM - SN/A - [id number] Implanted N/A     Screw SCREW, LOCKING, 2.4MM X 20MM - SN/A - [id number] Implanted N/A     Screw SCREW, LOCKING, 2.4MM X 22MM - SN/A - [id number] Implanted N/A     Screw SCREW, CORTEX SELF TAP W/T8 RECESS 2.4MM X 14MM - SN/A - [id number] Wasted N/A     Screw SCREW, CORTEX SELF TAP W/T8 RECESS 2.4MM X 16MM - SN/A - [id number] Wasted N/A     Screw WIRE, KIRSHNER 1.25MM X 150MM - SN/A - [id number] Used, Not Implanted N/A              Findings: Comminuted  intra-articular right distal radius fracture    Indications: Khalida Barker is an 73 y.o. female who is having surgery for fall and resultant closed intra-articular distal radius fracture on the right side with resultant impaction and substantial apex volar angulation.  Patient did develop a postoperative infection in regards to the lumbar spine which necessitated return to the OR for washout and has been on IV antibiotic therapy.  Patient has been receiving IV antibiotic therapy for multiple days with no evidence of bacteremia.  Discussion was had with patient regards to risk benefits and alternatives of both operative and nonoperative management.  I did make the recommendation for operative intervention and discussion with the neurosurgery team as patient's ability to safely mobilize and recover in regards to her back would be improved with mobilization of the elbow and more stability about the right wrist given internal fixation.  Patient made aware of risk benefits and alternatives associated both operative and nonoperative measures and elected proceed with operative intervention.    The patient was seen in the preoperative area. The risks, benefits, complications, treatment options, non-operative alternatives, expected recovery and outcomes were discussed with the patient. The possibilities of reaction to medication, pulmonary aspiration, injury to surrounding structures, bleeding, recurrent infection, the need for additional procedures, failure to diagnose a condition, and creating a complication requiring transfusion or operation were discussed with the patient. The patient concurred with the proposed plan, giving informed consent.  The site of surgery was properly noted/marked if necessary per policy. The patient has been actively warmed in preoperative area. Preoperative antibiotics have been ordered and given within 1 hours of incision. Venous thrombosis prophylaxis have been ordered including bilateral  sequential compression devices and chemical prophylaxis    Procedure Details: Patient was taken the operating room placed supine the operative table.  The right upper extremity had a arm tourniquet applied.  The operative extremity was subsequently prepped and draped as typical for extremity procedure.  A timeout was performed, all parties identified, the correct surgical site was noted.  At conclusion of the timeout, the hand and forearm were exsanguinated and tourniquet was inflated to 250 mmHg.  Subclavian then by making a standard volar approach to the wrist.  Flexor carpi radialis tendon was used as a landmark and dissection was carried down onto this tendon.  Subsequently incising the flexor carpi radialis tendon sheath the tendon was subsequently mobilized ulnarly.  The flexor pollicis longus subsheath was subsequently incised longitudinally and the muscle was subsequently mobilized ulnarly as well.  Pronator quadratus was subsequently identified and elevated off its radial border in an L-shaped manner going up to the level of the watershed line of the distal radius.  Retractors were subsequently placed and visualization of comminuted intra-articular distal radius fracture was subsequently identified.  Soft tissue invagination was subsequently removed from the fracture site.  Using combination of traction, rotational manipulation and K wire stabilization and adequate reduction was able to be achieved.  Reduction was confirmed on AP and lateral fluoroscopic imaging.  I subsequently proceeded then to place a Synthes volar rim variable angle plate.  Position of the plate was adequate and confirmed on AP and lateral fluoroscopic imaging.  I subsequently proceeded then to place a single screw into the shaft which nicely fixated the plate down to bone.  I then proceeded to place 5 screws into the distal articular segment appropriately fixating and rafting the articular surface.  Confirmation of length of screws and  lack of intra-articular penetration was confirmed on AP and lateral fluoroscopic imaging.  Filled proximal screw holes with locking screws.  K wires were subsequently removed along with all clamps and final fluoroscopic images obtained.  1 g of vancomycin powder was infiltrated into the wound.  Wound was copiously irrigated with normal saline.  Skin was closed with 2-0 Vicryl and 3-0 stratafix.        A soft compressive dressing was applied under a dorsal slab splint.  No complications occurred.    An assistant was used during this case.  Assistant Darby Heard SA helped in the positioning, prepping, draping, retracting and aiding in my ability to perform critical portions of this case and performed the majority of the closure and dressing application.  Help from the assistant was critical in performing these functions.    Complications:  None; patient tolerated the procedure well.    Disposition: PACU - hemodynamically stable.  Condition: stable       Postoperative plan: Patient will be nonweightbearing on the right wrist and hand however may be weightbearing as tolerated through right forearm to aid in mobilization.  No restriction of the elbow is required at this time.  Recommend an additional 2 doses of Ancef as prophylaxis against infection with ongoing utilization of vancomycin per her infection in the spine.  No further intervention required from orthopedic standpoint and plan for follow-up in office in 2 weeks.      Dale Gonzalez  Phone Number: [phone]

## 2024-06-11 NOTE — PROGRESS NOTES
For follow up of MRSA surgical spine infection    Subjective   She is eating dinner.  She has no complaints.  She had right arm surgery today       Current Facility-Administered Medications   Medication Dose Route Frequency Provider Last Rate Last Admin    acetaminophen (Tylenol) tablet 650 mg  650 mg oral q6h Dale Gonzalez MD   650 mg at 06/11/24 1645    [Held by provider] ceFAZolin in dextrose (iso-os) (Ancef) IVPB 2 g  2 g intravenous q8h Dale Gonzalez MD   Stopped at 06/09/24 0701    cholecalciferol (Vitamin D-3) tablet 1,000 Units  1,000 Units oral Daily Dale Gonzalez MD   1,000 Units at 06/10/24 0855    cyclobenzaprine (Flexeril) tablet 5 mg  5 mg oral TID Dale Gonzalez MD   5 mg at 06/11/24 1456    diphenhydrAMINE (Sominex) tablet 25 mg  25 mg oral q6h PRN Dale Gonzalez MD        enoxaparin (Lovenox) syringe 40 mg  40 mg subcutaneous Daily Dale Gonzalez MD   40 mg at 06/10/24 0855    HYDROcodone-acetaminophen (Norco) 5-325 mg per tablet 1 tablet  1 tablet oral q6h PRN Dale Gonzalez MD   1 tablet at 06/11/24 1456    HYDROmorphone (Dilaudid) injection 0.4 mg  0.4 mg intravenous Once Dale Gonzalez MD        HYDROmorphone PF (Dilaudid) injection 0.2 mg  0.2 mg intravenous q3h PRN Dale Gonzalez MD   0.2 mg at 06/10/24 1953    melatonin tablet 5 mg  5 mg oral Nightly PRN Dale Gonzalez MD   5 mg at 06/10/24 2145    pantoprazole (ProtoNix) EC tablet 40 mg  40 mg oral Daily before breakfast Dale Gonzalez MD   40 mg at 06/10/24 0608    polyethylene glycol (Glycolax, Miralax) packet 17 g  17 g oral BID Dale Gonzalez MD   17 g at 06/10/24 2145    pregabalin (Lyrica) capsule 50 mg  50 mg oral Nightly Dale Gonzalez MD        sennosides-docusate sodium (Maggie-Colace) 8.6-50 mg per tablet 1 tablet  1 tablet oral Nightly Dale Gonzalez MD   1 tablet at 06/10/24 4281    tamsulosin (Flomax) 24 hr capsule 0.4 mg  0.4 mg oral Daily Dale Gonzalez MD   0.4 mg at 06/10/24 3975    vancomycin  (Vancocin) 1,000 mg in dextrose 5% water 200 mL  1,000 mg intravenous q12h Dale Gonzalez MD   Stopped at 06/11/24 1500    vancomycin (Vancocin) pharmacy to dose - pharmacy monitoring   miscellaneous Daily PRN Dale Gonzalez MD            Objective     Last Recorded Vitals  /65 (BP Location: Left arm, Patient Position: Lying)   Pulse 73   Temp 36.3 °C (97.3 °F) (Temporal)   Resp 16   SpO2 97%     General: no acute distress, interactive, sitting up in bed, eating dinner  HEENT: pink pharynx  CVS: RRR  Resp: decreased breath sounds in bases  ABD: soft, NT, ND  EXT: Right wrist wrapped  Skin: Back surgical site covered    Relevant Results    6/6/2024 surgical culture shows MRSA    6/11/2024 creatinine 0.6    Assessment/Plan     Principal Problem:    Closed fracture of distal end of right radius  Active Problems:    Weakness    Closed fracture of distal end of right radius, unspecified fracture morphology, initial encounter    Acute blood loss as cause of postoperative anemia    Thoracogenic scoliosis of thoracolumbar region    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  I have personally and independently reviewed and interpreted her laboratory tests, imaging studies, and the documentation from other healthcare providers.  I will monitor for side effects from IV vancomycin which can include rash, diarrhea, and nephrotoxicity.  He would be reasonable at this point to stop the cefazolin.  She will require a PICC line for ongoing treatment.    -Continue IV vancomycin with plans for a 6-week course  -Can stop cefazolin  -Place a PICC line    Other issues:  #Right distal radius fracture status post surgical repair               Ramon Tang MD

## 2024-06-11 NOTE — ANESTHESIA POSTPROCEDURE EVALUATION
Patient: Khalida Barker    Procedure Summary       Date: 06/11/24 Room / Location: [facility] OR 10 / [facility]    Anesthesia Start: 1101 Anesthesia Stop: 1244    Procedure: Open Reduction Internal Fixation Radius (Right: Wrist) Diagnosis:       Closed fracture of distal end of right radius, unspecified fracture morphology, initial encounter      (Closed fracture of distal end of right radius, unspecified fracture morphology, initial encounter [S52.501A])    Surgeons: Dale Gonzalez MD Responsible Provider: Everett Guzman DO    Anesthesia Type: general ASA Status: 2            Anesthesia Type: general    Vitals Value Taken Time   /52 06/11/24 1300   Temp 36.3 °C (97.3 °F) 06/11/24 1240   Pulse 80 06/11/24 1301   Resp 16 06/11/24 1301   SpO2 93 % 06/11/24 1301   Vitals shown include unfiled device data.    Anesthesia Post Evaluation    Patient location during evaluation: PACU  Patient participation: complete - patient participated  Level of consciousness: awake  Pain score: 0  Pain management: adequate  Multimodal analgesia pain management approach  Airway patency: patent  Two or more strategies used to mitigate risk of obstructive sleep apnea  Cardiovascular status: acceptable  Respiratory status: acceptable  Hydration status: acceptable  Postoperative Nausea and Vomiting: none        No notable events documented.

## 2024-06-11 NOTE — PROGRESS NOTES
"Nutrition Initial Assessment:   Nutrition Assessment    Reason for Assessment: Provider consult order (Poor Appetite)    Patient is a 73 y.o. female presenting with recent T10-pelvis fusion, multilevel PCO, L2-S1 TLIF with Dr. Lu 5/14 admitted for placement.     PMhx: Scoliosis, chronic back pain with lumbar radiculopathy status post T10-pelvis fusion, multilevel PCO, L2-S1 TLIF, diverticulosis, GERD, carpal tunnel syndrome, osteoarthritis, vitamin D deficiency     Nutrition History:  Energy Intake: Poor < 50 %  Vitamin/Herbal Supplement Use: Pt taking Vt D3 and Biotin at home prior to admit.  Food Allergies/Intolerances:  None  GI Symptoms:  laxative; loose stool yesterday  Oral Problems: Swallowing difficulty   Pain Score: 8     Anthropometrics:  Height: 165.1 cm (5' 5\")          IBW/kg (Dietitian Calculated): 56.7 kg  Percent of IBW: 144 %       Weight History:   Wt Readings from Last 10 Encounters:   06/04/24 81.6 kg (180 lb)   05/15/24 95.4 kg (210 lb 5.1 oz)   04/30/24 86.6 kg (191 lb)   03/08/24 88.9 kg (195 lb 14.4 oz)   02/29/24 86.6 kg (191 lb)   01/31/24 87.9 kg (193 lb 11.2 oz)   10/24/23 86.5 kg (190 lb 12.8 oz)   07/11/23 86.8 kg (191 lb 4 oz)   06/12/23 89.1 kg (196 lb 6 oz)   04/10/23 95.3 kg (210 lb)      Weight Change %:  Weight History / % Weight Change: Loss of 5kg (5.8%) in 1 month.  Significant Weight Loss: Yes    Nutrition Focused Physical Exam Findings:  defer: pt just returned from surgery - needs to lie still.  Subcutaneous Fat Loss:      Muscle Wasting:     Edema:  Edema: none  Physical Findings:  Skin: Positive (surgical site infection per nursing)    Nutrition Significant Labs:  CBC Trend:   Results from last 7 days   Lab Units 06/11/24  0608 06/10/24  0520 06/09/24  0554 06/08/24  1354   WBC AUTO x10*3/uL 6.1 6.0 5.3 7.7   RBC AUTO x10*6/uL 2.70* 2.66* 2.68* 2.89*   HEMOGLOBIN g/dL 7.7* 7.6* 7.7* 8.3*   HEMATOCRIT % 25.8* 25.2* 25.5* 27.2*   MCV fL 96 95 95 94   PLATELETS AUTO " x10*3/uL 381 399 394 483*    , BMP Trend:   Results from last 7 days   Lab Units 06/11/24  0608 06/10/24  0520 06/09/24  0554 06/08/24  0607   GLUCOSE mg/dL 97 97 109* 145*   CALCIUM mg/dL 7.9* 7.8* 7.9* 7.8*   SODIUM mmol/L 136 138 139 138   POTASSIUM mmol/L 3.7 3.8 3.7 3.9   CO2 mmol/L 24 24 24 25   CHLORIDE mmol/L 104 105 106 105   BUN mg/dL 11 10 11 11   CREATININE mg/dL 0.65 0.74 0.76 0.80    , Vit D:   Lab Results   Component Value Date    VITD25 32 09/09/2022        Nutrition Specific Medications:  Current Outpatient Medications   Medication Instructions    acetaminophen (TYLENOL) 500 mg, oral, Every 8 hours PRN    aspirin-acetaminophen-caffeine (Excedrin Migraine) 250-250-65 mg tablet 2 tablets, oral, Every 8 hours PRN, Do not resume until discussed restart of aspirin portion of medication at neurosurgery follow up appointment.    bethanechol (URECHOLINE) 5 mg, oral, 3 times daily    biotin 10,000 mcg capsule 1 capsule, oral, Daily, caps    bisacodyl (DULCOLAX) 5 mg, oral, Daily PRN, Do not crush, chew, or split.    cholecalciferol (VITAMIN D-3) 1,000 Units, oral, Daily    cyclobenzaprine (FLEXERIL) 10 mg, oral, 3 times daily PRN    docusate sodium (COLACE) 100 mg, oral, Daily    heparin (porcine) 5,000 Units, subcutaneous, Every 8 hours    ibuprofen 200 mg, oral, Every 6 hours PRN    insulin lispro (HUMALOG) 0-5 Units, subcutaneous, 3 times daily (morning, midday, late afternoon), For use while on steroids. Okay to DC after completion of steroids. <BR>Take as directed per insulin instructions.    multivitamin with minerals tablet 1 tablet, oral, Daily    NON FORMULARY 1 each, oral, Daily, Viveran 200mg- pure caffience    pantoprazole (PROTONIX) 40 mg, oral, Daily before breakfast, Do not crush, chew, or split.    polyethylene glycol (GLYCOLAX, MIRALAX) 17 g, oral, 3 times daily PRN    pregabalin (LYRICA) 50 mg, oral, Nightly    sennosides-docusate sodium (Maggie-Colace) 8.6-50 mg tablet 2 tablets, oral, 2  "times daily    sulfamethoxazole-trimethoprim (Bactrim DS) 800-160 mg tablet 1 tablet, oral, 2 times daily    traMADol (ULTRAM) 50 mg, oral, Every 6 hours PRN         I/O:   Last BM Date: 06/10/24; Stool Appearance: Loose (06/10/24 1719)    Dietary Orders (From admission, onward)       Start     Ordered    06/11/24 1418  Oral nutritional supplements  Until discontinued        Question Answer Comment   Deliver with Lunch    Select supplement: Ensure Clear       Placed in \"And\" Linked Group    06/11/24 1418    06/11/24 1417  Oral nutritional supplements  Until discontinued        Question Answer Comment   Deliver with Breakfast    Deliver with Dinner    Select supplement: Gelatein Plus       Placed in \"And\" Linked Group    06/11/24 1418    06/11/24 1401  Adult diet Clear Liquid  Diet effective now        Question:  Diet type  Answer:  Clear Liquid    06/11/24 1400    06/10/24 0853  Oral nutritional supplements  Until discontinued        Question Answer Comment   Deliver with Breakfast    Deliver with Lunch    Deliver with Dinner    Select supplement: Ensure Clear        06/10/24 0852                     Estimated Needs:   Total Energy Estimated Needs (kCal):  (2460-6179 kcal (25-30 kcal/kg))     Total Protein Estimated Needs (g):  (82-98g protein (1.0-1.2g/kg))     Total Fluid Estimated Needs (mL):  (2040mL (1mL/kcal))           Nutrition Diagnosis   Malnutrition Diagnosis  Patient has Malnutrition Diagnosis: Yes  Diagnosis Status: New  Malnutrition Diagnosis: Moderate malnutrition related to acute disease or injury  As Evidenced by: significant weight loss of 5kg (5.8%) within the past month due to poor appetite and decreased oral intakes of <75% of estimated energy requirement.            Nutrition Interventions/Recommendations         Nutrition Prescription:  Individualized Nutrition Prescription Provided for : Oral Diet        Nutrition Interventions:   Interventions: Meals and snacks, Medical food " supplement  Meals and Snacks: General healthful diet  Goal: When diet advanced, recommend no therapeutic restrictions - suggest REGULAR.  Medical Food Supplement: Commercial beverage  Goal: Start with ENSURE clear with meals and trial Geletein - pt does not like Ensure & other creamy beverages.         Nutrition Education:   Discussed oral nutrition supplement options and suggested meal options that she could try to increase her oral intakes.       Nutrition Monitoring and Evaluation   Food/Nutrient Related History Monitoring  Monitoring and Evaluation Plan: Energy intake  Energy Intake: Estimated energy intake  Criteria: Pt to consume >75% of 2000 kcal and 85g protein each day.    Body Composition/Growth/Weight History  Monitoring and Evaluation Plan: Weight  Weight: Measured weight  Criteria: Pt to maintain stable weight - no further weight loss.         Nutrition Focused Physical Findings  Monitoring and Evaluation Plan: Skin  Skin: Impaired wound healing  Criteria: Surgical area(s) to heal.       Time Spent/Follow-up Reminder:   Time Spent (min): 45 minutes  Last Date of Nutrition Visit: 06/11/24  Nutrition Follow-Up Needed?: 3-5 days  Follow up Comment: ASHIA

## 2024-06-11 NOTE — ANESTHESIA PREPROCEDURE EVALUATION
Patient: Khalida Barker    Procedure Information       Date/Time: 06/11/24 1030    Procedure: Open Reduction Internal Fixation Radius (Right: Wrist)    Location: [facility] OR 10 / [facility]    Surgeons: Dale Gonzalez MD            Relevant Problems   Neuro   (+) Anxiety   (+) Bilateral carpal tunnel syndrome   (+) C6 radiculopathy   (+) Carpal tunnel syndrome      Hematology   (+) Acute blood loss as cause of postoperative anemia      Musculoskeletal   (+) Bilateral carpal tunnel syndrome   (+) Carpal tunnel syndrome   (+) Primary osteoarthritis of right knee   (+) Scoliosis deformity of spine   (+) Thoracogenic scoliosis of thoracolumbar region       Clinical information reviewed:    Allergies  Meds  Problems              NPO Detail:  No data recorded     Physical Exam    Airway  Mallampati: II  TM distance: >3 FB  Neck ROM: full     Cardiovascular - normal exam  Rhythm: regular  Rate: normal     Dental - normal exam     Pulmonary - normal exam     Abdominal - normal exam  Abdomen: soft             Anesthesia Plan    History of general anesthesia?: yes  History of complications of general anesthesia?: no    ASA 2     general     The patient is not a current smoker.  Patient was previously instructed to abstain from smoking on day of procedure.  Patient did not smoke on day of procedure.  Education provided regarding risk of obstructive sleep apnea.  intravenous induction   Postoperative administration of opioids is intended.  Anesthetic plan and risks discussed with patient.  Use of blood products discussed with patient who.    Plan discussed with CAA.

## 2024-06-11 NOTE — PROGRESS NOTES
Occupational Therapy                 Therapy Communication Note    Patient Name: Khalida Barker  MRN: [id number]  Today's Date: 6/11/2024     Discipline: Occupational Therapy    Missed Visit Reason: Missed Visit Reason: Patient in a medical procedure    Missed Time: Attempt    Comment:

## 2024-06-11 NOTE — PROGRESS NOTES
"Khalida Barker is a 73 y.o. female on day 4 of admission presenting with Closed fracture of distal end of right radius.    Subjective   Patient seen and examined, no acute events overnight.  Plan for right wrist surgery today.  Patient has no complaints at the time of evaluation.       Objective       Constitutional: Well developed, awake/alert/oriented x3, no distress  ENMT: mucous membranes moist  Head/Neck: Neck supple  Respiratory/Thorax: CTA b/l.   Cardiovascular: Regular, rate and rhythm, no murmurs  Gastrointestinal: Nondistended, soft, non-tender  Musculoskeletal: ROM intact  Skin: prevena drain in place   Extremities: R-forearm in a splint    Last Recorded Vitals  Blood pressure 118/65, pulse 73, temperature 36.3 °C (97.3 °F), temperature source Temporal, resp. rate 16, height 1.651 m (5' 5\"), SpO2 97%.  Intake/Output last 3 Shifts:  I/O last 3 completed shifts:  In: 1360 [P.O.:360; IV Piggyback:1000]  Out: 2060 [Urine:2050; Drains:10]    Relevant Results  Scheduled medications  acetaminophen, 650 mg, oral, q6h  [Held by provider] ceFAZolin, 2 g, intravenous, q8h  ceFAZolin, 2 g, intravenous, q8h  cholecalciferol, 1,000 Units, oral, Daily  cyclobenzaprine, 5 mg, oral, TID  enoxaparin, 40 mg, subcutaneous, Daily  HYDROmorphone, 0.4 mg, intravenous, Once  pantoprazole, 40 mg, oral, Daily before breakfast  polyethylene glycol, 17 g, oral, BID  pregabalin, 50 mg, oral, Nightly  sennosides-docusate sodium, 1 tablet, oral, Nightly  tamsulosin, 0.4 mg, oral, Daily  vancomycin, 1,000 mg, intravenous, q12h      Continuous medications     PRN medications  PRN medications: diphenhydrAMINE, HYDROcodone-acetaminophen, HYDROmorphone, melatonin, vancomycin  Results from last 7 days   Lab Units 06/11/24  0608 06/10/24  0520 06/09/24  0554   WBC AUTO x10*3/uL 6.1 6.0 5.3   RBC AUTO x10*6/uL 2.70* 2.66* 2.68*   HEMOGLOBIN g/dL 7.7* 7.6* 7.7*     Results from last 7 days   Lab Units 06/11/24  0608 06/10/24  0520 06/09/24  0554 " 06/08/24  0607 06/07/24  0531 06/06/24  0536 06/05/24  0546 06/04/24  1903   SODIUM mmol/L 136 138 139   < > 134* 132*   < > 133*   POTASSIUM mmol/L 3.7 3.8 3.7   < > 4.3 4.1   < > 3.7   CHLORIDE mmol/L 104 105 106   < > 102 101   < > 100   CO2 mmol/L 24 24 24   < > 24 23   < > 18*   BUN mg/dL 11 10 11   < > 9 12   < > 15   CREATININE mg/dL 0.65 0.74 0.76   < > 0.53 0.55   < > 0.59   CALCIUM mg/dL 7.9* 7.8* 7.9*   < > 7.8* 7.8*   < > 8.5*   PHOSPHORUS mg/dL  --   --   --   --  3.2 3.1  --   --    MAGNESIUM mg/dL 2.06 2.03 1.88   < > 2.05 1.92   < >  --    BILIRUBIN TOTAL mg/dL  --   --   --   --   --   --   --  1.0   ALT U/L  --   --   --   --   --   --   --  14   AST U/L  --   --   --   --   --   --   --  21    < > = values in this interval not displayed.         Assessment/Plan   Principal Problem:    Closed fracture of distal end of right radius  Active Problems:    Weakness    Closed fracture of distal end of right radius, unspecified fracture morphology, initial encounter    Acute blood loss as cause of postoperative anemia    Thoracogenic scoliosis of thoracolumbar region        Plan  -Neurosurgery on consult , s/p wound washout and revision with prevena placement on 6/6  -NS agree with needing consult for ID, ID placed on consult,   -Patient was on Vanc+ Cefazolin, Wound culture grew MRSA, Cefazolin discontinued on 6/9, will wait for ID for further recommendation regarding abx/duration  -Gray removed on 6/9 for voiding trial, but unable to void, gray replaced on 6/9 night.   -orthopedic surgery consulted: right radius fracture-RUE splinted-patient wanted time to discuss surgery options with her  and is at this time wanting to proceed with surgery  -patient was seen by orthopedic team, in light of limited support for mobility due to recent back surgery, agree with proceeding for surgery correction, however would like to continue iv abx for few days to prevent infection complications,   -s/p ORIF of R-  radius on 6/11  -Hemoglobin continues to downtrend, but remained stable ~7.6~7.7. continue to monitor.   -Toradol and Dilaudid PRN for pain  -Currently on MiraLAX/Maggie-Colace for constipation, had suppository, ultimately enema on 6/9 and had a BM.  -KUB showed nonobstructive gas pattern   -despite Flomax and straight cath x 3, gray catheter placed in the setting of surgery (and planned surgery on 6/11)  -dvtp: heparin subcutaneous  -am labs including cbc, bmp, mag  -Dispo: need SNF on discharge- -patient declined going back to [facility], prefers [facility] , [facility] aware                I spent 25 minutes in the professional and overall care of this patient.      Rik Lawson MD

## 2024-06-11 NOTE — PROGRESS NOTES
Vancomycin Dosing by Pharmacy- FOLLOW UP    Khalida Barker is a 73 y.o. year old female who Pharmacy has been consulted for vancomycin dosing for other Spinal abscess . Based on the patient's indication and renal status this patient is being dosed based on a goal AUC of 400-600.     Renal function is currently stable.    Current vancomycin dose: 750 mg given every 12 hours    Most recent random level: 17.2 mcg/mL    Visit Vitals  /51   Pulse 73   Temp 36.1 °C (97 °F) (Temporal)   Resp 16        Lab Results   Component Value Date    CREATININE 0.65 2024    CREATININE 0.74 06/10/2024    CREATININE 0.76 2024    CREATININE 0.80 2024        Patient weight is as follows: There were no vitals filed for this visit.    Cultures:  Susceptibility data for the encounter in last 14 days.  Collected Specimen Info Organism Clindamycin Erythromycin Oxacillin Tetracycline Trimethoprim/Sulfamethoxazole Vancomycin   24 Swab from ABSCESS Staphylococcus aureus         24 Swab from ABSCESS Staphylococcus aureus         24 Swab from ABSCESS Mixed Skin Microorganisms          24 Swab from ABSCESS Staphylococcus aureus         24 Tissue/Biopsy from Wound/Tissue Methicillin Resistant Staphylococcus aureus (MRSA)  S  R  R  S  S  S        I/O last 3 completed shifts:  In: 1360 [P.O.:360; IV Piggyback:1000]  Out:  [Urine:; Drains:10]  I/O during current shift:  No intake/output data recorded.    Temp (24hrs), Av.3 °C (97.4 °F), Min:36 °C (96.8 °F), Max:37.2 °C (99 °F)      Assessment/Plan    Below goal AUC. Orders placed for new vancomcyin regimen of 1000 mg every 12 hours to begin at 1400.  Predicted AUC on previous regimen was 403, but has been trending lower.  Based on this trend, deemed new regimen more appropriate at this time    This dosing regimen is predicted by [employer] to result in the following pharmacokinetic parameters:  Loading dose: N/A  Regimen: 1000 mg IV every 12  hours.  Start time: 15:11 on 06/11/2024  Exposure target: AUC24 (range)400-600 mg/L.hr   AUC24,ss: 531 mg/L.hr  Probability of AUC24 > 400: 100 %  Ctrough,ss: 17.1 mg/L  Probability of Ctrough,ss > 20: 14 %  Probability of nephrotoxicity (Lodise CAYDEN 2009): 13 %    The next level will be obtained on 6/13 at 0900. May be obtained sooner if clinically indicated.   Will continue to monitor renal function daily while on vancomycin and order serum creatinine at least every 48 hours if not already ordered.  Follow for continued vancomycin needs, clinical response, and signs/symptoms of toxicity.       Jad Odom, BevD

## 2024-06-11 NOTE — ANESTHESIA PROCEDURE NOTES
Airway  Date/Time: 6/11/2024 11:12 AM  Urgency: elective    Airway not difficult    Staffing  Performed: VIKA   Authorized by: Magdy Arias MD    Performed by: SHERIDAN Colon  Patient location during procedure: OR    Indications and Patient Condition  Indications for airway management: anesthesia  Spontaneous Ventilation: absent  Sedation level: deep  Preoxygenated: yes  Patient position: sniffing  MILS not maintained throughout  Mask difficulty assessment: 1 - vent by mask  Planned trial extubation    Final Airway Details  Final airway type: endotracheal airway      Successful airway: ETT  Cuffed: yes   Successful intubation technique: direct laryngoscopy  Facilitating devices/methods: intubating stylet  Endotracheal tube insertion site: oral  Blade: Crystal  Blade size: #3  ETT size (mm): 7.0  Cormack-Lehane Classification: grade I - full view of glottis  Placement verified by: chest auscultation and capnometry   Cuff volume (mL): 6  Measured from: lips  ETT to lips (cm): 21  Number of attempts at approach: 1  Number of other approaches attempted: 0    Additional Comments  Atraumatic x1 attempt

## 2024-06-11 NOTE — PROGRESS NOTES
Patient has been on antibiotics for multiple days in a row.  Patient has not been bacteremic throughout the duration of her antibiotic treatment.  Currently being managed with IV vancomycin.    Long discussion was had with patient again about risk benefits and alternatives of both operative and nonoperative management of this right distal radius fracture.  Given patient's limited capacity to mobilize I do feel that stabilization of the right distal radius would provide better chance at safe mobilization while recovering from back surgery and subsequent washout.  We discussed risk associated the procedure of open reduction internal fixation of the right distal radius fracture including but not limited to damage to nerves, tendons, vessels, infection, nonunion, malunion, failure fixation, persistent pain, stiffness, tendon irritation, need for urgent surgery, need for hardware removal, cardiopulmonary complications associate the procedure and anesthesia including not limited to DVT, PE, stroke, heart attack with goals of surgery to appropriately stabilize the right distal radius in a more anatomic position to allow for earlier mobilization and improvement in functional recovery both of her right wrist as well as her spine procedure.  With knowledge of risk benefits and alternatives patient is electing to proceed.      Dale Gonzalez MD

## 2024-06-12 ENCOUNTER — APPOINTMENT (OUTPATIENT)
Dept: RADIOLOGY | Facility: HOSPITAL | Age: 74
End: 2024-06-12
Payer: MEDICARE

## 2024-06-12 LAB
ANION GAP SERPL CALC-SCNC: 10 MMOL/L (ref 10–20)
BUN SERPL-MCNC: 10 MG/DL (ref 6–23)
CALCIUM SERPL-MCNC: 8.2 MG/DL (ref 8.6–10.3)
CHLORIDE SERPL-SCNC: 104 MMOL/L (ref 98–107)
CO2 SERPL-SCNC: 26 MMOL/L (ref 21–32)
CREAT SERPL-MCNC: 0.63 MG/DL (ref 0.5–1.05)
EGFRCR SERPLBLD CKD-EPI 2021: >90 ML/MIN/1.73M*2
ERYTHROCYTE [DISTWIDTH] IN BLOOD BY AUTOMATED COUNT: 14.6 % (ref 11.5–14.5)
GLUCOSE SERPL-MCNC: 122 MG/DL (ref 74–99)
HCT VFR BLD AUTO: 25.7 % (ref 36–46)
HGB BLD-MCNC: 7.8 G/DL (ref 12–16)
MAGNESIUM SERPL-MCNC: 2.21 MG/DL (ref 1.6–2.4)
MCH RBC QN AUTO: 28.8 PG (ref 26–34)
MCHC RBC AUTO-ENTMCNC: 30.4 G/DL (ref 32–36)
MCV RBC AUTO: 95 FL (ref 80–100)
NRBC BLD-RTO: 0 /100 WBCS (ref 0–0)
PLATELET # BLD AUTO: 441 X10*3/UL (ref 150–450)
POTASSIUM SERPL-SCNC: 4 MMOL/L (ref 3.5–5.3)
RBC # BLD AUTO: 2.71 X10*6/UL (ref 4–5.2)
SODIUM SERPL-SCNC: 136 MMOL/L (ref 136–145)
WBC # BLD AUTO: 6.9 X10*3/UL (ref 4.4–11.3)

## 2024-06-12 PROCEDURE — 83735 ASSAY OF MAGNESIUM: CPT | Performed by: ORTHOPAEDIC SURGERY

## 2024-06-12 PROCEDURE — 36573 INSJ PICC RS&I 5 YR+: CPT

## 2024-06-12 PROCEDURE — 1100000001 HC PRIVATE ROOM DAILY

## 2024-06-12 PROCEDURE — 82374 ASSAY BLOOD CARBON DIOXIDE: CPT | Performed by: ORTHOPAEDIC SURGERY

## 2024-06-12 PROCEDURE — 2500000002 HC RX 250 W HCPCS SELF ADMINISTERED DRUGS (ALT 637 FOR MEDICARE OP, ALT 636 FOR OP/ED): Performed by: ORTHOPAEDIC SURGERY

## 2024-06-12 PROCEDURE — 99233 SBSQ HOSP IP/OBS HIGH 50: CPT | Performed by: STUDENT IN AN ORGANIZED HEALTH CARE EDUCATION/TRAINING PROGRAM

## 2024-06-12 PROCEDURE — 36415 COLL VENOUS BLD VENIPUNCTURE: CPT | Performed by: ORTHOPAEDIC SURGERY

## 2024-06-12 PROCEDURE — 2500000004 HC RX 250 GENERAL PHARMACY W/ HCPCS (ALT 636 FOR OP/ED): Performed by: ORTHOPAEDIC SURGERY

## 2024-06-12 PROCEDURE — 02HV33Z INSERTION OF INFUSION DEVICE INTO SUPERIOR VENA CAVA, PERCUTANEOUS APPROACH: ICD-10-PCS | Performed by: STUDENT IN AN ORGANIZED HEALTH CARE EDUCATION/TRAINING PROGRAM

## 2024-06-12 PROCEDURE — C1751 CATH, INF, PER/CENT/MIDLINE: HCPCS

## 2024-06-12 PROCEDURE — 2500000001 HC RX 250 WO HCPCS SELF ADMINISTERED DRUGS (ALT 637 FOR MEDICARE OP): Performed by: ORTHOPAEDIC SURGERY

## 2024-06-12 PROCEDURE — 97164 PT RE-EVAL EST PLAN CARE: CPT | Mod: GP

## 2024-06-12 PROCEDURE — 97168 OT RE-EVAL EST PLAN CARE: CPT | Mod: GO | Performed by: OCCUPATIONAL THERAPIST

## 2024-06-12 PROCEDURE — 36569 INSJ PICC 5 YR+ W/O IMAGING: CPT

## 2024-06-12 PROCEDURE — 2780000003 HC OR 278 NO HCPCS

## 2024-06-12 PROCEDURE — 85027 COMPLETE CBC AUTOMATED: CPT | Performed by: ORTHOPAEDIC SURGERY

## 2024-06-12 PROCEDURE — 97530 THERAPEUTIC ACTIVITIES: CPT | Mod: GP

## 2024-06-12 ASSESSMENT — COGNITIVE AND FUNCTIONAL STATUS - GENERAL
STANDING UP FROM CHAIR USING ARMS: A LOT
WALKING IN HOSPITAL ROOM: TOTAL
HELP NEEDED FOR BATHING: A LOT
MOVING TO AND FROM BED TO CHAIR: TOTAL
MOBILITY SCORE: 8
DRESSING REGULAR LOWER BODY CLOTHING: TOTAL
TURNING FROM BACK TO SIDE WHILE IN FLAT BAD: TOTAL
TOILETING: A LOT
PERSONAL GROOMING: A LITTLE
STANDING UP FROM CHAIR USING ARMS: A LOT
TOILETING: A LOT
CLIMB 3 TO 5 STEPS WITH RAILING: TOTAL
MOVING FROM LYING ON BACK TO SITTING ON SIDE OF FLAT BED WITH BEDRAILS: A LOT
TURNING FROM BACK TO SIDE WHILE IN FLAT BAD: TOTAL
DRESSING REGULAR UPPER BODY CLOTHING: A LOT
MOVING FROM LYING ON BACK TO SITTING ON SIDE OF FLAT BED WITH BEDRAILS: A LOT
EATING MEALS: A LITTLE
MOVING TO AND FROM BED TO CHAIR: TOTAL
PERSONAL GROOMING: A LITTLE
DAILY ACTIVITIY SCORE: 13
DRESSING REGULAR LOWER BODY CLOTHING: TOTAL
CLIMB 3 TO 5 STEPS WITH RAILING: TOTAL
WALKING IN HOSPITAL ROOM: TOTAL
DRESSING REGULAR UPPER BODY CLOTHING: A LOT
DAILY ACTIVITIY SCORE: 13
HELP NEEDED FOR BATHING: A LOT
MOBILITY SCORE: 8
EATING MEALS: A LITTLE

## 2024-06-12 ASSESSMENT — PAIN SCALES - GENERAL
PAINLEVEL_OUTOF10: 4
PAINLEVEL_OUTOF10: 8
PAINLEVEL_OUTOF10: 6
PAINLEVEL_OUTOF10: 4
PAINLEVEL_OUTOF10: 8
PAINLEVEL_OUTOF10: 4
PAINLEVEL_OUTOF10: 7
PAINLEVEL_OUTOF10: 8
PAINLEVEL_OUTOF10: 5 - MODERATE PAIN
PAINLEVEL_OUTOF10: 8
PAINLEVEL_OUTOF10: 0 - NO PAIN
PAINLEVEL_OUTOF10: 5 - MODERATE PAIN
PAINLEVEL_OUTOF10: 5 - MODERATE PAIN

## 2024-06-12 ASSESSMENT — ACTIVITIES OF DAILY LIVING (ADL): ADL_ASSISTANCE: INDEPENDENT

## 2024-06-12 NOTE — CONSULTS
Reason For Consult  PICC placement for extended antibiotic therapy.     Allergies  Oxycodone and Tramadol    Assessment/Plan     Patient verbalized understanding of risks/benefits/line care and maintenance/infection prevention. Patient signed electronic consent. Single lumen PowerPICC Catheter placed using ultrasound guidance and Modified Seldinger technique. Brisk blood return noted and line flushes easily. Tip placement confirmed via Sherlock Device/EKG and images sent to PACS. Site free from bleeding/hematoma. Line secured with Stat-lock, Biopatch  placed and Tegaderm central line dressing placed.  Curos cap placed to end of  catheter. Limb alert placed to left wrist. See LDA for PICC details. Bedside RN updated. Patient's bed in  lowest position, bed alarm on/functioning, call light w/n reach.     Nevin Apple RN

## 2024-06-12 NOTE — PROGRESS NOTES
"Khalida Barker is a 73 y.o. female on day 5 of admission presenting with Closed fracture of distal end of right radius.    Subjective   Patient seen and examined, no acute events overnight.  Tolerated procedure well for the right wrist ORIF yesterday.  Patient states she is doing well, no complaints.  Notified by case management today insurance intent to deny her pre-CERT, peer to peer was called, still denied.  Expedited appeal started today.       Objective     Constitutional: Well developed, awake/alert/oriented x3, no distress  ENMT: mucous membranes moist  Head/Neck: Neck supple  Respiratory/Thorax: CTA b/l.   Cardiovascular: Regular, rate and rhythm, no murmurs  Gastrointestinal: Nondistended, soft, non-tender  Musculoskeletal: ROM intact  Skin: prevena drain in place   Extremities: R-forearm in surgical dressing, finger movement / sensation intact    Last Recorded Vitals  Blood pressure 136/70, pulse 77, temperature 36.4 °C (97.5 °F), temperature source Temporal, resp. rate 16, height 1.651 m (5' 5\"), SpO2 99%.  Intake/Output last 3 Shifts:  I/O last 3 completed shifts:  In: 2200 [I.V.:1600; IV Piggyback:600]  Out: 3187 [Urine:3125; Drains:52; Blood:10]    Relevant Results  Scheduled medications  acetaminophen, 650 mg, oral, q6h  [Held by provider] ceFAZolin, 2 g, intravenous, q8h  cholecalciferol, 1,000 Units, oral, Daily  cyclobenzaprine, 5 mg, oral, TID  enoxaparin, 40 mg, subcutaneous, Daily  HYDROmorphone, 0.4 mg, intravenous, Once  lidocaine, 5 mL, infiltration, Once  pantoprazole, 40 mg, oral, Daily before breakfast  polyethylene glycol, 17 g, oral, BID  pregabalin, 50 mg, oral, Nightly  sennosides-docusate sodium, 1 tablet, oral, Nightly  tamsulosin, 0.4 mg, oral, Daily  vancomycin, 1,000 mg, intravenous, q12h      Continuous medications     PRN medications  PRN medications: alteplase, diphenhydrAMINE, HYDROcodone-acetaminophen, HYDROmorphone, melatonin, vancomycin  Results from last 7 days   Lab Units " 06/12/24  0556 06/11/24  0608 06/10/24  0520   WBC AUTO x10*3/uL 6.9 6.1 6.0   RBC AUTO x10*6/uL 2.71* 2.70* 2.66*   HEMOGLOBIN g/dL 7.8* 7.7* 7.6*     Results from last 7 days   Lab Units 06/12/24  0556 06/11/24  0608 06/10/24  0520 06/08/24  0607 06/07/24  0531 06/06/24  0536   SODIUM mmol/L 136 136 138   < > 134* 132*   POTASSIUM mmol/L 4.0 3.7 3.8   < > 4.3 4.1   CHLORIDE mmol/L 104 104 105   < > 102 101   CO2 mmol/L 26 24 24   < > 24 23   BUN mg/dL 10 11 10   < > 9 12   CREATININE mg/dL 0.63 0.65 0.74   < > 0.53 0.55   CALCIUM mg/dL 8.2* 7.9* 7.8*   < > 7.8* 7.8*   PHOSPHORUS mg/dL  --   --   --   --  3.2 3.1   MAGNESIUM mg/dL 2.21 2.06 2.03   < > 2.05 1.92    < > = values in this interval not displayed.         Assessment/Plan   Principal Problem:    Closed fracture of distal end of right radius  Active Problems:    Weakness    Closed fracture of distal end of right radius, unspecified fracture morphology, initial encounter    Acute blood loss as cause of postoperative anemia    Thoracogenic scoliosis of thoracolumbar region          Plan  -Neurosurgery on consult , s/p wound washout and revision with prevena placement on 6/6  -NS agree with needing consult for ID, ID placed on consult,   -Patient was on Vanc+ Cefazolin, Wound culture grew MRSA, Cefazolin discontinued on 6/9, Per ID, recommend PICC line (placed on 6/12) and dc on vanc for 6 weeks, follow up with ID clinic after discharge  -Gray removed on 6/9 for voiding trial, but unable to void, gray replaced on 6/9 night.   -orthopedic surgery consulted: right radius fracture-RUE splinted-patient wanted time to discuss surgery options with her  and is at this time wanting to proceed with surgery  -patient was seen by orthopedic team, in light of limited support for mobility due to recent back surgery, agree with proceeding for surgery correction, however would like to continue iv abx for few days to prevent infection complications,   -s/p ORIF of  R- radius on 6/11  -Hemoglobin continues to downtrend, but remained stable ~7.6~7.7. continue to monitor.   -Toradol and Dilaudid PRN for pain  -Currently on MiraLAX/Maggie-Colace for constipation, had suppository, ultimately enema on 6/9 and had a BM.  -KUB showed nonobstructive gas pattern   -despite Flomax and straight cath x 3, gray catheter placed in the setting of surgery (and planned surgery on 6/11)  -dvtp: heparin subcutaneous  -am labs including cbc, bmp, mag  -dispo: denied p2p, currently waiting for appeal process                   I spent 45 minutes in the professional and overall care of this patient, including calling for peer-2-peer      Rik Lawson MD

## 2024-06-12 NOTE — PROGRESS NOTES
"Khalida Barker is a 73 y.o. female presenting with Closed fracture of distal end of right radius.    Subjective   Ms. Barker has minimal right wrist discomfort. She has her hand elevated on pillows.        Objective     Physical Exam  Vitals reviewed.   HENT:      Head: Normocephalic.      Mouth/Throat:      Mouth: Mucous membranes are moist.      Pharynx: Oropharynx is clear.   Eyes:      Extraocular Movements: Extraocular movements intact.   Cardiovascular:      Rate and Rhythm: Normal rate.   Pulmonary:      Effort: Pulmonary effort is normal.   Abdominal:      Palpations: Abdomen is soft.   Musculoskeletal:      Comments: Distal right UE splint dressing is dry and intact. Right finger motor function and light touch sensation is intact, fingers are pink with brisk cap refill     Skin:     General: Skin is warm and dry.      Capillary Refill: Capillary refill takes less than 2 seconds.   Neurological:      General: No focal deficit present.      Mental Status: She is alert. Mental status is at baseline.   Psychiatric:         Mood and Affect: Mood normal.         Last Recorded Vitals  Blood pressure 120/62, pulse 81, temperature 36.1 °C (97 °F), temperature source Temporal, resp. rate 16, height 1.651 m (5' 5\"), SpO2 95%.  Intake/Output last 3 Shifts:  I/O last 3 completed shifts:  In: 2200 [I.V.:1600; IV Piggyback:600]  Out: 3187 [Urine:3125; Drains:52; Blood:10]    Relevant Results      Scheduled medications  acetaminophen, 650 mg, oral, q6h  [Held by provider] ceFAZolin, 2 g, intravenous, q8h  cholecalciferol, 1,000 Units, oral, Daily  cyclobenzaprine, 5 mg, oral, TID  enoxaparin, 40 mg, subcutaneous, Daily  HYDROmorphone, 0.4 mg, intravenous, Once  lidocaine, 5 mL, infiltration, Once  pantoprazole, 40 mg, oral, Daily before breakfast  polyethylene glycol, 17 g, oral, BID  pregabalin, 50 mg, oral, Nightly  sennosides-docusate sodium, 1 tablet, oral, Nightly  tamsulosin, 0.4 mg, oral, Daily  vancomycin, 1,000 mg, " intravenous, q12h      Continuous medications     PRN medications  PRN medications: alteplase, diphenhydrAMINE, HYDROcodone-acetaminophen, HYDROmorphone, melatonin, vancomycin  Results for orders placed or performed during the hospital encounter of 06/04/24 (from the past 24 hour(s))   CBC   Result Value Ref Range    WBC 6.9 4.4 - 11.3 x10*3/uL    nRBC 0.0 0.0 - 0.0 /100 WBCs    RBC 2.71 (L) 4.00 - 5.20 x10*6/uL    Hemoglobin 7.8 (L) 12.0 - 16.0 g/dL    Hematocrit 25.7 (L) 36.0 - 46.0 %    MCV 95 80 - 100 fL    MCH 28.8 26.0 - 34.0 pg    MCHC 30.4 (L) 32.0 - 36.0 g/dL    RDW 14.6 (H) 11.5 - 14.5 %    Platelets 441 150 - 450 x10*3/uL   Basic Metabolic Panel   Result Value Ref Range    Glucose 122 (H) 74 - 99 mg/dL    Sodium 136 136 - 145 mmol/L    Potassium 4.0 3.5 - 5.3 mmol/L    Chloride 104 98 - 107 mmol/L    Bicarbonate 26 21 - 32 mmol/L    Anion Gap 10 10 - 20 mmol/L    Urea Nitrogen 10 6 - 23 mg/dL    Creatinine 0.63 0.50 - 1.05 mg/dL    eGFR >90 >60 mL/min/1.73m*2    Calcium 8.2 (L) 8.6 - 10.3 mg/dL   Magnesium   Result Value Ref Range    Magnesium 2.21 1.60 - 2.40 mg/dL               This patient has a urinary catheter   Reason for the urinary catheter remaining today? urinary retention/bladder outlet obstruction, acute or chronic                Assessment/Plan   Principal Problem:    Closed fracture of distal end of right radius  Active Problems:    Weakness    Closed fracture of distal end of right radius, unspecified fracture morphology, initial encounter    Acute blood loss as cause of postoperative anemia    Thoracogenic scoliosis of thoracolumbar region    POD #1 s/p ORIF right wrist  Continue PT/OT, NWB right hand/wrist, may bear weight right forearm for   Using incentive spirometer   Right UE sling for comfort  Reviewed am labs  Multimodal pain regimen  Surgical splint dressing to remain in place until follow up appointment with Dr Gonzalez  VTE prophylaxis: lovenox  When appropriate, will discharge  home with C  Follow up with Dr. Gonzalez in 2 weeks         I spent 25 minutes in the professional and overall care of this patient.      ABBEY Jean BaptisteC

## 2024-06-12 NOTE — PROGRESS NOTES
Physical Therapy    Physical Therapy Re-Evaluation and Treatment    Patient Name: Khalida Barker  MRN: [id number]  Today's Date: 6/12/2024   Time Calculation  Start Time: 0910  Stop Time: 0955  Time Calculation (min): 45 min  4112/4112-A    Assessment/Plan   PT Assessment  PT Assessment Results: Decreased strength, Decreased range of motion, Decreased endurance, Impaired balance, Decreased mobility, Pain, Orthopedic restrictions  Rehab Prognosis: Good  Evaluation/Treatment Tolerance: Patient tolerated treatment well  Medical Staff Made Aware: Yes  End of Session Communication: Bedside nurse  Assessment Comment: Pt presents today below baseline level of function and requires continued PT during hospital stay. Pt requires 24 hour physical assist for all mobility to prevent falls. Pt is unsafe to navigate home environment at this time with available support and assist. Pt requires PT at a moderate intensity level at discharge to maximize functional mobility and safety.    End of Session Patient Position: Up in chair, Alarm on  IP OR SWING BED PT PLAN  Inpatient or Swing Bed: Inpatient  PT Plan  Treatment/Interventions: Bed mobility, Transfer training, Gait training, Balance training, Neuromuscular re-education, Strengthening, Endurance training, Range of motion, Therapeutic exercise, Therapeutic activity, Home exercise program  PT Plan: Skilled PT  PT Frequency: 5 times per week  PT Discharge Recommendations: Moderate intensity level of continued care  Equipment Recommended upon Discharge: Platform attachment, Wheeled walker  PT Recommended Transfer Status: Assist x2  PT - OK to Discharge: Yes (To next level of care when cleared by medical team   )    Subjective       General Visit Information:  General  Reason for Referral: impaired mobility  Referred By: Rik Lawson MD  Past Medical History Relevant to Rehab: To hospital for inability to care for self after recent back surgery as well as right distal radius fracture.  5/14/24 pt underwent T10-Pelvis Instrumented Fusion, Correction of Scoliotic Deformity, L1-2, L2-3, L3-4, L4-5, L5-S1 posterior column osteotomies, L3-4 anterior column osteotomy through disc space,  L1-S1 Decompression, with L2-3, L3-4, L4-5, L5-S1 Transforaminal Lumbar Interbody Fusion, T11-T12 Decompression. Pt underwent lumbar spine I & D 6/6/24. Pt underwent Open Reduction Internal Fixation Radius 6/11/24. PMH: left LE radiculopathy and scoliosis, right TKA  Family/Caregiver Present: No  Co-Treatment: OT  Co-Treatment Reason: to maximize pt safety and functional mobility  Prior to Session Communication: Bedside nurse  Patient Position Received: Bed, 3 rail up, Alarm on  Preferred Learning Style: verbal  General Comment: Pt agreeable to PT, nursing cleared for treatment. Re-evaluation indicated due to Open Reduction Internal Fixation right Radius performed 6/11/24.    Home Living:  Home Living  Type of Home: House  Lives With: Spouse  Home Adaptive Equipment: Walker rolling or standard  Home Layout: Two level, Able to live on main level with bedroom/bathroom, Full bath main level  Home Access: Stairs to enter with rails  Entrance Stairs-Number of Steps: 2  Bathroom Shower/Tub: Walk-in shower  Bathroom Equipment: Grab bars in shower    Prior Level of Function:  Prior Function Per Pt/Caregiver Report  Level of [place]: Independent with ADLs and functional transfers, Independent with homemaking with ambulation (prior to surgery, however since then, she has required assistance wtih ADLs and IADLs. Right foot drop/weakness resulting in fall)  ADL Assistance: Independent  Homemaking Assistance: Independent  Ambulatory Assistance: Independent  Prior Function Comments: Since spine surgery in 5/24 patient has required increased assist for ADLs and was using FWW    Precautions:  Precautions  UE Weight Bearing Status: Right Non-Weight Bearing (to hand/wirst, pt permitted to use platform walker)  Medical Precautions:  Fall precautions  Post-Surgical Precautions: Spinal precautions  Splinting: cast to right UE  Precautions Comment: Lines; wound vac, 2 hemovacs, gray catheter. Right foot drop    Vital Signs:     Objective     Pain:  Pain Assessment  Pain Assessment: 0-10  Pain Score: 5 - Moderate pain  Pain Type: Surgical pain  Pain Location: Arm  Pain Orientation: Right  Pain Interventions: Repositioned, Elevated, Ambulation/increased activity  Pain 2  Pain Score 2: 8  Pain Type 2: Surgical pain  Pain Location 2: Back  Pain Interventions 2: Ambulation/increased activity, Repositioned, Rest    Cognition:  Cognition  Overall Cognitive Status: Within Functional Limits  Orientation Level: Oriented X4    General Assessments:      Activity Tolerance  Endurance: Tolerates 10 - 20 min exercise with multiple rests  Sensation  Sensation Comment: Numbness to bilateral feet              Static Sitting Balance  Static Sitting-Comment/Number of Minutes: good  Dynamic Sitting Balance  Dynamic Sitting-Comments: good  Static Standing Balance  Static Standing-Comment/Number of Minutes: poor  Dynamic Standing Balance  Dynamic Standing-Comments: poor    Functional Assessments:     Bed Mobility  Bed Mobility: Yes  Bed Mobility 1  Bed Mobility 1: Supine to sitting  Level of Assistance 1: Maximum assistance  Bed Mobility Comments 1: x 2  Transfers  Transfer: Yes  Transfer 1  Transfer From 1: Sit to  Transfer to 1: Stand  Transfer Device 1: Walker  Transfer Level of Assistance 1: Maximum assistance, +2 (improved to min A of 2 on subsequent attempts with bilateral knee block)  Trials/Comments 1: decreasing to mod A of 2 with left arm in arm assist on second trial and upon third trial with use of Catia Steady to pull to stand  Transfers 2  Transfer From 2: Stand to  Transfer to 2: Sit  Transfer Device 2: Catia Stedy  Transfer Level of Assistance 2: Maximum assistance, +2  Transfers 3  Transfer From 3: Bed to  Transfer to 3: Chair with arms  Technique 3:  Stand pivot  Transfer Device 3: Catia Stedy  Transfer Level of Assistance 3: Maximum tactile cues, +2  Trials/Comments 3: arm in arm assist with bilateral knee block  Ambulation/Gait Training  Ambulation/Gait Training Performed: Yes  Ambulation/Gait Training 1  Assistance 1: Maximum assistance (x 2 assist, arm in arm assist)  Comments/Distance (ft) 1: 4 side steps along EOB, bilateral knees buckling. Bilateral knee block provided for safety        Treatment    Pt educated on precautions. Pt educated on ability to bare weight throughout forearm in order to aide with transfers and ambulation. Verbal and tactile cues to log troll to transition supine<>sitting EOB. Cues for placing feet flat on floor at EOB and squaring hips to maximize safety. Cues given for anterior weight shift and upright gaze/posture with sit to stand transfers; multiple attempts practiced. Instruction in side stepping along EOB and bed to chair transfer with cues given for bilateral quad control.     Extremity/Trunk Assessments:        RLE   RLE : Exceptions to WFL  AROM RLE (degrees)  RLE AROM Comment: WFL except ankle impaired  R Ankle Dorsiflexion 0-20:  (trace movement noted)  R Ankle Plantar Flexion 0-45:  (75%)  Strength RLE  RLE Overall Strength: Greater than or equal to 3/5 as evidenced by functional mobility (except ankle)  R Ankle Dorsiflexion: 1/5  R Ankle Plantar Flexion: 3-/5  LLE   LLE : Exceptions to WFL  AROM LLE (degrees)  LLE AROM Comment: WFL except hip impaired  L Hip Flexion 0-125:  (25-50%)  Strength LLE  L Hip Flexion: 2+/5  L Knee Flexion: 3/5  L Knee Extension: 3/5  L Ankle Dorsiflexion: 5/5  L Ankle Plantar Flexion: 5/5    Outcome Measures:     [facility] Basic Mobility  Turning from your back to your side while in a flat bed without using bedrails: A lot  Moving from lying on your back to sitting on the side of a flat bed without using bedrails: Total  Moving to and from bed to chair (including a wheelchair): Total  Standing  up from a chair using your arms (e.g. wheelchair or bedside chair): A lot  To walk in hospital room: Total  Climbing 3-5 steps with railing: Total  Basic Mobility - Total Score: 8                                        Goals:  Encounter Problems       Encounter Problems (Active)       PT Problem       Pt will demonstrate [place] for all bed mobility   (Progressing)       Start:  06/05/24    Expected End:  06/19/24            Pt will demonstrate good dynamic standing balance while performing a functional task.   (Progressing)       Start:  06/05/24    Expected End:  06/19/24            Pt will progress to completing 3 x 20 supine/seated exercises in order to increase strength and improve gait mechanics.         Start:  06/07/24    Expected End:  06/21/24                Pt will complete sit <> stand and bed <> chair transfers with min A.         Start:  06/07/24    Expected End:  06/21/24                Pt will ambulate 30 feet using platform FWW with no LOB.       Start:  06/12/24    Expected End:  06/26/24                       Education Documentation  Home Exercise Program, taught by Lizzy Cleary PT at 6/12/2024 10:26 AM.  Learner: Patient  Readiness: Acceptance  Method: Explanation  Response: Verbalizes Understanding    Precautions, taught by Lizzy Cleary PT at 6/12/2024 10:26 AM.  Learner: Patient  Readiness: Acceptance  Method: Explanation  Response: Verbalizes Understanding    Body Mechanics, taught by Lizzy Cleary PT at 6/12/2024 10:26 AM.  Learner: Patient  Readiness: Acceptance  Method: Explanation  Response: Verbalizes Understanding    Mobility Training, taught by Lizzy Cleary PT at 6/12/2024 10:26 AM.  Learner: Patient  Readiness: Acceptance  Method: Explanation  Response: Verbalizes Understanding    Education Comments  No comments found.

## 2024-06-12 NOTE — PROGRESS NOTES
06/12/24 0854   Discharge Planning   Living Arrangements Spouse/significant other   Support Systems Spouse/significant other   Type of Residence Private residence   Home or Post Acute Services Post acute facilities (Rehab/SNF/etc)   Type of Post Acute Facility Services Rehab   Patient expects to be discharged to: [facility]   Does the patient need discharge transport arranged? Yes   RoundTrip coordination needed? Yes     Precert info for [facility] forwarded to Dr Lawson:  Intent to deny. Call [phone] opt [phone]. They will need name, [employer] ID ([id number]) and ref # [id number] when calling. Due by noon today. Therapy updates requested.   1036 Therapy notes sent in [facility]. Dr Lawson notified that updates are in.

## 2024-06-12 NOTE — PROGRESS NOTES
"Khalida Barker is a 73 y.o. female on postop day #1 status post open duction total fixation of right distal radius fracture    Subjective   Patient doing okay overall.  Has worn off in regards to regional block about the right hand.  Expected pain.       Objective     Physical Exam  Right upper extremity: Splint is clean dry and intact.  Motors fingers appropriately.  Sensation is returned in all digits.    Last Recorded Vitals  Blood pressure 120/62, pulse 81, temperature 36.1 °C (97 °F), temperature source Temporal, resp. rate 16, height 1.651 m (5' 5\"), SpO2 95%.  Intake/Output last 3 Shifts:  I/O last 3 completed shifts:  In: 2200 [I.V.:1600; IV Piggyback:600]  Out: 3187 [Urine:3125; Drains:52; Blood:10]    Relevant Results      Scheduled medications  acetaminophen, 650 mg, oral, q6h  [Held by provider] ceFAZolin, 2 g, intravenous, q8h  cholecalciferol, 1,000 Units, oral, Daily  cyclobenzaprine, 5 mg, oral, TID  enoxaparin, 40 mg, subcutaneous, Daily  HYDROmorphone, 0.4 mg, intravenous, Once  lidocaine, 5 mL, infiltration, Once  pantoprazole, 40 mg, oral, Daily before breakfast  polyethylene glycol, 17 g, oral, BID  pregabalin, 50 mg, oral, Nightly  sennosides-docusate sodium, 1 tablet, oral, Nightly  tamsulosin, 0.4 mg, oral, Daily  vancomycin, 1,000 mg, intravenous, q12h      Continuous medications     PRN medications  PRN medications: alteplase, diphenhydrAMINE, HYDROcodone-acetaminophen, HYDROmorphone, melatonin, vancomycin  Results for orders placed or performed during the hospital encounter of 06/04/24 (from the past 24 hour(s))   CBC   Result Value Ref Range    WBC 6.9 4.4 - 11.3 x10*3/uL    nRBC 0.0 0.0 - 0.0 /100 WBCs    RBC 2.71 (L) 4.00 - 5.20 x10*6/uL    Hemoglobin 7.8 (L) 12.0 - 16.0 g/dL    Hematocrit 25.7 (L) 36.0 - 46.0 %    MCV 95 80 - 100 fL    MCH 28.8 26.0 - 34.0 pg    MCHC 30.4 (L) 32.0 - 36.0 g/dL    RDW 14.6 (H) 11.5 - 14.5 %    Platelets 441 150 - 450 x10*3/uL   Basic Metabolic Panel "   Result Value Ref Range    Glucose 122 (H) 74 - 99 mg/dL    Sodium 136 136 - 145 mmol/L    Potassium 4.0 3.5 - 5.3 mmol/L    Chloride 104 98 - 107 mmol/L    Bicarbonate 26 21 - 32 mmol/L    Anion Gap 10 10 - 20 mmol/L    Urea Nitrogen 10 6 - 23 mg/dL    Creatinine 0.63 0.50 - 1.05 mg/dL    eGFR >90 >60 mL/min/1.73m*2    Calcium 8.2 (L) 8.6 - 10.3 mg/dL   Magnesium   Result Value Ref Range    Magnesium 2.21 1.60 - 2.40 mg/dL                     Assessment and plan:    Patient is postop day #1 status post open duction internal fixation of right distal radius fracture    Nonweightbearing on the hand and wrist however okay for forearm weightbearing and platform walker in the right upper extremity.    Encourage movement of the fingers to promote circulation and reduce swelling.    Follow-up in office in 7 to 10 days for splint removal and wound check.      Dale Gonzalez MD

## 2024-06-12 NOTE — PROGRESS NOTES
Occupational Therapy    RE-Evaluation    Patient Name: Khalida Barker  MRN: [id number]  Today's Date: 6/12/2024  Time Calculation  Start Time: 0911  Stop Time: 0955  Time Calculation (min): 44 min  4112/4112-A  Re-evaluation only     Assessment  IP OT Assessment  Prognosis: Good  Medical Staff Made Aware: Yes  End of Session Communication: Bedside nurse  End of Session Patient Position: Up in chair, Alarm on  Patient presents with decline in ADLs, functional transfers, and functional mobility tasks and would benefit from OT during acute stay to maximize functional independence and safety.  Patient requires 24 hours hands on assistance.  Recommend moderate intensity OT to maximize functional independence and safety.     Plan:  Treatment Interventions: ADL retraining, Functional transfer training, Patient/family training, Equipment evaluation/education, Compensatory technique education  OT Frequency: Daily  OT Discharge Recommendations: Moderate intensity level of continued care  OT - OK to Discharge: Yes  from acute care OT services to the next level of care when cleared by medical team        Subjective     Current Problem:  1. MRSA infection  vancomycin (Vancocin) IVPB      2. Closed fracture of distal end of right radius, unspecified fracture morphology, initial encounter  Case Request Operating Room: Open Reduction Internal Fixation Radius    Case Request Operating Room: Open Reduction Internal Fixation Radius      3. Thoracogenic scoliosis of thoracolumbar region  Case Request Operating Room: Spine Lumbar I  and  D    Case Request Operating Room: Spine Lumbar I  and  D    Tissue/Wound Culture/Smear    Tissue/Wound Culture/Smear    Tissue/Wound Culture/Smear    Tissue/Wound Culture/Smear    Tissue/Wound Culture/Smear    Tissue/Wound Culture/Smear    Tissue/Wound Culture/Smear    Tissue/Wound Culture/Smear    CANCELED: Tissue/Wound Culture/Smear    CANCELED: Tissue/Wound Culture/Smear           General:  General  Reason for Referral: re-eval after ORIF right radius 6/11  Referred By: Rik Lawson MD  Past Medical History Relevant to Rehab: To hospital for inability to care for self after recent back surgery as well as right distal radius fracture. 5/14/24 pt underwent T10-Pelvis Instrumented Fusion, Correction of Scoliotic Deformity, L1-2, L2-3, L3-4, L4-5, L5-S1 posterior column osteotomies, L3-4 anterior column osteotomy through disc space,  L1-S1 Decompression, with L2-3, L3-4, L4-5, L5-S1 Transforaminal Lumbar Interbody Fusion, T11-T12 Decompression. Pt underwent lumbar spine I & D 6/6/24.  Patient had ORIF right radius completed by Dr Gonzalez 6/11. PMH: left LE radiculopathy and scoliosis, right TKA  Co-Treatment: PT  Co-Treatment Reason: for safety  Prior to Session Communication: Bedside nurse  Patient Position Received: Alarm on, Bed, 3 rail up  Preferred Learning Style: verbal  General Comment: Patient in long legged sitting in bed and agreeable to participate in OT re-evaluation.    Precautions:  UE Weight Bearing Status:  (NWB right wrist)  Medical Precautions: Fall precautions  Post-Surgical Precautions: Spinal precautions  Precautions Comment: Lines; wound vac, 2 hemovacs, gray catheter    Pain:  Pain Assessment  Pain Assessment: 0-10  Pain Score: 5 - Moderate pain  Pain Type: Surgical pain  Pain Location: Arm  Pain Orientation: Right  Pain Interventions: Rest  Pain 2  Pain Score 2: 8  Pain Type 2: Surgical pain  Pain Location 2: Hip  Pain Orientation 2: Left  Pain Interventions 2: Rest    Objective   Cognition:  Overall Cognitive Status: Within Functional Limits  Orientation Level: Oriented X4    Home Living:  Type of Home: House  Lives With: Spouse  Home Adaptive Equipment: Walker rolling or standard  Home Layout: Two level, Able to live on main level with bedroom/bathroom, Full bath main level  Home Access: Stairs to enter with rails  Entrance Stairs-Number of Steps: 2  Bathroom  Shower/Tub: Walk-in shower  Bathroom Equipment: Grab bars in shower  Home Living Comments: Lives in 2 story home with spouse. Has 1st floor setup. Reports having WIS with GB.  Is able to stay on first floor     Prior Function:  Prior Function Comments: Since spine surgery in 5/24 patient has required increased assist for ADLs and was using FWW    ADL:  Grooming Assistance: Stand by (to wash face and brush teeth seated at edge of bed)  UE Dressing Assistance: Maximal (doff and don pull over gown)  LE Dressing Assistance: Total  ADL Comments: Educated patient on comp strategies for upper body dressing and patient verbalized understanding    Activity Tolerance:  Endurance: Decreased tolerance for upright activites    Bed Mobility/Transfers:   Bed Mobility  Bed Mobility:  (Max assist of 2 supine to sit with log roll technique)  Transfers  Transfer:  (Max assist of 2 sit to stand with bilateral knee block. Patient improved to min assist of 2 sit to stand on subsequent stands with bilateral knee blocks secondary to bilateral knee buckling.  Max assist of 2 to stand pivot from edge of bed to bedside chair.)    Hand Function:  Hand Function  Gross Grasp: Functional  Coordination: Functional    Extremities: RUE   RUE :  (unable to assess ROM on available joints due to pain right wrist) and LUE   LUE: Within Functional Limits    Outcome Measures: [facility] Daily Activity  Putting on and taking off regular lower body clothing: Total  Bathing (including washing, rinsing, drying): A lot  Putting on and taking off regular upper body clothing: A lot  Toileting, which includes using toilet, bedpan or urinal: A lot  Taking care of personal grooming such as brushing teeth: A little  Eating Meals: A little  Daily Activity - Total Score: 13    EDUCATION:  Education  Individual(s) Educated: Patient  Education Provided: Fall precautons (spine precautions, WB right UE)  Diagnosis and Precautions: spinal precautions, NWB RUE  Patient/Caregiver  Demonstrated Understanding: yes  Plan of Care Discussed and Agreed Upon: yes  Patient Response to Education: Patient/Caregiver Verbalized Understanding of Information    Goals:   Encounter Problems       Encounter Problems (Active)       Dressings Lower Extremities       STG - Patient will complete lower body dressing with mod assist with comp strategies and AE  (Progressing)       Start:  06/12/24    Expected End:  06/26/24               Functional Balance       STG-Patient will be min assist static stand task >5 minutes for ADL completion  (Progressing)       Start:  06/12/24    Expected End:  06/26/24               OT Transfers       STG-Patient will be mod assist with functional transfers demonstrating good safety   (Progressing)       Start:  06/12/24    Expected End:  06/26/24               Safety       STG-Patient will independently verbalize spine precautions with all functional tasks   (Progressing)       Start:  06/12/24    Expected End:  06/26/24

## 2024-06-12 NOTE — CARE PLAN
Problem: Pain  Goal: My pain/discomfort is manageable  6/12/2024 0322 by Patricia Carranza RN  Outcome: Progressing  6/12/2024 0321 by Patricia Carranza RN  Outcome: Progressing     Problem: Safety  Goal: Patient will be injury free during hospitalization  6/12/2024 0322 by Patricia Carranza RN  Outcome: Progressing  6/12/2024 0321 by Patricia Carranza RN  Outcome: Progressing  Goal: I will remain free of falls  6/12/2024 0322 by Patricia Carranza RN  Outcome: Progressing  6/12/2024 0321 by Patricia Carranza RN  Outcome: Progressing     Problem: Daily Care  Goal: Daily care needs are met  6/12/2024 0322 by Patricia Carranza RN  Outcome: Progressing  6/12/2024 0321 by Patricia Carranza RN  Outcome: Progressing     Problem: Psychosocial Needs  Goal: Demonstrates ability to cope with hospitalization/illness  6/12/2024 0322 by Patricia Carranza RN  Outcome: Progressing  6/12/2024 0321 by Patricia Carranza RN  Outcome: Progressing  Goal: Collaborate with me, my family, and caregiver to identify my specific goals  6/12/2024 0322 by Patricia Carranza RN  Outcome: Progressing  6/12/2024 0321 by Patricia Carranza RN  Outcome: Progressing     Problem: Discharge Barriers  Goal: My discharge needs are met  6/12/2024 0322 by Patricia Carranza RN  Outcome: Progressing  6/12/2024 0321 by Patricia Carranza RN  Outcome: Progressing     Problem: Skin  Goal: Decreased wound size/increased tissue granulation at next dressing change  6/12/2024 0322 by Patricia Carranza RN  Outcome: Progressing  6/12/2024 0321 by Patricia Carranza RN  Outcome: Progressing  Goal: Participates in plan/prevention/treatment measures  6/12/2024 0322 by Patricia Carranza RN  Outcome: Progressing  6/12/2024 0321 by Patricia Carranza RN  Outcome: Progressing  Goal: Prevent/manage excess moisture  6/12/2024 0322 by Patricia Carranza RN  Outcome: Progressing  6/12/202412/2024 0321 by Patricia Carranza, RN  Outcome: Progressing  Goal: Prevent/minimize sheer/friction injuries  6/12/2024 0322  by Patricia F Dillon, RN  Outcome: Progressing  6/12/2024 0321 by Patricia Carranza RN  Outcome: Progressing  Goal: Promote/optimize nutrition  6/12/2024 0322 by Patricia Carranza RN  Outcome: Progressing  6/12/2024 0321 by Patricia Carranza RN  Outcome: Progressing  Goal: Promote skin healing  6/12/2024 0322 by Patricia Carranza RN  Outcome: Progressing  6/12/2024 0321 by Patricia Carranza RN  Outcome: Progressing     Problem: Dressings Lower Extremities  Goal: STG - Patient will complete lower body dressing with mod assist with comp strategies and AE   6/12/2024 0322 by Patricia Carranza RN  Outcome: Progressing  6/12/2024 0321 by Patricia Carranza RN  Outcome: Progressing     Problem: Pain - Adult  Goal: Verbalizes/displays adequate comfort level or baseline comfort level  6/12/2024 0322 by Patricia Carranza RN  Outcome: Progressing  6/12/2024 0321 by Patricia Carranza RN  Outcome: Progressing     Problem: Safety - Adult  Goal: Free from fall injury  6/12/2024 0322 by Patricia Carranza RN  Outcome: Progressing  6/12/2024 0321 by Patricia Carranza RN  Outcome: Progressing     Problem: Discharge Planning  Goal: Discharge to home or other facility with appropriate resources  6/12/2024 0322 by Patricia Carranza RN  Outcome: Progressing  6/12/2024 0321 by Patricia Carranza RN  Outcome: Progressing     Problem: Chronic Conditions and Co-morbidities  Goal: Patient's chronic conditions and co-morbidity symptoms are monitored and maintained or improved  6/12/2024 0322 by Patricia Carranza RN  Outcome: Progressing  6/12/2024 0321 by Patricia Carranza RN  Outcome: Progressing     Problem: Pain  Goal: Walks with improved pain control throughout the shift  6/12/2024 0322 by Patricia Carranza RN  Outcome: Progressing  6/12/2024 0321 by Patricia Carranza RN  Outcome: Progressing   The patient's goals for the shift include      The clinical goals for the shift include Pt will have minimal pain this shift and receive adequate rest.    Over the  shift, the patient did receive rest.

## 2024-06-12 NOTE — PROGRESS NOTES
"Khalida Barker is a 73 y.o. female on day 5 of admission presenting with Closed fracture of distal end of right radius and wound infection     Subjective   No new changes or concerns    Objective     Physical Exam  Awake  Ox3  LUE 5/5  RLE 4/5  LLE Df/PF 3/5    Last Recorded Vitals  Blood pressure 120/62, pulse 81, temperature 36.1 °C (97 °F), temperature source Temporal, resp. rate 16, height 1.651 m (5' 5\"), SpO2 95%.  Intake/Output last 3 Shifts:  I/O last 3 completed shifts:  In: 2200 [I.V.:1600; IV Piggyback:600]  Out: 3187 [Urine:3125; Drains:52; Blood:10]      Assessment/Plan   Principal Problem:    Closed fracture of distal end of right radius  Active Problems:    Weakness    Closed fracture of distal end of right radius, unspecified fracture morphology, initial encounter    Acute blood loss as cause of postoperative anemia    Thoracogenic scoliosis of thoracolumbar region    73 year old with h/o severe LBP and LLE radiculopathy and scoliosis, 5/14 s/p L33-cencok, multi-level PCOs, L2-S1 TLIF, 6/5 presents after fall, found to have wound infection and R wrist fracture     6/6 s/p wound washout and revision, prevena placed     Plan:   No appreciable changes, was able to stand at bedside with 2 person assist and shuffle to chair on visit today  OR with ORIF right wrist prior day  Medicine primary   Maintain drains, continue prevena will DC tomorrow   Appreciate infectious disease recommendations for antibiotics.   Recommend q2 hour turning to keep pressure off the incision   Mobilize with PTOT  We will continue to follow               Gaurang Russell PA-C      "

## 2024-06-12 NOTE — PROGRESS NOTES
For follow up of MRSA surgical spine infection    Subjective   She had her PICC line placed today.  When she had a physical therapy session today, she was disappointed with how weak she has become.       Current Facility-Administered Medications   Medication Dose Route Frequency Provider Last Rate Last Admin    acetaminophen (Tylenol) tablet 650 mg  650 mg oral q6h Dale Gonzalez MD   650 mg at 06/11/24 1645    alteplase (Cathflo Activase) injection 2 mg  2 mg intra-catheter PRN Ramon Tang MD        [Held by provider] ceFAZolin in dextrose (iso-os) (Ancef) IVPB 2 g  2 g intravenous q8h Dale Gonzalez MD   Stopped at 06/09/24 0701    cholecalciferol (Vitamin D-3) tablet 1,000 Units  1,000 Units oral Daily Dale Gonzalez MD   1,000 Units at 06/12/24 0837    cyclobenzaprine (Flexeril) tablet 5 mg  5 mg oral TID Dale Gonzalez MD   5 mg at 06/12/24 1650    diphenhydrAMINE (Sominex) tablet 25 mg  25 mg oral q6h PRN Dale Gonzalez MD        enoxaparin (Lovenox) syringe 40 mg  40 mg subcutaneous Daily Dale Gonzalez MD   40 mg at 06/12/24 0837    HYDROcodone-acetaminophen (Norco) 5-325 mg per tablet 1 tablet  1 tablet oral q6h PRN Dale Gonzalez MD   1 tablet at 06/12/24 1651    HYDROmorphone (Dilaudid) injection 0.4 mg  0.4 mg intravenous Once Dale Gonzalez MD        HYDROmorphone PF (Dilaudid) injection 0.2 mg  0.2 mg intravenous q3h PRN Dale Gonzalez MD   0.2 mg at 06/12/24 0608    lidocaine PF (Xylocaine) 10 mg/mL (1 %) injection 50 mg  5 mL infiltration Once Ramon Tang MD        melatonin tablet 5 mg  5 mg oral Nightly PRN Dale Gonzalez MD   5 mg at 06/10/24 2145    pantoprazole (ProtoNix) EC tablet 40 mg  40 mg oral Daily before breakfast Dale Gonzalez MD   40 mg at 06/12/24 0604    polyethylene glycol (Glycolax, Miralax) packet 17 g  17 g oral BID Dale Gonzalez MD   17 g at 06/12/24 0837    pregabalin (Lyrica) capsule 50 mg  50 mg oral Nightly Dale Gonzalez MD         sennosides-docusate sodium (Maggie-Colace) 8.6-50 mg per tablet 1 tablet  1 tablet oral Nightly Dale Gonzalez MD   1 tablet at 06/11/24 2122    tamsulosin (Flomax) 24 hr capsule 0.4 mg  0.4 mg oral Daily Dale Gonzalez MD   0.4 mg at 06/12/24 0837    vancomycin (Vancocin) 1,000 mg in dextrose 5% water 200 mL  1,000 mg intravenous q12h Dale Gonzalez MD   Stopped at 06/12/24 1504    vancomycin (Vancocin) pharmacy to dose - pharmacy monitoring   miscellaneous Daily PRN Dale Gonzalez MD            Objective     Last Recorded Vitals  /70 (BP Location: Left arm, Patient Position: Lying)   Pulse 77   Temp 36.4 °C (97.5 °F) (Temporal)   Resp 16   SpO2 99%     General: no acute distress, interactive, sitting up in bed, eating dinner  HEENT: pink pharynx  CVS: RRR  Resp: decreased breath sounds in bases  ABD: soft, NT, ND  EXT: Right wrist wrapped  Skin: Back surgical site covered; left upper extremity PICC line is clean dry and intact    Relevant Results    6/6/2024 surgical culture shows MRSA    6/12/2024 creatinine 0.6      Assessment/Plan     Principal Problem:    Closed fracture of distal end of right radius  Active Problems:    Weakness    Closed fracture of distal end of right radius, unspecified fracture morphology, initial encounter    Acute blood loss as cause of postoperative anemia    Thoracogenic scoliosis of thoracolumbar region    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  I have personally and independently reviewed and interpreted her laboratory tests, imaging studies, and the documentation from other healthcare providers.  I am monitoring for side effects from IV vancomycin as outlined in a previous note.  She had a PICC line placed.  When she is ready for discharge, she can go on vancomycin.      -Can discharge when ready on IV vancomycin to complete a 6-week total course   -Collect weekly blood work while on IV  antibiotics  -Can follow-up in ID clinic in approximately 3 weeks    Other issues:  #Right distal radius fracture status post surgical repair               Ramon Tang MD

## 2024-06-13 LAB
ANION GAP SERPL CALC-SCNC: 13 MMOL/L (ref 10–20)
BUN SERPL-MCNC: 12 MG/DL (ref 6–23)
CALCIUM SERPL-MCNC: 8.3 MG/DL (ref 8.6–10.3)
CHLORIDE SERPL-SCNC: 103 MMOL/L (ref 98–107)
CO2 SERPL-SCNC: 25 MMOL/L (ref 21–32)
CREAT SERPL-MCNC: 0.76 MG/DL (ref 0.5–1.05)
EGFRCR SERPLBLD CKD-EPI 2021: 84 ML/MIN/1.73M*2
ERYTHROCYTE [DISTWIDTH] IN BLOOD BY AUTOMATED COUNT: 15.1 % (ref 11.5–14.5)
GLUCOSE SERPL-MCNC: 79 MG/DL (ref 74–99)
HCT VFR BLD AUTO: 26.2 % (ref 36–46)
HGB BLD-MCNC: 7.8 G/DL (ref 12–16)
MAGNESIUM SERPL-MCNC: 1.96 MG/DL (ref 1.6–2.4)
MCH RBC QN AUTO: 28.3 PG (ref 26–34)
MCHC RBC AUTO-ENTMCNC: 29.8 G/DL (ref 32–36)
MCV RBC AUTO: 95 FL (ref 80–100)
NRBC BLD-RTO: 0 /100 WBCS (ref 0–0)
PLATELET # BLD AUTO: 464 X10*3/UL (ref 150–450)
POTASSIUM SERPL-SCNC: 3.9 MMOL/L (ref 3.5–5.3)
RBC # BLD AUTO: 2.76 X10*6/UL (ref 4–5.2)
SODIUM SERPL-SCNC: 137 MMOL/L (ref 136–145)
STAPHYLOCOCCUS SPEC CULT: NORMAL
VANCOMYCIN SERPL-MCNC: 22.3 UG/ML (ref 5–20)
WBC # BLD AUTO: 5.1 X10*3/UL (ref 4.4–11.3)

## 2024-06-13 PROCEDURE — 2500000004 HC RX 250 GENERAL PHARMACY W/ HCPCS (ALT 636 FOR OP/ED): Performed by: ORTHOPAEDIC SURGERY

## 2024-06-13 PROCEDURE — 82374 ASSAY BLOOD CARBON DIOXIDE: CPT | Performed by: ORTHOPAEDIC SURGERY

## 2024-06-13 PROCEDURE — 2500000001 HC RX 250 WO HCPCS SELF ADMINISTERED DRUGS (ALT 637 FOR MEDICARE OP): Performed by: ORTHOPAEDIC SURGERY

## 2024-06-13 PROCEDURE — 99239 HOSP IP/OBS DSCHRG MGMT >30: CPT | Performed by: STUDENT IN AN ORGANIZED HEALTH CARE EDUCATION/TRAINING PROGRAM

## 2024-06-13 PROCEDURE — 2500000002 HC RX 250 W HCPCS SELF ADMINISTERED DRUGS (ALT 637 FOR MEDICARE OP, ALT 636 FOR OP/ED): Performed by: ORTHOPAEDIC SURGERY

## 2024-06-13 PROCEDURE — 1100000001 HC PRIVATE ROOM DAILY

## 2024-06-13 PROCEDURE — 80202 ASSAY OF VANCOMYCIN: CPT | Performed by: ORTHOPAEDIC SURGERY

## 2024-06-13 PROCEDURE — 85027 COMPLETE CBC AUTOMATED: CPT | Performed by: ORTHOPAEDIC SURGERY

## 2024-06-13 PROCEDURE — 83735 ASSAY OF MAGNESIUM: CPT | Performed by: ORTHOPAEDIC SURGERY

## 2024-06-13 PROCEDURE — 97530 THERAPEUTIC ACTIVITIES: CPT | Mod: GP,CQ

## 2024-06-13 PROCEDURE — 97530 THERAPEUTIC ACTIVITIES: CPT | Mod: GO,CO

## 2024-06-13 RX ORDER — VANCOMYCIN HYDROCHLORIDE 1 G/200ML
1 INJECTION, SOLUTION INTRAVENOUS EVERY 12 HOURS
Qty: 14000 ML | Refills: 0 | Status: SHIPPED | OUTPATIENT
Start: 2024-06-13 | End: 2024-07-18

## 2024-06-13 RX ORDER — AMOXICILLIN 250 MG
1 CAPSULE ORAL NIGHTLY
Start: 2024-06-13

## 2024-06-13 RX ORDER — HYDROCODONE BITARTRATE AND ACETAMINOPHEN 5; 325 MG/1; MG/1
1 TABLET ORAL EVERY 8 HOURS PRN
Qty: 12 TABLET | Refills: 0 | Status: SHIPPED | OUTPATIENT
Start: 2024-06-13

## 2024-06-13 ASSESSMENT — COGNITIVE AND FUNCTIONAL STATUS - GENERAL
DRESSING REGULAR UPPER BODY CLOTHING: A LOT
HELP NEEDED FOR BATHING: A LOT
DAILY ACTIVITIY SCORE: 15
MOVING TO AND FROM BED TO CHAIR: A LOT
CLIMB 3 TO 5 STEPS WITH RAILING: TOTAL
DRESSING REGULAR LOWER BODY CLOTHING: A LOT
MOVING FROM LYING ON BACK TO SITTING ON SIDE OF FLAT BED WITH BEDRAILS: A LOT
PERSONAL GROOMING: A LITTLE
STANDING UP FROM CHAIR USING ARMS: A LOT
WALKING IN HOSPITAL ROOM: TOTAL
TURNING FROM BACK TO SIDE WHILE IN FLAT BAD: TOTAL
MOBILITY SCORE: 9
TOILETING: A LOT

## 2024-06-13 ASSESSMENT — PAIN DESCRIPTION - LOCATION
LOCATION: BACK

## 2024-06-13 ASSESSMENT — PAIN SCALES - GENERAL
PAINLEVEL_OUTOF10: 4
PAINLEVEL_OUTOF10: 8
PAINLEVEL_OUTOF10: 8
PAINLEVEL_OUTOF10: 7
PAINLEVEL_OUTOF10: 4

## 2024-06-13 ASSESSMENT — PAIN - FUNCTIONAL ASSESSMENT
PAIN_FUNCTIONAL_ASSESSMENT: 0-10
PAIN_FUNCTIONAL_ASSESSMENT: 0-10

## 2024-06-13 ASSESSMENT — ACTIVITIES OF DAILY LIVING (ADL): EFFECT OF PAIN ON DAILY ACTIVITIES: .

## 2024-06-13 NOTE — DISCHARGE SUMMARY
DC delay until 6/15/24 due to appeal process for acute rehab, patient has been accepted to [facility] Acute rehab and will be transferred there today  She is doing well with no complaints  -Dr. Herring     Discharge Diagnosis  Closed fracture of distal end of right radius    Issues Requiring Follow-Up  #it was my pleasure taking care of you during this hospitalization    You are admitted to the hospital due to a fall with result injury to your right wrist.  During this hospitalization, you had a workup showing concern for infection in your back from most recent surgery.  You were taken by neurosurgery for a washout procedure.  Culture did grow MRSA infection.  You were treated with IV antibiotic vancomycin, and the infectious disease team recommend to continue this for 6 weeks.  A PICC line was placed for this purpose.    You were also seen by orthopedic team for right wrist fracture, surgery was done for the repair.    Please continue to follow-up with neurosurgery and orthopedic, as well as infectious disease after discharge.    Test Results Pending At Discharge  Pending Labs       Order Current Status    Staphylococcus Aureus/MRSA Colonization, Culture - PICC Only In process            Hospital Course  Ms. Barker is a 73-year-old woman with recent T10-pelvis fusion, multilevel PCO, L2-S1 TLIF with Dr. Lu 5/14 admitted for placement.       Patient discharged from her procedure 5/22 to acute rehab and was subsequently discharged to home 6/3. Patient states that since her surgery she has had LE weakness and R foot drop. She has been ambulating with a walker. She states she tripped over her R foot this morning and fell on an outstretched R hand, sustaining a right intra-articular distal radius fracture.  She presented for care in the emergency department earlier this morning.  A sugar-tong splint was applied and she was instructed to follow-up with orthopedic surgery.  Neurosurgery was also consulted in the ER to assess  her wound and recommended leaving staples in place and initiating a course of Bactrim for possible developing cellulitis.  Patient desired to return home from ED visit.      Unfortunately, when patient returned home, she determined in conjunction with home health aide that it would be too difficult to care for herself at home, so she re-presented for placement.      On my interview patient reports significant RUE pain at site of fracture. She also reports constipation x3 days, and states she is unable to void. She states that bed pain and purewick unsuccessful. Denies dyspnea, fever/chills.      On arrival to ED, patient afebrile, heart rate 100, normotensive, satting well on room air.  Labs with mild hyponatremia 133, Hagg, with bicarb 18, BUN/SCR 15/1.59, hypoalbuminemia 3.0, alk phos 154, otherwise within normal limits.  No leukocytosis, hemoglobin stable.  Patient given Tylenol and Norflex and admitted.     PMhx: Scoliosis, chronic back pain with lumbar radiculopathy status post T10-pelvis fusion, multilevel PCO, L2-S1 TLIF, diverticulosis, GERD, carpal tunnel syndrome, osteoarthritis, vitamin D deficiency     Meds: Med rec to be done     All: oxycodone, tramadol     FamHx: Reviewed and not pertinent to presenting complaint     SocHx: Denies alcohol, tobacco, recreational drug use      Hospital course  Over the course of hospitalization, patient was seen by neurosurgery, status post wound washout and revision with drain placement on 6/6, Preven a drain was ultimately removed on 6/13.  Culture from wound grew MRSA, patient was treated with vancomycin during the course of stay, ID recommended PICC line placement which was done on 6/12, patient will be discharged on vancomycin for 6 weeks and follow-up with infectious disease.  Patient was also seen by orthopedic surgery during the course of stay, in the setting of limited support for mobility due to recent back surgery as well as infection, patient ultimately  proceeded with ORIF for the right radius on 6/11.  Patient had a difficulty with urination and had a urinary retention, Mcmahon was placed, voiding trial was attempted on 6/9 but patient was unable to void afterwards, Mcmahon replaced on 6/9 evening.  Discussed with patient in great details regarding this including risk of increased risk of infection from UTI with Mcmahon catheter, however patient states due to limited mobility, she would like to keep the Mcmahon again until reevaluated that either acute rehab or skilled nursing when she is more mobile, then possible removal.  Patient had a insurance denial for her acute rehab on 6/12, peer to peer was called in, still denied.  Currently in the process for appeal.  Patient is currently medically ready for discharge once discharge disposition is found (SNF vs AR).         I have spent > 30min discharging the pt, care time spent include discharge planning, medication reconciliation and discussion of discharge instruction/follow up with the patient.    This note is created using voice recognition software. All efforts are made to minimize errors due to dictation.           Pertinent Physical Exam At Time of Discharge    Constitutional: Well developed, awake/alert/oriented x3, no distress  ENMT: mucous membranes moist  Head/Neck: Neck supple  Respiratory/Thorax: CTA b/l.   Cardiovascular: Regular, rate and rhythm, no murmurs  Gastrointestinal: Nondistended, soft, non-tender  Musculoskeletal: ROM intact  Skin: prevena drain removed today  Extremities: R-forearm in surgical dressing, finger movement / sensation intact    Home Medications     Medication List      START taking these medications     vancomycin IVPB; Commonly known as: Vancocin; Infuse 200 mL (1 g) at 200   mL/hr over 60 minutes into a venous catheter every 12 hours.     CHANGE how you take these medications     sennosides-docusate sodium 8.6-50 mg tablet; Commonly known as:   Maggie-Colace; Take 1 tablet by mouth once  daily at bedtime.; What changed:   how much to take, when to take this     CONTINUE taking these medications     acetaminophen 500 mg tablet; Commonly known as: Tylenol; Take 1 tablet   (500 mg) by mouth every 8 hours if needed for mild pain (1 - 3) or   moderate pain (4 - 6) for up to 30 doses.   biotin 10,000 mcg capsule   cholecalciferol 25 MCG (1000 UT) tablet; Commonly known as: Vitamin D-3   docusate sodium 100 mg capsule; Commonly known as: Colace   HYDROcodone-acetaminophen 5-325 mg tablet; Commonly known as: Norco;   Take 1 tablet by mouth every 8 hours if needed for severe pain (7 - 10).   multivitamin with minerals tablet   NON FORMULARY   polyethylene glycol 17 gram packet; Commonly known as: Glycolax,   Miralax; Take 17 g by mouth 3 times a day as needed (constipation).     STOP taking these medications     aspirin-acetaminophen-caffeine 250-250-65 mg tablet; Commonly known as:   Excedrin Migraine   bethanechol 5 mg tablet; Commonly known as: Urecholine   bisacodyl 5 mg EC tablet; Commonly known as: Dulcolax   cyclobenzaprine 10 mg tablet; Commonly known as: Flexeril   heparin (porcine) 5,000 unit/mL injection   ibuprofen 200 mg tablet   insulin lispro 100 unit/mL injection; Commonly known as: HumaLOG   pantoprazole 40 mg EC tablet; Commonly known as: ProtoNix   pregabalin 50 mg capsule; Commonly known as: Lyrica   sulfamethoxazole-trimethoprim 800-160 mg tablet; Commonly known as:   Bactrim DS   traMADol 50 mg tablet; Commonly known as: Ultram       Outpatient Follow-Up  Future Appointments   Date Time Provider Department Center   7/1/2024  2:30 PM Gaurang Russell PA-C [id number] [facility]   8/2/2024  9:00 AM Justin Lu MD [id number] [facility]       Rik Lawson MD

## 2024-06-13 NOTE — PROGRESS NOTES
"Khalida Barker is a 73 y.o. female on day 6 of admission presenting with Closed fracture of distal end of right radius.    Subjective   Sitting up in chair upon entering room.  Does describe some \"soreness\" in right wrist area but overall doing well.  No complaints of numbness or tingling.       Objective     Physical Exam  Constitutional:       Appearance: Normal appearance.   HENT:      Head: Normocephalic and atraumatic.      Nose: Nose normal.      Mouth/Throat:      Mouth: Mucous membranes are moist.   Cardiovascular:      Rate and Rhythm: Normal rate.   Pulmonary:      Effort: Pulmonary effort is normal.   Abdominal:      General: Abdomen is flat.      Palpations: Abdomen is soft.   Musculoskeletal:      Cervical back: Neck supple.      Comments: Right forearm with splint in place, fingers of right hand with intact movement and sensation pink warm and dry with brisk capillary refill, sensation in right upper extremity intact   Skin:     General: Skin is warm and dry.   Neurological:      General: No focal deficit present.      Mental Status: She is alert.   Psychiatric:         Mood and Affect: Mood normal.         Last Recorded Vitals  Blood pressure 96/50, pulse 84, temperature 36.1 °C (97 °F), temperature source Temporal, resp. rate 18, height 1.651 m (5' 5\"), SpO2 97%.  Intake/Output last 3 Shifts:  I/O last 3 completed shifts:  In: -   Out: 3327 [Urine:3275; Drains:52]    Relevant Results  Results for orders placed or performed during the hospital encounter of 06/04/24 (from the past 24 hour(s))   CBC   Result Value Ref Range    WBC 5.1 4.4 - 11.3 x10*3/uL    nRBC 0.0 0.0 - 0.0 /100 WBCs    RBC 2.76 (L) 4.00 - 5.20 x10*6/uL    Hemoglobin 7.8 (L) 12.0 - 16.0 g/dL    Hematocrit 26.2 (L) 36.0 - 46.0 %    MCV 95 80 - 100 fL    MCH 28.3 26.0 - 34.0 pg    MCHC 29.8 (L) 32.0 - 36.0 g/dL    RDW 15.1 (H) 11.5 - 14.5 %    Platelets 464 (H) 150 - 450 x10*3/uL   Basic Metabolic Panel   Result Value Ref Range    Glucose " 79 74 - 99 mg/dL    Sodium 137 136 - 145 mmol/L    Potassium 3.9 3.5 - 5.3 mmol/L    Chloride 103 98 - 107 mmol/L    Bicarbonate 25 21 - 32 mmol/L    Anion Gap 13 10 - 20 mmol/L    Urea Nitrogen 12 6 - 23 mg/dL    Creatinine 0.76 0.50 - 1.05 mg/dL    eGFR 84 >60 mL/min/1.73m*2    Calcium 8.3 (L) 8.6 - 10.3 mg/dL   Magnesium   Result Value Ref Range    Magnesium 1.96 1.60 - 2.40 mg/dL   Vancomycin   Result Value Ref Range    Vancomycin 22.3 (H) 5.0 - 20.0 ug/mL     Scheduled medications  acetaminophen, 650 mg, oral, q6h  [Held by provider] ceFAZolin, 2 g, intravenous, q8h  cholecalciferol, 1,000 Units, oral, Daily  cyclobenzaprine, 5 mg, oral, TID  enoxaparin, 40 mg, subcutaneous, Daily  HYDROmorphone, 0.4 mg, intravenous, Once  lidocaine, 5 mL, infiltration, Once  pantoprazole, 40 mg, oral, Daily before breakfast  polyethylene glycol, 17 g, oral, BID  pregabalin, 50 mg, oral, Nightly  sennosides-docusate sodium, 1 tablet, oral, Nightly  tamsulosin, 0.4 mg, oral, Daily  vancomycin, 1,500 mg, intravenous, q24h      Continuous medications     PRN medications  PRN medications: alteplase, diphenhydrAMINE, HYDROcodone-acetaminophen, HYDROmorphone, melatonin, vancomycin    Assessment/Plan   Principal Problem:    Closed fracture of distal end of right radius  Active Problems:    Weakness    Closed fracture of distal end of right radius, unspecified fracture morphology, initial encounter    Acute blood loss as cause of postoperative anemia    Thoracogenic scoliosis of thoracolumbar region    POD #2 s/p ORIF right wrist  Continue PT/OT, NWB right hand/wrist, may bear weight right forearm for   Using incentive spirometer   Right UE sling for comfort  Reviewed am labs  Multimodal pain regimen  Surgical splint dressing to remain in place until follow up appointment with Dr Gonzalez  VTE prophylaxis: lovenox  When appropriate, will discharge home with [facility]  Follow up with Dr. Gonzalez in 2 weeks       I spent 15 minutes in the  professional and overall care of this patient.      Vivienne James PA-C

## 2024-06-13 NOTE — PROGRESS NOTES
Vancomycin Dosing by Pharmacy- FOLLOW UP    Khalida Barker is a 73 y.o. year old female who Pharmacy has been consulted for vancomycin dosing for bone and joint infection. Based on the patient's indication and renal status this patient is being dosed based on a goal AUC of 400-600.     Renal function is currently stable.    Current vancomycin dose: 1000 mg given every 12 hours    Estimated vancomycin AUC on current dose: 608 mg/L.hr     Visit Vitals  BP 96/50 (BP Location: Left arm, Patient Position: Lying)   Pulse 84   Temp 36.1 °C (97 °F) (Temporal)   Resp 18        Lab Results   Component Value Date    CREATININE 0.76 2024    CREATININE 0.63 2024    CREATININE 0.65 2024    CREATININE 0.74 06/10/2024        Patient weight is as follows: There were no vitals filed for this visit.    Cultures:  Susceptibility data for the encounter in last 14 days.  Collected Specimen Info Organism Clindamycin Erythromycin Oxacillin Tetracycline Trimethoprim/Sulfamethoxazole Vancomycin   24 Swab from ABSCESS Staphylococcus aureus         24 Swab from ABSCESS Staphylococcus aureus         24 Swab from ABSCESS Mixed Skin Microorganisms          24 Swab from ABSCESS Staphylococcus aureus         24 Tissue/Biopsy from Wound/Tissue Methicillin Resistant Staphylococcus aureus (MRSA)  S  R  R  S  S  S        I/O last 3 completed shifts:  In: -   Out: 3327 [Urine:3275; Drains:52]  I/O during current shift:  No intake/output data recorded.    Temp (24hrs), Av.3 °C (97.4 °F), Min:36.1 °C (97 °F), Max:36.5 °C (97.7 °F)      Assessment/Plan    Above goal AUC. Orders placed for new vancomcyin regimen of 1500 mg every 24 hours to begin at 2100.    This dosing regimen is predicted by [employer] to result in the following pharmacokinetic parameters:  Loading dose: N/A  Regimen: 1500 mg IV every 24 hours.  Start time: 13:58 on 2024  Exposure target: AUC24 (range)400-600 mg/L.hr   AUC24,ss: 471  mg/L.hr  Probability of AUC24 > 400: 96 %  Ctrough,ss: 13.1 mg/L  Probability of Ctrough,ss > 20: 1 %  Probability of nephrotoxicity (Lodise CAYDEN 2009): 8 %    The next level will be obtained on 6/15 at 0500. May be obtained sooner if clinically indicated.   Will continue to monitor renal function daily while on vancomycin and order serum creatinine at least every 48 hours if not already ordered.  Follow for continued vancomycin needs, clinical response, and signs/symptoms of toxicity.       Jad Odom, PharmD

## 2024-06-13 NOTE — PROGRESS NOTES
Physical Therapy    Physical Therapy    Physical Therapy Treatment    Patient Name: Khalida Barker  MRN: [id number]  Today's Date: 6/13/2024  Time Calculation  Start Time: 1446  Stop Time: 1510  Time Calculation (min): 24 min     4112/4112-A       06/13/24 1446   PT  Visit   PT Received On 06/13/24   Response to Previous Treatment Patient with no complaints from previous session.   General   Reason for Referral re-eval after ORIF right radius 6/11   Referred By Rik Lawson MD   Past Medical History Relevant to Rehab To hospital for inability to care for self after recent back surgery as well as right distal radius fracture. 5/14/24 pt underwent T10-Pelvis Instrumented Fusion, Correction of Scoliotic Deformity, L1-2, L2-3, L3-4, L4-5, L5-S1 posterior column osteotomies, L3-4 anterior column osteotomy through disc space,  L1-S1 Decompression, with L2-3, L3-4, L4-5, L5-S1 Transforaminal Lumbar Interbody Fusion, T11-T12 Decompression. Pt underwent lumbar spine I & D 6/6/24.  Patient had ORIF right radius completed by Dr Gonzalez 6/11. PMH: left LE radiculopathy and scoliosis, right TKA   Prior to Session Communication Bedside nurse   Patient Position Received Up in chair;Alarm on   General Comment Pt agreeable to therapy and cleared by nurse.   Precautions   Medical Precautions Fall precautions   Post-Surgical Precautions Spinal precautions   Splinting cast to RUE   Pain Assessment   Pain Assessment 0-10   Effect of Pain on Daily Activities .   Cognition   Overall Cognitive Status WFL   Therapeutic Exercise   Therapeutic Exercise Performed Yes   Therapeutic Exercise Activity 1 Pt states she performs therex multiple times a day. Therapist educated pt on and reviewed therex.   Transfers   Transfer Yes   Transfer 1   Transfer From 1 Chair with arms to   Transfer to 1 Sit;Stand   Technique 1 Sit to stand;Stand to sit   Transfer Device 1 Jesus-walker   Transfer Level of Assistance 1 Moderate assistance;Moderate verbal cues;+2;Moderate  tactile cues   Trials/Comments 1 x5 STS (including x3 in a row for increased strength and stability) from chair level with B knees blocked while maintaining NWB RUE status. modAx2 with cues for sequencing and safety, good follow through. Jesus walker used to maintain balance with standing.   Activity Tolerance   Endurance Decreased tolerance for upright activites   PT Assessment   PT Assessment Results Decreased strength;Decreased endurance;Impaired balance;Decreased mobility   Rehab Prognosis Good   End of Session Communication Bedside nurse   Assessment Comment Pt tolerated session well. Multiple STS with B knees blocked, modAx2. Decreased assist to block B knees with 2nd and 3rd STS. Increased assist to block B knees with 4th and 5th STS d/t fatigue and increased weakness.   End of Session Patient Position Up in chair;Alarm on     Outcome Measures:  [facility] Basic Mobility  Turning from your back to your side while in a flat bed without using bedrails: A lot  Moving from lying on your back to sitting on the side of a flat bed without using bedrails: Total  Moving to and from bed to chair (including a wheelchair): A lot  Standing up from a chair using your arms (e.g. wheelchair or bedside chair): A lot  To walk in hospital room: Total  Climbing 3-5 steps with railing: Total  Basic Mobility - Total Score: 9                             EDUCATION:  Outpatient Education  Individual(s) Educated: Patient  Education Provided: Fall Risk  Education Documentation  Body Mechanics, taught by Court Jonas PTA at 6/13/2024  3:26 PM.  Learner: Patient  Readiness: Acceptance  Method: Explanation  Response: Verbalizes Understanding, Needs Reinforcement    Precautions, taught by Court Jonas PTA at 6/13/2024  3:26 PM.  Learner: Patient  Readiness: Acceptance  Method: Explanation  Response: Verbalizes Understanding, Needs Reinforcement    ADL Training, taught by Court Jonas PTA at 6/13/2024  3:26 PM.  Learner: Patient  Readiness:  Acceptance  Method: Explanation  Response: Verbalizes Understanding, Needs Reinforcement    Home Exercise Program, taught by Court Jonas PTA at 6/13/2024  3:26 PM.  Learner: Patient  Readiness: Acceptance  Method: Explanation  Response: Verbalizes Understanding, Needs Reinforcement    Precautions, taught by Court Jonas PTA at 6/13/2024  3:26 PM.  Learner: Patient  Readiness: Acceptance  Method: Explanation  Response: Verbalizes Understanding, Needs Reinforcement    Body Mechanics, taught by Court Jonas PTA at 6/13/2024  3:26 PM.  Learner: Patient  Readiness: Acceptance  Method: Explanation  Response: Verbalizes Understanding, Needs Reinforcement    Mobility Training, taught by Court Jonas PTA at 6/13/2024  3:26 PM.  Learner: Patient  Readiness: Acceptance  Method: Explanation  Response: Verbalizes Understanding, Needs Reinforcement    Education Comments  No comments found.        GOALS:  Encounter Problems       Encounter Problems (Active)       PT Problem       Pt will demonstrate Odell for all bed mobility   (Progressing)       Start:  06/05/24    Expected End:  06/19/24            Pt will demonstrate good dynamic standing balance while performing a functional task.   (Progressing)       Start:  06/05/24    Expected End:  06/19/24            Pt will progress to completing 3 x 20 supine/seated exercises in order to increase strength and improve gait mechanics.         Start:  06/07/24    Expected End:  06/21/24                Pt will complete sit <> stand and bed <> chair transfers with min A.         Start:  06/07/24    Expected End:  06/21/24                Pt will ambulate 30 feet using platform FWW with no LOB.       Start:  06/12/24    Expected End:  06/26/24                   Pain - Adult

## 2024-06-13 NOTE — PROGRESS NOTES
"Occupational Therapy    OT Treatment    Patient Name: Khalida Barker  MRN: [id number]  Today's Date: 6/13/2024  Time Calculation  Start Time: 1447  Stop Time: 1520  Time Calculation (min): 33 min       4112/4112-A    Assessment:  OT Assessment: Patient tolerated session well and could benefit from continued O.T. services secondary to decreased independence in ADL tasks and functional mobility.       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    Muscle weakness (generalized)    Positive colorectal cancer screening using Cologuard test    Primary osteoarthritis of right knee    Seborrheic keratoses    Tinnitus    Vitamin D deficiency    Left sided abdominal pain    BMI 30.0-30.9,adult    Cervicalgia    Cervical paraspinal muscle spasm    Non-smoker    S/P total knee arthroplasty, right    Right knee pain    Knee stiffness, right    Chronic left shoulder pain    Neck pain    Thoracogenic scoliosis of thoracolumbar region    Scoliosis deformity of spine    Anxiety    C6 radiculopathy    Carpal tunnel syndrome    Low back pain, unspecified    Closed fracture of distal end of right radius    Weakness    Closed fracture of distal end of right radius, unspecified fracture morphology, initial encounter    Acute blood loss as cause of postoperative anemia       General:  OT Received On: 06/13/24  Reason for Referral: re-eval after ORIF right radius 6/11  Prior to Session Communication: Bedside nurse  Patient Position Received: Up in chair  General Comment: Patient more than willing to work with therapy.    Vital Signs:       Pain:  Pain Assessment  Pain Assessment:  (When asked, patient stated her RUE was not painful, but she was \"aware\" of it.)  Objective      Activities of Daily Living:                        Functional Standing Tolerance:       Bed " Mobility/Transfers:    Transfers  Transfer: Yes  Transfer 1  Transfer From 1: Sit to  Transfer to 1: Stand  Transfer Device 1: Jesus-walker  Transfer Level of Assistance 1: Moderate assistance  Trials/Comments 1: Assist of two for safety secondary to possible buckling of B knees, per patient.   Patient stood five times. The first two times patient stood approximately four minutes each time. The last three stands were done one right after the other. (With the last sit to stand patient's knees appeared slightly shaky.)  Transfers 2  Trials/Comments 2: This FRANCES supported patient's RUE during sit to stands. While patient was standing she was able to relax her R elbow and let her arm dangle with no c/o pain or discomfort. Patient held on to jesus walker with L hand.                Therapy/Activity:               Strength:       Other Activity:       Outcome Measures:[facility] Daily Activity  Putting on and taking off regular lower body clothing: A lot  Bathing (including washing, rinsing, drying): A lot  Putting on and taking off regular upper body clothing: A lot  Toileting, which includes using toilet, bedpan or urinal: A lot  Taking care of personal grooming such as brushing teeth: A little  Eating Meals: None  Daily Activity - Total Score: 15  Education Documentation  No documentation found.  Education Comments  No comments found.        EDUCATION:  Education  Individual(s) Educated: Patient  Education Provided: Fall precautons (spine precautions, WB right UE)  Diagnosis and Precautions: spinal precautions, NWB RUE  Patient/Caregiver Demonstrated Understanding: yes  Plan of Care Discussed and Agreed Upon: yes  Patient Response to Education: Patient/Caregiver Verbalized Understanding of Information    Goals:  Encounter Problems       Encounter Problems (Active)       Dressings Lower Extremities       STG - Patient will complete lower body dressing with mod assist with comp strategies and AE  (Progressing)       Start:   06/12/24    Expected End:  06/26/24               Functional Balance       STG-Patient will be min assist static stand task >5 minutes for ADL completion  (Progressing)       Start:  06/12/24    Expected End:  06/26/24               OT Transfers       STG-Patient will be mod assist with functional transfers demonstrating good safety   (Progressing)       Start:  06/12/24    Expected End:  06/26/24               Safety       STG-Patient will independently verbalize spine precautions with all functional tasks   (Progressing)       Start:  06/12/24    Expected End:  06/26/24                          No

## 2024-06-13 NOTE — PROGRESS NOTES
06/13/24 0830   Discharge Planning   Living Arrangements Spouse/significant other   Support Systems Spouse/significant other   Type of Residence Private residence   Home or Post Acute Services Post acute facilities (Rehab/SNF/etc)   Type of Post Acute Facility Services Rehab   Patient expects to be discharged [facility]: [facility]   Does the patient need discharge transport arranged? Yes   RoundTrip coordination needed? Yes     [facility] has been denied after P2P. Pt wants to pursue appeal. FAR will initiate the appeal.

## 2024-06-13 NOTE — PROGRESS NOTES
"Khalida Barker is a 73 y.o. female on day 6 of admission presenting with Closed fracture of distal end of right radius and wound infection     Subjective   No new changes or concerns    Objective     Physical Exam  Awake  Ox3  LUE 5/5  RLE 4/5  LLE Df/PF 3/5  Stands to pivot with assistance from chair to bed and back    Last Recorded Vitals  Blood pressure 96/50, pulse 84, temperature 36.1 °C (97 °F), temperature source Temporal, resp. rate 18, height 1.651 m (5' 5\"), SpO2 97%.  Intake/Output last 3 Shifts:  I/O last 3 completed shifts:  In: -   Out: 3327 [Urine:3275; Drains:52]      Assessment/Plan   Principal Problem:    Closed fracture of distal end of right radius  Active Problems:    Weakness    Closed fracture of distal end of right radius, unspecified fracture morphology, initial encounter    Acute blood loss as cause of postoperative anemia    Thoracogenic scoliosis of thoracolumbar region    73 year old with h/o severe LBP and LLE radiculopathy and scoliosis, 5/14 s/p L45-hzfqme, multi-level PCOs, L2-S1 TLIF, 6/5 presents after fall, found to have wound infection and R wrist fracture     6/6 s/p wound washout and revision, prevena placed     Plan:   No appreciable changes, was able to stand at bedside with 2 person assist and shuffle to chair on visit today  OR with ORIF right wrist prior day  Medicine primary   POD7 today, drain output largely reduced and removed at bedside today. Preveena wound vac also discontinued  Incision CDI with staples. No regions of dehiscence or soft granulation tissue.   Appreciate infectious disease recommendations for antibiotics.  Recommend q2 hour turning to keep pressure off the incision   Mobilize with PTOT  We will continue to follow  Dispo placement pending to rehab. Patient would benefit greatly from intense inpatient therapy for return of leg strength and stability to avoid further falls.                Gaurang Russell PA-C      "

## 2024-06-13 NOTE — PROGRESS NOTES
For follow up of MRSA surgical spine infection    Subjective   She reports no new problems today.        Current Facility-Administered Medications   Medication Dose Route Frequency Provider Last Rate Last Admin    acetaminophen (Tylenol) tablet 650 mg  650 mg oral q6h Dale Gonzalez MD   650 mg at 06/11/24 1645    alteplase (Cathflo Activase) injection 2 mg  2 mg intra-catheter PRN Ramon Tang MD        [Held by provider] ceFAZolin in dextrose (iso-os) (Ancef) IVPB 2 g  2 g intravenous q8h Dale Gonzalez MD   Stopped at 06/09/24 0701    cholecalciferol (Vitamin D-3) tablet 1,000 Units  1,000 Units oral Daily Dale Gonzalez MD   1,000 Units at 06/13/24 0925    cyclobenzaprine (Flexeril) tablet 5 mg  5 mg oral TID Dale Gonzalez MD   5 mg at 06/13/24 1452    diphenhydrAMINE (Sominex) tablet 25 mg  25 mg oral q6h PRN Dale Gonzalez MD        enoxaparin (Lovenox) syringe 40 mg  40 mg subcutaneous Daily Dale Gonzalez MD   40 mg at 06/13/24 0900    HYDROcodone-acetaminophen (Norco) 5-325 mg per tablet 1 tablet  1 tablet oral q6h PRN Dale Gonzalez MD   1 tablet at 06/13/24 1703    HYDROmorphone (Dilaudid) injection 0.4 mg  0.4 mg intravenous Once Dale Gonzalez MD        HYDROmorphone PF (Dilaudid) injection 0.2 mg  0.2 mg intravenous q3h PRN Dale Gonzalez MD   0.2 mg at 06/12/24 0608    lidocaine PF (Xylocaine) 10 mg/mL (1 %) injection 50 mg  5 mL infiltration Once Ramon Tang MD        melatonin tablet 5 mg  5 mg oral Nightly PRN Dale Gonzalez MD   5 mg at 06/12/24 2201    pantoprazole (ProtoNix) EC tablet 40 mg  40 mg oral Daily before breakfast Dale Gonzalez MD   40 mg at 06/13/24 0510    polyethylene glycol (Glycolax, Miralax) packet 17 g  17 g oral BID Dale Gonzalez MD   17 g at 06/13/24 0926    pregabalin (Lyrica) capsule 50 mg  50 mg oral Nightly Dale Gonzalez MD        sennosides-docusate sodium (Maggie-Colace) 8.6-50 mg per tablet 1 tablet  1 tablet oral Nightly Dale MILLER  MD Lisa   1 tablet at 06/12/24 2156    tamsulosin (Flomax) 24 hr capsule 0.4 mg  0.4 mg oral Daily Dale Gonzalez MD   0.4 mg at 06/13/24 0926    vancomycin (Vancocin) in  mL IV 1,500 mg  1,500 mg intravenous q24h Dale Gonzalez MD        vancomycin (Vancocin) pharmacy to dose - pharmacy monitoring   miscellaneous Daily PRN Dale Gonzalez MD            Objective     Last Recorded Vitals  /64 (BP Location: Right arm, Patient Position: Sitting)   Pulse 90   Temp 36.4 °C (97.5 °F) (Temporal)   Resp 18   SpO2 99%     General: no acute distress, interactive, sitting up in bed, eating dinner  HEENT: pink pharynx  CVS: RRR  Resp: decreased breath sounds in bases  ABD: soft, NT, ND  EXT: Right wrist wrapped  Skin: Back surgical site covered; left upper extremity PICC line is clean dry and intact    Relevant Results    6/6/2024 surgical culture shows MRSA    6/12/2024 creatinine 0.6      Assessment/Plan     Principal Problem:    Closed fracture of distal end of right radius  Active Problems:    Weakness    Closed fracture of distal end of right radius, unspecified fracture morphology, initial encounter    Acute blood loss as cause of postoperative anemia    Thoracogenic scoliosis of thoracolumbar region    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  I have personally and independently reviewed and interpreted her laboratory tests, imaging studies, and the documentation from other healthcare providers.  I am monitoring for side effects from IV vancomycin as outlined in a previous note.  I discussed her medical care with Dr. Lawson.  When she is ready for discharge to acute rehab, she can go on vancomycin.      -Can discharge when ready on IV vancomycin to complete a 6-week total course   -Collect weekly blood work while on IV antibiotics  -Can follow-up in ID clinic in approximately 3 weeks    Other issues:  #Right distal radius fracture  status post surgical repair               Ramon Tang MD

## 2024-06-13 NOTE — DISCHARGE INSTRUCTIONS
#it was my pleasure taking care of you during this hospitalization    You are admitted to the hospital due to a fall with result injury to your right wrist.  During this hospitalization, you had a workup showing concern for infection in your back from most recent surgery.  You were taken by neurosurgery for a washout procedure.  Culture did grow MRSA infection.  You were treated with IV antibiotic vancomycin, and the infectious disease team recommend to continue this for 6 weeks.  A PICC line was placed for this purpose.    You were also seen by orthopedic team for right wrist fracture, surgery was done for the repair.    Please continue to follow-up with neurosurgery and orthopedic, as well as infectious disease after discharge.    Please obtain weekly lab work/blood draws while on the IV antibiotics/IV vanco

## 2024-06-13 NOTE — CARE PLAN
The patient's goals for the shift include      The clinical goals for the shift include pain will be managed at a tolerable level this shift; patient will ambulate once this shift and participate in ROM by end of shift.    Patient's pain was well managed with Norco. Patient participated in ROM and was up out of bed to Commode and to chair this morning. Patient had a large BM, loose. VSS. No output from drains. Mcmahon cath patent with good output. No issues overnight.    Problem: Safety  Goal: Patient will be injury free during hospitalization  Outcome: Progressing     Problem: Discharge Barriers  Goal: My discharge needs are met  Outcome: Progressing     Problem: Pain  Goal: My pain/discomfort is manageable  Outcome: Met     Problem: Safety  Goal: I will remain free of falls  Outcome: Met     Problem: Daily Care  Goal: Daily care needs are met  Outcome: Met     Problem: Psychosocial Needs  Goal: Demonstrates ability to cope with hospitalization/illness  Outcome: Met  Goal: Collaborate with me, my family, and caregiver to identify my specific goals  Outcome: Met     Problem: Skin  Goal: Decreased wound size/increased tissue granulation at next dressing change  Outcome: Met  Goal: Participates in plan/prevention/treatment measures  Outcome: Met  Goal: Prevent/manage excess moisture  Outcome: Met  Goal: Prevent/minimize sheer/friction injuries  Outcome: Met  Goal: Promote/optimize nutrition  Outcome: Met

## 2024-06-14 PROCEDURE — 2500000001 HC RX 250 WO HCPCS SELF ADMINISTERED DRUGS (ALT 637 FOR MEDICARE OP): Performed by: ORTHOPAEDIC SURGERY

## 2024-06-14 PROCEDURE — 2500000002 HC RX 250 W HCPCS SELF ADMINISTERED DRUGS (ALT 637 FOR MEDICARE OP, ALT 636 FOR OP/ED): Mod: MUE | Performed by: ORTHOPAEDIC SURGERY

## 2024-06-14 PROCEDURE — 2500000004 HC RX 250 GENERAL PHARMACY W/ HCPCS (ALT 636 FOR OP/ED): Performed by: ORTHOPAEDIC SURGERY

## 2024-06-14 PROCEDURE — 97110 THERAPEUTIC EXERCISES: CPT | Mod: GP,CQ

## 2024-06-14 PROCEDURE — 97530 THERAPEUTIC ACTIVITIES: CPT | Mod: GP,CQ

## 2024-06-14 PROCEDURE — 1100000001 HC PRIVATE ROOM DAILY

## 2024-06-14 PROCEDURE — 99233 SBSQ HOSP IP/OBS HIGH 50: CPT | Performed by: INTERNAL MEDICINE

## 2024-06-14 ASSESSMENT — PAIN - FUNCTIONAL ASSESSMENT
PAIN_FUNCTIONAL_ASSESSMENT: 0-10

## 2024-06-14 ASSESSMENT — PAIN SCALES - GENERAL
PAINLEVEL_OUTOF10: 7
PAINLEVEL_OUTOF10: 7
PAINLEVEL_OUTOF10: 3
PAINLEVEL_OUTOF10: 7
PAINLEVEL_OUTOF10: 4
PAINLEVEL_OUTOF10: 0 - NO PAIN
PAINLEVEL_OUTOF10: 1
PAINLEVEL_OUTOF10: 7

## 2024-06-14 ASSESSMENT — PAIN DESCRIPTION - LOCATION: LOCATION: BACK

## 2024-06-14 ASSESSMENT — COGNITIVE AND FUNCTIONAL STATUS - GENERAL
MOVING TO AND FROM BED TO CHAIR: A LOT
STANDING UP FROM CHAIR USING ARMS: A LOT
MOBILITY SCORE: 9
WALKING IN HOSPITAL ROOM: TOTAL
TURNING FROM BACK TO SIDE WHILE IN FLAT BAD: TOTAL
CLIMB 3 TO 5 STEPS WITH RAILING: TOTAL
MOVING FROM LYING ON BACK TO SITTING ON SIDE OF FLAT BED WITH BEDRAILS: A LOT

## 2024-06-14 NOTE — CARE PLAN
The clinical goals for the shift include Pt will rate pain as tolerable and have some good rest overnight.    Over the shift, the patient did make progress toward the following goals.       Problem: Safety  Goal: Patient will be injury free during hospitalization  Outcome: Progressing     Problem: Discharge Barriers  Goal: My discharge needs are met  Outcome: Progressing     Problem: Skin  Goal: Promote skin healing  Outcome: Progressing     Problem: Dressings Lower Extremities  Goal: STG - Patient will complete lower body dressing with mod assist with comp strategies and AE   Outcome: Progressing     Problem: Pain - Adult  Goal: Verbalizes/displays adequate comfort level or baseline comfort level  Outcome: Progressing     Problem: Safety - Adult  Goal: Free from fall injury  Outcome: Progressing     Problem: Discharge Planning  Goal: Discharge to home or other facility with appropriate resources  Outcome: Progressing     Problem: Chronic Conditions and Co-morbidities  Goal: Patient's chronic conditions and co-morbidity symptoms are monitored and maintained or improved  Outcome: Progressing     Problem: Pain  Goal: Walks with improved pain control throughout the shift  Outcome: Progressing

## 2024-06-14 NOTE — PROGRESS NOTES
"Khalida Barker is a 73 y.o. female on day 7 of admission presenting with Closed fracture of distal end of right radius.    Subjective   Patient seen and examined, no acute events over night  Awaiting approval for acute rehab/appeal process  She denies CP, SOB, abd pain, nausea, vomiting or diarrhea       Objective     Physical Exam  Vitals reviewed.   Constitutional:       Appearance: Normal appearance.   HENT:      Head: Normocephalic and atraumatic.      Nose: Nose normal.   Cardiovascular:      Rate and Rhythm: Normal rate and regular rhythm.      Pulses: Normal pulses.      Heart sounds: Normal heart sounds.   Pulmonary:      Effort: Pulmonary effort is normal.      Breath sounds: Normal breath sounds. No rales.   Abdominal:      General: Abdomen is flat. Bowel sounds are normal.      Palpations: Abdomen is soft.      Tenderness: There is no abdominal tenderness.   Musculoskeletal:      Comments: Right arm dressing intact   Skin:     General: Skin is warm and dry.   Neurological:      Mental Status: She is alert and oriented to person, place, and time. Mental status is at baseline.   Psychiatric:         Mood and Affect: Mood normal.         Last Recorded Vitals  Blood pressure 108/64, pulse 103, temperature 36.8 °C (98.2 °F), temperature source Temporal, resp. rate 18, height 1.651 m (5' 5\"), SpO2 99%.  Intake/Output last 3 Shifts:  I/O last 3 completed shifts:  In: -   Out: 2875 [Urine:2875]    Relevant Results  Scheduled medications  acetaminophen, 650 mg, oral, q6h  [Held by provider] ceFAZolin, 2 g, intravenous, q8h  cholecalciferol, 1,000 Units, oral, Daily  cyclobenzaprine, 5 mg, oral, TID  enoxaparin, 40 mg, subcutaneous, Daily  HYDROmorphone, 0.4 mg, intravenous, Once  lidocaine, 5 mL, infiltration, Once  pantoprazole, 40 mg, oral, Daily before breakfast  polyethylene glycol, 17 g, oral, BID  pregabalin, 50 mg, oral, Nightly  sennosides-docusate sodium, 1 tablet, oral, Nightly  tamsulosin, 0.4 mg, oral, " Daily  vancomycin, 1,500 mg, intravenous, q24h      Continuous medications     PRN medications  PRN medications: alteplase, diphenhydrAMINE, HYDROcodone-acetaminophen, HYDROmorphone, melatonin, vancomycin  Results from last 7 days   Lab Units 06/13/24  0509 06/12/24  0556 06/11/24  0608   WBC AUTO x10*3/uL 5.1 6.9 6.1   RBC AUTO x10*6/uL 2.76* 2.71* 2.70*   HEMOGLOBIN g/dL 7.8* 7.8* 7.7*     Results from last 7 days   Lab Units 06/13/24  0509 06/12/24  0556 06/11/24  0608   SODIUM mmol/L 137 136 136   POTASSIUM mmol/L 3.9 4.0 3.7   CHLORIDE mmol/L 103 104 104   CO2 mmol/L 25 26 24   BUN mg/dL 12 10 11   CREATININE mg/dL 0.76 0.63 0.65   CALCIUM mg/dL 8.3* 8.2* 7.9*   MAGNESIUM mg/dL 1.96 2.21 2.06       Assessment/Plan   Principal Problem:    Closed fracture of distal end of right radius  Active Problems:    Weakness    Closed fracture of distal end of right radius, unspecified fracture morphology, initial encounter    Acute blood loss as cause of postoperative anemia    Thoracogenic scoliosis of thoracolumbar region    Plan  -DC in 6/14, awaiting acceptance to rehab/appeal process to acute rehab  -Neurosurgery on consult , s/p wound washout and revision with prevena placement on 6/6  -NS agree with needing consult for ID, ID placed on consult,   -Patient was on Vanc+ Cefazolin, Wound culture grew MRSA, Cefazolin discontinued on 6/9, Per ID, recommend PICC line (placed on 6/12) and dc on vanc for 6 weeks, follow up with ID clinic after discharge, weekly lab draws while on IV Vanco  -Gray removed on 6/9 for voiding trial, but unable to void, gray replaced on 6/9 night.   -s/p ORIF of R- radius on 6/11  -pain control  -stool softeneters  -despite Flomax and straight cath x 3, gray catheter placed in the setting of surgery (and planned surgery on 6/11)  -dvtp: heparin subcutaneous  -am labs including cbc, bmp, mag  -dispo: denied p2p, currently waiting for appeal process, DC to rehab once accepted             I  spent 35 minutes in the professional and overall care of this patient.      Darling Herring, DO

## 2024-06-14 NOTE — PROGRESS NOTES
"Khalida Barker is a 73 y.o. female on day 7 of admission presenting with Closed fracture of distal end of right radius.    Subjective   Sitting up in chair with right upper extremity on pillows upon entering room.  Reports minimal pain in wrist area.       Objective     Physical Exam  Musculoskeletal:      Comments: Right wrist with splint in place fingers of right hand are visible warm dry with sensation present and movement intact         Last Recorded Vitals  Blood pressure 109/59, pulse 96, temperature 36.3 °C (97.3 °F), temperature source Temporal, resp. rate 18, height 1.651 m (5' 5\"), SpO2 98%.  Intake/Output last 3 Shifts:  I/O last 3 completed shifts:  In: -   Out: 2875 [Urine:2875]    Relevant Results  No results found for this or any previous visit (from the past 24 hour(s)).  Scheduled medications  acetaminophen, 650 mg, oral, q6h  [Held by provider] ceFAZolin, 2 g, intravenous, q8h  cholecalciferol, 1,000 Units, oral, Daily  cyclobenzaprine, 5 mg, oral, TID  enoxaparin, 40 mg, subcutaneous, Daily  HYDROmorphone, 0.4 mg, intravenous, Once  lidocaine, 5 mL, infiltration, Once  pantoprazole, 40 mg, oral, Daily before breakfast  polyethylene glycol, 17 g, oral, BID  pregabalin, 50 mg, oral, Nightly  sennosides-docusate sodium, 1 tablet, oral, Nightly  tamsulosin, 0.4 mg, oral, Daily  vancomycin, 1,500 mg, intravenous, q24h      Continuous medications     PRN medications  PRN medications: alteplase, diphenhydrAMINE, HYDROcodone-acetaminophen, HYDROmorphone, melatonin, vancomycin    Assessment/Plan   Principal Problem:    Closed fracture of distal end of right radius  Active Problems:    Weakness    Closed fracture of distal end of right radius, unspecified fracture morphology, initial encounter    Acute blood loss as cause of postoperative anemia    Thoracogenic scoliosis of thoracolumbar region    Postop day 3 of left wrist ORIF  Physical and Occupational Therapy are on consult  Weightbearing as tolerated " with walker  Patient is on Lovenox for VTE prophylaxis  Labs reviewed  Multimodal pain regime  Home medications ordered for chronic medical conditions as appropriate  Bowel regime  Encourage incentive spirometry  Plans on discharge to home with home health care  Follow-up with Dr. Gonzalez as directed       I spent 10 minutes in the professional and overall care of this patient.      Vivienne James PA-C

## 2024-06-14 NOTE — PROGRESS NOTES
06/14/24 1422   Discharge Planning   Living Arrangements Spouse/significant other   Support Systems Spouse/significant other   Type of Residence Private residence   Home or Post Acute Services Post acute facilities (Rehab/SNF/etc)   Type of Post Acute Facility Services Rehab   Patient expects to be [facility]: [facility]   Does the patient need discharge transport arranged? Yes   RoundTrip coordination needed? Yes     Approved for [facility] after appeal. No bed available today. Medical team, patient and family updated.

## 2024-06-14 NOTE — PROGRESS NOTES
"Nutrition Follow Up Assessment:   Nutrition Assessment         Patient is a 73 y.o. female presenting with recent T10-pelvis fusion, multilevel PCO, L2-S1 TLIF with Dr. Lu 5/14 admitted for placement.     6/14/24; follow up note. Ortho and Neurosurgery on consult. POD3 ORIF rt wrist. Pt remains on antibiotics. Per chart notes, acute rehab denied after peer to peer and pt now pursuing appeal. Pt didn't like the Gelatein supplement, will discontinue. Pt agreeable to continue Ensure Clear 1-2x/day \"it's more palatable\". Pt also agreeable to addition of ECC milkshake as she likes ice cream and familiar w/ECC. Pt feels she may have \"hospital food fatigue\" - will offer cafeteria menu to promote variety.     PMhx: Scoliosis, chronic back pain with lumbar radiculopathy status post T10-pelvis fusion, multilevel PCO, L2-S1 TLIF, diverticulosis, GERD, carpal tunnel syndrome, osteoarthritis, vitamin D deficiency     Nutrition History:  Energy Intake: Poor < 50 %  Vitamin/Herbal Supplement Use: Pt taking Vt D3 and Biotin at home prior to admit.  Food Allergies/Intolerances:  None  GI Symptoms:  laxative; loose stool yesterday  Oral Problems: Swallowing difficulty   Pain Score: 3     Anthropometrics:  Height: 165.1 cm (5' 5\")          IBW/kg (Dietitian Calculated): 56.7 kg  Percent of IBW: 144 %       Weight History:   Wt Readings from Last 10 Encounters:   06/04/24 81.6 kg (180 lb)   05/15/24 95.4 kg (210 lb 5.1 oz)   04/30/24 86.6 kg (191 lb)   03/08/24 88.9 kg (195 lb 14.4 oz)   02/29/24 86.6 kg (191 lb)   01/31/24 87.9 kg (193 lb 11.2 oz)   10/24/23 86.5 kg (190 lb 12.8 oz)   07/11/23 86.8 kg (191 lb 4 oz)   06/12/23 89.1 kg (196 lb 6 oz)   04/10/23 95.3 kg (210 lb)      Weight Change %:  Weight History / % Weight Change: Loss of 5kg (5.8%) in 1 month.  Significant Weight Loss: Yes    Nutrition Focused Physical Exam Findings:  defer: 6/11 - pt just returned from surgery - needs to lie still. 6/14-eating " breakfast  Subcutaneous Fat Loss:      Muscle Wasting:     Edema:  Edema Location: BUE and BLE - non pitting noted.  Physical Findings:  Skin: Positive (pale, warm, dry, bruising and surgical incisions (wrist and back).)    Nutrition Significant Labs:  CBC Trend:   Results from last 7 days   Lab Units 06/13/24  0509 06/12/24  0556 06/11/24  0608 06/10/24  0520   WBC AUTO x10*3/uL 5.1 6.9 6.1 6.0   RBC AUTO x10*6/uL 2.76* 2.71* 2.70* 2.66*   HEMOGLOBIN g/dL 7.8* 7.8* 7.7* 7.6*   HEMATOCRIT % 26.2* 25.7* 25.8* 25.2*   MCV fL 95 95 96 95   PLATELETS AUTO x10*3/uL 464* 441 381 399    , BMP Trend:   Results from last 7 days   Lab Units 06/13/24  0509 06/12/24  0556 06/11/24  0608 06/10/24  0520   GLUCOSE mg/dL 79 122* 97 97   CALCIUM mg/dL 8.3* 8.2* 7.9* 7.8*   SODIUM mmol/L 137 136 136 138   POTASSIUM mmol/L 3.9 4.0 3.7 3.8   CO2 mmol/L 25 26 24 24   CHLORIDE mmol/L 103 104 104 105   BUN mg/dL 12 10 11 10   CREATININE mg/dL 0.76 0.63 0.65 0.74    , Vit D:   Lab Results   Component Value Date    VITD25 32 09/09/2022        Nutrition Specific Medications:  Current Outpatient Medications   Medication Instructions    acetaminophen (TYLENOL) 500 mg, oral, Every 8 hours PRN    aspirin-acetaminophen-caffeine (Excedrin Migraine) 250-250-65 mg tablet 2 tablets, oral, Every 8 hours PRN, Do not resume until discussed restart of aspirin portion of medication at neurosurgery follow up appointment.    bethanechol (URECHOLINE) 5 mg, oral, 3 times daily    biotin 10,000 mcg capsule 1 capsule, oral, Daily, caps    bisacodyl (DULCOLAX) 5 mg, oral, Daily PRN, Do not crush, chew, or split.    cholecalciferol (VITAMIN D-3) 1,000 Units, oral, Daily    cyclobenzaprine (FLEXERIL) 10 mg, oral, 3 times daily PRN    docusate sodium (COLACE) 100 mg, oral, Daily    heparin (porcine) 5,000 Units, subcutaneous, Every 8 hours    HYDROcodone-acetaminophen (Norco) 5-325 mg tablet 1 tablet, oral, Every 8 hours PRN    ibuprofen 200 mg, oral, Every 6 hours  PRN    insulin lispro (HUMALOG) 0-5 Units, subcutaneous, 3 times daily (morning, midday, late afternoon), For use while on steroids. Okay to DC after completion of steroids. <BR>Take as directed per insulin instructions.    multivitamin with minerals tablet 1 tablet, oral, Daily    NON FORMULARY 1 each, oral, Daily, Viveran 200mg- pure caffience    pantoprazole (PROTONIX) 40 mg, oral, Daily before breakfast, Do not crush, chew, or split.    polyethylene glycol (GLYCOLAX, MIRALAX) 17 g, oral, 3 times daily PRN    pregabalin (LYRICA) 50 mg, oral, Nightly    sennosides-docusate sodium (Maggie-Colace) 8.6-50 mg tablet 2 tablets, oral, 2 times daily    sennosides-docusate sodium (Maggie-Colace) 8.6-50 mg tablet 1 tablet, oral, Nightly    sulfamethoxazole-trimethoprim (Bactrim DS) 800-160 mg tablet 1 tablet, oral, 2 times daily    traMADol (ULTRAM) 50 mg, oral, Every 6 hours PRN    vancomycin (Vancocin) IVPB 1 g, intravenous, Every 12 hours         I/O:   Last BM Date: 06/13/24; Stool Appearance: Loose (06/10/24 1719)    Dietary Orders (From admission, onward)       Start     Ordered    06/14/24 0951  Oral nutritional supplements  Until discontinued        Question Answer Comment   Deliver with Breakfast    Deliver with Lunch    Select supplement: Ensure Clear        06/14/24 0951    06/14/24 0951  Oral nutritional supplements  Until discontinued        Question Answer Comment   Deliver with Dinner    Select supplement: Product from home - please specify    Additional Details ECC milkshake - chocolate        06/14/24 0951    06/11/24 1640  Adult diet Regular  Diet effective now        Question:  Diet type  Answer:  Regular    06/11/24 1640                     Estimated Needs:   Total Energy Estimated Needs (kCal):  (8832-0547 kcal (25-30 kcal/kg))     Total Protein Estimated Needs (g):  (82-98g protein (1.0-1.2g/kg))     Total Fluid Estimated Needs (mL):  (2040mL (1mL/kcal))           Nutrition Diagnosis   Malnutrition  Diagnosis  Patient has Malnutrition Diagnosis: Yes  Diagnosis Status: Ongoing  Malnutrition Diagnosis: Moderate malnutrition related to acute disease or injury  As Evidenced by: significant weight loss of 5kg (5.8%) within the past month due to poor appetite and decreased oral intakes of <75% of estimated energy requirement.            Nutrition Interventions/Recommendations         Nutrition Prescription:  Individualized Nutrition Prescription Provided for : Diet: continue oral diet of Regular as ordered.        Nutrition Interventions:   Interventions: Meals and snacks, Medical food supplement  Meals and Snacks: General healthful diet  Goal: will consume 75% of meals  Medical Food Supplement: Commercial beverage  Goal: Will decrease Ensure Clear to 2x/day at breakfat and lunch meals (240kcal, 8g pro per 8oz serving), will discontinue Gelatein per pt. Will add ECC milkshake at dinner (247kcal, 5g pro for 4oz serving).  Additional Interventions: Will provide cafeteria menu to offer additional menu options as pt now hospital day 10.         Nutrition Education:   6/11: Discussed oral nutrition supplement options and suggested meal options that she could try to increase her oral intakes.  6/14: encouraged PO intakes and discussed other ONS options. Increased needs for healing reviewed.        Nutrition Monitoring and Evaluation   Food/Nutrient Related History Monitoring  Monitoring and Evaluation Plan: Energy intake  Energy Intake: Estimated energy intake  Criteria: pt will consume >75% of estimated energy needs w/meals and ONS    Body Composition/Growth/Weight History  Monitoring and Evaluation Plan: Weight  Weight: Measured weight  Criteria: to maintain stable wt - no further wt loss         Nutrition Focused Physical Findings  Monitoring and Evaluation Plan: Skin  Skin: Impaired wound healing  Criteria: surgical areas to heal - promote healing       Time Spent/Follow-up Reminder:   Time Spent (min): 30  minutes  Last Date of Nutrition Visit: 06/14/24  Nutrition Follow-Up Needed?: 3-8 days  Follow up Comment: [facility]

## 2024-06-14 NOTE — PROGRESS NOTES
"Khalida Barker is a 73 y.o. female on day 7 of admission presenting with Closed fracture of distal end of right radius and wound infection     Subjective   No significant change to patient or plan vs prior day.   Incision checked CDI  Recommend dressing changed twice daily.     Objective     Physical Exam  Awake  Ox3  LUE 5/5  RLE 4/5  LLE Df/PF 3/5  Stands to pivot with assistance from chair to bed and back    Last Recorded Vitals  Blood pressure 109/59, pulse 96, temperature 36.3 °C (97.3 °F), temperature source Temporal, resp. rate 18, height 1.651 m (5' 5\"), SpO2 98%.  Intake/Output last 3 Shifts:  I/O last 3 completed shifts:  In: -   Out: 2875 [Urine:2875]      Assessment/Plan   Principal Problem:    Closed fracture of distal end of right radius  Active Problems:    Weakness    Closed fracture of distal end of right radius, unspecified fracture morphology, initial encounter    Acute blood loss as cause of postoperative anemia    Thoracogenic scoliosis of thoracolumbar region    73 year old with h/o severe LBP and LLE radiculopathy and scoliosis, 5/14 s/p [id number], multi-level PCOs, L2-S1 TLIF, 6/5 presents after fall, found to have wound infection and R wrist fracture     6/6 s/p wound washout and revision, prevena placed     Plan:   No appreciable changes again   Medicine primary   POD8 today,    Incision CDI with staples. No regions of dehiscence or soft granulation tissue.   Appreciate infectious disease recommendations for antibiotics.  Recommend q2 hour turning to keep pressure off the incision   Mobilize with PTOT  We will continue to follow  Dispo placement pending to rehab. Patient would benefit greatly from intense inpatient therapy for return of leg strength and stability to avoid further falls.                Gaurang Russell PA-C      "

## 2024-06-14 NOTE — CARE PLAN
The patient's goals for the shift include      The clinical goals for the shift include pt will  have pain control    Over the shift, the patient did  make progress toward the following goals.

## 2024-06-15 VITALS
HEART RATE: 92 BPM | RESPIRATION RATE: 18 BRPM | TEMPERATURE: 98.4 F | SYSTOLIC BLOOD PRESSURE: 105 MMHG | OXYGEN SATURATION: 98 % | BODY MASS INDEX: 29.95 KG/M2 | HEIGHT: 65 IN | DIASTOLIC BLOOD PRESSURE: 51 MMHG

## 2024-06-15 LAB — VANCOMYCIN SERPL-MCNC: 23.1 UG/ML (ref 5–20)

## 2024-06-15 PROCEDURE — 2500000004 HC RX 250 GENERAL PHARMACY W/ HCPCS (ALT 636 FOR OP/ED): Performed by: ORTHOPAEDIC SURGERY

## 2024-06-15 PROCEDURE — 99232 SBSQ HOSP IP/OBS MODERATE 35: CPT | Performed by: INTERNAL MEDICINE

## 2024-06-15 PROCEDURE — 80202 ASSAY OF VANCOMYCIN: CPT | Performed by: ORTHOPAEDIC SURGERY

## 2024-06-15 PROCEDURE — 2500000001 HC RX 250 WO HCPCS SELF ADMINISTERED DRUGS (ALT 637 FOR MEDICARE OP): Performed by: ORTHOPAEDIC SURGERY

## 2024-06-15 PROCEDURE — 2500000002 HC RX 250 W HCPCS SELF ADMINISTERED DRUGS (ALT 637 FOR MEDICARE OP, ALT 636 FOR OP/ED): Mod: MUE | Performed by: ORTHOPAEDIC SURGERY

## 2024-06-15 ASSESSMENT — PAIN - FUNCTIONAL ASSESSMENT
PAIN_FUNCTIONAL_ASSESSMENT: 0-10

## 2024-06-15 ASSESSMENT — PAIN DESCRIPTION - LOCATION: LOCATION: BACK

## 2024-06-15 ASSESSMENT — PAIN SCALES - GENERAL
PAINLEVEL_OUTOF10: 6
PAINLEVEL_OUTOF10: 0 - NO PAIN
PAINLEVEL_OUTOF10: 5 - MODERATE PAIN
PAINLEVEL_OUTOF10: 8
PAINLEVEL_OUTOF10: 7

## 2024-06-15 ASSESSMENT — PAIN DESCRIPTION - ORIENTATION: ORIENTATION: LOWER;MID

## 2024-06-15 NOTE — PROGRESS NOTES
06/15/24 1032   Discharge Planning   Who is requesting discharge planning? Provider   Home or Post Acute Services Post acute facilities (Rehab/SNF/etc)   Type of Post Acute Facility Services Rehab   Patient expects to be discharged to: [facility] - has bed available today.   Does the patient need discharge transport arranged? Yes   RoundTrip coordination needed? Yes   Has discharge transport been arranged? No     Received a phone call form liaison for [facility] and they have a bed available today for patient. Physician aware and will set up transport. Discharge order already in computer. Will keep all parties.     1123- Transport set for 12:30 pm for patient to go to [facility]. Nurse and facility aware of transport time. Will follow till discharge.

## 2024-06-15 NOTE — PROGRESS NOTES
Physical Therapy    Physical Therapy Treatment    Patient Name: Khalida Barker  MRN: [id number]  Today's Date: 6/14/2024  Time Calculation  Start Time: 1515  Stop Time: 1605  Time Calculation (min): 50 min     4112/4112-A       06/14/24 1515   PT  Visit   PT Received On 06/14/24   Response to Previous Treatment Patient with no complaints from previous session.   General   Reason for Referral re-eval after ORIF right radius 6/11   Referred By Rik Lawson MD   Past Medical History Relevant to Rehab To hospital for inability to care for self after recent back surgery as well as right distal radius fracture. 5/14/24 pt underwent T10-Pelvis Instrumented Fusion, Correction of Scoliotic Deformity, L1-2, L2-3, L3-4, L4-5, L5-S1 posterior column osteotomies, L3-4 anterior column osteotomy through disc space,  L1-S1 Decompression, with L2-3, L3-4, L4-5, L5-S1 Transforaminal Lumbar Interbody Fusion, T11-T12 Decompression. Pt underwent lumbar spine I & D 6/6/24.  Patient had ORIF right radius completed by Dr Gonzalez 6/11. PMH: left LE radiculopathy and scoliosis, right TKA   Prior to Session Communication Bedside nurse   Patient Position Received Up in chair;Alarm on   General Comment Pt sitting in the chair. Agreeable to PT,  present.   Precautions   Medical Precautions Fall precautions   Post-Surgical Precautions Spinal precautions   Precautions Comment Lines; wound vac, 2 hemovacs, gray catheter. Right foot drop   Cognition   Overall Cognitive Status WFL   Orientation Level Oriented X4   General Observation   General Observation Patient is fearful of B LE weakness  needs encouragement to attempt standing and side steps   Therapeutic Exercise   Therapeutic Exercise Performed Yes   Therapeutic Exercise Activity 1 Seated ex's AP, QS, Seated hip abd/add, LAQ x 15 reps.   Therapeutic Activity   Therapeutic Activity Performed Yes   Bed Mobility   Bed Mobility Yes   Bed Mobility 1   Bed Mobility 1 Supine to sitting   Level of  Assistance 1 Minimum assistance   Transfers   Transfer Yes   Transfer 1   Transfer From 1 Chair with arms to   Transfer to 1 Sit;Stand   Technique 1 Sit to stand;Stand to sit   Transfer Device 1 Jesus-walker  (Bed rail and rail in hallway.)   Transfer Level of Assistance 1 Moderate assistance;Moderate verbal cues;+2;Moderate tactile cues   Trials/Comments 1 Static standing.holding the bedrail- stood to tolerance 1.5-2 minutes. moved to the handrail in the hallway. static standing, weight shifts and 2-3 side steps. Pt,.wwas anxious and fearful of falling.   Activity Tolerance   Endurance Decreased tolerance for upright activites   PT Assessment   PT Assessment Results Decreased strength;Decreased endurance;Impaired balance;Decreased mobility   Rehab Prognosis Good   End of Session Communication Bedside nurse       End of Session Patient Position Up in chair;Alarm on   Outpatient Education   Individual(s) Educated Patient   Education Provided Fall Risk   PT Plan   Inpatient/Swing Bed or Outpatient Inpatient   PT Plan   PT Frequency 5 times per week   PT Discharge Recommendations Moderate intensity level of continued care   Equipment Recommended upon Discharge Platform attachment;Wheeled walker           Assessment/Plan   PT Assessment  PT Assessment Results: Decreased strength, Decreased endurance, Impaired balance, Decreased mobility  Rehab Prognosis: Good  End of Session Communication: Bedside nurse  Assessment Comment: Pt tolerated session well. Multiple STS with B knees blocked, modAx2. Decreased assist to block B knees with 2nd and 3rd STS. Increased assist to block B knees with 4th and 5th STS d/t fatigue and increased weakness.  End of Session Patient Position: Up in chair, Alarm on  PT Plan  Inpatient/Swing Bed or Outpatient: Inpatient  PT Plan  Treatment/Interventions: Bed mobility, Transfer training, Gait training, Balance training, Neuromuscular re-education, Strengthening, Endurance training, Range of  motion, Therapeutic exercise, Therapeutic activity, Home exercise program  PT Plan: Skilled PT  PT Frequency: 5 times per week  PT Discharge Recommendations: Moderate intensity level of continued care  Equipment Recommended upon Discharge: Platform attachment, Wheeled walker  PT Recommended Transfer Status: Assist x2  PT - OK to Discharge: Yes    Outcome Measures:  [facility] Basic Mobility  Turning from your back to your side while in a flat bed without using bedrails: A lot  Moving from lying on your back to sitting on the side of a flat bed without using bedrails: Total  Moving to and from bed to chair (including a wheelchair): A lot  Standing up from a chair using your arms (e.g. wheelchair or bedside chair): A lot  To walk in hospital room: Total  Climbing 3-5 steps with railing: Total  Basic Mobility - Total Score: 9                             EDUCATION:  Outpatient Education  Individual(s) Educated: Patient  Education Provided: Fall Risk  Education Documentation  Home Exercise Program, taught by Raul Jorgensen PTA at 6/14/2024  8:19 PM.  Learner: Patient  Readiness: Acceptance  Method: Explanation, Demonstration  Response: Needs Reinforcement, Verbalizes Understanding    Precautions, taught by Raul Jorgensen PTA at 6/14/2024  8:19 PM.  Learner: Patient  Readiness: Acceptance  Method: Explanation, Demonstration  Response: Needs Reinforcement, Verbalizes Understanding    Body Mechanics, taught by Raul Jorgensen PTA at 6/14/2024  8:19 PM.  Learner: Patient  Readiness: Acceptance  Method: Explanation, Demonstration  Response: Needs Reinforcement, Verbalizes Understanding    Mobility Training, taught by Raul Jorgensen PTA at 6/14/2024  8:19 PM.  Learner: Patient  Readiness: Acceptance  Method: Explanation, Demonstration  Response: Needs Reinforcement, Verbalizes Understanding    Education Comments  No comments found.        GOALS:  Encounter Problems       Encounter Problems (Active)       PT Problem       Pt will  demonstrate Odell for all bed mobility   (Progressing)       Start:  06/05/24    Expected End:  06/19/24            Pt will demonstrate good dynamic standing balance while performing a functional task.   (Progressing)       Start:  06/05/24    Expected End:  06/19/24            Pt will progress to completing 3 x 20 supine/seated exercises in order to increase strength and improve gait mechanics.         Start:  06/07/24    Expected End:  06/21/24                Pt will complete sit <> stand and bed <> chair transfers with min A.         Start:  06/07/24    Expected End:  06/21/24                Pt will ambulate 30 feet using platform FWW with no LOB.       Start:  06/12/24    Expected End:  06/26/24                   Pain - Adult

## 2024-06-15 NOTE — CARE PLAN
The clinical goals for the shift include pain control and rest well    Over the shift, the patient did make progress toward the following goals.       Problem: Safety  Goal: Patient will be injury free during hospitalization  Outcome: Progressing     Problem: Discharge Barriers  Goal: My discharge needs are met  Outcome: Progressing     Problem: Skin  Goal: Promote skin healing  Outcome: Progressing     Problem: Dressings Lower Extremities  Goal: STG - Patient will complete lower body dressing with mod assist with comp strategies and AE   Outcome: Progressing     Problem: Pain - Adult  Goal: Verbalizes/displays adequate comfort level or baseline comfort level  Outcome: Progressing     Problem: Safety - Adult  Goal: Free from fall injury  Outcome: Progressing     Problem: Discharge Planning  Goal: Discharge to home or other facility with appropriate resources  Outcome: Progressing     Problem: Chronic Conditions and Co-morbidities  Goal: Patient's chronic conditions and co-morbidity symptoms are monitored and maintained or improved  Outcome: Progressing     Problem: Pain  Goal: Walks with improved pain control throughout the shift  Outcome: Progressing

## 2024-06-15 NOTE — CARE PLAN
The patient's goals for the shift include      The clinical goals for the shift include Pt will remain HDS this shift.      Problem: Safety  Goal: Patient will be injury free during hospitalization  Outcome: Adequate for Discharge     Problem: Discharge Barriers  Goal: My discharge needs are met  Outcome: Adequate for Discharge     Problem: Skin  Goal: Promote skin healing  Outcome: Adequate for Discharge     Problem: Dressings Lower Extremities  Goal: STG - Patient will complete lower body dressing with mod assist with comp strategies and AE   Outcome: Adequate for Discharge     Problem: Safety - Adult  Goal: Free from fall injury  Outcome: Adequate for Discharge     Problem: Discharge Planning  Goal: Discharge to home or other facility with appropriate resources  Outcome: Adequate for Discharge     Problem: Chronic Conditions and Co-morbidities  Goal: Patient's chronic conditions and co-morbidity symptoms are monitored and maintained or improved  Outcome: Adequate for Discharge   Pt was safely discharged to [facility] at this time.

## 2024-06-15 NOTE — PROGRESS NOTES
"Khalida Barker is a 73 y.o. female on day 8 of admission presenting with Closed fracture of distal end of right radius.    Subjective   Patient seen and examined, no acute events overnight.  Patient was approved for acute rehab, we have been awaiting for the appeal process.  She is doing well today and denies any significant complaints.  She will be discharged to [facility] acute rehab.       Objective     Constitutional:       Appearance: Normal appearance.   HENT:      Head: Normocephalic and atraumatic.      Nose: Nose normal.   Cardiovascular:      Rate and Rhythm: Normal rate and regular rhythm.      Pulses: Normal pulses.      Heart sounds: Normal heart sounds.   Pulmonary:      Effort: Pulmonary effort is normal.      Breath sounds: Normal breath sounds. No rales.   Abdominal:      General: Abdomen is flat. Bowel sounds are normal.      Palpations: Abdomen is soft.      Tenderness: There is no abdominal tenderness.   Musculoskeletal:      Comments: Right arm dressing intact   Skin:     General: Skin is warm and dry.   Neurological:      Mental Status: She is alert and oriented to person, place, and time. Mental status is at baseline.   Psychiatric:         Mood and Affect: Mood normal.        Last Recorded Vitals  Blood pressure 105/51, pulse 92, temperature 36.9 °C (98.4 °F), temperature source Temporal, resp. rate 18, height 1.651 m (5' 5\"), SpO2 98%.  Intake/Output last 3 Shifts:  I/O last 3 completed shifts:  In: 360 [P.O.:360]  Out: 2700 [Urine:2700]    Relevant Results  Scheduled medications  acetaminophen, 650 mg, oral, q6h  [Held by provider] ceFAZolin, 2 g, intravenous, q8h  cholecalciferol, 1,000 Units, oral, Daily  cyclobenzaprine, 5 mg, oral, TID  enoxaparin, 40 mg, subcutaneous, Daily  HYDROmorphone, 0.4 mg, intravenous, Once  lidocaine, 5 mL, infiltration, Once  pantoprazole, 40 mg, oral, Daily before breakfast  polyethylene glycol, 17 g, oral, BID  pregabalin, 50 mg, oral, Nightly  sennosides-docusate " sodium, 1 tablet, oral, Nightly  tamsulosin, 0.4 mg, oral, Daily  vancomycin, 1,500 mg, intravenous, q24h      Continuous medications     PRN medications  PRN medications: alteplase, diphenhydrAMINE, HYDROcodone-acetaminophen, HYDROmorphone, melatonin, vancomycin  Results from last 7 days   Lab Units 06/13/24  0509 06/12/24  0556 06/11/24  0608   WBC AUTO x10*3/uL 5.1 6.9 6.1   RBC AUTO x10*6/uL 2.76* 2.71* 2.70*   HEMOGLOBIN g/dL 7.8* 7.8* 7.7*     Results from last 7 days   Lab Units 06/13/24  0509 06/12/24  0556 06/11/24  0608   SODIUM mmol/L 137 136 136   POTASSIUM mmol/L 3.9 4.0 3.7   CHLORIDE mmol/L 103 104 104   CO2 mmol/L 25 26 24   BUN mg/dL 12 10 11   CREATININE mg/dL 0.76 0.63 0.65   CALCIUM mg/dL 8.3* 8.2* 7.9*   MAGNESIUM mg/dL 1.96 2.21 2.06       Assessment/Plan   Principal Problem:    Closed fracture of distal end of right radius  Active Problems:    Weakness    Closed fracture of distal end of right radius, unspecified fracture morphology, initial encounter    Acute blood loss as cause of postoperative anemia    Thoracogenic scoliosis of thoracolumbar region    Plan  -DC to [facility] today  -DC in 6/14  -Neurosurgery on consult , s/p wound washout and revision with prevena placement on 6/6  -NS agree with needing consult for ID, ID placed on consult,   -Patient was on Vanc+ Cefazolin, Wound culture grew MRSA, Cefazolin discontinued on 6/9, Per ID, recommend PICC line (placed on 6/12) and dc on vanc for 6 weeks, follow up with ID clinic after discharge, weekly lab draws while on IV Vanco  -Gray removed on 6/9 for voiding trial, but unable to void, gray replaced on 6/9 night.   -s/p ORIF of R- radius on 6/11  -pain control  -stool softeneters  -despite Flomax and straight cath x 3, gray catheter placed in the setting of surgery (and planned surgery on 6/11)  -dvtp: heparin subcutaneous  -am labs including cbc, bmp, mag  -dispo: denied p2p, currently waiting for appeal process, DC to rehab  once accepted- patient accepted today, DC to [facility]       I spent 25 minutes in the professional and overall care of this patient.      Darling Herring, DO

## 2024-06-15 NOTE — PROGRESS NOTES
"Khalida Barker is a 73 y.o. female on day 8 of admission presenting with Closed fracture of distal end of right radius and wound infection     Subjective   Patient feeling well today, no new complaints  Recommend dressing changed twice daily.     Objective     Physical Exam  Awake  Ox3  LUE 5/5  RLE 4/5  LLE Df/PF 3/5      Last Recorded Vitals  Blood pressure 98/50, pulse 89, temperature 36 °C (96.8 °F), temperature source Temporal, resp. rate 16, height 1.651 m (5' 5\"), SpO2 95%.  Intake/Output last 3 Shifts:  I/O last 3 completed shifts:  In: 360 [P.O.:360]  Out: 2700 [Urine:2700]      Assessment/Plan   Principal Problem:    Closed fracture of distal end of right radius  Active Problems:    Weakness    Closed fracture of distal end of right radius, unspecified fracture morphology, initial encounter    Acute blood loss as cause of postoperative anemia    Thoracogenic scoliosis of thoracolumbar region    73 year old with h/o severe LBP and LLE radiculopathy and scoliosis, 5/14 s/p [id number], multi-level PCOs, L2-S1 TLIF, 6/5 presents after fall, found to have wound infection and R wrist fracture     6/6 s/p wound washout and revision, prevena placed     Plan:   No appreciable changes   Medicine primary   POD9 today,  Incision CDI with staples. No regions of dehiscence or soft granulation tissue.   Appreciate infectious disease recommendations for antibiotics.  Recommend dressing changed twice daily.   Recommend q2 hour turning to keep pressure off the incision   Mobilize with PTOT  We will continue to follow  Dispo placement pending to rehab. Patient would benefit greatly from intense inpatient therapy for return of leg strength and stability to avoid further falls.                Jose Abrams MD      "

## 2024-06-15 NOTE — PROGRESS NOTES
Vancomycin Dosing by Pharmacy- FOLLOW UP    Khalida Barker is a 73 y.o. year old female who Pharmacy has been consulted for vancomycin dosing for other spinal abscess . Based on the patient's indication and renal status this patient is being dosed based on a goal AUC of 400-600.     Renal function is currently stable.    Current vancomycin dose: 1500 mg given every 24 hours    Estimated vancomycin AUC on current dose: 460 mg/L.hr     Visit Vitals  BP 92/55 (BP Location: Right arm, Patient Position: Lying)   Pulse 74   Temp 36.7 °C (98.1 °F) (Temporal)   Resp 18        Lab Results   Component Value Date    CREATININE 0.76 2024    CREATININE 0.63 2024    CREATININE 0.65 2024    CREATININE 0.74 06/10/2024        Patient weight is as follows: There were no vitals filed for this visit.    Cultures:  Susceptibility data for the encounter in last 14 days.  Collected Specimen Info Organism Clindamycin Erythromycin Oxacillin Tetracycline Trimethoprim/Sulfamethoxazole Vancomycin   24 Swab from ABSCESS Staphylococcus aureus         24 Swab from ABSCESS Staphylococcus aureus         24 Swab from ABSCESS Mixed Skin Microorganisms          24 Swab from ABSCESS Staphylococcus aureus         24 Tissue/Biopsy from Wound/Tissue Methicillin Resistant Staphylococcus aureus (MRSA)  S  R  R  S  S  S        I/O last 3 completed shifts:  In: 360 [P.O.:360]  Out: 2700 [Urine:2700]  I/O during current shift:  No intake/output data recorded.    Temp (24hrs), Av.6 °C (97.9 °F), Min:36 °C (96.8 °F), Max:36.8 °C (98.2 °F)      Assessment/Plan    Within goal AUC range. Continue current vancomycin regimen.    This dosing regimen is predicted by [employer] to result in the following pharmacokinetic parameters:  Regimen: 1500 mg IV every 24 hours.  Start time: 21:53 on 06/15/2024  Exposure target: AUC24 (range)400-600 mg/L.hr   AUC24,ss: 460 mg/L.hr  Probability of AUC24 > 400: 96 %  Ctrough,ss: 12.7  mg/L  Probability of Ctrough,ss > 20: 1 %  Probability of nephrotoxicity (Lodise CAYDEN 2009): 8 %      The next level will be obtained on 6/18 at 0500. May be obtained sooner if clinically indicated.   Will continue to monitor renal function daily while on vancomycin and order serum creatinine at least every 48 hours if not already ordered.  Follow for continued vancomycin needs, clinical response, and signs/symptoms of toxicity.       Alexander Haynes, PharmD

## 2024-06-15 NOTE — NURSING NOTE
Pt was discharged to [facility] at this time.   Taken via stretcher by private ambulance.  Vitals stable, see flow  sheet.

## 2024-06-19 ENCOUNTER — APPOINTMENT (OUTPATIENT)
Dept: PRIMARY CARE | Facility: CLINIC | Age: 74
End: 2024-06-19
Payer: MEDICARE

## 2024-07-01 ENCOUNTER — APPOINTMENT (OUTPATIENT)
Dept: NEUROSURGERY | Facility: CLINIC | Age: 74
End: 2024-07-01
Payer: MEDICARE

## 2024-07-01 VITALS — DIASTOLIC BLOOD PRESSURE: 76 MMHG | HEART RATE: 72 BPM | SYSTOLIC BLOOD PRESSURE: 122 MMHG

## 2024-07-01 DIAGNOSIS — M48.061 SPINAL STENOSIS OF LUMBAR REGION, UNSPECIFIED WHETHER NEUROGENIC CLAUDICATION PRESENT: ICD-10-CM

## 2024-07-01 DIAGNOSIS — M41.35 THORACOGENIC SCOLIOSIS OF THORACOLUMBAR REGION: Primary | ICD-10-CM

## 2024-07-01 PROCEDURE — 1159F MED LIST DOCD IN RCRD: CPT | Performed by: PHYSICIAN ASSISTANT

## 2024-07-01 PROCEDURE — 99024 POSTOP FOLLOW-UP VISIT: CPT | Performed by: PHYSICIAN ASSISTANT

## 2024-07-01 PROCEDURE — 1125F AMNT PAIN NOTED PAIN PRSNT: CPT | Performed by: PHYSICIAN ASSISTANT

## 2024-07-01 PROCEDURE — 3008F BODY MASS INDEX DOCD: CPT | Performed by: PHYSICIAN ASSISTANT

## 2024-07-01 PROCEDURE — 1111F DSCHRG MED/CURRENT MED MERGE: CPT | Performed by: PHYSICIAN ASSISTANT

## 2024-07-01 ASSESSMENT — PATIENT HEALTH QUESTIONNAIRE - PHQ9
SUM OF ALL RESPONSES TO PHQ9 QUESTIONS 1 & 2: 0
2. FEELING DOWN, DEPRESSED OR HOPELESS: NOT AT ALL
1. LITTLE INTEREST OR PLEASURE IN DOING THINGS: NOT AT ALL

## 2024-07-01 ASSESSMENT — PAIN SCALES - GENERAL: PAINLEVEL: 5

## 2024-07-02 ENCOUNTER — TELEPHONE (OUTPATIENT)
Dept: PRIMARY CARE | Facility: CLINIC | Age: 74
End: 2024-07-02
Payer: MEDICARE

## 2024-07-02 ENCOUNTER — PATIENT OUTREACH (OUTPATIENT)
Dept: PRIMARY CARE | Facility: CLINIC | Age: 74
End: 2024-07-02
Payer: MEDICARE

## 2024-07-02 NOTE — TELEPHONE ENCOUNTER
----- Message from Vivienne Naylor LPN sent at 7/2/2024 10:26 AM EDT -----  Regarding: tcm visit request  Hello,  Can you please contact pt to schedule hospital follow up within 14 days of discharge.    [place]  [facility] [place]  [facility]7/1/24    Discharge Diagnosis:  Closed fracture of distal end of right radius (s/p ORIF)  MRSA surgical site infection (s/p I&D and revision of T10-pelvis fusion done for thoracolumbar scoliosis, lumbar radiculopathy)    Admission Date:6/15/2024   Discharge Date: 7/1/2024     Thank you,  Vivienne Naylor LPN   Care Transitions Specialist

## 2024-07-02 NOTE — PROGRESS NOTES
Discharge Facility:  [facility] [place] 6/4/24-6/15/24  [facility] 6/15/24-7/1/24    Discharge Diagnosis:  Closed fracture of distal end of right radius (s/p ORIF)  Acute pain due to injury  MRSA surgical site infection (s/p I&D and revision of T10-pelvis fusion done for thoracolumbar scoliosis, lumbar radiculopathy)  Paraparesis  Tight heelcords  R foot drop, numbness R foot  Acute pain due to injury, postoperative pain  Muscle spasms  Neurogenic bladder dysfunction, acute retention of urine  Indwelling urethral urinary catheter in situ    Admission Date:6/15/2024   Discharge Date: 7/1/2024     PCP Appointment Date:TBD-I am unable to make an appt due to no openings . Message sent to office staff requesting assistance.     Specialist Appointment Date:   7/2/24-GI   8/2/24 Neurosurgery    Hospital Encounter and Summary: Linked   See discharge assessment below for further details  Engagement  Call Start Time: 0000 (spoke with  and wife) (7/2/2024 10:29 AM)    Medications  Medications reviewed with patient/caregiver?: No (in a rush to get ready for appt so did not go over in detailed) (7/2/2024 10:29 AM)    Appointments  Does the patient have a primary care provider?: Yes (7/2/2024 10:29 AM)  Care Management Interventions: Advised patient to make appointment (No openings- CM messaged Clerical staff at office to request the assist in scheduling appt with Dr Rushing) (7/2/2024 10:29 AM)  Has the patient kept scheduled appointments due by today?: Yes (7/2/2024 10:29 AM)  Care Management Interventions: Educated on importance of keeping appointment (7/2/2024 10:29 AM)    Self Management  What is the home health agency?: Disciplines ordered: Nursing, Physical Therapy, Occupational Therapy, Home Health Aide, and IV Therapy. -[facility] agency [phone] (7/2/2024 10:29 AM)  Has home health visited the patient within 72 hours of discharge?: Unsure (7/2/2024  10:29 AM)  What Durable Medical Equipment (DME) was ordered?: [employer]; 3-in-1 Bedside -[id number] (7/2/2024 10:29 AM)  Has all Durable Medical Equipment (DME) been delivered?: No (instructed pt that the number should be listed on discharge paper work. She was unable to write down # if I gave it to her at the time of call and could not get to paper work) (7/2/2024 10:29 AM)    Patient Teaching  Does the patient have access to their discharge instructions?: Yes (7/2/2024 10:29 AM)  Care Management Interventions: Reviewed instructions with patient (7/2/2024 10:29 AM)  Is the patient/caregiver able to teach back the hierarchy of who to call/visit for symptoms/problems? PCP, Specialist, Home Health nurse, Urgent Care, ED, 911: Yes (7/2/2024 10:29 AM)  Patient/Caregiver Education Comments: I introduced myself and the TCM program to Khalida Barker.  I gave my contact information and encouraged to call me if needing assistance or has any further non-emergent questions prior to my next outreach. (7/2/2024 10:29 AM)    Wrap Up  Wrap Up Additional Comments: Patient was getting ready to go doctor's appt. Stated she beleived she had everything she needs besides the bedside commode. I informed her that the number should be on her discharge papers for DME company but i can give it to her. She was not able to get to papers.  I offered twice to call back at a better time to review everything in more detail so she is not rushed but she declined and told me that she will reach out to me. CM will enroll in TCM outreaches and await pt/family members call back. (7/2/2024 10:29 AM)  Call End Time: 1038 (7/2/2024 10:29 AM)

## 2024-07-02 NOTE — PROGRESS NOTES
[facility]  Department of Neurological Surgery  Post Operative Patient Visit      History of Present Illness:  Khalida Barker is a 74 y.o. year old female who presents post T11-12 decompression with L2-S1 fusion on May 14 followed by wound washout on June 6, 2024.  Currently patient is being discharged to rehab and has made some progress in lower extremity strength and foot drop.  She will continue with therapy at home regularly.  Inspection of her incision shows well-approximated, mildly erythemic, and nonedematous surgical incision with early fibrotic tissue spanning.  Staples removed today with patient tolerating well and no complications encountered.  All questions answered.  Will place order for updated x-rays to be taken just prior to 3-month follow-up with Dr. Lu.        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    Muscle weakness (generalized)    Positive colorectal cancer screening using Cologuard test    Primary osteoarthritis of right knee    Seborrheic keratoses    Tinnitus    Vitamin D deficiency    Left sided abdominal pain    BMI 30.0-30.9,adult    Cervicalgia    Cervical paraspinal muscle spasm    Non-smoker    S/P total knee arthroplasty, right    Right knee pain    Knee stiffness, right    Chronic left shoulder pain    Neck pain    Thoracogenic scoliosis of thoracolumbar region    Scoliosis deformity of spine    Anxiety    C6 radiculopathy    Carpal tunnel syndrome    Low back pain, unspecified    Closed fracture of distal end of right radius    Weakness    Closed fracture of distal end of right radius, unspecified fracture morphology, initial encounter    Acute blood loss as cause of postoperative anemia     Past Medical History:   Diagnosis Date    Ankylosis of  sacroiliac joint 06/04/2024    Arthritis     Cervicalgia     Colon polyp     CTS (carpal tunnel syndrome)     Diverticulitis 06/27/2023    Hallux valgus, acquired 01/19/2013    Hiatal hernia     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     OA (osteoarthritis) of knee     Painful scar 09/20/2017    Peripheral neuropathy     Scoliosis     Spinal stenosis     Traumatic ecchymosis of foot 01/19/2013    Trigger finger of right hand     Vision loss     wears glasses    Vitamin D deficiency      Past Surgical History:   Procedure Laterality Date    ADENOIDECTOMY  1956    BREAST BIOPSY  1999    COLONOSCOPY      KNEE ARTHROPLASTY Right     MOUTH SURGERY  2016    Back left molar removed    MOUTH SURGERY  09/28/2021    right molar    OTHER SURGICAL HISTORY      Skin lesion excision    SPINAL FUSION  06/2010    C4-5    TONSILLECTOMY  1956    WISDOM TOOTH EXTRACTION  1978    lower     Social History     Tobacco Use    Smoking status: Never    Smokeless tobacco: Never   Substance Use Topics    Alcohol use: Not Currently     Comment: rarely     family history includes Alzheimer's disease in her mother; Heart disease in her father; Hypertension in her brother; Polymyositis in her father; Skin cancer in her brother; peripheral artery disease in her brother.    Current Outpatient Medications:     acetaminophen (Tylenol) 500 mg tablet, Take 1 tablet (500 mg) by mouth every 8 hours if needed for mild pain (1 - 3) or moderate pain (4 - 6) for up to 30 doses., Disp: 30 tablet, Rfl: 0    biotin 10,000 mcg capsule, Take 1 capsule (10 mg) by mouth once daily. caps, Disp: , Rfl:     cholecalciferol (Vitamin D-3) 25 MCG (1000 UT) tablet, Take 1 tablet (1,000 Units) by mouth once daily., Disp: , Rfl:     docusate sodium (Colace) 100 mg capsule, Take 1 capsule (100 mg) by mouth once daily., Disp: , Rfl:     HYDROcodone-acetaminophen (Norco) 5-325 mg tablet, Take 1 tablet by mouth every 8 hours if needed for severe pain (7 - 10)., Disp:  12 tablet, Rfl: 0    multivitamin with minerals tablet, Take 1 tablet by mouth once daily., Disp: , Rfl:     NON FORMULARY, Take 1 each by mouth once daily. Viveran 200mg- pure caffience, Disp: , Rfl:     polyethylene glycol (Glycolax, Miralax) 17 gram packet, Take 17 g by mouth 3 times a day as needed (constipation)., Disp: , Rfl:     sennosides-docusate sodium (Maggie-Colace) 8.6-50 mg tablet, Take 1 tablet by mouth once daily at bedtime., Disp: , Rfl:     vancomycin (Vancocin) IVPB, Infuse 200 mL (1 g) at 200 mL/hr over 60 minutes into a venous catheter every 12 hours., Disp: 39188 mL, Rfl: 0  No current facility-administered medications for this visit.  Allergies   Allergen Reactions    Oxycodone Itching     Previously tolerated Percocet     Tramadol Itching         The above clinical summary has been dictated with voice recognition software. It has not been proofread for grammatical errors, typographical mistakes, or other semantic inconsistencies.    Thank you for visiting our office today. It was our pleasure to take part in your healthcare.     Do not hesitate to call with any questions regarding your plan of care after leaving at [phone] M-F 8am-4pm.     To clinicians, thank you very much for this kind referral. It is a privilege to partner with you in the care of your patients. My office would be delighted to assist you with any further consultations or with questions regarding the plan of care outlined. Do not hesitate to call the office or contact me directly.       Sincerely,      ZACK Ty, PA-C  Associate Physician Assistant, Neurosurgery  Clinical   [facility]    [place] [address] [address] [place] [address]    Phone: [phone]  Fax: [phone]

## 2024-07-04 ENCOUNTER — HOSPITAL ENCOUNTER (EMERGENCY)
Facility: HOSPITAL | Age: 74
Discharge: HOME | End: 2024-07-04
Attending: EMERGENCY MEDICINE
Payer: MEDICARE

## 2024-07-04 VITALS
HEART RATE: 94 BPM | WEIGHT: 180 LBS | DIASTOLIC BLOOD PRESSURE: 81 MMHG | RESPIRATION RATE: 18 BRPM | BODY MASS INDEX: 33.13 KG/M2 | SYSTOLIC BLOOD PRESSURE: 122 MMHG | HEIGHT: 62 IN | OXYGEN SATURATION: 97 % | TEMPERATURE: 96.8 F

## 2024-07-04 DIAGNOSIS — Z45.2 PICC (PERIPHERALLY INSERTED CENTRAL CATHETER) FLUSH: Primary | ICD-10-CM

## 2024-07-04 PROCEDURE — 99283 EMERGENCY DEPT VISIT LOW MDM: CPT | Performed by: EMERGENCY MEDICINE

## 2024-07-04 PROCEDURE — 99282 EMERGENCY DEPT VISIT SF MDM: CPT

## 2024-07-04 ASSESSMENT — COLUMBIA-SUICIDE SEVERITY RATING SCALE - C-SSRS
6. HAVE YOU EVER DONE ANYTHING, STARTED TO DO ANYTHING, OR PREPARED TO DO ANYTHING TO END YOUR LIFE?: NO
1. IN THE PAST MONTH, HAVE YOU WISHED YOU WERE DEAD OR WISHED YOU COULD GO TO SLEEP AND NOT WAKE UP?: NO
2. HAVE YOU ACTUALLY HAD ANY THOUGHTS OF KILLING YOURSELF?: NO

## 2024-07-04 ASSESSMENT — PAIN SCALES - GENERAL: PAINLEVEL_OUTOF10: 0 - NO PAIN

## 2024-07-04 ASSESSMENT — PAIN - FUNCTIONAL ASSESSMENT: PAIN_FUNCTIONAL_ASSESSMENT: 0-10

## 2024-07-04 NOTE — ED PROVIDER NOTES
"EMERGENCY DEPARTMENT ENCOUNTER      Pt Name: Khalida Barker  MRN: [id number]  Birthdate 1950  Date of evaluation: 7/4/2024  Provider: Samuel Zuñiga MD    CHIEF COMPLAINT       Chief Complaint   Patient presents with    Vascular Access Problem     Sent in by home health care nurse because PICC flushed \"sluggish\"     HISTORY OF PRESENT ILLNESS    HPI  74-year-old female presents emergency department with chief complaint of PICC line dysfunction.  Patient had a PICC line placed 3 weeks ago for antibiotics due to MRSA infection.  Patient had a procedure done on her back that was initial intervention.  PICC line has been working appropriately the patient has no complaints otherwise and review of systems is otherwise negative.  However today the home health nurse was unable to flush and would not take medication through the PICC line.  Nursing Notes were reviewed.    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     OA (osteoarthritis) of knee     Painful scar 09/20/2017    Peripheral neuropathy     Scoliosis     Spinal stenosis     Traumatic ecchymosis of foot 01/19/2013    Trigger finger of right hand     Vision loss     wears glasses    Vitamin D deficiency          SURGICAL HISTORY       Past Surgical History:   Procedure Laterality Date    ADENOIDECTOMY  1956    BREAST BIOPSY  1999    COLONOSCOPY      KNEE ARTHROPLASTY Right     MOUTH SURGERY  2016    Back left molar removed    MOUTH SURGERY  09/28/2021    right molar    OTHER SURGICAL HISTORY      Skin lesion excision    SPINAL FUSION  06/2010    C4-5    TONSILLECTOMY  1956    WISDOM TOOTH EXTRACTION  1978    lower         CURRENT MEDICATIONS       Previous Medications    ACETAMINOPHEN " (TYLENOL) 500 MG TABLET    Take 1 tablet (500 mg) by mouth every 8 hours if needed for mild pain (1 - 3) or moderate pain (4 - 6) for up to 30 doses.    BIOTIN 10,000 MCG CAPSULE    Take 1 capsule (10 mg) by mouth once daily. caps    CHOLECALCIFEROL (VITAMIN D-3) 25 MCG (1000 UT) TABLET    Take 1 tablet (1,000 Units) by mouth once daily.    DOCUSATE SODIUM (COLACE) 100 MG CAPSULE    Take 1 capsule (100 mg) by mouth once daily.    HYDROCODONE-ACETAMINOPHEN (NORCO) 5-325 MG TABLET    Take 1 tablet by mouth every 8 hours if needed for severe pain (7 - 10).    MULTIVITAMIN WITH MINERALS TABLET    Take 1 tablet by mouth once daily.    NON FORMULARY    Take 1 each by mouth once daily. Viveran 200mg- pure caffience    POLYETHYLENE GLYCOL (GLYCOLAX, MIRALAX) 17 GRAM PACKET    Take 17 g by mouth 3 times a day as needed (constipation).    SENNOSIDES-DOCUSATE SODIUM (MAHNAZ-COLACE) 8.6-50 MG TABLET    Take 1 tablet by mouth once daily at bedtime.    VANCOMYCIN (VANCOCIN) IVPB    Infuse 200 mL (1 g) at 200 mL/hr over 60 minutes into a venous catheter every 12 hours.       ALLERGIES     Oxycodone and Tramadol    FAMILY HISTORY       Family History   Problem Relation Name Age of Onset    Alzheimer's disease Mother      Heart disease Father      Polymyositis Father      Hypertension Brother      Skin cancer Brother      Other (peripheral artery disease) Brother            SOCIAL HISTORY       Social History     Socioeconomic History    Marital status:      Spouse name: None    Number of children: None    Years of education: None    Highest education level: None   Occupational History    None   Tobacco Use    Smoking status: Never    Smokeless tobacco: Never   Vaping Use    Vaping status: Never Used   Substance and Sexual Activity    Alcohol use: Not Currently     Comment: rarely    Drug use: Never    Sexual activity: Defer   Other Topics Concern    None   Social History Narrative    None     Social Determinants of Health      Financial Resource Strain: Patient Declined (6/18/2024)    Received from [facility]    Overall Financial Resource Strain (CARDIA)     Difficulty of Paying Living Expenses: Patient declined   Food Insecurity: Patient Declined (6/18/2024)    Received from [facility]    Hunger Vital Sign     Worried About Running Out of Food in the Last Year: Patient declined     Ran Out of Food in the Last Year: Patient declined   Transportation Needs: No Transportation Needs (7/1/2024)    Received from [facility] SDOH Transportation Source     Has lack of transportation kept you from medical appointments or from getting medications?: No     Has lack of transportation kept you from meetings, work, or from getting things needed for daily living?: No   Physical Activity: Not on file   Stress: No Stress Concern Present (7/1/2024)    Received from [facility] - Occupational Stress Questionnaire     Feeling of Stress : Not at all   Social Connections: Patient Declined (6/18/2024)    Received from [facility]    Social Connection and Isolation Panel [NHANES]     Frequency of Communication with Friends and Family: Patient declined     Frequency of Social Gatherings with Friends and Family: Patient declined     Attends Church Services: Patient declined     Active Member of Clubs or Organizations: Patient declined     Attends Club or Organization Meetings: Patient declined     Marital Status: Patient declined   Intimate Partner Violence: Not At Risk (6/15/2024)    Received from [facility]    Domestic Abuse Assessment     Do you feel safe in your relationships at home?: Yes     Physical Abuse: Denies     [facility] Domestic Abuse - Type of Abuse: Not on file     [facility] Domestic Abuse - Time Frame: Not on file     [facility] Domestic Abuse - Signs and Symptoms: Not on file     Verbal Abuse: Denies     [facility] Domestic Abuse - Reported To: Not on file   Housing Stability: Unknown  (6/18/2024)    [facility] Stability Vital Sign     In the last 12 months, was there a time when you were not able to pay the mortgage or rent on time?: Patient declined     Number of Times Moved in the Last Year: Not on file     Homeless in the Last Year: Not on file       SCREENINGS                        PHYSICAL EXAM    (up to 7 for level 4, 8 or more for level 5)     ED Triage Vitals [07/04/24 1611]   Temperature Heart Rate Respirations BP   36 °C (96.8 °F) 94 18 122/81      Pulse Ox Temp Source Heart Rate Source Patient Position   97 % Temporal Monitor Sitting      BP Location FiO2 (%)     Right arm --       Physical Exam  Constitutional:       Appearance: She is well-developed.   HENT:      Head: Normocephalic and atraumatic.      Right Ear: Tympanic membrane normal.      Left Ear: Tympanic membrane normal.      Nose: Nose normal.      Mouth/Throat:      Mouth: Mucous membranes are moist.      Pharynx: Oropharynx is clear.   Eyes:      Extraocular Movements: Extraocular movements intact.      Pupils: Pupils are equal, round, and reactive to light.   Cardiovascular:      Rate and Rhythm: Normal rate and regular rhythm.      Pulses: Normal pulses.      Heart sounds: Normal heart sounds.   Pulmonary:      Effort: Pulmonary effort is normal.      Breath sounds: Normal breath sounds.   Abdominal:      General: Abdomen is flat.      Palpations: Abdomen is soft.   Musculoskeletal:         General: Normal range of motion.      Cervical back: Normal range of motion and neck supple.   Skin:     General: Skin is warm.   Neurological:      General: No focal deficit present.      Mental Status: She is alert and oriented to person, place, and time.   Psychiatric:         Mood and Affect: Mood normal.         Behavior: Behavior normal.          DIAGNOSTIC RESULTS     LABS:  Labs Reviewed - No data to display    All other labs were within normal range or not returned as of this  dictation.    Imaging  No orders to display        Procedures  Procedures     EMERGENCY DEPARTMENT COURSE/MDM:   Medical Decision Making  74-year-old female presents emergency department with chief complaint of PICC line dysfunction.  Medical management and treatment emergency department will consist of attempting to flush with normal saline if that does not work we will attempt to flush with a heparin flush.  Will reevaluate after nursing intervention.  Cathflo has been ordered nursing staff will attempt this.  Nursing staff was able to troubleshoot PICC line.  The patient will be discharged home with instructions to return if anything is not working on food.  Patient will be discharged        Diagnoses as of 07/04/24 1648   PICC (peripherally inserted central catheter) flush        Patient and or family in agreement and understanding of treatment plan.  All questions answered.      I reviewed the case with the attending ED physician. The attending ED physician agrees with the plan. Patient and/or patient´s representative was counseled regarding labs, imaging, likely diagnosis, and plan. All questions were answered.    ED Medications administered this visit:  Medications - No data to display    New Prescriptions from this visit:    New Prescriptions    No medications on file       Follow-up:  No follow-up provider specified.      Final Impression: No diagnosis found.      (Please note that portions of this note were completed with a voice recognition program.  Efforts were made to edit the dictations but occasionally words are mis-transcribed.)     Samuel Zuñiga MD  Resident  07/04/24 6783

## 2024-07-16 ENCOUNTER — PATIENT OUTREACH (OUTPATIENT)
Dept: PRIMARY CARE | Facility: CLINIC | Age: 74
End: 2024-07-16
Payer: MEDICARE

## 2024-07-21 ENCOUNTER — APPOINTMENT (OUTPATIENT)
Dept: CARDIOLOGY | Facility: HOSPITAL | Age: 74
End: 2024-07-21
Payer: MEDICARE

## 2024-07-21 ENCOUNTER — HOSPITAL ENCOUNTER (OUTPATIENT)
Facility: HOSPITAL | Age: 74
Setting detail: OBSERVATION
End: 2024-07-21
Attending: EMERGENCY MEDICINE | Admitting: INTERNAL MEDICINE
Payer: MEDICARE

## 2024-07-21 DIAGNOSIS — E86.0 DEHYDRATION: ICD-10-CM

## 2024-07-21 DIAGNOSIS — R13.10 DYSPHAGIA, UNSPECIFIED: Primary | ICD-10-CM

## 2024-07-21 DIAGNOSIS — R13.10 ODYNOPHAGIA: ICD-10-CM

## 2024-07-21 DIAGNOSIS — R11.2 NAUSEA AND VOMITING IN ADULT: ICD-10-CM

## 2024-07-21 PROBLEM — M45.0 ANKYLOSING SPONDYLITIS OF MULTIPLE SITES IN SPINE (MULTI): Status: ACTIVE | Noted: 2024-07-21

## 2024-07-21 LAB
ALBUMIN SERPL BCP-MCNC: 3.3 G/DL (ref 3.4–5)
ALP SERPL-CCNC: 133 U/L (ref 33–136)
ALT SERPL W P-5'-P-CCNC: 7 U/L (ref 7–45)
ANION GAP SERPL CALC-SCNC: 18 MMOL/L (ref 10–20)
APPEARANCE UR: ABNORMAL
APPEARANCE UR: ABNORMAL
AST SERPL W P-5'-P-CCNC: 17 U/L (ref 9–39)
B-OH-BUTYR SERPL-SCNC: 1.28 MMOL/L (ref 0.02–0.27)
BACTERIA #/AREA URNS AUTO: ABNORMAL /HPF
BASOPHILS # BLD AUTO: 0.05 X10*3/UL (ref 0–0.1)
BASOPHILS NFR BLD AUTO: 0.9 %
BILIRUB SERPL-MCNC: 0.7 MG/DL (ref 0–1.2)
BILIRUB UR STRIP.AUTO-MCNC: NEGATIVE MG/DL
BILIRUB UR STRIP.AUTO-MCNC: NEGATIVE MG/DL
BUN SERPL-MCNC: 10 MG/DL (ref 6–23)
CALCIUM SERPL-MCNC: 9.4 MG/DL (ref 8.6–10.3)
CARDIAC TROPONIN I PNL SERPL HS: 11 NG/L (ref 0–13)
CARDIAC TROPONIN I PNL SERPL HS: 11 NG/L (ref 0–13)
CHLORIDE SERPL-SCNC: 104 MMOL/L (ref 98–107)
CO2 SERPL-SCNC: 23 MMOL/L (ref 21–32)
COLOR UR: ABNORMAL
COLOR UR: YELLOW
CREAT SERPL-MCNC: 0.84 MG/DL (ref 0.5–1.05)
EGFRCR SERPLBLD CKD-EPI 2021: 74 ML/MIN/1.73M*2
EOSINOPHIL # BLD AUTO: 0.02 X10*3/UL (ref 0–0.4)
EOSINOPHIL NFR BLD AUTO: 0.3 %
ERYTHROCYTE [DISTWIDTH] IN BLOOD BY AUTOMATED COUNT: 15.8 % (ref 11.5–14.5)
GLUCOSE SERPL-MCNC: 122 MG/DL (ref 74–99)
GLUCOSE UR STRIP.AUTO-MCNC: NORMAL MG/DL
GLUCOSE UR STRIP.AUTO-MCNC: NORMAL MG/DL
HCT VFR BLD AUTO: 35.3 % (ref 36–46)
HGB BLD-MCNC: 10.8 G/DL (ref 12–16)
HOLD SPECIMEN: NORMAL
HOLD SPECIMEN: NORMAL
IMM GRANULOCYTES # BLD AUTO: 0.08 X10*3/UL (ref 0–0.5)
IMM GRANULOCYTES NFR BLD AUTO: 1.4 % (ref 0–0.9)
IRON SATN MFR SERPL: 18 % (ref 25–45)
IRON SERPL-MCNC: 29 UG/DL (ref 35–150)
KETONES UR STRIP.AUTO-MCNC: NEGATIVE MG/DL
KETONES UR STRIP.AUTO-MCNC: NEGATIVE MG/DL
LACTATE SERPL-SCNC: 1 MMOL/L (ref 0.4–2)
LEUKOCYTE ESTERASE UR QL STRIP.AUTO: ABNORMAL
LEUKOCYTE ESTERASE UR QL STRIP.AUTO: ABNORMAL
LYMPHOCYTES # BLD AUTO: 1.8 X10*3/UL (ref 0.8–3)
LYMPHOCYTES NFR BLD AUTO: 30.9 %
MCH RBC QN AUTO: 27.6 PG (ref 26–34)
MCHC RBC AUTO-ENTMCNC: 30.6 G/DL (ref 32–36)
MCV RBC AUTO: 90 FL (ref 80–100)
MONOCYTES # BLD AUTO: 0.52 X10*3/UL (ref 0.05–0.8)
MONOCYTES NFR BLD AUTO: 8.9 %
MUCOUS THREADS #/AREA URNS AUTO: ABNORMAL /LPF
MUCOUS THREADS #/AREA URNS AUTO: ABNORMAL /LPF
NEUTROPHILS # BLD AUTO: 3.36 X10*3/UL (ref 1.6–5.5)
NEUTROPHILS NFR BLD AUTO: 57.6 %
NITRITE UR QL STRIP.AUTO: NEGATIVE
NITRITE UR QL STRIP.AUTO: NEGATIVE
NRBC BLD-RTO: 0 /100 WBCS (ref 0–0)
PH UR STRIP.AUTO: 7 [PH]
PH UR STRIP.AUTO: 8 [PH]
PLATELET # BLD AUTO: 333 X10*3/UL (ref 150–450)
POTASSIUM SERPL-SCNC: 3.6 MMOL/L (ref 3.5–5.3)
PROT SERPL-MCNC: 7.3 G/DL (ref 6.4–8.2)
PROT UR STRIP.AUTO-MCNC: ABNORMAL MG/DL
PROT UR STRIP.AUTO-MCNC: ABNORMAL MG/DL
RBC # BLD AUTO: 3.91 X10*6/UL (ref 4–5.2)
RBC # UR STRIP.AUTO: ABNORMAL /UL
RBC # UR STRIP.AUTO: ABNORMAL /UL
RBC #/AREA URNS AUTO: ABNORMAL /HPF
RBC #/AREA URNS AUTO: ABNORMAL /HPF
SODIUM SERPL-SCNC: 141 MMOL/L (ref 136–145)
SP GR UR STRIP.AUTO: 1.01
SP GR UR STRIP.AUTO: 1.01
SQUAMOUS #/AREA URNS AUTO: ABNORMAL /HPF
TIBC SERPL-MCNC: 163 UG/DL (ref 240–445)
TSH SERPL-ACNC: 1.41 MIU/L (ref 0.44–3.98)
UIBC SERPL-MCNC: 134 UG/DL (ref 110–370)
UROBILINOGEN UR STRIP.AUTO-MCNC: ABNORMAL MG/DL
UROBILINOGEN UR STRIP.AUTO-MCNC: NORMAL MG/DL
WBC # BLD AUTO: 5.8 X10*3/UL (ref 4.4–11.3)
WBC #/AREA URNS AUTO: >50 /HPF
WBC #/AREA URNS AUTO: >50 /HPF
WBC CLUMPS #/AREA URNS AUTO: ABNORMAL /HPF
YEAST BUDDING #/AREA UR COMP ASSIST: PRESENT /HPF

## 2024-07-21 PROCEDURE — 82306 VITAMIN D 25 HYDROXY: CPT | Mod: STJLAB | Performed by: INTERNAL MEDICINE

## 2024-07-21 PROCEDURE — 36415 COLL VENOUS BLD VENIPUNCTURE: CPT

## 2024-07-21 PROCEDURE — 96372 THER/PROPH/DIAG INJ SC/IM: CPT | Performed by: INTERNAL MEDICINE

## 2024-07-21 PROCEDURE — 84484 ASSAY OF TROPONIN QUANT: CPT

## 2024-07-21 PROCEDURE — 83605 ASSAY OF LACTIC ACID: CPT | Performed by: INTERNAL MEDICINE

## 2024-07-21 PROCEDURE — 93005 ELECTROCARDIOGRAM TRACING: CPT

## 2024-07-21 PROCEDURE — 85025 COMPLETE CBC W/AUTO DIFF WBC: CPT

## 2024-07-21 PROCEDURE — 96374 THER/PROPH/DIAG INJ IV PUSH: CPT | Mod: 59

## 2024-07-21 PROCEDURE — 87086 URINE CULTURE/COLONY COUNT: CPT | Mod: STJLAB

## 2024-07-21 PROCEDURE — 99285 EMERGENCY DEPT VISIT HI MDM: CPT | Performed by: EMERGENCY MEDICINE

## 2024-07-21 PROCEDURE — 96361 HYDRATE IV INFUSION ADD-ON: CPT

## 2024-07-21 PROCEDURE — 84443 ASSAY THYROID STIM HORMONE: CPT | Performed by: INTERNAL MEDICINE

## 2024-07-21 PROCEDURE — 99285 EMERGENCY DEPT VISIT HI MDM: CPT | Mod: 25

## 2024-07-21 PROCEDURE — 81003 URINALYSIS AUTO W/O SCOPE: CPT

## 2024-07-21 PROCEDURE — 2500000004 HC RX 250 GENERAL PHARMACY W/ HCPCS (ALT 636 FOR OP/ED): Performed by: INTERNAL MEDICINE

## 2024-07-21 PROCEDURE — G0378 HOSPITAL OBSERVATION PER HR: HCPCS

## 2024-07-21 PROCEDURE — 2500000001 HC RX 250 WO HCPCS SELF ADMINISTERED DRUGS (ALT 637 FOR MEDICARE OP): Performed by: INTERNAL MEDICINE

## 2024-07-21 PROCEDURE — 84207 ASSAY OF VITAMIN B-6: CPT | Performed by: INTERNAL MEDICINE

## 2024-07-21 PROCEDURE — 80053 COMPREHEN METABOLIC PANEL: CPT

## 2024-07-21 PROCEDURE — 99223 1ST HOSP IP/OBS HIGH 75: CPT | Performed by: INTERNAL MEDICINE

## 2024-07-21 PROCEDURE — 83540 ASSAY OF IRON: CPT | Performed by: INTERNAL MEDICINE

## 2024-07-21 PROCEDURE — 36415 COLL VENOUS BLD VENIPUNCTURE: CPT | Performed by: INTERNAL MEDICINE

## 2024-07-21 PROCEDURE — 2500000004 HC RX 250 GENERAL PHARMACY W/ HCPCS (ALT 636 FOR OP/ED)

## 2024-07-21 PROCEDURE — 82010 KETONE BODYS QUAN: CPT

## 2024-07-21 RX ORDER — FLUCONAZOLE 200 MG/1
200 TABLET ORAL DAILY
Status: DISCONTINUED | OUTPATIENT
Start: 2024-07-21 | End: 2024-07-23

## 2024-07-21 RX ORDER — GUAIFENESIN 600 MG/1
600 TABLET, EXTENDED RELEASE ORAL EVERY 12 HOURS PRN
Status: DISCONTINUED | OUTPATIENT
Start: 2024-07-21 | End: 2024-07-25 | Stop reason: HOSPADM

## 2024-07-21 RX ORDER — ENOXAPARIN SODIUM 100 MG/ML
40 INJECTION SUBCUTANEOUS EVERY 24 HOURS
Status: DISCONTINUED | OUTPATIENT
Start: 2024-07-21 | End: 2024-07-25 | Stop reason: HOSPADM

## 2024-07-21 RX ORDER — PROMETHAZINE HYDROCHLORIDE 25 MG/1
25 TABLET ORAL EVERY 6 HOURS PRN
Status: DISCONTINUED | OUTPATIENT
Start: 2024-07-21 | End: 2024-07-25 | Stop reason: HOSPADM

## 2024-07-21 RX ORDER — POLYETHYLENE GLYCOL 3350 17 G/17G
17 POWDER, FOR SOLUTION ORAL DAILY PRN
Status: DISCONTINUED | OUTPATIENT
Start: 2024-07-21 | End: 2024-07-25 | Stop reason: HOSPADM

## 2024-07-21 RX ORDER — NYSTATIN 100000 [USP'U]/ML
5 SUSPENSION ORAL 4 TIMES DAILY
Status: DISCONTINUED | OUTPATIENT
Start: 2024-07-21 | End: 2024-07-25 | Stop reason: HOSPADM

## 2024-07-21 RX ORDER — FLUTICASONE PROPIONATE 50 MCG
2 SPRAY, SUSPENSION (ML) NASAL DAILY
Status: DISCONTINUED | OUTPATIENT
Start: 2024-07-21 | End: 2024-07-25 | Stop reason: HOSPADM

## 2024-07-21 RX ORDER — ACETAMINOPHEN 325 MG/1
650 TABLET ORAL EVERY 6 HOURS PRN
Status: DISCONTINUED | OUTPATIENT
Start: 2024-07-21 | End: 2024-07-25 | Stop reason: HOSPADM

## 2024-07-21 RX ORDER — ONDANSETRON HYDROCHLORIDE 2 MG/ML
4 INJECTION, SOLUTION INTRAVENOUS EVERY 8 HOURS PRN
Status: DISCONTINUED | OUTPATIENT
Start: 2024-07-21 | End: 2024-07-25 | Stop reason: HOSPADM

## 2024-07-21 RX ORDER — FLUCONAZOLE 200 MG/1
200 TABLET ORAL DAILY
COMMUNITY
End: 2024-07-25 | Stop reason: HOSPADM

## 2024-07-21 RX ORDER — PROMETHAZINE HYDROCHLORIDE 25 MG/1
25 SUPPOSITORY RECTAL EVERY 12 HOURS PRN
Status: DISCONTINUED | OUTPATIENT
Start: 2024-07-21 | End: 2024-07-25 | Stop reason: HOSPADM

## 2024-07-21 RX ORDER — ACETAMINOPHEN 160 MG/5ML
650 SOLUTION ORAL EVERY 6 HOURS PRN
Status: DISCONTINUED | OUTPATIENT
Start: 2024-07-21 | End: 2024-07-25 | Stop reason: HOSPADM

## 2024-07-21 RX ORDER — CEFTRIAXONE 1 G/50ML
1 INJECTION, SOLUTION INTRAVENOUS ONCE
Status: DISCONTINUED | OUTPATIENT
Start: 2024-07-21 | End: 2024-07-21

## 2024-07-21 RX ORDER — ACETAMINOPHEN 650 MG/1
650 SUPPOSITORY RECTAL EVERY 6 HOURS PRN
Status: DISCONTINUED | OUTPATIENT
Start: 2024-07-21 | End: 2024-07-25 | Stop reason: HOSPADM

## 2024-07-21 RX ORDER — ONDANSETRON HYDROCHLORIDE 2 MG/ML
4 INJECTION, SOLUTION INTRAVENOUS ONCE
Status: COMPLETED | OUTPATIENT
Start: 2024-07-21 | End: 2024-07-21

## 2024-07-21 RX ORDER — ONDANSETRON 4 MG/1
4 TABLET, FILM COATED ORAL EVERY 8 HOURS PRN
Status: DISCONTINUED | OUTPATIENT
Start: 2024-07-21 | End: 2024-07-25 | Stop reason: HOSPADM

## 2024-07-21 RX ORDER — SODIUM CHLORIDE 9 MG/ML
100 INJECTION, SOLUTION INTRAVENOUS CONTINUOUS
Status: ACTIVE | OUTPATIENT
Start: 2024-07-21 | End: 2024-07-22

## 2024-07-21 RX ADMIN — ONDANSETRON 4 MG: 2 INJECTION INTRAMUSCULAR; INTRAVENOUS at 11:38

## 2024-07-21 RX ADMIN — NYSTATIN 500000 UNITS: 100000 SUSPENSION ORAL at 20:00

## 2024-07-21 RX ADMIN — SODIUM CHLORIDE, POTASSIUM CHLORIDE, SODIUM LACTATE AND CALCIUM CHLORIDE 1000 ML: 600; 310; 30; 20 INJECTION, SOLUTION INTRAVENOUS at 11:39

## 2024-07-21 RX ADMIN — ENOXAPARIN SODIUM 40 MG: 40 INJECTION SUBCUTANEOUS at 17:45

## 2024-07-21 RX ADMIN — NYSTATIN 500000 UNITS: 100000 SUSPENSION ORAL at 17:45

## 2024-07-21 RX ADMIN — SODIUM CHLORIDE 100 ML/HR: 9 INJECTION, SOLUTION INTRAVENOUS at 17:37

## 2024-07-21 SDOH — SOCIAL STABILITY: SOCIAL INSECURITY: ARE YOU OR HAVE YOU BEEN THREATENED OR ABUSED PHYSICALLY, EMOTIONALLY, OR SEXUALLY BY ANYONE?: NO

## 2024-07-21 SDOH — SOCIAL STABILITY: SOCIAL INSECURITY: ABUSE: ADULT

## 2024-07-21 SDOH — SOCIAL STABILITY: SOCIAL INSECURITY: DO YOU FEEL ANYONE HAS EXPLOITED OR TAKEN ADVANTAGE OF YOU FINANCIALLY OR OF YOUR PERSONAL PROPERTY?: NO

## 2024-07-21 SDOH — SOCIAL STABILITY: SOCIAL INSECURITY: DO YOU FEEL UNSAFE GOING BACK TO THE PLACE WHERE YOU ARE LIVING?: NO

## 2024-07-21 SDOH — SOCIAL STABILITY: SOCIAL INSECURITY: HAVE YOU HAD ANY THOUGHTS OF HARMING ANYONE ELSE?: NO

## 2024-07-21 SDOH — SOCIAL STABILITY: SOCIAL INSECURITY: WERE YOU ABLE TO COMPLETE ALL THE BEHAVIORAL HEALTH SCREENINGS?: YES

## 2024-07-21 SDOH — SOCIAL STABILITY: SOCIAL INSECURITY: HAVE YOU HAD THOUGHTS OF HARMING ANYONE ELSE?: NO

## 2024-07-21 SDOH — SOCIAL STABILITY: SOCIAL INSECURITY: HAS ANYONE EVER THREATENED TO HURT YOUR FAMILY OR YOUR PETS?: NO

## 2024-07-21 SDOH — SOCIAL STABILITY: SOCIAL INSECURITY: ARE THERE ANY APPARENT SIGNS OF INJURIES/BEHAVIORS THAT COULD BE RELATED TO ABUSE/NEGLECT?: NO

## 2024-07-21 SDOH — SOCIAL STABILITY: SOCIAL INSECURITY: DOES ANYONE TRY TO KEEP YOU FROM HAVING/CONTACTING OTHER FRIENDS OR DOING THINGS OUTSIDE YOUR HOME?: NO

## 2024-07-21 ASSESSMENT — PAIN DESCRIPTION - DESCRIPTORS: DESCRIPTORS: ACHING

## 2024-07-21 ASSESSMENT — PAIN SCALES - GENERAL
PAINLEVEL_OUTOF10: 0 - NO PAIN
PAINLEVEL_OUTOF10: 0 - NO PAIN
PAINLEVEL_OUTOF10: 4

## 2024-07-21 ASSESSMENT — COGNITIVE AND FUNCTIONAL STATUS - GENERAL
WALKING IN HOSPITAL ROOM: A LITTLE
EATING MEALS: A LOT
CLIMB 3 TO 5 STEPS WITH RAILING: A LITTLE
DAILY ACTIVITIY SCORE: 16
STANDING UP FROM CHAIR USING ARMS: A LITTLE
DRESSING REGULAR UPPER BODY CLOTHING: A LITTLE
TURNING FROM BACK TO SIDE WHILE IN FLAT BAD: A LITTLE
MOVING FROM LYING ON BACK TO SITTING ON SIDE OF FLAT BED WITH BEDRAILS: A LITTLE
TOILETING: A LITTLE
PERSONAL GROOMING: A LOT
HELP NEEDED FOR BATHING: A LITTLE
MOVING TO AND FROM BED TO CHAIR: A LITTLE
MOBILITY SCORE: 18
DRESSING REGULAR LOWER BODY CLOTHING: A LITTLE
PATIENT BASELINE BEDBOUND: NO

## 2024-07-21 ASSESSMENT — LIFESTYLE VARIABLES
SKIP TO QUESTIONS 9-10: 1
HOW OFTEN DO YOU HAVE 6 OR MORE DRINKS ON ONE OCCASION: NEVER
TOTAL SCORE: 0
AUDIT-C TOTAL SCORE: 0
AUDIT-C TOTAL SCORE: 0
HOW MANY STANDARD DRINKS CONTAINING ALCOHOL DO YOU HAVE ON A TYPICAL DAY: PATIENT DOES NOT DRINK
HOW OFTEN DO YOU HAVE A DRINK CONTAINING ALCOHOL: NEVER
EVER HAD A DRINK FIRST THING IN THE MORNING TO STEADY YOUR NERVES TO GET RID OF A HANGOVER: NO
HAVE YOU EVER FELT YOU SHOULD CUT DOWN ON YOUR DRINKING: NO
HAVE PEOPLE ANNOYED YOU BY CRITICIZING YOUR DRINKING: NO
EVER FELT BAD OR GUILTY ABOUT YOUR DRINKING: NO

## 2024-07-21 ASSESSMENT — ACTIVITIES OF DAILY LIVING (ADL)
WALKS IN HOME: NEEDS ASSISTANCE
HEARING - RIGHT EAR: FUNCTIONAL
GROOMING: NEEDS ASSISTANCE
ADEQUATE_TO_COMPLETE_ADL: YES
BATHING: NEEDS ASSISTANCE
FEEDING YOURSELF: NEEDS ASSISTANCE
TOILETING: NEEDS ASSISTANCE
LACK_OF_TRANSPORTATION: NO
HEARING - LEFT EAR: FUNCTIONAL
DRESSING YOURSELF: NEEDS ASSISTANCE
ASSISTIVE_DEVICE: WALKER
JUDGMENT_ADEQUATE_SAFELY_COMPLETE_DAILY_ACTIVITIES: YES
PATIENT'S MEMORY ADEQUATE TO SAFELY COMPLETE DAILY ACTIVITIES?: YES

## 2024-07-21 ASSESSMENT — PAIN - FUNCTIONAL ASSESSMENT
PAIN_FUNCTIONAL_ASSESSMENT: 0-10
PAIN_FUNCTIONAL_ASSESSMENT: 0-10

## 2024-07-21 ASSESSMENT — PAIN DESCRIPTION - LOCATION: LOCATION: BACK

## 2024-07-21 ASSESSMENT — PAIN DESCRIPTION - ORIENTATION: ORIENTATION: MID

## 2024-07-21 ASSESSMENT — COLUMBIA-SUICIDE SEVERITY RATING SCALE - C-SSRS
1. IN THE PAST MONTH, HAVE YOU WISHED YOU WERE DEAD OR WISHED YOU COULD GO TO SLEEP AND NOT WAKE UP?: NO
6. HAVE YOU EVER DONE ANYTHING, STARTED TO DO ANYTHING, OR PREPARED TO DO ANYTHING TO END YOUR LIFE?: NO
2. HAVE YOU ACTUALLY HAD ANY THOUGHTS OF KILLING YOURSELF?: NO

## 2024-07-21 ASSESSMENT — PAIN DESCRIPTION - PAIN TYPE: TYPE: CHRONIC PAIN

## 2024-07-21 ASSESSMENT — PAIN DESCRIPTION - ONSET: ONSET: ONGOING

## 2024-07-21 ASSESSMENT — PAIN DESCRIPTION - PROGRESSION: CLINICAL_PROGRESSION: NOT CHANGED

## 2024-07-21 ASSESSMENT — PAIN DESCRIPTION - FREQUENCY: FREQUENCY: CONSTANT/CONTINUOUS

## 2024-07-21 NOTE — ED PROVIDER NOTES
HPI   No chief complaint on file.      Patient is a 74-year-old female with history of ankylosing spondylitis, diverticulitis, spinal stenosis, adenoidectomy presenting [facility] ED for nausea/vomiting that began approximately 4 days ago.  Of note, patient reports that she had spinal surgery in May, had MRSA infection of the back in June that required PICC line antibiotics.  Patient neurosurgeon is Dr. Justin Lu.  Patient reports outside of her decreased oral intake from recent nausea, vomiting, she has been possibly malnourished for the last few weeks due to other recent ED visits/hospitalizations.  Patient at this time denies any fever, chills, dizziness, headache, change in vision, chest pain, shortness of breath, abdominal pain.              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     OA (osteoarthritis) of knee     Painful scar 09/20/2017    Peripheral neuropathy     Scoliosis     Spinal stenosis     Traumatic ecchymosis of foot 01/19/2013    Trigger finger of right hand     Vision loss     wears glasses    Vitamin D deficiency      Past Surgical History:   Procedure Laterality Date    ADENOIDECTOMY  1956    BREAST BIOPSY  1999    COLONOSCOPY      KNEE ARTHROPLASTY Right     MOUTH SURGERY  2016    Back left molar removed    MOUTH SURGERY  09/28/2021    right molar    OTHER SURGICAL HISTORY      Skin lesion excision    SPINAL FUSION  06/2010    C4-5    TONSILLECTOMY  1956    WISDOM TOOTH EXTRACTION  1978    lower     Family History   Problem Relation Name Age of Onset    Alzheimer's disease Mother      Heart disease Father      Polymyositis Father      Hypertension Brother      Skin cancer Brother      Other (peripheral artery  disease) Brother       Social History     Tobacco Use    Smoking status: Never    Smokeless tobacco: Never   Vaping Use    Vaping status: Never Used   Substance Use Topics    Alcohol use: Not Currently     Comment: rarely    Drug use: Never       Physical Exam   ED Triage Vitals   Temp Pulse Resp BP   -- -- -- --      SpO2 Temp src Heart Rate Source Patient Position   -- -- -- --      BP Location FiO2 (%)     -- --       Physical Exam  Constitutional:       Appearance: Normal appearance. She is normal weight.   HENT:      Head: Normocephalic and atraumatic.      Nose: Nose normal.      Mouth/Throat:      Mouth: Mucous membranes are dry.      Pharynx: Oropharynx is clear.   Eyes:      Extraocular Movements: Extraocular movements intact.      Conjunctiva/sclera: Conjunctivae normal.      Pupils: Pupils are equal, round, and reactive to light.   Cardiovascular:      Rate and Rhythm: Normal rate and regular rhythm.      Pulses: Normal pulses.      Heart sounds: Normal heart sounds.   Pulmonary:      Effort: Pulmonary effort is normal.      Breath sounds: Normal breath sounds.   Abdominal:      General: Abdomen is flat. Bowel sounds are normal.      Palpations: Abdomen is soft.   Musculoskeletal:         General: Normal range of motion.      Cervical back: Normal range of motion and neck supple.   Skin:     General: Skin is warm and dry.      Capillary Refill: Capillary refill takes less than 2 seconds.   Neurological:      General: No focal deficit present.      Mental Status: She is alert and oriented to person, place, and time. Mental status is at baseline.   Psychiatric:         Mood and Affect: Mood normal.         Behavior: Behavior normal.           ED Course & MDM                        No data recorded                      Medical Decision Making  Patient is a 74 y.o. female who presents to [facility] ED for Dizziness and Nausea (Dizziness and nausea x few days, pt recently had back surgery in may and pt had mrsa in  the wound in June which they cleaned out and placed on iv atb, pt had a picc line which was removed a few days ago,). On initial ED evaluation, patient found to be in no acute distress. Per HPI, concern to evaluate and treat for nausea/vomiting, malnutrition, possible worsening esophageal candidiasis secondary to decreased ability to tolerate oral antibiotics.  Obtaining cardiac and weakness lab workup and diagnostics.  Obtain UA.  Patient bolused with IV LR fluids.  Patient given Zofran for nausea.  Lab work reviewed, notable for UTI.  Patient UTI treated with Rocephin.  Patient malnutrition status secondary to decreased p.o. and recent surgery concerning for worsening disposition.  Patient would benefit from inpatient evaluation for possible feeding tube, Dobbhoff catheter.  Patient to be admitted to general medicine service for further evaluation and management.  Patient vitally stable at this time.  Patient amenable to plan.              Procedure  Procedures     Mak Bradshaw MD  Resident  07/21/24 2001

## 2024-07-21 NOTE — CARE PLAN
The patient's goals for the shift include      The clinical goals for the shift include Pt will be able to ambulate with minimal assistance.      Problem: Pain - Adult  Goal: Verbalizes/displays adequate comfort level or baseline comfort level  Outcome: Progressing     Problem: Safety - Adult  Goal: Free from fall injury  Outcome: Progressing     Problem: Skin  Goal: Decreased wound size/increased tissue granulation at next dressing change  Outcome: Progressing  Goal: Participates in plan/prevention/treatment measures  Outcome: Progressing  Goal: Prevent/manage excess moisture  Outcome: Progressing  Flowsheets (Taken 7/21/2024 4163)  Prevent/manage excess moisture:   Moisturize dry skin   Monitor for/manage infection if present   Cleanse incontinence/protect with barrier cream  Goal: Prevent/minimize sheer/friction injuries  Outcome: Progressing  Goal: Promote/optimize nutrition  Outcome: Progressing  Goal: Promote skin healing  Outcome: Progressing     Problem: Pain  Goal: Takes deep breaths with improved pain control throughout the shift  Outcome: Progressing  Goal: Turns in bed with improved pain control throughout the shift  Outcome: Progressing  Goal: Walks with improved pain control throughout the shift  Outcome: Progressing  Goal: Performs ADL's with improved pain control throughout shift  Outcome: Progressing  Goal: Participates in PT with improved pain control throughout the shift  Outcome: Progressing  Goal: Free from opioid side effects throughout the shift  Outcome: Progressing  Goal: Free from acute confusion related to pain meds throughout the shift  Outcome: Progressing

## 2024-07-22 VITALS
OXYGEN SATURATION: 92 % | BODY MASS INDEX: 26.04 KG/M2 | RESPIRATION RATE: 20 BRPM | WEIGHT: 162.04 LBS | HEIGHT: 66 IN | TEMPERATURE: 98.2 F | HEART RATE: 79 BPM | SYSTOLIC BLOOD PRESSURE: 132 MMHG | DIASTOLIC BLOOD PRESSURE: 61 MMHG

## 2024-07-22 LAB
25(OH)D3 SERPL-MCNC: 50 NG/ML (ref 30–100)
ANION GAP SERPL CALC-SCNC: 12 MMOL/L (ref 10–20)
ATRIAL RATE: 83 BPM
BUN SERPL-MCNC: 8 MG/DL (ref 6–23)
CALCIUM SERPL-MCNC: 8.2 MG/DL (ref 8.6–10.3)
CHLORIDE SERPL-SCNC: 107 MMOL/L (ref 98–107)
CO2 SERPL-SCNC: 26 MMOL/L (ref 21–32)
CREAT SERPL-MCNC: 0.79 MG/DL (ref 0.5–1.05)
EGFRCR SERPLBLD CKD-EPI 2021: 80 ML/MIN/1.73M*2
ERYTHROCYTE [DISTWIDTH] IN BLOOD BY AUTOMATED COUNT: 16 % (ref 11.5–14.5)
FOLATE SERPL-MCNC: 12.3 NG/ML
GLUCOSE SERPL-MCNC: 89 MG/DL (ref 74–99)
HCT VFR BLD AUTO: 29.4 % (ref 36–46)
HGB BLD-MCNC: 9 G/DL (ref 12–16)
HOLD SPECIMEN: NORMAL
HOLD SPECIMEN: NORMAL
MCH RBC QN AUTO: 28 PG (ref 26–34)
MCHC RBC AUTO-ENTMCNC: 30.6 G/DL (ref 32–36)
MCV RBC AUTO: 91 FL (ref 80–100)
NRBC BLD-RTO: 0 /100 WBCS (ref 0–0)
P AXIS: 53 DEGREES
P OFFSET: 193 MS
P ONSET: 154 MS
PLATELET # BLD AUTO: 257 X10*3/UL (ref 150–450)
POTASSIUM SERPL-SCNC: 3 MMOL/L (ref 3.5–5.3)
PR INTERVAL: 126 MS
Q ONSET: 217 MS
QRS COUNT: 14 BEATS
QRS DURATION: 86 MS
QT INTERVAL: 438 MS
QTC CALCULATION(BAZETT): 514 MS
QTC FREDERICIA: 488 MS
R AXIS: 13 DEGREES
RBC # BLD AUTO: 3.22 X10*6/UL (ref 4–5.2)
SODIUM SERPL-SCNC: 142 MMOL/L (ref 136–145)
T AXIS: 69 DEGREES
T OFFSET: 436 MS
VENTRICULAR RATE: 83 BPM
VIT B12 SERPL-MCNC: 674 PG/ML (ref 211–911)
WBC # BLD AUTO: 4.4 X10*3/UL (ref 4.4–11.3)

## 2024-07-22 PROCEDURE — 99232 SBSQ HOSP IP/OBS MODERATE 35: CPT | Performed by: INTERNAL MEDICINE

## 2024-07-22 PROCEDURE — 2500000002 HC RX 250 W HCPCS SELF ADMINISTERED DRUGS (ALT 637 FOR MEDICARE OP, ALT 636 FOR OP/ED): Performed by: INTERNAL MEDICINE

## 2024-07-22 PROCEDURE — 2500000001 HC RX 250 WO HCPCS SELF ADMINISTERED DRUGS (ALT 637 FOR MEDICARE OP): Performed by: INTERNAL MEDICINE

## 2024-07-22 PROCEDURE — 97530 THERAPEUTIC ACTIVITIES: CPT | Mod: GO

## 2024-07-22 PROCEDURE — 82746 ASSAY OF FOLIC ACID SERUM: CPT | Mod: STJLAB | Performed by: INTERNAL MEDICINE

## 2024-07-22 PROCEDURE — 36415 COLL VENOUS BLD VENIPUNCTURE: CPT | Performed by: INTERNAL MEDICINE

## 2024-07-22 PROCEDURE — 92526 ORAL FUNCTION THERAPY: CPT | Mod: GN | Performed by: SPEECH-LANGUAGE PATHOLOGIST

## 2024-07-22 PROCEDURE — 96372 THER/PROPH/DIAG INJ SC/IM: CPT | Performed by: INTERNAL MEDICINE

## 2024-07-22 PROCEDURE — 82607 VITAMIN B-12: CPT | Mod: STJLAB | Performed by: INTERNAL MEDICINE

## 2024-07-22 PROCEDURE — 92610 EVALUATE SWALLOWING FUNCTION: CPT | Mod: GN | Performed by: SPEECH-LANGUAGE PATHOLOGIST

## 2024-07-22 PROCEDURE — 97165 OT EVAL LOW COMPLEX 30 MIN: CPT | Mod: GO

## 2024-07-22 PROCEDURE — G0378 HOSPITAL OBSERVATION PER HR: HCPCS

## 2024-07-22 PROCEDURE — 97116 GAIT TRAINING THERAPY: CPT | Mod: GP

## 2024-07-22 PROCEDURE — 2500000004 HC RX 250 GENERAL PHARMACY W/ HCPCS (ALT 636 FOR OP/ED): Performed by: INTERNAL MEDICINE

## 2024-07-22 PROCEDURE — 97161 PT EVAL LOW COMPLEX 20 MIN: CPT | Mod: GP

## 2024-07-22 PROCEDURE — 80048 BASIC METABOLIC PNL TOTAL CA: CPT | Performed by: INTERNAL MEDICINE

## 2024-07-22 PROCEDURE — 85027 COMPLETE CBC AUTOMATED: CPT | Performed by: INTERNAL MEDICINE

## 2024-07-22 PROCEDURE — 99222 1ST HOSP IP/OBS MODERATE 55: CPT | Performed by: NURSE PRACTITIONER

## 2024-07-22 RX ORDER — SODIUM CHLORIDE 9 MG/ML
100 INJECTION, SOLUTION INTRAVENOUS CONTINUOUS
Status: ACTIVE | OUTPATIENT
Start: 2024-07-22 | End: 2024-07-22

## 2024-07-22 RX ORDER — POTASSIUM CHLORIDE 1.5 G/1.58G
40 POWDER, FOR SOLUTION ORAL ONCE
Status: COMPLETED | OUTPATIENT
Start: 2024-07-22 | End: 2024-07-22

## 2024-07-22 RX ADMIN — FLUCONAZOLE 200 MG: 200 TABLET ORAL at 09:58

## 2024-07-22 RX ADMIN — POTASSIUM CHLORIDE 40 MEQ: 1.5 POWDER, FOR SOLUTION ORAL at 09:59

## 2024-07-22 RX ADMIN — NYSTATIN 500000 UNITS: 100000 SUSPENSION ORAL at 20:49

## 2024-07-22 RX ADMIN — NYSTATIN 500000 UNITS: 100000 SUSPENSION ORAL at 06:03

## 2024-07-22 RX ADMIN — SODIUM CHLORIDE 100 ML/HR: 9 INJECTION, SOLUTION INTRAVENOUS at 13:22

## 2024-07-22 RX ADMIN — FLUTICASONE PROPIONATE 2 SPRAY: 50 SPRAY, METERED NASAL at 17:20

## 2024-07-22 RX ADMIN — NYSTATIN 500000 UNITS: 100000 SUSPENSION ORAL at 17:20

## 2024-07-22 RX ADMIN — NYSTATIN 500000 UNITS: 100000 SUSPENSION ORAL at 13:21

## 2024-07-22 RX ADMIN — ENOXAPARIN SODIUM 40 MG: 40 INJECTION SUBCUTANEOUS at 17:19

## 2024-07-22 ASSESSMENT — ACTIVITIES OF DAILY LIVING (ADL)
BATHING_ASSISTANCE: MODERATE
LACK_OF_TRANSPORTATION: NO
ADL_ASSISTANCE: NEEDS ASSISTANCE
ADL_ASSISTANCE: INDEPENDENT

## 2024-07-22 ASSESSMENT — COGNITIVE AND FUNCTIONAL STATUS - GENERAL
STANDING UP FROM CHAIR USING ARMS: A LITTLE
MOVING TO AND FROM BED TO CHAIR: A LITTLE
CLIMB 3 TO 5 STEPS WITH RAILING: A LOT
WALKING IN HOSPITAL ROOM: A LITTLE
DRESSING REGULAR LOWER BODY CLOTHING: A LOT
STANDING UP FROM CHAIR USING ARMS: A LITTLE
DRESSING REGULAR LOWER BODY CLOTHING: A LOT
DAILY ACTIVITIY SCORE: 18
TOILETING: A LITTLE
MOVING FROM LYING ON BACK TO SITTING ON SIDE OF FLAT BED WITH BEDRAILS: A LITTLE
DAILY ACTIVITIY SCORE: 17
PERSONAL GROOMING: A LITTLE
MOBILITY SCORE: 17
PERSONAL GROOMING: A LITTLE
MOBILITY SCORE: 17
MOVING TO AND FROM BED TO CHAIR: A LITTLE
TOILETING: A LOT
TURNING FROM BACK TO SIDE WHILE IN FLAT BAD: A LITTLE
HELP NEEDED FOR BATHING: A LOT
HELP NEEDED FOR BATHING: A LOT
MOVING FROM LYING ON BACK TO SITTING ON SIDE OF FLAT BED WITH BEDRAILS: A LITTLE
TURNING FROM BACK TO SIDE WHILE IN FLAT BAD: A LITTLE
WALKING IN HOSPITAL ROOM: A LITTLE
CLIMB 3 TO 5 STEPS WITH RAILING: A LOT

## 2024-07-22 ASSESSMENT — PAIN SCALES - GENERAL
PAINLEVEL_OUTOF10: 0 - NO PAIN

## 2024-07-22 ASSESSMENT — PAIN - FUNCTIONAL ASSESSMENT
PAIN_FUNCTIONAL_ASSESSMENT: 0-10

## 2024-07-22 NOTE — PROGRESS NOTES
"Nutrition Initial Assessment:   Nutrition Assessment    Reason for Assessment: Provider consult order (\"educate patient\")    Patient is a 74 y.o. female presenting with difficulty swallowing and concern for dehydration and malnutrition. PMH ankylosing spondylitis, esophageal dysmotility, diverticulitis.  Follows with Dr. Noelle Nation for outpatient GI needs.     Past Medical History:   Diagnosis Date    Ankylosis of sacroiliac joint 06/04/2024    Arthritis     Cervicalgia     Colon polyp     CTS (carpal tunnel syndrome)     Diverticulitis 06/27/2023    Hallux valgus, acquired 01/19/2013    Hiatal hernia     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     OA (osteoarthritis) of knee     Painful scar 09/20/2017    Peripheral neuropathy     Scoliosis     Spinal stenosis     Traumatic ecchymosis of foot 01/19/2013    Trigger finger of right hand     Vision loss     wears glasses    Vitamin D deficiency        Nutrition History:  Energy Intake: Poor < 50 %  Food and Nutrient History: Pt with N/V x 4 days.  (Per GI note) PMH ankylosing spondylitis, esophageal dysmotility, diverticulitis.  Patient recently underwent spinal surgery, was home from rehab for 18hrs and fell and broke her R wrist and then was noted to have developed MRSA infection in her back and went back to the OR June 14 for drainage and returned to rehab where she was on IV vancomycin via PICC line which she just completed on the 7/15. She endorses postnasal drip and increasing difficulty swallowing.  No odynophagia.  She does have to spit up liquid shortly after swallowing though her pills stay down this morning.  She is not generally on a PPI. She also follows with Dr. Noelle Nation for GI needs and underwent EGD as reported in detail below on 7/10/2024.  She was started on Diflucan for Candida and has 3 days left of that prescription.  She endorses that the postnasal drip causes \"gut gunk\" which leads to mild diarrhea at times.  She is able to tolerate " "liquids intermittently and has tolerated yogurt and applesauce though seeming increasingly difficult to get down.  Due to symptoms, she has no appetite.  She does endorse intermittent constipation and states she had a small BM yesterday, liquid in consistency.  She does have intermittent SOB though she contributes this to anxiety, frustration.  Vitamin/Herbal Supplement Use: None per chart  Food Allergies/Intolerances:  None  GI Symptoms: Diarrhea, Nausea, and Vomiting  Oral Problems: Swallowing difficulty       Anthropometrics:  Height: 167.6 cm (5' 6\")   Weight: 73.5 kg (162 lb 0.6 oz)   BMI (Calculated): 26.17  IBW/kg (Dietitian Calculated): 58.97 kg  Percent of IBW: 124.64 %       Weight History:   Wt Readings from Last 10 Encounters:   07/21/24 73.5 kg (162 lb 0.6 oz)   07/04/24 81.6 kg (180 lb)   06/04/24 81.6 kg (180 lb)   05/15/24 95.4 kg (210 lb 5.1 oz)   04/30/24 86.6 kg (191 lb)   03/08/24 88.9 kg (195 lb 14.4 oz)   02/29/24 86.6 kg (191 lb)   01/31/24 87.9 kg (193 lb 11.2 oz)   10/24/23 86.5 kg (190 lb 12.8 oz)   07/11/23 86.8 kg (191 lb 4 oz)       Weight Change %:  Weight History / % Weight Change: Loss of 8.1 kg (9.9%) within the past 3 weeks  Significant Weight Loss: Yes    Nutrition Focused Physical Exam Findings:  defer: limited - pt leaving floor for esophogram  Subcutaneous Fat Loss:      Muscle Wasting:     Edema:  Edema: +1 trace, +2 mild  Edema Location: bilateral lower extremities  Physical Findings:  Mouth: Positive (thick coating in mouth)  Skin: Negative (skin intact)    Nutrition Significant Labs:  CBC Trend:   Results from last 7 days   Lab Units 07/22/24  0610 07/21/24  1133   WBC AUTO x10*3/uL 4.4 5.8   RBC AUTO x10*6/uL 3.22* 3.91*   HEMOGLOBIN g/dL 9.0* 10.8*   HEMATOCRIT % 29.4* 35.3*   MCV fL 91 90   PLATELETS AUTO x10*3/uL 257 333    , BMP Trend:   Results from last 7 days   Lab Units 07/22/24  0610 07/21/24  1133   GLUCOSE mg/dL 89 122*   CALCIUM mg/dL 8.2* 9.4   SODIUM mmol/L " 142 141   POTASSIUM mmol/L 3.0* 3.6   CO2 mmol/L 26 23   CHLORIDE mmol/L 107 104   BUN mg/dL 8 10   CREATININE mg/dL 0.79 0.84            I/O:   Last BM Date: 07/21/24;      Dietary Orders (From admission, onward)       Start     Ordered    07/23/24 0001  NPO Diet Except: Sips with meds; Effective midnight  Diet effective midnight        Question:  Except:  Answer:  Sips with meds    07/22/24 1243    07/22/24 1527  Oral nutritional supplements  Until discontinued        Question Answer Comment   Deliver with Breakfast    Deliver with All meals    Deliver with Lunch    Deliver with Dinner    Select supplement: [employer] Standard 1.0        07/22/24 1526    07/22/24 1243  Adult diet Regular  Diet effective now        Question:  Diet type  Answer:  Regular    07/22/24 1243                     Estimated Needs:   Total Energy Estimated Needs (kCal): 1800 kCal  Method for Estimating Needs: MSJ x 1.3 x 1.1  Total Protein Estimated Needs (g): 74 g     Total Fluid Estimated Needs (mL): 1800 mL           Nutrition Diagnosis   Malnutrition Diagnosis  Patient has Malnutrition Diagnosis: Yes  Diagnosis Status: New  Malnutrition Diagnosis: Severe malnutrition related to acute disease or injury  As Evidenced by: significant weight loss of 8.1 kg (9.9%) within the past 3 weeks and poor intakes of <50% of estimated nutritional needs within the past 3 weeks.            Nutrition Interventions/Recommendations         Nutrition Prescription:  Individualized Nutrition Prescription Provided for : continue REGULAR diet as ordered        Nutrition Interventions:   Interventions: Meals and snacks, Medical food supplement  Meals and Snacks: Texture-modified diet  Goal: Pt only able to tolerate liquids at this time  Medical Food Supplement: Commercial beverage  Goal: Pt reports ENSURE gives her diarrhea. Will trial plant-based option: [employer] (325 kcal/16g protein each 11-ounces)  Additional Interventions: Pt would need to consume 4  "cartons in order to meet >75% of estimated nutritional needs.    Goal: Case discussed with ADY Nath.    Nutrition Education:   Education Documentation  Nutrition Care Manual, taught by Arianne Ying RDN, LD at 7/22/2024  3:50 PM.  Learner: Significant Other, Patient  Readiness: Acceptance  Method: Explanation, Handout  Response: Verbalizes Understanding  Comment: Provided the following handouts from the Nutrition Care Manual:\"High Calorie, High Protein Recipes\"\"Recipes for Shakes with Dairy\"\"Recipes to Help with Sore mouth or Throat\"\"How to Make a high-Calorie Shake\"               Nutrition Monitoring and Evaluation   Food/Nutrient Related History Monitoring  Monitoring and Evaluation Plan: Energy intake  Energy Intake: Estimated energy intake  Criteria: Pt to consume >75% of 1800 kcal and 74g protein daily.  Additional Plans: Pt to supplement with ons as tolerated.    Body Composition/Growth/Weight History  Monitoring and Evaluation Plan: Weight  Weight: Measured weight  Criteria: Obtain daily weights and document in chart.    Biochemical Data, Medical Tests and Procedures  Monitoring and Evaluation Plan: Electrolyte/renal panel  Electrolyte and Renal Panel: Potassium  Criteria: To improve to normal range.    Nutrition Focused Physical Findings  Monitoring and Evaluation Plan: Digestive System  Digestive System: Nausea, Vomiting, Decrease in appetite  Criteria: To resolve as swallowing improves.         Time Spent (min): 60 minutes    "

## 2024-07-22 NOTE — PROGRESS NOTES
07/22/24 1434   Discharge Planning   Living Arrangements Spouse/significant other   Support Systems Spouse/significant other   Assistance Needed yes   Type of Residence Private residence   Home or Post Acute Services In home services   Type of Home Care Services Home OT;Home PT   Expected Discharge Disposition  Services   Housing Stability   In the last 12 months, was there a time when you were not able to pay the mortgage or rent on time? N   At any time in the past 12 months, were you homeless or living in a shelter (including now)? N   Transportation Needs   In the past 12 months, has lack of transportation kept you from medical appointments or from getting medications? no   In the past 12 months, has lack of transportation kept you from meetings, work, or from getting things needed for daily living? No     Spoke to patient at bedside to explain my role in discharge planning. Patient states she lives at home with her . Patient states she uses a walker with ambulation. PCP is dr Rushing. Patient states she is currently active with [facility] for PT/OT. Spoke to patient regarding therapy recommendation for high intensity. Patient states she does not want to go to [place] and that her problem now is being able to eat and that if she could eat she would be able to do the PT/OT she already has and would be fine. Patient states it's the lack of strength from not eating solid foods. Patient states she would like a better nutrition plan prior to going home and would also like to know what is causing the problem. TCC to follow for further d/c needs as care progresses.

## 2024-07-22 NOTE — CONSULTS
"Reason for consult  Dysphagia, Randi    HPI  Khalida Barker is a 74 y.o. female presenting with N/V x 4 days.  PMH ankylosing spondylitis, esophageal dysmotility, diverticulitis.  Patient recently underwent spinal surgery, was home from rehab for 18hrs and fell and broke her R wrist and then was noted to have developed MRSA infection in her back and went back to the OR June 14 for drainage and returned to rehab where she was on IV vancomycin via PICC line which she just completed on the 7/15. She endorses postnasal drip and increasing difficulty swallowing.  No odynophagia.  She does have to spit up liquid shortly after swallowing though her pills stay down this morning.  She is not generally on a PPI. She also follows with Dr. Noelle Nation for GI needs and underwent EGD as reported in detail below on 7/10/2024.  She was started on Diflucan for Candida and has 3 days left of that prescription.  She endorses that the postnasal drip causes \"gut gunk\" which leads to mild diarrhea at times.  She is able to tolerate liquids intermittently and has tolerated yogurt and applesauce though seeming increasingly difficult to get down.  Due to symptoms, she has no appetite.  She does endorse intermittent constipation and states she had a small BM yesterday, liquid in consistency.  She does have intermittent SOB though she contributes this to anxiety, frustration.  No lightheadedness, CP, abdominal pain, hematochezia or melena.    EGD 7/10/2024 with Dr. Nation noting patchy white plaques in the entire esophagus with biopsies obtained, abnormal esophageal motility with spasticity of the esophageal body and diffuse atrophic mucosa throughout the stomach with biopsies obtained.  Colonoscopy 9/28/2022 noting one 8 mm sessile polyp in descending colon, 5 sessile polyps 3 to 8 mm in transverse colon.  All resected and retrieved.  Internal hemorrhoids, tortuous colon, sigmoid diverticulosis.    PMH  She has a past medical history of " "Ankylosis of sacroiliac joint (06/04/2024), Arthritis, Cervicalgia, Colon polyp, CTS (carpal tunnel syndrome), Diverticulitis (06/27/2023), Hallux valgus, acquired (01/19/2013), Hiatal hernia, 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, OA (osteoarthritis) of knee, Painful scar (09/20/2017), Peripheral neuropathy, Scoliosis, Spinal stenosis, Traumatic ecchymosis of foot (01/19/2013), Trigger finger of right hand, Vision loss, and Vitamin D deficiency.    PSH  She has a past surgical history that includes Other surgical history; Knee Arthroplasty (Right); Colonoscopy; Tonsillectomy (1956); Adenoidectomy (1956); Spinal fusion (06/2010); Breast biopsy (1999); Mouth surgery (2016); Mouth surgery (09/28/2021); and [place] tooth extraction (1978).     Family  No GI malignancies    Social  She reports that she has never smoked. She has never used smokeless tobacco. She reports that she does not currently use alcohol. She reports that she does not use drugs.      Review of Systems  ROS negative unless stated otherwise in HPI     Objective  /61   Pulse 75   Temp 36.6 °C (97.9 °F)   Resp 20   Ht 1.676 m (5' 6\")   Wt 73.5 kg (162 lb 0.6 oz)   SpO2 95%   BMI 26.15 kg/m²     Physical Exam  Constitutional: Alert, pleasant and interactive, in NAD, tearful at times  Eyes: PERRL, sclera clear, no conjunctival injection  Skin: Warm and dry, no rash or ecchymosis  ENMT: Mucous membranes moist, no lesions noted  Resp: CTAB, even and unlabored  CV: RRR, normal S1, S2, systolic murmur  GI: +BS, soft, round, NT, no rebound tenderness or guarding, no palpable masses or organomegaly  Extremities: Extremities warm, no edema, contusions, wounds or cyanosis  Neuro: Alert and oriented x3  Psych: Appropriate mood and behavior    Medications  Scheduled medications  enoxaparin, 40 mg, subcutaneous, q24h  fluconazole, 200 mg, oral, Daily  fluticasone, 2 spray, Each Nostril, Daily  nystatin, 5 mL, Swish & Swallow, 4x " daily  potassium chloride, 40 mEq, oral, Once      Continuous medications     PRN medications  PRN medications: acetaminophen **OR** acetaminophen **OR** acetaminophen, benzocaine-menthol, guaiFENesin, lidocaine-diphenhydraMINE-Maalox 1:1:1, ondansetron **OR** ondansetron, polyethylene glycol, promethazine **OR** promethazine     Labs  Lab Results   Component Value Date    WBC 4.4 07/22/2024    HGB 9.0 (L) 07/22/2024    HCT 29.4 (L) 07/22/2024    MCV 91 07/22/2024     07/22/2024     Lab Results   Component Value Date    GLUCOSE 89 07/22/2024    CALCIUM 8.2 (L) 07/22/2024     07/22/2024    K 3.0 (L) 07/22/2024    CO2 26 07/22/2024     07/22/2024    BUN 8 07/22/2024    CREATININE 0.79 07/22/2024     Lab Results   Component Value Date    ALT 7 07/21/2024    AST 17 07/21/2024    ALKPHOS 133 07/21/2024    BILITOT 0.7 07/21/2024     Lab Results   Component Value Date    IRON 29 (L) 07/21/2024    TIBC 163 (L) 07/21/2024     Lab Results   Component Value Date    INR 1.3 (H) 05/14/2024    INR 1.0 04/30/2024    PROTIME 14.5 (H) 05/14/2024    PROTIME 11.6 04/30/2024       Radiology  N/A    Assessment:  Khalida Barker is a 74 y.o. female presenting with N/V x 4 days.  PMH ankylosing spondylitis, esophageal dysmotility, diverticulitis.  Follows with Dr. Noelle Nation for outpatient GI needs.  Had EGD 7/10 that noted candidiasis for which she is completing Diflucan as well as esophageal dysmotility.  Patient feels this may have worsened since recent IV antibiotic treatment for MRSA of the spine.    # N/V- secondary to dysmotility v PND v antibiotics  # esophageal dysmotility  # candidiasis  # recent back surgery x2  # MRSA spine- ts with IV vancomycin    Plan:  - continue supportive care  - diet per dietician recs  - continue IVF's until able to tolerated oral fluids  - continue fluconazole course  - agree with nystatin S&S  - obtain esophagram  - pt will continue to follow with Dr. YOMI Nation for outpatient GI  needs    Plan has been discussed with Dr. YOMI Nation GI will continue to follow peripherally.     SHAMIKA Hathaway/CNP

## 2024-07-22 NOTE — PROGRESS NOTES
"Occupational Therapy    Evaluation    Patient Name: Khalida Barker  MRN: [id number]  Today's Date: 7/22/2024  Time Calculation  Start Time: 1056  Stop Time: 1123  Time Calculation (min): 27 min    Assessment  IP OT Assessment  OT Assessment: Pt benefits from continued OT services to improve independence in ADL/IADLs, functional mobility, strength, balance, and endurance  End of Session Communication: Bedside nurse  End of Session Patient Position: Up in chair, Alarm on  Plan:  Treatment Interventions: ADL retraining, Functional transfer training, Endurance training, UE strengthening/ROM  OT Frequency: 5 times per week  OT Discharge Recommendations: High intensity level of continued care  OT Recommended Transfer Status: Minimal assist  OT - OK to Discharge:  (Pt OK to d/c once medically cleared)    Subjective   Current Problem:  No diagnosis found.  General:  General  Reason for Referral: N+V, difficulty swallowing, dehydration, malnutrition  Referred By: Dr. Leung  Past Medical History Relevant to Rehab: Ankylosing spondylitis, diverticulosis, spinal stenosis, candida esophagitis  Family/Caregiver Present: Yes ( present towards end of eval)  Co-Treatment: PT  Prior to Session Communication: Bedside nurse  Patient Position Received: Up in chair, Alarm on  General Comment: Pt agreeable and cooperative to therapy services this date  Precautions:  UE Weight Bearing Status: Other (Comment) (Platform WB through RUE)  LE Weight Bearing Status: Weight Bearing as Tolerated  Medical Precautions: Fall precautions  Braces Applied: R wrist  Vital Signs:  SpO2:  (88% w/ functional activity, increases to 99% when seated)  Pain:  Pain Assessment  Pain Assessment: 0-10  0-10 (Numeric) Pain Score: 0 - No pain  Pain Type: Other (Comment) (\"discomfort\")    Objective   Cognition:  Overall Cognitive Status: Within Functional Limits           Home Living:  Type of Home: House  Lives With: Spouse  Home Adaptive Equipment: Walker rolling " or standard, Cane  Home Layout: Two level, Able to live on main level with bedroom/bathroom, Stairs to alternate level with rails  Alternate Level Stairs-Rails: Right  Alternate Level Stairs-Number of Steps: 13  Home Access: Stairs to enter with rails  Entrance Stairs-Rails: Both  Entrance Stairs-Number of Steps: 4 (3 + 1 threshold)  Bathroom Shower/Tub: Walk-in shower  Bathroom Toilet: Handicapped height  Bathroom Equipment: Grab bars in shower, Shower chair with back, Hand-held shower hose  Home Living Comments: Reports to have been ambulatory w/ platform walker at home   Prior Function:  Level of [place]: Independent with ADLs and functional transfers, Needs assistance with homemaking  Receives Help From: Family  ADL Assistance: Independent  Homemaking Assistance: Needs assistance  Prior Function Comments: Pt reports to be overall independent w/ ADLs except  assists w/ LB dressing,  has been managing IADLs at home  IADL History:     ADL:  Eating Assistance: Modified independent (Device)  Grooming Assistance: Stand by  Bathing Assistance: Moderate  UE Dressing Assistance: Minimal  LE Dressing Assistance: Moderate  Toileting Assistance with Device: Moderate  Functional Assistance: Minimal  Activity Tolerance:  Endurance: Tolerates 10 - 20 min exercise with multiple rests  Bed Mobility/Transfers: Transfers  Transfer: Yes (Sit <--> stand at bedside chair w/ platform walker and Min A)      Functional Mobility:  Functional Mobility  Functional Mobility Performed: Yes (Pt ambulates to door from bedside chair w/ platform walker, Min A, and chair follow for safety)       Extremities: RUE   RUE :  (N/T) and LUE   LUE: Within Functional Limits    Outcome Measures: [facility] Daily Activity  Putting on and taking off regular lower body clothing: A lot  Bathing (including washing, rinsing, drying): A lot  Putting on and taking off regular upper body clothing: None  Toileting, which includes using toilet, bedpan  or urinal: A lot  Taking care of personal grooming such as brushing teeth: A little  Eating Meals: None  Daily Activity - Total Score: 17      Education Documentation  No documentation found.  Education Comments  No comments found.      Goals:   Encounter Problems       Encounter Problems (Active)       OT Goals       OT Goal 1 (Progressing)       Start:  07/22/24    Expected End:  08/05/24       PATIENT WILL COMPLETE LOWER BODY DRESSING WITH supervision            OT Problem       PATIENT WILL MAINTAIN DYNAMIC STANDING BALANCE DURING walking and reaching (Progressing)       Start:  07/22/24    Expected End:  08/05/24            PATIENT WILL PERFORM CHAIR TO AND FROM BED TRANSFER WITH supervision (Progressing)       Start:  07/22/24    Expected End:  08/05/24            PATIENT WILL PERFORM COMMODE TRANSFER WITH supervision (Progressing)       Start:  07/22/24    Expected End:  08/05/24            PATIENT WILL PERFROM BATHING ACTIVITIES STANDING WITH stand by assist (Progressing)       Start:  07/22/24    Expected End:  08/05/24            PATIENT WILL COMPLETE CLOTHING MANAGEMENT WITH TOILETTING supervision (Progressing)       Start:  07/22/24    Expected End:  08/05/24            PATIENT WILL COMPLETE TOILETTING HYGIENE WITH supervision (Progressing)       Start:  07/22/24    Expected End:  08/05/24

## 2024-07-22 NOTE — PROGRESS NOTES
Khalida Barker is a 74 y.o. female on day 0 of admission presenting with Dysphagia.      Subjective   Not feeling any better than yesterday.  Talked about low iron level and waiting on GI.  She would like to speak with dietitian as well.  Speech eval pending       Objective     Last Recorded Vitals  /61   Pulse 75   Temp 36.6 °C (97.9 °F)   Resp 20   Wt 73.5 kg (162 lb 0.6 oz)   SpO2 95%   Intake/Output last 3 Shifts:    Intake/Output Summary (Last 24 hours) at 7/22/2024 0912  Last data filed at 7/22/2024 0502  Gross per 24 hour   Intake 2001.67 ml   Output 500 ml   Net 1501.67 ml       Admission Weight  Weight: 81.6 kg (180 lb) (07/21/24 1127)    Daily Weight  07/21/24 : 73.5 kg (162 lb 0.6 oz)      Physical Exam:  General: Not in acute distress, alert  HEENT: PERRLA, head intact and normocephalic.  Tongue does not look dry but significant furrowing and erythema noted  Neck: Normal to inspection  Lungs: Clear to auscultation, work of breathing within normal limit  Cardiac: Regular rate and rhythm  Abdomen: Soft nontender, positive bowel sounds  : Exam deferred  Skin: Intact  Hematology: No petechia or excessive ecchymosis  Musculoskeletal: Without significant trauma.  Pitting edema of 1-2+  Neurological: Alert awake oriented, no focal deficit, cranial nerves grossly intact  Psych: No suicidal ideation or homicidal ideation    Relevant Results  Scheduled medications  enoxaparin, 40 mg, subcutaneous, q24h  fluconazole, 200 mg, oral, Daily  fluticasone, 2 spray, Each Nostril, Daily  nystatin, 5 mL, Swish & Swallow, 4x daily  potassium chloride, 40 mEq, oral, Once      Continuous medications     PRN medications  PRN medications: acetaminophen **OR** acetaminophen **OR** acetaminophen, benzocaine-menthol, guaiFENesin, lidocaine-diphenhydraMINE-Maalox 1:1:1, ondansetron **OR** ondansetron, polyethylene glycol, promethazine **OR** promethazine   Labs  Results from last 7 days   Lab Units 07/22/24  0610  07/21/24  1133   WBC AUTO x10*3/uL 4.4 5.8   HEMOGLOBIN g/dL 9.0* 10.8*   HEMATOCRIT % 29.4* 35.3*   PLATELETS AUTO x10*3/uL 257 333     Results from last 7 days   Lab Units 07/22/24  0610 07/21/24  1133   SODIUM mmol/L 142 141   POTASSIUM mmol/L 3.0* 3.6   CHLORIDE mmol/L 107 104   CO2 mmol/L 26 23   BUN mg/dL 8 10   CREATININE mg/dL 0.79 0.84   CALCIUM mg/dL 8.2* 9.4   PROTEIN TOTAL g/dL  --  7.3   BILIRUBIN TOTAL mg/dL  --  0.7   ALK PHOS U/L  --  133   ALT U/L  --  7   AST U/L  --  17   GLUCOSE mg/dL 89 122*       ECG 12 Lead    Result Date: 7/22/2024  Normal sinus rhythm Cannot rule out Anterior infarct (cited on or before 26-JUN-2023) Prolonged QT Abnormal ECG When compared with ECG of 14-MAY-2024 18:14, Nonspecific T wave abnormality no longer evident in Inferior leads Nonspecific T wave abnormality, improved in Lateral leads    US gallbladder    Result Date: 6/26/2024  * * *Final Report* * * DATE OF EXAM: Jun 25 2024  5:24PM   S0U   1032  -  US ABD RIGHT UPPER QUADRANT  / ACCESSION #  [id number] PROCEDURE REASON: nausea, vomiting, pain      * * * * Physician Interpretation * * * *  EXAMINATION: RIGHT UPPER QUADRANT ULTRASOUND HISTORY:  nausea, vomiting, pain TECHNIQUE:  Sonography of the right upper quadrant was performed.  Images were obtained and stored in a permanent archive. MQ:  URUQ_2 COMPARISON: None. RESULT: Pancreas:  Normal sonographic appearance.    Portions obscured: tail Liver:      Echotexture:  Normal, homogeneous.      Echogenicity:  Normal      Surface contour:  Smooth      Lesions: Multiple simple and mildly complex cysts are present with largest measuring up to 2.8 cm. Biliary: No intrahepatic biliary duct dilation.      CBD: 0.3 cm at the hilum.      Gallbladder: Normal caliber           -Contents:  No cholelithiasis.  There is gallbladder sludge.           -Wall:  Normal           -Other:  No pericholecystic fluid. Sonographic Meza sign was reported negative. Right Kidney: No  hydronephrosis. 5 cm benign-appearing simple cyst and additional smaller cysts. Ascites: None. -    IMPRESSION: Gallbladder sludge. Hepatic and RIGHT renal cysts. : [facility]   Transcribe Date/Time: Jun 26 2024  9:44A Dictated by : BECKY REARDON MD This examination was interpreted and the report reviewed and   electronically signed by: BECKY REARDON MD on Jun 26 2024  9:45AM  EST    XR abdomen 1 view    Result Date: 6/26/2024  * * *Final Report* * * DATE OF EXAM: Jun 25 2024 12:43PM   S0X   5289  -  XR ABDOMEN 1V SUPINE  / ACCESSION #  [id number] PROCEDURE REASON: nausea, vomiting      * * * * Physician Interpretation * * * *  HISTORY:  N/V.   nausea, vomiting. TECHNIQUE: XR ABDOMEN 1V SUPINE COMPARISON: 6/20/2024 KUB along with same day chest x-ray RESULT: No dilated large or small bowel. Pelvis is not included in field-of-view. Thoracolumbar postsurgical change with associated hardware including hardware crossing sacroiliac joints is again seen. Skin staples again seen. Visualized lung bases have better detail on chest x-ray performed at same time. -    IMPRESSION: No bowel dilatation. : [facility]   Transcribe Date/Time: Jun 26 2024  9:32A Dictated by : BECKY REARDON MD This examination was interpreted and the report reviewed and electronically signed by: BECKY REARDON MD on Jun 26 2024  9:43AM  EST    XR abdomen 2 views supine and erect or decub    Result Date: 6/26/2024  * * *Final Report* * * DATE OF EXAM: Jun 25 2024 12:43PM   S0X   5289  -  XR ABDOMEN 1V SUPINE  / ACCESSION #  [id number] PROCEDURE REASON: nausea, vomiting      * * * * Physician Interpretation * * * *  HISTORY:  N/V.   nausea, vomiting. TECHNIQUE: XR ABDOMEN 1V SUPINE COMPARISON: 6/20/2024 KUB along with same day chest x-ray RESULT: No dilated large or small bowel. Pelvis is not included in field-of-view. Thoracolumbar postsurgical change with associated hardware including hardware crossing sacroiliac joints is  again seen. Skin staples again seen. Visualized lung bases have better detail on chest x-ray performed at same time. -    IMPRESSION: No bowel dilatation. : [facility]   Transcribe Date/Time: Jun 26 2024  9:32A Dictated by : BECKY REARDON MD This examination was interpreted and the report reviewed and electronically signed by: BECKY REARDON MD on Jun 26 2024  9:43AM  EST    XR chest 1 view    Result Date: 6/25/2024  * * *Final Report* * * DATE OF EXAM: Jun 25 2024 12:43PM   S0X   5290  -  XR CHEST 1V FRONTAL   / ACCESSION #  [id number] PROCEDURE REASON: pain      * * * * Physician Interpretation * * * *  EXAM:  XR CHEST 1V FRONTAL CLINICAL HISTORY:  pain COMPARISON: No available prior. RESULT: Lines, tubes, and devices:  Left-sided PICC line present terminating in the lower SVC. Lungs and pleura: Bilateral lung fields are clear. No pneumothorax or suggestion for obvious/significant pleural effusions. Cardiomediastinal silhouette:  Normal cardiomediastinal silhouette. Other: Posterior fusion hardware with rods and pedicle screws project over the lower thoracic spine. Numerous surgical skin staples also project over the mid to lower chest at the midline.    IMPRESSION: No acute cardiopulmonary process. Well-positioned left PICC line. : [facility]   Transcribe Date/Time: Jun 25 2024  8:06P Dictated by : ROSANA SMITH MD This examination was interpreted and the report reviewed and electronically signed by: ROSANA SMITH MD on Jun 25 2024  8:07PM  EST          Assessment/Plan   Khalida Barker is a 74 y.o. female on day 0 of admission presenting with Dysphagia.  Principal Problem:    Dysphagia  Active Problems:    Muscle weakness (generalized)    Dehydration    Ankylosing spondylitis of multiple sites in spine (Multi)    Khalida Barker is a 74 y.o. female  with past medical history of ankylosing spondylitis, diverticulosis, spinal stenosis, recent Candida esophagitis diagnosis on July 10, 2024, history of  MRSA infection in the back requiring IV antibiotics presented to emergency department due to difficulty swallowing and concern for dehydration and malnutrition.     Dysphagia with Candida esophagitis diagnosis  Continue with Diflucan and nystatin  Awaiting GI evaluation  Speech and swallow evaluation  BMX added and told patient and nurse to try this and see if that helps a little bit with comfort     Dehydration-improved and nearly resolved  Overall clinically looks euvolemic at this point  Will encourage patient to eat orally     Glossitis  Unclear if this is all primary from Candida but really has no white plaques  Low iron studies noted  Normal B12 and folate level     Chronic right shoulder pain  Pain control  PT OT evaluation with generalized weakness    Hypokalemia  Repleted     History of diverticulosis  MiraLAX as needed     DVT prophylaxis  Lovenox     Plan discussed with patient at bedside and with primary nurse outside of the room    Moderate level of MDM based on above issue and discussing plan    This note is created using voice recognition software. All efforts are made to minimize errors, if there are errors there due to transcription.    Ron Leung  Hospitalist    Addendum: GI recommended doing esophagogram.  Will proceed with that

## 2024-07-22 NOTE — PROGRESS NOTES
Physical Therapy    Physical Therapy Evaluation and Treatment    Patient Name: Khalida Barker  MRN: [id number]  Today's Date: 7/22/2024   Time Calculation  Start Time: 1056  Stop Time: 1120  Time Calculation (min): 24 min  3102/3102-A    Assessment/Plan   PT Assessment  PT Assessment Results: Decreased strength, Decreased range of motion, Decreased endurance, Impaired balance, Decreased mobility, Pain, Orthopedic restrictions  Rehab Prognosis: Good  Evaluation/Treatment Tolerance: Patient limited by fatigue  Medical Staff Made Aware: Yes  End of Session Communication: Bedside nurse  Assessment Comment: Pt presents today below baseline level of function and requires continued PT during hospital stay. Pt requires PT at a high intensity level at discharge to maximize functional mobility and safety. Pt requires 24 hour physical assist for all mobility to prevent falls. Pt is unsafe to navigate home environment at this time with available support and assist. Anticipate pt will tolerate 3 hours of therapy daily. Pt is motivated to participate and requires daily, intensive, and short term therapy to return to high prior functional level.     End of Session Patient Position: Up in chair, Alarm on  IP OR SWING BED PT PLAN  Inpatient or Swing Bed: Inpatient  PT Plan  Treatment/Interventions: Bed mobility, Transfer training, Gait training, Balance training, Neuromuscular re-education, Strengthening, Endurance training, Range of motion, Therapeutic exercise, Therapeutic activity, Home exercise program, Stair training  PT Plan: Ongoing PT  PT Frequency: 5 times per week  PT Discharge Recommendations: High intensity level of continued care  Equipment Recommended upon Discharge: Platform attachment, Wheeled walker  PT Recommended Transfer Status: Assist x1  PT - OK to Discharge: Yes (To next level of care when cleared by medical team   )    Subjective         General Visit Information:  General  Reason for Referral: impaired  "mobility  Referred By: Ron Leung MD  Past Medical History Relevant to Rehab: To ED with difficulty swallowing. PMH: 5/24 T11-12 decompression with L2-S1 fusion, back I & D 6/5/24, right ORIF radius 6/11/24, ankulosing spondylitis, recent Candida esophagitis, CTS, peripheral neuropathy, right TKA, cervicalgia, anxiety  Family/Caregiver Present: Yes (spouse arrived [facility] through session)  Co-Treatment: OT  Co-Treatment Reason: for safety  Prior to Session Communication: Bedside nurse  Patient Position Received: Alarm on, Up in chair  Preferred Learning Style: verbal  General Comment: Pt agreeable to PT, nursing cleared for treatment.    Home Living:  Home Living  Type of Home: House  Lives With: Spouse  Home Adaptive Equipment: Walker rolling or standard, Cane, Wheelchair-manual (platform FWW)  Home Layout: Able to live on main level with bedroom/bathroom, Full bath main level (one step down into family room)  Home Access: Stairs to enter with rails  Entrance Stairs-Number of Steps: 3 large CRISTO with BL HR + one platform CRISTO  Bathroom Shower/Tub: Walk-in shower  Bathroom Equipment: Grab bars in shower    Prior Level of Function:  Prior Function Per Pt/Caregiver Report  ADL Assistance: Needs assistance  Homemaking Assistance: Needs assistance  Ambulatory Assistance:  (mod I with platform FWW)    Precautions:  Precautions  Medical Precautions: Fall precautions  Post-Surgical Precautions: Spinal precautions  Precautions Comment: NWB to right wrist/hand. Pt permitted to use platform walker. Right foot drop    Vital Signs:  Vital Signs  SpO2:  (87% with activity with shallow breathing, 99% at rest)  Objective     Pain:  Pain Assessment  Pain Assessment: 0-10  0-10 (Numeric) Pain Score:  (\"discomfort\")  Pain Type: Chronic pain  Pain Location: Back  Pain Interventions: Ambulation/increased activity, Repositioned    Cognition:  Cognition  Overall Cognitive Status:  (anxious)    General Assessments:      Activity " Tolerance  Endurance: Decreased tolerance for upright activites  Sensation  Sensation Comment: denies numbness and tingling              Static Sitting Balance  Static Sitting-Comment/Number of Minutes: good  Dynamic Sitting Balance  Dynamic Sitting-Comments: good  Static Standing Balance  Static Standing-Comment/Number of Minutes: fair  Dynamic Standing Balance  Dynamic Standing-Comments: fair    Functional Assessments:     Bed Mobility  Bed Mobility: No  Transfers  Transfer: Yes  Transfer 1  Transfer From 1: Sit to  Transfer to 1: Stand  Transfer Device 1: Platform walker  Transfer Level of Assistance 1: Minimum assistance  Transfers 2  Transfer From 2: Stand to  Transfer to 2: Sit  Transfer Device 2: Platform walker  Transfer Level of Assistance 2: Minimum assistance  Ambulation/Gait Training  Ambulation/Gait Training Performed: Yes  Ambulation/Gait Training 1  Device 1: Platform walker right  Assistance 1: Minimum assistance  Quality of Gait 1: Diminished heel strike, Forward flexed posture, Decreased step length (flat foot strike right)  Comments/Distance (ft) 1: 20 feet, pt with shallow breathing throughout        Treatment    Set up and adjusted platform FWW for patient's height. Instruction throughout on safe activity pacing and deep breathing during standing and ambulation. Skilled monitoring of vitals performed throughout session to modify activity appropriately.     Extremity/Trunk Assessments:        RLE   RLE : Exceptions to WFL  AROM RLE (degrees)  RLE AROM Comment: WFL except ankle less than 25% of range, hip 50-75%  Strength RLE  R Hip Flexion: 3-/5  R Knee Flexion: 4/5  R Knee Extension: 4/5  R Ankle Dorsiflexion: 2/5  R Ankle Plantar Flexion: 2/5  LLE   LLE : Exceptions to WFL  AROM LLE (degrees)  LLE AROM Comment: WFL except hip flexion 50-75%  Strength LLE  L Hip Flexion: 3-/5  L Knee Flexion: 4/5  L Knee Extension: 4/5  L Ankle Dorsiflexion: 5/5  L Ankle Plantar Flexion: 5/5    Outcome  Measures:     [facility] Basic Mobility  Turning from your back to your side while in a flat bed without using bedrails: A little  Moving from lying on your back to sitting on the side of a flat bed without using bedrails: A little  Moving to and from bed to chair (including a wheelchair): A little  Standing up from a chair using your arms (e.g. wheelchair or bedside chair): A little  To walk in hospital room: A little  Climbing 3-5 steps with railing: A lot  Basic Mobility - Total Score: 17                Goals:  Encounter Problems       Encounter Problems (Active)       PT Problem       Pt will perform bed mobility with SBA.        Start:  07/22/24    Expected End:  08/05/24            Pt will complete sit <> stand and bed <> chair transfers with SBA.        Start:  07/22/24    Expected End:  08/05/24            Pt will ambulate 150 feet SBA using platform FWW with no significant gait deviations.         Start:  07/22/24    Expected End:  08/05/24            Pt will progress to completing 3 x 20 LE supine/seated exercises in order to increase strength and improve gait mechanics.         Start:  07/22/24    Expected End:  08/05/24            Pt will ascend/descend at least 3 stairs SBA in order to navigate home environment.         Start:  07/22/24    Expected End:  08/05/24                     Education Documentation  Precautions, taught by Lizzy Cleary PT at 7/22/2024  1:08 PM.  Learner: Patient  Readiness: Acceptance  Method: Explanation  Response: Verbalizes Understanding, Needs Reinforcement    Body Mechanics, taught by Lizzy Cleary PT at 7/22/2024  1:08 PM.  Learner: Patient  Readiness: Acceptance  Method: Explanation  Response: Verbalizes Understanding, Needs Reinforcement    Mobility Training, taught by Lizzy Cleary PT at 7/22/2024  1:08 PM.  Learner: Patient  Readiness: Acceptance  Method: Explanation  Response: Verbalizes Understanding, Needs Reinforcement    Education Comments  No  comments found.

## 2024-07-23 ENCOUNTER — APPOINTMENT (OUTPATIENT)
Dept: RADIOLOGY | Facility: HOSPITAL | Age: 74
End: 2024-07-23
Payer: MEDICARE

## 2024-07-23 ENCOUNTER — APPOINTMENT (OUTPATIENT)
Dept: CARDIOLOGY | Facility: HOSPITAL | Age: 74
End: 2024-07-23
Payer: MEDICARE

## 2024-07-23 PROBLEM — R13.10 DYSPHAGIA, UNSPECIFIED: Status: ACTIVE | Noted: 2024-07-23

## 2024-07-23 LAB
ALBUMIN SERPL BCP-MCNC: 2.6 G/DL (ref 3.4–5)
ALP SERPL-CCNC: 95 U/L (ref 33–136)
ALT SERPL W P-5'-P-CCNC: 4 U/L (ref 7–45)
ANION GAP SERPL CALC-SCNC: 12 MMOL/L (ref 10–20)
AST SERPL W P-5'-P-CCNC: 12 U/L (ref 9–39)
BASOPHILS # BLD AUTO: 0.02 X10*3/UL (ref 0–0.1)
BASOPHILS NFR BLD AUTO: 0.5 %
BILIRUB SERPL-MCNC: 0.5 MG/DL (ref 0–1.2)
BUN SERPL-MCNC: 7 MG/DL (ref 6–23)
CALCIUM SERPL-MCNC: 8.2 MG/DL (ref 8.6–10.3)
CHLORIDE SERPL-SCNC: 108 MMOL/L (ref 98–107)
CO2 SERPL-SCNC: 25 MMOL/L (ref 21–32)
CREAT SERPL-MCNC: 0.72 MG/DL (ref 0.5–1.05)
EGFRCR SERPLBLD CKD-EPI 2021: 89 ML/MIN/1.73M*2
EOSINOPHIL # BLD AUTO: 0.09 X10*3/UL (ref 0–0.4)
EOSINOPHIL NFR BLD AUTO: 2.3 %
ERYTHROCYTE [DISTWIDTH] IN BLOOD BY AUTOMATED COUNT: 15.9 % (ref 11.5–14.5)
GLUCOSE SERPL-MCNC: 86 MG/DL (ref 74–99)
HCT VFR BLD AUTO: 29.4 % (ref 36–46)
HGB BLD-MCNC: 8.7 G/DL (ref 12–16)
IMM GRANULOCYTES # BLD AUTO: 0.01 X10*3/UL (ref 0–0.5)
IMM GRANULOCYTES NFR BLD AUTO: 0.3 % (ref 0–0.9)
LYMPHOCYTES # BLD AUTO: 1.16 X10*3/UL (ref 0.8–3)
LYMPHOCYTES NFR BLD AUTO: 30.3 %
MAGNESIUM SERPL-MCNC: 1.8 MG/DL (ref 1.6–2.4)
MCH RBC QN AUTO: 27.4 PG (ref 26–34)
MCHC RBC AUTO-ENTMCNC: 29.6 G/DL (ref 32–36)
MCV RBC AUTO: 93 FL (ref 80–100)
MONOCYTES # BLD AUTO: 0.39 X10*3/UL (ref 0.05–0.8)
MONOCYTES NFR BLD AUTO: 10.2 %
NEUTROPHILS # BLD AUTO: 2.16 X10*3/UL (ref 1.6–5.5)
NEUTROPHILS NFR BLD AUTO: 56.4 %
NRBC BLD-RTO: 0 /100 WBCS (ref 0–0)
PLATELET # BLD AUTO: 239 X10*3/UL (ref 150–450)
POTASSIUM SERPL-SCNC: 3.1 MMOL/L (ref 3.5–5.3)
PROT SERPL-MCNC: 5.6 G/DL (ref 6.4–8.2)
RBC # BLD AUTO: 3.17 X10*6/UL (ref 4–5.2)
SODIUM SERPL-SCNC: 142 MMOL/L (ref 136–145)
WBC # BLD AUTO: 3.8 X10*3/UL (ref 4.4–11.3)

## 2024-07-23 PROCEDURE — 99232 SBSQ HOSP IP/OBS MODERATE 35: CPT | Performed by: NURSE PRACTITIONER

## 2024-07-23 PROCEDURE — 2500000004 HC RX 250 GENERAL PHARMACY W/ HCPCS (ALT 636 FOR OP/ED): Performed by: INTERNAL MEDICINE

## 2024-07-23 PROCEDURE — 93005 ELECTROCARDIOGRAM TRACING: CPT

## 2024-07-23 PROCEDURE — 36415 COLL VENOUS BLD VENIPUNCTURE: CPT | Performed by: INTERNAL MEDICINE

## 2024-07-23 PROCEDURE — 2500000001 HC RX 250 WO HCPCS SELF ADMINISTERED DRUGS (ALT 637 FOR MEDICARE OP): Performed by: INTERNAL MEDICINE

## 2024-07-23 PROCEDURE — 1100000001 HC PRIVATE ROOM DAILY

## 2024-07-23 PROCEDURE — 2500000002 HC RX 250 W HCPCS SELF ADMINISTERED DRUGS (ALT 637 FOR MEDICARE OP, ALT 636 FOR OP/ED): Performed by: INTERNAL MEDICINE

## 2024-07-23 PROCEDURE — 80053 COMPREHEN METABOLIC PANEL: CPT | Performed by: INTERNAL MEDICINE

## 2024-07-23 PROCEDURE — 99232 SBSQ HOSP IP/OBS MODERATE 35: CPT | Performed by: INTERNAL MEDICINE

## 2024-07-23 PROCEDURE — 83735 ASSAY OF MAGNESIUM: CPT | Performed by: INTERNAL MEDICINE

## 2024-07-23 PROCEDURE — 97110 THERAPEUTIC EXERCISES: CPT | Mod: GP,CQ

## 2024-07-23 PROCEDURE — 97116 GAIT TRAINING THERAPY: CPT | Mod: GP,CQ

## 2024-07-23 PROCEDURE — 74220 X-RAY XM ESOPHAGUS 1CNTRST: CPT

## 2024-07-23 PROCEDURE — 2500000005 HC RX 250 GENERAL PHARMACY W/O HCPCS: Performed by: INTERNAL MEDICINE

## 2024-07-23 PROCEDURE — 74220 X-RAY XM ESOPHAGUS 1CNTRST: CPT | Performed by: RADIOLOGY

## 2024-07-23 PROCEDURE — 85025 COMPLETE CBC W/AUTO DIFF WBC: CPT | Performed by: INTERNAL MEDICINE

## 2024-07-23 RX ORDER — POTASSIUM CHLORIDE 1.5 G/1.58G
40 POWDER, FOR SOLUTION ORAL ONCE
Status: COMPLETED | OUTPATIENT
Start: 2024-07-23 | End: 2024-07-23

## 2024-07-23 RX ORDER — SODIUM CHLORIDE 9 MG/ML
100 INJECTION, SOLUTION INTRAVENOUS CONTINUOUS
Status: DISCONTINUED | OUTPATIENT
Start: 2024-07-23 | End: 2024-07-23

## 2024-07-23 RX ORDER — HYOSCYAMINE SULFATE 0.12 MG/1
0.12 TABLET, ORALLY DISINTEGRATING ORAL 3 TIMES DAILY
Status: DISCONTINUED | OUTPATIENT
Start: 2024-07-23 | End: 2024-07-25 | Stop reason: HOSPADM

## 2024-07-23 RX ORDER — CEFTRIAXONE 1 G/50ML
1 INJECTION, SOLUTION INTRAVENOUS EVERY 24 HOURS
Status: DISCONTINUED | OUTPATIENT
Start: 2024-07-23 | End: 2024-07-25

## 2024-07-23 RX ORDER — LORAZEPAM 2 MG/ML
0.5 INJECTION INTRAMUSCULAR ONCE
Status: DISCONTINUED | OUTPATIENT
Start: 2024-07-23 | End: 2024-07-23

## 2024-07-23 RX ADMIN — NYSTATIN 500000 UNITS: 100000 SUSPENSION ORAL at 22:35

## 2024-07-23 RX ADMIN — FLUCONAZOLE 200 MG: 200 TABLET ORAL at 11:24

## 2024-07-23 RX ADMIN — HYOSCYAMINE SULFATE 0.12 MG: 0.12 TABLET SUBLINGUAL at 15:32

## 2024-07-23 RX ADMIN — DIPHENHYDRAMINE HYDROCHLORIDE AND LIDOCAINE HYDROCHLORIDE AND ALUMINUM HYDROXIDE AND MAGNESIUM HYDRO 10 ML: KIT at 11:25

## 2024-07-23 RX ADMIN — HYOSCYAMINE SULFATE 0.12 MG: 0.12 TABLET SUBLINGUAL at 12:34

## 2024-07-23 RX ADMIN — POTASSIUM CHLORIDE 40 MEQ: 1.5 POWDER, FOR SOLUTION ORAL at 11:24

## 2024-07-23 RX ADMIN — HYOSCYAMINE SULFATE 0.12 MG: 0.12 TABLET SUBLINGUAL at 22:35

## 2024-07-23 RX ADMIN — CEFTRIAXONE SODIUM 1 G: 1 INJECTION, SOLUTION INTRAVENOUS at 13:32

## 2024-07-23 RX ADMIN — ENOXAPARIN SODIUM 40 MG: 40 INJECTION SUBCUTANEOUS at 16:48

## 2024-07-23 RX ADMIN — SODIUM CHLORIDE 100 ML/HR: 9 INJECTION, SOLUTION INTRAVENOUS at 11:05

## 2024-07-23 RX ADMIN — GUAIFENESIN 600 MG: 600 TABLET ORAL at 11:24

## 2024-07-23 RX ADMIN — NYSTATIN 500000 UNITS: 100000 SUSPENSION ORAL at 06:16

## 2024-07-23 RX ADMIN — BARIUM SULFATE 10 ML: 960 POWDER, FOR SUSPENSION ORAL at 09:35

## 2024-07-23 RX ADMIN — FLUTICASONE PROPIONATE 2 SPRAY: 50 SPRAY, METERED NASAL at 09:00

## 2024-07-23 RX ADMIN — NYSTATIN 500000 UNITS: 100000 SUSPENSION ORAL at 13:57

## 2024-07-23 RX ADMIN — NYSTATIN 500000 UNITS: 100000 SUSPENSION ORAL at 16:48

## 2024-07-23 ASSESSMENT — COGNITIVE AND FUNCTIONAL STATUS - GENERAL
CLIMB 3 TO 5 STEPS WITH RAILING: A LOT
DRESSING REGULAR LOWER BODY CLOTHING: A LITTLE
WALKING IN HOSPITAL ROOM: A LITTLE
DAILY ACTIVITIY SCORE: 18
MOBILITY SCORE: 18
WALKING IN HOSPITAL ROOM: A LOT
PERSONAL GROOMING: A LITTLE
EATING MEALS: A LITTLE
STANDING UP FROM CHAIR USING ARMS: A LITTLE
MOVING TO AND FROM BED TO CHAIR: A LITTLE
TOILETING: A LITTLE
MOVING FROM LYING ON BACK TO SITTING ON SIDE OF FLAT BED WITH BEDRAILS: A LITTLE
TURNING FROM BACK TO SIDE WHILE IN FLAT BAD: A LITTLE
CLIMB 3 TO 5 STEPS WITH RAILING: A LOT
MOBILITY SCORE: 19
HELP NEEDED FOR BATHING: A LITTLE
DRESSING REGULAR UPPER BODY CLOTHING: A LITTLE

## 2024-07-23 ASSESSMENT — PAIN - FUNCTIONAL ASSESSMENT: PAIN_FUNCTIONAL_ASSESSMENT: 0-10

## 2024-07-23 ASSESSMENT — PAIN SCALES - GENERAL
PAINLEVEL_OUTOF10: 2
PAINLEVEL_OUTOF10: 3

## 2024-07-23 ASSESSMENT — ACTIVITIES OF DAILY LIVING (ADL): EFFECT OF PAIN ON DAILY ACTIVITIES: .

## 2024-07-23 NOTE — CARE PLAN
Problem: Pain - Adult  Goal: Verbalizes/displays adequate comfort level or baseline comfort level  Outcome: Progressing     Problem: Safety - Adult  Goal: Free from fall injury  Outcome: Progressing     Problem: Discharge Planning  Goal: Discharge to home or other facility with appropriate resources  Outcome: Progressing     Problem: Chronic Conditions and Co-morbidities  Goal: Patient's chronic conditions and co-morbidity symptoms are monitored and maintained or improved  Outcome: Progressing     Problem: Skin  Goal: Decreased wound size/increased tissue granulation at next dressing change  Outcome: Progressing  Goal: Participates in plan/prevention/treatment measures  Outcome: Progressing  Goal: Prevent/manage excess moisture  Outcome: Progressing  Goal: Prevent/minimize sheer/friction injuries  Outcome: Progressing  Goal: Promote/optimize nutrition  Outcome: Progressing  Goal: Promote skin healing  Outcome: Progressing     Problem: Pain  Goal: Takes deep breaths with improved pain control throughout the shift  Outcome: Progressing  Goal: Turns in bed with improved pain control throughout the shift  Outcome: Progressing  Goal: Walks with improved pain control throughout the shift  Outcome: Progressing  Goal: Performs ADL's with improved pain control throughout shift  Outcome: Progressing  Goal: Participates in PT with improved pain control throughout the shift  Outcome: Progressing  Goal: Free from opioid side effects throughout the shift  Outcome: Progressing  Goal: Free from acute confusion related to pain meds throughout the shift  Outcome: Progressing     Problem: Fall/Injury  Goal: Not fall by end of shift  Outcome: Progressing  Goal: Be free from injury by end of the shift  Outcome: Progressing  Goal: Verbalize understanding of personal risk factors for fall in the hospital  Outcome: Progressing  Goal: Verbalize understanding of risk factor reduction measures to prevent injury from fall in the  home  Outcome: Progressing  Goal: Use assistive devices by end of the shift  Outcome: Progressing  Goal: Pace activities to prevent fatigue by end of the shift  Outcome: Progressing   The patient's goals for the shift include  remain  afebrile    The clinical goals for the shift include NO FALLS, CONTROL PAIN

## 2024-07-23 NOTE — CARE PLAN
The patient's goals for the shift include  remain  afebrile    The clinical goals for the shift include NO FALLS, CONTROL PAIN

## 2024-07-23 NOTE — PROGRESS NOTES
07/23/24 0928   Discharge Planning   Living Arrangements Spouse/significant other   Support Systems Spouse/significant other   Assistance Needed yes   Type of Home Care Services Home OT;Home PT;Home nursing visits   Expected Discharge Disposition  Services   Does the patient need discharge transport arranged? No     Therapy's recommendations is a high intensity therapy program with an ampac score of 17. I reviewed this with the patient and she wants to return home with continued home care for pt/ot. She is currently active with [facility]. Referral sent to agency, message sent to attending to order outgoing home care orders.

## 2024-07-23 NOTE — CARE PLAN
Problem: Pain - Adult  Goal: Verbalizes/displays adequate comfort level or baseline comfort level  7/23/2024 0521 by Mindy Oneal RN  Outcome: Progressing  7/23/2024 0506 by Mindy Oneal RN  Outcome: Progressing     Problem: Safety - Adult  Goal: Free from fall injury  7/23/2024 0521 by Mindy Oneal RN  Outcome: Progressing  7/23/2024 0506 by Mindy Oneal RN  Outcome: Progressing     Problem: Chronic Conditions and Co-morbidities  Goal: Patient's chronic conditions and co-morbidity symptoms are monitored and maintained or improved  Outcome: Progressing     Problem: Skin  Goal: Decreased wound size/increased tissue granulation at next dressing change  7/23/2024 0521 by Mindy Oneal RN  Outcome: Progressing  7/23/2024 0506 by Mindy Oneal RN  Outcome: Progressing  Goal: Participates in plan/prevention/treatment measures  7/23/2024 0521 by Mindy Oneal RN  Outcome: Progressing  7/23/2024 0506 by Mindy Oneal RN  Outcome: Progressing  Goal: Prevent/manage excess moisture  7/23/2024 0521 by Mindy Oneal RN  Outcome: Progressing  7/23/2024 0506 by Mindy Oneal RN  Outcome: Progressing  Goal: Prevent/minimize sheer/friction injuries  7/23/2024 0521 by Mindy Oneal RN  Outcome: Progressing  7/23/2024 0506 by Mindy Oneal RN  Outcome: Progressing  Goal: Promote/optimize nutrition  7/23/2024 0521 by Mindy Oneal RN  Outcome: Progressing  7/23/2024 0506 by Mindy Oneal RN  Outcome: Progressing  Goal: Promote skin healing  7/23/2024 0521 by Mindy Oneal RN  Outcome: Progressing  7/23/2024 0506 by Mindy Oneal RN  Outcome: Progressing     Problem: Pain  Goal: Takes deep breaths with improved pain control throughout the shift  7/23/2024 0521 by Mindy Oneal RN  Outcome: Progressing  7/23/2024 0506 by Mindy Oneal RN  Outcome: Progressing  Goal: Turns in bed with improved pain control throughout the shift  7/23/2024 0521 by Mindy Oneal RN  Outcome: Progressing  7/23/2024 0506 by Mindy Oneal, RN  Outcome:  Progressing  Goal: Walks with improved pain control throughout the shift  7/23/2024 0521 by Mindy Oneal RN  Outcome: Progressing  7/23/2024 0506 by Mindy Oneal RN  Outcome: Progressing  Goal: Performs ADL's with improved pain control throughout shift  7/23/2024 0521 by Mindy Oneal RN  Outcome: Progressing  7/23/2024 0506 by Mindy Oneal RN  Outcome: Progressing  Goal: Free from opioid side effects throughout the shift  7/23/2024 0521 by Mindy Oneal RN  Outcome: Progressing  7/23/2024 0506 by Mindy Oneal RN  Outcome: Progressing  Goal: Free from acute confusion related to pain meds throughout the shift  7/23/2024 0521 by Mindy Oneal RN  Outcome: Progressing  7/23/2024 0506 by Mindy Oneal RN  Outcome: Progressing     Problem: Fall/Injury  Goal: Not fall by end of shift  7/23/2024 0521 by Mindy Oneal RN  Outcome: Progressing  7/23/2024 0506 by Mindy Oneal RN  Outcome: Progressing  Goal: Be free from injury by end of the shift  7/23/2024 0521 by Mindy Oneal RN  Outcome: Progressing  7/23/2024 0506 by Mindy Oneal RN  Outcome: Progressing  Goal: Verbalize understanding of personal risk factors for fall in the hospital  7/23/2024 0521 by Mindy Oneal RN  Outcome: Progressing  7/23/2024 0506 by Mindy Oneal RN  Outcome: Progressing  Goal: Verbalize understanding of risk factor reduction measures to prevent injury from fall in the home  7/23/2024 0521 by Mindy Oneal RN  Outcome: Progressing  7/23/2024 0506 by Mindy Oneal RN  Outcome: Progressing  Goal: Use assistive devices by end of the shift  7/23/2024 0521 by Mindy Oneal RN  Outcome: Progressing  7/23/2024 0506 by Mindy Oneal RN  Outcome: Progressing  Goal: Pace activities to prevent fatigue by end of the shift  Outcome: Progressing   The patient's goals for the shift include      The clinical goals for the shift include NO FALLS, CONTROL PAIN

## 2024-07-23 NOTE — CARE PLAN
The patient's goals for the shift include      The clinical goals for the shift include NO FALLS, CONTROL PAIN    Problem: Pain - Adult  Goal: Verbalizes/displays adequate comfort level or baseline comfort level  Outcome: Progressing     Problem: Safety - Adult  Goal: Free from fall injury  Outcome: Progressing     Problem: Skin  Goal: Decreased wound size/increased tissue granulation at next dressing change  Outcome: Progressing  Goal: Participates in plan/prevention/treatment measures  Outcome: Progressing  Goal: Prevent/manage excess moisture  Outcome: Progressing  Goal: Prevent/minimize sheer/friction injuries  Outcome: Progressing  Goal: Promote/optimize nutrition  Outcome: Progressing  Goal: Promote skin healing  Outcome: Progressing     Problem: Pain  Goal: Takes deep breaths with improved pain control throughout the shift  Outcome: Progressing  Goal: Turns in bed with improved pain control throughout the shift  Outcome: Progressing  Goal: Walks with improved pain control throughout the shift  Outcome: Progressing  Goal: Performs ADL's with improved pain control throughout shift  Outcome: Progressing  Goal: Free from opioid side effects throughout the shift  Outcome: Progressing  Goal: Free from acute confusion related to pain meds throughout the shift  Outcome: Progressing     Problem: Fall/Injury  Goal: Not fall by end of shift  Outcome: Progressing  Goal: Be free from injury by end of the shift  Outcome: Progressing  Goal: Verbalize understanding of personal risk factors for fall in the hospital  Outcome: Progressing  Goal: Verbalize understanding of risk factor reduction measures to prevent injury from fall in the home  Outcome: Progressing  Goal: Use assistive devices by end of the shift  Outcome: Progressing  Goal: Pace activities to prevent fatigue by end of the shift  Outcome: Progressing

## 2024-07-23 NOTE — PROGRESS NOTES
Khalida Barker is a 74 y.o. female on day 0 of admission presenting with Dysphagia.      Subjective   Yesterday could not tolerate the esophagogram because of her legs being weak and they required her to provide balance using her upper extremity and she has a bad right wrist and she has to use the left 1 to drink.  Talked about trying with a walker and using some anxiety medications.  Talked about severe malnutrition from poor oral intake       Objective     Last Recorded Vitals  /60 (BP Location: Left arm, Patient Position: Lying)   Pulse 74   Temp 36.7 °C (98.1 °F) (Temporal)   Resp 20   Wt 73.5 kg (162 lb 0.6 oz)   SpO2 98%   Intake/Output last 3 Shifts:    Intake/Output Summary (Last 24 hours) at 7/23/2024 0943  Last data filed at 7/23/2024 0618  Gross per 24 hour   Intake 1293.33 ml   Output 1400 ml   Net -106.67 ml       Admission Weight  Weight: 81.6 kg (180 lb) (07/21/24 1127)    Daily Weight  07/21/24 : 73.5 kg (162 lb 0.6 oz)      Physical Exam:  General: Not in acute distress, alert  HEENT: PERRLA, head intact and normocephalic.  Tongue rolling is noted neck: Normal to inspection  Lungs: Clear to auscultation, work of breathing within normal limit  Cardiac: Regular rate and rhythm  Abdomen: Soft nontender, positive bowel sounds  : Exam deferred  Skin: Intact  Hematology: No petechia or excessive ecchymosis  Musculoskeletal: Without significant trauma.  Pitting edema of 1+  Neurological: Alert awake oriented, no focal deficit, cranial nerves grossly intact  Psych: No suicidal ideation or homicidal ideation    Relevant Results  Scheduled medications  enoxaparin, 40 mg, subcutaneous, q24h  fluconazole, 200 mg, oral, Daily  fluticasone, 2 spray, Each Nostril, Daily  LORazepam, 0.5 mg, intravenous, Once  nystatin, 5 mL, Swish & Swallow, 4x daily  potassium chloride, 40 mEq, oral, Once      Continuous medications     PRN medications  PRN medications: acetaminophen **OR** acetaminophen **OR**  acetaminophen, benzocaine-menthol, guaiFENesin, lidocaine-diphenhydraMINE-Maalox 1:1:1, ondansetron **OR** ondansetron, polyethylene glycol, promethazine **OR** promethazine   Labs  Results from last 7 days   Lab Units 07/23/24  0509 07/22/24  0610 07/21/24  1133   WBC AUTO x10*3/uL 3.8* 4.4 5.8   HEMOGLOBIN g/dL 8.7* 9.0* 10.8*   HEMATOCRIT % 29.4* 29.4* 35.3*   PLATELETS AUTO x10*3/uL 239 257 333     Results from last 7 days   Lab Units 07/23/24  0509 07/22/24  0610 07/21/24  1133   SODIUM mmol/L 142 142 141   POTASSIUM mmol/L 3.1* 3.0* 3.6   CHLORIDE mmol/L 108* 107 104   CO2 mmol/L 25 26 23   BUN mg/dL 7 8 10   CREATININE mg/dL 0.72 0.79 0.84   CALCIUM mg/dL 8.2* 8.2* 9.4   PROTEIN TOTAL g/dL 5.6*  --  7.3   BILIRUBIN TOTAL mg/dL 0.5  --  0.7   ALK PHOS U/L 95  --  133   ALT U/L 4*  --  7   AST U/L 12  --  17   GLUCOSE mg/dL 86 89 122*       ECG 12 Lead    Result Date: 7/22/2024  Normal sinus rhythm Cannot rule out Anterior infarct (cited on or before 26-JUN-2023) Prolonged QT Abnormal ECG When compared with ECG of 14-MAY-2024 18:14, Nonspecific T wave abnormality no longer evident in Inferior leads Nonspecific T wave abnormality, improved in Lateral leads    US gallbladder    Result Date: 6/26/2024  * * *Final Report* * * DATE OF EXAM: Jun 25 2024  5:24PM   S0U   1032  -  US ABD RIGHT UPPER QUADRANT  / ACCESSION #  [id number] PROCEDURE REASON: nausea, vomiting, pain      * * * * Physician Interpretation * * * *  EXAMINATION: RIGHT UPPER QUADRANT ULTRASOUND HISTORY:  nausea, vomiting, pain TECHNIQUE:  Sonography of the right upper quadrant was performed.  Images were obtained and stored in a permanent archive. MQ:  URUQ_2 COMPARISON: None. RESULT: Pancreas:  Normal sonographic appearance.    Portions obscured: tail Liver:      Echotexture:  Normal, homogeneous.      Echogenicity:  Normal      Surface contour:  Smooth      Lesions: Multiple simple and mildly complex cysts are present with largest measuring up to  2.8 cm. Biliary: No intrahepatic biliary duct dilation.      CBD: 0.3 cm at the hilum.      Gallbladder: Normal caliber           -Contents:  No cholelithiasis.  There is gallbladder sludge.           -Wall:  Normal           -Other:  No pericholecystic fluid. Sonographic Meza sign was reported negative. Right Kidney: No hydronephrosis. 5 cm benign-appearing simple cyst and additional smaller cysts. Ascites: None. -    IMPRESSION: Gallbladder sludge. Hepatic and RIGHT renal cysts. : [facility]   Transcribe Date/Time: Jun 26 2024  9:44A Dictated by : BECKY REARDON MD This examination was interpreted and the report reviewed and   electronically signed by: BECKY REARDON MD on Jun 26 2024  9:45AM  EST    XR abdomen 1 view    Result Date: 6/26/2024  * * *Final Report* * * DATE OF EXAM: Jun 25 2024 12:43PM   S0X   5289  -  XR ABDOMEN 1V SUPINE  / ACCESSION #  [id number] PROCEDURE REASON: nausea, vomiting      * * * * Physician Interpretation * * * *  HISTORY:  N/V.   nausea, vomiting. TECHNIQUE: XR ABDOMEN 1V SUPINE COMPARISON: 6/20/2024 KUB along with same day chest x-ray RESULT: No dilated large or small bowel. Pelvis is not included in field-of-view. Thoracolumbar postsurgical change with associated hardware including hardware crossing sacroiliac joints is again seen. Skin staples again seen. Visualized lung bases have better detail on chest x-ray performed at same time. -    IMPRESSION: No bowel dilatation. : [facility]   Transcribe Date/Time: Jun 26 2024  9:32A Dictated by : BECKY REARDON MD This examination was interpreted and the report reviewed and electronically signed by: BECKY REARDON MD on Jun 26 2024  9:43AM  EST    XR abdomen 2 views supine and erect or decub    Result Date: 6/26/2024  * * *Final Report* * * DATE OF EXAM: Jun 25 2024 12:43PM   S0X   5289  -  XR ABDOMEN 1V SUPINE  / ACCESSION #  [id number] PROCEDURE REASON: nausea, vomiting      * * * * Physician Interpretation * *  * *  HISTORY:  N/V.   nausea, vomiting. TECHNIQUE: XR ABDOMEN 1V SUPINE COMPARISON: 6/20/2024 KUB along with same day chest x-ray RESULT: No dilated large or small bowel. Pelvis is not included in field-of-view. Thoracolumbar postsurgical change with associated hardware including hardware crossing sacroiliac joints is again seen. Skin staples again seen. Visualized lung bases have better detail on chest x-ray performed at same time. -    IMPRESSION: [facility] bowel dilatation. [facility]   Transcribe Date/Time: Jun 26 2024  9:32A Dictated by : BECKY REARDON MD This examination was interpreted and the report reviewed and electronically signed by: BECKY REARDON MD on Jun 26 2024  9:43AM  EST    XR chest 1 view    Result Date: 6/25/2024  * * *Final Report* * * DATE OF EXAM: Jun 25 2024 12:43PM   S0X   5290  -  XR CHEST 1V FRONTAL   / ACCESSION #  [id number] PROCEDURE REASON: pain      * * * * Physician Interpretation * * * *  EXAM:  XR CHEST 1V FRONTAL CLINICAL HISTORY:  pain COMPARISON: No available prior. RESULT: Lines, tubes, and devices:  Left-sided PICC line present terminating in the lower SVC. Lungs and pleura: Bilateral lung fields are clear. No pneumothorax or suggestion for obvious/significant pleural effusions. Cardiomediastinal silhouette:  Normal cardiomediastinal silhouette. Other: Posterior fusion hardware with rods and pedicle screws project over the lower thoracic spine. Numerous surgical skin staples also project over the mid to lower chest at the midline.    IMPRESSION: No acute cardiopulmonary process. Well-positioned left PICC line. : [facility]   Transcribe Date/Time: Jun 25 2024  8:06P Dictated by : ROSANA SMITH MD This examination was interpreted and the report reviewed and electronically signed by: ROSANA SMITH MD on Jun 25 2024  8:07PM  EST          Assessment/Plan   Khalida Barker is a 74 y.o. female on day 0 of admission presenting with Dysphagia.  Principal Problem:     Dysphagia  Active Problems:    Muscle weakness (generalized)    Dehydration    Ankylosing spondylitis of multiple sites in spine (Multi)    Khalida Barker is a 74 y.o. female  with past medical history of ankylosing spondylitis, diverticulosis, spinal stenosis, recent Candida esophagitis diagnosis on July 10, 2024, history of MRSA infection in the back requiring IV antibiotics presented to emergency department due to difficulty swallowing and concern for dehydration and malnutrition.     Dysphagia with Candida esophagitis diagnosis  Continue with Diflucan and nystatin  Patient is still not able to tolerate she would benefit from inpatient level of stay as anticipate she will require greater than 2 midnight stay and has failed observation.  GI consult is noted  No need for modified barium swallow  Plan for esophagogram  N.p.o. for now  Will try with Ativan and providing additional support using a walker     Dehydration-improved and nearly resolved  After the esophagogram will try to do evaluation and see what patient can tolerate    Severe protein calorie malnutrition from poor oral intake  Nutritionist consult is noted     Glossitis  BMX is helping  Unclear if this is all primary from Candida but really has no white plaques  Low iron studies noted  Normal B12 and folate level  Follow-up vitamin B6 level     Chronic right shoulder pain  Pain control  PT OT evaluation with generalized weakness  They recommended high intensity rehab but patient declining any acute rehab placement currently    Abnormal UA  Urine culture came back positive but patient has no signs and symptoms  With generalized weakness will do 3 days of uncomplicated UTI treatment    Hypokalemia  Repleted     History of diverticulosis  MiraLAX as needed     DVT prophylaxis  Lovenox     Plan discussed with patient at bedside and with primary nurse outside of the room    Moderate level of MDM based on above issue and discussing plan    This note is created  using voice recognition software. All efforts are made to minimize errors, if there are errors there due to transcription.    Ron Leung  Hospitalist    Addendum 10:48 AM: Esophagogram results are noted for moderate to severe dysmotility with limited pictures.  Will start patient on Levsin 3 times daily.  Will wait for GIs recommendation

## 2024-07-23 NOTE — PROGRESS NOTES
Physical Therapy    Physical Therapy    Physical Therapy Treatment    Patient Name: Khalida Barker  MRN: [id number]  Today's Date: 7/23/2024  Time Calculation  Start Time: 1308  Stop Time: 1338  Time Calculation (min): 30 min     3120/3120-A    Assessment/Plan   PT Assessment  PT Assessment Results: Decreased strength, Decreased endurance, Impaired balance, Decreased mobility  Rehab Prognosis: Good  End of Session Communication: Bedside nurse  End of Session Patient Position: Up in chair  PT Plan  Inpatient/Swing Bed or Outpatient: Inpatient  PT Plan  Treatment/Interventions: Bed mobility, Transfer training, Gait training, Balance training, Neuromuscular re-education, Strengthening, Endurance training, Range of motion, Therapeutic exercise, Therapeutic activity, Home exercise program, Stair training  PT Plan: Ongoing PT  PT Frequency: 5 times per week  PT Discharge Recommendations: High intensity level of continued care  Equipment Recommended upon Discharge: Platform attachment, Wheeled walker  PT Recommended Transfer Status: Assist x1  PT - OK to Discharge: Yes (     07/23/24 1308   PT  Visit   PT Received On 07/23/24   Response to Previous Treatment Patient with no complaints from previous session.   General   Reason for Referral impaired mobility   General Comment  present patient agreeable to PT session   Precautions   UE Weight Bearing Status Right Non-Weight Bearing   LE Weight Bearing Status Weight Bearing as Tolerated   Medical Precautions Fall precautions   Pain Assessment   Pain Assessment 0-10   0-10 (Numeric) Pain Score 3   Pain Type Surgical pain   Pain Location Wrist   Pain Orientation Right   Effect of Pain on Daily Activities .   Therapeutic Exercise   Therapeutic Exercise Performed Yes   Therapeutic Exercise Activity 1 B LE therex x 20 reps each  (Heel Raises, Toe Raises,LAQ, Marching, Hip Abduction, Encouraeged patient to perfor throughout the day)   Ambulation/Gait Training   Ambulation/Gait  Training Performed Yes   Ambulation/Gait Training 1   Surface 1 Level tile   Device 1 Rolling walker  (With R platform)   Gait Support Devices Gait belt   Assistance 1 Minimum assistance   Quality of Gait 1 Diminished heel strike;Forward flexed posture;Decreased step length   Comments/Distance (ft) 1 30 x 2   Transfers   Transfer Yes   Transfer 1   Technique 1 Sit to stand;Stand to sit   Transfer Device 1 Walker   Transfer Level of Assistance 1 Minimum assistance   PT Assessment   PT Assessment Results Decreased strength;Decreased endurance;Impaired balance;Decreased mobility   Rehab Prognosis Good   End of Session Communication Bedside nurse   End of Session Patient Position Up in chair   Outpatient Education   Individual(s) Educated Patient   Education Provided Fall Risk   PT Plan   Inpatient/Swing Bed or Outpatient Inpatient     Outcome Measures:  [facility] Basic Mobility  Turning from your back to your side while in a flat bed without using bedrails: A little  Moving from lying on your back to sitting on the side of a flat bed without using bedrails: A little  Moving to and from bed to chair (including a wheelchair): A little  Standing up from a chair using your arms (e.g. wheelchair or bedside chair): None  To walk in hospital room: A little  Climbing 3-5 steps with railing: A lot  Basic Mobility - Total Score: 18                             EDUCATION:  Outpatient Education  Individual(s) Educated: Patient  Education Provided: Fall Risk  Education Documentation  No documentation found.  Education Comments  No comments found.        GOALS:  Encounter Problems       Encounter Problems (Active)       PT Problem       Pt will perform bed mobility with SBA.  (Progressing)       Start:  07/22/24    Expected End:  08/05/24            Pt will complete sit <> stand and bed <> chair transfers with SBA.  (Progressing)       Start:  07/22/24    Expected End:  08/05/24            Pt will ambulate 150 feet SBA using platform FWW  with no significant gait deviations.   (Progressing)       Start:  07/22/24    Expected End:  08/05/24            Pt will progress to completing 3 x 20 LE supine/seated exercises in order to increase strength and improve gait mechanics.   (Progressing)       Start:  07/22/24    Expected End:  08/05/24            Pt will ascend/descend at least 3 stairs SBA in order to navigate home environment.   (Progressing)       Start:  07/22/24    Expected End:  08/05/24                   Pain - Adult

## 2024-07-23 NOTE — PROGRESS NOTES
"Subjective  Patient sitting up in room in NAD with  at bedside.  S/p esophagram noting moderate to severe esophageal dysmotility.    Objective  Blood pressure 134/67, pulse 76, temperature 36.3 °C (97.3 °F), temperature source Temporal, resp. rate 19, height 1.676 m (5' 6\"), weight 73.5 kg (162 lb 0.6 oz), SpO2 98%.    Physical Exam  Constitutional: Alert, pleasant and interactive, in NAD  Eyes: PERRL, sclera clear, no conjunctival injection  Skin: Warm and dry, no rash or ecchymosis  ENMT: Mucous membranes moist, no lesions noted  Resp: CTAB, even and unlabored  CV: RRR, normal S1, S2, no m,r,g  GI: +BS, soft, round, NT, no rebound tenderness or guarding, no palpable masses or organomegaly  Extremities: Extremities warm, no edema, contusions, wounds or cyanosis  Neuro: Alert and oriented x3  Psych: Appropriate mood and behavior    Medications  Scheduled medications  cefTRIAXone, 1 g, intravenous, q24h  enoxaparin, 40 mg, subcutaneous, q24h  fluticasone, 2 spray, Each Nostril, Daily  hyoscyamine, 0.125 mg, oral, TID  nystatin, 5 mL, Swish & Swallow, 4x daily      Continuous medications  sodium chloride 0.9%, 100 mL/hr, Last Rate: 100 mL/hr (07/23/24 1336)      PRN medications  PRN medications: acetaminophen **OR** acetaminophen **OR** acetaminophen, benzocaine-menthol, guaiFENesin, lidocaine-diphenhydraMINE-Maalox 1:1:1, ondansetron **OR** ondansetron, polyethylene glycol, promethazine **OR** promethazine     Labs  Lab Results   Component Value Date    WBC 3.8 (L) 07/23/2024    HGB 8.7 (L) 07/23/2024    HCT 29.4 (L) 07/23/2024    MCV 93 07/23/2024     07/23/2024     Lab Results   Component Value Date    GLUCOSE 86 07/23/2024    CALCIUM 8.2 (L) 07/23/2024     07/23/2024    K 3.1 (L) 07/23/2024    CO2 25 07/23/2024     (H) 07/23/2024    BUN 7 07/23/2024    CREATININE 0.72 07/23/2024     Lab Results   Component Value Date    ALT 4 (L) 07/23/2024    AST 12 07/23/2024    ALKPHOS 95 07/23/2024    " BILITOT 0.5 07/23/2024     Lab Results   Component Value Date    IRON 29 (L) 07/21/2024    TIBC 163 (L) 07/21/2024     Lab Results   Component Value Date    INR 1.3 (H) 05/14/2024    INR 1.0 04/30/2024    PROTIME 14.5 (H) 05/14/2024    PROTIME 11.6 04/30/2024     Radiology  Esophagram 7/23/2024 noting:  IMPRESSION:  1.  Limited exam.  2. Moderate to severe esophageal dysmotility.      Signed by: Joselito Abrams 7/23/2024 10:05 AM  Dictation workstation:   [id number]     Assessment:  Khalida Barker is a 74 y.o. female presenting with N/V x 4 days.  PMH ankylosing spondylitis, esophageal dysmotility, diverticulitis.  Follows with Dr. Noelle Nation for outpatient GI needs.  Had EGD 7/10 that noted candidiasis for which she is completing Diflucan as well as esophageal dysmotility.      Patient underwent esophagram today noting moderate to severe esophageal dysmotility.   Patient tolerating clears and some yogurt.      # N/V- secondary to dysmotility v PND v antibiotics  # mod to severe esophageal dysmotility  # candidiasis  # recent back surgery x2  # MRSA spine- ts with IV vancomycin     Plan:  - continue supportive care  - diet per dietician recs  - continue IVF's until able to tolerated oral fluids  - agree with nystatin S&S  - obtain MBS  - pt will continue to follow with Dr. YOMI Nation for outpatient GI needs     Plan has been discussed with Dr. Velez. GI will continue to follow.      Dahlia Stein, APRN/CNP

## 2024-07-23 NOTE — PROGRESS NOTES
Speech-Language Pathology                 Therapy Communication Note    Patient Name: Khalida Barker  MRN: [id number]  Today's Date: 7/23/2024     Discipline: Speech Language Pathology    Missed Visit Reason:  MBSS canceled    Comment: MBSS had been scheduled for this date, however was canceled by GI (as reported by Radiology). Will continue to follow during inpatient stay.

## 2024-07-23 NOTE — PROGRESS NOTES
Speech-Language Pathology    SLP Adult Inpatient Speech-Language Pathology Clinical Swallow Evaluation    Patient Name: Khalida Barker  MRN: [id number]  Today's Date: 7/22/2024   Time Calculation  Start Time: 1803  Stop Time: 1833  Time Calculation (min): 30     Current Problem:   1. Dysphagia, unspecified  CANCELED: [place] GI esophagram    CANCELED: FL GI esophagram         Recommendations:  Diet: Pureed 4 and Thin 0; No straw  Medication Administration: Whole in pureed carrier  Strategies: Sit upright in 90 degree position&maintain upright posture for 30 mins after PO&strict oral care&no straws&small sips/bites&alternate between bites and sips&smaller, more frequent meals&reflux precautions  Instrumental Evaluation: Modified Barium Swallow Study (MBSS) to assess swallow physiology  GI Consult (of note, GI is following)    Assessment:  Consistencies Trialed: Pureed; Regular; Thin Liquids   Oral motor exam: WFL. Volitional swallow was WFL, whereas volitional cough was slightly weak.   Oral phase of swallowing was WFL, with functional oral preparation and clearance of all textures.   Pharyngeal dysphagia suspect, characterized by latent coughing following the [place] Swallow Protocol (3 oz water challenge). Grimacing noted, with pt stating she needs to cough up phlegm. No other S/S dysphagia were noted during other PO presentation.   Pt was oriented x4.  Pt is considered to be at risk for aspiration at this time.   Per the results of this assessment, patient is appropriate tor PO at this time. Please refer to recommendations and precautions as noted above.   ST to continue to follow patient during inpatient stay.      Plan:   Skilled speech therapy for dysphagia treatment is warranted for ongoing assessment of oropharyngeal swallow function, to ensure tolerance of safest and least restrictive consistencies, to provide training and instruction regarding the use of compensatory swallow strategies and pt/caregiver education in  "order to reduce risk of aspiration, dehydration and malnutrition.  Frequency: 2x/wk  Duration: Current admission   SLP Discharge Recommendations: Home     Goals:  1. Patient will tolerate PO with no overt s/s of aspiration in 90% of observed therapeutic trials.  2. Patient/caregiver will implement safe swallowing strategies to reduce risk of aspiration in 90% of trials given caregiver assistance/cueing as needed.  3. Complete a modified barium swallow study (MBSS) for objective assessment of swallowing function, to assess for aspiration, and to guide further recommendations and treatment plan.     Subjective:  Pt seen at bedside for clinical swallow evaluation. She was assisted into an upright position for PO trials. Vocal intensity was WFL.       Baseline Assessment:  O2 use: Room air      General Visit Information:  Clinical Swallow Evaluation ordered d/t concern for dysphagia. Current diet level is NPO except sips with meds.   ADY Guillen reports pt has esophagitis, and she will be having an esophagram to check for candida in the esophagus.   Pt reports that she can chew okay, but stuff she chews doesn't want to go down. She'll spit (out) the food. Applesauce is hard to get down because it's grainy. Pt does not really eat anything-she's existing on soup, yogurt since it's creamy and it'll go down. She cannot swallow chili, and spaghetti has to be really saucy. Her tongue feels permanently burnt. Pt expressed concern that she cannot take in enough calories or nutrients, and has lost 35 lbs in the last 2 months. She'll throw up. Pt got a handful of pills when she was in [facility], and bile came up. She has difficulty swallowing pills. She gulped water to get a pill down, and phlegm came up. Pt has continuous nasal drip, and cannot fully burp. She's to get to get phlegm out (pt pointed to her sternal notch and chest areas).     History of Present Illness:  Per EMR, \"Khalida Barker is a 74 y.o. female  with past " medical history of ankylosing spondylitis, diverticulosis, spinal stenosis, recent Candida esophagitis diagnosis on July 10, 2024, history of MRSA infection in the back requiring IV antibiotics presented to emergency department due to difficulty swallowing and concern for dehydration and malnutrition.  Patient states that she has been having difficulty swallowing pills.  She took 2 Diflucan on the 10th and has been taking 1 daily.  She has been able to get the pills down but when she is trying to swallow them she vomits everything else up.  She feels constant drainage from her nose and is vomiting that up as well and is difficulty even get the liquids down.  She has noticed that she has an increasing leg swelling.  Her albumin in the emergency department was 3.3 but beta hydroxybutyrate was elevated at 1.28 and urine showed 1+ bacteria with greater than 50 WBC and positive leuk esterase.  Patient denies any burning or urgency or frequency with her urination.  She was given IV fluids and feels little more energetic and was referred to hospitalist service for further management.     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     Surgical History  She has a past surgical history that includes Other surgical history; Knee Arthroplasty (Right); Colonoscopy; Tonsillectomy (1956); Adenoidectomy (1956); Spinal fusion (06/2010); Breast biopsy (1999); Mouth surgery (2016); Mouth surgery (09/28/2021); and [place] tooth extraction (1978).     Imaging: XR chest 1  "view     Result Date: 6/25/2024  * * *Final Report* * * DATE OF EXAM: Jun 25 2024 12:43PM   S0X   5290  -  XR CHEST 1V FRONTAL   / ACCESSION #  [id number] PROCEDURE REASON: pain      * * * * Physician Interpretation * * * *  EXAM:  XR CHEST 1V FRONTAL CLINICAL HISTORY:  pain COMPARISON: No available prior. RESULT: Lines, tubes, and devices:  Left-sided PICC line present terminating in the lower SVC. Lungs and pleura: Bilateral lung fields are clear. No pneumothorax or suggestion for obvious/significant pleural effusions. Cardiomediastinal silhouette:  Normal cardiomediastinal silhouette. Other: Posterior fusion hardware with rods and pedicle screws project over the lower thoracic spine. Numerous surgical skin staples also project over the mid to lower chest at the midline.     IMPRESSION: No acute cardiopulmonary process. Well-positioned left PICC line. : [facility]   Transcribe Date/Time: Jun 25 2024  8:06P Dictated by : ROSANA SMITH MD This examination was interpreted and the report reviewed and electronically signed by: ROSANA SMITH MD on Jun 25 2024  8:07PM.\"    Pain:  Rating scale: 0-10   Pt report: 0      Treatment: Yes. Treatment provided following clinical swallow evaluation. Reviewed results and impressions in detail with patient. Discussed recommended diet textures and strategies for balancing aspiration/choking risk with quality of life, as well as rationale behind MBSS and procedure. Further discussed risk for aspiration, and need for strict adherence to aspiration precautions.   Pt educated re: safe swallow strategies (compensatory swallow techniques) per Clinical Swallow Evaluation this date. She was able to implement same with min cues during the 2 oz of thin liquid she consumed via cup. ST to continue with above recommendations, along with ongoing assessment of oropharyngeal swallow function, to ensure tolerance of safest and least restrictive consistencies, and to provide ongoing education " and training in safe swallowing strategies to reduce risk of aspiration.     Education:   Learner pt; RN  Barriers to Learning None  Method demonstration; verbal; visual  Education-Topic SLP provided patient education regarding role of SLP, purpose of assessment and clinical impressions/recommendations. Patient verbalized full comprehension, consistent with cognitive status. SLP further coordinated with RN regarding recommendations and precautions per this assessment, with RN verbalizing understanding.  Outcome: Pt voiced understanding and agreement.

## 2024-07-23 NOTE — NURSING NOTE
Pt. Went  for  esophogram  and  returned.    Pt.  Admitted  as  inpatient  and she  was  moved  to  room 3120 .

## 2024-07-24 ENCOUNTER — APPOINTMENT (OUTPATIENT)
Dept: PRIMARY CARE | Facility: CLINIC | Age: 74
End: 2024-07-24
Payer: MEDICARE

## 2024-07-24 ENCOUNTER — APPOINTMENT (OUTPATIENT)
Dept: RADIOLOGY | Facility: HOSPITAL | Age: 74
End: 2024-07-24
Payer: MEDICARE

## 2024-07-24 LAB
ANION GAP SERPL CALC-SCNC: 12 MMOL/L (ref 10–20)
BACTERIA UR CULT: ABNORMAL
BACTERIA UR CULT: ABNORMAL
BASOPHILS # BLD AUTO: 0.02 X10*3/UL (ref 0–0.1)
BASOPHILS NFR BLD AUTO: 0.4 %
BUN SERPL-MCNC: 7 MG/DL (ref 6–23)
CALCIUM SERPL-MCNC: 8.5 MG/DL (ref 8.6–10.3)
CHLORIDE SERPL-SCNC: 107 MMOL/L (ref 98–107)
CO2 SERPL-SCNC: 26 MMOL/L (ref 21–32)
CREAT SERPL-MCNC: 0.72 MG/DL (ref 0.5–1.05)
EGFRCR SERPLBLD CKD-EPI 2021: 89 ML/MIN/1.73M*2
EOSINOPHIL # BLD AUTO: 0.06 X10*3/UL (ref 0–0.4)
EOSINOPHIL NFR BLD AUTO: 1.1 %
ERYTHROCYTE [DISTWIDTH] IN BLOOD BY AUTOMATED COUNT: 15.8 % (ref 11.5–14.5)
GLUCOSE SERPL-MCNC: 89 MG/DL (ref 74–99)
HCT VFR BLD AUTO: 32 % (ref 36–46)
HGB BLD-MCNC: 9.5 G/DL (ref 12–16)
IMM GRANULOCYTES # BLD AUTO: 0.02 X10*3/UL (ref 0–0.5)
IMM GRANULOCYTES NFR BLD AUTO: 0.4 % (ref 0–0.9)
LYMPHOCYTES # BLD AUTO: 1.07 X10*3/UL (ref 0.8–3)
LYMPHOCYTES NFR BLD AUTO: 20.2 %
MAGNESIUM SERPL-MCNC: 1.85 MG/DL (ref 1.6–2.4)
MCH RBC QN AUTO: 27.3 PG (ref 26–34)
MCHC RBC AUTO-ENTMCNC: 29.7 G/DL (ref 32–36)
MCV RBC AUTO: 92 FL (ref 80–100)
MONOCYTES # BLD AUTO: 0.4 X10*3/UL (ref 0.05–0.8)
MONOCYTES NFR BLD AUTO: 7.6 %
NEUTROPHILS # BLD AUTO: 3.72 X10*3/UL (ref 1.6–5.5)
NEUTROPHILS NFR BLD AUTO: 70.3 %
NRBC BLD-RTO: 0 /100 WBCS (ref 0–0)
PLATELET # BLD AUTO: 251 X10*3/UL (ref 150–450)
POTASSIUM SERPL-SCNC: 3.3 MMOL/L (ref 3.5–5.3)
PYRIDOXAL PHOS SERPL-SCNC: 6.8 NMOL/L (ref 20–125)
RBC # BLD AUTO: 3.48 X10*6/UL (ref 4–5.2)
SODIUM SERPL-SCNC: 142 MMOL/L (ref 136–145)
WBC # BLD AUTO: 5.3 X10*3/UL (ref 4.4–11.3)

## 2024-07-24 PROCEDURE — 74230 X-RAY XM SWLNG FUNCJ C+: CPT

## 2024-07-24 PROCEDURE — 97110 THERAPEUTIC EXERCISES: CPT | Mod: GP,CQ

## 2024-07-24 PROCEDURE — 97116 GAIT TRAINING THERAPY: CPT | Mod: GP,CQ

## 2024-07-24 PROCEDURE — 85025 COMPLETE CBC W/AUTO DIFF WBC: CPT | Performed by: INTERNAL MEDICINE

## 2024-07-24 PROCEDURE — 80048 BASIC METABOLIC PNL TOTAL CA: CPT | Performed by: INTERNAL MEDICINE

## 2024-07-24 PROCEDURE — 99232 SBSQ HOSP IP/OBS MODERATE 35: CPT | Performed by: INTERNAL MEDICINE

## 2024-07-24 PROCEDURE — 2500000001 HC RX 250 WO HCPCS SELF ADMINISTERED DRUGS (ALT 637 FOR MEDICARE OP): Performed by: INTERNAL MEDICINE

## 2024-07-24 PROCEDURE — 2500000004 HC RX 250 GENERAL PHARMACY W/ HCPCS (ALT 636 FOR OP/ED): Performed by: INTERNAL MEDICINE

## 2024-07-24 PROCEDURE — 1100000001 HC PRIVATE ROOM DAILY

## 2024-07-24 PROCEDURE — 97530 THERAPEUTIC ACTIVITIES: CPT | Mod: GO,CO

## 2024-07-24 PROCEDURE — 2500000005 HC RX 250 GENERAL PHARMACY W/O HCPCS: Performed by: INTERNAL MEDICINE

## 2024-07-24 PROCEDURE — 36415 COLL VENOUS BLD VENIPUNCTURE: CPT | Performed by: INTERNAL MEDICINE

## 2024-07-24 PROCEDURE — 83735 ASSAY OF MAGNESIUM: CPT | Performed by: INTERNAL MEDICINE

## 2024-07-24 PROCEDURE — 92611 MOTION FLUOROSCOPY/SWALLOW: CPT | Mod: GN | Performed by: SPEECH-LANGUAGE PATHOLOGIST

## 2024-07-24 PROCEDURE — 99232 SBSQ HOSP IP/OBS MODERATE 35: CPT | Performed by: NURSE PRACTITIONER

## 2024-07-24 PROCEDURE — 74230 X-RAY XM SWLNG FUNCJ C+: CPT | Performed by: RADIOLOGY

## 2024-07-24 RX ORDER — NYSTATIN 100000 [USP'U]/ML
5 SUSPENSION ORAL 4 TIMES DAILY
Qty: 100 ML | Refills: 0 | Status: SHIPPED | OUTPATIENT
Start: 2024-07-24 | End: 2024-07-29

## 2024-07-24 RX ORDER — HYDROXYZINE HYDROCHLORIDE 10 MG/1
10 TABLET, FILM COATED ORAL ONCE
Status: COMPLETED | OUTPATIENT
Start: 2024-07-24 | End: 2024-07-24

## 2024-07-24 RX ORDER — POTASSIUM CHLORIDE 1.5 G/1.58G
40 POWDER, FOR SOLUTION ORAL ONCE
Status: COMPLETED | OUTPATIENT
Start: 2024-07-24 | End: 2024-07-24

## 2024-07-24 RX ORDER — HYOSCYAMINE SULFATE 0.12 MG/1
0.12 TABLET, ORALLY DISINTEGRATING ORAL 3 TIMES DAILY
Qty: 90 TABLET | Refills: 0 | Status: SHIPPED | OUTPATIENT
Start: 2024-07-24 | End: 2024-08-23

## 2024-07-24 RX ORDER — ONDANSETRON 4 MG/1
4 TABLET, FILM COATED ORAL EVERY 8 HOURS PRN
Qty: 20 TABLET | Refills: 0 | Status: SHIPPED | OUTPATIENT
Start: 2024-07-24

## 2024-07-24 RX ADMIN — HYOSCYAMINE SULFATE 0.12 MG: 0.12 TABLET SUBLINGUAL at 10:56

## 2024-07-24 RX ADMIN — HYOSCYAMINE SULFATE 0.12 MG: 0.12 TABLET SUBLINGUAL at 15:10

## 2024-07-24 RX ADMIN — NYSTATIN 500000 UNITS: 100000 SUSPENSION ORAL at 16:24

## 2024-07-24 RX ADMIN — BARIUM SULFATE 700 MG: 700 TABLET ORAL at 11:34

## 2024-07-24 RX ADMIN — FLUTICASONE PROPIONATE 2 SPRAY: 50 SPRAY, METERED NASAL at 10:56

## 2024-07-24 RX ADMIN — ENOXAPARIN SODIUM 40 MG: 40 INJECTION SUBCUTANEOUS at 18:52

## 2024-07-24 RX ADMIN — NYSTATIN 500000 UNITS: 100000 SUSPENSION ORAL at 20:55

## 2024-07-24 RX ADMIN — BARIUM SULFATE 25 ML: 400 SUSPENSION ORAL at 11:35

## 2024-07-24 RX ADMIN — NYSTATIN 500000 UNITS: 100000 SUSPENSION ORAL at 12:34

## 2024-07-24 RX ADMIN — BARIUM SULFATE 5 ML: 400 SUSPENSION ORAL at 11:34

## 2024-07-24 RX ADMIN — HYDROXYZINE HYDROCHLORIDE 10 MG: 10 TABLET ORAL at 20:55

## 2024-07-24 RX ADMIN — NYSTATIN 500000 UNITS: 100000 SUSPENSION ORAL at 06:08

## 2024-07-24 RX ADMIN — POTASSIUM CHLORIDE 40 MEQ: 1.5 POWDER, FOR SOLUTION ORAL at 10:54

## 2024-07-24 RX ADMIN — CEFTRIAXONE SODIUM 1 G: 1 INJECTION, SOLUTION INTRAVENOUS at 10:50

## 2024-07-24 RX ADMIN — HYOSCYAMINE SULFATE 0.12 MG: 0.12 TABLET SUBLINGUAL at 20:55

## 2024-07-24 ASSESSMENT — COGNITIVE AND FUNCTIONAL STATUS - GENERAL
DRESSING REGULAR UPPER BODY CLOTHING: A LITTLE
MOBILITY SCORE: 18
DRESSING REGULAR LOWER BODY CLOTHING: A LITTLE
TURNING FROM BACK TO SIDE WHILE IN FLAT BAD: A LITTLE
WALKING IN HOSPITAL ROOM: A LITTLE
CLIMB 3 TO 5 STEPS WITH RAILING: A LOT
PERSONAL GROOMING: A LITTLE
TOILETING: A LITTLE
HELP NEEDED FOR BATHING: A LOT
DAILY ACTIVITIY SCORE: 17
MOVING TO AND FROM BED TO CHAIR: A LITTLE
EATING MEALS: A LITTLE
TOILETING: A LITTLE
WALKING IN HOSPITAL ROOM: A LITTLE
DAILY ACTIVITIY SCORE: 17
CLIMB 3 TO 5 STEPS WITH RAILING: A LOT
MOVING FROM LYING ON BACK TO SITTING ON SIDE OF FLAT BED WITH BEDRAILS: A LITTLE
DRESSING REGULAR UPPER BODY CLOTHING: A LITTLE
DRESSING REGULAR LOWER BODY CLOTHING: A LOT
PERSONAL GROOMING: A LITTLE
HELP NEEDED FOR BATHING: A LOT
MOVING TO AND FROM BED TO CHAIR: A LITTLE
MOVING FROM LYING ON BACK TO SITTING ON SIDE OF FLAT BED WITH BEDRAILS: A LITTLE
TURNING FROM BACK TO SIDE WHILE IN FLAT BAD: A LITTLE
MOBILITY SCORE: 18

## 2024-07-24 ASSESSMENT — PAIN SCALES - WONG BAKER: WONGBAKER_NUMERICALRESPONSE: HURTS LITTLE BIT

## 2024-07-24 ASSESSMENT — PAIN - FUNCTIONAL ASSESSMENT
PAIN_FUNCTIONAL_ASSESSMENT: 0-10
PAIN_FUNCTIONAL_ASSESSMENT: 0-10

## 2024-07-24 ASSESSMENT — PAIN SCALES - GENERAL
PAINLEVEL_OUTOF10: 2
PAINLEVEL_OUTOF10: 5 - MODERATE PAIN
PAINLEVEL_OUTOF10: 4
PAINLEVEL_OUTOF10: 7

## 2024-07-24 NOTE — PROGRESS NOTES
Khalida Barker is a 74 y.o. female on day 1 of admission presenting with Dysphagia.      Subjective   Having some improvement with Levsin.  She feels now some of this food getting stuck in her throat is psychological.  GI recommending getting modified barium swallow.       Objective     Last Recorded Vitals  /60 (BP Location: Right arm, Patient Position: Lying)   Pulse 80   Temp 36.4 °C (97.5 °F) (Temporal)   Resp 18   Wt 73.5 kg (162 lb 0.6 oz)   SpO2 94%   Intake/Output last 3 Shifts:    Intake/Output Summary (Last 24 hours) at 7/24/2024 0952  Last data filed at 7/24/2024 0000  Gross per 24 hour   Intake 620 ml   Output 900 ml   Net -280 ml       Admission Weight  Weight: 81.6 kg (180 lb) (07/21/24 1127)    Daily Weight  07/21/24 : 73.5 kg (162 lb 0.6 oz)      Physical Exam:  General: Not in acute distress, alert  HEENT: PERRLA, head intact and normocephalic.  Tongue fissuring is improved.  Labs are still chart but improved compared to yesterday  Neck: Supple  Lungs: Clear to auscultation, work of breathing within normal limit  Cardiac: Regular rate and rhythm  Abdomen: Soft nontender, positive bowel sounds  : Exam deferred  Skin: Intact  Hematology: No petechia or excessive ecchymosis  Musculoskeletal: Without significant trauma.  Pitting edema of 1+  Neurological: Alert awake oriented, no focal deficit, cranial nerves grossly intact  Psych: No suicidal ideation or homicidal ideation    Relevant Results  Scheduled medications  cefTRIAXone, 1 g, intravenous, q24h  enoxaparin, 40 mg, subcutaneous, q24h  fluticasone, 2 spray, Each Nostril, Daily  hyoscyamine, 0.125 mg, oral, TID  nystatin, 5 mL, Swish & Swallow, 4x daily  potassium chloride, 40 mEq, oral, Once      Continuous medications     PRN medications  PRN medications: acetaminophen **OR** acetaminophen **OR** acetaminophen, benzocaine-menthol, guaiFENesin, lidocaine-diphenhydraMINE-Maalox 1:1:1, ondansetron **OR** ondansetron, polyethylene glycol,  promethazine **OR** promethazine   Labs  Results from last 7 days   Lab Units 07/24/24  0524 07/23/24  0509 07/22/24  0610   WBC AUTO x10*3/uL 5.3 3.8* 4.4   HEMOGLOBIN g/dL 9.5* 8.7* 9.0*   HEMATOCRIT % 32.0* 29.4* 29.4*   PLATELETS AUTO x10*3/uL 251 239 257     Results from last 7 days   Lab Units 07/24/24  0524 07/23/24  0509 07/22/24  0610 07/21/24  1133   SODIUM mmol/L 142 142 142 141   POTASSIUM mmol/L 3.3* 3.1* 3.0* 3.6   CHLORIDE mmol/L 107 108* 107 104   CO2 mmol/L 26 25 26 23   BUN mg/dL 7 7 8 10   CREATININE mg/dL 0.72 0.72 0.79 0.84   CALCIUM mg/dL 8.5* 8.2* 8.2* 9.4   PROTEIN TOTAL g/dL  --  5.6*  --  7.3   BILIRUBIN TOTAL mg/dL  --  0.5  --  0.7   ALK PHOS U/L  --  95  --  133   ALT U/L  --  4*  --  7   AST U/L  --  12  --  17   GLUCOSE mg/dL 89 86 89 122*       ECG 12 Lead    Result Date: 7/22/2024  Normal sinus rhythm Cannot rule out Anterior infarct (cited on or before 26-JUN-2023) Prolonged QT Abnormal ECG When compared with ECG of 14-MAY-2024 18:14, Nonspecific T wave abnormality no longer evident in Inferior leads Nonspecific T wave abnormality, improved in Lateral leads    US gallbladder    Result Date: 6/26/2024  * * *Final Report* * * DATE OF EXAM: Jun 25 2024  5:24PM   S0U   1032  -  US ABD RIGHT UPPER QUADRANT  / ACCESSION #  [id number] PROCEDURE REASON: nausea, vomiting, pain      * * * * Physician Interpretation * * * *  EXAMINATION: RIGHT UPPER QUADRANT ULTRASOUND HISTORY:  nausea, vomiting, pain TECHNIQUE:  Sonography of the right upper quadrant was performed.  Images were obtained and stored in a permanent archive. MQ:  URUQ_2 COMPARISON: None. RESULT: Pancreas:  Normal sonographic appearance.    Portions obscured: tail Liver:      Echotexture:  Normal, homogeneous.      Echogenicity:  Normal      Surface contour:  Smooth      Lesions: Multiple simple and mildly complex cysts are present with largest measuring up to 2.8 cm. Biliary: No intrahepatic biliary duct dilation.      CBD: 0.3 cm  at the hilum.      Gallbladder: Normal caliber           -Contents:  No cholelithiasis.  There is gallbladder sludge.           -Wall:  Normal           -Other:  No pericholecystic fluid. Sonographic Meza sign was reported negative. Right Kidney: No hydronephrosis. 5 cm benign-appearing simple cyst and additional smaller cysts. Ascites: None. -    IMPRESSION: Gallbladder sludge. Hepatic and RIGHT renal cysts. : [facility]   Transcribe Date/Time: Jun 26 2024  9:44A Dictated by : BECKY REARDON MD This examination was interpreted and the report reviewed and   electronically signed by: BECKY REARDON MD on Jun 26 2024  9:45AM  EST    XR abdomen 1 view    Result Date: 6/26/2024  * * *Final Report* * * DATE OF EXAM: Jun 25 2024 12:43PM   S0X   5289  -  XR ABDOMEN 1V SUPINE  / ACCESSION #  [id number] PROCEDURE REASON: nausea, vomiting      * * * * Physician Interpretation * * * *  HISTORY:  N/V.   nausea, vomiting. TECHNIQUE: XR ABDOMEN 1V SUPINE COMPARISON: 6/20/2024 KUB along with same day chest x-ray RESULT: No dilated large or small bowel. Pelvis is not included in field-of-view. Thoracolumbar postsurgical change with associated hardware including hardware crossing sacroiliac joints is again seen. Skin staples again seen. Visualized lung bases have better detail on chest x-ray performed at same time. -    IMPRESSION: No bowel dilatation. : [facility]   Transcribe Date/Time: Jun 26 2024  9:32A Dictated by : BECKY REARDON MD This examination was interpreted and the report reviewed and electronically signed by: BECKY REARDON MD on Jun 26 2024  9:43AM  EST    XR abdomen 2 views supine and erect or decub    Result Date: 6/26/2024  * * *Final Report* * * DATE OF EXAM: Jun 25 2024 12:43PM   S0X   5289  -  XR ABDOMEN 1V SUPINE  / ACCESSION #  [id number] PROCEDURE REASON: nausea, vomiting      * * * * Physician Interpretation * * * *  HISTORY:  N/V.   nausea, vomiting. TECHNIQUE: XR ABDOMEN 1V SUPINE  COMPARISON: 6/20/2024 KUB along with same day chest x-ray RESULT: No dilated large or small bowel. Pelvis is not included in field-of-view. Thoracolumbar postsurgical change with associated hardware including hardware crossing sacroiliac joints is again seen. Skin staples again seen. Visualized lung bases have better detail on chest x-ray performed at same time. -    IMPRESSION: No bowel dilatation. : [facility]   Transcribe Date/Time: Jun 26 2024  9:32A Dictated by : BECKY REARDON MD This examination was interpreted and the report reviewed and electronically signed by: BECKY REARDON MD on Jun 26 2024  9:43AM  EST    XR chest 1 view    Result Date: 6/25/2024  * * *Final Report* * * DATE OF EXAM: Jun 25 2024 12:43PM   S0X   5290  -  XR CHEST 1V FRONTAL   / ACCESSION #  [id number] PROCEDURE REASON: pain      * * * * Physician Interpretation * * * *  EXAM:  XR CHEST 1V FRONTAL CLINICAL HISTORY:  pain COMPARISON: No available prior. RESULT: Lines, tubes, and devices:  Left-sided PICC line present terminating in the lower SVC. Lungs and pleura: Bilateral lung fields are clear. No pneumothorax or suggestion for obvious/significant pleural effusions. Cardiomediastinal silhouette:  Normal cardiomediastinal silhouette. Other: Posterior fusion hardware with rods and pedicle screws project over the lower thoracic spine. Numerous surgical skin staples also project over the mid to lower chest at the midline.    IMPRESSION: No acute cardiopulmonary process. Well-positioned left PICC line. : [facility]   Transcribe Date/Time: Jun 25 2024  8:06P Dictated by : ROSANA SMITH MD This examination was interpreted and the report reviewed and electronically signed by: ROSANA SMITH MD on Jun 25 2024  8:07PM  EST          Assessment/Plan   Khalida Barker is a 74 y.o. female on day 1 of admission presenting with Dysphagia.  Principal Problem:    Dysphagia  Active Problems:    Muscle weakness (generalized)    Dehydration     Ankylosing spondylitis of multiple sites in spine (Multi)    Dysphagia, unspecified    Khalida Barker is a 74 y.o. female  with past medical history of ankylosing spondylitis, diverticulosis, spinal stenosis, recent Candida esophagitis diagnosis on July 10, 2024, history of MRSA infection in the back requiring IV antibiotics presented to emergency department due to difficulty swallowing and concern for dehydration and malnutrition.     Dysphagia with Candida esophagitis diagnosis  Only continue with nystatin  Diflucan has been stopped  Continue with inpatient level of care  GI consult is noted  GI recommending modified barium swallow  Esophagogram showing moderate to severe motility disorder  Continue with Levsin for esophageal dysmotility     Dehydration-improved and nearly resolved  Resolved  Patient is eating and drinking    Severe protein calorie malnutrition from poor oral intake  Nutritionist consult is noted  Continue with protein supplement     Glossitis-improved  BMX is helping  Do not believe this is related to candidiasis  Low iron level on admission  Normal B12 and folate level  Follow-up vitamin B6 level     Chronic right shoulder pain  Pain control  PT OT evaluation with generalized weakness  Patient would like to go home with home health and declining acute rehab and skilled nursing facility placement    Abnormal UA-uncomplicated UTI  Continue with ceftriaxone for 3 days and stop    Hypokalemia  Repleted     History of diverticulosis  MiraLAX as needed     DVT prophylaxis  Lovenox     Plan discussed with patient at bedside    Moderate level of MDM based on above issue and discussing plan    This note is created using voice recognition software. All efforts are made to minimize errors, if there are errors there due to transcription.    Ron Leung  Hospitalist

## 2024-07-24 NOTE — CARE PLAN
Problem: Pain - Adult  Goal: Verbalizes/displays adequate comfort level or baseline comfort level  Outcome: Progressing     Problem: Safety - Adult  Goal: Free from fall injury  Outcome: Progressing     Problem: Discharge Planning  Goal: Discharge to home or other facility with appropriate resources  Outcome: Progressing     Problem: Chronic Conditions and Co-morbidities  Goal: Patient's chronic conditions and co-morbidity symptoms are monitored and maintained or improved  Outcome: Progressing     Problem: Skin  Goal: Decreased wound size/increased tissue granulation at next dressing change  Outcome: Progressing  Goal: Participates in plan/prevention/treatment measures  Outcome: Progressing  Goal: Prevent/manage excess moisture  Outcome: Progressing  Goal: Prevent/minimize sheer/friction injuries  Outcome: Progressing  Flowsheets (Taken 7/24/2024 0303)  Prevent/minimize sheer/friction injuries: Turn/reposition every 2 hours/use positioning/transfer devices  Goal: Promote/optimize nutrition  Outcome: Progressing  Goal: Promote skin healing  Outcome: Progressing     Problem: Pain  Goal: Takes deep breaths with improved pain control throughout the shift  Outcome: Progressing  Goal: Turns in bed with improved pain control throughout the shift  Outcome: Progressing  Goal: Walks with improved pain control throughout the shift  Outcome: Progressing  Goal: Performs ADL's with improved pain control throughout shift  Outcome: Progressing  Goal: Participates in PT with improved pain control throughout the shift  Outcome: Progressing  Goal: Free from opioid side effects throughout the shift  Outcome: Progressing  Goal: Free from acute confusion related to pain meds throughout the shift  Outcome: Progressing     Problem: Fall/Injury  Goal: Not fall by end of shift  Outcome: Progressing  Goal: Be free from injury by end of the shift  Outcome: Progressing  Goal: Verbalize understanding of personal risk factors for fall in the  hospital  Outcome: Progressing  Goal: Verbalize understanding of risk factor reduction measures to prevent injury from fall in the home  Outcome: Progressing  Goal: Use assistive devices by end of the shift  Outcome: Progressing  Goal: Pace activities to prevent fatigue by end of the shift  Outcome: Progressing   The patient's goals for the shift include      The clinical goals for the shift include pt will remain free from fall/injury

## 2024-07-24 NOTE — PROCEDURES
Speech-Language Pathology    Inpatient Modified Barium Swallow Study    Patient Name: Khalida Barker  MRN: [id number]  : 1950  Today's Date: 24  Time Calculation  Start Time: 1005  Stop Time: 1025  Time Calculation (min): 20 min          Modified Barium Swallow Study completed. Informed verbal consent obtained prior to completion of exam. Trials of thin, nectar/mildly thick liquid, honey/moderately thick liquid, pudding, and barium tablet/water were given.     SLP: Shira Duffy CCC-SLP   Contact info: [employer] chat; phone: [phone]      Reason for Referral: Rule out UES dysfunction  Patient Hx:  74 y.o. female with history of ankylosing spondylitis, diverticulitis, spinal stenosis, adenoidectomy ,admitted for concerns of nausea/vomiting and dysphagia. Patient is s/p esophagram 24 noting moderate to severe esophageal dysmotility.   Respiratory Status: Room air  Current diet: Regular solids and thin liquids    FINAL SPEECH RECOMMENDATIONS    DIET:   - Regular (IDDSI Level 7), as tolerated  - Thin liquids (IDDSI Level 0)  Per the results of today's MBSS, patient to continue baseline diet of regular consistencies and thin liquids following swallow strategies listed below:    STRATEGIES:  - Small bites  - Small, single sips  - Alternate consistencies  - Upright for all PO intake  - Swallow 2-3 times per bite/sip  - Smaller, more frequent meals  - Reflux Precautions    EXERCISES:    Plan:  Treatment/Interventions:  Compensatory strategy training, Patient/family education, Diet tolerance  SLP Plan: Skilled SLP warranted  SLP Frequency: 1x/week  Duration: Current admission    Discussed POC: Patient  Discussed Risks/Benefits: Yes  Patient/Caregiver Agreeable: Yes    Short term goals established 24:   1. Patient will tolerate recommended PO diet absent of overt s/s of aspiration in 90% of observed therapeutic trials.  2. Patient will implement safe swallowing strategies to reduce risk of  aspiration in 90% of trials given caregiver assistance/cueing as needed.    Education Provided: Results and recommendations per MBSS, with video review. Verbal understanding and agreement given on all accounts.     Additional consult suggested: None currently    Repeat study/ dc plan: As indicated    Mechanics of the Swallow Summary:  ORAL PHASE:  Lip Closure - No labial escape/anterior loss of bolus   Tongue Control During Bolus Hold - Escape to lateral buccal cavity and/or floor of mouth   Bolus prep/mastication - Mastication not assessed ; patient deferred srini cracker  Bolus transport/lingual motion - Brisk tongue motion for A-P movement of the bolus   Oral residue - Residue collection on oral structure     PHARYNGEAL PHASE:  Initiation of pharyngeal swallow - Bolus head at posterior angle of ramus   Soft palate elevation - No bolus between soft palate/pharyngeal wall   Laryngeal elevation - Complete superior movement of thyroid cartilage with contact of arytenoids to epiglottic petiole   Anterior hyoid excursion - Complete anterior movement   Epiglottic movement - Complete inversion    Laryngeal vestibule closure - Complete - no air/contrast in laryngeal vestibule   Pharyngeal stripping wave - Complete  Pharyngeal contraction (A/P view) - Complete  Pharyngoesophageal segment opening - Complete distension and complete duration/no obstruction of flow of bolus   Tongue base retraction - Narrow column of contrast or air between tongue base and pharyngeal wall   Pharyngeal residue - Collection of residue within or on the pharyngeal structures     ESOPHAGEAL PHASE:  Esophageal clearance - Esophageal retention       SLP Impressions with Severity Rating:   Pt presents with mild oropharyngeal dysphagia and known moderate-severe esophageal dysmotility upon completion of modified barium swallow study this date. Swallowing physiology is detailed above. Impairments most impacting swallowing safety and efficiency include  "decreased tongue base retraction and impaired timing of laryngeal vestibule closure. Patient demonstrated aspiration with effective cough response for thin liquid via 20mL presentation, and penetration without aspiration for thin liquid via consecutive straw sips and mildly thick liquids via 5mL and 20mL presentations. No further penetration/aspiration was observed for any other consistency. Patient demonstrated trace-mild oral and pharyngeal residue that was reduced with repeat swallows. Of note, patient was unable to tolerate/swallow pudding trial (\"too thick\"), which she expectorated into a bag, and deferred srini cracker trial.    In the AP view, patient demonstrated deficits consistent with those observed on esophagram. Barium tablet was given with water in the AP view, and was cleared without difficulty.    Per the results of this assessment, patient to continue baseline diet, with additional recommended strategies as noted above. ST to continue to follow during inpatient stay to ensure diet tolerance, and to provide ongoing education/training in safe swallowing strategies to reduce risk of aspiration.      OUTCOME MEASURES:  Functional Oral Intake Scale  Functional Oral Intake Scale: Level 6        total oral diet with multiple consistencies without special preparations but specific food limitations      Rosenbek's Penetration Aspiration Scale  Thin Liquids: 6. ASPIRATION that CLEARS x1 for 20mL presentation  Nectar Thick Liquids: 3. PENETRATION with LOW ASPIRATION risk - contrast remains above vocal cords, visible residue]   4. DEEP PENETRATION that clears - contrast contacts vocal cords, no residue  Honey Thick Liquids: 1. NO ASPIRATION & NO PENETRATION - no aspiration, contrast does not enter airway  Puree: Unable to swallow  Solids: Patient deferred    "

## 2024-07-24 NOTE — PROGRESS NOTES
07/24/24 0914   Discharge Planning   Expected Discharge Disposition  Services   Does the patient need discharge transport arranged? No   Patient Choice   Patient / Family choosing to utilize agency / facility established prior to hospitalization Yes     Patient states she is active with [facility] and would like to continue at discharge. Referral sent. Attending updated and will need to order an outgoing referral for home care.

## 2024-07-24 NOTE — DISCHARGE SUMMARY
Discharge Diagnosis  Dysphagia    Issues Requiring Follow-Up  Follow-up with primary care provider and GI as outpatient    Discharge Meds     Your medication list        START taking these medications        Instructions Last Dose Given Next Dose Due   hyoscyamine 0.125 mg disintegrating tablet  Commonly known as: Anaspaz      Take 1 tablet (0.125 mg) by mouth 3 times a day.       nystatin 100,000 unit/mL suspension  Commonly known as: Mycostatin      Swish and swallow 5 mL (500,000 Units) 4 times a day for 5 days.       ondansetron 4 mg tablet  Commonly known as: Zofran      Take 1 tablet (4 mg) by mouth every 8 hours if needed for nausea.              CONTINUE taking these medications        Instructions Last Dose Given Next Dose Due   acetaminophen 500 mg tablet  Commonly known as: Tylenol      Take 1 tablet (500 mg) by mouth every 8 hours if needed for mild pain (1 - 3) or moderate pain (4 - 6) for up to 30 doses.       docusate sodium 100 mg capsule  Commonly known as: Colace           NON FORMULARY                  STOP taking these medications      fluconazole 200 mg tablet  Commonly known as: Diflucan        HYDROcodone-acetaminophen 5-325 mg tablet  Commonly known as: Norco        polyethylene glycol 17 gram packet  Commonly known as: Glycolax, Miralax        sennosides-docusate sodium 8.6-50 mg tablet  Commonly known as: Maggie-Colace        vancomycin IVPB  Commonly known as: Vancocin                  Where to Get Your Medications        These medications were sent to [id number] - [place], [place] - [address]  [address], [facility] [address]      Phone: [phone]   hyoscyamine 0.125 mg disintegrating tablet  nystatin 100,000 unit/mL suspension  ondansetron 4 mg tablet         Test Results Pending At Discharge  Pending Labs       No current pending labs.            Hospital Course  Khalida Barker is a 74 y.o. female with past medical history of ankylosing  spondylitis, diverticulosis, spinal stenosis, recent Candida esophagitis diagnosis on July 10, 2024, history of MRSA infection in the back requiring IV antibiotics presented to emergency department due to difficulty swallowing and concern for dehydration and malnutrition.  Patient was admitted and given IV fluids after which she improved.  She was given BMX solution along with liquid nystatin providing minimal relief and she was feeling that food was still getting stuck.  GI was consulted and recommended her XR esophagogram which showed moderate to severe motility disorder.  Patient was started on Levsin providing some relief and was also seen and evaluated by nutritionist who started patient on protein nutrition shakes providing additional calories.  Patient is feeling better.  Speech and swallow evaluated patient and modified barium swallow was performed as well.  It showed that patient is at aspiration with thin liquid and they modified the diet for patient and recommended regular diet with thin fluid consistency with small bites and sips and slow rate with smaller meals and reflux precaution.  Patient does not want to go to rehab based on PT OT recommendation would prefer to go home with home health.  She will follow-up with a nutritionist as outpatient.  Currently she is in stable condition for discharge and we are asking her to stop Diflucan and take nystatin swish and swallow for few more days.  Her vitamin B6 level is still pending and that can be followed up as outpatient with PCP.    44 minutes spent in discharge timing    Pertinent Physical Exam At Time of Discharge  General: Not in acute distress, alert  HEENT: PERRLA, head intact and normocephalic.  Significant chapping of the lips but has improved compared to admission  Neck: Normal to inspection  Lungs: Clear to auscultation, work of breathing within normal limit  Cardiac: Regular rate and rhythm  Abdomen: Soft nontender, positive bowel sounds  :  Exam deferred  Skin: Intact  Hematology: No petechia or excessive ecchymosis  Musculoskeletal: Without significant trauma  Neurological: Alert awake oriented, no focal deficit, cranial nerves grossly intact  Psych: No suicidal ideation or homicidal ideation    Outpatient Follow-Up  Future Appointments   Date Time Provider Department Center   8/2/2024  9:00 AM Justin Lu MD [id number] Dallas Leung MD

## 2024-07-24 NOTE — PROGRESS NOTES
"Subjective  Patient sitting up in room in NAD, s/p MBS with recommendation for regular solids with thin liquids.  Plan to start Levsin, reflux diet, aspiration precautions and follow-up with Dr. Noelle Nation for outpatient GI needs.    Objective  Blood pressure 134/66, pulse 83, temperature 36.1 °C (97 °F), temperature source Temporal, resp. rate 19, height 1.676 m (5' 6\"), weight 73.5 kg (162 lb 0.6 oz), SpO2 97%.    Physical Exam  Constitutional: Alert, pleasant and interactive, in NAD  Eyes: PERRL, sclera clear, no conjunctival injection  Skin: Warm and dry, no rash or ecchymosis  ENMT: Mucous membranes moist, no lesions noted  Resp: CTAB, even and unlabored  CV: RRR, normal S1, S2, no m,r,g  GI: +BS, soft, round, NT, no rebound tenderness or guarding, no palpable masses or organomegaly  Extremities: Extremities warm, no edema, contusions, wounds or cyanosis  Neuro: Alert and oriented x3  Psych: Appropriate mood and behavior    Medications  Scheduled medications  cefTRIAXone, 1 g, intravenous, q24h  enoxaparin, 40 mg, subcutaneous, q24h  fluticasone, 2 spray, Each Nostril, Daily  hyoscyamine, 0.125 mg, oral, TID  nystatin, 5 mL, Swish & Swallow, 4x daily      Continuous medications       PRN medications  PRN medications: acetaminophen **OR** acetaminophen **OR** acetaminophen, benzocaine-menthol, guaiFENesin, lidocaine-diphenhydraMINE-Maalox 1:1:1, ondansetron **OR** ondansetron, polyethylene glycol, promethazine **OR** promethazine     Labs  Lab Results   Component Value Date    WBC 5.3 07/24/2024    HGB 9.5 (L) 07/24/2024    HCT 32.0 (L) 07/24/2024    MCV 92 07/24/2024     07/24/2024     Lab Results   Component Value Date    GLUCOSE 89 07/24/2024    CALCIUM 8.5 (L) 07/24/2024     07/24/2024    K 3.3 (L) 07/24/2024    CO2 26 07/24/2024     07/24/2024    BUN 7 07/24/2024    CREATININE 0.72 07/24/2024     Lab Results   Component Value Date    ALT 4 (L) 07/23/2024    AST 12 07/23/2024    ALKPHOS 95 " 07/23/2024    BILITOT 0.5 07/23/2024     Lab Results   Component Value Date    IRON 29 (L) 07/21/2024    TIBC 163 (L) 07/21/2024     Lab Results   Component Value Date    INR 1.3 (H) 05/14/2024    INR 1.0 04/30/2024    PROTIME 14.5 (H) 05/14/2024    PROTIME 11.6 04/30/2024     Radiology  MBS 7/24/2024 noting:  IMPRESSION:  Aspiration with thin liquids.      Signed by: Joselito Abrams 7/24/2024 2:16   FINAL SPEECH RECOMMENDATIONS      DIET:  - Regular (IDDSI Level 7), as tolerated  - Thin liquids (IDDSI Level 0)  Per the results of today's MBSS, patient to continue baseline diet of  regular consistencies and thin liquids following swallow strategies  listed below:      Esophagram 7/23/2024 noting:  IMPRESSION:  1.  Limited exam.  2. Moderate to severe esophageal dysmotility.      Signed by: Joselito Abrams 7/23/2024 10:05 AM  Dictation workstation:   [id number]     Assessment:  Khalida Barker is a 74 y.o. female presenting with N/V x 4 days.  PMH ankylosing spondylitis, esophageal dysmotility, diverticulitis.  Follows with Dr. Noelle Nation for outpatient GI needs.  Had EGD 7/10 that noted candidiasis for which she is completing Diflucan as well as esophageal dysmotility.      Patient underwent MBS today with recommendations for regular with thin liquids.  Discussed with patient aspiration precautions and reflux diet.    # mod to severe esophageal dysmotility  # N/V- resolved  # candidiasis  # recent back surgery x2  # MRSA spine- ts with IV vancomycin     Plan:  - continue supportive care  - diet per MBS recs recs  - continue IVF's until able to tolerated oral fluids  - agree with nystatin S&S  - agree with initiating Levsin 0.125 oral tid  STRATEGIES:  - Small bites  - Small, single sips  - Alternate consistencies  - Upright for all PO intake  - Swallow 2-3 times per bite/sip  - Smaller, more frequent meals  - Reflux Precautions  - pt will continue to follow with Dr. YOMI Nation for outpatient GI needs     Plan has been  discussed with Dr. Velez. GI will sign off.      SHAMIKA Hathaway/CNP

## 2024-07-24 NOTE — PROGRESS NOTES
Physical Therapy    Physical Therapy    Physical Therapy Treatment    Patient Name: Khalida Barker  MRN: [id number]  Today's Date: 7/24/2024  Time Calculation  Start Time: 1206  Stop Time: 1240  Time Calculation (min): 34 min     3120/3120-A    Assessment/Plan   PT Assessment  PT Assessment Results: Decreased strength, Decreased endurance, Impaired balance, Decreased mobility  Rehab Prognosis: Good  End of Session Communication: Bedside nurse  End of Session Patient Position: Up in chair  PT Plan  Inpatient/Swing Bed or Outpatient: Inpatient  PT Plan  Treatment/Interventions: Bed mobility, Transfer training, Gait training, Balance training, Neuromuscular re-education, Strengthening, Endurance training, Range of motion, Therapeutic exercise, Therapeutic activity, Home exercise program, Stair training  PT Plan: Ongoing PT  PT Frequency: 5 times per week  PT Discharge Recommendations: High intensity level of continued care  Equipment Recommended upon Discharge: Platform attachment, Wheeled walker  PT Recommended Transfer Status: Assist x1  PT - OK to Discharge: Yes (     07/24/24 1206   PT  Visit   PT Received On 07/24/24   Response to Previous Treatment Patient with no complaints from previous session.   General   Reason for Referral impaired mobility   General Comment  present patient agreeable to PT session   Precautions   UE Weight Bearing Status Right Non-Weight Bearing   LE Weight Bearing Status Weight Bearing as Tolerated   Medical Precautions Fall precautions   Pain Assessment   Pain Assessment 0-10   0-10 (Numeric) Pain Score 4   Pain Type Chronic pain   Pain Location Foot  (Feet B)   Therapeutic Exercise   Therapeutic Exercise Performed Yes   Therapeutic Exercise Activity 1 B LE therex x 20 reps each  (Heel Raises, Toe Raises,LAQ, Marching, Hip Abduction, Encouraeged patient to perfor throughout the day)   Ambulation/Gait Training   Ambulation/Gait Training Performed Yes   Ambulation/Gait Training 1    Surface 1 Level tile   Device 1 Rolling walker  (With R platform)   Gait Support Devices Gait belt   Assistance 1 Minimum assistance   Quality of Gait 1 Diminished heel strike;Forward flexed posture;Decreased step length   Comments/Distance (ft) 1 60 x 2  (Patient dmeos steppage gait r LE secondary to foot drop SOB after gait training)   Transfers   Transfer Yes   Transfer 1   Technique 1 Sit to stand;Stand to sit   Transfer Device 1 Walker   Transfer Level of Assistance 1 Minimum assistance   Trials/Comments 1 x2   PT Assessment   PT Assessment Results Decreased strength;Decreased endurance;Impaired balance;Decreased mobility   Rehab Prognosis Good   End of Session Communication Bedside nurse   End of Session Patient Position Up in chair   Outpatient Education   Individual(s) Educated Patient   Education Provided Fall Risk   PT Plan   Inpatient/Swing Bed or Outpatient Inpatient     Outcome Measures:  [facility] Basic Mobility  Turning from your back to your side while in a flat bed without using bedrails: A little  Moving from lying on your back to sitting on the side of a flat bed without using bedrails: A little  Moving to and from bed to chair (including a wheelchair): A little  Standing up from a chair using your arms (e.g. wheelchair or bedside chair): None  To walk in hospital room: A little  Climbing 3-5 steps with railing: A lot  Basic Mobility - Total Score: 18                             EDUCATION:  Outpatient Education  Individual(s) Educated: Patient  Education Provided: Fall Risk  Education Documentation  No documentation found.  Education Comments  No comments found.        GOALS:  Encounter Problems       Encounter Problems (Active)       PT Problem       Pt will perform bed mobility with SBA.  (Progressing)       Start:  07/22/24    Expected End:  08/05/24            Pt will complete sit <> stand and bed <> chair transfers with SBA.  (Progressing)       Start:  07/22/24    Expected End:  08/05/24             Pt will ambulate 150 feet SBA using platform FWW with no significant gait deviations.   (Progressing)       Start:  07/22/24    Expected End:  08/05/24            Pt will progress to completing 3 x 20 LE supine/seated exercises in order to increase strength and improve gait mechanics.   (Progressing)       Start:  07/22/24    Expected End:  08/05/24            Pt will ascend/descend at least 3 stairs SBA in order to navigate home environment.   (Progressing)       Start:  07/22/24    Expected End:  08/05/24                   Pain - Adult

## 2024-07-24 NOTE — PROGRESS NOTES
"Occupational Therapy    OT Treatment    Patient Name: Khalida Barker  MRN: [id number]  Today's Date: 7/24/2024  Time Calculation  Start Time: 1443  Stop Time: 1507  Time Calculation (min): 24 min       3120/3120-A    Assessment:  End of Session Communication: Bedside nurse  End of Session Patient Position: Up in chair, Alarm on       Plan:  OT Frequency: 5 times per week  OT Discharge Recommendations: High intensity level of continued care     Subjective      07/24/24 1443   OT Last Visit   OT Received On 07/24/24   General   Past Medical History Relevant to Rehab To ED with difficulty swallowing. PMH: 5/24 T11-12 decompression with L2-S1 fusion, back I & D 6/5/24, right ORIF radius 6/11/24, ankulosing spondylitis, recent Candida esophagitis, CTS, peripheral neuropathy, right TKA, cervicalgia, anxiety   Caregiver Feedback spouse left at start of session   Prior to Session Communication Bedside nurse   Patient Position Received Up in chair;Alarm on   General Comment Pt agreeable to tx with min encouragement; cleared for participation.   Precautions   UE Weight Bearing Status Right Non-Weight Bearing   Medical Precautions Fall precautions   Post-Surgical Precautions Spinal precautions   Braces Applied R wrist  (not donned d/t IV in wrist - pt reports is for comfort as needed)   Precautions Comment NWB to right wrist/hand. Pt permitted to use platform walker. Right foot drop   Pain Assessment   0-10 (Numeric) Pain Score 5 - Moderate pain  (also reports back \"discomfort\" however reports feels better with standing)   Pain Location Wrist   Pain Orientation Right   Pain Interventions Repositioned;Distraction   Cognition   Overall Cognitive Status WFL   Grooming   Grooming Comments Pt declined as had already completed.   Toileting   Toileting Comments Encouraged toileting during tx versus pure wick however pt declined need at this time.   Transfer 1   Technique 1 Sit to stand;Stand to sit   Transfer Device 1 Platform walker "   Transfer Level of Assistance 1 Contact guard   Trials/Comments 1 STS functional transfers from recliner level x5 trials with CGA, cues for R UE placement to maintain precuations as pt attempts to push up from seated surface at times.   Therapeutic Activity   Therapeutic Activity Performed Yes   Therapeutic Activity 1 Pt engaged in static and dynmaic standing activities with B UE support on AD (platform walker) incorporating weight shifting, emphasis on breathing and upright stance with fair follow through. Pt demonstrated >1 minute standing intervals x5 trials with CGA throughout.   IP OT Assessment   End of Session Communication Bedside nurse   End of Session Patient Position Up in chair;Alarm on   Inpatient Plan   OT Frequency 5 times per week   OT Discharge Recommendations High intensity level of continued care     Outcome Measures:[facility] Daily Activity  Putting on and taking off regular lower body clothing: A lot  Bathing (including washing, rinsing, drying): A lot  Putting on and taking off regular upper body clothing: A little  Toileting, which includes using toilet, bedpan or urinal: A little  Taking care of personal grooming such as brushing teeth: A little  Eating Meals: None  Daily Activity - Total Score: 17  Education Documentation  No documentation found.  Education Comments  No comments found.            Goals:  Encounter Problems       Encounter Problems (Active)       OT Goals       OT Goal 1 (Progressing)       Start:  07/22/24    Expected End:  08/05/24       PATIENT WILL COMPLETE LOWER BODY DRESSING WITH supervision            OT Problem       PATIENT WILL MAINTAIN DYNAMIC STANDING BALANCE DURING walking and reaching (Progressing)       Start:  07/22/24    Expected End:  08/05/24            PATIENT WILL PERFORM CHAIR TO AND FROM BED TRANSFER WITH supervision (Progressing)       Start:  07/22/24    Expected End:  08/05/24            PATIENT WILL PERFORM COMMODE TRANSFER WITH supervision  (Progressing)       Start:  07/22/24    Expected End:  08/05/24            PATIENT WILL PERFROM BATHING ACTIVITIES STANDING WITH stand by assist (Progressing)       Start:  07/22/24    Expected End:  08/05/24            PATIENT WILL COMPLETE CLOTHING MANAGEMENT WITH TOILETTING supervision (Progressing)       Start:  07/22/24    Expected End:  08/05/24            PATIENT WILL COMPLETE TOILETTING HYGIENE WITH supervision (Progressing)       Start:  07/22/24    Expected End:  08/05/24

## 2024-07-24 NOTE — HOSPITAL COURSE
Khalida Barker is a 74 y.o. female with past medical history of ankylosing spondylitis, diverticulosis, spinal stenosis, recent Candida esophagitis diagnosis on July 10, 2024, history of MRSA infection in the back requiring IV antibiotics presented to emergency department due to difficulty swallowing and concern for dehydration and malnutrition.  Patient was admitted and given IV fluids after which she improved.  She was given BMX solution along with liquid nystatin providing minimal relief and she was feeling that food was still getting stuck.  GI was consulted and recommended her XR esophagogram which showed moderate to severe motility disorder.  Patient was started on Levsin providing some relief and was also seen and evaluated by nutritionist who started patient on protein nutrition shakes providing additional calories.  Patient is feeling better.  Speech and swallow evaluated patient and modified barium swallow was performed as well.  It showed that patient is at aspiration with thin liquid and they modified the diet for patient and recommended regular diet with thin fluid consistency with small bites and sips and slow rate with smaller meals and reflux precaution.  Patient does not want to go to rehab based on PT OT recommendation would prefer to go home with home health.  She will follow-up with a nutritionist as outpatient.  Currently she is in stable condition for discharge and we are asking her to stop Diflucan and take nystatin swish and swallow for few more days.  Her vitamin B6 level is still pending and that can be followed up as outpatient with PCP.    44 minutes spent in discharge timing

## 2024-07-24 NOTE — CARE PLAN
The patient's goals for the shift include      The clinical goals for the shift include pt will remain free from fall/injury    Over the shift, the patient did not make progress toward the following goals. Barriers to progression include . Recommendations to address these barriers include   Problem: Pain - Adult  Goal: Verbalizes/displays adequate comfort level or baseline comfort level  Outcome: Progressing     Problem: Safety - Adult  Goal: Free from fall injury  Outcome: Progressing     Problem: Discharge Planning  Goal: Discharge to home or other facility with appropriate resources  Outcome: Progressing     Problem: Chronic Conditions and Co-morbidities  Goal: Patient's chronic conditions and co-morbidity symptoms are monitored and maintained or improved  Outcome: Progressing     Problem: Skin  Goal: Decreased wound size/increased tissue granulation at next dressing change  Outcome: Progressing   .

## 2024-07-25 VITALS
DIASTOLIC BLOOD PRESSURE: 75 MMHG | RESPIRATION RATE: 18 BRPM | HEART RATE: 81 BPM | TEMPERATURE: 97 F | BODY MASS INDEX: 26.04 KG/M2 | WEIGHT: 162.04 LBS | HEIGHT: 66 IN | OXYGEN SATURATION: 98 % | SYSTOLIC BLOOD PRESSURE: 156 MMHG

## 2024-07-25 LAB
ANION GAP SERPL CALC-SCNC: 17 MMOL/L (ref 10–20)
BASOPHILS # BLD AUTO: 0.04 X10*3/UL (ref 0–0.1)
BASOPHILS NFR BLD AUTO: 0.7 %
BUN SERPL-MCNC: 8 MG/DL (ref 6–23)
CALCIUM SERPL-MCNC: 9.1 MG/DL (ref 8.6–10.3)
CHLORIDE SERPL-SCNC: 105 MMOL/L (ref 98–107)
CO2 SERPL-SCNC: 22 MMOL/L (ref 21–32)
CREAT SERPL-MCNC: 0.69 MG/DL (ref 0.5–1.05)
EGFRCR SERPLBLD CKD-EPI 2021: >90 ML/MIN/1.73M*2
EOSINOPHIL # BLD AUTO: 0.1 X10*3/UL (ref 0–0.4)
EOSINOPHIL NFR BLD AUTO: 1.8 %
ERYTHROCYTE [DISTWIDTH] IN BLOOD BY AUTOMATED COUNT: 15.7 % (ref 11.5–14.5)
GLUCOSE SERPL-MCNC: 104 MG/DL (ref 74–99)
HCT VFR BLD AUTO: 35.8 % (ref 36–46)
HGB BLD-MCNC: 11 G/DL (ref 12–16)
IMM GRANULOCYTES # BLD AUTO: 0.02 X10*3/UL (ref 0–0.5)
IMM GRANULOCYTES NFR BLD AUTO: 0.4 % (ref 0–0.9)
LYMPHOCYTES # BLD AUTO: 2.12 X10*3/UL (ref 0.8–3)
LYMPHOCYTES NFR BLD AUTO: 38.4 %
MAGNESIUM SERPL-MCNC: 1.87 MG/DL (ref 1.6–2.4)
MCH RBC QN AUTO: 27.8 PG (ref 26–34)
MCHC RBC AUTO-ENTMCNC: 30.7 G/DL (ref 32–36)
MCV RBC AUTO: 90 FL (ref 80–100)
MONOCYTES # BLD AUTO: 0.46 X10*3/UL (ref 0.05–0.8)
MONOCYTES NFR BLD AUTO: 8.3 %
NEUTROPHILS # BLD AUTO: 2.78 X10*3/UL (ref 1.6–5.5)
NEUTROPHILS NFR BLD AUTO: 50.4 %
NRBC BLD-RTO: 0 /100 WBCS (ref 0–0)
PLATELET # BLD AUTO: 311 X10*3/UL (ref 150–450)
POTASSIUM SERPL-SCNC: 3.6 MMOL/L (ref 3.5–5.3)
RBC # BLD AUTO: 3.96 X10*6/UL (ref 4–5.2)
SODIUM SERPL-SCNC: 140 MMOL/L (ref 136–145)
WBC # BLD AUTO: 5.5 X10*3/UL (ref 4.4–11.3)

## 2024-07-25 PROCEDURE — 99239 HOSP IP/OBS DSCHRG MGMT >30: CPT | Performed by: INTERNAL MEDICINE

## 2024-07-25 PROCEDURE — 36415 COLL VENOUS BLD VENIPUNCTURE: CPT | Performed by: INTERNAL MEDICINE

## 2024-07-25 PROCEDURE — 85025 COMPLETE CBC W/AUTO DIFF WBC: CPT | Performed by: INTERNAL MEDICINE

## 2024-07-25 PROCEDURE — 80048 BASIC METABOLIC PNL TOTAL CA: CPT | Performed by: INTERNAL MEDICINE

## 2024-07-25 PROCEDURE — 83735 ASSAY OF MAGNESIUM: CPT | Performed by: INTERNAL MEDICINE

## 2024-07-25 PROCEDURE — 2500000005 HC RX 250 GENERAL PHARMACY W/O HCPCS: Performed by: INTERNAL MEDICINE

## 2024-07-25 PROCEDURE — 2500000001 HC RX 250 WO HCPCS SELF ADMINISTERED DRUGS (ALT 637 FOR MEDICARE OP): Performed by: INTERNAL MEDICINE

## 2024-07-25 RX ORDER — HYDROXYZINE HYDROCHLORIDE 10 MG/1
10 TABLET, FILM COATED ORAL EVERY 8 HOURS PRN
Qty: 21 TABLET | Refills: 0 | Status: SHIPPED | OUTPATIENT
Start: 2024-07-25 | End: 2024-08-01

## 2024-07-25 RX ADMIN — FLUTICASONE PROPIONATE 2 SPRAY: 50 SPRAY, METERED NASAL at 08:44

## 2024-07-25 RX ADMIN — HYOSCYAMINE SULFATE 0.12 MG: 0.12 TABLET SUBLINGUAL at 08:44

## 2024-07-25 RX ADMIN — NYSTATIN 500000 UNITS: 100000 SUSPENSION ORAL at 06:00

## 2024-07-25 ASSESSMENT — COGNITIVE AND FUNCTIONAL STATUS - GENERAL
TURNING FROM BACK TO SIDE WHILE IN FLAT BAD: A LITTLE
WALKING IN HOSPITAL ROOM: A LITTLE
CLIMB 3 TO 5 STEPS WITH RAILING: A LITTLE
DRESSING REGULAR LOWER BODY CLOTHING: A LOT
PERSONAL GROOMING: A LITTLE
MOVING TO AND FROM BED TO CHAIR: A LITTLE
TOILETING: A LITTLE
HELP NEEDED FOR BATHING: A LOT
MOBILITY SCORE: 19
DRESSING REGULAR UPPER BODY CLOTHING: A LITTLE
DAILY ACTIVITIY SCORE: 17
MOVING FROM LYING ON BACK TO SITTING ON SIDE OF FLAT BED WITH BEDRAILS: A LITTLE

## 2024-07-25 ASSESSMENT — PAIN - FUNCTIONAL ASSESSMENT: PAIN_FUNCTIONAL_ASSESSMENT: 0-10

## 2024-07-25 ASSESSMENT — PAIN SCALES - GENERAL: PAINLEVEL_OUTOF10: 4

## 2024-07-25 NOTE — CARE PLAN
The patient's goals for the shift include    Problem: Pain - Adult  Goal: Verbalizes/displays adequate comfort level or baseline comfort level  Outcome: Progressing     Problem: Safety - Adult  Goal: Free from fall injury  Outcome: Progressing     Problem: Discharge Planning  Goal: Discharge to home or other facility with appropriate resources  Outcome: Progressing     Problem: Chronic Conditions and Co-morbidities  Goal: Patient's chronic conditions and co-morbidity symptoms are monitored and maintained or improved  Outcome: Progressing     Problem: Skin  Goal: Decreased wound size/increased tissue granulation at next dressing change  Outcome: Progressing  Goal: Participates in plan/prevention/treatment measures  Outcome: Progressing  Goal: Prevent/manage excess moisture  Outcome: Progressing  Goal: Prevent/minimize sheer/friction injuries  Outcome: Progressing  Goal: Promote/optimize nutrition  Outcome: Progressing  Goal: Promote skin healing  Outcome: Progressing     Problem: Pain  Goal: Takes deep breaths with improved pain control throughout the shift  Outcome: Progressing  Goal: Turns in bed with improved pain control throughout the shift  Outcome: Progressing  Goal: Walks with improved pain control throughout the shift  Outcome: Progressing  Goal: Performs ADL's with improved pain control throughout shift  Outcome: Progressing  Goal: Participates in PT with improved pain control throughout the shift  Outcome: Progressing  Goal: Free from opioid side effects throughout the shift  Outcome: Progressing  Goal: Free from acute confusion related to pain meds throughout the shift  Outcome: Progressing     Problem: Fall/Injury  Goal: Not fall by end of shift  Outcome: Progressing  Goal: Be free from injury by end of the shift  Outcome: Progressing  Goal: Verbalize understanding of personal risk factors for fall in the hospital  Outcome: Progressing  Goal: Verbalize understanding of risk factor reduction measures to  prevent injury from fall in the home  Outcome: Progressing  Goal: Use assistive devices by end of the shift  Outcome: Progressing  Goal: Pace activities to prevent fatigue by end of the shift  Outcome: Progressing       The clinical goals for the shift include remain free of injury this shift

## 2024-07-25 NOTE — PROGRESS NOTES
07/25/24 0935   Discharge Planning   Type of Home Care Services Home OT;Home PT   Expected Discharge Disposition Home   Patient Choice   Patient / Family choosing to utilize agency / facility established prior to hospitalization Yes     Patient is medically ready for discharge. Patient's preference is to continue with [facility]. Final home care orders and updates sent to the agency for a resumption of care. Met with patient and confirmed that home care has been reordered. She denies any questions or concerns.     07/29/24 1330: Patient contacted me regarding her [facility] Home care referral, she stated non one has contacted her regarding her home care. I called the agency and they confirmed she was seen by the Nurse on 07/26, and therapy will be calling to see the patient. I returned the call to the patient and notified her the agency will be calling to schedule her home therapy.

## 2024-07-26 ENCOUNTER — PATIENT OUTREACH (OUTPATIENT)
Dept: PRIMARY CARE | Facility: CLINIC | Age: 74
End: 2024-07-26
Payer: MEDICARE

## 2024-07-26 NOTE — PROGRESS NOTES
Discharge Facility:  [facility]    Discharge Diagnosis:  Dysphagia   Dizziness and Nausea     Admission Date:7/21/24  Discharge Date: 7/25/24    PCP Appointment Date:TBD-I am unable to make an appt due to no openings . Message sent to office staff requesting assistance. As well as encourged patient to call today to schedule.     Specialist Appointment Date:   TBD- GI follow up-More Nation MD   [facility] Yes  See discharge assessment below for further details  Medications  Medications reviewed with patient/caregiver?: Yes (7/26/2024  9:23 AM)  Is the patient having any side effects they believe may be caused by any medication additions or changes?: No (7/26/2024  9:23 AM)  Does the patient have all medications ordered at discharge?: Yes (7/26/2024  9:23 AM)  Care Management Interventions: Provided patient education (7/26/2024  9:23 AM)  Prescription Comments: START taking:  hydrOXYzine HCL (Atarax)   hyoscyamine (Anaspaz)   nystatin (Mycostatin)   ondansetron (Zofran)    STOP taking:  fluconazole 200 mg tablet (Diflucan)   HYDROcodone-acetaminophen 5-325 mg  tablet (Norco)   polyethylene glycol 17 gram packet (Glycolax, Miralax)   sennosides-docusate sodium 8.6-50 mg tablet (PeriColace)   vancomycin IVPB (Vancocin) (7/26/2024  9:23 AM)  Is the patient taking all medications as directed (includes completed medication regime)?: Yes (7/26/2024  9:23 AM)  Care Management Interventions: Provided patient education (7/26/2024  9:23 AM)  Medication Comments: CM discussed referencing discharge paperwork to follow detailed daily medication schedule. Reminded to bring in all medications. (Old & New) to future appointments. (7/26/2024  9:23 AM)    Appointments  Does the patient have a primary care provider?: Yes (7/26/2024  9:23 AM)  Care Management Interventions: Educated patient on importance of making appointment; Advised  patient to make appointment (No openings for CM to schedule- messaged clerical staff at PCP office to reach out to pt to schedule hospital follow up) (7/26/2024  9:23 AM)  Has the patient kept scheduled appointments due by today?: Yes (7/26/2024  9:23 AM)  Care Management Interventions: Advised patient to keep appointment; Educated on importance of keeping appointment; Advised to schedule with specialist (Reviewed up coming Surgery follow up - GI follow up needs scheduled) (7/26/2024  9:23 AM)    Self Management  What is the home health agency?: Patient's preference is to continue with [facility] - CM in hospital noted that she contacted [facility] w/ orders. (7/26/2024  9:23 AM)  Has home health visited the patient within 72 hours of discharge?: No (Pt will call if she does not hear anything by this afternoon- has contact info from already using the service before admission) (7/26/2024  9:23 AM)  What Durable Medical Equipment (DME) was ordered?: n/a (7/26/2024  9:23 AM)    Patient Teaching  Does the patient have access to their discharge instructions?: Yes (7/26/2024  9:23 AM)  Care Management Interventions: Reviewed instructions with patient; Educated on MyChart (Active on MyChart) (7/26/2024  9:23 AM)  What is the patient's perception of their health status since discharge?: Same (7/26/2024  9:23 AM)  Is the patient/caregiver able to teach back the hierarchy of who to call/visit for symptoms/problems? PCP, Specialist, Home Health nurse, Urgent Care, ED, 911: Yes (7/26/2024  9:23 AM)  Patient/Caregiver Education Comments: Successful transition of care outreach with patient. CM introduced myself and the TCM program to Khalida Barker. Reviewed hospital stay and answered any questions. Patient denies any further discharge questions/needs at this time. CM gave my contact information and encouraged to call if needing assistance or has any further non-emergent questions prior to my next outreach. (7/26/2024  9:23  AM)

## 2024-08-01 ENCOUNTER — APPOINTMENT (OUTPATIENT)
Dept: PRIMARY CARE | Facility: CLINIC | Age: 74
End: 2024-08-01
Payer: MEDICARE

## 2024-08-02 ENCOUNTER — HOSPITAL ENCOUNTER (OUTPATIENT)
Dept: RADIOLOGY | Facility: HOSPITAL | Age: 74
Discharge: HOME | End: 2024-08-02
Payer: MEDICARE

## 2024-08-02 ENCOUNTER — APPOINTMENT (OUTPATIENT)
Dept: NEUROSURGERY | Facility: CLINIC | Age: 74
End: 2024-08-02
Payer: MEDICARE

## 2024-08-02 VITALS
HEART RATE: 92 BPM | WEIGHT: 160 LBS | SYSTOLIC BLOOD PRESSURE: 118 MMHG | HEIGHT: 65 IN | BODY MASS INDEX: 26.66 KG/M2 | DIASTOLIC BLOOD PRESSURE: 84 MMHG

## 2024-08-02 DIAGNOSIS — M41.35 THORACOGENIC SCOLIOSIS OF THORACOLUMBAR REGION: Primary | ICD-10-CM

## 2024-08-02 DIAGNOSIS — M41.35 THORACOGENIC SCOLIOSIS OF THORACOLUMBAR REGION: ICD-10-CM

## 2024-08-02 PROCEDURE — 3008F BODY MASS INDEX DOCD: CPT | Performed by: STUDENT IN AN ORGANIZED HEALTH CARE EDUCATION/TRAINING PROGRAM

## 2024-08-02 PROCEDURE — 72070 X-RAY EXAM THORAC SPINE 2VWS: CPT

## 2024-08-02 PROCEDURE — 1159F MED LIST DOCD IN RCRD: CPT | Performed by: STUDENT IN AN ORGANIZED HEALTH CARE EDUCATION/TRAINING PROGRAM

## 2024-08-02 PROCEDURE — 1111F DSCHRG MED/CURRENT MED MERGE: CPT | Performed by: STUDENT IN AN ORGANIZED HEALTH CARE EDUCATION/TRAINING PROGRAM

## 2024-08-02 PROCEDURE — 99024 POSTOP FOLLOW-UP VISIT: CPT | Performed by: STUDENT IN AN ORGANIZED HEALTH CARE EDUCATION/TRAINING PROGRAM

## 2024-08-02 PROCEDURE — 1125F AMNT PAIN NOTED PAIN PRSNT: CPT | Performed by: STUDENT IN AN ORGANIZED HEALTH CARE EDUCATION/TRAINING PROGRAM

## 2024-08-02 PROCEDURE — 72100 X-RAY EXAM L-S SPINE 2/3 VWS: CPT

## 2024-08-02 PROCEDURE — 1036F TOBACCO NON-USER: CPT | Performed by: STUDENT IN AN ORGANIZED HEALTH CARE EDUCATION/TRAINING PROGRAM

## 2024-08-02 RX ORDER — TRAMADOL HYDROCHLORIDE 50 MG/1
50 TABLET ORAL EVERY 6 HOURS PRN
Qty: 28 TABLET | Refills: 0 | Status: SHIPPED | OUTPATIENT
Start: 2024-08-02 | End: 2024-08-03 | Stop reason: SDUPTHER

## 2024-08-02 ASSESSMENT — PATIENT HEALTH QUESTIONNAIRE - PHQ9
2. FEELING DOWN, DEPRESSED OR HOPELESS: NOT AT ALL
1. LITTLE INTEREST OR PLEASURE IN DOING THINGS: NOT AT ALL
SUM OF ALL RESPONSES TO PHQ9 QUESTIONS 1 & 2: 0

## 2024-08-02 ASSESSMENT — PAIN SCALES - GENERAL: PAINLEVEL: 7

## 2024-08-03 ENCOUNTER — TELEPHONE (OUTPATIENT)
Dept: NEUROSURGERY | Facility: HOSPITAL | Age: 74
End: 2024-08-03
Payer: MEDICARE

## 2024-08-03 DIAGNOSIS — M41.35 THORACOGENIC SCOLIOSIS OF THORACOLUMBAR REGION: Primary | ICD-10-CM

## 2024-08-03 LAB
ATRIAL RATE: 83 BPM
P AXIS: 53 DEGREES
P OFFSET: 193 MS
P ONSET: 154 MS
PR INTERVAL: 126 MS
Q ONSET: 217 MS
QRS COUNT: 14 BEATS
QRS DURATION: 86 MS
QT INTERVAL: 438 MS
QTC CALCULATION(BAZETT): 514 MS
QTC FREDERICIA: 488 MS
R AXIS: 13 DEGREES
T AXIS: 69 DEGREES
T OFFSET: 436 MS
VENTRICULAR RATE: 83 BPM

## 2024-08-03 RX ORDER — TRAMADOL HYDROCHLORIDE 50 MG/1
50 TABLET ORAL EVERY 6 HOURS PRN
Qty: 28 TABLET | Refills: 0 | Status: SHIPPED | OUTPATIENT
Start: 2024-08-03 | End: 2024-08-10

## 2024-08-03 NOTE — TELEPHONE ENCOUNTER
I have personally reviewed the OARRS report. This report is scanned into the electronic medical record. I have considered the risks of abuse, dependence, addiction and diversion.     Patient with recent history of major reconstructive surgery necessitating narcotic medications at higher doses during the post-operative period of 6 weeks.     Will continue with close monitoring and short course refills.     The above clinical summary has been dictated with voice recognition software. It has not been proofread for grammatical errors, typographical mistakes, or other semantic inconsistencies.    Thank you for visiting our office today. It was our pleasure to take part in your healthcare.     Do not hesitate to call with any questions regarding your plan of care after leaving at [phone] M-F 8am-4pm.     To clinicians, thank you very much for this kind referral. It is a privilege to partner with you in the care of your patients. My office would be delighted to assist you with any further consultations or with questions regarding the plan of care outlined. Do not hesitate to call the office or contact me directly.       Sincerely,      Justin Lu MD  Director, [facility] [place]   of Neurosurgery, [facility]  Complex Spine Fellowship Director  , Department of Neurological Surgery  [employer]    [facility]  [address] [address]  [address]  [address] [place] [address]  [address]  [place] [address]    Phone: [phone]  Fax: [phone]

## 2024-08-05 ENCOUNTER — APPOINTMENT (OUTPATIENT)
Dept: PRIMARY CARE | Facility: CLINIC | Age: 74
End: 2024-08-05
Payer: MEDICARE

## 2024-08-05 VITALS — SYSTOLIC BLOOD PRESSURE: 127 MMHG | DIASTOLIC BLOOD PRESSURE: 75 MMHG | HEART RATE: 93 BPM | TEMPERATURE: 97.5 F

## 2024-08-05 DIAGNOSIS — E66.3 OVERWEIGHT WITH BODY MASS INDEX (BMI) OF 26 TO 26.9 IN ADULT: ICD-10-CM

## 2024-08-05 DIAGNOSIS — Z78.9 NEVER SMOKED CIGARETTES: ICD-10-CM

## 2024-08-05 DIAGNOSIS — R13.10 DYSPHAGIA, UNSPECIFIED TYPE: ICD-10-CM

## 2024-08-05 PROCEDURE — 1160F RVW MEDS BY RX/DR IN RCRD: CPT | Performed by: FAMILY MEDICINE

## 2024-08-05 PROCEDURE — 1123F ACP DISCUSS/DSCN MKR DOCD: CPT | Performed by: FAMILY MEDICINE

## 2024-08-05 PROCEDURE — 1111F DSCHRG MED/CURRENT MED MERGE: CPT | Performed by: FAMILY MEDICINE

## 2024-08-05 PROCEDURE — 1158F ADVNC CARE PLAN TLK DOCD: CPT | Performed by: FAMILY MEDICINE

## 2024-08-05 PROCEDURE — 1036F TOBACCO NON-USER: CPT | Performed by: FAMILY MEDICINE

## 2024-08-05 PROCEDURE — 1159F MED LIST DOCD IN RCRD: CPT | Performed by: FAMILY MEDICINE

## 2024-08-05 PROCEDURE — 99495 TRANSJ CARE MGMT MOD F2F 14D: CPT | Performed by: FAMILY MEDICINE

## 2024-08-05 ASSESSMENT — PATIENT HEALTH QUESTIONNAIRE - PHQ9
SUM OF ALL RESPONSES TO PHQ9 QUESTIONS 1 AND 2: 0
2. FEELING DOWN, DEPRESSED OR HOPELESS: NOT AT ALL
1. LITTLE INTEREST OR PLEASURE IN DOING THINGS: NOT AT ALL
1. LITTLE INTEREST OR PLEASURE IN DOING THINGS: NOT AT ALL
2. FEELING DOWN, DEPRESSED OR HOPELESS: NOT AT ALL
SUM OF ALL RESPONSES TO PHQ9 QUESTIONS 1 AND 2: 0

## 2024-08-05 NOTE — PROGRESS NOTES
Javier Gaxiola is here accompanied by her  for posthospital follow-up.  She has had multiple issues since spinal surgery in May 2024.  This was complicated by a MRSA wound infection requiring several weeks of IV antibiotics.  She then developed candidiasis of her throat and esophagus resulting in significant dysphagia for which she was admitted to [facility] on July 21, 2020 for with symptoms of nausea and vomiting.  She was at that point dehydrated as well.  She was seen by gastroenterology and esophagogram did not show a moderate to severe motility disorder.  She was also seen by speech therapy and had swallowing therapy.  Initially treated with Diflucan and then switched to nystatin swish and swallow.  At this point her swallowing is improving although she still has difficulty with certain types of foods.  She had seen a dietitian in the hospital as well.  She is also on hyoscyamine.  She is scheduled to follow-up with gastroenterology this week.  She continues on her other meds as noted.    Patient ID: Khalida Barker is a 74 y.o. 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     OA (osteoarthritis) of knee     Painful scar 09/20/2017    Peripheral neuropathy     Scoliosis     Spinal stenosis     Traumatic ecchymosis of foot 01/19/2013    Trigger finger of right hand     Vision loss     wears glasses    Vitamin D deficiency       Past Surgical History:   Procedure Laterality Date    ADENOIDECTOMY  1956    BREAST BIOPSY  1999    COLONOSCOPY      KNEE ARTHROPLASTY Right     MOUTH SURGERY  2016    Back left molar removed    MOUTH SURGERY  09/28/2021     right molar    OTHER SURGICAL HISTORY      Skin lesion excision    SPINAL FUSION  06/2010    C4-5    TONSILLECTOMY  1956    WISDOM TOOTH EXTRACTION  1978    lower      Family History   Problem Relation Name Age of Onset    Alzheimer's disease Mother      Heart disease Father      Polymyositis Father      Hypertension Brother      Skin cancer Brother      Other (peripheral artery disease) Brother        Social History     Socioeconomic History    Marital status:      Spouse name: Not on file    Number of children: Not on file    Years of education: Not on file    Highest education level: Not on file   Occupational History    Not on file   Tobacco Use    Smoking status: Never    Smokeless tobacco: Never   Vaping Use    Vaping status: Never Used   Substance and Sexual Activity    Alcohol use: Not Currently     Comment: rarely    Drug use: Never    Sexual activity: Defer   Other Topics Concern    Not on file   Social History Narrative    Not on file     Social Determinants of Health     Financial Resource Strain: Low Risk  (7/21/2024)    Overall Financial Resource Strain (CARDIA)     Difficulty of Paying Living Expenses: Not very hard   Food Insecurity: Patient Declined (6/18/2024)    Received from [facility]    Hunger Vital Sign     Worried About Running Out of Food in the Last Year: Patient declined     Ran Out of Food in the Last Year: Patient declined   Transportation Needs: No Transportation Needs (7/22/2024)    PRAPARE - Transportation     Lack of Transportation (Medical): No     Lack of Transportation (Non-Medical): No   Physical Activity: Not on file   Stress: No Stress Concern Present (7/1/2024)    Received from [facility]    [employer] [employer] - Occupational Stress Questionnaire     Feeling of Stress : Not at all   Social Connections: Patient Declined (6/18/2024)    Received from [facility]    Social Connection and  Isolation Panel [NHANES]     Frequency of Communication with Friends and Family: Patient declined     Frequency of Social Gatherings with Friends and Family: Patient declined     Attends Anabaptism Services: Patient declined     Active Member of Clubs or Organizations: Patient declined     Attends Club or Organization Meetings: Patient declined     Marital Status: Patient declined   Intimate Partner Violence: Not At Risk (6/15/2024)    [facility]    Domestic Abuse Assessment     Do you feel safe in your relationships at home?: Yes     Physical Abuse: Denies     [facility] Domestic Abuse - Type of Abuse: Not on file     [facility] Domestic Abuse - Time Frame: Not on file     [facility] Domestic Abuse - Signs and Symptoms: Not on file     Verbal Abuse: Denies     [facility] Domestic Abuse - Reported To: Not on file   Housing Stability: Low Risk  (7/22/2024)    Housing Stability Vital Sign     Unable to Pay for Housing in the Last Year: No     Number of Times Moved in the Last Year: 1     Homeless in the Last Year: No      Oxycodone and Tramadol   Current Outpatient Medications   Medication Sig Dispense Refill    acetaminophen (Tylenol) 500 mg tablet Take 1 tablet (500 mg) by mouth every 8 hours if needed for mild pain (1 - 3) or moderate pain (4 - 6) for up to 30 doses. 30 tablet 0    hyoscyamine (Anaspaz) 0.125 mg disintegrating tablet Take 1 tablet (0.125 mg) by mouth 3 times a day. 90 tablet 0    traMADol (Ultram) 50 mg tablet Take 1 tablet (50 mg) by mouth every 6 hours if needed for severe pain (7 - 10) (pain) for up to 7 days. 28 tablet 0    docusate sodium (Colace) 100 mg capsule Take 1 capsule (100 mg) by mouth once daily.      hydrOXYzine HCL (Atarax) 10 mg tablet Take 1 tablet (10 mg) by mouth every 8 hours if needed for anxiety for up to 7 days. (Patient not taking: Reported on 8/5/2024) 21 tablet 0    NON FORMULARY Take 1 each by mouth once daily. Viveran 200mg- pure caffience      ondansetron  (Zofran) 4 mg tablet Take 1 tablet (4 mg) by mouth every 8 hours if needed for nausea. (Patient not taking: Reported on 8/2/2024) 20 tablet 0     No current facility-administered medications for this visit.       Immunization History   Administered Date(s) Administered    Flu vaccine, quadrivalent, high-dose, preservative free, age 65y+ (FLUZONE) 12/07/2020    Influenza, High Dose Seasonal, Preservative Free 11/16/2017, 10/11/2022    Influenza, Seasonal, Quadrivalent, Adjuvanted 10/20/2023    Influenza, Unspecified 10/24/2019, 11/08/2021, 10/11/2022    Influenza, seasonal, injectable 11/04/2016    Moderna COVID-19 vaccine, Fall 2023, 12 yeasrs and older (50mcg/0.5mL) 10/06/2023    Moderna COVID-19 vaccine, bivalent, blue cap/gray label *Check age/dose* 09/08/2022    Moderna SARS-CoV-2 Vaccination 02/11/2021, 03/11/2021, 11/02/2021, 04/14/2022    Pneumococcal Conjugate PCV 7 09/24/2018    Pneumococcal polysaccharide vaccine, 23-valent, age 2 years and older (PNEUMOVAX 23) 10/24/2019    Zoster vaccine, recombinant, adult (SHINGRIX) 09/23/2021, 12/10/2021        Review of Systems   Constitutional:  Positive for fatigue.   HENT: Negative.     Eyes: Negative.    Respiratory: Negative.     Cardiovascular: Negative.    Gastrointestinal:         Dysphagia   Endocrine: Negative.    Genitourinary: Negative.    Musculoskeletal: Negative.    Skin: Negative.    Allergic/Immunologic: Negative.    Neurological: Negative.    Hematological: Negative.    Psychiatric/Behavioral: Negative.     All other systems reviewed and are negative.       Vitals:    08/05/24 1527   BP: 127/75   Pulse: 93   Temp: 36.4 °C (97.5 °F)     Vitals:      Physical Exam  Constitutional:       General: She is not in acute distress.     Appearance: Normal appearance.   Neurological:      General: No focal deficit present.      Mental Status: She is alert and oriented to person, place, and time. Mental status is at baseline.          ASSESSMENT/PLAN: Dysphagia  secondary to esophageal motility disorder.  Esophageal candidiasis.  Patient is to follow-up closely with gastroenterology for further evaluation of this.  Continue current meds as noted.  We discussed the need to follow the nutritional advice that she was given in the hospital including the use of supplemental nutritional supplements.    Generalized weakness gradually improving.  Gradually increase activity but rest as needed.  Home physical therapy has been ordered.    Right radial fracture secondary to falling episode improved    Continue current medications noted.  The patient her  and I spent at least 30 to 40 minutes today discussing the above-mentioned problems.  Follow-up in 1 month and call as needed     Scribe Attestation  By signing my name below, I, Patricia Martinez LPN, Scribe   attest that this documentation has been prepared under the direction and in the presence of Rocco Rushing MD.

## 2024-08-07 ENCOUNTER — PATIENT OUTREACH (OUTPATIENT)
Dept: PRIMARY CARE | Facility: CLINIC | Age: 74
End: 2024-08-07
Payer: MEDICARE

## 2024-08-07 NOTE — PROGRESS NOTES
Unable to reach patient for call back after patient's follow up appointment with PCP on 8/5/24.   LVM with call back number for patient to call if needed to assist with any questions or concerns patient may have.

## 2024-08-08 ASSESSMENT — ENCOUNTER SYMPTOMS
RESPIRATORY NEGATIVE: 1
HEMATOLOGIC/LYMPHATIC NEGATIVE: 1
PSYCHIATRIC NEGATIVE: 1
NEUROLOGICAL NEGATIVE: 1
CARDIOVASCULAR NEGATIVE: 1
EYES NEGATIVE: 1
ENDOCRINE NEGATIVE: 1
FATIGUE: 1
ALLERGIC/IMMUNOLOGIC NEGATIVE: 1
MUSCULOSKELETAL NEGATIVE: 1
ROS GI COMMENTS: DYSPHAGIA

## 2024-08-15 ENCOUNTER — HOSPITAL ENCOUNTER (OUTPATIENT)
Dept: RADIOLOGY | Facility: CLINIC | Age: 74
Discharge: HOME | End: 2024-08-15
Payer: MEDICARE

## 2024-08-15 DIAGNOSIS — M41.35 THORACOGENIC SCOLIOSIS OF THORACOLUMBAR REGION: ICD-10-CM

## 2024-08-15 PROCEDURE — 72131 CT LUMBAR SPINE W/O DYE: CPT | Mod: RSC

## 2024-08-15 PROCEDURE — 72128 CT CHEST SPINE W/O DYE: CPT

## 2024-08-16 DIAGNOSIS — M48.061 SPINAL STENOSIS OF LUMBAR REGION, UNSPECIFIED WHETHER NEUROGENIC CLAUDICATION PRESENT: Primary | ICD-10-CM

## 2024-08-16 DIAGNOSIS — M41.35 THORACOGENIC SCOLIOSIS OF THORACOLUMBAR REGION: ICD-10-CM

## 2024-08-16 DIAGNOSIS — M41.35 THORACOGENIC SCOLIOSIS OF THORACOLUMBAR REGION: Primary | ICD-10-CM

## 2024-08-16 NOTE — PROGRESS NOTES
T9 compression fracture above construct. There is some osseous changes through the disc space, possibly healing already. Would like to treat in a brace. I am not concerned for infection at this time. This appears to be segmental kyphosis above the fusion.

## 2024-08-20 ENCOUNTER — APPOINTMENT (OUTPATIENT)
Dept: RADIOLOGY | Facility: CLINIC | Age: 74
End: 2024-08-20
Payer: MEDICARE

## 2024-08-26 ENCOUNTER — LAB (OUTPATIENT)
Dept: LAB | Facility: LAB | Age: 74
End: 2024-08-26
Payer: MEDICARE

## 2024-08-26 ENCOUNTER — OFFICE VISIT (OUTPATIENT)
Facility: CLINIC | Age: 74
End: 2024-08-26
Payer: MEDICARE

## 2024-08-26 VITALS
WEIGHT: 160 LBS | TEMPERATURE: 97.3 F | HEART RATE: 90 BPM | BODY MASS INDEX: 26.66 KG/M2 | HEIGHT: 65 IN | DIASTOLIC BLOOD PRESSURE: 84 MMHG | SYSTOLIC BLOOD PRESSURE: 134 MMHG

## 2024-08-26 DIAGNOSIS — T81.49XA SURGICAL SITE INFECTION: ICD-10-CM

## 2024-08-26 DIAGNOSIS — R79.81 ABNORMAL BLOOD-GAS LEVEL: ICD-10-CM

## 2024-08-26 DIAGNOSIS — Z91.89 AT RISK FOR SURGICAL SITE INFECTION: ICD-10-CM

## 2024-08-26 DIAGNOSIS — M41.35 THORACOGENIC SCOLIOSIS OF THORACOLUMBAR REGION: ICD-10-CM

## 2024-08-26 DIAGNOSIS — M41.35 THORACOGENIC SCOLIOSIS OF THORACOLUMBAR REGION: Primary | ICD-10-CM

## 2024-08-26 LAB
ALBUMIN SERPL BCP-MCNC: 3.8 G/DL (ref 3.4–5)
ALP SERPL-CCNC: 105 U/L (ref 33–136)
ALT SERPL W P-5'-P-CCNC: 9 U/L (ref 7–45)
ANION GAP SERPL CALC-SCNC: 14 MMOL/L (ref 10–20)
AST SERPL W P-5'-P-CCNC: 17 U/L (ref 9–39)
BILIRUB SERPL-MCNC: 0.6 MG/DL (ref 0–1.2)
BUN SERPL-MCNC: 15 MG/DL (ref 6–23)
CALCIUM SERPL-MCNC: 9.7 MG/DL (ref 8.6–10.3)
CHLORIDE SERPL-SCNC: 107 MMOL/L (ref 98–107)
CO2 SERPL-SCNC: 23 MMOL/L (ref 21–32)
CREAT SERPL-MCNC: 0.68 MG/DL (ref 0.5–1.05)
CRP SERPL-MCNC: 1.89 MG/DL
EGFRCR SERPLBLD CKD-EPI 2021: >90 ML/MIN/1.73M*2
ERYTHROCYTE [DISTWIDTH] IN BLOOD BY AUTOMATED COUNT: 15.8 % (ref 11.5–14.5)
ERYTHROCYTE [SEDIMENTATION RATE] IN BLOOD BY WESTERGREN METHOD: 47 MM/H (ref 0–30)
GLUCOSE SERPL-MCNC: 101 MG/DL (ref 74–99)
HCT VFR BLD AUTO: 36.1 % (ref 36–46)
HGB BLD-MCNC: 11.5 G/DL (ref 12–16)
MCH RBC QN AUTO: 28.7 PG (ref 26–34)
MCHC RBC AUTO-ENTMCNC: 31.9 G/DL (ref 32–36)
MCV RBC AUTO: 90 FL (ref 80–100)
NRBC BLD-RTO: 0 /100 WBCS (ref 0–0)
PLATELET # BLD AUTO: 277 X10*3/UL (ref 150–450)
POTASSIUM SERPL-SCNC: 3.9 MMOL/L (ref 3.5–5.3)
PROT SERPL-MCNC: 7.3 G/DL (ref 6.4–8.2)
RBC # BLD AUTO: 4.01 X10*6/UL (ref 4–5.2)
SODIUM SERPL-SCNC: 140 MMOL/L (ref 136–145)
WBC # BLD AUTO: 4.4 X10*3/UL (ref 4.4–11.3)

## 2024-08-26 PROCEDURE — 85027 COMPLETE CBC AUTOMATED: CPT

## 2024-08-26 PROCEDURE — 85652 RBC SED RATE AUTOMATED: CPT

## 2024-08-26 PROCEDURE — 36415 COLL VENOUS BLD VENIPUNCTURE: CPT

## 2024-08-26 PROCEDURE — 80053 COMPREHEN METABOLIC PANEL: CPT

## 2024-08-26 PROCEDURE — 86140 C-REACTIVE PROTEIN: CPT

## 2024-08-26 RX ORDER — TRAMADOL HYDROCHLORIDE 50 MG/1
50 TABLET ORAL EVERY 6 HOURS PRN
COMMUNITY

## 2024-08-26 RX ORDER — OMEPRAZOLE 20 MG/1
20 CAPSULE, DELAYED RELEASE ORAL
COMMUNITY
Start: 2024-08-18

## 2024-08-26 ASSESSMENT — PATIENT HEALTH QUESTIONNAIRE - PHQ9
1. LITTLE INTEREST OR PLEASURE IN DOING THINGS: SEVERAL DAYS
SUM OF ALL RESPONSES TO PHQ9 QUESTIONS 1 & 2: 2
10. IF YOU CHECKED OFF ANY PROBLEMS, HOW DIFFICULT HAVE THESE PROBLEMS MADE IT FOR YOU TO DO YOUR WORK, TAKE CARE OF THINGS AT HOME, OR GET ALONG WITH OTHER PEOPLE: SOMEWHAT DIFFICULT
2. FEELING DOWN, DEPRESSED OR HOPELESS: SEVERAL DAYS

## 2024-08-26 ASSESSMENT — PAIN SCALES - GENERAL: PAINLEVEL: 8

## 2024-08-26 NOTE — PROGRESS NOTES
[facility] [place]  Department of Neurological Surgery  Established Patient Visit    History of Present Illness:  Khalida Barker is a 74 y.o. year old female who presents to the spine clinic in follow up with 3-4 months s/p T11-12 decompression and L2-S1 fusion on 5/14/2024. History of lumbar deformity at T10-pelvis. X-rays and CT scan showed a T9 compression fracture directly above her surgery. The fracture radiates into the sides of the ribs causing pain. She states this has been not getting better. She reports that she takes tramadol only when she really needs it.     Patient's BMI is Body mass index is 26.63 kg/m².    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    Muscle weakness (generalized)    Positive colorectal cancer screening using Cologuard test    Primary osteoarthritis of right knee    Seborrheic keratoses    Tinnitus    Vitamin D deficiency    Left sided abdominal pain    BMI 30.0-30.9,adult    Cervicalgia    Cervical paraspinal muscle spasm    Non-smoker    S/P total knee arthroplasty, right    Right knee pain    Knee stiffness, right    Chronic left shoulder pain    Neck pain    Thoracogenic scoliosis of thoracolumbar region    Scoliosis deformity of spine    Anxiety    C6 radiculopathy    Carpal tunnel syndrome    Low back pain, unspecified    Closed fracture of distal end of right radius    Weakness    Closed fracture of distal end of right radius, unspecified fracture morphology, initial encounter    Acute blood loss as cause of postoperative anemia    Dysphagia    Dehydration    Ankylosing spondylitis of multiple sites in spine (Multi)    Dysphagia, unspecified     Past Medical  History:   Diagnosis Date    Ankylosis of sacroiliac joint 06/04/2024    Arthritis     Cervicalgia     Colon polyp     CTS (carpal tunnel syndrome)     Diverticulitis 06/27/2023    Hallux valgus, acquired 01/19/2013    Hiatal hernia     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     OA (osteoarthritis) of knee     Painful scar 09/20/2017    Peripheral neuropathy     Scoliosis     Spinal stenosis     Traumatic ecchymosis of foot 01/19/2013    Trigger finger of right hand     Vision loss     wears glasses    Vitamin D deficiency      Past Surgical History:   Procedure Laterality Date    ADENOIDECTOMY  1956    BREAST BIOPSY  1999    COLONOSCOPY      KNEE ARTHROPLASTY Right     MOUTH SURGERY  2016    Back left molar removed    MOUTH SURGERY  09/28/2021    right molar    OTHER SURGICAL HISTORY      Skin lesion excision    SPINAL FUSION  06/2010    C4-5    TONSILLECTOMY  1956    WISDOM TOOTH EXTRACTION  1978    lower     Social History     Tobacco Use    Smoking status: Never    Smokeless tobacco: Never   Substance Use Topics    Alcohol use: Not Currently     Comment: rarely     family history includes Alzheimer's disease in her mother; Heart disease in her father; Hypertension in her brother; Polymyositis in her father; Skin cancer in her brother; peripheral artery disease in her brother.    Current Outpatient Medications:     acetaminophen (Tylenol) 500 mg tablet, Take 1 tablet (500 mg) by mouth every 8 hours if needed for mild pain (1 - 3) or moderate pain (4 - 6) for up to 30 doses., Disp: 30 tablet, Rfl: 0    hyoscyamine (Anaspaz) 0.125 mg disintegrating tablet, Take 1 tablet (0.125 mg) by mouth 3 times a day., Disp: 90 tablet, Rfl: 0    omeprazole (PriLOSEC) 20 mg DR capsule, Take 1 capsule (20 mg) by mouth once daily in the morning. Take before meals., Disp: , Rfl:     traMADol (Ultram) 50 mg tablet, Take 1 tablet (50 mg) by mouth every 6 hours if needed., Disp: , Rfl:   Allergies   Allergen Reactions     Oxycodone Itching     Previously tolerated Percocet     Tramadol Itching       Physical Examination:    General: Well developed, awake/alert/oriented x3, no distress, alert and cooperative  Skin: Warm and dry, no lesions, no rashes  ENMT: Mucous membranes moist, no apparent injury, no lesions seen  Head/Neck: Neck Supple, no apparent injury  Respiratory/Thorax: Normal breath sounds with good chest expansion, thorax symmetric  Cardiovascular: No pitting edema, no JVD    Motor Strength: 5/5 Throughout all extremities    Muscle Bulk: Normal and symmetric in all extremities    Posture:   -- Cervical: Normal  -- Thoracic: Normal  -- Lumbar : Normal  Paraspinal muscle spasm/tenderness absent.     Sensation: intact to light touch      Results:  I personally reviewed and interpreted the imaging results which included X-rays and CT scan showed a T9 compression fracture directly above her surgery.    Assessment and Plan:      Khalida Barker is a 74 y.o. year old female who presents to the spine clinic in follow up with T9 compression fracture. Pt will be fitted for a back brace tomorrow. I encourage her to wear a brace and perform PT. I believe that it should heal on its own with time and bracing. I will see her back in 4-6 weeks.      I have reviewed all prior documentation and reviewed the electronic medical record since admission. I have personally have reviewed all advanced imaging not just the reports and used my interpretation as documented as the relevant findings. I have reviewed the risks and benefits of all treatment recommendations listed in this note with the patient and family. I spent a total of 30 minutes in service to this patient's care during this date of service.      The above clinical summary has been dictated with voice recognition software. It has not been proofread for grammatical errors, typographical mistakes, or other semantic inconsistencies.    Thank you for visiting our office today. It was our  pleasure to take part in your healthcare.     Do not hesitate to call with any questions regarding your plan of care after leaving at [phone] M-F 8am-4pm.     To clinicians, thank you very much for this kind referral. It is a privilege to partner with you in the care of your patients. My office would be delighted to assist you with any further consultations or with questions regarding the plan of care outlined. Do not hesitate to call the office or contact me directly.       Sincerely,      Justin Lu MD, [facility]  Spine , [facility]  [employer] [facility] in Spinal Neurosurgery  Neurosurgery , [facility] and [facility]  Complex Spine Surgery Fellowship Director   of Neurological Surgery  [facility]    [facility]  [address]  [address]  [address] [place] [address]  [address]  [place] [address]    Phone: [phone]  Fax: [phone]        Scribe Attestation  By signing my name below, I, Maliha Abrams , Scribe   attest that this documentation has been prepared under the direction and in the presence of Justin Lu MD.

## 2024-09-05 ENCOUNTER — APPOINTMENT (OUTPATIENT)
Dept: PRIMARY CARE | Facility: CLINIC | Age: 74
End: 2024-09-05
Payer: MEDICARE

## 2024-09-05 ENCOUNTER — EVALUATION (OUTPATIENT)
Dept: PHYSICAL THERAPY | Facility: CLINIC | Age: 74
End: 2024-09-05
Payer: MEDICARE

## 2024-09-05 VITALS
BODY MASS INDEX: 25.99 KG/M2 | TEMPERATURE: 97.3 F | DIASTOLIC BLOOD PRESSURE: 70 MMHG | SYSTOLIC BLOOD PRESSURE: 130 MMHG | HEART RATE: 86 BPM | HEIGHT: 65 IN | WEIGHT: 156 LBS

## 2024-09-05 DIAGNOSIS — R63.4 WEIGHT LOSS, UNINTENTIONAL: ICD-10-CM

## 2024-09-05 DIAGNOSIS — M41.35 THORACOGENIC SCOLIOSIS OF THORACOLUMBAR REGION: ICD-10-CM

## 2024-09-05 DIAGNOSIS — E66.3 OVERWEIGHT WITH BODY MASS INDEX (BMI) OF 25 TO 25.9 IN ADULT: ICD-10-CM

## 2024-09-05 DIAGNOSIS — R13.10 DYSPHAGIA, UNSPECIFIED TYPE: ICD-10-CM

## 2024-09-05 DIAGNOSIS — Z78.9 NEVER SMOKED CIGARETTES: ICD-10-CM

## 2024-09-05 DIAGNOSIS — S52.501K CLOSED FRACTURE OF DISTAL END OF RIGHT RADIUS WITH NONUNION, UNSPECIFIED FRACTURE MORPHOLOGY, SUBSEQUENT ENCOUNTER: ICD-10-CM

## 2024-09-05 PROCEDURE — 97161 PT EVAL LOW COMPLEX 20 MIN: CPT | Mod: GP

## 2024-09-05 PROCEDURE — 1159F MED LIST DOCD IN RCRD: CPT | Performed by: FAMILY MEDICINE

## 2024-09-05 PROCEDURE — 99213 OFFICE O/P EST LOW 20 MIN: CPT | Performed by: FAMILY MEDICINE

## 2024-09-05 PROCEDURE — 1123F ACP DISCUSS/DSCN MKR DOCD: CPT | Performed by: FAMILY MEDICINE

## 2024-09-05 PROCEDURE — 97535 SELF CARE MNGMENT TRAINING: CPT | Mod: GP

## 2024-09-05 PROCEDURE — 1036F TOBACCO NON-USER: CPT | Performed by: FAMILY MEDICINE

## 2024-09-05 PROCEDURE — 1160F RVW MEDS BY RX/DR IN RCRD: CPT | Performed by: FAMILY MEDICINE

## 2024-09-05 PROCEDURE — 1158F ADVNC CARE PLAN TLK DOCD: CPT | Performed by: FAMILY MEDICINE

## 2024-09-05 PROCEDURE — 3008F BODY MASS INDEX DOCD: CPT | Performed by: FAMILY MEDICINE

## 2024-09-05 RX ORDER — ACETAMINOPHEN 500 MG
2000 TABLET ORAL DAILY
COMMUNITY
Start: 2024-09-05

## 2024-09-05 RX ORDER — MULTIVITAMIN
1 TABLET ORAL DAILY
COMMUNITY
Start: 2024-09-05

## 2024-09-05 ASSESSMENT — BALANCE ASSESSMENTS
STANDING ON ONE LEG: TRIES TO LIFT LEG UNABLE TO HOLD 3 SECONDS BUT REMAINS STANDING INDEPENDENTLY
STANDING TO SITTING: CONTROLS DESCENT BY USING HANDS
PLACE ALTERNATE FOOT ON STEP OR STOOL WHILE STANDING UNSUPPORTED: LOOKS BEHIND ONE SIDE ONLY OTHER SIDE SHOWS LESS WEIGHT SHIFT
STANDING UNSUPPORTED WITH FEET TOGETHER: ABLE TO PLACE FEET TOGETHER INDEPENDENTLY BUT UNABLE TO HOLD FOR 30 SECONDS
REACHING FORWARD WITH OUTSTRETCHED ARM WHILE STANDING: CAN REACH FORWARD 5 CM (2 INCHES)
SITTING WITH BACK UNSUPPORTED BUT FEET SUPPORTED ON FLOOR OR ON A STOOL: ABLE TO SIT SAFELY AND SECURELY FOR 2 MINUTES
STANDING UNSUPPORTED ONE FOOT IN FRONT: LOSES BALANCE WHILE STEPPING OR STANDING
STANDING UNSUPPORTED: UNABLE TO STAND 30 SECONDS UNSUPPORTED
PLACE ALTERNATE FOOT ON STEP OR STOOL WHILE STANDING UNSUPPORTED: NEEDS ASSISTANCE TO KEEP FROM FALLING/UNABLE TO TRY
STANDING UNSUPPORTED WITH EYES CLOSED: UNABLE TO KEEP EYES CLOSED 3 SECONDS BUT STAYS SAFELY
LONG VERSION TOTAL SCORE (MAX 56): 23
TRANSFERS: ABLE TO TRANSFER WITH VERBAL CUEING AND/OR SUPERVISION
STANDING TO SITTING: ABLE TO STAND USING HANDS AFTER SEVERAL TRIES
PICK UP OBJECT FROM THE FLOOR FROM A STANDING POSITION: UNABLE TO PICK UP BUT REACHES 2-5 CM (1-2 INCHES) FROM SLIPPER AND KEEPS BALANCE INDEPENDENTLY
TURN 360 DEGREES: NEEDS CLOSE SUPERVISION OR VERBAL CUEING

## 2024-09-05 ASSESSMENT — ENCOUNTER SYMPTOMS
DEPRESSION: 1
LOSS OF SENSATION IN FEET: 1
OCCASIONAL FEELINGS OF UNSTEADINESS: 1

## 2024-09-05 ASSESSMENT — PAIN SCALES - GENERAL: PAINLEVEL_OUTOF10: 4

## 2024-09-05 ASSESSMENT — PAIN - FUNCTIONAL ASSESSMENT: PAIN_FUNCTIONAL_ASSESSMENT: 0-10

## 2024-09-05 NOTE — PROGRESS NOTES
Subjective Khalida is here today accompanied by her  for a follow-up on her dysphagia and back pain.  Patient states that her swallowing has improved significantly over the last 1 months.  She is able to tolerate most foods at this point.  She has had no choking episodes.  She is being followed by gastroenterology closely.  She was placed in a back brace by her neurosurgeon several weeks ago and her thoracic and lumbar pain are improving.  She has a history of lumbar surgery as well as a T9 compression fracture.  She is ambulating better at this point as well.  She has had no recent falling episodes.  She continues on her medications as noted    Patient ID: Khalida Barker is a 74 y.o. 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     OA (osteoarthritis) of knee     Painful scar 09/20/2017    Peripheral neuropathy     Scoliosis     Spinal stenosis     Traumatic ecchymosis of foot 01/19/2013    Trigger finger of right hand     Vision loss     wears glasses    Vitamin D deficiency       Past Surgical History:   Procedure Laterality Date    ADENOIDECTOMY  1956    BREAST BIOPSY  1999    COLONOSCOPY      KNEE ARTHROPLASTY Right     MOUTH SURGERY  2016    Back left molar removed    MOUTH SURGERY  09/28/2021    right molar    OTHER SURGICAL HISTORY      Skin lesion excision    SPINAL FUSION  06/2010    C4-5    TONSILLECTOMY  1956    WISDOM TOOTH EXTRACTION  1978    lower      Family History   Problem Relation Name Age of Onset    Alzheimer's disease Mother      Heart disease Father      Polymyositis Father      Hypertension Brother      Skin cancer Brother       Other (peripheral artery disease) Brother        Social History     Socioeconomic History    Marital status:      Spouse name: Not on file    Number of children: Not on file    Years of education: Not on file    Highest education level: Not on file   Occupational History    Not on file   Tobacco Use    Smoking status: Never    Smokeless tobacco: Never   Vaping Use    Vaping status: Never Used   Substance and Sexual Activity    Alcohol use: Not Currently     Comment: rarely    Drug use: Never    Sexual activity: Defer   Other Topics Concern    Not on file   Social History Narrative    Not on file     Social Determinants of Health     Financial Resource Strain: Low Risk  (7/21/2024)    Overall Financial Resource Strain (CARDIA)     Difficulty of Paying Living Expenses: Not very hard   Food Insecurity: Patient Declined (6/18/2024)    Received from "[employer] [facility], [facility]    Hunger Vital Sign     Worried About Running Out of Food in the Last Year: Patient declined     Ran Out of Food in the Last Year: Patient declined   Transportation Needs: No Transportation Needs (7/22/2024)    PRAPARE - Transportation     Lack of Transportation (Medical): No     Lack of Transportation (Non-Medical): No   Physical Activity: Not on file   Stress: No Stress Concern Present (7/1/2024)    Received from "[facility] [employer] [facility], [facility]    [facility] of Occupational Health - Occupational Stress Questionnaire     Feeling of Stress : Not at all   Social Connections: Patient Declined (6/18/2024)    Received from "[facility] [facility], [facility]    Social Connection and Isolation Panel [NHANES]     Frequency of Communication with Friends and Family: Patient declined     Frequency of Social Gatherings with Friends and Family: Patient declined     Attends Zoroastrianism Services: Patient declined     Active Member of Clubs or Organizations: Patient declined     Attends Club or Organization Meetings: Patient declined     Marital  Status: Patient declined   Intimate Partner Violence: Not At Risk (6/15/2024)    [facility]    Domestic Abuse Assessment     Do you feel safe in your relationships at home?: Yes     Physical Abuse: Denies     [facility] Domestic Abuse - Type of Abuse: Not on file     HRSN Domestic Abuse - Time Frame: Not on file     HRSN Domestic Abuse - Signs and Symptoms: Not on file     Verbal Abuse: Denies     [facility] Domestic Abuse - Reported To: Not on file   Housing Stability: Low Risk  (7/22/2024)    Housing Stability Vital Sign     Unable to Pay for Housing in the Last Year: No     Number of Times Moved in the Last Year: 1     Homeless in the Last Year: No      Oxycodone and Tramadol   Current Outpatient Medications   Medication Sig Dispense Refill    acetaminophen (Tylenol) 500 mg tablet Take 1 tablet (500 mg) by mouth every 8 hours if needed for mild pain (1 - 3) or moderate pain (4 - 6) for up to 30 doses. 30 tablet 0    hyoscyamine (Anaspaz) 0.125 mg disintegrating tablet Take 1 tablet (0.125 mg) by mouth 3 times a day. 90 tablet 0    omeprazole (PriLOSEC) 20 mg DR capsule Take 1 capsule (20 mg) by mouth once daily in the morning. Take before meals.      traMADol (Ultram) 50 mg tablet Take 1 tablet (50 mg) by mouth every 6 hours if needed.       No current facility-administered medications for this visit.       Immunization History   Administered Date(s) Administered    Flu vaccine, quadrivalent, high-dose, preservative free, age 65y+ (FLUZONE) 12/07/2020    Flu vaccine, trivalent, preservative free, HIGH-DOSE, age 65y+ (Fluzone) 11/16/2017, 10/11/2022    Influenza, Seasonal, Quadrivalent, Adjuvanted 10/20/2023    Influenza, Unspecified 10/24/2019, 11/08/2021, 10/11/2022    Influenza, seasonal, injectable 11/04/2016    Moderna COVID-19 vaccine, Fall 2023, 12 yeasrs and older (50mcg/0.5mL) 10/06/2023    Moderna COVID-19 vaccine, bivalent, blue cap/gray label *Check age/dose* 09/08/2022    Moderna  SARS-CoV-2 Vaccination 02/11/2021, 03/11/2021, 11/02/2021, 04/14/2022    Pneumococcal Conjugate PCV 7 09/24/2018    Pneumococcal polysaccharide vaccine, 23-valent, age 2 years and older (PNEUMOVAX 23) 10/24/2019    Zoster vaccine, recombinant, adult (SHINGRIX) 09/23/2021, 12/10/2021        Review of Systems   Constitutional: Negative.    HENT: Negative.     Eyes: Negative.    Respiratory: Negative.     Cardiovascular: Negative.    Gastrointestinal: Negative.    Endocrine: Negative.    Genitourinary: Negative.    Musculoskeletal:  Positive for back pain.   Skin: Negative.    Allergic/Immunologic: Negative.    Neurological: Negative.    Hematological: Negative.    Psychiatric/Behavioral: Negative.     All other systems reviewed and are negative.       Vitals:    09/05/24 1016   BP: 130/70   Pulse: 86   Temp: 36.3 °C (97.3 °F)     Vitals:    09/05/24 1016   Weight: 70.8 kg (156 lb)      Physical Exam  Constitutional:       General: She is not in acute distress.     Appearance: Normal appearance.   Cardiovascular:      Rate and Rhythm: Normal rate and regular rhythm.      Pulses: Normal pulses.      Heart sounds: Normal heart sounds.   Pulmonary:      Effort: Pulmonary effort is normal.      Breath sounds: Normal breath sounds.   Neurological:      General: No focal deficit present.      Mental Status: She is alert and oriented to person, place, and time. Mental status is at baseline.          ASSESSMENT/PLAN: Dysphagia and esophageal motility disorder improving.  Continue hyoscyamine as directed by gastroenterology.  Dietary instructions were given by gastroenterology.  Call if any worsening of swallowing.    Status post lumbar surgery complicated by MRSA infection of wound and T9 compression fracture.  Continue physical and Occupational Therapy.  Use walker for ambulation assistance.  May use Tylenol as needed for discomfort.  Follow-up with neurosurgery as recommended    Begin calcium supplementation 500 mg daily and  vitamin D 2000 units daily.  Follow-up in 1 months and call as needed       Scribe Attestation  By signing my name below, I, Patricia Martinez LPN, Scribe   attest that this documentation has been prepared under the direction and in the presence of Rocco Rushing MD.

## 2024-09-05 NOTE — PROGRESS NOTES
Physical Therapy Evaluation    Patient Name: Khalida Barker  MRN: [id number]  Time Calculation  Start Time: 1215  Stop Time: 1255  Time Calculation (min): 40 min  PT Evaluation Time Entry  PT Evaluation (Low) Time Entry: 15  PT Therapeutic Procedures Time Entry  Self-Care/Home Mgmt Trainin                   Current Problem  1. Thoracogenic scoliosis of thoracolumbar region  Referral to Physical Therapy        Insurance    Insurance reviewed   Visit number: 1  Approved number of visits: BMN   [employer] MED[employer]ARE BMN PT OT COPAY 30  DED 0 COVERAGE 100 OOP 0 NO AUTH REQ       Subjective   General:  Pt presents to the clinic following multiple spine surgeries (T11-12 decompression and L2-S1 fusion on 2024) after suffering from back pain since 2022 from scoliosis. She has weakness in (B) LE and now has drop foot in R LE. She presents with a FWW and posture support brace for compression Fx to T9 that she is unsure if there was a specific BRYANT. Her main goal for therapy to be able to ambulate with a cane and improve foot drop that was caused by spine surgery. She has (B) numbness and tingling from knee down. Pt also has lack of ROM in R shoulder after falling (2024) and breaking R wrist in which she had to use a platform walker for. Last fall was 2024, no injury. Denies any changes in /GI, night sweats, fever, or chills. PMH of R TKA. Her  has to drive her everywhere at this time.     Precautions:  Fall risk     Pain:  4/10 current and best , ache   7/10 at worst, ache     Aggravated by: everything  Relieved by: OTC meds, Rx meds     Reviewed medical screening form with pt and medical screening assessed    Imaging:     Objective   Posture: fwd heat, rounded shoulders    Transfers:  uses UE (B)   Gait: hunched     Lumbar ROM (* indicates pain)  Flex: 90%  Ext: 10%*  R SB: 5%*  L SB: 5%  Rotation: NT        LE Myotomes :  L1-2: diminished (L>R)  L3  L4  L5 diminished (L>R)  S1 diminished  (L>R)    LE Dermatomes:  L1-2 diminished (B)  L3  L4  L5 diminished (B)   S1    Repeated loaded flexion: NT  Repeated loaded ext: NT    Slump: NT  SLR: NT    Palpation:  Lumbar paraspinals - TTP (B)   SIJ- TTP (B)   Piriformis- NT    Flexibility:   Quads- NT pt not able to lay on stomach   HS- NT pt not able to lay on back   Piriformis-  NT pt not able to lay on back     Outcome Measures:  Ball Balance Scale  1. Sitting to Standing: Able to stand using hands after several tries  2. Standing Unsupported: Unable to stand 30 seconds unsupported  3. Sitting with Back Unsupported but Feet Supported on Floor or on a Stool: Able to sit safely and securely for 2 minutes  4. Standing to Sitting: Controls descent by using hands  5.  Transfers: Able to transfer with verbal cueing and/or supervision  6. Standing Unsupported with Eyes Closed: Unable to keep eyes closed 3 seconds but stays safely  7. Standing Unsupported with Feet Together: Able to place feet together independently but unable to hold for 30 seconds  8. Reach Forward with Outstretched Arm While Standing: Can reach forward 5 cm (2 inches)  9.  Object from Floor from a Standing Position: Unable to  but reaches 2-5 cm (1-2 inches) from slipper and keeps balance independently  10. Turning to Look Behind Over Left and Right Shoulders While Standing: Looks behind one side only other side shows less weight shift  11. Turn 360 Degrees: Needs close supervision or verbal cueing  12. Place Alternate Foot on Step or Stool While Standing Unsupported: Needs assistance to keep from falling/unable to try  13. Standing Unsupported One Foot in Front: Loses balance while stepping or standing  14. Standing on One Leg: Tries to lift leg unable to hold 3 seconds but remains standing independently  Ball Balance Score: 23      TU seconds   LEFS: 19    Treatment:   Included measures for eval     EDUCATION/HEP:    Assessment:  Pt presents to the clinic following multiple  spine surgeries (T11-12 decompression and L2-S1 fusion on 5/14/2024) after suffering from back pain since September of 2022 from scoliosis. She has weakness in (B) LE and now has drop foot in R LE. She presents with a FWW and posture support brace for compression Fx to T9 that she is unsure if there was a specific BRYANT. Her main goal for therapy to be able to ambulate with a cane and improve foot drop that was caused by spine surgery. She has (B) numbness and tingling from knee down. Pt also has lack of ROM in R shoulder after falling (June 2024) and breaking R wrist in which she had to use a platform walker for. Last fall was July 1, 2024, no injury. Denies any changes in /GI, night sweats, fever, or chills. PMH of R TKA. I did discuss with her that a neuro guided clinic would be more beneficial to her considering (B) LE weakness and high fall risk. [facility] was updated on her case and will be taking her on pending openings .    Clinical Presentation: Stable    Level of Complexity: Low    Goals:  -Pt will demo decrease in TUG score by 5 to decrease fall risk.  -Pt will demo decrease in BBS score by 9 to decrease fall risk   -Pt will increase BL LE strengthen to 4+/5 in order to improve functional activities.     Plan  Pending consult with neuro OP    Skilled therapeutic intervention to address the above mentioned physical and functional impairments and limitations including, but not limited to: patient education, therapeutic exercise, therapeutic activity, manual therapy, body mechanics training, dry needling, blood flow restriction training, instrumented soft tissue mobilization, manual soft tissue mobilization, gait retraining, biofeedback, cryotherapy, electrical stimulation, home program development, hot pack, taping, neuromuscular re-education, self-care/home management, and vasopneumatic compression.

## 2024-09-06 ASSESSMENT — ENCOUNTER SYMPTOMS
GASTROINTESTINAL NEGATIVE: 1
CARDIOVASCULAR NEGATIVE: 1
NEUROLOGICAL NEGATIVE: 1
HEMATOLOGIC/LYMPHATIC NEGATIVE: 1
RESPIRATORY NEGATIVE: 1
ENDOCRINE NEGATIVE: 1
EYES NEGATIVE: 1
ALLERGIC/IMMUNOLOGIC NEGATIVE: 1
PSYCHIATRIC NEGATIVE: 1
CONSTITUTIONAL NEGATIVE: 1
BACK PAIN: 1

## 2024-09-12 ENCOUNTER — PATIENT OUTREACH (OUTPATIENT)
Dept: PRIMARY CARE | Facility: CLINIC | Age: 74
End: 2024-09-12
Payer: MEDICARE

## 2024-09-15 NOTE — PROGRESS NOTES
Physical Therapy    Physical Therapy Treatment    Patient Name: Khalida Barker  MRN: [id number]  Today's Date: 9/18/2024    Time Entry:   Time Calculation  Start Time: 1256  Stop Time: 1340  Time Calculation (min): 44 min     PT Therapeutic Procedures Time Entry  Neuromuscular Re-Education Time Entry: 3  Therapeutic Exercise Time Entry: 41                 Visit #2 (1 of 10)   [employer]ARE BMN PT OT COPAY 30  DED 0 COVERAGE 100 OOP 0 NO AUTH REQ  REF# [id number]/ALL     Assessment:   Pt arrives at session with generalized weakness and R ankle DF weakness secondary to recent spine surgery, which was also complicated by difficulty swallowing, R wrist fracture and ORIF secondary to fall, and then MRSA infection. Pt's main impairments noted are R UE weakness and stiffness, as well as R ankle DF weakness and decreased confidence with gait and functional mobility. Pt will continue to benefit from PT to address these impairments to improve her functional mobility.     Plan:       Current Problem  1. Difficulty walking        2. Thoracogenic scoliosis of thoracolumbar region  Follow Up In Physical Therapy      3. Falls frequently        4. Right foot drop        5. Shoulder stiffness, right        6. Unsteadiness on feet            General   Pt arrives at session with her , Quinn, reporting she fell last night. Reports she was reaching down to put something in the recycle paper pile. Reports she was stepping back with her R foot and landed on her backside and took out a table & chairs. Denies injuries and was able to slowly lower to the ground.   Reports her R foot drop foot has been occurring since her spine surgery on 6/6/24. Pt reports her spine surgery was complicated by MRSA as well as R wrist fracture following a fall at home the next day after DC from acute rehab. In addition had difficulty swallowing after this and ended up losing quite a bit of weight.   Reports noticing differences in sensation  "of her L shin.   Reports her R shoulder does not elevate beyond ~50-60 deg of elevation. Reports this has been occurring since having a platform.   R wrist surgery for distal radius fracture on 6/11/24.   Swallow function has improved- taking a medication for esophageal motility. Now more recently dealing with drainage and wants to see an ENT.   Reports she tends to fall when backing up.  Most recent fall yesterday had to call EMS for lift assist. Was able to push to stand using Quinn's assistance with pushing with her L LE.   Has discussed AFO with her providers and was advised to hold off for now to try to gain more muscle strength.   Does notice times where she is catching her R toe in swing phase of gait.   Pt does also have a T9 fracture and is wearing a TLSO brace.   Is to have her R wrist part of her hardware removed in 12/2024 as this impacts her R thumb.     Pt arrives at session as a transfer of care from [facility].     Equipment: has a cane with [employer] cane base at home and was recommended.     From HHCPT HEP:   Seated marching  Seated hip ABD with TB   Side stepping seated  Standing squats  Side stepping standing  Standing hip flexion  Standing hip EXT   Standing HS curl  Arm lifts with 1# weight  Comments: does have adjustable ankle weights up to 5#. Currently using a 1# ankle weight       Pain #: does not rank on VAS.   Numbness/tingling: B LE's from knees down  Difficult Activities: walking with lesser device than FWW, stairs, raising R arm up for function, walking bwd  Goals: \"To be able to walk without a device. To get stronger & to be able to stand up straighter.\"     Subjective    Precautions  Precautions  STEADI Fall Risk Score (The score of 4 or more indicates an increased risk of falling): 9  Vital Signs     Pain  Pain Assessment  Pain Assessment: 0-10  0-10 (Numeric) Pain Score: 4  Pain Location: Back  Pain Orientation: Lower    Objective   Cognition   WNL.   Posture:   Fwd head, rounded " shoulders. Pt arrives wearing TLSO.     Range of Motion:   R shoulder elevation AROM: 60 deg     R shoulder PROM elevation/scaption in sitting: ~100 deg; L shoulder PROM elevation: 120 to 130 deg.     Strength:   MMT:   Joint Strength /5   Hip: R L   Flexion 4+ 4   Extension     ABD     IR 4+ to 5 tested in sitting 4+ to 5 tested in sitting   ER 4+ to 5 tested in sitting 4+ to 5 tested in sitting        Knee: R L   Extension 4+ to 5 4+ to 5   Flexion 4+ to 5  4+ to 5        Ankle:  R L   DF 2 4   PF 4+ tested in sitting  4+ tested in sitting      Transfers:   Sit <> stand: MOD I    Gait:   Pt ambulated with FWW MOD I demonstrating slightly flexed posturing, decreased gait speed, and steppage gait of R LE secondary to drop foot.     Pt ambulated along single // bar with HHA and then without HHA with CGA ~5ft.      Stairs:   Pt ascended/descended 5 steps with CGA with non-reciprocating pattern and turning sideways to use unilateral rail.     Treatments:  Therapeutic Exercise (61060): 41 minutes  Objective measures as above.     Instructed pt in Initial HEP:   Pulleys flexion/scaption in sitting  Supine AAROM flexion with dowel rogelio     Neuromuscular Re-education (09939): 3 minutes  Walking fwd/bwd along single // bar with L UE support on bar and HHA of therapist, eventually performing without HHA of therapist. CGA at gait belt.     OP EDUCATION:       Goals:  By discharge, pt will meet the following goals:     Pt will demo decrease in TUG score by 5 to decrease fall risk.  Pt will demo decrease in BBS score by 9 to decrease fall risk   Pt will increase BL LE strengthen to 4+/5 in order to improve functional activities.

## 2024-09-18 ENCOUNTER — TREATMENT (OUTPATIENT)
Dept: PHYSICAL THERAPY | Facility: CLINIC | Age: 74
End: 2024-09-18
Payer: MEDICARE

## 2024-09-18 DIAGNOSIS — Z96.651 S/P TOTAL KNEE ARTHROPLASTY, RIGHT: ICD-10-CM

## 2024-09-18 DIAGNOSIS — R26.81 UNSTEADINESS ON FEET: ICD-10-CM

## 2024-09-18 DIAGNOSIS — M21.371 RIGHT FOOT DROP: ICD-10-CM

## 2024-09-18 DIAGNOSIS — M41.35 THORACOGENIC SCOLIOSIS OF THORACOLUMBAR REGION: ICD-10-CM

## 2024-09-18 DIAGNOSIS — M25.611 SHOULDER STIFFNESS, RIGHT: ICD-10-CM

## 2024-09-18 DIAGNOSIS — R26.2 DIFFICULTY WALKING: Primary | ICD-10-CM

## 2024-09-18 DIAGNOSIS — R29.6 FALLS FREQUENTLY: ICD-10-CM

## 2024-09-18 PROCEDURE — 97110 THERAPEUTIC EXERCISES: CPT | Mod: GP

## 2024-09-18 ASSESSMENT — ENCOUNTER SYMPTOMS
DEPRESSION: 0
OCCASIONAL FEELINGS OF UNSTEADINESS: 1
LOSS OF SENSATION IN FEET: 1

## 2024-09-18 ASSESSMENT — PAIN SCALES - GENERAL: PAINLEVEL_OUTOF10: 4

## 2024-09-18 ASSESSMENT — PAIN - FUNCTIONAL ASSESSMENT: PAIN_FUNCTIONAL_ASSESSMENT: 0-10

## 2024-09-18 NOTE — LETTER
September 18, 2024    [place]  [address] [address]  [facility] [address]    Patient: Khalida Barker   YOB: 1950   Date of Visit: 9/18/2024       Dear Rocco Rushing [place]  [address] [address] [facility] [facility]   [address]  [place] [address]    The attached plan of care is being sent to you because your patient’s medical reimbursement requires that you certify the plan of care. Your signature is required to allow uninterrupted insurance coverage.      You may indicate your approval by signing below and faxing this form back to us at Dept Fax: [phone].    Please call Dept: [phone] with any questions or concerns.    Thank you for this referral,        Lara Hsieh, PT  [facility] [address] [address]  [address] [facility] [place] [address]    Payer: Payor: [employer]ICARE / Plan: [employer] G[employer] MEDICARE / Product Type: *No Product type* /                                                                         Date:     Dear Lara Hsieh, PT,     Re: Ms. Khalida Barker, MRN:[id number]    I certify that I have reviewed the attached plan of care and it is medically necessary for Ms. Khalida Barker (1950) who is under my care.          ______________________________________                    _________________  Provider name and credentials                                           Date and time                                                                                           Plan of Care 9/18/24   Effective from: 9/18/2024  Effective to: 12/17/2024    Plan ID: [id number]            Participants as of Finalize on 9/18/2024    Name Type Comments Contact Info    Lara Hsieh PT Physical Therapist  [phone]    Justin Lu MD Consulting Physician Pt with Medicare insurance. Please sign to certify PT POC. [phone]       Last Plan Note     Author: Lara Hsieh PT Status: Sign when Signing Visit Last edited: 9/18/2024 12:45 PM       Physical Therapy    Physical Therapy  Treatment    Patient Name: Khalida Barker  MRN: [id number]  Today's Date: 9/18/2024    Time Entry:   Time Calculation  Start Time: 1256  Stop Time: 1340  Time Calculation (min): 44 min     PT Therapeutic Procedures Time Entry  Neuromuscular Re-Education Time Entry: 3  Therapeutic Exercise Time Entry: 41                 Visit #2 (1 of 10)   [employer] MEDICARE BMN PT OT COPAY 30  DED 0 COVERAGE 100 OOP 0 NO AUTH REQ  REF# [id number]/ALL     Assessment:   Pt arrives at session with generalized weakness and R ankle DF weakness secondary to recent spine surgery, which was also complicated by difficulty swallowing, R wrist fracture and ORIF secondary to fall, and then MRSA infection. Pt's main impairments noted are R UE weakness and stiffness, as well as R ankle DF weakness and decreased confidence with gait and functional mobility. Pt will continue to benefit from PT to address these impairments to improve her functional mobility.     Plan:       Current Problem  1. Difficulty walking        2. Thoracogenic scoliosis of thoracolumbar region  Follow Up In Physical Therapy      3. Falls frequently        4. Right foot drop        5. Shoulder stiffness, right        6. Unsteadiness on feet            General   Pt arrives at session with her , Quinn, reporting she fell last night. Reports she was reaching down to put something in the recycle paper pile. Reports she was stepping back with her R foot and landed on her backside and took out a table & chairs. Denies injuries and was able to slowly lower to the ground.   Reports her R foot drop foot has been occurring since her spine surgery on 6/6/24. Pt reports her spine surgery was complicated by MRSA as well as R wrist fracture following a fall at home the next day after DC from acute rehab. In addition had difficulty swallowing after this and ended up losing quite a bit of weight.   Reports noticing differences in sensation of her L shin.   Reports her R  "shoulder does not elevate beyond ~50-60 deg of elevation. Reports this has been occurring since having a platform.   R wrist surgery for distal radius fracture on 6/11/24.   Swallow function has improved- taking a medication for esophageal motility. Now more recently dealing with drainage and wants to see an ENT.   Reports she tends to fall when backing up.  Most recent fall yesterday had to call EMS for lift assist. Was able to push to stand using Quinn's assistance with pushing with her L LE.   Has discussed AFO with her providers and was advised to hold off for now to try to gain more muscle strength.   Does notice times where she is catching her R toe in swing phase of gait.   Pt does also have a T9 fracture and is wearing a TLSO brace.   Is to have her R wrist part of her hardware removed in 12/2024 as this impacts her R thumb.     Pt arrives at session as a transfer of care from [facility].     Equipment: has a cane with [employer] cane base at home and was recommended.     From HHCPT HEP:   Seated marching  Seated hip ABD with TB   Side stepping seated  Standing squats  Side stepping standing  Standing hip flexion  Standing hip EXT   Standing HS curl  Arm lifts with 1# weight  Comments: does have adjustable ankle weights up to 5#. Currently using a 1# ankle weight       Pain #: does not rank on VAS.   Numbness/tingling: B LE's from knees down  Difficult Activities: walking with lesser device than FWW, stairs, raising R arm up for function, walking bwd  Goals: \"To be able to walk without a device. To get stronger & to be able to stand up straighter.\"     Subjective    Precautions  Precautions  STEADI Fall Risk Score (The score of 4 or more indicates an increased risk of falling): 9  Vital Signs     Pain  Pain Assessment  Pain Assessment: 0-10  0-10 (Numeric) Pain Score: 4  Pain Location: Back  Pain Orientation: Lower    Objective   Cognition   WNL.   Posture:   Fwd head, rounded shoulders. Pt arrives wearing TLSO. "     Range of Motion:   R shoulder elevation AROM: 60 deg     R shoulder PROM elevation/scaption in sitting: ~100 deg; L shoulder PROM elevation: 120 to 130 deg.     Strength:   MMT:   Joint Strength /5   Hip: R L   Flexion 4+ 4   Extension     ABD     IR 4+ to 5 tested in sitting 4+ to 5 tested in sitting   ER 4+ to 5 tested in sitting 4+ to 5 tested in sitting        Knee: R L   Extension 4+ to 5 4+ to 5   Flexion 4+ to 5  4+ to 5        Ankle:  R L   DF 2 4   PF 4+ tested in sitting  4+ tested in sitting      Transfers:   Sit <> stand: MOD I    Gait:   Pt ambulated with FWW MOD I demonstrating slightly flexed posturing, decreased gait speed, and steppage gait of R LE secondary to drop foot.     Pt ambulated along single // bar with HHA and then without HHA with CGA ~5ft.      Stairs:   Pt ascended/descended 5 steps with CGA with non-reciprocating pattern and turning sideways to use unilateral rail.     Treatments:  Therapeutic Exercise (92334): 41 minutes  Objective measures as above.     Instructed pt in Initial HEP:   Pulleys flexion/scaption in sitting  Supine AAROM flexion with dowel rogelio     Neuromuscular Re-education (15328): 3 minutes  Walking fwd/bwd along single // bar with L UE support on bar and HHA of therapist, eventually performing without HHA of therapist. CGA at gait belt.     OP EDUCATION:       Goals:  By discharge, pt will meet the following goals:     Pt will demo decrease in TUG score by 5 to decrease fall risk.  Pt will demo decrease in BBS score by 9 to decrease fall risk   Pt will increase BL LE strengthen to 4+/5 in order to improve functional activities.            Current Participants as of 9/18/2024    Name Type Comments Contact Info    Justin Lu MD Consulting Physician Pt with Medicare insurance. Please sign to certify PT POC. [phone]    Signature pending    Lara Hsieh PT Physical Therapist  [phone]    Electronically signed by Lara Hsieh PT at 9/18/2024 8465 EDT

## 2024-09-25 ENCOUNTER — HOSPITAL ENCOUNTER (OUTPATIENT)
Dept: RADIOLOGY | Facility: CLINIC | Age: 74
Discharge: HOME | End: 2024-09-25
Payer: MEDICARE

## 2024-09-25 ENCOUNTER — TREATMENT (OUTPATIENT)
Dept: PHYSICAL THERAPY | Facility: CLINIC | Age: 74
End: 2024-09-25
Payer: MEDICARE

## 2024-09-25 DIAGNOSIS — R26.2 DIFFICULTY WALKING: Primary | ICD-10-CM

## 2024-09-25 DIAGNOSIS — R29.6 FALLS FREQUENTLY: ICD-10-CM

## 2024-09-25 DIAGNOSIS — M41.35 THORACOGENIC SCOLIOSIS OF THORACOLUMBAR REGION: ICD-10-CM

## 2024-09-25 DIAGNOSIS — M25.611 SHOULDER STIFFNESS, RIGHT: ICD-10-CM

## 2024-09-25 DIAGNOSIS — R26.81 UNSTEADINESS ON FEET: ICD-10-CM

## 2024-09-25 DIAGNOSIS — M21.371 RIGHT FOOT DROP: ICD-10-CM

## 2024-09-25 PROCEDURE — 72070 X-RAY EXAM THORAC SPINE 2VWS: CPT | Performed by: RADIOLOGY

## 2024-09-25 PROCEDURE — 97110 THERAPEUTIC EXERCISES: CPT | Mod: GP,CQ

## 2024-09-25 PROCEDURE — 72070 X-RAY EXAM THORAC SPINE 2VWS: CPT

## 2024-09-25 PROCEDURE — 97112 NEUROMUSCULAR REEDUCATION: CPT | Mod: GP,CQ

## 2024-09-25 PROCEDURE — 72110 X-RAY EXAM L-2 SPINE 4/>VWS: CPT | Performed by: RADIOLOGY

## 2024-09-25 PROCEDURE — 72120 X-RAY BEND ONLY L-S SPINE: CPT

## 2024-09-25 NOTE — PROGRESS NOTES
Physical Therapy    Physical Therapy Treatment    Patient Name: Khalida Barker  MRN: [id number]  Today's Date: 9/25/2024    Time Entry:   Time Calculation  Start Time: 0900  Stop Time: 0945  Time Calculation (min): 45 min     PT Therapeutic Procedures Time Entry  Neuromuscular Re-Education Time Entry: 35  Therapeutic Exercise Time Entry: 10                 Visit #3 (2 of 10)   [employer] MEDICARE BMN PT OT COPAY 30  DED 0 COVERAGE 100 OOP 0 NO AUTH REQ  REF# [id number]/ALL     Assessment:   Pt. Tolerated session well today and is motivated with plan of care.  She did well walking within the // bars and was able to progress to one UE support.  Static standing was challenging. Pt will continue to benefit from PT to address these impairments to improve her functional mobility.     Plan:   Con't per POC    Current Problem  1. Difficulty walking        2. Right foot drop        3. Shoulder stiffness, right        4. Unsteadiness on feet        5. Falls frequently        6. Thoracogenic scoliosis of thoracolumbar region  Follow Up In Physical Therapy             Subjective     Pt/. States its a low day in regards to energy.  No pain in the area of T9 but lower back pain which is a solid 6/10.  Is getting an x-ray today of her T9 region.  Has an appointment next Friday with Dr. Lu.  No falls since last visit.  Has not yet obtained pulleys.    Precautions   Fall risk      Pain   6/10 across low back    Objective   Treatments:  Therapeutic Exercise (10883):  10 minutes  In sitting performed PROM for shoulder flexion 2 x 10 reps  Seated right ankle dorsiflexion x 20 reps  Neuromuscular Re-education (93322): 35 minutes  -Within // bars forward and retro walking initially with both hands and then just left UE support for a few laps with cga.  -Placed sticks down and walked forward over sticks leading with right LE and emphasis on heel strike multiple reps.  -side stepping over the sticks  with bilat UE  support.    -static standing with NBOS EO and also mod tandem stand EO with close cga.    From HHCPT HEP:   Seated marching  Seated hip ABD with TB   Side stepping seated  Standing squats  Side stepping standing  Standing hip flexion  Standing hip EXT   Standing HS curl  Arm lifts with 1# weight  Comments: does have adjustable ankle weights up to 5#. Currently using a 1# ankle weight  Pulleys flexion/scaption in sitting  Supine AAROM flexion with dowel rogelio       Goals:  By discharge, pt will meet the following goals:     Pt will demo decrease in TUG score by 5 to decrease fall risk.  Pt will demo decrease in BBS score by 9 to decrease fall risk   Pt will increase BL LE strengthen to 4+/5 in order to improve functional activities.

## 2024-10-01 NOTE — PROGRESS NOTES
Physical Therapy    Physical Therapy Treatment    Patient Name: Khalida Barker  MRN: [id number]  Today's Date: 10/2/2024    Time Entry:   Time Calculation  Start Time: 1234  Stop Time: 1321  Time Calculation (min): 47 min     PT Therapeutic Procedures Time Entry  Manual Therapy Time Entry: 10  Neuromuscular Re-Education Time Entry: 15  Therapeutic Exercise Time Entry: 10  Gait Training Time Entry: 12                 Visit #4 (3 of 10)   [employer] MEDICARE BMN PT OT COPAY 30 DED 0 COVERAGE 100 OOP 0 NO AUTH REQ, REF# [id number]/ALL     Assessment:   Pt tolerated session well without adverse effect and is making progress toward goals. Pt gained ~25 deg of R shoulder elevation AAROM following manual therapy. She initially had difficulty with bwd walking due to retropulsion and responds well to cues to improve fwd weight shift. She was able to walk 113ft with SPC with CGA to MIN A of therapist. Pt will continue to benefit from PT to address her R shoulder ROM, generalized functional mobility, etc.     Plan:   Continue GHJ mobilizations for R shoulder.   Continue gait training with SPC & without device- may try harnessed for less support & longer durations  Wobble board & other AP weight shifting control drills   Core exercises: paloff presses with TB, modified planks at wall, standing isometric trunk holds against resistance of therapist all planes    Current Problem  1. Difficulty walking        2. Thoracogenic scoliosis of thoracolumbar region  Follow Up In Physical Therapy      3. Falls frequently        4. Right foot drop        5. Shoulder stiffness, right        6. Unsteadiness on feet          General   Pt arrives at session with her , Quinn, reporting she has noticed she has been losing her hair. Asked her provider about one of her medications to see if this is a cause. Started supplementing with biotin. Arrives at session wearing her thoracolumbar brace. Reports she has LBP ranked at 4/10. Is to  see her her surgeon in 2 days.   No new falls.   Has been trying to perform her shoulder HEP.   Is working on 1# dumbbell rows & shoulder extension/tricep extension with 1#.   Reports she keeps forgetting about pulleys- has not yet obtained them.   Has been working on UE strengthening daily.        Subjective    Precautions  Precautions  STEADI Fall Risk Score (The score of 4 or more indicates an increased risk of falling): 9  Vital Signs     Pain  Pain Assessment  Pain Assessment: 0-10  0-10 (Numeric) Pain Score: 4  Pain Location: Back  Pain Orientation: Lower    Objective    R shoulder ROM:   AAROM: 100 deg with table slide   AAROM: 100 deg with dowel rogelio in supine --> 125 deg after mobilizations     AROM: 108 deg in sitting     Treatments:  Therapeutic Exercise (66333): 10 minutes  Objective measures, as above.     Reviewed Current HEP:  Pulleys flexion/scaption in sitting  Supine AAROM flexion with dowel rogelio     From HHCPT HEP:   Seated marching  Seated hip ABD with TB   Side stepping seated  Standing squats  Side stepping standing  Standing hip flexion  Standing hip EXT   Standing HS curl  Arm lifts with 1# weight  Comments: does have adjustable ankle weights up to 5#. Currently using a 1# ankle weight    Manual Therapy (20091): 10 minutes  Joint mobilizations including axial distraction & inferior glides, AP glides to R GHJ. Pt in supine 3x10 reps each at grades 3-4. Pt in supine.      Neuromuscular Re-education (59228): 15 minutes  Walking fwd/bwd in // bars without device, multiple laps. Cues to improve fwd weight shift with retrowalking (initially retropulsive)     Gait Training (70543): 12 minutes  Pt ambulated with SPC [id number] with CGA to MIN A at gait belt posteriorly.      OP EDUCATION:       Goals:  By discharge, pt will meet the following goals:      Pt will demo decrease in TUG score by 5 to decrease fall risk.  Pt will demo decrease in BBS score by 9 to decrease fall risk   Pt will increase BL LE  strengthen to 4+/5 in order to improve functional activities.

## 2024-10-02 ENCOUNTER — TREATMENT (OUTPATIENT)
Dept: PHYSICAL THERAPY | Facility: CLINIC | Age: 74
End: 2024-10-02
Payer: MEDICARE

## 2024-10-02 DIAGNOSIS — R29.6 FALLS FREQUENTLY: ICD-10-CM

## 2024-10-02 DIAGNOSIS — R26.81 UNSTEADINESS ON FEET: ICD-10-CM

## 2024-10-02 DIAGNOSIS — M41.35 THORACOGENIC SCOLIOSIS OF THORACOLUMBAR REGION: ICD-10-CM

## 2024-10-02 DIAGNOSIS — M25.611 SHOULDER STIFFNESS, RIGHT: ICD-10-CM

## 2024-10-02 DIAGNOSIS — R26.2 DIFFICULTY WALKING: Primary | ICD-10-CM

## 2024-10-02 DIAGNOSIS — M21.371 RIGHT FOOT DROP: ICD-10-CM

## 2024-10-02 PROCEDURE — 97112 NEUROMUSCULAR REEDUCATION: CPT | Mod: GP

## 2024-10-02 PROCEDURE — 97110 THERAPEUTIC EXERCISES: CPT | Mod: GP

## 2024-10-02 PROCEDURE — 97116 GAIT TRAINING THERAPY: CPT | Mod: GP

## 2024-10-02 ASSESSMENT — PAIN SCALES - GENERAL: PAINLEVEL_OUTOF10: 4

## 2024-10-02 ASSESSMENT — PAIN - FUNCTIONAL ASSESSMENT: PAIN_FUNCTIONAL_ASSESSMENT: 0-10

## 2024-10-04 ENCOUNTER — APPOINTMENT (OUTPATIENT)
Facility: CLINIC | Age: 74
End: 2024-10-04
Payer: MEDICARE

## 2024-10-04 VITALS
HEART RATE: 87 BPM | HEIGHT: 65 IN | WEIGHT: 155 LBS | DIASTOLIC BLOOD PRESSURE: 72 MMHG | BODY MASS INDEX: 25.83 KG/M2 | SYSTOLIC BLOOD PRESSURE: 124 MMHG

## 2024-10-04 DIAGNOSIS — M41.35 THORACOGENIC SCOLIOSIS OF THORACOLUMBAR REGION: Primary | ICD-10-CM

## 2024-10-04 PROCEDURE — 1036F TOBACCO NON-USER: CPT | Performed by: STUDENT IN AN ORGANIZED HEALTH CARE EDUCATION/TRAINING PROGRAM

## 2024-10-04 PROCEDURE — 1159F MED LIST DOCD IN RCRD: CPT | Performed by: STUDENT IN AN ORGANIZED HEALTH CARE EDUCATION/TRAINING PROGRAM

## 2024-10-04 PROCEDURE — 3008F BODY MASS INDEX DOCD: CPT | Performed by: STUDENT IN AN ORGANIZED HEALTH CARE EDUCATION/TRAINING PROGRAM

## 2024-10-04 PROCEDURE — 1125F AMNT PAIN NOTED PAIN PRSNT: CPT | Performed by: STUDENT IN AN ORGANIZED HEALTH CARE EDUCATION/TRAINING PROGRAM

## 2024-10-04 PROCEDURE — 1123F ACP DISCUSS/DSCN MKR DOCD: CPT | Performed by: STUDENT IN AN ORGANIZED HEALTH CARE EDUCATION/TRAINING PROGRAM

## 2024-10-04 PROCEDURE — 99214 OFFICE O/P EST MOD 30 MIN: CPT | Performed by: STUDENT IN AN ORGANIZED HEALTH CARE EDUCATION/TRAINING PROGRAM

## 2024-10-04 ASSESSMENT — PATIENT HEALTH QUESTIONNAIRE - PHQ9
8. MOVING OR SPEAKING SO SLOWLY THAT OTHER PEOPLE COULD HAVE NOTICED. OR THE OPPOSITE, BEING SO FIGETY OR RESTLESS THAT YOU HAVE BEEN MOVING AROUND A LOT MORE THAN USUAL: NOT AT ALL
6. FEELING BAD ABOUT YOURSELF - OR THAT YOU ARE A FAILURE OR HAVE LET YOURSELF OR YOUR FAMILY DOWN: NOT AT ALL
3. TROUBLE FALLING OR STAYING ASLEEP: NOT AT ALL
4. FEELING TIRED OR HAVING LITTLE ENERGY: NOT AT ALL
2. FEELING DOWN, DEPRESSED OR HOPELESS: NEARLY EVERY DAY
9. THOUGHTS THAT YOU WOULD BE BETTER OFF DEAD, OR OF HURTING YOURSELF: NOT AT ALL
SUM OF ALL RESPONSES TO PHQ9 QUESTIONS 1 & 2: 6
SUM OF ALL RESPONSES TO PHQ QUESTIONS 1-9: 7
10. IF YOU CHECKED OFF ANY PROBLEMS, HOW DIFFICULT HAVE THESE PROBLEMS MADE IT FOR YOU TO DO YOUR WORK, TAKE CARE OF THINGS AT HOME, OR GET ALONG WITH OTHER PEOPLE: SOMEWHAT DIFFICULT
1. LITTLE INTEREST OR PLEASURE IN DOING THINGS: NEARLY EVERY DAY
7. TROUBLE CONCENTRATING ON THINGS, SUCH AS READING THE NEWSPAPER OR WATCHING TELEVISION: SEVERAL DAYS
5. POOR APPETITE OR OVEREATING: NOT AT ALL

## 2024-10-04 ASSESSMENT — PAIN SCALES - GENERAL: PAINLEVEL: 6

## 2024-10-04 NOTE — PROGRESS NOTES
[facility] [place]  Department of Neurological Surgery  Established Patient Visit    History of Present Illness:  Khalida Barker is a 74 y.o. year old female who presents to the spine clinic in follow up with low back pain and T9 compression fracture. She is 4-5 months s/p T11-12 decompression and L2-S1 fusion on 5/14/2024. History of lumbar deformity at T10-pelvis. X-rays and CT scan showed a T9 compression fracture directly above her surgery. The fracture radiates into the sides of the ribs causing pain. She states this has been not getting better. She reports that she takes tramadol only when she really needs it. She has been wearing a back brace and performing PT for the last 4-6 weeks.    Since her last visit, her pain has improved. She has been wearing her back brace religiously. Still has some low back pain. Can walk with walker for up to 200 feet now.     Patient's BMI is Body mass index is 25.79 kg/m².    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    Muscle weakness (generalized)    Positive colorectal cancer screening using Cologuard test    Primary osteoarthritis of right knee    Seborrheic keratoses    Tinnitus    Vitamin D deficiency    Left sided abdominal pain    BMI 30.0-30.9,adult    Cervicalgia    Cervical paraspinal muscle spasm    Non-smoker    Right knee pain    Knee stiffness, right    Chronic left shoulder pain    Neck pain    Thoracogenic scoliosis of thoracolumbar region    Scoliosis deformity of spine    Anxiety    C6 radiculopathy    Carpal tunnel syndrome    Low back pain, unspecified    Closed fracture of distal end of right radius    Weakness    Closed fracture of  distal end of right radius, unspecified fracture morphology, initial encounter    Acute blood loss as cause of postoperative anemia    Dysphagia    Dehydration    Ankylosing spondylitis of multiple sites in spine (Multi)    Dysphagia, unspecified    Difficulty walking    Right foot drop    Shoulder stiffness, right    Unsteadiness on feet    Falls frequently     Past Medical History:   Diagnosis Date    Ankylosis of sacroiliac joint 06/04/2024    Arthritis     Cervicalgia     Colon polyp     CTS (carpal tunnel syndrome)     Diverticulitis 06/27/2023    Hallux valgus, acquired 01/19/2013    Hiatal hernia     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     OA (osteoarthritis) of knee     Painful scar 09/20/2017    Peripheral neuropathy     Scoliosis     Spinal stenosis     Traumatic ecchymosis of foot 01/19/2013    Trigger finger of right hand     Vision loss     wears glasses    Vitamin D deficiency      Past Surgical History:   Procedure Laterality Date    ADENOIDECTOMY  1956    BREAST BIOPSY  1999    COLONOSCOPY      KNEE ARTHROPLASTY Right     MOUTH SURGERY  2016    Back left molar removed    MOUTH SURGERY  09/28/2021    right molar    OTHER SURGICAL HISTORY      Skin lesion excision    SPINAL FUSION  06/2010    C4-5    TONSILLECTOMY  1956    WISDOM TOOTH EXTRACTION  1978    lower     Social History     Tobacco Use    Smoking status: Never    Smokeless tobacco: Never   Substance Use Topics    Alcohol use: Not Currently     Comment: rarely     family history includes Alzheimer's disease in her mother; Heart disease in her father; Hypertension in her brother; Polymyositis in her father; Skin cancer in her brother; peripheral artery disease in her brother.    Current Outpatient Medications:     acetaminophen (Tylenol) 500 mg tablet, Take 1 tablet (500 mg) by mouth every 8 hours if needed for mild pain (1 - 3) or moderate pain (4 - 6) for up to 30 doses., Disp: 30 tablet, Rfl: 0    calcium carbonate 195 mg  calcium (500 mg) tablet,chewable, Chew 1 tablet once daily., Disp: , Rfl:     cholecalciferol (Vitamin D3) 50 mcg (2,000 unit) capsule, Take 1 capsule (50 mcg) by mouth once daily., Disp: , Rfl:     multivitamin (One Daily Multivitamin) tablet, Take 1 tablet by mouth once daily., Disp: , Rfl:     traMADol (Ultram) 50 mg tablet, Take 1 tablet (50 mg) by mouth every 6 hours if needed., Disp: , Rfl:     hyoscyamine (Anaspaz) 0.125 mg disintegrating tablet, Take 1 tablet (0.125 mg) by mouth 3 times a day., Disp: 90 tablet, Rfl: 0  Allergies   Allergen Reactions    Oxycodone Itching     Previously tolerated Percocet     Tramadol Itching       Physical Examination:    General: Well developed, awake/alert/oriented x3, no distress, alert and cooperative  Skin: Warm and dry, no lesions, no rashes  ENMT: Mucous membranes moist, no apparent injury, no lesions seen  Head/Neck: Neck Supple, no apparent injury  Respiratory/Thorax: Normal breath sounds with good chest expansion, thorax symmetric  Cardiovascular: No pitting edema, no JVD    Motor Strength: 5/5 Throughout all extremities    Muscle Bulk: Normal and symmetric in all extremities    Posture:   -- Cervical: Normal  -- Thoracic: Normal  -- Lumbar : Normal  Paraspinal muscle spasm/tenderness absent.     Sensation: intact to light touch    Neuropathy in feet in L5 S1 distribution  Using a walker for ambulation  Wearing TLSO brace    Results:  I personally reviewed and interpreted the imaging results which included x-rays showing hardware in good position. Loosency around the T10 screws but fracture at T9 is completely healed.    Assessment and Plan:      Khalida Barker is a 74 y.o. year old female who presents to the spine clinic in follow up with 4-5 months s/p T11-12 decompression and L2-S1 fusion on 5/14/2024. At this point, we will take her brace off. She will continue with tramadol as needed for pain and continue with PT. I will see her back in 3 months.      I have  reviewed all prior documentation and reviewed the electronic medical record since admission. I have personally have reviewed all advanced imaging not just the reports and used my interpretation as documented as the relevant findings. I have reviewed the risks and benefits of all treatment recommendations listed in this note with the patient and family. I spent a total of 45 minutes in service to this patient's care during this date of service.      The above clinical summary has been dictated with voice recognition software. It has not been proofread for grammatical errors, typographical mistakes, or other semantic inconsistencies.    Thank you for visiting our office today. It was our pleasure to take part in your healthcare.     Do not hesitate to call with any questions regarding your plan of care after leaving at [phone] M-F 8am-4pm.     To clinicians, thank you very much for this kind referral. It is a privilege to partner with you in the care of your patients. My office would be delighted to assist you with any further consultations or with questions regarding the plan of care outlined. Do not hesitate to call the office or contact me directly.       Sincerely,      uJstin Lu MD, [facility]  Spine , [facility]  [place] Art Chair in Spinal Neurosurgery  Complex Spine Surgery Fellowship Director   of Neurological Surgery  [facility] [employer]  Phone: [phone]  Fax: [phone]        Scribe Attestation  By signing my name below, I, Malihaanny Abrams , Co[place]   attest that this documentation has been prepared under the direction and in the presence of Justin Lu MD.

## 2024-10-08 ENCOUNTER — TREATMENT (OUTPATIENT)
Dept: PHYSICAL THERAPY | Facility: CLINIC | Age: 74
End: 2024-10-08
Payer: MEDICARE

## 2024-10-08 ENCOUNTER — PATIENT OUTREACH (OUTPATIENT)
Dept: CARDIOLOGY | Facility: CLINIC | Age: 74
End: 2024-10-08
Payer: MEDICARE

## 2024-10-08 DIAGNOSIS — R26.2 DIFFICULTY WALKING: Primary | ICD-10-CM

## 2024-10-08 DIAGNOSIS — M25.611 SHOULDER STIFFNESS, RIGHT: ICD-10-CM

## 2024-10-08 DIAGNOSIS — R26.81 UNSTEADINESS ON FEET: ICD-10-CM

## 2024-10-08 DIAGNOSIS — R29.6 FALLS FREQUENTLY: ICD-10-CM

## 2024-10-08 DIAGNOSIS — M41.35 THORACOGENIC SCOLIOSIS OF THORACOLUMBAR REGION: ICD-10-CM

## 2024-10-08 DIAGNOSIS — M21.371 RIGHT FOOT DROP: ICD-10-CM

## 2024-10-08 PROCEDURE — 97140 MANUAL THERAPY 1/> REGIONS: CPT | Mod: GP,CQ

## 2024-10-08 PROCEDURE — 97112 NEUROMUSCULAR REEDUCATION: CPT | Mod: GP,CQ

## 2024-10-08 PROCEDURE — 97110 THERAPEUTIC EXERCISES: CPT | Mod: GP,CQ

## 2024-10-08 NOTE — PROGRESS NOTES
Physical Therapy    Physical Therapy Treatment    Patient Name: Khalida Barker  MRN: [id number]  Today's Date: 10/8/2024    Time Entry:   Time Calculation  Start Time: 0948  Stop Time: 1030  Time Calculation (min): 42 min     PT Therapeutic Procedures Time Entry  Manual Therapy Time Entry: 10  Neuromuscular Re-Education Time Entry: 22  Therapeutic Exercise Time Entry: 10                 Visit #5 (4 of 10)   [employer] MED[employer]ARE BMN PT OT COPAY 30 DED 0 COVERAGE 100 OOP 0 NO AUTH REQ, REF# [id number]     Assessment:   Pt tolerated session well without adverse effect and is making progress toward goals.  Pt. Did well in the harness and she improved the more she walked using the cane.      Pt will continue to benefit from PT to address her R shoulder ROM, generalized functional mobility, etc.     Plan:  Possibly update HEP, has 1# to 5# pound ankle weights.   Continue GHJ mobilizations for R shoulder.   Continue gait training with SPC & without device- may try harnessed for less support & longer durations  Wobble board & other AP weight shifting control drills   Core exercises: paloff presses with TB, modified planks at wall, standing isometric trunk holds against resistance of therapist all planes    Current Problem  1. Difficulty walking        2. Right foot drop        3. Shoulder stiffness, right        4. Unsteadiness on feet        5. Falls frequently        6. Thoracogenic scoliosis of thoracolumbar region  Follow Up In Physical Therapy        General   Pt. States she  saw the surgeon last Friday. He reports T9  is healing but there is loosening of the screws around T10, pt. States the surgeon was not concerned.  She is to schedule a full body scan downtown. She longer needs to wear her back brace.   She did receive the pulleys yesterday.     Subjective     Precautions  Precautions  STEADI Fall Risk Score (The score of 4 or more indicates an increased risk of falling): 9  Pain   Lower back from hip  to hip 5/10, upper back is sore.      Treatments:  Manual Therapy (23746): 10 minutes  Joint mobilizations including axial distraction & inferior glides, AP glides to R GHJ. Pt in supine 3x10 reps each at grades 2-3.   PROM right shoulder into flexion x 10 reps.    Neuromuscular Re-education (42823):  22 minutes  In harness system:  Walking with SPC 8-10 laps with cga to vest.  Pt. With good sequencing, working on posture.   Side stepping with SPC x 2 laps   Time spent donning/doffing harness vest.  Did have pt. Walk using the wheeled walker to get used to being in the harness system x 4 laps.    Therapeutic Exercise (33136): 10 minutes  Verbally reviewed HEP and we will review further next session.  Pt. Has ankle weights and wondering if she can add weights for home.      Current HEP:  Pulleys flexion/scaption in sitting  Supine AAROM flexion with dowel rogeilo     From CPT HEP:   Seated marching  Seated hip ABD with TB   Side stepping seated  Standing squats  Side stepping standing  Standing hip flexion  Standing hip EXT   Standing HS curl  Arm lifts with 1# weight  Comments: does have adjustable ankle weights up to 5#. Currently using a 1# ankle weight      Goals:  By discharge, pt will meet the following goals:      Pt will demo decrease in TUG score by 5 to decrease fall risk.  Pt will demo decrease in BBS score by 9 to decrease fall risk   Pt will increase BL LE strengthen to 4+/5 in order to improve functional activities.

## 2024-10-10 ENCOUNTER — APPOINTMENT (OUTPATIENT)
Dept: PRIMARY CARE | Facility: CLINIC | Age: 74
End: 2024-10-10
Payer: MEDICARE

## 2024-10-10 VITALS
SYSTOLIC BLOOD PRESSURE: 116 MMHG | DIASTOLIC BLOOD PRESSURE: 74 MMHG | HEIGHT: 65 IN | BODY MASS INDEX: 24.93 KG/M2 | TEMPERATURE: 98.1 F | HEART RATE: 79 BPM | WEIGHT: 149.6 LBS

## 2024-10-10 DIAGNOSIS — M41.35 THORACOGENIC SCOLIOSIS OF THORACOLUMBAR REGION: Primary | ICD-10-CM

## 2024-10-10 DIAGNOSIS — K64.9 HEMORRHOIDS, UNSPECIFIED HEMORRHOID TYPE: ICD-10-CM

## 2024-10-10 DIAGNOSIS — M21.371 RIGHT FOOT DROP: ICD-10-CM

## 2024-10-10 DIAGNOSIS — Z78.9 NEVER SMOKED CIGARETTES: ICD-10-CM

## 2024-10-10 DIAGNOSIS — R26.2 DIFFICULTY WALKING: ICD-10-CM

## 2024-10-10 PROCEDURE — 99213 OFFICE O/P EST LOW 20 MIN: CPT | Performed by: FAMILY MEDICINE

## 2024-10-10 PROCEDURE — 1036F TOBACCO NON-USER: CPT | Performed by: FAMILY MEDICINE

## 2024-10-10 PROCEDURE — 1160F RVW MEDS BY RX/DR IN RCRD: CPT | Performed by: FAMILY MEDICINE

## 2024-10-10 PROCEDURE — 1159F MED LIST DOCD IN RCRD: CPT | Performed by: FAMILY MEDICINE

## 2024-10-10 PROCEDURE — 1123F ACP DISCUSS/DSCN MKR DOCD: CPT | Performed by: FAMILY MEDICINE

## 2024-10-10 PROCEDURE — 3008F BODY MASS INDEX DOCD: CPT | Performed by: FAMILY MEDICINE

## 2024-10-10 RX ORDER — HYDROCORTISONE ACETATE 25 MG/1
25 SUPPOSITORY RECTAL DAILY
Qty: 10 SUPPOSITORY | Refills: 1 | Status: SHIPPED | OUTPATIENT
Start: 2024-10-10

## 2024-10-10 ASSESSMENT — PATIENT HEALTH QUESTIONNAIRE - PHQ9
2. FEELING DOWN, DEPRESSED OR HOPELESS: SEVERAL DAYS
SUM OF ALL RESPONSES TO PHQ9 QUESTIONS 1 AND 2: 1
10. IF YOU CHECKED OFF ANY PROBLEMS, HOW DIFFICULT HAVE THESE PROBLEMS MADE IT FOR YOU TO DO YOUR WORK, TAKE CARE OF THINGS AT HOME, OR GET ALONG WITH OTHER PEOPLE: NOT DIFFICULT AT ALL
1. LITTLE INTEREST OR PLEASURE IN DOING THINGS: NOT AT ALL

## 2024-10-10 NOTE — PROGRESS NOTES
Subjective Khalida is here accompanied by her  for follow-up on her dysphagia and low back pain.  She states that her dysphagia has resolved and she is now able to eat most foods without difficulty with her swallowing.  She follows up with gastroenterology regarding this.  She does have some postnasal drainage which occasionally does cause some phlegm buildup in the back of her throat.  She had been on Flonase in the past.  Her low back pain is very slightly improving.  She is status post lumbar surgery as well as T9 compression fracture complicated by MRSA infection.  She is just beginning physical therapy at this point.  She is still using a walker for ambulation assistance.  The patient has noticed some hair loss.  She read that omeprazole may cause this and she discontinued this medication.  She also has a chronic hemorrhoid that has been slightly painful but has not been bleeding at the present time.  She continues on her meds as noted she does follow-up with neurosurgery.    Patient ID: Khalida Barker is a 74 y.o. female who presents for Follow-up (1m follow up - pt has poss hemorrhoid and would like to know who to see for that, has some hair loss in the last 3 weeks poss from taking omeprazole so she has stopped taking it a week ago):    Problem List Items Addressed This Visit    None     Past Medical History:   Diagnosis Date    Ankylosis of sacroiliac joint 06/04/2024    Arthritis     Cervicalgia     Colon polyp     CTS (carpal tunnel syndrome)     Diverticulitis 06/27/2023    Hallux valgus, acquired 01/19/2013    Hiatal hernia     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     OA (osteoarthritis) of knee     Painful scar 09/20/2017    Peripheral neuropathy     Scoliosis     Spinal stenosis     Traumatic ecchymosis of foot 01/19/2013    Trigger finger of right hand     Vision loss     wears glasses    Vitamin D deficiency       Past Surgical History:   Procedure Laterality Date    ADENOIDECTOMY   1956    BACK SURGERY  05/14/2024    spine up to T10    BREAST BIOPSY  1999    COLONOSCOPY      KNEE ARTHROPLASTY Right     MOUTH SURGERY  2016    Back left molar removed    MOUTH SURGERY  09/28/2021    right molar    OTHER SURGICAL HISTORY      Skin lesion excision    SPINAL FUSION  06/2010    C4-5    TONSILLECTOMY  1956    WISDOM TOOTH EXTRACTION  1978    lower    WRIST SURGERY Right 06/2024      Family History   Problem Relation Name Age of Onset    Alzheimer's disease Mother      Heart disease Father      Polymyositis Father      Hypertension Brother      Skin cancer Brother      Other (peripheral artery disease) Brother        Social History     Socioeconomic History    Marital status:      Spouse name: Not on file    Number of children: Not on file    Years of education: Not on file    Highest education level: Not on file   Occupational History    Not on file   Tobacco Use    Smoking status: Never    Smokeless tobacco: Never   Vaping Use    Vaping status: Never Used   Substance and Sexual Activity    Alcohol use: Not Currently     Comment: rarely    Drug use: Never    Sexual activity: Defer   Other Topics Concern    Not on file   Social History Narrative    Not on file     Social Determinants of Health     Financial Resource Strain: Low Risk  (7/21/2024)    Overall Financial Resource Strain (CARDIA)     Difficulty of Paying Living Expenses: Not very hard   Food Insecurity: Patient Declined (6/18/2024)    [facility]    Hunger Vital Sign     Worried About Running Out of Food in the Last Year: Patient declined     Ran Out of Food in the Last Year: Patient declined   Transportation Needs: No Transportation Needs (7/22/2024)    PRAPARE - Transportation     Lack of Transportation (Medical): No     Lack of Transportation (Non-Medical): No   Physical Activity: Not on file   Stress: No Stress Concern Present (7/1/2024)    Received from [facility]    [place]  [place] [employer] - Occupational Stress Questionnaire     Feeling of Stress : Not at all   Social Connections: Patient Declined (6/18/2024)    Received from [facility], [facility]    Social Connection and Isolation Panel [NHANES]     Frequency of Communication with Friends and Family: Patient declined     Frequency of Social Gatherings with Friends and Family: Patient declined     Attends Voodoo Services: Patient declined     Active Member of Clubs or Organizations: Patient declined     Attends Club or Organization Meetings: Patient declined     Marital Status: Patient declined   Intimate Partner Violence: Not At Risk (6/15/2024)    Received from [facility], [facility]    Domestic Abuse Assessment     Do you feel safe in your relationships at home?: Yes     Physical Abuse: Denies     [facility] Domestic Abuse - Type of Abuse: Not on file     [facility] Domestic Abuse - Time Frame: Not on file     [facility] Domestic Abuse - Signs and Symptoms: Not on file     Verbal Abuse: Denies     [facility] Domestic Abuse - Reported To: Not on file   Housing Stability: Low Risk  (7/22/2024)    Housing Stability Vital Sign     Unable to Pay for Housing in the Last Year: No     Number of Times Moved in the Last Year: 1     Homeless in the Last Year: No      Oxycodone and Tramadol   Current Outpatient Medications   Medication Sig Dispense Refill    acetaminophen (Tylenol) 500 mg tablet Take 1 tablet (500 mg) by mouth every 8 hours if needed for mild pain (1 - 3) or moderate pain (4 - 6) for up to 30 doses. 30 tablet 0    B complex-vitamin C-folic acid (Nephro-Jayson Rx) 1- mg-mg-mcg tablet Take 1 tablet by mouth once daily with breakfast.      cholecalciferol (Vitamin D3) 50 mcg (2,000 unit) capsule Take 1 capsule (50 mcg) by mouth once daily.      hyoscyamine (Anaspaz) 0.125 mg disintegrating tablet Take 1 tablet (0.125 mg) by mouth 3 times a day. 90 tablet 0    multivitamin (One Daily Multivitamin) tablet Take 1  tablet by mouth once daily.      traMADol (Ultram) 50 mg tablet Take 1 tablet (50 mg) by mouth every 6 hours if needed.      calcium carbonate 195 mg calcium (500 mg) tablet,chewable Chew 1 tablet once daily. (Patient not taking: Reported on 10/10/2024)       No current facility-administered medications for this visit.       Immunization History   Administered Date(s) Administered    Flu vaccine, quadrivalent, high-dose, preservative free, age 65y+ (FLUZONE) 12/07/2020    Flu vaccine, trivalent, preservative free, HIGH-DOSE, age 65y+ (Fluzone) 11/16/2017, 10/11/2022    Influenza, Seasonal, Quadrivalent, Adjuvanted 10/20/2023    Influenza, Unspecified 10/24/2019, 11/08/2021, 10/11/2022    Influenza, seasonal, injectable 11/04/2016    Moderna COVID-19 vaccine, 12 years and older (50mcg/0.5mL)(Spikevax) 10/06/2023    Moderna COVID-19 vaccine, bivalent, blue cap/gray label *Check age/dose* 09/08/2022    Moderna SARS-CoV-2 Vaccination 02/11/2021, 03/11/2021, 11/02/2021, 04/14/2022    Pneumococcal Conjugate PCV 7 09/24/2018    Pneumococcal polysaccharide vaccine, 23-valent, age 2 years and older (PNEUMOVAX 23) 10/24/2019    Zoster vaccine, recombinant, adult (SHINGRIX) 09/23/2021, 12/10/2021        Review of Systems   Constitutional: Negative.    HENT:  Positive for postnasal drip.    Eyes: Negative.    Respiratory: Negative.     Cardiovascular: Negative.    Gastrointestinal: Negative.    Endocrine: Negative.    Genitourinary: Negative.    Musculoskeletal:  Positive for back pain and gait problem.   Skin: Negative.    Allergic/Immunologic: Negative.    Hematological: Negative.    Psychiatric/Behavioral: Negative.     All other systems reviewed and are negative.       Vitals:    10/10/24 1122   BP: 116/74   Pulse: 79   Temp: 36.7 °C (98.1 °F)     Vitals:    10/10/24 1122   Weight: 67.9 kg (149 lb 9.6 oz)      Physical Exam  Constitutional:       General: She is not in acute distress.     Appearance: Normal appearance.    Cardiovascular:      Rate and Rhythm: Normal rate and regular rhythm.      Pulses: Normal pulses.      Heart sounds: Normal heart sounds. No murmur heard.     No friction rub. No gallop.   Pulmonary:      Effort: Pulmonary effort is normal. No respiratory distress.      Breath sounds: Normal breath sounds. No wheezing or rales.   Neurological:      General: No focal deficit present.      Mental Status: She is alert and oriented to person, place, and time. Mental status is at baseline.          ASSESSMENT/PLAN: Dysphagia significantly improved.  Patient had discontinued omeprazole.  We discussed the potential for acid reflux to complicate her swallowing problems.  Recommended that she try Pepcid 20 mg twice daily if she stays off the omeprazole.  Also discussed that it is unlikely the omeprazole would cause hair loss.  Also discussed her chronic postnasal drainage.  Recommended she start Flonase daily to help with this.    Chronic low back pain status post lumbar surgery with unsteady gait.  Proceed with physical therapy as directed.  Continue to use walker as per PT instructions.  Follow-up with neurosurgery as recommended    Internal hemorrhoids that were also documented by colonoscopy 2 years ago.  Discussed the use of Anusol HC suppositories which the patient does not want to begin at the present time.  She will monitor her bowel movements and call if any worsening.  Recommended that she use Metamucil to soften her bowel movements.    Mild weight loss.  We discussed nutritional issues and the use of nutritional supplements such as boost or Ensure or [place] instant breakfast.  Eat diet as tolerated and monitor weight.    Follow-up in 2 months and call as needed       Scribe Attestation  By signing my name below, I, Patricia Martinez LPN, Scribe   attest that this documentation has been prepared under the direction and in the presence of Rocco Rushing MD.

## 2024-10-10 NOTE — PATIENT INSTRUCTIONS
Recommend OTC Pepcid 20mg two times daily   Recommend OTC Flonase or Nasacort daily  Recommend OTC calcium 1200 mg daily (decrease to 600 mg if 1200 mg constipating)    Recommend [place] Instant Breakfast  Hydrocortisone suppository sent to pharmacy  Recommend covid and flu vaccines as discussed  Return 2 months

## 2024-10-11 ENCOUNTER — TREATMENT (OUTPATIENT)
Dept: PHYSICAL THERAPY | Facility: CLINIC | Age: 74
End: 2024-10-11
Payer: MEDICARE

## 2024-10-11 DIAGNOSIS — M25.611 SHOULDER STIFFNESS, RIGHT: ICD-10-CM

## 2024-10-11 DIAGNOSIS — M21.371 RIGHT FOOT DROP: ICD-10-CM

## 2024-10-11 DIAGNOSIS — R26.81 UNSTEADINESS ON FEET: ICD-10-CM

## 2024-10-11 DIAGNOSIS — M41.35 THORACOGENIC SCOLIOSIS OF THORACOLUMBAR REGION: ICD-10-CM

## 2024-10-11 DIAGNOSIS — R29.6 FALLS FREQUENTLY: ICD-10-CM

## 2024-10-11 DIAGNOSIS — R26.2 DIFFICULTY WALKING: Primary | ICD-10-CM

## 2024-10-11 PROCEDURE — 97140 MANUAL THERAPY 1/> REGIONS: CPT | Mod: GP,CQ

## 2024-10-11 PROCEDURE — 97110 THERAPEUTIC EXERCISES: CPT | Mod: GP,CQ

## 2024-10-11 RX ORDER — TRAMADOL HYDROCHLORIDE 50 MG/1
50 TABLET ORAL EVERY 6 HOURS PRN
Qty: 28 TABLET | Refills: 0 | Status: SHIPPED | OUTPATIENT
Start: 2024-10-11

## 2024-10-11 ASSESSMENT — ENCOUNTER SYMPTOMS
RESPIRATORY NEGATIVE: 1
BACK PAIN: 1
ENDOCRINE NEGATIVE: 1
EYES NEGATIVE: 1
HEMATOLOGIC/LYMPHATIC NEGATIVE: 1
CARDIOVASCULAR NEGATIVE: 1
PSYCHIATRIC NEGATIVE: 1
ALLERGIC/IMMUNOLOGIC NEGATIVE: 1
CONSTITUTIONAL NEGATIVE: 1
GASTROINTESTINAL NEGATIVE: 1

## 2024-10-11 NOTE — PROGRESS NOTES
Physical Therapy    Physical Therapy Treatment    Patient Name: Khalida Barker  MRN: [id number]  Today's Date: 10/11/2024    Time Entry:   Time Calculation  Start Time: 0945  Stop Time: 1030  Time Calculation (min): 45 min     PT Therapeutic Procedures Time Entry  Manual Therapy Time Entry: 15  Therapeutic Exercise Time Entry: 30                 Visit #6 (5 of 10)   [employer] BMN PT OT COPAY 30 DED 0 COVERAGE 100 OOP 0 NO AUTH REQ, REF# [id number]/ALL     Assessment:   Pt tolerated session well without adverse effect and is making progress toward goals.  Pt. Reporting functional improvements with her arm as she is reporting improved reaching.  Pt. Will try her HEP using ankle weights for home.   Pt. Will improved posture while walking with her wheeled walker.  Pt will continue to benefit from PT to address her R shoulder ROM, generalized functional mobility, etc.     Plan:     Continue GHJ mobilizations for R shoulder.   Continue gait training with SPC & without device- may try harnessed for less support & longer durations  Wobble board & other AP weight shifting control drills   Core exercises: paloff presses with TB, modified planks at wall, standing isometric trunk holds against resistance of therapist all planes    Current Problem  1. Difficulty walking        2. Falls frequently        3. Right foot drop        4. Shoulder stiffness, right        5. Unsteadiness on feet        6. Thoracogenic scoliosis of thoracolumbar region  Follow Up In Physical Therapy        General   Pt. States she has lost 5# and she thinks its water weight from her swelling.  Pt. Notices an improvement with her reaching.    Subjective     Precautions  Precautions  STEADI Fall Risk Score (The score of 4 or more indicates an increased risk of falling): 9  Pain   Lower back from hip to hip 4/10, upper back is sore.      Treatments:  Manual Therapy (79172): 15 minutes  Joint mobilizations including axial distraction &  inferior glides, AP glides to R GHJ. Pt in supine 3x10 reps each at grades 2-3.   PROM right shoulder into flexion x 10 reps.    Measured AAROM with dowel rogelio to 125 degrees flexion in supine.    Therapeutic Exercise ([id number]): 30 minutes  Reviewed HEP using ankle weights today.  -LAQ's 2# 2 x 10 reps  -reviewed and performed standing exercises of marching, hip abduction, hip extension and hamstring curls all with 2#  bilat with verbal cues on technique and to slow down.  -seated hip abduction increased to green t-band along with seated ankle PF with green t-band each x 10 reps.  Given green t-band for home.  Pt. Will try 2# ankle weights for home.         Current HEP:  Pulleys flexion/scaption in sitting  Supine AAROM flexion with dowel rogelio     From [facility] HEP:   Seated marching  Seated hip ABD with TB   Side stepping seated  Standing squats  Side stepping standing  Standing hip flexion  Standing hip EXT   Standing HS curl  Arm lifts with 1# weight  Comments: does have adjustable ankle weights up to 5#. Currently using a 1# ankle weight      Goals:  By discharge, pt will meet the following goals:      Pt will demo decrease in TUG score by 5 to decrease fall risk.  Pt will demo decrease in BBS score by 9 to decrease fall risk   Pt will increase BL LE strengthen to 4+/5 in order to improve functional activities.

## 2024-10-15 ENCOUNTER — TREATMENT (OUTPATIENT)
Dept: PHYSICAL THERAPY | Facility: CLINIC | Age: 74
End: 2024-10-15
Payer: MEDICARE

## 2024-10-15 DIAGNOSIS — R26.2 DIFFICULTY WALKING: Primary | ICD-10-CM

## 2024-10-15 DIAGNOSIS — M21.371 RIGHT FOOT DROP: ICD-10-CM

## 2024-10-15 DIAGNOSIS — R26.81 UNSTEADINESS ON FEET: ICD-10-CM

## 2024-10-15 DIAGNOSIS — M25.611 SHOULDER STIFFNESS, RIGHT: ICD-10-CM

## 2024-10-15 DIAGNOSIS — R29.6 FALLS FREQUENTLY: ICD-10-CM

## 2024-10-15 DIAGNOSIS — M41.35 THORACOGENIC SCOLIOSIS OF THORACOLUMBAR REGION: ICD-10-CM

## 2024-10-15 PROCEDURE — 97110 THERAPEUTIC EXERCISES: CPT | Mod: GP,CQ

## 2024-10-15 PROCEDURE — 97116 GAIT TRAINING THERAPY: CPT | Mod: GP,CQ

## 2024-10-15 PROCEDURE — 97140 MANUAL THERAPY 1/> REGIONS: CPT | Mod: GP,CQ

## 2024-10-15 NOTE — PROGRESS NOTES
Physical Therapy    Physical Therapy Treatment    Patient Name: Khalida Barker  MRN: [id number]  Today's Date: 10/15/2024    Time Entry:   Time Calculation  Start Time: 0947  Stop Time: 1028  Time Calculation (min): 41 min     PT Therapeutic Procedures Time Entry  Manual Therapy Time Entry: 15  Therapeutic Exercise Time Entry: 15  Gait Training Time Entry: 11                 Visit #7 (6 of 10)   [employer] BMN PT OT COPAY 30 DED 0 COVERAGE 100 OOP 0 NO AUTH REQ, REF# [id number]/ALL     Assessment:   Pt tolerated session well without adverse effect and is making progress toward goals.  Pt enjoyed using the pulleys and will try to get her pulleys set up for home.  She did well using the cane with no balance loss noted and good foot clearance.  Pt will continue to benefit from PT to address her R shoulder ROM, generalized functional mobility, etc.     Plan:     Continue GHJ mobilizations for R shoulder.   Continue gait training with SPC & without device- may try harnessed for less support & longer durations  Wobble board & other AP weight shifting control drills   Core exercises: paloff presses with TB, modified planks at wall, standing isometric trunk holds against resistance of therapist all planes    Current Problem  1. Difficulty walking        2. Falls frequently        3. Right foot drop        4. Shoulder stiffness, right        5. Unsteadiness on feet        6. Thoracogenic scoliosis of thoracolumbar region  Follow Up In Physical Therapy          General   Pt. States right low  to touch, had increased pain the other night which was helped with pain meds.    Subjective     Precautions  Precautions  STEADI Fall Risk Score (The score of 4 or more indicates an increased risk of falling): 9  Pain   Lower back concentrated on right  side 4/10      Treatments:  Manual Therapy ([id number]): 15 minutes  Joint mobilizations including axial distraction & inferior glides, AP glides to R GHJ. Pt in  supine 3x10 reps each at grades 2-3.   PROM right shoulder into flexion x 10 reps.        Therapeutic Exercise ([id number]): 15 minutes  Seated pulleys for shoulder flexion about 20 reps  Supine wand shoulder flexion using dowel rogelio x 10 reps    Gait Training (32650): 11 minutes   Gait training with ' and 35' with cga.  Pt. Did a nice job with posture and working on looking straight ahead versus the ground.  No balance loss, and good foot clearance noted on right.       Current HEP:  Pulleys flexion/scaption in sitting  Supine AAROM flexion with dowel rogelio     From [facility] HEP:   Seated marching  Seated hip ABD with TB   Side stepping seated  Standing squats  Side stepping standing  Standing hip flexion  Standing hip EXT   Standing HS curl  Arm lifts with 1# weight  Comments: does have adjustable ankle weights up to 5#. Currently using a 1# ankle weight      Goals:  By discharge, pt will meet the following goals:      Pt will demo decrease in TUG score by 5 to decrease fall risk.  Pt will demo decrease in BBS score by 9 to decrease fall risk   Pt will increase BL LE strengthen to 4+/5 in order to improve functional activities.

## 2024-10-16 ENCOUNTER — TREATMENT (OUTPATIENT)
Dept: PHYSICAL THERAPY | Facility: CLINIC | Age: 74
End: 2024-10-16
Payer: MEDICARE

## 2024-10-16 DIAGNOSIS — R26.2 DIFFICULTY WALKING: Primary | ICD-10-CM

## 2024-10-16 DIAGNOSIS — M41.35 THORACOGENIC SCOLIOSIS OF THORACOLUMBAR REGION: ICD-10-CM

## 2024-10-16 DIAGNOSIS — M25.611 SHOULDER STIFFNESS, RIGHT: ICD-10-CM

## 2024-10-16 DIAGNOSIS — R26.81 UNSTEADINESS ON FEET: ICD-10-CM

## 2024-10-16 DIAGNOSIS — M21.371 RIGHT FOOT DROP: ICD-10-CM

## 2024-10-16 DIAGNOSIS — R29.6 FALLS FREQUENTLY: ICD-10-CM

## 2024-10-16 PROCEDURE — 97112 NEUROMUSCULAR REEDUCATION: CPT | Mod: GP,CQ

## 2024-10-16 PROCEDURE — 97110 THERAPEUTIC EXERCISES: CPT | Mod: GP,CQ

## 2024-10-16 PROCEDURE — 97140 MANUAL THERAPY 1/> REGIONS: CPT | Mod: GP,CQ

## 2024-10-16 NOTE — PROGRESS NOTES
Physical Therapy    Physical Therapy Treatment    Patient Name: Khalida Barker  MRN: [id number]  Today's Date: 10/16/2024    Time Entry:   Time Calculation  Start Time: 1300  Stop Time: 1345  Time Calculation (min): 45 min     PT Therapeutic Procedures Time Entry  Manual Therapy Time Entry: 15  Neuromuscular Re-Education Time Entry: 15  Therapeutic Exercise Time Entry: 15                 Visit #8 (7 of 10)   [employer] BMN PT OT COPAY 30 DED 0 COVERAGE 100 OOP 0 NO AUTH REQ, REF# [id number]/ALL     Assessment:   Pt tolerated session well without adverse effect and is making progress toward goals. Pt.'s sitting and standing posture has improved.   Gradual improvement with AAROM using dowel rogelio. Pt will continue to benefit from PT to address her R shoulder ROM, generalized functional mobility, etc.     Plan:     Continue GHJ mobilizations for R shoulder.   Continue gait training with SPC & without device- may try harnessed for less support & longer durations  Wobble board & other AP weight shifting control drills   Core exercises: paloff presses with TB, modified planks at wall, standing isometric trunk holds against resistance of therapist all planes    Current Problem  1. Difficulty walking        2. Right foot drop        3. Shoulder stiffness, right        4. Unsteadiness on feet        5. Falls frequently        6. Thoracogenic scoliosis of thoracolumbar region  Follow Up In Physical Therapy          General   Pt. States soreness across both sides of low back right more so than left.  Rates pain 4/10.    Subjective     Precautions  Precautions  STEADI Fall Risk Score (The score of 4 or more indicates an increased risk of falling): 9  Pain   4/10    Treatments:  Manual Therapy (29727): 15 minutes  Joint mobilizations including axial distraction & inferior glides, AP glides to R GHJ. Pt in supine 3x10 reps each at grades 2-3.   PROM right shoulder into flexion x 10 reps.    Measured AAROM for  shoulder flexion to 130 degrees.    Therapeutic Exercise ([id number]): 15 minutes  Supine hook lying rhythmic stabilization for trunk 2 x 30 seconds.  Supine wand shoulder flexion using dowel rogelio x 10 reps  Seated paloff press using yellow t-band x 10 reps each side.  Pt. demonstrated good sitting posture.  Neuromuscular Re-education (89281): 15 minutes  Rocker board for PF/DF and midline stability  Adjusted wheeled walker up one more notch and ambulated within the department.  Pt. Demonstrated good posture throughout and she prefers walker at this height.   Current HEP:  Pulleys flexion/scaption in sitting  Supine AAROM flexion with dowel rogelio     From [facility] HEP:   Seated marching  Seated hip ABD with TB   Side stepping seated  Standing squats  Side stepping standing  Standing hip flexion  Standing hip EXT   Standing HS curl  Arm lifts with 1# weight  Comments: does have adjustable ankle weights up to 5#. Currently using a 1# ankle weight      Goals:  By discharge, pt will meet the following goals:      Pt will demo decrease in TUG score by 5 to decrease fall risk.  Pt will demo decrease in BBS score by 9 to decrease fall risk   Pt will increase BL LE strengthen to 4+/5 in order to improve functional activities.

## 2024-10-21 ENCOUNTER — HOSPITAL ENCOUNTER (OUTPATIENT)
Dept: RADIOLOGY | Facility: HOSPITAL | Age: 74
Discharge: HOME | End: 2024-10-21
Payer: MEDICARE

## 2024-10-21 DIAGNOSIS — M41.35 THORACOGENIC SCOLIOSIS OF THORACOLUMBAR REGION: ICD-10-CM

## 2024-10-21 PROCEDURE — 72082 X-RAY EXAM ENTIRE SPI 2/3 VW: CPT

## 2024-10-21 PROCEDURE — 72082 X-RAY EXAM ENTIRE SPI 2/3 VW: CPT | Mod: MUE

## 2024-10-21 PROCEDURE — 72082 X-RAY EXAM ENTIRE SPI 2/3 VW: CPT | Performed by: RADIOLOGY

## 2024-10-22 ENCOUNTER — TREATMENT (OUTPATIENT)
Dept: PHYSICAL THERAPY | Facility: CLINIC | Age: 74
End: 2024-10-22
Payer: MEDICARE

## 2024-10-22 DIAGNOSIS — M25.611 SHOULDER STIFFNESS, RIGHT: ICD-10-CM

## 2024-10-22 DIAGNOSIS — R26.2 DIFFICULTY WALKING: Primary | ICD-10-CM

## 2024-10-22 DIAGNOSIS — M21.371 RIGHT FOOT DROP: ICD-10-CM

## 2024-10-22 DIAGNOSIS — R26.81 UNSTEADINESS ON FEET: ICD-10-CM

## 2024-10-22 DIAGNOSIS — R29.6 FALLS FREQUENTLY: ICD-10-CM

## 2024-10-22 DIAGNOSIS — M41.35 THORACOGENIC SCOLIOSIS OF THORACOLUMBAR REGION: ICD-10-CM

## 2024-10-22 PROCEDURE — 97140 MANUAL THERAPY 1/> REGIONS: CPT | Mod: GP,CQ

## 2024-10-22 PROCEDURE — 97112 NEUROMUSCULAR REEDUCATION: CPT | Mod: GP,CQ

## 2024-10-22 NOTE — PROGRESS NOTES
Physical Therapy    Physical Therapy Treatment    Patient Name: Khalida Barker  MRN: [id number]  Today's Date: 10/22/2024    Time Entry:   Time Calculation  Start Time: 0945  Stop Time: 1030  Time Calculation (min): 45 min     PT Therapeutic Procedures Time Entry  Manual Therapy Time Entry: 20  Neuromuscular Re-Education Time Entry: 25                 Visit #9 (8 of 10)   [employer] BMN PT OT COPAY 30 DED 0 COVERAGE 100 OOP 0 NO AUTH REQ, REF# [id number]/ALL     Assessment:   Pt tolerated session well without adverse effect and is making progress toward goals. Pt. Would like to focus on improving her strength in  right shoulder and ankle for foot drop.   Pt. Does well with walking when she focusses on heel strike.  In general, her posture  has improved. Pt will continue to benefit from PT to address her R shoulder ROM, generalized functional mobility, etc.     Plan:     Continue GHJ mobilizations for R shoulder.   Continue gait training with SPC & without device- may try harnessed for less support & longer durations  Wobble board & other AP weight shifting control drills   Core exercises: paloff presses with TB, modified planks at wall, standing isometric trunk holds against resistance of therapist all planes    Current Problem  1. Difficulty walking        2. Falls frequently        3. Right foot drop        4. Shoulder stiffness, right        5. Unsteadiness on feet        6. Thoracogenic scoliosis of thoracolumbar region  Follow Up In Physical Therapy          General   Pt. States getting tired of the back pain, right lower thoracic region very tender.  [place].  Pain right lower thoracic about a 6/10.  Doing standing exercises feels more stability  Subjective     Precautions  Precautions  STEADI Fall Risk Score (The score of 4 or more indicates an increased risk of falling): 9  Pain   6/10    Treatments:  Manual Therapy ([id number]): 20 minutes  Joint  mobilizations including axial distraction & inferior glides, AP glides to R GHJ. Pt in supine 3x10 reps each at grades 2-3.   PROM right shoulder into flexion x 10 reps.  AAROM with wand for chest press x 5 reps and also with therapist assist x 5 reps.      Neuromuscular Re-education (71525): 25 minutes  Within // bars:  forward and retro walking multiple reps.  High stepping and retro walking multiple reps.  -walking forward over hurdles with emphasis on heel strike multiple reps.  -Ambulating with wheeled walker within the department with emphasis on heel strike.   Current HEP:  Pulleys flexion/scaption in sitting  Supine AAROM flexion with dowel rogelio     From [facility] HEP:   Seated marching  Seated hip ABD with TB   Side stepping seated  Standing squats  Side stepping standing  Standing hip flexion  Standing hip EXT   Standing HS curl  Arm lifts with 1# weight  Comments: does have adjustable ankle weights up to 5#. Currently using a 1# ankle weight      Goals:  By discharge, pt will meet the following goals:      Pt will demo decrease in TUG score by 5 to decrease fall risk.  Pt will demo decrease in BBS score by 9 to decrease fall risk   Pt will increase BL LE strengthen to 4+/5 in order to improve functional activities.

## 2024-10-25 ENCOUNTER — TREATMENT (OUTPATIENT)
Dept: PHYSICAL THERAPY | Facility: CLINIC | Age: 74
End: 2024-10-25
Payer: MEDICARE

## 2024-10-25 DIAGNOSIS — M41.35 THORACOGENIC SCOLIOSIS OF THORACOLUMBAR REGION: ICD-10-CM

## 2024-10-25 DIAGNOSIS — R26.81 UNSTEADINESS ON FEET: ICD-10-CM

## 2024-10-25 DIAGNOSIS — M25.611 SHOULDER STIFFNESS, RIGHT: ICD-10-CM

## 2024-10-25 DIAGNOSIS — R29.6 FALLS FREQUENTLY: ICD-10-CM

## 2024-10-25 DIAGNOSIS — R26.2 DIFFICULTY WALKING: Primary | ICD-10-CM

## 2024-10-25 DIAGNOSIS — M21.371 RIGHT FOOT DROP: ICD-10-CM

## 2024-10-25 PROCEDURE — 97112 NEUROMUSCULAR REEDUCATION: CPT | Mod: GP,CQ

## 2024-10-25 PROCEDURE — 97110 THERAPEUTIC EXERCISES: CPT | Mod: GP,CQ

## 2024-10-25 PROCEDURE — 97140 MANUAL THERAPY 1/> REGIONS: CPT | Mod: GP,CQ

## 2024-10-25 NOTE — PROGRESS NOTES
Physical Therapy    Physical Therapy Treatment    Patient Name: Khalida Barker  MRN: [id number]  Today's Date: 10/25/2024    Time Entry:   Time Calculation  Start Time: 0945  Stop Time: 1030  Time Calculation (min): 45 min     PT Therapeutic Procedures Time Entry  Manual Therapy Time Entry: 17  Neuromuscular Re-Education Time Entry: 8  Therapeutic Exercise Time Entry: 20                 Visit #10 (9 of 10)   [employer] BMN PT OT COPAY 30 DED 0 COVERAGE 100 OOP 0 NO AUTH REQ, REF# [id number]/ALL     Assessment:   Pt tolerated session well without adverse effect and is making progress toward goals. Pt. Was able to progress to shoulder strengthening using yellow t-band for IR and ER,  her AAROM for flexion has improved.  Will incorporate more AP weight shifting drills in future visits.  Pt will continue to benefit from PT to address her R shoulder ROM, generalized functional mobility, etc.     Plan:     Continue GHJ mobilizations for R shoulder.   Continue gait training with SPC & without device- may try harnessed for less support & longer durations  Wobble board & other AP weight shifting control drills   Core exercises: paloff presses with TB, modified planks at wall, standing isometric trunk holds against resistance of therapist all planes    Current Problem  1. Difficulty walking        2. Falls frequently        3. Right foot drop        4. Shoulder stiffness, right        5. Unsteadiness on feet        6. Thoracogenic scoliosis of thoracolumbar region  Follow Up In Physical Therapy             Subjective     Pt. Reports pain Across thoracic region on  both sides  and rates it a 7/10. Pain last night 8/10    Precautions  Precautions  STEADI Fall Risk Score (The score of 4 or more indicates an increased risk of falling): 9  Pain   7/10    Treatments:  Manual Therapy (30300): 17 minutes  Joint mobilizations including axial distraction & inferior glides, AP glides to R GHJ. Pt in supine 3x10 reps  each at grades 2-3.   PROM right shoulder into flexion x 10 reps.  AAROM shoulder flexion with wand 140 degrees.    Neuromuscular Re-education (59370): 8 minutes  [place] E-stim applied to right tibialis anterior for 5 minutes,  10 seconds on/50 seconds off. Intensity 37. Pt. Performing active dorsiflexion.    Therapeutic Exercise (60507): 20 minutes  Instructed in yellow t-band shoulder internal rotation and external rotation x 10 reps.  Issued written HEP and yellow t-band for home.   Instructed in scapular retractions x 10 reps added for HEP.  Shoulder wand flexion x 10 reps  Shoulder wand chest press x 10 reps.     Current HEP:  Pulleys flexion/scaption in sitting  Supine AAROM flexion with dowel rogelio     From CPT HEP:   Seated marching  Seated hip ABD with TB   Side stepping seated  Standing squats  Side stepping standing  Standing hip flexion  Standing hip EXT   Standing HS curl  Arm lifts with 1# weight  Comments: does have adjustable ankle weights up to 5#. Currently using a 1# ankle weight      Goals:  By discharge, pt will meet the following goals:      Pt will demo decrease in TUG score by 5 to decrease fall risk.  Pt will demo decrease in BBS score by 9 to decrease fall risk   Pt will increase BL LE strengthen to 4+/5 in order to improve functional activities.

## 2024-10-29 ENCOUNTER — TREATMENT (OUTPATIENT)
Dept: PHYSICAL THERAPY | Facility: CLINIC | Age: 74
End: 2024-10-29
Payer: MEDICARE

## 2024-10-29 DIAGNOSIS — R26.81 UNSTEADINESS ON FEET: ICD-10-CM

## 2024-10-29 DIAGNOSIS — M41.35 THORACOGENIC SCOLIOSIS OF THORACOLUMBAR REGION: ICD-10-CM

## 2024-10-29 DIAGNOSIS — R26.2 DIFFICULTY WALKING: Primary | ICD-10-CM

## 2024-10-29 DIAGNOSIS — M25.611 SHOULDER STIFFNESS, RIGHT: ICD-10-CM

## 2024-10-29 DIAGNOSIS — M21.371 RIGHT FOOT DROP: ICD-10-CM

## 2024-10-29 DIAGNOSIS — R29.6 FALLS FREQUENTLY: ICD-10-CM

## 2024-10-29 PROCEDURE — 97140 MANUAL THERAPY 1/> REGIONS: CPT | Mod: GP,CQ

## 2024-10-29 PROCEDURE — 97112 NEUROMUSCULAR REEDUCATION: CPT | Mod: GP,CQ

## 2024-10-29 PROCEDURE — 97110 THERAPEUTIC EXERCISES: CPT | Mod: GP,CQ

## 2024-11-01 ENCOUNTER — TREATMENT (OUTPATIENT)
Dept: PHYSICAL THERAPY | Facility: CLINIC | Age: 74
End: 2024-11-01
Payer: MEDICARE

## 2024-11-01 DIAGNOSIS — M25.611 SHOULDER STIFFNESS, RIGHT: ICD-10-CM

## 2024-11-01 DIAGNOSIS — R29.6 FALLS FREQUENTLY: ICD-10-CM

## 2024-11-01 DIAGNOSIS — R26.81 UNSTEADINESS ON FEET: ICD-10-CM

## 2024-11-01 DIAGNOSIS — R26.2 DIFFICULTY WALKING: Primary | ICD-10-CM

## 2024-11-01 DIAGNOSIS — M21.371 RIGHT FOOT DROP: ICD-10-CM

## 2024-11-01 PROCEDURE — 97110 THERAPEUTIC EXERCISES: CPT | Mod: GP

## 2024-11-01 ASSESSMENT — PAIN - FUNCTIONAL ASSESSMENT: PAIN_FUNCTIONAL_ASSESSMENT: 0-10

## 2024-11-01 ASSESSMENT — PAIN SCALES - GENERAL: PAINLEVEL_OUTOF10: 6

## 2024-11-05 ENCOUNTER — TREATMENT (OUTPATIENT)
Dept: PHYSICAL THERAPY | Facility: CLINIC | Age: 74
End: 2024-11-05
Payer: MEDICARE

## 2024-11-05 DIAGNOSIS — M21.371 RIGHT FOOT DROP: ICD-10-CM

## 2024-11-05 DIAGNOSIS — M25.611 SHOULDER STIFFNESS, RIGHT: ICD-10-CM

## 2024-11-05 DIAGNOSIS — R26.2 DIFFICULTY WALKING: Primary | ICD-10-CM

## 2024-11-05 DIAGNOSIS — R29.6 FALLS FREQUENTLY: ICD-10-CM

## 2024-11-05 DIAGNOSIS — M41.35 THORACOGENIC SCOLIOSIS OF THORACOLUMBAR REGION: ICD-10-CM

## 2024-11-05 DIAGNOSIS — R26.81 UNSTEADINESS ON FEET: ICD-10-CM

## 2024-11-05 PROCEDURE — 97112 NEUROMUSCULAR REEDUCATION: CPT | Mod: GP,CQ

## 2024-11-05 PROCEDURE — 97110 THERAPEUTIC EXERCISES: CPT | Mod: GP,CQ

## 2024-11-05 PROCEDURE — 97140 MANUAL THERAPY 1/> REGIONS: CPT | Mod: GP,CQ

## 2024-11-05 NOTE — PROGRESS NOTES
Physical Therapy    Physical Therapy Treatment    Patient Name: Khalida Barker  MRN: [id number]  Today's Date: 11/5/2024    Time Entry:   Time Calculation  Start Time: 0949  Stop Time: 1032  Time Calculation (min): 43 min     PT Therapeutic Procedures Time Entry  Manual Therapy Time Entry: 13  Neuromuscular Re-Education Time Entry: 20  Therapeutic Exercise Time Entry: 10                 Visit #13 (1 of 10 new)   [employer]ARE BMN PT OT COPAY 30 DED 0 COVERAGE 100 OOP 0 NO AUTH REQ, REF# [id number]/ALL     Assessment:   Pt tolerated session well without adverse effect and is making progress toward goals.   Less compensation noted with shoulder flexion using the wand.  Standing on foam pad with mild to moderate sway. Pt will continue to benefit from PT to address her R shoulder ROM, generalized functional mobility, etc.     Plan:     Continue GHJ mobilizations for R shoulder.   Continue gait training with SPC & without device- may try harnessed for less support & longer durations  Wobble board & other AP weight shifting control drills   Core exercises: paloff presses with TB, modified planks at wall, standing isometric trunk holds against resistance of therapist all planes    Current Problem  1. Difficulty walking        2. Thoracogenic scoliosis of thoracolumbar region  Follow Up In Physical Therapy      3. Right foot drop        4. Shoulder stiffness, right        5. Unsteadiness on feet        6. Falls frequently                   Subjective     Pt. States pain in the back is about a 5/10, more so on right today.   Precautions  Precautions  STEADI Fall Risk Score (The score of 4 or more indicates an increased risk of falling): 9  Pain   5/10    Treatments:  Manual Therapy (92118): 13 minutes  Joint mobilizations including axial distraction & inferior glides, AP glides to R GHJ. Pt in supine 3 x10 reps each at grades 2-3.   PROM right shoulder into flexion x 5 reps.    Neuromuscular Re-education  (06278): 20 minutes  Foam:  normal base of support with horizontal head turns  NBOS and mod tandem stand with EO     Rocker board for PF/DF and midline stability     Therapeutic Exercise (61621): 10 minutes  Verbally reviewed yellow t-band external rotation.  Shoulder wand flexion x 10 reps  Shoulder wand chest press x 10 reps.  Shoulder internal rotation isometric per therapist     Current HEP:  Pulleys flexion/scaption in sitting  Supine AAROM flexion with dowel rogelio     From [facility]T HEP:   Seated marching  Seated hip ABD with TB   Side stepping seated  Standing squats  Side stepping standing  Standing hip flexion  Standing hip EXT   Standing HS curl  Arm lifts with 1# weight  Comments: does have adjustable ankle weights up to 5#. Currently using a 1# ankle weight      Goals:  By discharge, pt will meet the following goals:      Pt will demo decrease in TUG score by 5 to decrease fall risk.  Pt will demo decrease in BBS score by 9 to decrease fall risk   Pt will increase BL LE strengthen to 4+/5 in order to improve functional activities.

## 2024-11-07 NOTE — PROGRESS NOTES
Physical Therapy    Physical Therapy Treatment    Patient Name: Khalida Barker  MRN: [id number]  Today's Date: 11/8/2024    Time Entry:   Time Calculation  Start Time: 0946  Stop Time: 1032  Time Calculation (min): 46 min     PT Therapeutic Procedures Time Entry  Manual Therapy Time Entry: 15  Neuromuscular Re-Education Time Entry: 5  Therapeutic Exercise Time Entry: 26                 Visit #14 (2 of 10 new)   [employer] BMN PT OT COPAY 30 DED 0 COVERAGE 100 OOP 0 NO AUTH REQ, REF# [id number]/ALL     Assessment:   Pt tolerated session well without adverse effect and is making progress toward goals. Pt's R shoulder AAROM improved by 5 deg following mobilizations. She now has 145 deg active assistedly. However, continues to be limited with AROM. Demo's much improvement with pulleys and able to perform in standing safely independently. Pt will continue to benefit from PT to address her functional mobility and balance as well as R shoulder function.     Plan:   Ktape facilitation R tib anterior.   Continue shoulder mobilizations followed by RTC & periscapular strengthening.   Gait training with lesser device & balance.     Current Problem  1. Difficulty walking        2. Right foot drop        3. Shoulder stiffness, right        4. Unsteadiness on feet        5. Falls frequently          General   Pt arrives at session with her , Quinn, however attends session independently. She reports the yellow thera band is still challenging for the [facility] ER exercise. Reports otherwise her HEP is going well. Reports she feels progress is overall slow.   Reports she feels the following is the most impaired to least: R foot, then R shoulder.   Reports she would like to work on her joint mobilizations and strengthening for her shoulder. Has not yet tried pulleys in standing, but now has pulleys for home- just harder to set up a chair where she would use them due to spacing.   Reports has R sided lower back pain,  "however no pain at rest. Only hurts with direct pressure over this area. Ranks at 5/10 and described as \"really really really sore.\"        Subjective    Precautions  Precautions  STEADI Fall Risk Score (The score of 4 or more indicates an increased risk of falling): 9  Vital Signs     Pain  Pain Assessment  Pain Assessment: 0-10  0-10 (Numeric) Pain Score: 5 - Moderate pain  Pain Location: Back  Pain Orientation: Lower, Right  Pain Descriptors: Sore (with direct pressure, no pain seated at rest)    Objective   Shoulder ROM:   AROM elevation: 40 deg   AAROM:   R= 140 deg --> 145 deg by end of session  L= 155 deg     Treatments:  Therapeutic Exercise (60338): 26 minutes  Reviewed yellow t-band external rotation in sitting 2x10  Shoulder wand flexion 2x10 reps  Shoulder wand chest press x 10 reps    Pec minor stretch with overpressure of therapist, 3x30 sec holds     Pulleys in standing 3x10 reps       Current HEP:  Pulleys flexion/scaption in sitting  Supine AAROM flexion with dowel rogelio      From CPT HEP:   Seated marching  Seated hip ABD with TB   Side stepping seated  Standing squats  Side stepping standing  Standing hip flexion  Standing hip EXT   Standing HS curl  Arm lifts with 1# weight  Comments: does have adjustable ankle weights up to 5#. Currently using a 1# ankle weight    Manual Therapy (20522): 15 minutes  Joint mobilizations including axial distraction & inferior glides, AP glides to R GHJ. Pt in supine 3x10 reps each at grades 3-4. Pt in supine.    Also performed scapulothoracic mobilizations all planes x10 reps     Neuromuscular Re-education (75855): 5 minutes  Worked with pt to perform shoulder flexion/scaption in sidelying as well as sitting with assist for scapular upward rotation    NOT TODAY:   Walking fwd/bwd in // bars without device, multiple laps. Cues to improve fwd weight shift with retrowalking (initially retropulsive)      OP EDUCATION:       Goals:  By discharge, pt will meet the " following goals:      Pt will demo decrease in TUG score by 5 to decrease fall risk.--PARTIALLY MET  Pt will demo decrease in BBS score by 9 to decrease fall risk--NOT TESTED TODAY  Pt will increase BL LE strengthen to 4+/5 in order to improve functional activities.--PARTIALLY MET  Pt will increase R UE AROM for elevation to WFL & within 10 deg of contralateral side.   Pt will ambulate with LRAD- preferably a cane- 350ft+ MOD I.

## 2024-11-08 ENCOUNTER — TREATMENT (OUTPATIENT)
Dept: PHYSICAL THERAPY | Facility: CLINIC | Age: 74
End: 2024-11-08
Payer: MEDICARE

## 2024-11-08 DIAGNOSIS — R26.81 UNSTEADINESS ON FEET: ICD-10-CM

## 2024-11-08 DIAGNOSIS — R26.2 DIFFICULTY WALKING: Primary | ICD-10-CM

## 2024-11-08 DIAGNOSIS — M21.371 RIGHT FOOT DROP: ICD-10-CM

## 2024-11-08 DIAGNOSIS — M25.611 SHOULDER STIFFNESS, RIGHT: ICD-10-CM

## 2024-11-08 DIAGNOSIS — R29.6 FALLS FREQUENTLY: ICD-10-CM

## 2024-11-08 PROCEDURE — 97140 MANUAL THERAPY 1/> REGIONS: CPT | Mod: GP

## 2024-11-08 PROCEDURE — 97110 THERAPEUTIC EXERCISES: CPT | Mod: GP

## 2024-11-08 ASSESSMENT — PAIN - FUNCTIONAL ASSESSMENT: PAIN_FUNCTIONAL_ASSESSMENT: 0-10

## 2024-11-08 ASSESSMENT — PAIN SCALES - GENERAL: PAINLEVEL_OUTOF10: 5 - MODERATE PAIN

## 2024-11-08 ASSESSMENT — PAIN DESCRIPTION - DESCRIPTORS: DESCRIPTORS: SORE

## 2024-11-12 ENCOUNTER — TREATMENT (OUTPATIENT)
Dept: PHYSICAL THERAPY | Facility: CLINIC | Age: 74
End: 2024-11-12
Payer: MEDICARE

## 2024-11-12 DIAGNOSIS — R26.2 DIFFICULTY WALKING: Primary | ICD-10-CM

## 2024-11-12 DIAGNOSIS — R29.6 FALLS FREQUENTLY: ICD-10-CM

## 2024-11-12 DIAGNOSIS — M21.371 RIGHT FOOT DROP: ICD-10-CM

## 2024-11-12 DIAGNOSIS — M25.611 SHOULDER STIFFNESS, RIGHT: ICD-10-CM

## 2024-11-12 DIAGNOSIS — R26.81 UNSTEADINESS ON FEET: ICD-10-CM

## 2024-11-12 PROCEDURE — 97112 NEUROMUSCULAR REEDUCATION: CPT | Mod: GP,CQ

## 2024-11-12 PROCEDURE — 97140 MANUAL THERAPY 1/> REGIONS: CPT | Mod: GP,CQ

## 2024-11-12 PROCEDURE — 97110 THERAPEUTIC EXERCISES: CPT | Mod: GP,CQ

## 2024-11-12 NOTE — PROGRESS NOTES
Physical Therapy    Physical Therapy Treatment    Patient Name: Khalida Barker  MRN: [id number]  Today's Date: 11/12/2024    Time Entry:   Time Calculation  Start Time: 0948  Stop Time: 1029  Time Calculation (min): 41 min     PT Therapeutic Procedures Time Entry  Manual Therapy Time Entry: 15  Neuromuscular Re-Education Time Entry: 10  Therapeutic Exercise Time Entry: 16                 Visit #15 (3 of 10 new)   [employer] BMN PT OT COPAY 30 DED 0 COVERAGE 100 OOP 0 NO AUTH REQ, REF# [id number]     Assessment:   Pt tolerated session well without adverse effect and is making progress toward goals. Pt. Reported some soreness in shoulder today but overall tolerated exercises well.   She does well with the pulleys and encouraged to try this at home.  Standing on foam pad with mild to moderate sway, this is challenging for her balance.  Pt will continue to benefit from PT to address her functional mobility and balance as well as R shoulder function.     Plan:   Ktape facilitation R tib anterior.   Continue shoulder mobilizations followed by RTC & periscapular strengthening.   Gait training with lesser device & balance.     Current Problem  1. Difficulty walking        2. Right foot drop        3. Shoulder stiffness, right        4. Unsteadiness on feet        5. Falls frequently                 Subjective  Pt. States  shoulder kind of hurts today may have overdone it yesterday with the exercises and may have slept on the shoulder.  Tender across the low back and tender on the right side and  rates pain 5/10 and shoulder pain 3/10.  Pt. Did take a pain pill prior to session.  Did stairs twice over the weekend and did well.   Precautions   Precautions  STEADI Fall Risk Score (The score of 4 or more indicates an increased risk of falling): 9  Vital Signs     Pain       Objective  Not measured this session  Shoulder ROM:   AROM elevation: 40 deg   AAROM:   R= 140 deg --> 145 deg by end of session  L=  155 deg     Treatments:  Therapeutic Exercise (04302): 16 minutes  Left sidelying right shoulder ER x 20 reps, added 1# and completed x 10 reps  AAROM per therapist shoulder flexion and horizontal abduction x 3 reps of each.  Reports some pain and weakness with these last two exercises.   Pulleys in standing 3x10 reps       Current HEP:  Pulleys flexion/scaption in sitting  Supine AAROM flexion with dowel rogelio      From CPT HEP:   Seated marching  Seated hip ABD with TB   Side stepping seated  Standing squats  Side stepping standing  Standing hip flexion  Standing hip EXT   Standing HS curl  Arm lifts with 1# weight  Comments: does have adjustable ankle weights up to 5#. Currently using a 1# ankle weight    Manual Therapy (47438): 15 minutes  Joint mobilizations including axial distraction & inferior glides, AP glides to R GHJ. Pt in supine 3x10 reps each at grades 2-3.      Neuromuscular Re-education (36485): 10 minutes  Standing on foam pad with normal base of support with horizontal head turns  Standing on foam pad in NBOS with EO.    NOT TODAY:   Walking fwd/bwd in // bars without device, multiple laps. Cues to improve fwd weight shift with retrowalking (initially retropulsive)      OP EDUCATION:       Goals:  By discharge, pt will meet the following goals:      Pt will demo decrease in TUG score by 5 to decrease fall risk.--PARTIALLY MET  Pt will demo decrease in BBS score by 9 to decrease fall risk--NOT TESTED TODAY  Pt will increase BL LE strengthen to 4+/5 in order to improve functional activities.--PARTIALLY MET  Pt will increase R UE AROM for elevation to WFL & within 10 deg of contralateral side.   Pt will ambulate with LRAD- preferably a cane- 350ft+ MOD I.

## 2024-11-15 ENCOUNTER — TREATMENT (OUTPATIENT)
Dept: PHYSICAL THERAPY | Facility: CLINIC | Age: 74
End: 2024-11-15
Payer: MEDICARE

## 2024-11-15 DIAGNOSIS — M21.371 RIGHT FOOT DROP: ICD-10-CM

## 2024-11-15 DIAGNOSIS — M25.611 SHOULDER STIFFNESS, RIGHT: ICD-10-CM

## 2024-11-15 DIAGNOSIS — R26.2 DIFFICULTY WALKING: Primary | ICD-10-CM

## 2024-11-15 DIAGNOSIS — R26.81 UNSTEADINESS ON FEET: ICD-10-CM

## 2024-11-15 DIAGNOSIS — R29.6 FALLS FREQUENTLY: ICD-10-CM

## 2024-11-15 PROCEDURE — 97110 THERAPEUTIC EXERCISES: CPT | Mod: GP

## 2024-11-15 ASSESSMENT — PAIN - FUNCTIONAL ASSESSMENT: PAIN_FUNCTIONAL_ASSESSMENT: 0-10

## 2024-11-15 ASSESSMENT — PAIN DESCRIPTION - DESCRIPTORS: DESCRIPTORS: SORE

## 2024-11-15 ASSESSMENT — PAIN SCALES - GENERAL: PAINLEVEL_OUTOF10: 5 - MODERATE PAIN

## 2024-11-15 NOTE — PROGRESS NOTES
Physical Therapy    Physical Therapy Treatment    Patient Name: Khalida Barker  MRN: [id number]  Today's Date: 11/15/2024    Time Entry:   Time Calculation  Start Time: 0950  Stop Time: 1037  Time Calculation (min): 47 min     PT Therapeutic Procedures Time Entry  Neuromuscular Re-Education Time Entry: 5  Therapeutic Exercise Time Entry: 42                 Visit #16 (4 of 10 new)   [employer] BMN PT OT COPAY 30 DED 0 COVERAGE 100 OOP 0 NO AUTH REQ, REF# [id number]     Assessment:   Pt tolerated session well without adverse effect and is making slow progress toward goals. Pt's has been dealing with elevated R anterior shoulder & proximal brachium pain since last visit- which is likely associated with muscular imbalance of pectorals & long head of biceps due to weakness of periscapular muscles. Much of session spent focused on this, with emphasis on middle & lower trapezius, posterior deltoid, triceps & serratus anterior. She benefits from tactile cues for appropriate muscle activation, due to frequent compensations with pectorals & difficulty maintaining scapular retraction/neutral scapular positioning. Also applied elastic therapeutic tape to patient's R tibialis anterior as DF facilitation technique for her to wear until next visit if tolerated. Pt will continue to benefit from PT to address her functional mobility and balance as well as R shoulder function.     Plan:   Review if Ktape helped R ankle DF- may re-apply if beneficial.   Review new exercise of HEP: standing scapular retraction + shoulder extension with yellow TB.   Continue working on shoulder/periscapular strengthening:   As able:   Wall slides- arms in pillow case  Wall slides with TB  Posterior deltoid row, emphasis on neutral scapular positioning    Gait training with lesser device & balance    Current Problem  1. Difficulty walking        2. Right foot drop        3. Shoulder stiffness, right        4. Unsteadiness on feet         5. Falls frequently          General   Pt arrives at session with her , Quinn, however attends session independently. She reports her R arm has been hurting. Unsure what she did to strain it. This is occurring anterior shoulder & in proximal brachium/biceps area. Reports her back is tender R>L side, currently ~5/10.   Reports she will have to work on the pulleys in sitting, as standing she can only lift to 90 deg or so because of the length of the rope.        Subjective    Precautions  Precautions  STEADI Fall Risk Score (The score of 4 or more indicates an increased risk of falling): 9  Vital Signs     Pain  Pain Assessment  Pain Assessment: 0-10  0-10 (Numeric) Pain Score: 5 - Moderate pain  Pain Location: Back  Pain Orientation: Lower, Right, Left  Pain Descriptors: Sore    Objective   Shoulder ROM:   AROM elevation: 40 deg   AAROM:   R= 145 to 150 deg   L= 155 deg     Treatments:  Therapeutic Exercise (01005): 42 minutes  Supine shoulder wand flexion x10 reps  Supine horizontal ABD + scaption with yellow TB 3x10 reps  Supine horizontal ABD with yellow TB 2x10 reps  Supine scapular protraction/retraction, first with cane, then progressing to 4# weighted bar. Tactile cues over R SA to improve force of contraction. 3x10    Standing rows with yellow TB 3x10, tactile cues of therapist to assist with full scapular excursion       Current HEP:  Pulleys flexion/scaption in sitting  Supine AAROM flexion with dowel rogelio   Shoulder extension + scapular retraction with yellow TB (added today)      From [facility] HEP:   Seated marching  Seated hip ABD with TB   Side stepping seated  Standing squats  Side stepping standing  Standing hip flexion  Standing hip EXT   Standing HS curl  Arm lifts with 1# weight  Comments: does have adjustable ankle weights up to 5#. Currently using a 1# ankle weight    Neuromuscular Re-education (91252): 5 minutes  Elastic therapeutic tape applied to pt's R ankle for ankle DF facilitation.  "\"I\" strip applied with no tension at end anchors, with paper off tension in therapeutic zone. Anchors applied at base of 2-3rd metatarsals & at anterior lateral aspect of shin. Anchors applied with R ankle in DF, followed by therapeutic zone of tape applied in ankle PF.   Instructed pt in precautions/contraindications, and grounds to discontinue use.      NOT TODAY:   Walking fwd/bwd in // bars without device, multiple laps. Cues to improve fwd weight shift with retrowalking (initially retropulsive)     OP EDUCATION:       Goals:  By discharge, pt will meet the following goals:      Pt will demo decrease in TUG score by 5 to decrease fall risk.--PARTIALLY MET  Pt will demo decrease in BBS score by 9 to decrease fall risk--NOT TESTED TODAY  Pt will increase BL LE strengthen to 4+/5 in order to improve functional activities.--PARTIALLY MET  Pt will increase R UE AROM for elevation to WFL & within 10 deg of contralateral side.   Pt will ambulate with LRAD- preferably a cane- 350ft+ MOD I.   "

## 2024-11-18 ENCOUNTER — APPOINTMENT (OUTPATIENT)
Dept: PHYSICAL THERAPY | Facility: CLINIC | Age: 74
End: 2024-11-18
Payer: MEDICARE

## 2024-11-18 DIAGNOSIS — R26.81 UNSTEADINESS ON FEET: ICD-10-CM

## 2024-11-18 DIAGNOSIS — R26.2 DIFFICULTY WALKING: Primary | ICD-10-CM

## 2024-11-18 DIAGNOSIS — M21.371 RIGHT FOOT DROP: ICD-10-CM

## 2024-11-18 DIAGNOSIS — R29.6 FALLS FREQUENTLY: ICD-10-CM

## 2024-11-18 DIAGNOSIS — M25.611 SHOULDER STIFFNESS, RIGHT: ICD-10-CM

## 2024-11-18 PROCEDURE — 97110 THERAPEUTIC EXERCISES: CPT | Mod: GP,CQ

## 2024-11-18 PROCEDURE — 97112 NEUROMUSCULAR REEDUCATION: CPT | Mod: GP,CQ

## 2024-11-18 NOTE — PROGRESS NOTES
Physical Therapy    Physical Therapy Treatment    Patient Name: Khalida Barker  MRN: [id number]  Today's Date: 11/18/2024    Time Entry:   Time Calculation  Start Time: 0900  Stop Time: 0945  Time Calculation (min): 45 min     PT Therapeutic Procedures Time Entry  Neuromuscular Re-Education Time Entry: 15  Therapeutic Exercise Time Entry: 30                 Visit #17 (5 of 10 new)   [employer] BMN PT OT COPAY 30 DED 0 COVERAGE 100 OOP 0 NO AUTH REQ, REF# [id number]/ALL     Assessment:   Pt tolerated session well without adverse effect and is making progress toward goals.   Pt. Feels benefit from Ktape  with her walking, may re-apply next visit.   She benefits from balance training and ambulating with the SPC.   Will review new HEP next visit. Pt will continue to benefit from PT to address her functional mobility and balance as well as R shoulder function.     Plan:   Review if Ktape helped R ankle DF- may re-apply if beneficial.   Review new exercise of HEP: standing scapular retraction + shoulder extension with yellow TB.   Continue working on shoulder/periscapular strengthening:   As able:   Wall slides- arms in pillow case  Wall slides with TB  Posterior deltoid row, emphasis on neutral scapular positioning    Gait training with lesser device & balance    Current Problem  1. Difficulty walking        2. Right foot drop        3. Shoulder stiffness, right        4. Unsteadiness on feet        5. Falls frequently          General   Pt arrives at session with her , Quinn, however attends session independently. Pt. States she likes the tape and feels like its beneficial, she is wearing it currently and will take it off tomorrow.  Pt. States pain is there all the time in her back, did take a pain pill prior to session and using biofreeze on the back.  States its about a 4/10       Subjective    Precautions   Precautions  STEADI Fall Risk Score (The score of 4 or more indicates an increased risk  of falling): 9  Vital Signs     Pain       Objective   Treatments:  Therapeutic Exercise (01370):  30 minutes  Prom right shoulder flexion x 10 reps and GHJ distractions  Supine shoulder wand flexion x10 reps  Supine shoulder horizontal abduction with wand  Supine scapular protraction/retraction, first with cane, then progressing to 4# weighted bar.  Rhythmic stabilization with shoulder  flexed at 90 degrees.  Standing rows with yellow TB 3x10, tactile cues of therapist to assist with full scapular excursion.  NOT PERFORMED TODAY    Neuromuscular Re-education (91242): 15 minutes  Standing on foam pad with horizontal head turns, standing with EO.  Walking a few laps in // bars using SPC, initially hesitant but improved with practice.  We have not used the cane in a while.   Current HEP:  Pulleys flexion/scaption in sitting  Supine AAROM flexion with dowel rogelio   Shoulder extension + scapular retraction with yellow TB (added today)      From [facility] HEP:   Seated marching  Seated hip ABD with TB   Side stepping seated  Standing squats  Side stepping standing  Standing hip flexion  Standing hip EXT   Standing HS curl  Arm lifts with 1# weight  Comments: does have adjustable ankle weights up to 5#. Currently using a 1# ankle weight         Goals:  By discharge, pt will meet the following goals:      Pt will demo decrease in TUG score by 5 to decrease fall risk.--PARTIALLY MET  Pt will demo decrease in BBS score by 9 to decrease fall risk--NOT TESTED TODAY  Pt will increase BL LE strengthen to 4+/5 in order to improve functional activities.--PARTIALLY MET  Pt will increase R UE AROM for elevation to WFL & within 10 deg of contralateral side.   Pt will ambulate with LRAD- preferably a cane- 350ft+ MOD I.

## 2024-11-20 ENCOUNTER — APPOINTMENT (OUTPATIENT)
Dept: PHYSICAL THERAPY | Facility: CLINIC | Age: 74
End: 2024-11-20
Payer: MEDICARE

## 2024-11-20 DIAGNOSIS — M21.371 RIGHT FOOT DROP: ICD-10-CM

## 2024-11-20 DIAGNOSIS — R26.81 UNSTEADINESS ON FEET: ICD-10-CM

## 2024-11-20 DIAGNOSIS — R26.2 DIFFICULTY WALKING: Primary | ICD-10-CM

## 2024-11-20 DIAGNOSIS — R29.6 FALLS FREQUENTLY: ICD-10-CM

## 2024-11-20 DIAGNOSIS — M25.611 SHOULDER STIFFNESS, RIGHT: ICD-10-CM

## 2024-11-22 ENCOUNTER — TREATMENT (OUTPATIENT)
Dept: PHYSICAL THERAPY | Facility: CLINIC | Age: 74
End: 2024-11-22
Payer: MEDICARE

## 2024-11-22 DIAGNOSIS — R29.6 FALLS FREQUENTLY: ICD-10-CM

## 2024-11-22 DIAGNOSIS — R26.2 DIFFICULTY WALKING: Primary | ICD-10-CM

## 2024-11-22 DIAGNOSIS — M21.371 RIGHT FOOT DROP: ICD-10-CM

## 2024-11-22 DIAGNOSIS — M25.611 SHOULDER STIFFNESS, RIGHT: ICD-10-CM

## 2024-11-22 DIAGNOSIS — R26.81 UNSTEADINESS ON FEET: ICD-10-CM

## 2024-11-22 PROCEDURE — 97112 NEUROMUSCULAR REEDUCATION: CPT | Mod: GP

## 2024-11-22 PROCEDURE — 97110 THERAPEUTIC EXERCISES: CPT | Mod: GP

## 2024-11-22 ASSESSMENT — PAIN - FUNCTIONAL ASSESSMENT: PAIN_FUNCTIONAL_ASSESSMENT: 0-10

## 2024-11-22 ASSESSMENT — PAIN SCALES - GENERAL: PAINLEVEL_OUTOF10: 0 - NO PAIN

## 2024-11-25 ENCOUNTER — PRE-ADMISSION TESTING (OUTPATIENT)
Dept: PREADMISSION TESTING | Facility: HOSPITAL | Age: 74
End: 2024-11-25
Payer: MEDICARE

## 2024-11-25 VITALS
TEMPERATURE: 96.8 F | DIASTOLIC BLOOD PRESSURE: 59 MMHG | OXYGEN SATURATION: 98 % | WEIGHT: 148.59 LBS | BODY MASS INDEX: 24.76 KG/M2 | SYSTOLIC BLOOD PRESSURE: 139 MMHG | HEIGHT: 65 IN | HEART RATE: 71 BPM | RESPIRATION RATE: 16 BRPM

## 2024-11-25 DIAGNOSIS — Z01.818 PREOP TESTING: Primary | ICD-10-CM

## 2024-11-25 DIAGNOSIS — S52.571D OTHER CLOSED INTRA-ARTICULAR FRACTURE OF DISTAL END OF RIGHT RADIUS WITH ROUTINE HEALING, SUBSEQUENT ENCOUNTER: ICD-10-CM

## 2024-11-25 PROCEDURE — 99202 OFFICE O/P NEW SF 15 MIN: CPT | Performed by: NURSE PRACTITIONER

## 2024-11-25 RX ORDER — DOCUSATE SODIUM 50 MG/5ML
100 LIQUID ORAL DAILY
COMMUNITY

## 2024-11-25 RX ORDER — FLUTICASONE PROPIONATE 50 MCG
1 SPRAY, SUSPENSION (ML) NASAL DAILY
COMMUNITY

## 2024-11-25 ASSESSMENT — DUKE ACTIVITY SCORE INDEX (DASI)
CAN YOU WALK INDOORS, SUCH AS AROUND YOUR HOUSE: YES
CAN YOU RUN A SHORT DISTANCE: NO
CAN YOU CLIMB A FLIGHT OF STAIRS OR WALK UP A HILL: YES
CAN YOU WALK A BLOCK OR TWO ON LEVEL GROUND: NO
CAN YOU PARTICIPATE IN MODERATE RECREATIONAL ACTIVITIES LIKE GOLF, BOWLING, DANCING, DOUBLES TENNIS OR THROWING A BASEBALL OR FOOTBALL: NO
TOTAL_SCORE: 17.95
DASI METS SCORE: 4.9
CAN YOU DO MODERATE WORK AROUND THE HOUSE LIKE VACUUMING, SWEEPING FLOORS OR CARRYING GROCERIES: NO
CAN YOU DO LIGHT WORK AROUND THE HOUSE LIKE DUSTING OR WASHING DISHES: YES
CAN YOU TAKE CARE OF YOURSELF (EAT, DRESS, BATHE, OR USE TOILET): YES
CAN YOU DO HEAVY WORK AROUND THE HOUSE LIKE SCRUBBING FLOORS OR LIFTING AND MOVING HEAVY FURNITURE: NO
CAN YOU HAVE SEXUAL RELATIONS: YES
CAN YOU PARTICIPATE IN STRENOUS SPORTS LIKE SWIMMING, SINGLES TENNIS, FOOTBALL, BASKETBALL, OR SKIING: NO
CAN YOU DO YARD WORK LIKE RAKING LEAVES, WEEDING OR PUSHING A MOWER: NO

## 2024-11-25 ASSESSMENT — ACTIVITIES OF DAILY LIVING (ADL): ADL_SCORE: 1

## 2024-11-25 ASSESSMENT — PAIN SCALES - GENERAL: PAINLEVEL_OUTOF10: 0 - NO PAIN

## 2024-11-25 ASSESSMENT — LIFESTYLE VARIABLES: SMOKING_STATUS: NONSMOKER

## 2024-11-25 NOTE — CPM/PAT H&P
CPM/PAT Evaluation       Name: Khalida Barker (Khalida Barker)  /Age: 1950/74 y.o.     In-Person       Chief Complaint: Right wrist hardware removal    HPI74 year old female for hardware removal and tenolysis right wrist - right 12/3/24. Patient with spine surgery in May and had drainage to back in  for MRSA which was treated with antibiotic. Patient fell and injured right right wrist in  and had surgical intervention. Now presents to have right wrist hardware removal.     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     OA (osteoarthritis) of knee     Painful scar 2017    Peripheral neuropathy     Scoliosis     Spinal stenosis     Traumatic ecchymosis of foot 2013    Trigger finger of right hand     Vision loss     wears glasses    Vitamin D deficiency        Past Surgical History:   Procedure Laterality Date    ADENOIDECTOMY      BACK SURGERY  2024    spine up to T10    BREAST BIOPSY      COLONOSCOPY      KNEE ARTHROPLASTY Right     MOUTH SURGERY  2016    Back left molar removed    MOUTH SURGERY  2021    right molar    OTHER SURGICAL HISTORY      Skin lesion excision    SPINAL FUSION  2010    C4-5    TONSILLECTOMY      WISDOM TOOTH EXTRACTION  1978    lower    WRIST SURGERY Right 2024       Patient Sexual activity questions deferred to the physician.    Family History   Problem Relation Name Age of Onset    Alzheimer's disease Mother      Heart disease Father      Polymyositis Father      Hypertension Brother      Skin cancer Brother      Other (peripheral artery disease) Brother         Allergies   Allergen Reactions    Oxycodone Itching     Previously tolerated Percocet        Prior to Admission  medications    Medication Sig Start Date End Date Taking? Authorizing Provider   acetaminophen (Tylenol) 500 mg tablet Take 1 tablet (500 mg) by mouth every 8 hours if needed for mild pain (1 - 3) or moderate pain (4 - 6) for up to 30 doses. 6/4/24   Norm Garcia MD   B complex-vitamin C-folic acid (Nephro-Jayson Rx) 1- mg-mg-mcg tablet Take 1 tablet by mouth once daily with breakfast.    Historical Provider, MD   cholecalciferol (Vitamin D3) 50 mcg (2,000 unit) capsule Take 1 capsule (50 mcg) by mouth once daily. 9/5/24   Rocco Rushing MD   hydrocortisone (Anusol-HC) 25 mg suppository Insert 1 suppository (25 mg) into the rectum once daily. 10/10/24   Rocco Rushing MD   hyoscyamine (Anaspaz) 0.125 mg disintegrating tablet Take 1 tablet (0.125 mg) by mouth 3 times a day. 7/24/24 10/10/24  Ron Leung MD   multivitamin (One Daily Multivitamin) tablet Take 1 tablet by mouth once daily. 9/5/24   Rocco Rushing MD   traMADol (Ultram) 50 mg tablet Take 1 tablet (50 mg) by mouth every 6 hours if needed for severe pain (7 - 10). 10/11/24   Justin Lu MD      Refer to updated medication list on file      Constitutional: Negative for fever, chills, or sweats   ENMT: Negative for congestion, ear pain, mouth pain, throat pain Positive for post nasal drip  Respiratory: Negative for cough, wheezing, shortness of breath   Cardiac: Negative for chest pain, dyspnea on exertion, palpitations   Gastrointestinal: Negative for nausea, vomiting, diarrhea, abdominal pain Positive for constipation (on medication)  Genitourinary: Negative for dysuria, flank pain, frequency, hematuria   Musculoskeletal: Negative for swelling, Positive for hardware right wrist, limited ROM to RUE, right foot drop, numbness to bilateral feet, walking with walker, lower back pain and right sided pain.    Neurological: Negative for dizziness, confusion, headache  Psychiatric: Negative for mood changes   Skin: Negative for itching, rash,  ulcer    Hematologic/Lymph: Negative for bruising, easy bleeding  Allergic/Immunologic: Negative itching, sneezing, swelling       Physical Exam  Constitutional:       Appearance: Normal appearance.   HENT:      Head: Normocephalic.   Eyes:      Extraocular Movements: Extraocular movements intact.      Comments: glasses   Neck:      Comments: Hardware to neck  Cardiovascular:      Rate and Rhythm: Normal rate and regular rhythm.      Heart sounds: Normal heart sounds.   Pulmonary:      Effort: Pulmonary effort is normal.      Breath sounds: Normal breath sounds.   Abdominal:      General: Bowel sounds are normal.      Palpations: Abdomen is soft.   Musculoskeletal:      Cervical back: Normal range of motion.      Comments: Limited RUE ROM   Skin:     General: Skin is warm and dry.   Neurological:      Mental Status: She is alert and oriented to person, place, and time.   Psychiatric:         Mood and Affect: Mood normal.        PAT AIRWAY:   Airway:     Neck ROM::  Full   Some missing teeth, Denies loose teeth    Anesthesia:  Patient denies any anesthesia complications.      Testing/Diagnostic:   Lab Results   Component Value Date    GLUCOSE 101 (H) 08/26/2024    CALCIUM 9.7 08/26/2024     08/26/2024    K 3.9 08/26/2024    CO2 23 08/26/2024     08/26/2024    BUN 15 08/26/2024    CREATININE 0.68 08/26/2024      Lab Results   Component Value Date    WBC 4.4 08/26/2024    HGB 11.5 (L) 08/26/2024    HCT 36.1 08/26/2024    MCV 90 08/26/2024     08/26/2024      Lab Results   Component Value Date    HGBA1C 5.1 04/30/2024      Patient Specialist/PCP: Kristan    Visit Vitals  /59   Pulse 71   Temp 36 °C (96.8 °F) (Temporal)   Resp 16       DASI Risk Score      Flowsheet Row [facility]-Ad[facility] [place] [place] [facility] [place] [facility] [facility]   Can you take care of yourself (eat, dress, bathe, or use toilet)?  2.75 filed at 11/25/2024  1026 2.75 filed at 04/30/2024 1016   Can you walk indoors, such as around your house? 1.75 filed at 11/25/2024 1026 1.75 filed at 04/30/2024 1016   Can you walk a block or two on level ground?  0 filed at 11/25/2024 1026 0 filed at 04/30/2024 1016   Can you climb a flight of stairs or walk up a hill? 5.5 filed at 11/25/2024 1026 0 filed at 04/30/2024 1016   Can you run a short distance? 0 filed at 11/25/2024 1026 0 filed at 04/30/2024 1016   Can you do light work around the house like dusting or washing dishes? 2.7 filed at 11/25/2024 1026 2.7 filed at 04/30/2024 1016   Can you do moderate work around the house like vacuuming, sweeping floors or carrying groceries? 0 filed at 11/25/2024 1026 3.5 filed at 04/30/2024 1016   Can you do heavy work around the house like scrubbing floors or lifting and moving heavy furniture?  0 filed at 11/25/2024 1026 0 filed at 04/30/2024 1016   Can you do yard work like raking leaves, weeding or pushing a mower? 0 filed at 11/25/2024 1026 0 filed at 04/30/2024 1016   Can you have sexual relations? 5.25 filed at 11/25/2024 1026 5.25 filed at 04/30/2024 1016   Can you participate in moderate recreational activities like golf, bowling, dancing, doubles tennis or throwing a baseball or football? 0 filed at 11/25/2024 1026 0 filed at 04/30/2024 1016   Can you participate in strenous sports like swimming, singles tennis, football, basketball, or skiing? 0 filed at 11/25/2024 1026 0 filed at 04/30/2024 1016   DASI SCORE 17.95 filed at 11/25/2024 1026 15.95 filed at 04/30/2024 1016   METS Score (Will be calculated only when all the questions are answered) 4.9 filed at 11/25/2024 1026 4.7 filed at 04/30/2024 1016          Caprameshi DVT Assessment      Flowsheet Row Pre-Admission Testing from 11/25/2024 in [facility] ED to Hosp-Admission (Discharged) from 7/21/2024 in [facility] with Ron Leung MD and Darnell Chou DO   DVT Score 4 filed at  11/25/2024 1025 3 filed at 07/21/2024 1600   Surgical Factors Minor surgery planned filed at 11/25/2024 1025 --   BMI 30 or less filed at 11/25/2024 1025 30 or less filed at 07/21/2024 1600   RETIRED: Age -- 60-75 years filed at 07/21/2024 1600          Modified Frailty Index      Flowsheet Row Pre-Admission Testing from 11/25/2024 in [facility] Pre-Admission Testing from 4/30/2024 in [facility]   Non-independent functional status (problems with dressing, bathing, personal grooming, or cooking) 0 filed at 11/25/2024 1027 0 filed at 04/30/2024 1534   History of diabetes mellitus  0 filed at 11/25/2024 1027 0 filed at 04/30/2024 1534   History of COPD 0 filed at 11/25/2024 1027 0 filed at 04/30/2024 1534   History of CHF No filed at 11/25/2024 1027 No filed at 04/30/2024 1534   History of MI 0 filed at 11/25/2024 1027 0 filed at 04/30/2024 1534   History of Percutaneous Coronary Intervention, Cardiac Surgery, or Angina No filed at 11/25/2024 1027 No filed at 04/30/2024 1534   Hypertension requiring the use of medication  0 filed at 11/25/2024 1027 0 filed at 04/30/2024 1534   Peripheral vascular disease 0 filed at 11/25/2024 1027 0 filed at 04/30/2024 1534   Impaired sensorium (cognitive impairement or loss, clouding, or delirium) 0 filed at 11/25/2024 1027 0 filed at 04/30/2024 1534   TIA or CVA withouy residual deficit 0 filed at 11/25/2024 1027 0 filed at 04/30/2024 1534   Cerebrovascular accident with deficit 0 filed at 11/25/2024 1027 0 filed at 04/30/2024 1534   Modified Frailty Index Calculator 0 filed at 11/25/2024 1027 0 filed at 04/30/2024 1534          CHADS2 Stroke Risk  Current as of 15 minutes ago        N/A 3 to 100%: High Risk   2 to < 3%: Medium Risk   0 to < 2%: Low Risk     Last Change: N/A          This score determines the patient's risk of having a stroke if the patient has atrial fibrillation.        This score is not applicable to this patient. Components are  not calculated.          Revised Cardiac Risk Index      Flowsheet Row [facility] [place] [place] [facility] [facility] [facility]   High-Risk Surgery (Intraperitoneal, Intrathoracic,Suprainguinal vascular) 0 filed at 11/25/2024 1027 0 filed at 04/30/2024 1534   History of ischemic heart disease (History of MI, History of positive exercuse test, Current chest paint considered due to myocardial ischemia, Use of nitrate therapy, ECG with pathological Q Waves) 0 filed at 11/25/2024 1027 0 filed at 04/30/2024 1534   History of congestive heart failure (pulmonary edemia, bilateral rales or S3 gallop, Paroxysmal nocturnal dyspnea, CXR showing pulmonary vascular redistribution) 0 filed at 11/25/2024 1027 0 filed at 04/30/2024 1534   History of cerebrovascular disease (Prior TIA or stroke) 0 filed at 11/25/2024 1027 0 filed at 04/30/2024 1534   Pre-operative insulin treatment 0 filed at 11/25/2024 1027 0 filed at 04/30/2024 1534   Pre-operative creatinine>2 mg/dl 0 filed at 11/25/2024 1027 0 filed at 04/30/2024 1534   Revised Cardiac Risk Calculator 0 filed at 11/25/2024 1027 0 filed at 04/30/2024 1534          Apfel Simplified Score      Flowsheet Row [facility] [place] [place] [facility] [place] [facility]   Smoking status 1 filed at 11/25/2024 1027 1 filed at 04/30/2024 1534   History of motion sickness or PONV  0 filed at 11/25/2024 1027 0 filed at 04/30/2024 1534   Use of postoperative opioids 1 filed at 11/25/2024 1027 1 filed at 04/30/2024 1534   Gender - Female 1=Yes filed at 11/25/2024 1027 --   Apfel Simplified Score Calculator 3 filed at 11/25/2024 1027 3 filed at 04/30/2024 1534          Risk Analysis Index Results This Encounter         11/25/2024  1027             Do you live in a place other than your own home?: 0    When did you begin living in the place  you are currently residing?: Greater than one year ago    Any kidney failure, kidney not working well, or seeing a kidney doctor (nephrologist)? If yes, was this for kidney stones or another problem?: 0 No    Any history of chronic (long-term) congestive heart failure (CHF)?: 0 No    Any shortness of breath when resting?: 0 No    In the past five years, have you been diagnosed with or treated for cancer?: No    During the last 3 months has it become difficult for you to remember things or organize your thoughts?: 0 No    Have you lost weight of 10 pounds or more in the past 3 months without trying?: 4 Yes    Do you have any loss of appetitie?: 0 No    Getting Around (Mobility): 1 Needs help from cane, walker, or scooter    Eatin Can plan and prepare own meals    Toiletin Can use toilet without any help    Personal Hygiene (Bathing, Hand Washing, Changing Clothes): 0 Can shower or bathe without any help    NICOLE Cancer History: Patient does not indicate history of cancer    Total Risk Analysis Index Score Without Cancer: 27    Total Risk Analysis Index Score: 27          Stop Bang Score      Flowsheet Row [facility]   Do you snore loudly? 0 filed at 2024 1026 1 filed at 2024 1016   Do you often feel tired or fatigued after your sleep? 1 filed at 2024 1026 1 filed at 2024 1016   Has anyone ever observed you stop breathing in your sleep? 0 filed at 2024 1026 0 filed at 2024 1016   Do you have or are you being treated for high blood pressure? 0 filed at 2024 1026 0 filed at 2024 1016   Recent BMI (Calculated) 24.7 filed at 2024 1026 31.8 filed at 2024 1016   Is BMI greater than 35 kg/m2? 0=No filed at 2024 1026 0=No filed at 2024 1016   Age older than 50 years old? 1=Yes filed at 2024 1026 1=Yes filed at 2024  1016   Is your neck circumference greater than 17 inches (Male) or 16 inches (Female)? 0 filed at 11/25/2024 1026 0 filed at 04/30/2024 1016   Gender - Male 0=No filed at 11/25/2024 1026 0=No filed at 04/30/2024 1016   STOP-BANG Total Score 2 filed at 11/25/2024 1026 3 filed at 04/30/2024 1016          Prodigy: High Risk  Total Score: 15              Prodigy Age Score      Prodigy Previous Opioid Use Score           ARISCAT Score for Postoperative Pulmonary Complications      Flowsheet Row Pre-Admission Testing from 11/25/2024 in [facility]   Age, years  3 filed at 11/25/2024 1028   Preoperative SpO2 0 filed at 11/25/2024 1028   Respiratory infection in the last month Either upper or lower (i.e., URI, bronchitis, pneumonia), with fever and antibiotic treatment 0 filed at 11/25/2024 1028   Preoperative anemoa (Hgb less than 10 g/dl) 0 filed at 11/25/2024 1028   Surgical incision  0 filed at 11/25/2024 1028   Duration of surgery  0 filed at 11/25/2024 1028   Emergency Procedure  0 filed at 11/25/2024 1028   ARISCAT Total Score  3 filed at 11/25/2024 1028          Kathryn Perioperative Risk for Myocardial Infarction or Cardiac Arrest (RAMONITA)      Flowsheet Row Pre-Admission Testing from 11/25/2024 in [facility]   Age 1.46 filed at 11/25/2024 1028   Functional Status  0 filed at 11/25/2024 1028   ASA Class  -3.29 filed at 11/25/2024 1028   Creatinine 0 filed at 11/25/2024 1028   Type of Procedure  0.80 filed at 11/25/2024 1028   RAMONITA Total Score  -6.3 filed at 11/25/2024 1028   RAMONITA % 0.18 filed at 11/25/2024 1028          Assessment and Plan:     Preop:   OR with Dr Gonzalez.   Labs on file and above August 2024  EKG on file 7/23/24    Neurologic:   Preoperative brain exercise educational handout provided to patient.    Cardiac:  DASI Score: 17.95   MET Score: 4.9  RCRI  0 which is 3.9% 30 day risk of MACE (risk for cardiac death, nonfatal myocardial infarction, and nonfactal cardiac  arrest)  RAMONITA score which indicates a 0.18% risk of intraoperative or 30-day postoperative MACE  Denies cardiovascular abnormalities    Pulmonary:   STOP-BANG score of 2.  Low risk of obstructive sleep apnea.   ARISCAT: 3 points which is a low (1.6%) risk of in-hospital post-op pulmonary complications     GI:  Apfel: 3 points 61% risk for post operative N/V  Constipation (on medication)    Neuro-muscular:   See above  Patient in PT    Hematologic:   Caprini score 4, patient at high risk for perioperative DVT. Patient provided with VTE education/handout.     Anesthesia: No history of anesthesia complications. No anesthesia concerns.      *See risk scores as previously documented

## 2024-11-25 NOTE — PREPROCEDURE INSTRUCTIONS
Thank you for visiting Preadmission Testing at [facility]. If you have any changes to your health condition, please call the SURGEON's office to alert them and give them details of your symptoms.        Preoperative Brain Exercises    What are brain exercises?  A brain exercise is any activity that engages your thinking (cognitive) skills.    What types of activities are considered brain exercises?  Jigsaw puzzles, crossword puzzles, word jumble, memory games, word search, and many more.  Many can be found free online or on your phone via a mobile ayaz.    Why should I do brain exercises before my surgery?  More recent research has shown brain exercise before surgery can lower the risk of postoperative delirium (confusion) which can be especially important for older adults.  Patients who did brain exercises for 5 to 10 hours the days before surgery, cut their risk of postoperative delirium in half up to 1 week after surgery.      Preoperative Deep Breathing Exercises    Why it is important to do deep breathing exercises before my surgery?  Deep breathing exercises strengthen your breathing muscles.  This helps you to recover after your surgery and decreases the chance of breathing complications.    How are the deep breathing exercises done?  Sit straight with your back supported.  Breathe in deeply and slowly through your nose. Your lower rib cage should expand and your abdomen may move forward.  Hold that breath for 3 to 5 seconds.  Breathe out through pursed lips, slowly and completely.  Rest and repeat 10 times every hour while awake.  Rest longer if you become dizzy or lightheaded.      Patient and Family Education   Ways You Can Help Prevent Blood Clots     This handout explains some simple things you can do to help prevent blood clots.      Blood clots are blockages that can form in the body's veins. When a blood clot forms in your deep veins, it may be called a deep vein thrombosis, or DVT for short. Blood clots can  happen in any part of the body where blood flows, but they are most common in the arms and legs. If a piece of a blood clot breaks free and travels to the lungs, it is called a pulmonary embolus (PE). A PE can be a very serious problem.      Being in the hospital or having surgery can raise your chances of getting a blood clot because you may not be well enough to move around as much as you normally do.      Ways you can help prevent blood clots in the hospital         Wearing SCDs. SCDs stands for Sequential Compression Devices.   SCDs are special sleeves that wrap around your legs  They attach to a pump that fills them with air to gently squeeze your legs every few minutes.   This helps return the blood in your legs to your heart.   SCDs should only be taken off when walking or bathing.   SCDs may not be comfortable, but they can help save your life.               Wearing compression stockings - if your doctor orders them. These special snug fitting stockings gently squeeze your legs to help blood flow.       Walking. Walking helps move the blood in your legs.   If your doctor says it is ok, try walking the halls at least   5 times a day. Ask us to help you get up, so you don't fall.      Taking any blood thinning medicines your doctor orders.          ©[facility]; 3/23        Ways you can help prevent blood clots at home       Wearing compression stockings - if your doctor orders them. ? Walking - to help move the blood in your legs.       Taking any blood thinning medicines your doctor orders.      Signs of a blood clot or PE      Tell your doctor or nurse know right away if you have of the problems listed below.    If you are at home, seek medical care right away. Call 911 for chest pain or problems breathing.          Signs of a blood clot (DVT) - such as pain,  swelling, redness or warmth in your arm or leg      Signs of a pulmonary embolism (PE) - such as chest     pain or feeling short of breath

## 2024-11-25 NOTE — PREPROCEDURE INSTRUCTIONS
Medication List            Accurate as of November 25, 2024 10:45 AM. Always use your most recent med list.                acetaminophen 500 mg tablet  Commonly known as: Tylenol  Take 1 tablet (500 mg) by mouth every 8 hours if needed for mild pain (1 - 3) or moderate pain (4 - 6) for up to 30 doses.  Medication Adjustments for Surgery: Take/Use as prescribed     aspirin-acetaminophen-caffeine 250-250-65 mg tablet  Commonly known as: Excedrin Migraine  Additional Medication Adjustments for Surgery: Take last dose 7 days before surgery     BIOTIN ORAL  Additional Medication Adjustments for Surgery: Take last dose 7 days before surgery     cholecalciferol 50 mcg (2,000 unit) capsule  Commonly known as: Vitamin D3  Take 1 capsule (50 mcg) by mouth once daily.  Additional Medication Adjustments for Surgery: Take last dose 7 days before surgery     docusate sodium 50 mg/5 mL oral liquid  Commonly known as: Colace  Medication Adjustments for Surgery: Do Not take on the morning of surgery     fluticasone 50 mcg/actuation nasal spray  Commonly known as: Flonase  Medication Adjustments for Surgery: Take/Use as prescribed     hydrocortisone 25 mg suppository  Commonly known as: Anusol-HC  Insert 1 suppository (25 mg) into the rectum once daily.  Medication Adjustments for Surgery: Do Not take on the morning of surgery     hyoscyamine 0.125 mg disintegrating tablet  Commonly known as: Anaspaz  Take 1 tablet (0.125 mg) by mouth 3 times a day.  Medication Adjustments for Surgery: Do Not take on the morning of surgery     One Daily Multivitamin tablet  Generic drug: multivitamin  Take 1 tablet by mouth once daily.  Additional Medication Adjustments for Surgery: Take last dose 7 days before surgery     psyllium powder  Commonly known as: Metamucil  Medication Adjustments for Surgery: Do Not take on the morning of surgery     traMADol 50 mg tablet  Commonly known as: Ultram  Take 1 tablet (50 mg) by mouth every 6 hours if  needed for severe pain (7 - 10).  Medication Adjustments for Surgery: Take/Use as prescribed                PRE-OPERATIVE INSTRUCTIONS    You will receive notification one business day prior to your procedure to confirm your arrival time. It is important that you answer your phone and/or check your messages during this time. If you do not hear from the surgery center by 5 pm. the day before your procedure, please call [phone].     Please enter the building through the Outpatient entrance and take the elevator off the lobby to the 2nd floor then check in at the Outpatient Surgery desk on the 2nd floor.    INSTRUCTIONS:  Talk to your surgeon for instructions if you should stop your aspirin, blood thinner, or diabetes medicines.  DO NOT take any multivitamins or over the counter supplements for 7-10 days before surgery.  If not being admitted, you must have an adult immediately available to drive you home after surgery. We also highly recommend you have someone stay with you for the entire day and night of your surgery.  For children having surgery, a parent or legal guardian must accompany them to the surgery center. If this is not possible, please call [phone] to make additional arrangements.  For adults who are unable to consent or make medical decisions for themselves, a legal guardian or Power of  must accompany them to the surgery center. If this is not possible, please call [phone] to make additional arrangements.  Wear comfortable, loose fitting clothing.  All jewelry and piercings must be removed. If you are unable to remove an item or have a dermal piercing, please be sure to tell the nurse when you arrive for surgery.  Nail polish and make-up must be removed.  Avoid smoking or consuming alcohol for 24 hours before surgery.  To help prevent infection, please take a shower/bath and wash your hair the night before and/or morning of surgery (or follow other specific bathing instructions  provided).    Preoperative Fasting Guidelines    Why must I stop eating and drinking near surgery time?  With sedation, food or liquid in your stomach can enter your lungs causing serious complications  Increases nausea and vomiting    When do I need to stop eating and drinking before my surgery?  Do not eat any solid food after midnight the night before your surgery/procedure unless otherwise instructed by your surgeon.   You may have up to 13.5 ounces of clear liquid until TWO hours before your instructed arrival time to the hospital.  This includes water, black tea/coffee, (no milk or cream) apple juice, and electrolyte drinks (Gatorade).   You may chew gum until TWO hours before your surgery/procedure      If applicable, notify your surgeons office immediately of any new skin changes that occur to the surgical limb.      If you have any questions or concerns, please call Pre-Admission Testing at [phone].        Home Preoperative Antibacterial Shower with Chlorhexidine gluconate (CHG)     What is a home preoperative antibacterial shower?  This shower is a way of cleaning the skin with a germ killing solution before surgery. The solution contains chlorhexidine gluconate, commonly known as CHG. CHG is a skin cleanser with germ killing ability. Let your doctor know if you are allergic to chlorhexidine.    Why do I need to take a preoperative antibacterial shower?  Skin is not sterile. It is best to try to make your skin as free of germs as possible before surgery. Proper cleansing with a germ killing soap before surgery can lower the number of germs on your skin. This helps to reduce the risk of infection at the surgical site. Following the instructions listed below will help you prepare your skin for surgery.    How do I use the solution?  Begin using your CHG soap the night before and again the morning of your procedure.   Do not shave the day before or day of surgery.  Remove all jewelry until after  surgery. Take off rings and take out all body-piercing jewelry.  Wash your face and hair with normal soap and shampoo before you use the CHG soap.  Apply the CHG solution to a clean wet washcloth. Move away from the water to avoid premature rinsing of the CHG soap as you are applying. Firmly lather your entire body from the neck down. Do not use CHG on your face, eyes, ears, or genitals.   Pay special attention to the area where your incisions will be located.  Do not scrub your skin too hard.  It is important to allow the CHG soap to sit on your skin for 3-5 minutes.  Rinse the solution off your body completely. Do not wash with your normal soap after the CHG soap solution.  Pat yourself dry with a clean, soft towel.  Do not apply powders, lotions or deodorants as these might block how the CHG soap works.   Dress in clean clothing.  Be sure to sleep with clean, freshly laundered sheets.  Be aware that CHG can cause stains on fabric. Rinse your washcloth and other linens that have contact with CHG completely. Use only non-chlorine detergents to launder the items used.

## 2024-11-25 NOTE — H&P (VIEW-ONLY)
CPM/PAT Evaluation       Name: Khalida Barker (Khalida Barker)  /Age: 1950/74 y.o.     In-Person       Chief Complaint: Right wrist hardware removal    HPI74 year old female for hardware removal and tenolysis right wrist - right 12/3/24. Patient with spine surgery in May and had drainage to back in  for MRSA which was treated with antibiotic. Patient fell and injured right right wrist in  and had surgical intervention. Now presents to have right wrist hardware removal.     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     OA (osteoarthritis) of knee     Painful scar 2017    Peripheral neuropathy     Scoliosis     Spinal stenosis     Traumatic ecchymosis of foot 2013    Trigger finger of right hand     Vision loss     wears glasses    Vitamin D deficiency        Past Surgical History:   Procedure Laterality Date    ADENOIDECTOMY      BACK SURGERY  2024    spine up to T10    BREAST BIOPSY      COLONOSCOPY      KNEE ARTHROPLASTY Right     MOUTH SURGERY  2016    Back left molar removed    MOUTH SURGERY  2021    right molar    OTHER SURGICAL HISTORY      Skin lesion excision    SPINAL FUSION  2010    C4-5    TONSILLECTOMY      WISDOM TOOTH EXTRACTION  1978    lower    WRIST SURGERY Right 2024       Patient Sexual activity questions deferred to the physician.    Family History   Problem Relation Name Age of Onset    Alzheimer's disease Mother      Heart disease Father      Polymyositis Father      Hypertension Brother      Skin cancer Brother      Other (peripheral artery disease) Brother         Allergies   Allergen Reactions    Oxycodone Itching     Previously tolerated Percocet        Prior to Admission  medications    Medication Sig Start Date End Date Taking? Authorizing Provider   acetaminophen (Tylenol) 500 mg tablet Take 1 tablet (500 mg) by mouth every 8 hours if needed for mild pain (1 - 3) or moderate pain (4 - 6) for up to 30 doses. 6/4/24   Norm Garcia MD   B complex-vitamin C-folic acid (Nephro-Jayson Rx) 1- mg-mg-mcg tablet Take 1 tablet by mouth once daily with breakfast.    Historical Provider, MD   cholecalciferol (Vitamin D3) 50 mcg (2,000 unit) capsule Take 1 capsule (50 mcg) by mouth once daily. 9/5/24   Rocco Rushing MD   hydrocortisone (Anusol-HC) 25 mg suppository Insert 1 suppository (25 mg) into the rectum once daily. 10/10/24   Rocco Rushing MD   hyoscyamine (Anaspaz) 0.125 mg disintegrating tablet Take 1 tablet (0.125 mg) by mouth 3 times a day. 7/24/24 10/10/24  Ron Leung MD   multivitamin (One Daily Multivitamin) tablet Take 1 tablet by mouth once daily. 9/5/24   Rocco Rushing MD   traMADol (Ultram) 50 mg tablet Take 1 tablet (50 mg) by mouth every 6 hours if needed for severe pain (7 - 10). 10/11/24   Justin Lu MD      Refer to updated medication list on file      Constitutional: Negative for fever, chills, or sweats   ENMT: Negative for congestion, ear pain, mouth pain, throat pain Positive for post nasal drip  Respiratory: Negative for cough, wheezing, shortness of breath   Cardiac: Negative for chest pain, dyspnea on exertion, palpitations   Gastrointestinal: Negative for nausea, vomiting, diarrhea, abdominal pain Positive for constipation (on medication)  Genitourinary: Negative for dysuria, flank pain, frequency, hematuria   Musculoskeletal: Negative for swelling, Positive for hardware right wrist, limited ROM to RUE, right foot drop, numbness to bilateral feet, walking with walker, lower back pain and right sided pain.    Neurological: Negative for dizziness, confusion, headache  Psychiatric: Negative for mood changes   Skin: Negative for itching, rash,  ulcer    Hematologic/Lymph: Negative for bruising, easy bleeding  Allergic/Immunologic: Negative itching, sneezing, swelling       Physical Exam  Constitutional:       Appearance: Normal appearance.   HENT:      Head: Normocephalic.   Eyes:      Extraocular Movements: Extraocular movements intact.      Comments: glasses   Neck:      Comments: Hardware to neck  Cardiovascular:      Rate and Rhythm: Normal rate and regular rhythm.      Heart sounds: Normal heart sounds.   Pulmonary:      Effort: Pulmonary effort is normal.      Breath sounds: Normal breath sounds.   Abdominal:      General: Bowel sounds are normal.      Palpations: Abdomen is soft.   Musculoskeletal:      Cervical back: Normal range of motion.      Comments: Limited RUE ROM   Skin:     General: Skin is warm and dry.   Neurological:      Mental Status: She is alert and oriented to person, place, and time.   Psychiatric:         Mood and Affect: Mood normal.        PAT AIRWAY:   Airway:     Neck ROM::  Full   Some missing teeth, Denies loose teeth    Anesthesia:  Patient denies any anesthesia complications.      Testing/Diagnostic:   Lab Results   Component Value Date    GLUCOSE 101 (H) 08/26/2024    CALCIUM 9.7 08/26/2024     08/26/2024    K 3.9 08/26/2024    CO2 23 08/26/2024     08/26/2024    BUN 15 08/26/2024    CREATININE 0.68 08/26/2024      Lab Results   Component Value Date    WBC 4.4 08/26/2024    HGB 11.5 (L) 08/26/2024    HCT 36.1 08/26/2024    MCV 90 08/26/2024     08/26/2024      Lab Results   Component Value Date    HGBA1C 5.1 04/30/2024      Patient Specialist/PCP: Kristan    Visit Vitals  /59   Pulse 71   Temp 36 °C (96.8 °F) (Temporal)   Resp 16       DASI Risk Score      Flowsheet Row Pre-Admission Testing from 11/25/2024 in [facility] Pre-Admission Testing from 4/30/2024 in [facility]   Can you take care of yourself (eat, dress, bathe, or use toilet)?  2.75 filed at 11/25/2024  1026 2.75 filed at 04/30/2024 1016   Can you walk indoors, such as around your house? 1.75 filed at 11/25/2024 1026 1.75 filed at 04/30/2024 1016   Can you walk a block or two on level ground?  0 filed at 11/25/2024 1026 0 filed at 04/30/2024 1016   Can you climb a flight of stairs or walk up a hill? 5.5 filed at 11/25/2024 1026 0 filed at 04/30/2024 1016   Can you run a short distance? 0 filed at 11/25/2024 1026 0 filed at 04/30/2024 1016   Can you do light work around the house like dusting or washing dishes? 2.7 filed at 11/25/2024 1026 2.7 filed at 04/30/2024 1016   Can you do moderate work around the house like vacuuming, sweeping floors or carrying groceries? 0 filed at 11/25/2024 1026 3.5 filed at 04/30/2024 1016   Can you do heavy work around the house like scrubbing floors or lifting and moving heavy furniture?  0 filed at 11/25/2024 1026 0 filed at 04/30/2024 1016   Can you do yard work like raking leaves, weeding or pushing a mower? 0 filed at 11/25/2024 1026 0 filed at 04/30/2024 1016   Can you have sexual relations? 5.25 filed at 11/25/2024 1026 5.25 filed at 04/30/2024 1016   Can you participate in moderate recreational activities like golf, bowling, dancing, doubles tennis or throwing a baseball or football? 0 filed at 11/25/2024 1026 0 filed at 04/30/2024 1016   Can you participate in strenous sports like swimming, singles tennis, football, basketball, or skiing? 0 filed at 11/25/2024 1026 0 filed at 04/30/2024 1016   DASI SCORE 17.95 filed at 11/25/2024 1026 15.95 filed at 04/30/2024 1016   METS Score (Will be calculated only when all the questions are answered) 4.9 filed at 11/25/2024 1026 4.7 filed at 04/30/2024 1016          Caprameshi DVT Assessment      Flowsheet Row Pre-Admission Testing from 11/25/2024 in [facility]-Admission (Discharged) from 7/21/2024 in [facility] with Ron Leung MD and Darnell Chou DO   DVT Score 4 filed at  11/25/2024 1025 3 filed at 07/21/2024 1600   Surgical Factors Minor surgery planned filed at 11/25/2024 1025 --   BMI 30 or less filed at 11/25/2024 1025 30 or less filed at 07/21/2024 1600   RETIRED: Age -- 60-75 years filed at 07/21/2024 1600          Modified Frailty Index      Flowsheet Row Pre-Admission Testing from 11/25/2024 in [facility] Pre-Admission Testing from 4/30/2024 in [facility]   Non-independent functional status (problems with dressing, bathing, personal grooming, or cooking) 0 filed at 11/25/2024 1027 0 filed at 04/30/2024 1534   History of diabetes mellitus  0 filed at 11/25/2024 1027 0 filed at 04/30/2024 1534   History of COPD 0 filed at 11/25/2024 1027 0 filed at 04/30/2024 1534   History of CHF No filed at 11/25/2024 1027 No filed at 04/30/2024 1534   History of MI 0 filed at 11/25/2024 1027 0 filed at 04/30/2024 1534   History of Percutaneous Coronary Intervention, Cardiac Surgery, or Angina No filed at 11/25/2024 1027 No filed at 04/30/2024 1534   Hypertension requiring the use of medication  0 filed at 11/25/2024 1027 0 filed at 04/30/2024 1534   Peripheral vascular disease 0 filed at 11/25/2024 1027 0 filed at 04/30/2024 1534   Impaired sensorium (cognitive impairement or loss, clouding, or delirium) 0 filed at 11/25/2024 1027 0 filed at 04/30/2024 1534   TIA or CVA withouy residual deficit 0 filed at 11/25/2024 1027 0 filed at 04/30/2024 1534   Cerebrovascular accident with deficit 0 filed at 11/25/2024 1027 0 filed at 04/30/2024 1534   Modified Frailty Index Calculator 0 filed at 11/25/2024 1027 0 filed at 04/30/2024 1534          CHADS2 Stroke Risk  Current as of 15 minutes ago        N/A 3 to 100%: High Risk   2 to < 3%: Medium Risk   0 to < 2%: Low Risk     Last Change: N/A          This score determines the patient's risk of having a stroke if the patient has atrial fibrillation.        This score is not applicable to this patient. Components are  not calculated.          Revised Cardiac Risk Index      Flowsheet Row Pre-Admission Testing from 11/25/2024 in [facility] Pre-Admission Testing from [facility]30/2024 [facility]   High-Risk Surgery (Intraperitoneal, Intrathoracic,Suprainguinal vascular) 0 filed at 11/25/2024 1027 0 filed at 04/30/2024 1534   History of ischemic heart disease (History of MI, History of positive exercuse test, Current chest paint considered due to myocardial ischemia, Use of nitrate therapy, ECG with pathological Q Waves) 0 filed at 11/25/2024 1027 0 filed at 04/30/2024 1534   History of congestive heart failure (pulmonary edemia, bilateral rales or S3 gallop, Paroxysmal nocturnal dyspnea, CXR showing pulmonary vascular redistribution) 0 filed at 11/25/2024 1027 0 filed at 04/30/2024 1534   History of cerebrovascular disease (Prior TIA or stroke) 0 filed at 11/25/2024 1027 0 filed at 04/30/2024 1534   Pre-operative insulin treatment 0 filed at 11/25/2024 1027 0 filed at 04/30/2024 1534   Pre-operative creatinine>2 mg/dl 0 filed at 11/25/2024 1027 0 filed at 04/30/2024 1534   Revised Cardiac Risk Calculator 0 filed at 11/25/2024 1027 0 filed at 04/30/2024 1534          Apfel Simplified Score      Flowsheet Row Pre-Admission Testing from 11/25/2024 in [facility] Pre-Admission Testing from 4/30/2024 in [facility]   Smoking status 1 filed at 11/25/2024 1027 1 filed at 04/30/2024 1534   History of motion sickness or PONV  0 filed at 11/25/2024 1027 0 filed at 04/30/2024 1534   Use of postoperative opioids 1 filed at 11/25/2024 1027 1 filed at 04/30/2024 1534   Gender - Female 1=Yes filed at 11/25/2024 1027 --   Apfel Simplified Score Calculator 3 filed at 11/25/2024 1027 3 filed at 04/30/2024 1534          Risk Analysis Index Results This Encounter         11/25/2024  1027             Do you live in a place other than your own home?: 0    When did you begin living in the place  you are currently residing?: Greater than one year ago    Any kidney failure, kidney not working well, or seeing a kidney doctor (nephrologist)? If yes, was this for kidney stones or another problem?: 0 No    Any history of chronic (long-term) congestive heart failure (CHF)?: 0 No    Any shortness of breath when resting?: 0 No    In the past five years, have you been diagnosed with or treated for cancer?: No    During the last 3 months has it become difficult for you to remember things or organize your thoughts?: 0 No    Have you lost weight of 10 pounds or more in the past 3 months without trying?: 4 Yes    Do you have any loss of appetitie?: 0 No    Getting Around (Mobility): 1 Needs help from cane, walker, or scooter    Eatin Can plan and prepare own meals    Toiletin Can use toilet without any help    Personal Hygiene (Bathing, Hand Washing, Changing Clothes): 0 Can shower or bathe without any help    NICOLE Cancer History: Patient does not indicate history of cancer    Total Risk Analysis Index Score Without Cancer: 27    Total Risk Analysis Index Score: 27          Stop Bang Score      Flowsheet Row Pre-Admission Testing from 2024 in [facility] Pre-Admission Testing from 2024 in [facility]   Do you snore loudly? 0 filed at 2024 1026 1 filed at 2024 1016   Do you often feel tired or fatigued after your sleep? 1 filed at 2024 1026 1 filed at 2024 1016   Has anyone ever observed you stop breathing in your sleep? 0 filed at 2024 1026 0 filed at 2024 1016   Do you have or are you being treated for high blood pressure? 0 filed at 2024 1026 0 filed at 2024 1016   Recent BMI (Calculated) 24.7 filed at 2024 1026 31.8 filed at 2024 1016   Is BMI greater than 35 kg/m2? 0=No filed at 2024 1026 0=No filed at 2024 1016   Age older than 50 years old? 1=Yes filed at 2024 1026 1=Yes filed at 2024  1016   Is your neck circumference greater than 17 inches (Male) or 16 inches (Female)? 0 filed at 11/25/2024 1026 0 filed at 04/30/2024 1016   Gender - Male 0=No filed at 11/25/2024 1026 0=No filed at 04/30/2024 1016   STOP-BANG Total Score 2 filed at 11/25/2024 1026 3 filed at 04/30/2024 1016          Prodigy: High Risk  Total Score: 15              Prodigy Age Score      Prodigy Previous Opioid Use Score           ARISCAT Score for Postoperative Pulmonary Complications      Flowsheet Row Pre-Admission Testing from 11/25/2024 in [facility]   Age, years  3 filed at 11/25/2024 1028   Preoperative SpO2 0 filed at 11/25/2024 1028   Respiratory infection in the last month Either upper or lower (i.e., URI, bronchitis, pneumonia), with fever and antibiotic treatment 0 filed at 11/25/2024 1028   Preoperative anemoa (Hgb less than 10 g/dl) 0 filed at 11/25/2024 1028   Surgical incision  0 filed at 11/25/2024 1028   Duration of surgery  0 filed at 11/25/2024 1028   Emergency Procedure  0 filed at 11/25/2024 1028   ARISCAT Total Score  3 filed at 11/25/2024 1028          Kathryn Perioperative Risk for Myocardial Infarction or Cardiac Arrest (RAMONITA)      Flowsheet Row Pre-Admission Testing from 11/25/2024 in [facility]   Age 1.46 filed at 11/25/2024 1028   Functional Status  0 filed at 11/25/2024 1028   ASA Class  -3.29 filed at 11/25/2024 1028   Creatinine 0 filed at 11/25/2024 1028   Type of Procedure  0.80 filed at 11/25/2024 1028   RAMONITA Total Score  -6.3 filed at 11/25/2024 1028   RAMONITA % 0.18 filed at 11/25/2024 1028          Assessment and Plan:     Preop:   OR with Dr Gonzalez.   Labs on file and above August 2024  EKG on file 7/23/24    Neurologic:   Preoperative brain exercise educational handout provided to patient.    Cardiac:  DASI Score: 17.95   MET Score: 4.9  RCRI  0 which is 3.9% 30 day risk of MACE (risk for cardiac death, nonfatal myocardial infarction, and nonfactal cardiac  arrest)  RAMONITA score which indicates a 0.18% risk of intraoperative or 30-day postoperative MACE  Denies cardiovascular abnormalities    Pulmonary:   STOP-BANG score of 2.  Low risk of obstructive sleep apnea.   ARISCAT: 3 points which is a low (1.6%) risk of in-hospital post-op pulmonary complications     GI:  Apfel: 3 points 61% risk for post operative N/V  Constipation (on medication)    Neuro-muscular:   See above  Patient in PT    Hematologic:   Caprini score 4, patient at high risk for perioperative DVT. Patient provided with VTE education/handout.     Anesthesia: No history of anesthesia complications. No anesthesia concerns.      *See risk scores as previously documented

## 2024-11-26 NOTE — PROGRESS NOTES
"Physical Therapy    Physical Therapy Treatment    Patient Name: Khalida Barker  MRN: [id number]  Today's Date: 11/27/2024    Time Entry:   Time Calculation  Start Time: 1025  Stop Time: 1115  Time Calculation (min): 50 min     PT Therapeutic Procedures Time Entry  Neuromuscular Re-Education Time Entry: 5  Therapeutic Exercise Time Entry: 45                 Visit #18 (6 of 10 new)   [employer] BMN PT OT COPAY 30 DED 0 COVERAGE 100 OOP 0 NO AUTH REQ, REF# [id number]     Assessment:   Pt tolerated session well without adverse effect and is making slow progress toward goals. Pt verbalizes frustration of current condition, especially with use of R UE. She continues to be upper trapezius dominant, and much of session spent focused on improving periscapular strengthening. Pt's R great toe pain is improved from last visit. Pt will continue to benefit from PT to address her functional mobility and balance as well as R shoulder function.    Plan:   Continue to apply Ktape to help R ankle DF.  Rows with TB vs cybex machine   Supine/hooklying scaption with slight incline of table   Posterior deltoid row, emphasis on neutral scapular positioning  Long sitting ankle DF strengthening without resistance   Ankle circles   Ankle eversion/inversion strengthening   As able:   Gait training with lesser device & balance  E-stim for tib anterior  Long sitting ankle DF strengthening     Current Problem  1. Difficulty walking        2. Falls frequently        3. Right foot drop        4. Shoulder stiffness, right        5. Unsteadiness on feet          General   Pt arrives at session with her , Quinn, however attends session independently. She reports she is frustrated with her R UE function as she feels she has a \"t-emilia arm\". Did vacuum some of the main living spaces- was able to perform when her arm is in a low position.   R great toe pain is improving- especially after clipping toenails & applying lotion. "   Plans to reach out to Dr. Lu about back pain & drop foot that is still persisting.        Subjective    Precautions  Precautions  STEADI Fall Risk Score (The score of 4 or more indicates an increased risk of falling): 9  Vital Signs     Pain  Pain Assessment  Pain Assessment: 0-10  0-10 (Numeric) Pain Score: 0 - No pain    Objective   Shoulder ROM:   AROM elevation: 40 deg   AAROM:   R= 140 deg   L= 155 deg     Treatments:  Therapeutic Exercise (55557): 45 minutes  Objective measures, as above.     Discussion regarding expectations for rehab recovery at start of session.     Reviewed Scapular retraction + GHJ ER with red TB 3x10   Reviewed Shoulder flexion with yellow looped band resistance for serratus anterior 3x10    Standing SA wall slides x10  Tried with foam roller a couple reps, however this was very challenging and discontinued    Scapular protraction/retraction with dowel rogelio then 4# weighted bar 3x10 reps     NOT TODAY:   Supine horizontal ABD + scaption with yellow TB 3x10 reps  Supine horizontal ABD with yellow TB 2x10 reps  Supine scapular protraction/retraction, first with cane, then progressing to 4# weighted bar. Tactile cues over R SA to improve force of contraction. 3x10    Verbally reviewed standing rows with yellow TB 3x10, tactile cues of therapist to assist with full scapular excursion      Reviewed Current HEP:  Pulleys flexion/scaption in sitting  Supine AAROM flexion with dowel rogelio   Shoulder extension + scapular retraction with yellow TB   Scapular retraction + GHJ ER with red TB  Shoulder flexion with yellow looped TB for serratus activation  Supine scapular protraction/retraction (added today)     From [facility] HEP:   Seated marching  Seated hip ABD with TB   Side stepping seated  Standing squats  Side stepping standing  Standing hip flexion  Standing hip EXT   Standing HS curl  Arm lifts with 1# weight  Comments: does have adjustable ankle weights up to 5#. Currently using a 1# ankle  "weight    Access Code: [email or web address]  URL: [email or web address]  Date: 11/22/2024  Prepared by: Lara Hsieh    Exercises  - Shoulder External Rotation and Scapular Retraction with Resistance  - 1-2 x daily - 7 x weekly - 3 sets - 10 reps - 2 seconds hold  - Shoulder Flexion Serratus Activation with Resistance  - 1-3 x daily - 7 x weekly - 2 sets - 10 reps    Neuromuscular Re-education (15707): 5 minutes  Elastic therapeutic tape applied to pt's R ankle for ankle DF facilitation. \"I\" strip applied with no tension at end anchors, with paper off tension in therapeutic zone. Anchors applied at base of 2-3rd metatarsals & at anterior lateral aspect of shin. Anchors applied with R ankle in DF, followed by therapeutic zone of tape applied in ankle PF.   Instructed pt in precautions/contraindications, and grounds to discontinue use.      OP EDUCATION:       Goals:  By discharge, pt will meet the following goals:      Pt will demo decrease in TUG score by 5 to decrease fall risk.--PARTIALLY MET  Pt will demo decrease in BBS score by 9 to decrease fall risk--NOT TESTED TODAY  Pt will increase BL LE strengthen to 4+/5 in order to improve functional activities.--PARTIALLY MET  Pt will increase R UE AROM for elevation to WFL & within 10 deg of contralateral side.   Pt will ambulate with LRAD- preferably a cane- 350ft+ MOD I.   "

## 2024-11-27 ENCOUNTER — APPOINTMENT (OUTPATIENT)
Dept: PHYSICAL THERAPY | Facility: CLINIC | Age: 74
End: 2024-11-27
Payer: MEDICARE

## 2024-11-27 DIAGNOSIS — M21.371 RIGHT FOOT DROP: ICD-10-CM

## 2024-11-27 DIAGNOSIS — R29.6 FALLS FREQUENTLY: ICD-10-CM

## 2024-11-27 DIAGNOSIS — M25.611 SHOULDER STIFFNESS, RIGHT: ICD-10-CM

## 2024-11-27 DIAGNOSIS — R26.81 UNSTEADINESS ON FEET: ICD-10-CM

## 2024-11-27 DIAGNOSIS — R26.2 DIFFICULTY WALKING: Primary | ICD-10-CM

## 2024-11-27 PROCEDURE — 97110 THERAPEUTIC EXERCISES: CPT | Mod: GP

## 2024-11-27 ASSESSMENT — PAIN - FUNCTIONAL ASSESSMENT: PAIN_FUNCTIONAL_ASSESSMENT: 0-10

## 2024-11-27 ASSESSMENT — PAIN SCALES - GENERAL: PAINLEVEL_OUTOF10: 0 - NO PAIN

## 2024-12-02 ENCOUNTER — APPOINTMENT (OUTPATIENT)
Dept: PHYSICAL THERAPY | Facility: CLINIC | Age: 74
End: 2024-12-02
Payer: MEDICARE

## 2024-12-02 DIAGNOSIS — R26.81 UNSTEADINESS ON FEET: ICD-10-CM

## 2024-12-02 DIAGNOSIS — M25.611 SHOULDER STIFFNESS, RIGHT: ICD-10-CM

## 2024-12-02 DIAGNOSIS — R29.6 FALLS FREQUENTLY: ICD-10-CM

## 2024-12-02 DIAGNOSIS — R26.2 DIFFICULTY WALKING: Primary | ICD-10-CM

## 2024-12-02 DIAGNOSIS — M21.371 RIGHT FOOT DROP: ICD-10-CM

## 2024-12-02 PROCEDURE — 97110 THERAPEUTIC EXERCISES: CPT | Mod: GP,CQ

## 2024-12-02 NOTE — PROGRESS NOTES
Physical Therapy    Physical Therapy Treatment    Patient Name: Khalida Barker  MRN: [id number]  Today's Date: 12/2/2024    Time Entry:   Time Calculation  Start Time: 1033  Stop Time: 1115  Time Calculation (min): 42 min     PT Therapeutic Procedures Time Entry  Therapeutic Exercise Time Entry: 42                 Visit #19 (7 of 10 new)   [employer] BMN PT OT COPAY 30 DED 0 COVERAGE 100 OOP 0 NO AUTH REQ, REF# [id number]     Assessment:   Pt tolerated session well without adverse effect and is making progress toward goals. Focus of session was strengthening exercises for right UE and range of motion exercises for right ankle. Will plan on balance and ambulation using the cane next visit. Pt will continue to benefit from PT to address her functional mobility and balance as well as R shoulder function.    Plan:   Continue to apply Ktape to help R ankle DF.  Rows with TB vs cybex machine   Supine/hooklying scaption with slight incline of table   Posterior deltoid row, emphasis on neutral scapular positioning  Long sitting ankle DF strengthening without resistance   Ankle circles   Ankle eversion/inversion strengthening   As able:   Gait training with lesser device & balance  E-stim for tib anterior  Long sitting ankle DF strengthening     Current Problem  1. Difficulty walking        2. Falls frequently        3. Right foot drop        4. Shoulder stiffness, right        5. Unsteadiness on feet          General   Pt arrives at session with her , Quinn, however attends session independently.  Pt. Scheduled tomorrow for removal of hardware right wrist.    Subjective    Precautions   Precautions  STEADI Fall Risk Score (The score of 4 or more indicates an increased risk of falling): 9       Objective     Treatments:  Therapeutic Exercise (86462): 42 minutes    Long sitting on table with right foot off edge of table.  Worked on AAROM right ankle DF with therapist assist.  Instructed in ankle  circles in each direction, inversion and eversion.  Stretching gastrocs with therapist assist.    Reviewed Scapular retraction + GHJ ER with red TB 2 x10   Reviewed Shoulder flexion with yellow looped band resistance for serratus anterior 2x10  Seated rows with red t-band x 20 reps  Seated unilateral row with red t-band  Seated shoulder IR while holding 1# x 20 reps, towel under arm.  Verbal cues and guidance for improved technique with some exercises.        NOT TODAY:   Supine horizontal ABD + scaption with yellow TB 3x10 reps  Supine horizontal ABD with yellow TB 2x10 reps  Supine scapular protraction/retraction, first with cane, then progressing to 4# weighted bar. Tactile cues over R SA to improve force of contraction. 3x10    Verbally reviewed standing rows with yellow TB 3x10, tactile cues of therapist to assist with full scapular excursion      Reviewed Current HEP:  Pulleys flexion/scaption in sitting  Supine AAROM flexion with dowel rogelio   Shoulder extension + scapular retraction with yellow TB   Scapular retraction + GHJ ER with red TB  Shoulder flexion with yellow looped TB for serratus activation  Supine scapular protraction/retraction (added today)     From [facility] HEP:   Seated marching  Seated hip ABD with TB   Side stepping seated  Standing squats  Side stepping standing  Standing hip flexion  Standing hip EXT   Standing HS curl  Arm lifts with 1# weight  Comments: does have adjustable ankle weights up to 5#. Currently using a 1# ankle weight    Access Code: [email or web address]  URL: [email or web address]  Date: 11/22/2024  Prepared by: Lara Hsieh    Exercises  - Shoulder External Rotation and Scapular Retraction with Resistance  - 1-2 x daily - 7 x weekly - 3 sets - 10 reps - 2 seconds hold  - Shoulder Flexion Serratus Activation with Resistance  - 1-3 x daily - 7 x weekly - 2 sets - 10 reps    Neuromuscular Re-education (87255): 5 minutes  Elastic therapeutic tape applied to pt's R  "ankle for ankle DF facilitation. \"I\" strip applied with no tension at end anchors, with paper off tension in therapeutic zone. Anchors applied at base of 2-3rd metatarsals & at anterior lateral aspect of shin. Anchors applied with R ankle in DF, followed by therapeutic zone of tape applied in ankle PF.   Instructed pt in precautions/contraindications, and grounds to discontinue use.      OP EDUCATION:       Goals:  By discharge, pt will meet the following goals:      Pt will demo decrease in TUG score by 5 to decrease fall risk.--PARTIALLY MET  Pt will demo decrease in BBS score by 9 to decrease fall risk--NOT TESTED TODAY  Pt will increase BL LE strengthen to 4+/5 in order to improve functional activities.--PARTIALLY MET  Pt will increase R UE AROM for elevation to WFL & within 10 deg of contralateral side.   Pt will ambulate with LRAD- preferably a cane- 350ft+ MOD I.   "

## 2024-12-03 ENCOUNTER — ANESTHESIA EVENT (OUTPATIENT)
Dept: OPERATING ROOM | Facility: HOSPITAL | Age: 74
End: 2024-12-03
Payer: MEDICARE

## 2024-12-03 ENCOUNTER — ANESTHESIA (OUTPATIENT)
Dept: OPERATING ROOM | Facility: HOSPITAL | Age: 74
End: 2024-12-03
Payer: MEDICARE

## 2024-12-03 ENCOUNTER — HOSPITAL ENCOUNTER (OUTPATIENT)
Facility: HOSPITAL | Age: 74
Setting detail: OUTPATIENT SURGERY
Discharge: HOME | End: 2024-12-03
Attending: ORTHOPAEDIC SURGERY | Admitting: ORTHOPAEDIC SURGERY
Payer: MEDICARE

## 2024-12-03 ENCOUNTER — APPOINTMENT (OUTPATIENT)
Dept: RADIOLOGY | Facility: HOSPITAL | Age: 74
End: 2024-12-03
Payer: MEDICARE

## 2024-12-03 ENCOUNTER — APPOINTMENT (OUTPATIENT)
Dept: PHYSICAL THERAPY | Facility: CLINIC | Age: 74
End: 2024-12-03
Payer: MEDICARE

## 2024-12-03 VITALS
BODY MASS INDEX: 25.61 KG/M2 | OXYGEN SATURATION: 97 % | RESPIRATION RATE: 18 BRPM | HEIGHT: 64 IN | HEART RATE: 90 BPM | SYSTOLIC BLOOD PRESSURE: 125 MMHG | WEIGHT: 150 LBS | TEMPERATURE: 97.5 F | DIASTOLIC BLOOD PRESSURE: 71 MMHG

## 2024-12-03 DIAGNOSIS — R26.81 UNSTEADINESS ON FEET: ICD-10-CM

## 2024-12-03 DIAGNOSIS — M25.611 SHOULDER STIFFNESS, RIGHT: ICD-10-CM

## 2024-12-03 DIAGNOSIS — M21.371 RIGHT FOOT DROP: ICD-10-CM

## 2024-12-03 DIAGNOSIS — R26.2 DIFFICULTY WALKING: Primary | ICD-10-CM

## 2024-12-03 DIAGNOSIS — R29.6 FALLS FREQUENTLY: ICD-10-CM

## 2024-12-03 PROCEDURE — 2500000001 HC RX 250 WO HCPCS SELF ADMINISTERED DRUGS (ALT 637 FOR MEDICARE OP): Performed by: ANESTHESIOLOGY

## 2024-12-03 PROCEDURE — 2720000007 HC OR 272 NO HCPCS: Performed by: ORTHOPAEDIC SURGERY

## 2024-12-03 PROCEDURE — 3600000007 HC OR TIME - EACH INCREMENTAL 1 MINUTE - PROCEDURE LEVEL TWO: Performed by: ORTHOPAEDIC SURGERY

## 2024-12-03 PROCEDURE — 99100 ANES PT EXTEME AGE<1 YR&>70: CPT | Performed by: ANESTHESIOLOGY

## 2024-12-03 PROCEDURE — 2500000004 HC RX 250 GENERAL PHARMACY W/ HCPCS (ALT 636 FOR OP/ED): Mod: JZ | Performed by: ORTHOPAEDIC SURGERY

## 2024-12-03 PROCEDURE — 3700000002 HC GENERAL ANESTHESIA TIME - EACH INCREMENTAL 1 MINUTE: Performed by: ORTHOPAEDIC SURGERY

## 2024-12-03 PROCEDURE — 3600000002 HC OR TIME - INITIAL BASE CHARGE - PROCEDURE LEVEL TWO: Performed by: ORTHOPAEDIC SURGERY

## 2024-12-03 PROCEDURE — 2500000004 HC RX 250 GENERAL PHARMACY W/ HCPCS (ALT 636 FOR OP/ED): Performed by: NURSE ANESTHETIST, CERTIFIED REGISTERED

## 2024-12-03 PROCEDURE — A20680 PR REMOVAL DEEP IMPLANT: Performed by: ANESTHESIOLOGY

## 2024-12-03 PROCEDURE — 7100000009 HC PHASE TWO TIME - INITIAL BASE CHARGE: Performed by: ORTHOPAEDIC SURGERY

## 2024-12-03 PROCEDURE — 7100000010 HC PHASE TWO TIME - EACH INCREMENTAL 1 MINUTE: Performed by: ORTHOPAEDIC SURGERY

## 2024-12-03 PROCEDURE — 2500000004 HC RX 250 GENERAL PHARMACY W/ HCPCS (ALT 636 FOR OP/ED): Performed by: ANESTHESIOLOGY

## 2024-12-03 PROCEDURE — 2500000004 HC RX 250 GENERAL PHARMACY W/ HCPCS (ALT 636 FOR OP/ED): Performed by: ORTHOPAEDIC SURGERY

## 2024-12-03 PROCEDURE — 7100000001 HC RECOVERY ROOM TIME - INITIAL BASE CHARGE: Performed by: ORTHOPAEDIC SURGERY

## 2024-12-03 PROCEDURE — 7100000002 HC RECOVERY ROOM TIME - EACH INCREMENTAL 1 MINUTE: Performed by: ORTHOPAEDIC SURGERY

## 2024-12-03 PROCEDURE — 3700000001 HC GENERAL ANESTHESIA TIME - INITIAL BASE CHARGE: Performed by: ORTHOPAEDIC SURGERY

## 2024-12-03 PROCEDURE — A20680 PR REMOVAL DEEP IMPLANT: Performed by: NURSE ANESTHETIST, CERTIFIED REGISTERED

## 2024-12-03 RX ORDER — PROPOFOL 10 MG/ML
INJECTION, EMULSION INTRAVENOUS AS NEEDED
Status: DISCONTINUED | OUTPATIENT
Start: 2024-12-03 | End: 2024-12-03

## 2024-12-03 RX ORDER — HYDROCODONE BITARTRATE AND ACETAMINOPHEN 5; 325 MG/1; MG/1
1 TABLET ORAL EVERY 4 HOURS PRN
Status: DISCONTINUED | OUTPATIENT
Start: 2024-12-03 | End: 2024-12-03 | Stop reason: HOSPADM

## 2024-12-03 RX ORDER — ALBUTEROL SULFATE 0.83 MG/ML
2.5 SOLUTION RESPIRATORY (INHALATION) ONCE AS NEEDED
Status: DISCONTINUED | OUTPATIENT
Start: 2024-12-03 | End: 2024-12-03 | Stop reason: HOSPADM

## 2024-12-03 RX ORDER — DEXAMETHASONE SODIUM PHOSPHATE 10 MG/ML
INJECTION INTRAMUSCULAR; INTRAVENOUS AS NEEDED
Status: DISCONTINUED | OUTPATIENT
Start: 2024-12-03 | End: 2024-12-03

## 2024-12-03 RX ORDER — FENTANYL CITRATE 50 UG/ML
INJECTION, SOLUTION INTRAMUSCULAR; INTRAVENOUS AS NEEDED
Status: DISCONTINUED | OUTPATIENT
Start: 2024-12-03 | End: 2024-12-03

## 2024-12-03 RX ORDER — CEFAZOLIN SODIUM 2 G/100ML
2 INJECTION, SOLUTION INTRAVENOUS ONCE
Status: COMPLETED | OUTPATIENT
Start: 2024-12-03 | End: 2024-12-03

## 2024-12-03 RX ORDER — ONDANSETRON HYDROCHLORIDE 2 MG/ML
INJECTION, SOLUTION INTRAVENOUS AS NEEDED
Status: DISCONTINUED | OUTPATIENT
Start: 2024-12-03 | End: 2024-12-03

## 2024-12-03 RX ORDER — HYDRALAZINE HYDROCHLORIDE 20 MG/ML
5 INJECTION INTRAMUSCULAR; INTRAVENOUS EVERY 30 MIN PRN
Status: DISCONTINUED | OUTPATIENT
Start: 2024-12-03 | End: 2024-12-03 | Stop reason: HOSPADM

## 2024-12-03 RX ORDER — LIDOCAINE HYDROCHLORIDE 10 MG/ML
0.1 INJECTION, SOLUTION INFILTRATION; PERINEURAL ONCE
Status: DISCONTINUED | OUTPATIENT
Start: 2024-12-03 | End: 2024-12-03 | Stop reason: HOSPADM

## 2024-12-03 RX ORDER — ONDANSETRON HYDROCHLORIDE 2 MG/ML
4 INJECTION, SOLUTION INTRAVENOUS ONCE AS NEEDED
Status: DISCONTINUED | OUTPATIENT
Start: 2024-12-03 | End: 2024-12-03 | Stop reason: HOSPADM

## 2024-12-03 RX ORDER — SODIUM CHLORIDE, SODIUM LACTATE, POTASSIUM CHLORIDE, CALCIUM CHLORIDE 600; 310; 30; 20 MG/100ML; MG/100ML; MG/100ML; MG/100ML
100 INJECTION, SOLUTION INTRAVENOUS CONTINUOUS
Status: DISCONTINUED | OUTPATIENT
Start: 2024-12-03 | End: 2024-12-03 | Stop reason: HOSPADM

## 2024-12-03 RX ORDER — DIPHENHYDRAMINE HYDROCHLORIDE 50 MG/ML
12.5 INJECTION INTRAMUSCULAR; INTRAVENOUS ONCE AS NEEDED
Status: DISCONTINUED | OUTPATIENT
Start: 2024-12-03 | End: 2024-12-03 | Stop reason: HOSPADM

## 2024-12-03 RX ORDER — ACETAMINOPHEN 325 MG/1
975 TABLET ORAL ONCE
Status: DISCONTINUED | OUTPATIENT
Start: 2024-12-03 | End: 2024-12-03 | Stop reason: HOSPADM

## 2024-12-03 RX ORDER — HYDROMORPHONE HYDROCHLORIDE 0.2 MG/ML
0.2 INJECTION INTRAMUSCULAR; INTRAVENOUS; SUBCUTANEOUS EVERY 5 MIN PRN
Status: DISCONTINUED | OUTPATIENT
Start: 2024-12-03 | End: 2024-12-03 | Stop reason: HOSPADM

## 2024-12-03 RX ORDER — LABETALOL HYDROCHLORIDE 5 MG/ML
5 INJECTION, SOLUTION INTRAVENOUS ONCE AS NEEDED
Status: DISCONTINUED | OUTPATIENT
Start: 2024-12-03 | End: 2024-12-03 | Stop reason: HOSPADM

## 2024-12-03 RX ORDER — LIDOCAINE HYDROCHLORIDE 20 MG/ML
INJECTION, SOLUTION INFILTRATION; PERINEURAL AS NEEDED
Status: DISCONTINUED | OUTPATIENT
Start: 2024-12-03 | End: 2024-12-03

## 2024-12-03 RX ORDER — BUPIVACAINE HYDROCHLORIDE 5 MG/ML
INJECTION, SOLUTION PERINEURAL AS NEEDED
Status: DISCONTINUED | OUTPATIENT
Start: 2024-12-03 | End: 2024-12-03 | Stop reason: HOSPADM

## 2024-12-03 SDOH — HEALTH STABILITY: MENTAL HEALTH: CURRENT SMOKER: 0

## 2024-12-03 ASSESSMENT — PAIN SCALES - GENERAL
PAINLEVEL_OUTOF10: 6
PAINLEVEL_OUTOF10: 9
PAINLEVEL_OUTOF10: 5 - MODERATE PAIN
PAINLEVEL_OUTOF10: 5 - MODERATE PAIN
PAINLEVEL_OUTOF10: 6
PAINLEVEL_OUTOF10: 3
PAINLEVEL_OUTOF10: 7
PAINLEVEL_OUTOF10: 5 - MODERATE PAIN
PAINLEVEL_OUTOF10: 4
PAINLEVEL_OUTOF10: 10 - WORST POSSIBLE PAIN
PAINLEVEL_OUTOF10: 10 - WORST POSSIBLE PAIN

## 2024-12-03 ASSESSMENT — PAIN - FUNCTIONAL ASSESSMENT
PAIN_FUNCTIONAL_ASSESSMENT: 0-10

## 2024-12-03 ASSESSMENT — PAIN DESCRIPTION - LOCATION: LOCATION: HAND

## 2024-12-03 ASSESSMENT — PAIN DESCRIPTION - ORIENTATION: ORIENTATION: RIGHT

## 2024-12-03 NOTE — OP NOTE
Hardware removal and tenolysis right wrist (R) Operative Note     Date: 12/3/2024  OR Location: [facility] OR    Name: Khalida Barker, : 1950, Age: 74 y.o., MRN: [id number], Sex: female    Diagnosis  Pre-op Diagnosis      * Other closed intra-articular fracture of distal end of right radius with routine healing, subsequent encounter [S52.839H] Post-op Diagnosis     * Other closed intra-articular fracture of distal end of right radius with routine healing, subsequent encounter [S52.571D]     Procedures  1.  Hard removal right wrist  2.  Tenolysis flexor carpi radialis and flexor pollicis longus right wrist    Surgeons      * Dale Gonzalez - Primary    Resident/Fellow/Other Assistant:  Surgeons and Role:  * No surgeons found with a matching role *    Staff:   Heenaulator: Sumi  Surgical Assistant: Mckay Blanton Person: Darby    Anesthesia Staff: Anesthesiologist: Portillo Brar MD  C-AA: SHERIDAN Santana    Procedure Summary  Anesthesia: General  ASA: II  Estimated Blood Loss: 10 mL  Intra-op Medications:   Administrations occurring from 1315 to 1500 on 24:   Medication Name Total Dose   dexAMETHasone (Decadron) PF injection 10 mg/mL 10 mg   fentaNYL (Sublimaze) injection 50 mcg/mL 100 mcg   LR bolus Cannot be calculated   lidocaine (Xylocaine) injection 2 % 60 mg   ondansetron (Zofran) 2 mg/mL injection 4 mg   propofol (Diprivan) injection 10 mg/mL 184 mg   ceFAZolin (Ancef) 2 g in dextrose (iso)  mL 2 g              Anesthesia Record               Intraprocedure I/O Totals       None           Specimen: No specimens collected              Drains and/or Catheters: * None in log *    Tourniquet Times:         Implants:     Findings: Healed right distal radius fracture with scarring of flexor carpi radialis and flexor pollicis longus tendon and muscle belly    Indications: Khalida Barker is an 74 y.o. female who is having surgery for staged removal of volar rim plate right complex distal radius  fracture.  Patient has gone on to radiographically heal and recommendation was made for hardware removal of the distal radial plate to allow for prevention of compromise about flexor tendons of the wrist and forearm.  With knowledge of risk benefits alternatives patient did elect to proceed.    The patient was seen in the preoperative area. The risks, benefits, complications, treatment options, non-operative alternatives, expected recovery and outcomes were discussed with the patient. The possibilities of reaction to medication, pulmonary aspiration, injury to surrounding structures, bleeding, recurrent infection, the need for additional procedures, failure to diagnose a condition, and creating a complication requiring transfusion or operation were discussed with the patient. The patient concurred with the proposed plan, giving informed consent.  The site of surgery was properly noted/marked if necessary per policy. The patient has been actively warmed in preoperative area. Preoperative antibiotics have been ordered and given within 1 hours of incision. Venous thrombosis prophylaxis have been ordered including bilateral sequential compression devices    Procedure Details: Patient was taken the operating room placed supine the operative table.  The right arm had a tourniquet applied and the right upper extremity was prepped and draped as typical for extremity procedure.  A timeout is performed, all present identified, the correct surgical site was noted.  At the conclusion the timeout, hand and forearm were exsanguinated and tourniquet was inflated to 250 mmHg.  Prior surgical incision was used along the volar aspect of the wrist and forearm.  Incision was carried down through skin exposing substantial scarring about the flexor carpi radialis tendon.  The median nerve was identified as this a displaced slightly radially.  Flexor carpi radialis tendon was then debrided in regards to its thick tenosynovium and flexor  pollicis longus muscle belly was identified.  There is substantial scarring down to the plate and this was excised in its entirety.  Rim plate was visualized uneventfully.  All screws were subsequently removed uneventfully.  Plate was removed and confirmed lack of fracture and healed distal radius fracture as confirmed on fluoroscopic imaging.  Further clean up of the scar tissue around the flexor pollicis longus muscle belly was performed using combination of scalpel and rongeur in an excisional manner.  Wound was irrigated with normal saline.  Superficial tissues were infiltrated with 10 cc of half percent Marcaine.  Skin was closed with 2-0 Vicryl and 3-0 Monocryl.  Skin glue was applied under a soft compressive dressing and a radial gutter splint.  Given deflated with excellent reperfusion of the hand.  Patient tolerated procedure well no complications occurred.    An assistant was used during this case.  Assistant Alexandre Mittal SA helped in the positioning, prepping, draping, retracting and aiding in my ability to perform critical portions of this case and performed the majority of the closure and dressing application.  Help from the assistant was critical in performing these functions.    Complications:  None; patient tolerated the procedure well.    Disposition: PACU - hemodynamically stable.  Condition: stable       Post Operative plan: Patient will be discharged home with tramadol for p.o. narcotic medication and anti-inflammatories and Tylenol as needed.  Recommend limited function of the right hand at this time however may utilize the right wrist and hand for stabilization purposes while mobilizing utilizing a walker.        Dale Gonzalez  Phone Number: [phone]

## 2024-12-03 NOTE — ANESTHESIA PROCEDURE NOTES
Airway  Date/Time: 12/3/2024 2:18 PM  Urgency: elective    Airway not difficult    Staffing  Performed: SHERIDAN   Authorized by: Portillo Brar MD    Performed by: SHERIDAN Santana  Patient location during procedure: OR    Indications and Patient Condition  Indications for airway management: anesthesia  Spontaneous Ventilation: absent  Sedation level: deep  Preoxygenated: yes  Patient position: sniffing  MILS maintained throughout  Mask difficulty assessment: 0 - not attempted  Planned trial extubation    Final Airway Details  Final airway type: supraglottic airway      Successful airway: classic  Size 4     Number of attempts at approach: 1  Ventilation between attempts: supraglottic airway

## 2024-12-03 NOTE — ANESTHESIA PREPROCEDURE EVALUATION
Patient: Khalida Barker    Procedure Information       Date/Time: 12/03/24 1315    Procedure: Hardware removal and tenolysis right wrist (Right: Wrist)    Location: [facility] OR 06 / Virtual [facility] OR    Surgeons: Dale Gonzalez MD            Relevant Problems   Neuro   (+) Anxiety   (+) Bilateral carpal tunnel syndrome   (+) C6 radiculopathy   (+) Carpal tunnel syndrome      GI   (+) Dysphagia   (+) Dysphagia, unspecified      Hematology   (+) Acute blood loss as cause of postoperative anemia      Musculoskeletal   (+) Bilateral carpal tunnel syndrome   (+) Carpal tunnel syndrome   (+) Primary osteoarthritis of right knee   (+) Scoliosis deformity of spine   (+) Thoracogenic scoliosis of thoracolumbar region       Clinical information reviewed:   Tobacco  Allergies  Meds   Med Hx  Surg Hx   Fam Hx  Soc Hx        NPO Detail:  NPO/Void Status  Date of Last Liquid: 12/03/24  Time of Last Liquid: 0900  Date of Last Solid: 12/02/24  Time of Last Solid: 2100  Time of Last Void: 1100         Physical Exam    Airway  Mallampati: II  TM distance: >3 FB     Cardiovascular   Rhythm: regular  Rate: normal     Dental - normal exam     Pulmonary   Breath sounds clear to auscultation     Abdominal            Anesthesia Plan    History of general anesthesia?: yes  History of complications of general anesthesia?: no    ASA 2     general     The patient is not a current smoker.    intravenous induction   Anesthetic plan and risks discussed with patient.    Plan discussed with CAA.

## 2024-12-03 NOTE — DISCHARGE INSTRUCTIONS
Patient underwent hardware removal and tenolysis of flexor tendon of the right wrist    Dressing and splint to remain on until seen in office.    Should keep covered and from getting wet until seen in office.  You may shower however keep surgical site clean and dry and covered with either plastic bag or cast protector.    If you notice any, redness, drainage or your incision comes apart please call the office [phone].    No lifting, pulling, or pushing with the operative extremity greater than 2 pounds.    Patient does require utilization of a walker to safely ambulate and would be okay with light utilization of this to support and stabilize individual while transferring and mobilizing.    Patient may take ibuprofen 400 mg every 6 hours to help with pain relief.    You may resume your prehospital diet.    Move fingers often to promote circulation and reduce swelling.    Keep hand elevated as often as possible over the next 24-48 hrs.    You already have tramadol at home for narcotic medication.  If this is insufficient please call the office for further pain medication guidelines.    Follow-up in office at previously scheduled appointment.    Dale Gonzalez MD  [facility]  [phone]   15-Nov-2018 17:40

## 2024-12-03 NOTE — ANESTHESIA POSTPROCEDURE EVALUATION
Patient: Khalida Barker    Procedure Summary       Date: 12/03/24 Room / Location: [facility] OR 06 / [facility] OR    Anesthesia Start: 1405 Anesthesia Stop:     Procedure: Hardware removal and tenolysis right wrist (Right: Wrist) Diagnosis:       Other closed intra-articular fracture of distal end of right radius with routine healing, subsequent encounter      (E52.250B)    Surgeons: Dale Gonzalez MD Responsible Provider: Portillo Brar MD    Anesthesia Type: general ASA Status: 2            Anesthesia Type: general    Vitals Value Taken Time   /70 12/03/24 1525   Temp 36.6 12/03/24 1525   Pulse 82 12/03/24 1525   Resp 21 12/03/24 1525   SpO2 100 12/03/24 1525       Anesthesia Post Evaluation    Patient location during evaluation: PACU  Patient participation: complete - patient participated  Level of consciousness: awake and alert  Pain management: satisfactory to patient  Multimodal analgesia pain management approach  Airway patency: patent  Two or more strategies used to mitigate risk of obstructive sleep apnea  Cardiovascular status: acceptable  Respiratory status: acceptable  Hydration status: acceptable  Postoperative Nausea and Vomiting: none        No notable events documented.

## 2024-12-06 ENCOUNTER — APPOINTMENT (OUTPATIENT)
Dept: PHYSICAL THERAPY | Facility: CLINIC | Age: 74
End: 2024-12-06
Payer: MEDICARE

## 2024-12-06 DIAGNOSIS — M21.371 RIGHT FOOT DROP: ICD-10-CM

## 2024-12-06 DIAGNOSIS — R29.6 FALLS FREQUENTLY: ICD-10-CM

## 2024-12-06 DIAGNOSIS — R26.2 DIFFICULTY WALKING: Primary | ICD-10-CM

## 2024-12-06 DIAGNOSIS — M25.611 SHOULDER STIFFNESS, RIGHT: ICD-10-CM

## 2024-12-06 DIAGNOSIS — R26.81 UNSTEADINESS ON FEET: ICD-10-CM

## 2024-12-06 PROCEDURE — 97112 NEUROMUSCULAR REEDUCATION: CPT | Mod: GP,CQ

## 2024-12-06 NOTE — PROGRESS NOTES
Physical Therapy    Physical Therapy Treatment    Patient Name: Khalida Barker  MRN: [id number]  Today's Date: 12/6/2024    Time Entry:   Time Calculation  Start Time: 0947  Stop Time: 1030  Time Calculation (min): 43 min     PT Therapeutic Procedures Time Entry  Neuromuscular Re-Education Time Entry: 43                 Visit #20 (8 of 10 new)   [employer] BMN PT OT COPAY 30 DED 0 COVERAGE 100 OOP 0 NO AUTH REQ, REF# [id number]     Assessment:   Pt tolerated session well without adverse effect and is making progress toward goals.  Today's session was to focus on balance and walking within // bars using the cane.  Pt. Benefits from static balance drills especially while standing on foam pad as it is challenging for her.  She did well using the cane within // bars with a few cues for sequence, will need to walk out in the open more in the next few visits. Pt will continue to benefit from PT to address her functional mobility and balance as well as R shoulder function.    Plan:   Continue to apply Ktape to help R ankle DF.  Rows with TB vs cybex machine   Supine/hooklying scaption with slight incline of table   Posterior deltoid row, emphasis on neutral scapular positioning  Long sitting ankle DF strengthening without resistance   Ankle circles   Ankle eversion/inversion strengthening   As able:   Gait training with lesser device & balance  E-stim for tib anterior  Long sitting ankle DF strengthening     Current Problem  1. Difficulty walking        2. Falls frequently        3. Right foot drop        4. Shoulder stiffness, right        5. Unsteadiness on feet          General   Pt arrives at session with her , Quinn, however attends session independently.  Pt. Had the hardware removed in right wrist, Pt. Has a splint on right wrist and forearm.  States she is not able to push or pull anything with that hand as yet but was able to work on the stick exercises with her shoulder.  Right wrist is  sore but not painful.       Precautions   Precautions  STEADI Fall Risk Score (The score of 4 or more indicates an increased risk of falling): 9       Objective     Treatments:  Neuromuscular Re-education (50548): 43 minutes   Supported single leg stand on right foot with left foot on 2 inch step, repeated with left foot on foam pad.    Forward and retro walking with left hand on // bars multiple laps and added mirror for visual feedback.    Foam: normal base of support, NBOS and mod tandem stand  all eyes open.    Pt. Ambulated within // bars with SPC in left hand multiple trials with cues on sequencing at times.           NOT TODAY:   Supine horizontal ABD + scaption with yellow TB 3x10 reps  Supine horizontal ABD with yellow TB 2x10 reps  Supine scapular protraction/retraction, first with cane, then progressing to 4# weighted bar. Tactile cues over R SA to improve force of contraction. 3x10    Verbally reviewed standing rows with yellow TB 3x10, tactile cues of therapist to assist with full scapular excursion      Reviewed Current HEP:  Pulleys flexion/scaption in sitting  Supine AAROM flexion with dowel rogelio   Shoulder extension + scapular retraction with yellow TB   Scapular retraction + GHJ ER with red TB  Shoulder flexion with yellow looped TB for serratus activation  Supine scapular protraction/retraction (added today)     From [facility] HEP:   Seated marching  Seated hip ABD with TB   Side stepping seated  Standing squats  Side stepping standing  Standing hip flexion  Standing hip EXT   Standing HS curl  Arm lifts with 1# weight  Comments: does have adjustable ankle weights up to 5#. Currently using a 1# ankle weight    Access Code: [email or web address]  URL: [email or web address]  Date: 11/22/2024  Prepared by: Lara Hsieh  Exercises  - Shoulder External Rotation and Scapular Retraction with Resistance  - 1-2 x daily - 7 x weekly - 3 sets - 10 reps - 2 seconds hold  - Shoulder Flexion Serratus  Activation with Resistance  - 1-3 x daily - 7 x weekly - 2 sets - 10 reps    OP EDUCATION:       Goals:  By discharge, pt will meet the following goals:      Pt will demo decrease in TUG score by 5 to decrease fall risk.--PARTIALLY MET  Pt will demo decrease in BBS score by 9 to decrease fall risk--NOT TESTED TODAY  Pt will increase BL LE strengthen to 4+/5 in order to improve functional activities.--PARTIALLY MET  Pt will increase R UE AROM for elevation to WFL & within 10 deg of contralateral side.   Pt will ambulate with LRAD- preferably a cane- 350ft+ MOD I.

## 2024-12-10 ENCOUNTER — APPOINTMENT (OUTPATIENT)
Dept: PHYSICAL THERAPY | Facility: CLINIC | Age: 74
End: 2024-12-10
Payer: MEDICARE

## 2024-12-10 DIAGNOSIS — R26.81 UNSTEADINESS ON FEET: ICD-10-CM

## 2024-12-10 DIAGNOSIS — M54.16 LUMBAR RADICULOPATHY: Primary | ICD-10-CM

## 2024-12-10 DIAGNOSIS — R26.2 DIFFICULTY WALKING: Primary | ICD-10-CM

## 2024-12-10 DIAGNOSIS — M25.611 SHOULDER STIFFNESS, RIGHT: ICD-10-CM

## 2024-12-10 DIAGNOSIS — R29.6 FALLS FREQUENTLY: ICD-10-CM

## 2024-12-10 DIAGNOSIS — M21.371 RIGHT FOOT DROP: ICD-10-CM

## 2024-12-10 PROCEDURE — 97112 NEUROMUSCULAR REEDUCATION: CPT | Mod: GP,CQ

## 2024-12-10 PROCEDURE — 97110 THERAPEUTIC EXERCISES: CPT | Mod: GP,CQ

## 2024-12-10 NOTE — PROGRESS NOTES
Physical Therapy    Physical Therapy Treatment    Patient Name: Khalida Barker  MRN: [id number]  Today's Date: 12/10/2024    Time Entry:   Time Calculation  Start Time: 0945  Stop Time: 1030  Time Calculation (min): 45 min     PT Therapeutic Procedures Time Entry  Neuromuscular Re-Education Time Entry: 25  Therapeutic Exercise Time Entry: 20                 Visit #21 (9 of 10 new)   [employer] BMN PT OT COPAY 30 DED 0 COVERAGE 100 OOP 0 NO AUTH REQ, REF# [id number]     Assessment:   Pt tolerated session well without adverse effect and is making progress toward goals. Pt. benefits from standing on compliant surfaces as it is challenging. She was able to work on some shoulder exercises today even though her wrist remains in a brace.   Pt will continue to benefit from PT to address her functional mobility and balance as well as R shoulder function.    Plan:   Continue to apply Ktape to help R ankle DF.  Rows with TB vs cybex machine   Supine/hooklying scaption with slight incline of table   Posterior deltoid row, emphasis on neutral scapular positioning  Long sitting ankle DF strengthening without resistance   Ankle circles   Ankle eversion/inversion strengthening   As able:   Gait training with lesser device & balance  E-stim for tib anterior  Long sitting ankle DF strengthening     Current Problem  1. Difficulty walking        2. Falls frequently        3. Right foot drop        4. Shoulder stiffness, right        5. Unsteadiness on feet          General   Pt arrives at session with her , Quinn, however attends session independently. Pt. Had a fall yesterday when she was seated at the edge of a rolling office chair, she reached forward and slipped off the chair landing on her buttocks and hit her head on the table.  Denies any blurry vision, headaches, nausea.  No increase in back pain.  Reached out to surgeon and has a full body x-ray that she might do on Monday.  Current low back pain  5/10.     Precautions   Precautions  STEADI Fall Risk Score (The score of 4 or more indicates an increased risk of falling): 9       Objective     Treatments:  Therapeutic Exercise (04213): 20 minutes  -Pulleys for flexion x 4 minutes  -Wand presses x 20 reps  4# scapular protraction/retraction 2 x 10 reps.    Neuromuscular Re-education (03734): 25 minutes  Forward and retro walking with left hand on // bars multiple laps.  Forward walking without UE support in // bars.  Foam: normal base of support, NBOS and mod tandem stand  all eyes open and with horizontal and vertical head turns.      NOT TODAY:   Supine horizontal ABD + scaption with yellow TB 3x10 reps  Supine horizontal ABD with yellow TB 2x10 reps  Supine scapular protraction/retraction, first with cane, then progressing to 4# weighted bar. Tactile cues over R SA to improve force of contraction. 3x10    Verbally reviewed standing rows with yellow TB 3x10, tactile cues of therapist to assist with full scapular excursion      Reviewed Current HEP:  Pulleys flexion/scaption in sitting  Supine AAROM flexion with dowel rogelio   Shoulder extension + scapular retraction with yellow TB   Scapular retraction + GHJ ER with red TB  Shoulder flexion with yellow looped TB for serratus activation  Supine scapular protraction/retraction (added today)     From [facility] HEP:   Seated marching  Seated hip ABD with TB   Side stepping seated  Standing squats  Side stepping standing  Standing hip flexion  Standing hip EXT   Standing HS curl  Arm lifts with 1# weight  Comments: does have adjustable ankle weights up to 5#. Currently using a 1# ankle weight    Access Code: [email or web address]  URL: [email or web address]  Date: 11/22/2024  Prepared by: Lara Hsieh  Exercises  - Shoulder External Rotation and Scapular Retraction with Resistance  - 1-2 x daily - 7 x weekly - 3 sets - 10 reps - 2 seconds hold  - Shoulder Flexion Serratus Activation with Resistance  - 1-3 x  daily - 7 x weekly - 2 sets - 10 reps    OP EDUCATION:       Goals:  By discharge, pt will meet the following goals:      Pt will demo decrease in TUG score by 5 to decrease fall risk.--PARTIALLY MET  Pt will demo decrease in BBS score by 9 to decrease fall risk--NOT TESTED TODAY  Pt will increase BL LE strengthen to 4+/5 in order to improve functional activities.--PARTIALLY MET  Pt will increase R UE AROM for elevation to WFL & within 10 deg of contralateral side.   Pt will ambulate with LRAD- preferably a cane- 350ft+ MOD I.

## 2024-12-11 NOTE — PROGRESS NOTES
Physical Therapy    Physical Therapy Progress Note    Patient Name: Khalida Barker  MRN: [id number]  Today's Date: 12/12/2024    Time Entry:   Time Calculation  Start Time: 0949  Stop Time: 1045  Time Calculation (min): 56 min     PT Therapeutic Procedures Time Entry  Neuromuscular Re-Education Time Entry: 8  Therapeutic Exercise Time Entry: 38  Gait Training Time Entry: 10                 Visit #22 (10 of 10 new) RE-ASSESSMENT  [employer] BMN PT OT COPAY 30 DED 0 COVERAGE 100 OOP 0 NO AUTH REQ, REF# [id number]     Assessment:   Pt has not completely met further goals from last re-assessment, however has made progress on 3 goals since last re-assessment. Pt is now able to walk with SPC 480ft with no greater than CGA, which indicates improvement in functional ambulation and greater independence with mobility. Pt's AAROM and PROM of R UE has improved, however continues to have difficulty with muscular coordination to achieve functional AROM of R UE. Pt continues to have weakness of R UE which limits functional elevation and use of her R UE. Pt will continue to benefit from PT working on revised goals as established his date at 2x/week frequency for 5 more weeks.     Goals:  By discharge, pt will meet the following goals:      Pt will demo decrease in TUG score by 5 to decrease fall risk.--PARTIALLY MET  Pt will demo decrease in BBS score by 9 to decrease fall risk--NOT TESTED TODAY  Pt will increase BL LE strengthen to 4+/5 in order to improve functional activities.--PARTIALLY MET  Pt will increase R UE AROM for elevation to WFL & within 10 deg of contralateral side.--PARTIALLY MET  Pt will ambulate with LRAD- preferably a cane- 350ft+ MOD I.--PARTIALLY TO MOSTLY MET      By discharge, pt will meet the following revised goals established 12/12/24:     Pt will demo decrease in TUG score by 5 to decrease fall risk.  Pt will demo decrease in BBS score by 9 to decrease fall risk.  Pt will increase R  UE AROM for elevation to WFL & within 10 deg of contralateral side.  Pt will ambulate with LRAD- preferably a cane- 350ft+ MOD I.  Pt will increase ROM of B hips to WFL for improved ability to don socks & shoes in sitting.  Pt will increase strength of R ankle DF to 3/5.   Pt will improve score of QuickDASH by 15 points to meet MCID.   Pt will improve score of LEFS by 9 points from initial evaluation to meet MCID.     Plan:   Continue PT at 2x/week frequency for 5 more weeks, working toward revised goals as above.    Continue to apply Ktape to help R ankle DF.  Rows with TB vs cybex machine   Supine/hooklying scaption with slight incline of table   Posterior deltoid row, emphasis on neutral scapular positioning  Long sitting ankle DF strengthening without resistance   Ankle circles   Ankle eversion/inversion strengthening   As able:   Gait training with lesser device & balance  E-stim for tib anterior  Long sitting ankle DF strengthening     Current Problem  1. Difficulty walking        2. Falls frequently        3. Right foot drop        4. Shoulder stiffness, right        5. Unsteadiness on feet          General   Pt arrives at session with her , Quinn, however attends session independently. She reports her splint is scheduled to be removed tomorrow. Was recommended to complete a full body XR by her surgeon which she hopes to complete on 12/16/24. Scheduled to see her spine surgeon on 12/20/24.  Reports one of her final goals would be to improve her hip mobility as she feels this is limited.  Reports she does feel stronger overall. Continues to report weakness of her R ankle.        Subjective    Precautions  Precautions  STEADI Fall Risk Score (The score of 4 or more indicates an increased risk of falling): 9  Vital Signs     Pain  Pain Assessment  Pain Assessment: 0-10  0-10 (Numeric) Pain Score: 0 - No pain    Objective   Shoulder ROM:   AROM elevation: 40 deg     AAROM Elevation:   R= 148 deg   L= 155 deg  "    PROM:   R ER @90 deg ABD: 80 deg  R IR @ 90deg ABD: 60 deg   R shoulder ABD: 100 to 110 deg     Hip PROM:   FLEX: R= 100 to 110 deg; L= 100 to 110 deg  ER: R= 50 deg; L= 50 deg  IR: R= 15 deg; L= 15-20 deg     Shoulder strength:   GHJ ER @ neutral: L= 4+/5; R= 4/5  GHJ IR @ neutral: L= 4+/5; R= 4-/5     Other Measures  Activities - Specific Balance Confidence Scale: 1020 (63.75% of self confidence)  Disability of Arm Shoulder Hand (DASH): 40 (65.91% impairment)  Lower Extremity Funtional Score (LEFS): 25    Treatments:  Therapeutic Exercise (79399): 38 minutes  Objective measures, as above.     Supine PROM of R UE, x5 reps all planes    Reviewed Current HEP:  Pulleys flexion/scaption in sitting  Supine AAROM flexion with dowel rogelio   Shoulder extension + scapular retraction with yellow TB   Scapular retraction + GHJ ER with red TB  Shoulder flexion with yellow looped TB for serratus activation  Supine scapular protraction/retraction (added today)     From [facility] HEP:   Seated marching  Seated hip ABD with TB   Side stepping seated  Standing squats  Side stepping standing  Standing hip flexion  Standing hip EXT   Standing HS curl  Arm lifts with 1# weight  Comments: does have adjustable ankle weights up to 5#. Currently using a 1# ankle weight    Access Code: [email or web address]  URL: [email or web address]  Date: 12/12/2024  Prepared by: Lara Hsieh    Exercises  - Shoulder External Rotation and Scapular Retraction with Resistance  - 1-2 x daily - 7 x weekly - 3 sets - 10 reps - 2 seconds hold  - Shoulder Flexion Serratus Activation with Resistance  - 1-3 x daily - 7 x weekly - 2 sets - 10 reps  - Supine Figure 4 Piriformis Stretch  - 1 x daily - 7 x weekly - 1 sets - 3-4 reps - 30 seconds hold    Neuromuscular Re-education (77901): 8 minutes  Elastic therapeutic tape applied to pt's R ankle for ankle DF facilitation. \"I\" strip applied with no tension at end anchors, with paper off tension in " therapeutic zone. Anchors applied at base of 2-3rd metatarsals & at anterior lateral aspect of shin. Anchors applied with R ankle in DF, followed by therapeutic zone of tape applied in ankle PF.   Instructed pt in precautions/contraindications, and grounds to discontinue use.      Gait Training (78455): 10 minutes  Pt ambulated with SPC with CGA of therapist at 480ft. Performs without LOB.

## 2024-12-12 ENCOUNTER — APPOINTMENT (OUTPATIENT)
Dept: PHYSICAL THERAPY | Facility: CLINIC | Age: 74
End: 2024-12-12
Payer: MEDICARE

## 2024-12-12 DIAGNOSIS — M21.371 RIGHT FOOT DROP: ICD-10-CM

## 2024-12-12 DIAGNOSIS — R26.2 DIFFICULTY WALKING: Primary | ICD-10-CM

## 2024-12-12 DIAGNOSIS — R26.81 UNSTEADINESS ON FEET: ICD-10-CM

## 2024-12-12 DIAGNOSIS — M25.611 SHOULDER STIFFNESS, RIGHT: ICD-10-CM

## 2024-12-12 DIAGNOSIS — R29.6 FALLS FREQUENTLY: ICD-10-CM

## 2024-12-12 PROCEDURE — 97112 NEUROMUSCULAR REEDUCATION: CPT | Mod: GP

## 2024-12-12 PROCEDURE — 97110 THERAPEUTIC EXERCISES: CPT | Mod: GP

## 2024-12-12 PROCEDURE — 97116 GAIT TRAINING THERAPY: CPT | Mod: GP

## 2024-12-12 ASSESSMENT — PAIN - FUNCTIONAL ASSESSMENT: PAIN_FUNCTIONAL_ASSESSMENT: 0-10

## 2024-12-12 ASSESSMENT — PAIN SCALES - GENERAL: PAINLEVEL_OUTOF10: 0 - NO PAIN

## 2024-12-13 ENCOUNTER — APPOINTMENT (OUTPATIENT)
Dept: PHYSICAL THERAPY | Facility: CLINIC | Age: 74
End: 2024-12-13
Payer: MEDICARE

## 2024-12-13 DIAGNOSIS — R26.2 DIFFICULTY WALKING: Primary | ICD-10-CM

## 2024-12-13 DIAGNOSIS — M25.611 SHOULDER STIFFNESS, RIGHT: ICD-10-CM

## 2024-12-13 DIAGNOSIS — R29.6 FALLS FREQUENTLY: ICD-10-CM

## 2024-12-13 DIAGNOSIS — M21.371 RIGHT FOOT DROP: ICD-10-CM

## 2024-12-13 DIAGNOSIS — R26.81 UNSTEADINESS ON FEET: ICD-10-CM

## 2024-12-15 NOTE — PROGRESS NOTES
Physical Therapy    Physical Therapy Treatment    Patient Name: Khalida Barker  MRN: [id number]  Today's Date: 12/17/2024    Time Entry:   Time Calculation  Start Time: 0947  Stop Time: 1035  Time Calculation (min): 48 min     PT Therapeutic Procedures Time Entry  Neuromuscular Re-Education Time Entry: 8  Therapeutic Exercise Time Entry: 40                 Visit #23 (1 of 10 new)  [employer] MED[employer]ARE BMN PT OT COPAY 30 DED 0 COVERAGE 100 OOP 0 NO AUTH REQ, REF# [id number]/ALL     Assessment:   Pt tolerated session well without adverse effect and is making progress toward goals. Pt was able to perform more shoulder strengthening in supine for middle & lower trapezius. Pt will continue to benefit from PT to address her R shoulder function, R dropfoot & general mobility following her spine surgery.     Plan:   Continue with similar session.   As able:   Supine at reclined table shoulder flexion AAROM   Standing snow angels standing at wall   Prone scapular retractions  + shoulder extension   S/L shoulder flexion  S/L shoulder horizontal ABD   S/L shoulder ABD     As able:   Walking more with SPC & varying terrain, change in direction, etc.   Ktape, e-stim & general strengthening for footdrop     Rows with TB vs cybex machine   Supine/hooklying scaption with slight incline of table   Posterior deltoid row, emphasis on neutral scapular positioning  Long sitting ankle DF strengthening without resistance   Ankle circles   Ankle eversion/inversion strengthening     Current Problem  1. Difficulty walking        2. Falls frequently        3. Right foot drop        4. Shoulder stiffness, right        5. Unsteadiness on feet          General   Pt arrives at session with her , Quinn, however attends session independently. She reports she did speak with her wrist surgeon. Reports a R shoulder MRI was ordered by him. Reports she had her whole body XR for her spine, results as below:     FINDINGS:  Thoracic spine, two  views. Lumbar spine, four views. Full body scan  through the spine, two views    The patient is status post fusion from T10 through the iliac bones.  There is increased perihardware lucency about the pedicular screws at  T10 which are mildly retracted, similar to the prior. In addition  there is loss of height of the T10 vertebral body with severe disc  space narrowing and osteophytosis at T9-T10. This is similar to the  prior. Mild kyphosis at this level. The remainder of the hardware is  unremarkable. There is moderate severe multilevel spondylosis  throughout the thoracolumbar spine. There is mild scoliosis of the  thoracolumbar spine    There is a mild leg length discrepancy with the right leg shorter  than the left. Right total knee arthroplasty noted.    IMPRESSION:  T10 through the iliac bones fusion.  Increased lucency about pedicular screws at T10 which are mildly  retracted, similar to the prior and concerning for hardware failure.  Severe spondylosis at T9-T10 with mild kyphosis        Subjective    Precautions  Precautions  STEADI Fall Risk Score (The score of 4 or more indicates an increased risk of falling): 9  Vital Signs     Pain  Pain Assessment  Pain Assessment: 0-10  0-10 (Numeric) Pain Score: 0 - No pain    Objective        Treatments:  Therapeutic Exercise (44606): 40 minutes  Educated pt on basic anatomy & muscles of the trunk, including: obliques, rectus abdominus, and latissimus dorsi.     Supine snow angels 2x10 reps, use of slide board under R arm to assist with ease of motion  Supine red TB shoulder horizontal ABD 2x10    Supine lower trapezius presses 2x10 reps with 5-10    Reviewed Current HEP:  Pulleys flexion/scaption in sitting  Supine AAROM flexion with dowel rogelio   Shoulder extension + scapular retraction with yellow TB   Scapular retraction + GHJ ER with red TB  Shoulder flexion with yellow looped TB for serratus activation  Supine scapular protraction/retraction (added today)  "    From CPT HEP:   Seated marching  Seated hip ABD with TB   Side stepping seated  Standing squats  Side stepping standing  Standing hip flexion  Standing hip EXT   Standing HS curl  Arm lifts with 1# weight  Comments: does have adjustable ankle weights up to 5#. Currently using a 1# ankle weight    Access Code: [email or web address]  URL: [email or web address]  Date: 12/17/2024  Prepared by: Lara Hsieh    Exercises  - Shoulder External Rotation and Scapular Retraction with Resistance  - 1-2 x daily - 7 x weekly - 3 sets - 10 reps - 2 seconds hold  - Shoulder Flexion Serratus Activation with Resistance  - 1-3 x daily - 7 x weekly - 2 sets - 10 reps  - Supine Figure 4 Piriformis Stretch  - 1 x daily - 7 x weekly - 1 sets - 3-4 reps - 30 seconds hold  - Supine Lower Trapezius Strengthening  - 1-2 x daily - 7 x weekly - 3 sets - 10 reps - 5-10 seconds hold  - [place]  - 1-2 x daily - 7 x weekly - 2-3 sets - 10 reps    Neuromuscular Re-education (66952): 8 minutes  Elastic therapeutic tape applied to pt's R ankle for ankle DF facilitation. \"I\" strip applied with no tension at end anchors, with paper off tension in therapeutic zone. Anchors applied at base of 2-3rd metatarsals & at anterior lateral aspect of shin. Anchors applied with R ankle in DF, followed by therapeutic zone of tape applied in ankle PF.   Instructed pt in precautions/contraindications, and grounds to discontinue use.      Goals:  By discharge, pt will meet the following revised goals established 12/12/24:     Pt will demo decrease in TUG score by 5 to decrease fall risk.  Pt will demo decrease in BBS score by 9 to decrease fall risk.  Pt will increase R UE AROM for elevation to WFL & within 10 deg of contralateral side.  Pt will ambulate with LRAD- preferably a cane- 350ft+ MOD I.  Pt will increase ROM of B hips to WFL for improved ability to don socks & shoes in sitting.  Pt will increase strength of R ankle DF to 3/5.   Pt will " improve score of QuickDASH by 15 points to meet MCID.   Pt will improve score of LEFS by 9 points from initial evaluation to meet MCID.

## 2024-12-16 ENCOUNTER — HOSPITAL ENCOUNTER (OUTPATIENT)
Dept: RADIOLOGY | Facility: HOSPITAL | Age: 74
Discharge: HOME | End: 2024-12-16
Payer: MEDICARE

## 2024-12-16 DIAGNOSIS — M54.16 LUMBAR RADICULOPATHY: ICD-10-CM

## 2024-12-16 PROCEDURE — 72120 X-RAY BEND ONLY L-S SPINE: CPT | Mod: 59

## 2024-12-16 PROCEDURE — 72084 X-RAY EXAM ENTIRE SPI 6/> VW: CPT | Performed by: RADIOLOGY

## 2024-12-16 PROCEDURE — 72082 X-RAY EXAM ENTIRE SPI 2/3 VW: CPT

## 2024-12-16 PROCEDURE — 72070 X-RAY EXAM THORAC SPINE 2VWS: CPT | Mod: 59

## 2024-12-17 ENCOUNTER — APPOINTMENT (OUTPATIENT)
Dept: PHYSICAL THERAPY | Facility: CLINIC | Age: 74
End: 2024-12-17
Payer: MEDICARE

## 2024-12-17 DIAGNOSIS — R29.6 FALLS FREQUENTLY: ICD-10-CM

## 2024-12-17 DIAGNOSIS — M21.371 RIGHT FOOT DROP: ICD-10-CM

## 2024-12-17 DIAGNOSIS — M25.611 SHOULDER STIFFNESS, RIGHT: ICD-10-CM

## 2024-12-17 DIAGNOSIS — R26.81 UNSTEADINESS ON FEET: ICD-10-CM

## 2024-12-17 DIAGNOSIS — R26.2 DIFFICULTY WALKING: Primary | ICD-10-CM

## 2024-12-17 PROCEDURE — 97110 THERAPEUTIC EXERCISES: CPT | Mod: GP

## 2024-12-17 ASSESSMENT — PAIN SCALES - GENERAL: PAINLEVEL_OUTOF10: 0 - NO PAIN

## 2024-12-17 ASSESSMENT — PAIN - FUNCTIONAL ASSESSMENT: PAIN_FUNCTIONAL_ASSESSMENT: 0-10

## 2024-12-18 NOTE — PROGRESS NOTES
Physical Therapy    Physical Therapy Treatment    Patient Name: Khalida Barker  MRN: [id number]  Today's Date: 12/20/2024    Time Entry:   Time Calculation  Start Time: 1340  Stop Time: 1430  Time Calculation (min): 50 min     PT Therapeutic Procedures Time Entry  Therapeutic Exercise Time Entry: 50                 Visit #24 (2 of 10 new)  [employer] BMN PT OT COPAY 30 DED 0 COVERAGE 100 OOP 0 NO AUTH REQ, REF# [id number]     Assessment:   Pt tolerated session well without adverse effect and is making progress toward goals. Pt seemed to have improved recruitment of R shoulder with repetition of S/L shoulder ABD. Pt will continue to benefit from PT to address her R shoulder function, R dropfoot & general mobility following her spine surgery.     Plan:   Continue with similar session.   As able:   S/L shoulder horizontal ABD   Supine at reclined table shoulder flexion AAROM   Standing snow angels standing at wall   Prone scapular retractions  + shoulder extension   Walking more with SPC & varying terrain, change in direction, etc.   Ktape, e-stim & general strengthening for footdrop  Rows with TB vs cybex machine   Supine/hooklying scaption with slight incline of table   Posterior deltoid row, emphasis on neutral scapular positioning  Long sitting ankle DF strengthening without resistance   Ankle circles   Ankle eversion/inversion strengthening     Current Problem  1. Difficulty walking        2. Falls frequently        3. Right foot drop        4. Shoulder stiffness, right        5. Unsteadiness on feet          General   Pt arrives at session with her , Quinn, however attends session independently. She reports she saw her surgeon this morning. Is to have a CT scan of lumbar spine to investigate R sided back pain. Has been told by her surgeon and her  that she is standing taller, however she has a difficult time seeing it herself. Reports her L1 compression fracture is now healed. No  issues with hardware. Received a referral for an AFO as well as a bone stimulator.   Reports her foot & ankle strength rival importance of her R shoulder.        Subjective    Precautions  Precautions  STEADI Fall Risk Score (The score of 4 or more indicates an increased risk of falling): 9  Vital Signs     Pain  Pain Assessment  Pain Assessment: 0-10  0-10 (Numeric) Pain Score: 0 - No pain    Objective        Treatments:  Therapeutic Exercise (15930): 50 minutes  Demonstrated to pt donning/doffing of AFO and discussed proper footware to bring to her upcoming orthotist appt.     Supine:   Supine snow angels 2x10 reps, use of slide board under R arm to assist with ease of motion  Supine red TB shoulder horizontal ABD 2x10  Supine lower trapezius presses 2x10 reps with 5-10    S/L:  S/L shoulder flexion with arm supported on table 3x10 reps, on last set added light manual resistance of therapist. Added protraction of scapula.   S/L shoulder ABD x10 reps, therapist assisted prn during ROM. Palpable crepitus in mid-ranges  S/L shoulder ER with 2# 3x10    Reviewed Current HEP:  Pulleys flexion/scaption in sitting  Supine AAROM flexion with dowel rogelio   Shoulder extension + scapular retraction with yellow TB   Scapular retraction + GHJ ER with red TB  Shoulder flexion with yellow looped TB for serratus activation  Supine scapular protraction/retraction (added today)     From HHCPT HEP:   Seated marching  Seated hip ABD with TB   Side stepping seated  Standing squats  Side stepping standing  Standing hip flexion  Standing hip EXT   Standing HS curl  Arm lifts with 1# weight  Comments: does have adjustable ankle weights up to 5#. Currently using a 1# ankle weight    Access Code: [email or web address]  URL: [email or web address]  Date: 12/17/2024  Prepared by: Lara Hsieh    Exercises  - Shoulder External Rotation and Scapular Retraction with Resistance  - 1-2 x daily - 7 x weekly - 3 sets - 10 reps - 2 seconds  hold  - Shoulder Flexion Serratus Activation with Resistance  - 1-3 x daily - 7 x weekly - 2 sets - 10 reps  - Supine Figure 4 Piriformis Stretch  - 1 x daily - 7 x weekly - 1 sets - 3-4 reps - 30 seconds hold  - Supine Lower Trapezius Strengthening  - 1-2 x daily - 7 x weekly - 3 sets - 10 reps - 5-10 seconds hold  - Snow [place]  - 1-2 x daily - 7 x weekly - 2-3 sets - 10 reps    Goals:  By discharge, pt will meet the following revised goals established 12/12/24:     Pt will demo decrease in TUG score by 5 to decrease fall risk.  Pt will demo decrease in BBS score by 9 to decrease fall risk.  Pt will increase R UE AROM for elevation to WFL & within 10 deg of contralateral side.  Pt will ambulate with LRAD- preferably a cane- 350ft+ MOD I.  Pt will increase ROM of B hips to WFL for improved ability to don socks & shoes in sitting.  Pt will increase strength of R ankle DF to 3/5.   Pt will improve score of QuickDASH by 15 points to meet MCID.   Pt will improve score of LEFS by 9 points from initial evaluation to meet MCID.

## 2024-12-19 NOTE — PROGRESS NOTES
[facility] [place]  Department of Neurological Surgery  Established Patient Visit    History of Present Illness:  Khalida Barker is a 74 y.o. year old female who presents to the spine clinic in follow up with low back pain and T9 compression fracture. She is 4-5 months s/p T11-12 decompression and L2-S1 fusion on 5/14/2024. History of lumbar deformity at T10-pelvis. X-rays and CT scan showed a T9 compression fracture directly above her surgery. The fracture radiates into the sides of the ribs causing pain. She states this has been not getting better. She reports that she takes tramadol only when she really needs it. She has been wearing a back brace and performing PT for the last 4-6 weeks.     At 10/4/24 visit, her pain had improved since May. She has been wearing her back brace religiously. Still has some low back pain. Can walk with walker for up to 200 feet now. Since her last visit, she is dependent on her walker at all times due to drop foot. She states that she has gone to physical therapy and had the therapist tape the foot. This action gives her greater stability. She expresses that her pain radiates from the buttocks to the incision site.     She underwent hardware removal and tenolysis of the right wrist on 12/03/2024. She is experiencing depression and anger to the the limitations of her life. She has been undergoing physical therapy for 2 months. She has complaints that being unable to move the arm above the shoulder is limiting her life. It is the same side as her drop foot. She states that she was advised to not have a brace as she is able to move her toes.  The foot drop is still partial and worse with ambulation. She is also having Right sided back pain.    Patient's BMI is Body mass index is 25.75 kg/m².    Diabetic: no     Osteoporosis: no  No DXA results found for the past 12 months    Review of Systems:  14/14 systems reviewed and negative other than what is listed in the history  of present illness    Patient Active Problem List   Diagnosis    Bilateral carpal tunnel syndrome    Breast asymmetry    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    Muscle weakness (generalized)    Positive colorectal cancer screening using Cologuard test    Primary osteoarthritis of right knee    Seborrheic keratoses    Tinnitus    Vitamin D deficiency    Left sided abdominal pain    BMI 30.0-30.9,adult    Cervicalgia    Cervical paraspinal muscle spasm    Non-smoker    Right knee pain    Knee stiffness, right    Chronic left shoulder pain    Neck pain    Thoracogenic scoliosis of thoracolumbar region    Scoliosis deformity of spine    Anxiety    C6 radiculopathy    Carpal tunnel syndrome    Low back pain, unspecified    Closed fracture of distal end of right radius    Weakness    Closed fracture of distal end of right radius, unspecified fracture morphology, initial encounter    Acute blood loss as cause of postoperative anemia    Dysphagia    Dehydration    Ankylosing spondylitis of multiple sites in spine (Multi)    Dysphagia, unspecified    Difficulty walking    Right foot drop    Shoulder stiffness, right    Unsteadiness on feet    Falls frequently     Past Medical History:   Diagnosis Date    Ankylosis of sacroiliac joint 06/04/2024    Anxiety     Arthritis     Cervicalgia     Colon polyp     CTS (carpal tunnel syndrome)     Diverticulitis 06/27/2023    Hallux valgus, acquired 01/19/2013    Hiatal hernia     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     OA (osteoarthritis) of knee     Painful scar 09/20/2017    Peripheral neuropathy     Scoliosis     Skin cancer     Spinal stenosis     Traumatic ecchymosis of foot 01/19/2013    Trigger finger of right hand     Vision loss     wears glasses    Vitamin D deficiency      Past Surgical History:   Procedure Laterality Date    ADENOIDECTOMY  1956    BACK SURGERY  05/14/2024    spine up to T10    BREAST BIOPSY  1999    COLONOSCOPY      KNEE  ARTHROPLASTY Right     MOUTH SURGERY  2016    Back left molar removed    MOUTH SURGERY  09/28/2021    right molar    OTHER SURGICAL HISTORY      Skin lesion excision    SPINAL FUSION  06/2010    C4-5    TONSILLECTOMY  1956    WISDOM TOOTH EXTRACTION  1978    lower    WRIST SURGERY Right 06/2024     Social History     Tobacco Use    Smoking status: Never    Smokeless tobacco: Never   Substance Use Topics    Alcohol use: Not Currently     Comment: rarely     family history includes Alzheimer's disease in her mother; Heart disease in her father; Hypertension in her brother; Polymyositis in her father; Skin cancer in her brother; peripheral artery disease in her brother.    Current Outpatient Medications:     aspirin-acetaminophen-caffeine (Excedrin Migraine) 250-250-65 mg tablet, Take 1 tablet by mouth every 6 hours if needed for headaches., Disp: , Rfl:     BIOTIN ORAL, Take 1 tablet by mouth once daily., Disp: , Rfl:     cholecalciferol (Vitamin D3) 50 mcg (2,000 unit) capsule, Take 1 capsule (50 mcg) by mouth once daily., Disp: , Rfl:     docusate sodium (Colace) 50 mg/5 mL oral liquid, Take 10 mL (100 mg) by mouth once daily., Disp: , Rfl:     fluticasone (Flonase) 50 mcg/actuation nasal spray, Administer 1 spray into each nostril once daily. Shake gently. Before first use, prime pump. After use, clean tip and replace cap., Disp: , Rfl:     hydrocortisone (Anusol-HC) 25 mg suppository, Insert 1 suppository (25 mg) into the rectum once daily., Disp: 10 suppository, Rfl: 1    hyoscyamine (Anaspaz) 0.125 mg disintegrating tablet, Take 1 tablet (0.125 mg) by mouth 3 times a day., Disp: 90 tablet, Rfl: 0    multivitamin (One Daily Multivitamin) tablet, Take 1 tablet by mouth once daily., Disp: , Rfl:     psyllium (Metamucil) powder, 1 Dose (5.8 g) once daily., Disp: , Rfl:     traMADol (Ultram) 50 mg tablet, Take 1 tablet (50 mg) by mouth every 6 hours if needed for severe pain (7 - 10)., Disp: 28 tablet, Rfl:  0  Allergies   Allergen Reactions    Oxycodone Itching     Previously tolerated Percocet        Physical Examination:    General: Well developed, awake/alert/oriented x3, no distress, alert and cooperative  Skin: Warm and dry, no lesions, no rashes  ENMT: Mucous membranes moist, no apparent injury, no lesions seen  Head/Neck: Neck Supple, no apparent injury  Respiratory/Thorax: Normal breath sounds with good chest expansion, thorax symmetric  Cardiovascular: No pitting edema, no JVD    Motor Strength: 5/5 Throughout all extremities    Muscle Bulk: Normal and symmetric in all extremities    Posture:   -- Cervical: Normal  -- Thoracic: Normal  -- Lumbar : Normal  Paraspinal muscle spasm/tenderness back pain on right side intermittently up side of back.     Sensation: intact to light touch    Partial drop foot of the right foot.     Results:  I personally reviewed and interpreted the imaging results which included that the hardware screws are projecting into the disc space. Otherwise, the imaging does not look dramatically different from other imaging with stable hardware and instrumentation. She has a stable fracture at T9 hardware.  I think the hardware looks stable and fracture might be healed. I need to have another CT to ensure that the fracture has healed.     Assessment and Plan:      Khalida Barker is a 74 y.o. year old female who presents to the spine clinic in follow up with low back pain and T9 compression fracture. I have advised the patient that it is unknown as to why she continues to experience drop foot.     I have expressed that the patient may require a brace for her drop foot. I would state that the patient is limited due to her drop foot. Therefore I would recommend that the patient use a brace for the drop foot. She does not need to wear it at all times. It is worth trying. If she needs a brace to be functionally active, that is what she needs to look into. I have advised her that she does not  require a brace during the day at home. She may require the brace when she is out in public walking.     I have reviewed all prior documentation and reviewed the electronic medical record since admission. I have personally have reviewed all advanced imaging not just the reports and used my interpretation as documented as the relevant findings. I have reviewed the risks and benefits of all treatment recommendations listed in this note with the patient and family.     After reviewing her XR, everything looks unremarkable. The fracture has healed. I have no opinion on the hardware down below. I can not guarantee that the area is healed. I discussed with there that if the bottom space is not fused, when extending her pack spaces do not look the same. I can't be certain therefore we will order her a CT scan.     The patient would like to order her for a CT scan of the lumber and thoracic spine. She would like it completed by the end of the year. I have ordered the patient AFO to be molded to the patient. This will give her greater independence.     We discussed that if she does not want more surgery, how do we manage her pain. Pain management, medication, how she interprets her pain or bone stimulation. I have advised her that the bone stimulator increases healing. This would promote healing due to possible non union. She could also continue with surveillance of the pain.     The above clinical summary has been dictated with voice recognition software. It has not been proofread for grammatical errors, typographical mistakes, or other semantic inconsistencies.    Thank you for visiting our office today. It was our pleasure to take part in your healthcare.     Do not hesitate to call with any questions regarding your plan of care after leaving at [phone] M-F 8am-4pm.     To clinicians, thank you very much for this kind referral. It is a privilege to partner with you in the care of your patients. My office would be  delighted to assist you with any further consultations or with questions regarding the plan of care outlined. Do not hesitate to call the office or contact me directly.       Sincerely,      Justin Lu MD, [facility]  Spine , [facility]  [place] Art Chair in Spinal Neurosurgery  Complex Spine Surgery Fellowship Director   of Neurological Surgery  [employer] [facility] [employer]  Phone: [phone]  Fax: [phone]        Scribe Attestation   By signing my name below, IIraida, Colette attestation that this documentation has been prepared under the direction and in the presence of Justin Lu MD.    Scribe Attestation  By signing my name below, Maliha NORIEGA Scribe   attest that this documentation has been prepared under the direction and in the presence of Justin Lu MD.

## 2024-12-20 ENCOUNTER — TREATMENT (OUTPATIENT)
Dept: PHYSICAL THERAPY | Facility: CLINIC | Age: 74
End: 2024-12-20
Payer: MEDICARE

## 2024-12-20 ENCOUNTER — APPOINTMENT (OUTPATIENT)
Dept: NEUROSURGERY | Facility: CLINIC | Age: 74
End: 2024-12-20
Payer: MEDICARE

## 2024-12-20 VITALS
HEIGHT: 64 IN | SYSTOLIC BLOOD PRESSURE: 104 MMHG | DIASTOLIC BLOOD PRESSURE: 68 MMHG | WEIGHT: 150 LBS | TEMPERATURE: 96.9 F | HEART RATE: 84 BPM | BODY MASS INDEX: 25.61 KG/M2

## 2024-12-20 DIAGNOSIS — R29.6 FALLS FREQUENTLY: ICD-10-CM

## 2024-12-20 DIAGNOSIS — M41.35 THORACOGENIC SCOLIOSIS OF THORACOLUMBAR REGION: ICD-10-CM

## 2024-12-20 DIAGNOSIS — M21.371 RIGHT FOOT DROP: ICD-10-CM

## 2024-12-20 DIAGNOSIS — R26.81 UNSTEADINESS ON FEET: ICD-10-CM

## 2024-12-20 DIAGNOSIS — S22.009G: ICD-10-CM

## 2024-12-20 DIAGNOSIS — R26.2 DIFFICULTY WALKING: Primary | ICD-10-CM

## 2024-12-20 DIAGNOSIS — M25.611 SHOULDER STIFFNESS, RIGHT: ICD-10-CM

## 2024-12-20 DIAGNOSIS — M21.371 RIGHT FOOT DROP: Primary | ICD-10-CM

## 2024-12-20 PROCEDURE — 1125F AMNT PAIN NOTED PAIN PRSNT: CPT | Performed by: STUDENT IN AN ORGANIZED HEALTH CARE EDUCATION/TRAINING PROGRAM

## 2024-12-20 PROCEDURE — 3008F BODY MASS INDEX DOCD: CPT | Performed by: STUDENT IN AN ORGANIZED HEALTH CARE EDUCATION/TRAINING PROGRAM

## 2024-12-20 PROCEDURE — 1036F TOBACCO NON-USER: CPT | Performed by: STUDENT IN AN ORGANIZED HEALTH CARE EDUCATION/TRAINING PROGRAM

## 2024-12-20 PROCEDURE — 1123F ACP DISCUSS/DSCN MKR DOCD: CPT | Performed by: STUDENT IN AN ORGANIZED HEALTH CARE EDUCATION/TRAINING PROGRAM

## 2024-12-20 PROCEDURE — 99214 OFFICE O/P EST MOD 30 MIN: CPT | Performed by: STUDENT IN AN ORGANIZED HEALTH CARE EDUCATION/TRAINING PROGRAM

## 2024-12-20 PROCEDURE — 97110 THERAPEUTIC EXERCISES: CPT | Mod: GP

## 2024-12-20 ASSESSMENT — PAIN SCALES - GENERAL
PAINLEVEL_OUTOF10: 0 - NO PAIN
PAINLEVEL_OUTOF10: 5

## 2024-12-20 ASSESSMENT — PAIN - FUNCTIONAL ASSESSMENT: PAIN_FUNCTIONAL_ASSESSMENT: 0-10

## 2024-12-22 NOTE — PROGRESS NOTES
Physical Therapy    Physical Therapy Treatment    Patient Name: Khalida Barker  MRN: [id number]  Today's Date: 12/24/2024    Time Entry:   Time Calculation  Start Time: 0950  Stop Time: 1045  Time Calculation (min): 55 min     PT Therapeutic Procedures Time Entry  Neuromuscular Re-Education Time Entry: 55                 Visit #25 (3 of 10 new)  [employer] BMN PT OT COPAY 30 DED 0 COVERAGE 100 OOP 0 NO AUTH REQ, REF# [id number]     Assessment:   Pt tolerated session well without adverse effect and is making progress toward goals. Focus of session on balance and increasing independence with SPC. Pt only had 1-2 mild LOB when walking with SPC, and was challenged by AP weight shifting control- especially on foam surfaces. Held work on R shoulder until pt has seen orthopedic surgeon to learn options. Pt will continue to benefit from PT to address her R shoulder function, R dropfoot & general mobility following her spine surgery.     Plan:   Continue with similar session.   As able:     Current Problem  1. Difficulty walking        2. Falls frequently        3. Right foot drop        4. Shoulder stiffness, right        5. Unsteadiness on feet          General   Pt arrives at session with her , Quinn, however attends session independently. She reports she received the results of her shoulder MRI. Results as below. She [facility] Franklin, and may also see orthoped[place] [facility] [place]. And[place]. Reports she feels the exercises worked on in PT since last visit have contributed to pain.     MRI Result: Some limitations from motion.  Rotator Cuff Tendons:  Large full-thickness rotator cuff tear involving entire supraspinatus, entire subscapularis, at least some more anterior fibers of infraspinatus with other intact appearing more posterior fibers.  Medial retraction of the torn fibers to near the glenohumeral joint, with underlying tendinosis.  Teres minor  "appears intact.  Long Head Biceps Tendon:  Intra-articular portion not well-visualized, probably torn.  Muscle:  At least mild volume loss of supraspinatus and subscapularis.  Labrum:  Diffuse fraying/tearing.  Bones and Marrow:  No evidence of fracture or bone marrow replacing process.  Superior elevation of the humeral head contacting the acromion suggestive of rotator cuff arthropathy, with associated adjacent edema signal, likely reactive.  Glenohumeral Joint:  No distinct measurable full-thickness chondral defect.  Moderate join effusion.  Mild synovitis and/or joint bodies.  Acromioclavicular Join:  Mild degenerative changes.  Other:  Fluid extending into the subacromial subdeltoid bursa.  Impression:  Large full-thickness rotator cuff tear as discussed.  Medial retraction of the torn fibers with at leas mild volume loss of supraspinatus and subscapularis.  Rotator cuff arthropathy.       Subjective    Precautions  Precautions  STEADI Fall Risk Score (The score of 4 or more indicates an increased risk of falling): 9  Vital Signs     Pain  Pain Assessment  Pain Assessment: 0-10  0-10 (Numeric) Pain Score:  (does not rank on VAS)    Objective      Treatments:  Neuromuscular Re-education (07519): 47 minutes  Elastic therapeutic tape applied to pt's R ankle for ankle DF facilitation. \"I\" strip applied with no tension at end anchors, with paper off tension in therapeutic zone. Anchors applied at base of 2-3rd metatarsals & at anterior lateral aspect of shin. Anchors applied with R ankle in DF, followed by therapeutic zone of tape applied in ankle PF.   Instructed pt in precautions/contraindications, and grounds to discontinue use.      In 20ft Hallway with SPC x2 laps of each:   --Fwd/bwd walking   --Side stepping    In // Bars:   --Stepping over 2\" sticks fwd & laterally with SPC  --Stepping on/off 2, blue foam pads fwd & laterally with SPC  --Curb step practice with SPC     Reviewed Current HEP:  Pulleys " flexion/scaption in sitting  Supine AAROM flexion with dowel rogelio   Shoulder extension + scapular retraction with yellow TB   Scapular retraction + GHJ ER with red TB  Shoulder flexion with yellow looped TB for serratus activation  Supine scapular protraction/retraction (added today)     From HHCPT HEP:   Seated marching  Seated hip ABD with TB   Side stepping seated  Standing squats  Side stepping standing  Standing hip flexion  Standing hip EXT   Standing HS curl  Arm lifts with 1# weight  Comments: does have adjustable ankle weights up to 5#. Currently using a 1# ankle weight    Gait Training (48653): 8 minutes  Pt ambulated [id number] with SPC with CGA to 1-2 occurrences of MIN A.     Access Code: [email or web address]  URL: [email or web address]  Date: 12/17/2024  Prepared by: Lara Hsieh    Exercises  - Shoulder External Rotation and Scapular Retraction with Resistance  - 1-2 x daily - 7 x weekly - 3 sets - 10 reps - 2 seconds hold  - Shoulder Flexion Serratus Activation with Resistance  - 1-3 x daily - 7 x weekly - 2 sets - 10 reps  - Supine Figure 4 Piriformis Stretch  - 1 x daily - 7 x weekly - 1 sets - 3-4 reps - 30 seconds hold  - Supine Lower Trapezius Strengthening  - 1-2 x daily - 7 x weekly - 3 sets - 10 reps - 5-10 seconds hold  - Snow [place]  - 1-2 x daily - 7 x weekly - 2-3 sets - 10 reps      Goals:  By discharge, pt will meet the following revised goals established 12/12/24:     Pt will demo decrease in TUG score by 5 to decrease fall risk.  Pt will demo decrease in BBS score by 9 to decrease fall risk.  Pt will increase R UE AROM for elevation to WFL & within 10 deg of contralateral side.  Pt will ambulate with LRAD- preferably a cane- 350ft+ MOD I.  Pt will increase ROM of B hips to WFL for improved ability to don socks & shoes in sitting.  Pt will increase strength of R ankle DF to 3/5.   Pt will improve score of QuickDASH by 15 points to meet MCID.   Pt will improve score of LEFS by 9  points from initial evaluation to meet MCID.

## 2024-12-23 ENCOUNTER — APPOINTMENT (OUTPATIENT)
Dept: PRIMARY CARE | Facility: CLINIC | Age: 74
End: 2024-12-23
Payer: MEDICARE

## 2024-12-23 VITALS
DIASTOLIC BLOOD PRESSURE: 78 MMHG | SYSTOLIC BLOOD PRESSURE: 144 MMHG | HEIGHT: 64 IN | WEIGHT: 152.8 LBS | BODY MASS INDEX: 26.09 KG/M2 | TEMPERATURE: 97.7 F | HEART RATE: 96 BPM

## 2024-12-23 DIAGNOSIS — G89.29 CHRONIC RIGHT SHOULDER PAIN: Primary | ICD-10-CM

## 2024-12-23 DIAGNOSIS — G89.29 CHRONIC LOW BACK PAIN WITH RIGHT-SIDED SCIATICA, UNSPECIFIED BACK PAIN LATERALITY: ICD-10-CM

## 2024-12-23 DIAGNOSIS — M54.41 CHRONIC LOW BACK PAIN WITH RIGHT-SIDED SCIATICA, UNSPECIFIED BACK PAIN LATERALITY: ICD-10-CM

## 2024-12-23 DIAGNOSIS — M25.511 CHRONIC RIGHT SHOULDER PAIN: Primary | ICD-10-CM

## 2024-12-23 PROCEDURE — 3008F BODY MASS INDEX DOCD: CPT | Performed by: FAMILY MEDICINE

## 2024-12-23 PROCEDURE — 1036F TOBACCO NON-USER: CPT | Performed by: FAMILY MEDICINE

## 2024-12-23 PROCEDURE — 99213 OFFICE O/P EST LOW 20 MIN: CPT | Performed by: FAMILY MEDICINE

## 2024-12-23 PROCEDURE — 1159F MED LIST DOCD IN RCRD: CPT | Performed by: FAMILY MEDICINE

## 2024-12-23 PROCEDURE — 1160F RVW MEDS BY RX/DR IN RCRD: CPT | Performed by: FAMILY MEDICINE

## 2024-12-23 PROCEDURE — 1123F ACP DISCUSS/DSCN MKR DOCD: CPT | Performed by: FAMILY MEDICINE

## 2024-12-24 ENCOUNTER — APPOINTMENT (OUTPATIENT)
Dept: PHYSICAL THERAPY | Facility: CLINIC | Age: 74
End: 2024-12-24
Payer: MEDICARE

## 2024-12-24 DIAGNOSIS — M21.371 RIGHT FOOT DROP: ICD-10-CM

## 2024-12-24 DIAGNOSIS — R29.6 FALLS FREQUENTLY: ICD-10-CM

## 2024-12-24 DIAGNOSIS — R26.81 UNSTEADINESS ON FEET: ICD-10-CM

## 2024-12-24 DIAGNOSIS — R26.2 DIFFICULTY WALKING: Primary | ICD-10-CM

## 2024-12-24 DIAGNOSIS — M25.611 SHOULDER STIFFNESS, RIGHT: ICD-10-CM

## 2024-12-24 PROCEDURE — 97112 NEUROMUSCULAR REEDUCATION: CPT | Mod: GP

## 2024-12-24 ASSESSMENT — PAIN - FUNCTIONAL ASSESSMENT: PAIN_FUNCTIONAL_ASSESSMENT: 0-10

## 2024-12-26 ASSESSMENT — ENCOUNTER SYMPTOMS
EYES NEGATIVE: 1
NEUROLOGICAL NEGATIVE: 1
ARTHRALGIAS: 1
ENDOCRINE NEGATIVE: 1
RESPIRATORY NEGATIVE: 1
PSYCHIATRIC NEGATIVE: 1
CONSTITUTIONAL NEGATIVE: 1
HEMATOLOGIC/LYMPHATIC NEGATIVE: 1
CARDIOVASCULAR NEGATIVE: 1
ALLERGIC/IMMUNOLOGIC NEGATIVE: 1

## 2024-12-26 NOTE — PROGRESS NOTES
Subjective Khalida is here today accompanied by her  for follow-up on her chronic low back pain.  Her back pain has overall improved.  Her appetite and swallowing have been stable.  However she now notes right shoulder pain that she has had for several months.  This began around the time of her right wrist fracture.  She had seen orthopedics regarding this and had been undergoing physical therapy for this.  PT was not beneficial and  subsequent MRI of the shoulder showed a large full-thickness rotator cuff tear.  She is very upset about this.  She has been feeling depressed about this.  Her shoulder range of motion is limited and she is frustrated about this.  She is scheduled [facility].  She continues on her meds as noted.  She otherwise has been doing well.    Patient ID: Khalida Barker is a 74 y.o. 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     OA (osteoarthritis) of knee     Painful scar 09/20/2017    Peripheral neuropathy     Scoliosis     Skin cancer     Spinal stenosis     Traumatic ecchymosis of foot 01/19/2013    Trigger finger of right hand     Vision loss     wears glasses    Vitamin D deficiency       Past Surgical History:   Procedure Laterality Date    ADENOIDECTOMY  1956    BACK SURGERY  05/14/2024    spine up to T10    BREAST BIOPSY  1999    COLONOSCOPY      KNEE ARTHROPLASTY Right     MOUTH SURGERY  2016    Back left molar removed    MOUTH SURGERY  09/28/2021    right molar    OTHER SURGICAL HISTORY      Skin lesion excision    SPINAL FUSION  06/2010    C4-5    TONSILLECTOMY  1956     WISDOM TOOTH EXTRACTION  1978    lower    WRIST SURGERY Right 06/2024    WRIST SURGERY Right 12/03/2024    Hardware removal and tenolysis right wrist      Family History   Problem Relation Name Age of Onset    Alzheimer's disease Mother      Heart disease Father      Polymyositis Father      Hypertension Brother      Skin cancer Brother      Other (peripheral artery disease) Brother        Social History     Socioeconomic History    Marital status:      Spouse name: Not on file    Number of children: Not on file    Years of education: Not on file    Highest education level: Not on file   Occupational History    Not on file   Tobacco Use    Smoking status: Never    Smokeless tobacco: Never   Vaping Use    Vaping status: Never Used   Substance and Sexual Activity    Alcohol use: Not Currently     Comment: rarely    Drug use: Never    Sexual activity: Defer   Other Topics Concern    Not on file   Social History Narrative    Not on file     Social Drivers of Health     Financial Resource Strain: Low Risk  (7/21/2024)    Overall Financial Resource Strain (CARDIA)     Difficulty of Paying Living Expenses: Not very hard   Food Insecurity: Patient Declined (6/18/2024)    Received from [facility], [facility]    Hunger Vital Sign     Worried About Running Out of Food in the Last Year: Patient declined     Ran Out of Food in the Last Year: Patient declined   Transportation Needs: No Transportation Needs (7/22/2024)    PRAPARE - Transportation     Lack of Transportation (Medical): No     Lack of Transportation (Non-Medical): No   Physical Activity: Not on file   Stress: No Stress Concern Present (7/1/2024)    Received from [facility], [facility] [place] [facility] Occupational [facility] - Occupational Stress Questionnaire     Feeling of Stress : Not at all   Social Connections: Patient Declined (6/18/2024)    Received from [facility], [facility]    Social Connection and Isolation Panel [NHANES]      Frequency of Communication with Friends and Family: Patient declined     Frequency of Social Gatherings with Friends and Family: Patient declined     Attends Faith Services: Patient declined     Active Member of Clubs or Organizations: Patient declined     Attends Club or Organization Meetings: Patient declined     Marital Status: Patient declined   Intimate Partner Violence: Not At Risk (6/15/2024)    [facility]    Domestic Abuse Assessment     Do you feel safe in your relationships at home?: Yes     Physical Abuse: Denies     [facility] Domestic Abuse - Type of Abuse: Not on file     [facility] Domestic Abuse - Time Frame: Not on file     [facility] Domestic Abuse - Signs and Symptoms: Not on file     Verbal Abuse: Denies     [facility] Domestic Abuse - Reported To: Not on file   Housing Stability: Low Risk  (7/22/2024)    Housing Stability Vital Sign     Unable to Pay for Housing in the Last Year: No     Number of Times Moved in the Last Year: 1     Homeless in the Last Year: No      Oxycodone   Current Outpatient Medications   Medication Sig Dispense Refill    aspirin-acetaminophen-caffeine (Excedrin Migraine) 250-250-65 mg tablet Take 1 tablet by mouth every 6 hours if needed for headaches.      BIOTIN ORAL Take 1 tablet by mouth once daily.      cholecalciferol (Vitamin D3) 50 mcg (2,000 unit) capsule Take 1 capsule (50 mcg) by mouth once daily.      docusate sodium (Colace) 50 mg/5 mL oral liquid Take 10 mL (100 mg) by mouth once daily.      fluticasone (Flonase) 50 mcg/actuation nasal spray Administer 1 spray into each nostril once daily. Shake gently. Before first use, prime pump. After use, clean tip and replace cap.      hydrocortisone (Anusol-HC) 25 mg suppository Insert 1 suppository (25 mg) into the rectum once daily. (Patient taking differently: Insert 1 suppository (25 mg) into the rectum once daily as needed.) 10 suppository 1    multivitamin (One Daily Multivitamin) tablet Take 1  tablet by mouth once daily.      psyllium (Metamucil) powder 1 Dose (5.8 g) once daily.      traMADol (Ultram) 50 mg tablet Take 1 tablet (50 mg) by mouth every 6 hours if needed for severe pain (7 - 10). 28 tablet 0    hyoscyamine (Anaspaz) 0.125 mg disintegrating tablet Take 1 tablet (0.125 mg) by mouth 3 times a day. (Patient not taking: Reported on 12/23/2024) 90 tablet 0     No current facility-administered medications for this visit.       Immunization History   Administered Date(s) Administered    Flu vaccine, quadrivalent, high-dose, preservative free, age 65y+ (FLUZONE) 12/07/2020    Flu vaccine, trivalent, preservative free, HIGH-DOSE, age 65y+ (Fluzone) 11/16/2017, 10/11/2022    Influenza, Seasonal, Quadrivalent, Adjuvanted 10/20/2023    Influenza, Unspecified 10/24/2019, 11/08/2021, 10/11/2022    Influenza, seasonal, injectable 11/04/2016    Influenza, trivalent, adjuvanted 11/02/2024    Moderna COVID-19 vaccine, 12 years and older (50mcg/0.5mL)(Spikevax) 10/06/2023, 11/21/2024    Moderna COVID-19 vaccine, bivalent, blue cap/gray label *Check age/dose* 09/08/2022    Moderna SARS-CoV-2 Vaccination 02/11/2021, 03/11/2021, 11/02/2021, 04/14/2022    Pneumococcal Conjugate PCV 7 09/24/2018    Pneumococcal polysaccharide vaccine, 23-valent, age 2 years and older (PNEUMOVAX 23) 10/24/2019    Zoster vaccine, recombinant, adult (SHINGRIX) 09/23/2021, 12/10/2021        Review of Systems   Constitutional: Negative.    HENT: Negative.     Eyes: Negative.    Respiratory: Negative.     Cardiovascular: Negative.    Endocrine: Negative.    Genitourinary: Negative.    Musculoskeletal:  Positive for arthralgias (Right shoulder pain as noted above).   Skin: Negative.    Allergic/Immunologic: Negative.    Neurological: Negative.    Hematological: Negative.    Psychiatric/Behavioral: Negative.          Vitals:    12/23/24 1321   BP: 144/78   Pulse: 96   Temp: 36.5 °C (97.7 °F)     Vitals:    12/23/24 1321   Weight: 69.3 kg  (152 lb 12.8 oz)      Physical Exam  Constitutional:       General: She is not in acute distress.     Appearance: Normal appearance.   Cardiovascular:      Rate and Rhythm: Normal rate and regular rhythm.      Pulses: Normal pulses.      Heart sounds: Normal heart sounds. No murmur heard.     No friction rub. No gallop.   Pulmonary:      Effort: Pulmonary effort is normal. No respiratory distress.      Breath sounds: Normal breath sounds. No wheezing or rales.   Neurological:      General: No focal deficit present.      Mental Status: She is alert and oriented to person, place, and time.          ASSESSMENT/PLAN: Low back pain status post lumbar surgery improving.  Continue to use walker.  This has been very slowly improving.    Right shoulder pain with limited range of motion secondary to rotator cuff tear.  Proceed with orthopedic evaluation with Dr. Franklin.  We also [facility]  For now she may use Tylenol for pain control.    Dysphagia improved    Depression.  We discussed consideration of medication for this or counseling both of which patient defers at the present time.  She is encouraged to try to stay as active as possible.  Call if any worsening.    Continue current meds  Follow-up 2 months pending above and call as needed

## 2024-12-29 NOTE — PROGRESS NOTES
Physical Therapy    Physical Therapy Treatment    Patient Name: Khalida Barker  MRN: [id number]  Today's Date: 12/31/2024    Time Entry:   Time Calculation  Start Time: 0950  Stop Time: 1033  Time Calculation (min): 43 min     PT Therapeutic Procedures Time Entry  Therapeutic Activity Time Entry: 43                 Visit #26 (4 of 10 new)  [employer] BMN PT OT COPAY 30 DED 0 COVERAGE 100 OOP 0 NO AUTH REQ, REF# [id number]     Assessment:   Pt tolerated session well without adverse effect and is making some progress toward goals. Focus of session today on pt education regarding anatomy and reverse total shoulder common post-operative expectations for recovery. Pt will continue to benefit from PT to address her R shoulder function, R dropfoot & general mobility following her spine surgery.     Plan:   Continue with similar session.   As able:   Dynamic Gait: (with SPC)   Harnessed: (with SPC then without device)   --Obstacle courses: blue & gray foams, river rocks, steps, etc.   --Quick turns  --Fwd/bwd walking  --Side stepping  --High knees   --Tandem walk    Current Problem  1. Difficulty walking        2. Falls frequently        3. Right foot drop        4. Shoulder stiffness, right        5. Unsteadiness on feet          General   Pt arrives at session with her , Quinn, however attends session independently. She reports she saw Dr. Franklin since last visit to inquire about her R shoulder and leaning toward having a reverse total shoulder replacement. Plans to follow up with him again.   Is to have a CT today for her back.   Is to see orthotist later this week regarding AFO.   Is to see her wrist surgeon soon regarding her incision of R wrist.        Subjective    Precautions  Precautions  STEADI Fall Risk Score (The score of 4 or more indicates an increased risk of falling): 9  Vital Signs     Pain  Pain Assessment  Pain Assessment: 0-10  0-10 (Numeric) Pain Score:  (does not rank on  VAS)    Objective      Treatments:  Therapeutic Activity (33035): 43 minutes  Educated pt on basic anatomy of the shoulder regarding RTC muscles as well as anatomic changes brought about with a reverse total shoulder procedure.   Discussed typical post-operative presentation & protocols, answering pt questions throughout.     Reviewed hooklying figure 4 stretch briefly at end of session.     Reviewed Current HEP:  Pulleys flexion/scaption in sitting  Supine AAROM flexion with dowel rogelio   Shoulder extension + scapular retraction with yellow TB   Scapular retraction + GHJ ER with red TB  Shoulder flexion with yellow looped TB for serratus activation  Supine scapular protraction/retraction (added today)     From [facility]T HEP:   Seated marching  Seated hip ABD with TB   Side stepping seated  Standing squats  Side stepping standing  Standing hip flexion  Standing hip EXT   Standing HS curl  Arm lifts with 1# weight  Comments: does have adjustable ankle weights up to 5#. Currently using a 1# ankle weight    Access Code: [email or web address]  URL: [email or web address]  Date: 12/17/2024  Prepared by: Lara Hsieh    Exercises  - Shoulder External Rotation and Scapular Retraction with Resistance  - 1-2 x daily - 7 x weekly - 3 sets - 10 reps - 2 seconds hold  - Shoulder Flexion Serratus Activation with Resistance  - 1-3 x daily - 7 x weekly - 2 sets - 10 reps  - Supine Figure 4 Piriformis Stretch  - 1 x daily - 7 x weekly - 1 sets - 3-4 reps - 30 seconds hold  - Supine Lower Trapezius Strengthening  - 1-2 x daily - 7 x weekly - 3 sets - 10 reps - 5-10 seconds hold  - Snow [place]  - 1-2 x daily - 7 x weekly - 2-3 sets - 10 reps      Goals:  By discharge, pt will meet the following revised goals established 12/12/24:     Pt will demo decrease in TUG score by 5 to decrease fall risk.  Pt will demo decrease in BBS score by 9 to decrease fall risk.  Pt will increase R UE AROM for elevation to WFL & within 10 deg of  contralateral side.  Pt will ambulate with LRAD- preferably a cane- 350ft+ MOD I.  Pt will increase ROM of B hips to WFL for improved ability to don socks & shoes in sitting.  Pt will increase strength of R ankle DF to 3/5.   Pt will improve score of QuickDASH by 15 points to meet MCID.   Pt will improve score of LEFS by 9 points from initial evaluation to meet MCID.

## 2024-12-31 ENCOUNTER — HOSPITAL ENCOUNTER (OUTPATIENT)
Dept: RADIOLOGY | Facility: CLINIC | Age: 74
Discharge: HOME | End: 2024-12-31
Payer: MEDICARE

## 2024-12-31 ENCOUNTER — APPOINTMENT (OUTPATIENT)
Dept: PHYSICAL THERAPY | Facility: CLINIC | Age: 74
End: 2024-12-31
Payer: MEDICARE

## 2024-12-31 DIAGNOSIS — R29.6 FALLS FREQUENTLY: ICD-10-CM

## 2024-12-31 DIAGNOSIS — M21.371 RIGHT FOOT DROP: ICD-10-CM

## 2024-12-31 DIAGNOSIS — R26.81 UNSTEADINESS ON FEET: ICD-10-CM

## 2024-12-31 DIAGNOSIS — S22.009G: ICD-10-CM

## 2024-12-31 DIAGNOSIS — R26.2 DIFFICULTY WALKING: Primary | ICD-10-CM

## 2024-12-31 DIAGNOSIS — M25.611 SHOULDER STIFFNESS, RIGHT: ICD-10-CM

## 2024-12-31 PROCEDURE — 72128 CT CHEST SPINE W/O DYE: CPT | Performed by: RADIOLOGY

## 2024-12-31 PROCEDURE — 72128 CT CHEST SPINE W/O DYE: CPT

## 2024-12-31 PROCEDURE — 97530 THERAPEUTIC ACTIVITIES: CPT | Mod: GP

## 2024-12-31 PROCEDURE — 72131 CT LUMBAR SPINE W/O DYE: CPT

## 2024-12-31 ASSESSMENT — PAIN - FUNCTIONAL ASSESSMENT: PAIN_FUNCTIONAL_ASSESSMENT: 0-10

## 2025-01-01 NOTE — PROGRESS NOTES
"Physical Therapy    Physical Therapy Treatment    Patient Name: Khalida Barker  MRN: [id number]  Today's Date: 1/3/2025    Time Entry:   Time Calculation  Start Time: 0947  Stop Time: 1030  Time Calculation (min): 43 min     PT Therapeutic Procedures Time Entry  Neuromuscular Re-Education Time Entry: 43                 Visit #27 (5 of 10 new)  Visit #1 in 2025  [employer] BMN PT OT COPAY 30 DED 0 COVERAGE 100 OOP 0 NO AUTH REQ, REF# [id number]     Assessment:   Pt tolerated session well without adverse effect and is making progress toward goals. Focus of session on maintaining balance with changes in direction such as fwd/bwd walking with SPC. Challenged with stepping on/off foams as well with SPC. Pt will continue to benefit from PT to address her R shoulder function, R dropfoot & general mobility following her spine surgery.     Plan:   Continue with similar session.   As able:     Current Problem  1. Difficulty walking        2. Falls frequently        3. Right foot drop        4. Shoulder stiffness, right        5. Unsteadiness on feet          General   Pt arrives at session with her , Quinn, however attends session independently. She reports she has her orthotist appt later this afternoon regarding AFO. Had her thoracic & lumbar CT since last visit. Will be scheduling her R shoulder CT. Also saw her wrist surgeon and had sutures removed at R wrist. Is a little sore there today as result.     CT Thoracic 12/31/24:   \"FINDINGS:  Staple increased  thoracic kyphosis. Unremarkable lumbar lordosis.  Again noted is the extensive posterior transpedicular screw fusion  starting at T10 extending into S1. Disc spacers in place at L2-S1.  Mild disc subsidence changes are noted at L2/L3. Lumbar laminectomies  spanning the entirety of the lumbar spine.  The bilateral T10 screws demonstrate stable mild edin screw lucency  with tips terminating at the disc space. Again noted is stable T10  collapse " "with erosive changes at T9/T10. There is increased sclerosis  of the erosive changes at the disc space of T9/T10 along with  increased anterior osteophyte bony formations. Vacuum phenomena is  noted at this level now. Additionally there is decreased  paravertebral swelling.  There is approximately 10% depression at the superior endplate of T12  which is new since the prior examination. No retropulsion.  Lumbar laminectomies spanning the entirety of the lumbar spine.  Delineation of the thecal sac is limited by hardware artifact. No  sapna spinal canal stenosis within these limitations.      IMPRESSION:  Healing changes at T9/T10.  New mild superior endplate fracture at T12.  Overall stable thoracolumbar fusion.\"       Subjective    Precautions  Precautions  STEADI Fall Risk Score (The score of 4 or more indicates an increased risk of falling): 9  Vital Signs     Pain  Pain Assessment  Pain Assessment: 0-10  0-10 (Numeric) Pain Score:  (does not rank on VAS)    Objective      Treatments:  Neuromuscular Re-education (14353): 43 minutes  Elastic therapeutic tape applied to pt's R ankle for ankle DF facilitation. \"I\" strip applied with no tension at end anchors, with paper off tension in therapeutic zone. Anchors applied at base of 2-3rd metatarsals & at anterior lateral aspect of shin. Anchors applied with R ankle in DF, followed by therapeutic zone of tape applied in ankle PF.   Instructed pt in precautions/contraindications, and grounds to discontinue use.      Harnessed: (with SPC)   --Fwd/bwd walking  --Resisted walking x4 laps   --Side stepping  --High knees   --Obstacle courses: blue & gray foams    Reviewed Current HEP:  Pulleys flexion/scaption in sitting  Supine AAROM flexion with dowel rogelio   Shoulder extension + scapular retraction with yellow TB   Scapular retraction + GHJ ER with red TB  Shoulder flexion with yellow looped TB for serratus activation  Supine scapular protraction/retraction (added today) "     From CPT HEP:   Seated marching  Seated hip ABD with TB   Side stepping seated  Standing squats  Side stepping standing  Standing hip flexion  Standing hip EXT   Standing HS curl  Arm lifts with 1# weight  Comments: does have adjustable ankle weights up to 5#. Currently using a 1# ankle weight    Access Code: [email or web address]  URL: [email or web address]  Date: 12/17/2024  Prepared by: Lara Hsieh    Exercises  - Shoulder External Rotation and Scapular Retraction with Resistance  - 1-2 x daily - 7 x weekly - 3 sets - 10 reps - 2 seconds hold  - Shoulder Flexion Serratus Activation with Resistance  - 1-3 x daily - 7 x weekly - 2 sets - 10 reps  - Supine Figure 4 Piriformis Stretch  - 1 x daily - 7 x weekly - 1 sets - 3-4 reps - 30 seconds hold  - Supine Lower Trapezius Strengthening  - 1-2 x daily - 7 x weekly - 3 sets - 10 reps - 5-10 seconds hold  - Snow [place]  - 1-2 x daily - 7 x weekly - 2-3 sets - 10 reps      Goals:  By discharge, pt will meet the following revised goals established 12/12/24:     Pt will demo decrease in TUG score by 5 to decrease fall risk.  Pt will demo decrease in BBS score by 9 to decrease fall risk.  Pt will increase R UE AROM for elevation to WFL & within 10 deg of contralateral side.  Pt will ambulate with LRAD- preferably a cane- 350ft+ MOD I.  Pt will increase ROM of B hips to WFL for improved ability to don socks & shoes in sitting.  Pt will increase strength of R ankle DF to 3/5.   Pt will improve score of QuickDASH by 15 points to meet MCID.   Pt will improve score of LEFS by 9 points from initial evaluation to meet MCID.

## 2025-01-03 ENCOUNTER — APPOINTMENT (OUTPATIENT)
Dept: PHYSICAL THERAPY | Facility: CLINIC | Age: 75
End: 2025-01-03
Payer: MEDICARE

## 2025-01-03 DIAGNOSIS — R26.2 DIFFICULTY WALKING: Primary | ICD-10-CM

## 2025-01-03 DIAGNOSIS — R29.6 FALLS FREQUENTLY: ICD-10-CM

## 2025-01-03 DIAGNOSIS — M21.371 RIGHT FOOT DROP: ICD-10-CM

## 2025-01-03 DIAGNOSIS — M25.611 SHOULDER STIFFNESS, RIGHT: ICD-10-CM

## 2025-01-03 DIAGNOSIS — R26.81 UNSTEADINESS ON FEET: ICD-10-CM

## 2025-01-03 PROCEDURE — 97112 NEUROMUSCULAR REEDUCATION: CPT | Mod: GP

## 2025-01-03 ASSESSMENT — PAIN - FUNCTIONAL ASSESSMENT: PAIN_FUNCTIONAL_ASSESSMENT: 0-10

## 2025-01-05 NOTE — PROGRESS NOTES
Physical Therapy    Physical Therapy Treatment    Patient Name: Khalida Barker  MRN: [id number]  Today's Date: 1/7/2025    Time Entry:   Time Calculation  Start Time: 0933  Stop Time: 1040  Time Calculation (min): 67 min     PT Therapeutic Procedures Time Entry  Neuromuscular Re-Education Time Entry: 67                 Visit #28 (6 of 10 new)  Visit #2 in 2025  [employer] BM[employer] PT OT COPAY 30 DED 0 COVERAGE 100 OOP 0 NO AUTH REQ, REF# [id number]     Assessment:   Pt tolerated session well without adverse effect and is making progress toward goals. Pt demo's improvement in her AP weight shifting control when stepping onto foams today. Was able to step onto foams without LOB using SPC today. She did have 1 LOB and sat into harness due to readiness for seated rest break, and reports some R arm soreness as a result. Pt adequately challenged with multi-directional walking and cable column walkouts. Pt will continue to benefit from PT to address her R shoulder function, R dropfoot & general mobility following her spine surgery.     Plan:   Continue with similar session.   As able:   Walking without device   River rocks in obstacle courses    Current Problem  1. Difficulty walking        2. Falls frequently        3. Right foot drop        4. Shoulder stiffness, right        5. Unsteadiness on feet          General   Pt arrives at session with her , Quinn, however attends session independently. She reports she started experiencing some purulent drainage on her R wrist and wonders if this is infected. She did call and is to see someone to assess this later today. Reports her  has pneumonia. Is to have her R shoulder CT scan later this afternoon. Saw orthotist last week regarding R AFO which went well. Was able to use a carbon fiber AFO versus a custom molded. Did order another pair of shoes with laces that would be good to use with an AFO.        Subjective    Precautions  Precautions  CHE  "Fall Risk Score (The score of 4 or more indicates an increased risk of falling): 9  Vital Signs     Pain  Pain Assessment  Pain Assessment: 0-10  0-10 (Numeric) Pain Score:  (did not rank on VAS)    Objective      Treatments:  Neuromuscular Re-education (24367): 67 minutes  Elastic therapeutic tape applied to pt's R ankle for ankle DF facilitation. \"I\" strip applied with no tension at end anchors, with paper off tension in therapeutic zone. Anchors applied at base of 2-3rd metatarsals & at anterior lateral aspect of shin. Anchors applied with R ankle in DF, followed by therapeutic zone of tape applied in ankle PF.   Instructed pt in precautions/contraindications, and grounds to discontinue use.      Harnessed: (with SPC)   --Walking fwd  --Resisted walking x4 laps   --Fwd/bwd walking  --Multi-directional walking: fwd/bwd, side stepping, turns on command  --360 deg turns   --Obstacle course: blue & gray foams: cues to improve fwd weight shift when stepping onto foam  Comments: pt did sit into harness on 1 occasion due to readiness for rest break. Does report R arm soreness afterward.     Cable column walkouts with SPC and 5# x5 reps walking fwd/bwd & x3 reps each direction laterally.     Reviewed Current HEP:  Pulleys flexion/scaption in sitting  Supine AAROM flexion with dowel rogelio   Shoulder extension + scapular retraction with yellow TB   Scapular retraction + GHJ ER with red TB  Shoulder flexion with yellow looped TB for serratus activation  Supine scapular protraction/retraction (added today)     From HHCPT HEP:   Seated marching  Seated hip ABD with TB   Side stepping seated  Standing squats  Side stepping standing  Standing hip flexion  Standing hip EXT   Standing HS curl  Arm lifts with 1# weight  Comments: does have adjustable ankle weights up to 5#. Currently using a 1# ankle weight    Access Code: [email or web address]  URL: [email or web address]  Date: 12/17/2024  Prepared by: Lara" Jori    Exercises  - Shoulder External Rotation and Scapular Retraction with Resistance  - 1-2 x daily - 7 x weekly - 3 sets - 10 reps - 2 seconds hold  - Shoulder Flexion Serratus Activation with Resistance  - 1-3 x daily - 7 x weekly - 2 sets - 10 reps  - Supine Figure 4 Piriformis Stretch  - 1 x daily - 7 x weekly - 1 sets - 3-4 reps - 30 seconds hold  - Supine Lower Trapezius Strengthening  - 1-2 x daily - 7 x weekly - 3 sets - 10 reps - 5-10 seconds hold  - [place]  - 1-2 x daily - 7 x weekly - 2-3 sets - 10 reps      Goals:  By discharge, pt will meet the following revised goals established 12/12/24:     Pt will demo decrease in TUG score by 5 to decrease fall risk.  Pt will demo decrease in BBS score by 9 to decrease fall risk.  Pt will increase R UE AROM for elevation to WFL & within 10 deg of contralateral side.  Pt will ambulate with LRAD- preferably a cane- 350ft+ MOD I.  Pt will increase ROM of B hips to WFL for improved ability to don socks & shoes in sitting.  Pt will increase strength of R ankle DF to 3/5.   Pt will improve score of QuickDASH by 15 points to meet MCID.   Pt will improve score of LEFS by 9 points from initial evaluation to meet MCID.

## 2025-01-07 ENCOUNTER — APPOINTMENT (OUTPATIENT)
Dept: RADIOLOGY | Facility: CLINIC | Age: 75
End: 2025-01-07
Payer: MEDICARE

## 2025-01-07 ENCOUNTER — HOSPITAL ENCOUNTER (OUTPATIENT)
Dept: RADIOLOGY | Facility: HOSPITAL | Age: 75
Discharge: HOME | End: 2025-01-07
Payer: MEDICARE

## 2025-01-07 ENCOUNTER — APPOINTMENT (OUTPATIENT)
Dept: PHYSICAL THERAPY | Facility: CLINIC | Age: 75
End: 2025-01-07
Payer: MEDICARE

## 2025-01-07 DIAGNOSIS — R26.81 UNSTEADINESS ON FEET: ICD-10-CM

## 2025-01-07 DIAGNOSIS — S46.011A STRAIN OF MUSCLE(S) AND TENDON(S) OF THE ROTATOR CUFF OF RIGHT SHOULDER, INITIAL ENCOUNTER: ICD-10-CM

## 2025-01-07 DIAGNOSIS — R26.2 DIFFICULTY WALKING: Primary | ICD-10-CM

## 2025-01-07 DIAGNOSIS — R29.6 FALLS FREQUENTLY: ICD-10-CM

## 2025-01-07 DIAGNOSIS — M21.371 RIGHT FOOT DROP: ICD-10-CM

## 2025-01-07 DIAGNOSIS — M25.611 SHOULDER STIFFNESS, RIGHT: ICD-10-CM

## 2025-01-07 PROCEDURE — 97112 NEUROMUSCULAR REEDUCATION: CPT | Mod: GP

## 2025-01-07 PROCEDURE — 73200 CT UPPER EXTREMITY W/O DYE: CPT | Mod: RIGHT SIDE | Performed by: STUDENT IN AN ORGANIZED HEALTH CARE EDUCATION/TRAINING PROGRAM

## 2025-01-07 PROCEDURE — 73200 CT UPPER EXTREMITY W/O DYE: CPT | Mod: RT

## 2025-01-07 ASSESSMENT — PAIN - FUNCTIONAL ASSESSMENT: PAIN_FUNCTIONAL_ASSESSMENT: 0-10

## 2025-01-09 NOTE — PROGRESS NOTES
Physical Therapy    Physical Therapy Treatment    Patient Name: Khalida Barker  MRN: [id number]  Today's Date: 1/10/2025    Time Entry:   Time Calculation  Start Time: 0924  Stop Time: 1005  Time Calculation (min): 41 min     PT Therapeutic Procedures Time Entry  Neuromuscular Re-Education Time Entry: 41                 Visit #29 (7 of 10 new)  Visit #2 in 2025  [employer] BMN PT OT COPAY 30 DED 0 COVERAGE 100 OOP 0 NO AUTH REQ, REF# [id number]     Assessment:   Pt tolerated session well without adverse effect and is making progress toward goals. Pt was able to walk without a device while harnessed today for fwd, bwd and side stepping without major LOB. Fatigued in trunk after~12 min of standing. Progressed resistance on cable column walkouts to 7.5# which pt tolerated well. Only 1 seated rest break during session due to trunk fatigue. Pt will continue to benefit from PT to address her R shoulder function, R dropfoot & general mobility following her spine surgery.     Plan:   Continue with similar session. Continue working with SPC then without device.   As able:   Walking without device   River rocks in obstacle courses    Current Problem  1. Difficulty walking        2. Falls frequently        3. Right foot drop        4. Shoulder stiffness, right        5. Unsteadiness on feet          General   Pt arrives at session with her , Quinn, however attends session independently. She reports she did purchase new lace up shoes in prep for her AFO. Was told to soak her wrist and express it. Was given oral antibiotics. Had R shoulder CT as well, results as below.     R shoulder CT 1/7/25:   FINDINGS:  OSSEOUS STRUCTURES:  There is no acute fracture or dislocation. The glenohumeral joint is  maintained. There is moderate acromioclavicular joint space narrowing  with small osteophytes. Minimal glenohumeral joint effusion is  present.      SOFT TISSUES:  The rotator cuff is not well evaluated by CT.  There is minimal fatty  atrophy of the supraspinatus and subscapularis muscles. The remaining  visualized muscles are normal in bulk. No subcutaneous or  intramuscular fluid collection is identified. The visualized right  lung is unremarkable.      IMPRESSION:  No acute osseous abnormality. Consider MRI evaluation if there is  concern for rotator cuff tear.       Subjective    Precautions  Precautions  STEADI Fall Risk Score (The score of 4 or more indicates an increased risk of falling): 9  Vital Signs     Pain  Pain Assessment  Pain Assessment: 0-10  0-10 (Numeric) Pain Score:  (does not rank on VAS)    Objective      Treatments:  Neuromuscular Re-education (29908): 41 minutes  Harnessed: (with SPC then without device)   --Walking fwd  --Multi-directional walking: fwd/bwd, side stepping, turns on command. Then without cane.   NOT TODAY:   --Obstacle course: blue & gray foams: cues to improve fwd weight shift when stepping onto foam  Comments: pt did sit into harness on 1 occasion due to readiness for rest break. Does report R arm soreness afterward.     Cable column walkouts with SPC and 7.5# x5 reps walking fwd/bwd & x3 reps each direction laterally.     Reviewed Current HEP:  Pulleys flexion/scaption in sitting  Supine AAROM flexion with dowel rogelio   Shoulder extension + scapular retraction with yellow TB   Scapular retraction + GHJ ER with red TB  Shoulder flexion with yellow looped TB for serratus activation  Supine scapular protraction/retraction     From [facility]CPT HEP:   Seated marching  Seated hip ABD with TB   Side stepping seated  Standing squats  Side stepping standing  Standing hip flexion  Standing hip EXT   Standing HS curl  Arm lifts with 1# weight  Comments: does have adjustable ankle weights up to 5#. Currently using a 1# ankle weight    Access Code: [email or web address]  URL: [email or web address]  Date: 12/17/2024  Prepared by: Lara Hsieh    Exercises  - Shoulder External Rotation and  Scapular Retraction with Resistance  - 1-2 x daily - 7 x weekly - 3 sets - 10 reps - 2 seconds hold  - Shoulder Flexion Serratus Activation with Resistance  - 1-3 x daily - 7 x weekly - 2 sets - 10 reps  - Supine Figure 4 Piriformis Stretch  - 1 x daily - 7 x weekly - 1 sets - 3-4 reps - 30 seconds hold  - Supine Lower Trapezius Strengthening  - 1-2 x daily - 7 x weekly - 3 sets - 10 reps - 5-10 seconds hold  - [place]  - 1-2 x daily - 7 x weekly - 2-3 sets - 10 reps      Goals:  By discharge, pt will meet the following revised goals established 12/12/24:     Pt will demo decrease in TUG score by 5 to decrease fall risk.  Pt will demo decrease in BBS score by 9 to decrease fall risk.  Pt will increase R UE AROM for elevation to WFL & within 10 deg of contralateral side.  Pt will ambulate with LRAD- preferably a cane- 350ft+ MOD I.  Pt will increase ROM of B hips to WFL for improved ability to don socks & shoes in sitting.  Pt will increase strength of R ankle DF to 3/5.   Pt will improve score of QuickDASH by 15 points to meet MCID.   Pt will improve score of LEFS by 9 points from initial evaluation to meet MCID.

## 2025-01-10 ENCOUNTER — APPOINTMENT (OUTPATIENT)
Dept: PHYSICAL THERAPY | Facility: CLINIC | Age: 75
End: 2025-01-10
Payer: MEDICARE

## 2025-01-10 DIAGNOSIS — M21.371 RIGHT FOOT DROP: ICD-10-CM

## 2025-01-10 DIAGNOSIS — R29.6 FALLS FREQUENTLY: ICD-10-CM

## 2025-01-10 DIAGNOSIS — R26.81 UNSTEADINESS ON FEET: ICD-10-CM

## 2025-01-10 DIAGNOSIS — R26.2 DIFFICULTY WALKING: Primary | ICD-10-CM

## 2025-01-10 DIAGNOSIS — M25.611 SHOULDER STIFFNESS, RIGHT: ICD-10-CM

## 2025-01-10 PROCEDURE — 97112 NEUROMUSCULAR REEDUCATION: CPT | Mod: GP

## 2025-01-10 ASSESSMENT — PAIN - FUNCTIONAL ASSESSMENT: PAIN_FUNCTIONAL_ASSESSMENT: 0-10

## 2025-01-13 NOTE — PROGRESS NOTES
Physical Therapy    Physical Therapy Treatment    Patient Name: Khalida Barker  MRN: [id number]  Today's Date: 1/14/2025    Time Entry:   Time Calculation  Start Time: 1137  Stop Time: 1230  Time Calculation (min): 53 min     PT Therapeutic Procedures Time Entry  Neuromuscular Re-Education Time Entry: 53                 Visit #30 (8 of 10 new)  Visit #3 in 2025  [employer] BMN PT OT COPAY 30 DED 0 COVERAGE 100 OOP 0 NO AUTH REQ, REF# [id number]     Assessment:   Pt tolerated session well without adverse effect and is making progress toward goals. Pt was able to carry an item while walking today- an overturned cone with a ball on top- without device and without dropping ball from foam. Pt did fatigue by end of session. Pt does require frequent small steps to recovery balance- for example when turning and when side stepping due to LOB bwd. Greater difficulty stepping on/off foams today due insufficient anterior weight shift, however this was performed at end of session and may be due to fatigue. Pt will continue to benefit from PT to address her R shoulder function, R dropfoot & general mobility following her spine surgery.     Plan:   Re-assessment in 2 visits.   Continue with similar session. Continue working with SPC then without device.   As able:   Eventually river rocks for obstacle courses  Hurdles     Current Problem  1. Difficulty walking        2. Falls frequently        3. Right foot drop        4. Shoulder stiffness, right        5. Unsteadiness on feet          General   Pt arrives at session with her , Quinn, however attends session independently. She reports her R side of her low back to ribs has been more painful as of this morning. Pain has always been in this location, however has been slightly better up until this morning.   Reports she is to follow up with Dr. Franklin for her shoulder CT. Also scheduled her shoulder surgery for 3/11/25.       Subjective   "  Precautions  Precautions  STEADI Fall Risk Score (The score of 4 or more indicates an increased risk of falling): 9  Vital Signs     Pain  Pain Assessment  Pain Assessment: 0-10  0-10 (Numeric) Pain Score: 5 - Moderate pain  Pain Location: Back  Pain Orientation: Right, Lower  Pain Descriptors: Other (Comment) (aggravating)    Objective      Treatments:  Neuromuscular Re-education (19484): 53 minutes  Elastic therapeutic tape applied to pt's R ankle for ankle DF facilitation. \"I\" strip applied with no tension at end anchors, with paper off tension in therapeutic zone. Anchors applied at base of 2-3rd metatarsals & at anterior lateral aspect of shin. Anchors applied with R ankle in DF, followed by therapeutic zone of tape applied in ankle PF.   Instructed pt in precautions/contraindications, and grounds to discontinue use.     Harnessed: (with SPC then without device)   --Walking fwd  --Resisted walking x6 laps  --Multi-directional walking: fwd/bwd, side stepping, turns on command. Then without cane.   --Carrying: pt carried cone with ball walking fwd  --Obstacle course: blue & gray foams: cues to improve fwd weight shift when stepping onto foam.   Comments: pt did sit into harness on 1 occasion due to readiness for rest break. Does report R arm soreness afterward.     NOT TODAY:  Cable column walkouts with SPC and 7.5# x5 reps walking fwd/bwd & x3 reps each direction laterally.     Reviewed Current HEP:  Pulleys flexion/scaption in sitting  Supine AAROM flexion with dowel rogelio   Shoulder extension + scapular retraction with yellow TB   Scapular retraction + GHJ ER with red TB  Shoulder flexion with yellow looped TB for serratus activation  Supine scapular protraction/retraction     From [facility] HEP:   Seated marching  Seated hip ABD with TB   Side stepping seated  Standing squats  Side stepping standing  Standing hip flexion  Standing hip EXT   Standing HS curl  Arm lifts with 1# weight  Comments: does have " adjustable ankle weights up to 5#. Currently using a 1# ankle weight    Access Code: [email or web address]  URL: [email or web address]  Date: 12/17/2024  Prepared by: Lara Hsieh    Exercises  - Shoulder External Rotation and Scapular Retraction with Resistance  - 1-2 x daily - 7 x weekly - 3 sets - 10 reps - 2 seconds hold  - Shoulder Flexion Serratus Activation with Resistance  - 1-3 x daily - 7 x weekly - 2 sets - 10 reps  - Supine Figure 4 Piriformis Stretch  - 1 x daily - 7 x weekly - 1 sets - 3-4 reps - 30 seconds hold  - Supine Lower Trapezius Strengthening  - 1-2 x daily - 7 x weekly - 3 sets - 10 reps - 5-10 seconds hold  - [place]  - 1-2 x daily - 7 x weekly - 2-3 sets - 10 reps    Goals:  By discharge, pt will meet the following revised goals established 12/12/24:     Pt will demo decrease in TUG score by 5 to decrease fall risk.  Pt will demo decrease in BBS score by 9 to decrease fall risk.  Pt will increase R UE AROM for elevation to WFL & within 10 deg of contralateral side.  Pt will ambulate with LRAD- preferably a cane- 350ft+ MOD I.  Pt will increase ROM of B hips to WFL for improved ability to don socks & shoes in sitting.  Pt will increase strength of R ankle DF to 3/5.   Pt will improve score of QuickDASH by 15 points to meet MCID.   Pt will improve score of LEFS by 9 points from initial evaluation to meet MCID.

## 2025-01-14 ENCOUNTER — APPOINTMENT (OUTPATIENT)
Dept: PHYSICAL THERAPY | Facility: CLINIC | Age: 75
End: 2025-01-14
Payer: MEDICARE

## 2025-01-14 DIAGNOSIS — R26.2 DIFFICULTY WALKING: Primary | ICD-10-CM

## 2025-01-14 DIAGNOSIS — R29.6 FALLS FREQUENTLY: ICD-10-CM

## 2025-01-14 DIAGNOSIS — M25.611 SHOULDER STIFFNESS, RIGHT: ICD-10-CM

## 2025-01-14 DIAGNOSIS — R26.81 UNSTEADINESS ON FEET: ICD-10-CM

## 2025-01-14 DIAGNOSIS — M21.371 RIGHT FOOT DROP: ICD-10-CM

## 2025-01-14 PROCEDURE — 97112 NEUROMUSCULAR REEDUCATION: CPT | Mod: GP

## 2025-01-14 ASSESSMENT — PAIN DESCRIPTION - DESCRIPTORS: DESCRIPTORS: OTHER (COMMENT)

## 2025-01-14 ASSESSMENT — PAIN - FUNCTIONAL ASSESSMENT: PAIN_FUNCTIONAL_ASSESSMENT: 0-10

## 2025-01-14 ASSESSMENT — PAIN SCALES - GENERAL: PAINLEVEL_OUTOF10: 5 - MODERATE PAIN

## 2025-01-16 NOTE — PROGRESS NOTES
Physical Therapy    Physical Therapy Treatment    Patient Name: Khalida Barker  MRN: [id number]  Today's Date: 1/17/2025    Time Entry:   Time Calculation  Start Time: 0920  Stop Time: 1002  Time Calculation (min): 42 min     PT Therapeutic Procedures Time Entry  Neuromuscular Re-Education Time Entry: 42                 Visit #31 (9 of 10 new)  Visit #3 in 2025  [employer] BMN PT OT COPAY 30 DED 0 COVERAGE 100 OOP 0 NO AUTH REQ, REF# [id number]     Assessment:   Pt tolerated session well without adverse effect and is making progress toward goals. Pt demo's improved tolerance and balance overall with SPC and to without device while harnessed. Improved AP weight shift control stepping on/off of foams without cane today. Pt will continue to benefit from PT to address her R shoulder function, R dropfoot & general mobility following her spine surgery.     Plan:   Re-assessment next visit.     Continue with similar session. Continue working with SPC then without device.   As able:   --Eventually river rocks for obstacle courses  --Hurdles     Current Problem  1. Difficulty walking        2. Falls frequently        3. Right foot drop        4. Shoulder stiffness, right        5. Unsteadiness on feet          General   Pt arrives at session with her , Quinn, however attends session independently. She reports her R side of her back continues to bother her. However, does not feel her PT visits have negatively impacted this. Received her bone stimulator in the mail, however still has to work with the rep for initial set up. Has not yet heard about AFO yet from orthotist and plans to call.        Subjective    Precautions  Precautions  STEADI Fall Risk Score (The score of 4 or more indicates an increased risk of falling): 9  Vital Signs     Pain  Pain Assessment  Pain Assessment: 0-10  0-10 (Numeric) Pain Score: 5 - Moderate pain  Pain Location: Back  Pain Orientation: Right, Lower  Pain Descriptors:  Other (Comment) (aggravating)    Objective      Treatments:  Neuromuscular Re-education (96475): 42 minutes  Harnessed: (with SPC then without device)   --Walking fwd  --Resisted walking x6 laps  --Multi-directional walking: fwd/bwd, side stepping, turns on command. Then without cane.   --Carrying: pt carried cone with ball walking fwd--NOT TODAY  --Obstacle course: blue & gray foams: cues to improve fwd weight shift when stepping onto foam.   Comments: pt did sit into harness on 1 occasion due to readiness for rest break. Does report R arm soreness afterward.     Cable column walkouts with SPC and 7.5# x5 reps walking fwd/bwd & x3 reps each direction laterally.     Reviewed Current HEP:  Pulleys flexion/scaption in sitting  Supine AAROM flexion with dowel rogelio   Shoulder extension + scapular retraction with yellow TB   Scapular retraction + GHJ ER with red TB  Shoulder flexion with yellow looped TB for serratus activation  Supine scapular protraction/retraction     From HHCPT HEP:   Seated marching  Seated hip ABD with TB   Side stepping seated  Standing squats  Side stepping standing  Standing hip flexion  Standing hip EXT   Standing HS curl  Arm lifts with 1# weight  Comments: does have adjustable ankle weights up to 5#. Currently using a 1# ankle weight    Access Code: [email or web address]  URL: [email or web address]  Date: 12/17/2024  Prepared by: Lara Hsieh    Exercises  - Shoulder External Rotation and Scapular Retraction with Resistance  - 1-2 x daily - 7 x weekly - 3 sets - 10 reps - 2 seconds hold  - Shoulder Flexion Serratus Activation with Resistance  - 1-3 x daily - 7 x weekly - 2 sets - 10 reps  - Supine Figure 4 Piriformis Stretch  - 1 x daily - 7 x weekly - 1 sets - 3-4 reps - 30 seconds hold  - Supine Lower Trapezius Strengthening  - 1-2 x daily - 7 x weekly - 3 sets - 10 reps - 5-10 seconds hold  - [place]  - 1-2 x daily - 7 x weekly - 2-3 sets - 10 reps    Goals:  By discharge, pt  will meet the following revised goals established 12/12/24:     Pt will demo decrease in TUG score by 5 to decrease fall risk.  Pt will demo decrease in BBS score by 9 to decrease fall risk.  Pt will increase R UE AROM for elevation to WFL & within 10 deg of contralateral side.  Pt will ambulate with LRAD- preferably a cane- 350ft+ MOD I.  Pt will increase ROM of B hips to WFL for improved ability to don socks & shoes in sitting.  Pt will increase strength of R ankle DF to 3/5.   Pt will improve score of QuickDASH by 15 points to meet MCID.   Pt will improve score of LEFS by 9 points from initial evaluation to meet MCID.

## 2025-01-17 ENCOUNTER — APPOINTMENT (OUTPATIENT)
Dept: PHYSICAL THERAPY | Facility: CLINIC | Age: 75
End: 2025-01-17
Payer: MEDICARE

## 2025-01-17 DIAGNOSIS — R26.2 DIFFICULTY WALKING: Primary | ICD-10-CM

## 2025-01-17 DIAGNOSIS — R29.6 FALLS FREQUENTLY: ICD-10-CM

## 2025-01-17 DIAGNOSIS — M21.371 RIGHT FOOT DROP: ICD-10-CM

## 2025-01-17 DIAGNOSIS — M25.611 SHOULDER STIFFNESS, RIGHT: ICD-10-CM

## 2025-01-17 DIAGNOSIS — R26.81 UNSTEADINESS ON FEET: ICD-10-CM

## 2025-01-17 PROCEDURE — 97112 NEUROMUSCULAR REEDUCATION: CPT | Mod: GP

## 2025-01-17 ASSESSMENT — PAIN - FUNCTIONAL ASSESSMENT: PAIN_FUNCTIONAL_ASSESSMENT: 0-10

## 2025-01-17 ASSESSMENT — PAIN DESCRIPTION - DESCRIPTORS: DESCRIPTORS: OTHER (COMMENT)

## 2025-01-17 ASSESSMENT — PAIN SCALES - GENERAL: PAINLEVEL_OUTOF10: 5 - MODERATE PAIN

## 2025-01-19 NOTE — PROGRESS NOTES
Physical Therapy    Physical Therapy Progress Note    Patient Name: Khalida Barker  MRN: [id number]  Today's Date: 1/21/2025    Time Entry:   Time Calculation  Start Time: 0930  Stop Time: 1015  Time Calculation (min): 45 min     PT Therapeutic Procedures Time Entry  Therapeutic Exercise Time Entry: 45                 Visit #32 (10 of 10 new) RE-ASSESSMENT   Visit #3 in 2025  [employer] BMN PT OT COPAY 30 DED 0 COVERAGE 100 OOP 0 NO AUTH REQ, REF# [id number]     Assessment:   Pt  has met 2 goals and has made progress on 5 more goals (7 of 8 goals). Since last re-assessment, pt has met with a shoulder surgeon and plans to pursue a R reverse total shoulder replacement. Therefore, interventions to address R shoulder strength and ROM have been limited since last re-assessment. Her R shoulder elevation ROM is still 50 deg actively. Focus has shifted more to balance and gait, as pt prepares to ambulate with a SPC once she has a sling during recovery for shoulder surgery. She has made excellent strides in balance compared to her initial evaluation and prior re-assessments. Her TUG score has improved by 4 seconds with FWW, and is now able to complete the test with SPC as well. Her BBS improved by 18 points compared to initial evaluation, and is now 4 points below cutoff for falls risk assessment. She is now able to ambulate with SPC with largely MIN A, and would be capable of ambulating 350+ feet. Her B hip ROM has improved as she is now has improved ease with donning shoes, however continues to use sock aide. Pt's R ankle DF continues to be 2/5. QuickDASH did improve by 11% compared to last re-assessment despite minimal interventions to address her R shoulder. LEFS score has also improved by 8 points from last re-assessment, and 11 points overall. Pt will continue to benefit from PT, working toward progress/revised goals as below:     Goals:  By discharge, pt will meet the following revised goals  established 12/12/24:     Pt will demo decrease in TUG score by 5 to decrease fall risk.--MOSTLY MET  Pt will demo decrease in BBS score by 9 to decrease fall risk.--GOAL MET  Pt will increase R UE AROM for elevation to WFL & within 10 deg of contralateral side.--NOT MET  Pt will ambulate with LRAD- preferably a cane- 350ft+ MOD I.--PARTIALLY MET  Pt will increase ROM of B hips to WFL for improved ability to don socks & shoes in sitting.--PARTIALLY MET  Pt will increase strength of R ankle DF to 3/5.--PARTIALLY MET  Pt will improve score of QuickDASH by 15 points to meet MCID.--PARTIALLY MET   Pt will improve score of LEFS by 9 points from initial evaluation to meet MCID.--GOAL MET      By discharge, pt will meet the following revised goals established 1/21/25:     Pt will demo decrease in TUG score by 5 to decrease fall risk.  Pt will increase score of BBS by 4 points to meet cutoff for falls risk assessment.   Pt will ambulate with LRAD- preferably a cane- 350ft+ MOD I.  Pt will increase ROM of B hips to WFL for improved ability to don socks & shoes in sitting.  Pt will increase strength of R ankle DF to 3/5.  Pt will improve score of LEFS by 9 more points from this re-assessment (to reach 42 points).   Pt will perform DGI at or above cutoff for falls risk assessment.     Plan:   Continue PT at 2x/week for 5 more weeks, working toward revised goals as above.      Continue with similar session. Continue working with SPC then without device.   As able:   --Eventually river e[place] for obstacle courses  --Hurdles     Current Problem  1. Difficulty walking        2. Falls frequently        3. Right foot drop        4. Shoulder stiffness, right        5. Unsteadiness on feet          General   Pt arrives at session with her , Quinn, however attends session independently. She reports her back continues to bother her. Did take Tramidol this morning. Is meeting with the rep for bone stimulator later today. Has not yet  heard from orthotist regarding AFO for MARIS GALLARDO. Noticing a mild improvement in ease of donning shoes- as she is able to cross ankle over the knee to don. Has been using a sock aide to don socks.        Subjective    Precautions  Precautions  STEADI Fall Risk Score (The score of 4 or more indicates an increased risk of falling): 9  Vital Signs     Pain  Pain Assessment  Pain Assessment: 0-10  0-10 (Numeric) Pain Score: 6  Pain Location: Back  Pain Orientation: Left, Right, Lower  Pain Descriptors: Other (Comment) (aggravating)    Objective   Ball Balance Scale  1. Sitting to Standing: Able to stand without using hands and stabilize independently  2. Standing Unsupported: Able to stand safely for 2 minutes  3. Sitting with Back Unsupported but Feet Supported on Floor or on a Stool: Able to sit safely and securely for 2 minutes  4. Standing to Sitting: Sits safely with minimal use of hands  5.  Transfers: Able to transfer safely definite need of hands  6. Standing Unsupported with Eyes Closed: Able to stand 10 seconds safely  7. Standing Unsupported with Feet Together: Able to place feet together independently and stand 1 minute safely  8. Reach Forward with Outstretched Arm While Standing: Can reach forward 12 cm (5 inches)  9.  Object from Floor from a Standing Position: Able to  slipper but needs supervision  10. Turning to Look Behind Over Left and Right Shoulders While Standing: Turns sideways only but maintains balance  11. Turn 360 Degrees: Able to turn 360 degrees safely but slowly  12. Place Alternate Foot on Step or Stool While Standing Unsupported: Able to complete greater than 2 steps needs minimal assist  13. Standing Unsupported One Foot in Front: Able to take small step independently and hold 30 seconds  14. Standing on One Leg: Tries to lift leg unable to hold 3 seconds but remains standing independently  Ball Balance Score: 41    Other Measures  Activities - Specific Balance Confidence  Scale: 830 (51.875% of self confidence)  Disability of Arm Shoulder Hand (DASH): 35 (54.55% impairment)  Lower Extremity Funtional Score (LEFS): 33    Range of Motion    Right      Forward elevation: 45.       Passive forward elevation: 140 (AAROM in supine).     Left      Active forward elevation: 110.         Hip Musculoskeletal Exam     Strength    Right      Flexion: 4/5.     Left      Flexion: 4/5.       Knee Musculoskeletal Exam     Strength    Right      Extension: 5/5.       Flexion: 5/5.     Left      Extension: 5/5.       Flexion: 5/5.      Foot/Ankle Musculoskeletal Exam     Range of Motion    Right      Right ankle active dorsiflexion: -5 to -10.      Passive dorsiflexion: 7.    Left      Active Dorsiflexion: 5      Passive dorsiflexion: 5-7 deg.     Strength    Right      Tibialis anterior: 2/5.     Left      Tibialis anterior: 4-/5.      Lumbar Neuro Screen:   Dermatomes:  L1,2: WFL B  L3: WFL B  L4: R= WFL; L= diminished  L5: R= diminished; L= WFL  S1: R= WFL; L= diminished  S2: R= WFL; L= diminished    Treatments:  Therapeutic Exercise (06816): 45 minutes  Re-assessment activities, as above.     Current HEP:  Pulleys flexion/scaption in sitting  Supine AAROM flexion with dowel rogelio   Shoulder extension + scapular retraction with yellow TB   Scapular retraction + GHJ ER with red TB  Shoulder flexion with yellow looped TB for serratus activation  Supine scapular protraction/retraction     From HHCPT HEP:   Seated marching  Seated hip ABD with TB   Side stepping seated  Standing squats  Side stepping standing  Standing hip flexion  Standing hip EXT   Standing HS curl  Arm lifts with 1# weight  Comments: does have adjustable ankle weights up to 5#. Currently using a 1# ankle weight    Access Code: [email or web address]  URL: [email or web address]  Date: 12/17/2024  Prepared by: Lara Hsieh    Exercises  - Shoulder External Rotation and Scapular Retraction with Resistance  - 1-2 x daily - 7 x  weekly - 3 sets - 10 reps - 2 seconds hold  - Shoulder Flexion Serratus Activation with Resistance  - 1-3 x daily - 7 x weekly - 2 sets - 10 reps  - Supine Figure 4 Piriformis Stretch  - 1 x daily - 7 x weekly - 1 sets - 3-4 reps - 30 seconds hold  - Supine Lower Trapezius Strengthening  - 1-2 x daily - 7 x weekly - 3 sets - 10 reps - 5-10 seconds hold  - Snow [place]  - 1-2 x daily - 7 x weekly - 2-3 sets - 10 reps

## 2025-01-21 ENCOUNTER — APPOINTMENT (OUTPATIENT)
Dept: PHYSICAL THERAPY | Facility: CLINIC | Age: 75
End: 2025-01-21
Payer: MEDICARE

## 2025-01-21 DIAGNOSIS — R29.6 FALLS FREQUENTLY: ICD-10-CM

## 2025-01-21 DIAGNOSIS — M21.371 RIGHT FOOT DROP: ICD-10-CM

## 2025-01-21 DIAGNOSIS — R26.81 UNSTEADINESS ON FEET: ICD-10-CM

## 2025-01-21 DIAGNOSIS — R26.2 DIFFICULTY WALKING: Primary | ICD-10-CM

## 2025-01-21 DIAGNOSIS — M25.611 SHOULDER STIFFNESS, RIGHT: ICD-10-CM

## 2025-01-21 PROCEDURE — 97110 THERAPEUTIC EXERCISES: CPT | Mod: GP

## 2025-01-21 ASSESSMENT — BALANCE ASSESSMENTS
PICK UP OBJECT FROM THE FLOOR FROM A STANDING POSITION: ABLE TO PICK UP SLIPPER BUT NEEDS SUPERVISION
TRANSFERS: ABLE TO TRANSFER SAFELY DEFINITE NEED OF HANDS
STANDING UNSUPPORTED: ABLE TO STAND SAFELY FOR 2 MINUTES
STANDING TO SITTING: SITS SAFELY WITH MINIMAL USE OF HANDS
STANDING UNSUPPORTED WITH EYES CLOSED: ABLE TO STAND 10 SECONDS SAFELY
STANDING UNSUPPORTED WITH FEET TOGETHER: ABLE TO PLACE FEET TOGETHER INDEPENDENTLY AND STAND 1 MINUTE SAFELY
STANDING TO SITTING: ABLE TO STAND WITHOUT USING HANDS AND STABILIZE INDEPENDENTLY
TURN 360 DEGREES: ABLE TO TURN 360 DEGREES SAFELY BUT SLOWLY
PLACE ALTERNATE FOOT ON STEP OR STOOL WHILE STANDING UNSUPPORTED: ABLE TO COMPLETE GREATER THAN 2 STEPS NEEDS MINIMAL ASSIST
LONG VERSION TOTAL SCORE (MAX 56): 41
REACHING FORWARD WITH OUTSTRETCHED ARM WHILE STANDING: CAN REACH FORWARD 12 CM (5 INCHES)
STANDING UNSUPPORTED ONE FOOT IN FRONT: ABLE TO TAKE SMALL STEP INDEPENDENTLY AND HOLD 30 SECONDS
PLACE ALTERNATE FOOT ON STEP OR STOOL WHILE STANDING UNSUPPORTED: TURNS SIDEWAYS ONLY BUT MAINTAINS BALANCE
STANDING ON ONE LEG: TRIES TO LIFT LEG UNABLE TO HOLD 3 SECONDS BUT REMAINS STANDING INDEPENDENTLY
SITTING WITH BACK UNSUPPORTED BUT FEET SUPPORTED ON FLOOR OR ON A STOOL: ABLE TO SIT SAFELY AND SECURELY FOR 2 MINUTES

## 2025-01-21 ASSESSMENT — PAIN SCALES - GENERAL: PAINLEVEL_OUTOF10: 6

## 2025-01-21 ASSESSMENT — PAIN - FUNCTIONAL ASSESSMENT: PAIN_FUNCTIONAL_ASSESSMENT: 0-10

## 2025-01-21 ASSESSMENT — PAIN DESCRIPTION - DESCRIPTORS: DESCRIPTORS: OTHER (COMMENT)

## 2025-01-23 NOTE — PROGRESS NOTES
"Physical Therapy    Physical Therapy Treatment    Patient Name: Khalida Barker  MRN: [id number]  Today's Date: 1/24/2025    Time Entry:   Time Calculation  Start Time: 0919  Stop Time: 1015  Time Calculation (min): 56 min     PT Therapeutic Procedures Time Entry  Neuromuscular Re-Education Time Entry: 56                 Visit #33 (2 of 10 new)  Visit #7 in 2025  [employer] BMN PT OT COPAY 30 DED 0 COVERAGE 100 OOP 0 NO AUTH REQ, REF# [id number]     Assessment:   Pt tolerated session well without adverse effect and is making progress toward goals. Pt require seated rest break after ~15 min of standing activity today due to fatigue of her trunk. At that point noted pt with shorter step length and was slowing down- however this was also when pt was ambulating without a cane. Pt was able to perform stepping over hurdles today with SPC then without device. Adequately challenged with this. In addition, was able to carry a 4.5\" step when walking without device- which obstructs view of the ground. Adequately challenging. Pt will continue to benefit from PT to address her R shoulder function, R dropfoot & general mobility following her spine surgery.     Plan:   Continue with similar session. Continue working with SPC then without device.   As able:   --Eventually river rocks for obstacle courses  --Hurdles     Current Problem  1. Difficulty walking        2. Falls frequently        3. Right foot drop        4. Shoulder stiffness, right        5. Unsteadiness on feet          General   Pt arrives at session with her sister in law, Kasia, however attends session independently. She reports the rep for the bone stimulator came to her house later in the day after last visit. She was standing and went to turn to show rep her back- was holding onto the walker lightly at the time. Lost her balance and fell rightward onto her R side. The rep assisted her up to sit onto couch. Reports her back pain is worse as a " "result, now at a 7/10 on her R side. Unsure if she rolled her ankle- does not seem to have more swelling than usual. In addition, her  is to have a cardiac catheterization and will be in the hospital for a bit.   Transitions in bed are painful, but once she is supine in bed she is OK. Does feel better with compression on her R side. Heat minimally beneficial- nothing long lasting.   Is to see her spine surgeon on 1/27/25.        Subjective    Precautions  Precautions  STEADI Fall Risk Score (The score of 4 or more indicates an increased risk of falling): 9  Vital Signs     Pain  Pain Assessment  Pain Assessment: 0-10  0-10 (Numeric) Pain Score: 7  Pain Location: Back  Pain Orientation: Right, Lower  Pain Descriptors: Other (Comment) (aggravating)    Objective      Treatments:  Neuromuscular Re-education (43834): 56 minutes  Elastic therapeutic tape applied to pt's R ankle for ankle DF facilitation. \"I\" strip applied with no tension at end anchors, with paper off tension in therapeutic zone. Anchors applied at base of 2-3rd metatarsals & at anterior lateral aspect of shin. Anchors applied with R ankle in DF, followed by therapeutic zone of tape applied in ankle PF.   Instructed pt in precautions/contraindications, and grounds to discontinue use.     Harnessed: (with SPC then without device)   --Walking fwd  --Resisted walking x6 laps--NOT TODAY  --Multi-directional walking: fwd/bwd, side stepping, turns on command. Then without cane.   --Carrying: pt carried overturned 4.5\" step  --Stepping over: hurdles in non-reciprocating pattern  --Obstacle course: blue & gray foams: cues to improve fwd weight shift when stepping onto foam.   Comments: requires 1 seated rest break during session due to trunk fatigue    NOT TODAY:  Cable column walkouts with SPC and 7.5# x5 reps walking fwd/bwd & x3 reps each direction laterally.     Reviewed Current HEP:  Pulleys flexion/scaption in sitting  Supine AAROM flexion with dowel " rogelio   Shoulder extension + scapular retraction with yellow TB   Scapular retraction + GHJ ER with red TB  Shoulder flexion with yellow looped TB for serratus activation  Supine scapular protraction/retraction     From HHCPT HEP:   Seated marching  Seated hip ABD with TB   Side stepping seated  Standing squats  Side stepping standing  Standing hip flexion  Standing hip EXT   Standing HS curl  Arm lifts with 1# weight  Comments: does have adjustable ankle weights up to 5#. Currently using a 1# ankle weight    Access Code: [email or web address]  URL: [email or web address]  Date: 12/17/2024  Prepared by: Lara Hsieh    Exercises  - Shoulder External Rotation and Scapular Retraction with Resistance  - 1-2 x daily - 7 x weekly - 3 sets - 10 reps - 2 seconds hold  - Shoulder Flexion Serratus Activation with Resistance  - 1-3 x daily - 7 x weekly - 2 sets - 10 reps  - Supine Figure 4 Piriformis Stretch  - 1 x daily - 7 x weekly - 1 sets - 3-4 reps - 30 seconds hold  - Supine Lower Trapezius Strengthening  - 1-2 x daily - 7 x weekly - 3 sets - 10 reps - 5-10 seconds hold  - Snow [place]  - 1-2 x daily - 7 x weekly - 2-3 sets - 10 reps    Goals:  By discharge, pt will meet the following revised goals established 1/21/25:      Pt will demo decrease in TUG score by 5 to decrease fall risk.  Pt will increase score of BBS by 4 points to meet cutoff for falls risk assessment.   Pt will ambulate with LRAD- preferably a cane- 350ft+ MOD I.  Pt will increase ROM of B hips to WFL for improved ability to don socks & shoes in sitting.  Pt will increase strength of R ankle DF to 3/5.  Pt will improve score of LEFS by 9 more points from this re-assessment (to reach 42 points).   Pt will perform DGI at or above cutoff for falls risk assessment.

## 2025-01-24 ENCOUNTER — APPOINTMENT (OUTPATIENT)
Dept: PHYSICAL THERAPY | Facility: CLINIC | Age: 75
End: 2025-01-24
Payer: MEDICARE

## 2025-01-24 DIAGNOSIS — R29.6 FALLS FREQUENTLY: ICD-10-CM

## 2025-01-24 DIAGNOSIS — M25.611 SHOULDER STIFFNESS, RIGHT: ICD-10-CM

## 2025-01-24 DIAGNOSIS — M21.371 RIGHT FOOT DROP: ICD-10-CM

## 2025-01-24 DIAGNOSIS — R26.2 DIFFICULTY WALKING: Primary | ICD-10-CM

## 2025-01-24 DIAGNOSIS — R26.81 UNSTEADINESS ON FEET: ICD-10-CM

## 2025-01-24 PROCEDURE — 97112 NEUROMUSCULAR REEDUCATION: CPT | Mod: GP

## 2025-01-24 ASSESSMENT — PAIN DESCRIPTION - DESCRIPTORS: DESCRIPTORS: OTHER (COMMENT)

## 2025-01-24 ASSESSMENT — PAIN SCALES - GENERAL: PAINLEVEL_OUTOF10: 7

## 2025-01-24 ASSESSMENT — PAIN - FUNCTIONAL ASSESSMENT: PAIN_FUNCTIONAL_ASSESSMENT: 0-10

## 2025-01-27 ENCOUNTER — OFFICE VISIT (OUTPATIENT)
Facility: CLINIC | Age: 75
End: 2025-01-27
Payer: MEDICARE

## 2025-01-27 VITALS
SYSTOLIC BLOOD PRESSURE: 128 MMHG | HEIGHT: 64 IN | HEART RATE: 91 BPM | WEIGHT: 150 LBS | DIASTOLIC BLOOD PRESSURE: 72 MMHG | TEMPERATURE: 97.7 F | BODY MASS INDEX: 25.61 KG/M2

## 2025-01-27 DIAGNOSIS — M54.16 LUMBAR RADICULOPATHY: Primary | ICD-10-CM

## 2025-01-27 PROCEDURE — 1036F TOBACCO NON-USER: CPT | Performed by: STUDENT IN AN ORGANIZED HEALTH CARE EDUCATION/TRAINING PROGRAM

## 2025-01-27 PROCEDURE — 1125F AMNT PAIN NOTED PAIN PRSNT: CPT | Performed by: STUDENT IN AN ORGANIZED HEALTH CARE EDUCATION/TRAINING PROGRAM

## 2025-01-27 PROCEDURE — 99214 OFFICE O/P EST MOD 30 MIN: CPT | Performed by: STUDENT IN AN ORGANIZED HEALTH CARE EDUCATION/TRAINING PROGRAM

## 2025-01-27 PROCEDURE — 1123F ACP DISCUSS/DSCN MKR DOCD: CPT | Performed by: STUDENT IN AN ORGANIZED HEALTH CARE EDUCATION/TRAINING PROGRAM

## 2025-01-27 PROCEDURE — 3008F BODY MASS INDEX DOCD: CPT | Performed by: STUDENT IN AN ORGANIZED HEALTH CARE EDUCATION/TRAINING PROGRAM

## 2025-01-27 PROCEDURE — 1159F MED LIST DOCD IN RCRD: CPT | Performed by: STUDENT IN AN ORGANIZED HEALTH CARE EDUCATION/TRAINING PROGRAM

## 2025-01-27 RX ORDER — ACETAMINOPHEN 325 MG/1
TABLET ORAL
COMMUNITY

## 2025-01-27 RX ORDER — AMOXICILLIN 250 MG
CAPSULE ORAL
COMMUNITY
End: 2025-01-27 | Stop reason: ENTERED-IN-ERROR

## 2025-01-27 ASSESSMENT — PAIN SCALES - GENERAL: PAINLEVEL_OUTOF10: 7

## 2025-01-27 NOTE — PROGRESS NOTES
[facility] [place]  Department of Neurological Surgery  Established Patient Visit    History of Present Illness:  Khalida Barker is a 74 y.o. year old female who presents to the spine clinic in follow up with low back pain and T9 compression fracture. She is 8-9 months s/p T11-12 decompression and L2-S1 fusion on 5/14/2024. History of lumbar deformity at T10-pelvis. X-rays and CT scan showed a T9 compression fracture directly above her surgery. The fracture radiates into the sides of the ribs causing pain. At her last visit on 12/20/24, she stated this has been not getting better. She reports that she takes tramadol only when she really needs it. She has been wearing a back brace and performing PT for the last 4-6 weeks.    Today, she is here in follow up to discuss AFO brace/bone stimulator and CT scans.     Patient's BMI is Body mass index is 25.75 kg/m².    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    Muscle weakness (generalized)    Positive colorectal cancer screening using Cologuard test    Primary osteoarthritis of right knee    Seborrheic keratoses    Tinnitus    Vitamin D deficiency    Left sided abdominal pain    BMI 30.0-30.9,adult    Cervicalgia    Cervical paraspinal muscle spasm    Non-smoker    Right knee pain    Knee stiffness, right    Chronic left shoulder pain    Neck pain    Thoracogenic scoliosis of thoracolumbar region    Scoliosis deformity of spine    Anxiety    C6 radiculopathy    Carpal tunnel syndrome    Low back pain, unspecified    Closed fracture of distal end of right radius    Weakness    Closed fracture of distal end of right radius, unspecified fracture morphology,  initial encounter    Acute blood loss as cause of postoperative anemia    Dysphagia    Dehydration    Ankylosing spondylitis of multiple sites in spine (Multi)    Dysphagia, unspecified    Difficulty walking    Right foot drop    Shoulder stiffness, right    Unsteadiness on feet    Falls frequently     Past Medical History:   Diagnosis Date    Ankylosis of sacroiliac joint 06/04/2024    Anxiety     Arthritis     Cervicalgia     Colon polyp     CTS (carpal tunnel syndrome)     Diverticulitis 06/27/2023    Hallux valgus, acquired 01/19/2013    Hiatal hernia     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     OA (osteoarthritis) of knee     Painful scar 09/20/2017    Peripheral neuropathy     Scoliosis     Skin cancer     Spinal stenosis     Traumatic ecchymosis of foot 01/19/2013    Trigger finger of right hand     Vision loss     wears glasses    Vitamin D deficiency      Past Surgical History:   Procedure Laterality Date    ADENOIDECTOMY  1956    BACK SURGERY  05/14/2024    spine up to T10    BREAST BIOPSY  1999    COLONOSCOPY      KNEE ARTHROPLASTY Right     MOUTH SURGERY  2016    Back left molar removed    MOUTH SURGERY  09/28/2021    right molar    OTHER SURGICAL HISTORY      Skin lesion excision    SPINAL FUSION  06/2010    C4-5    TONSILLECTOMY  1956    WISDOM TOOTH EXTRACTION  1978    lower    WRIST SURGERY Right 06/2024    WRIST SURGERY Right 12/03/2024    Hardware removal and tenolysis right wrist     Social History     Tobacco Use    Smoking status: Never    Smokeless tobacco: Never   Substance Use Topics    Alcohol use: Not Currently     Comment: rarely     family history includes Alzheimer's disease in her mother; Heart disease in her father; Hypertension in her brother; Polymyositis in her father; Skin cancer in her brother; peripheral artery disease in her brother.    Current Outpatient Medications:     acetaminophen (Tylenol) 325 mg tablet, Take by mouth., Disp: , Rfl:      aspirin-acetaminophen-caffeine (Excedrin Migraine) 250-250-65 mg tablet, Take 1 tablet by mouth every 6 hours if needed for headaches., Disp: , Rfl:     BIOTIN ORAL, Take 1 tablet by mouth once daily., Disp: , Rfl:     cholecalciferol (Vitamin D3) 50 mcg (2,000 unit) capsule, Take 1 capsule (50 mcg) by mouth once daily., Disp: , Rfl:     docusate sodium (Colace) 50 mg/5 mL oral liquid, Take 10 mL (100 mg) by mouth once daily., Disp: , Rfl:     fluticasone (Flonase) 50 mcg/actuation nasal spray, Administer 1 spray into each nostril once daily. Shake gently. Before first use, prime pump. After use, clean tip and replace cap., Disp: , Rfl:     hydrocortisone (Anusol-HC) 25 mg suppository, Insert 1 suppository (25 mg) into the rectum once daily., Disp: 10 suppository, Rfl: 1    multivitamin (One Daily Multivitamin) tablet, Take 1 tablet by mouth once daily., Disp: , Rfl:     psyllium (Metamucil) powder, 1 Dose (5.8 g) once daily., Disp: , Rfl:     traMADol (Ultram) 50 mg tablet, Take 1 tablet (50 mg) by mouth every 6 hours if needed for severe pain (7 - 10)., Disp: 28 tablet, Rfl: 0    hyoscyamine (Anaspaz) 0.125 mg disintegrating tablet, Take 1 tablet (0.125 mg) by mouth 3 times a day. (Patient not taking: Reported on 12/23/2024), Disp: 90 tablet, Rfl: 0  Allergies   Allergen Reactions    Oxycodone Itching     Previously tolerated Percocet        Physical Examination:    General: Well developed, awake/alert/oriented x3, no distress, alert and cooperative  Skin: Warm and dry, no lesions, no rashes  ENMT: Mucous membranes moist, no apparent injury, no lesions seen  Head/Neck: Neck Supple, no apparent injury  Respiratory/Thorax: Normal breath sounds with good chest expansion, thorax symmetric  Cardiovascular: No pitting edema, no JVD     Motor Strength: 5/5 Throughout all extremities     Muscle Bulk: Normal and symmetric in all extremities     Posture:   -- Cervical: Normal  -- Thoracic: Normal  -- Lumbar :  Normal  Paraspinal muscle spasm/tenderness back pain on right side intermittently up side of back.      Sensation: intact to light touch     Partial drop foot of the right foot.    Results:  I personally reviewed and interpreted the imaging results which included CT T/L spine showing healed fracture across the T9-10 disc space and auto fusion across T9-10. CT L spine showing hardware in good position and signs of bony fusion that still need to occur across L1-2, T12-L1, L4-5 disc spaces.     Assessment and Plan:      Khalida Barker is a 74 y.o. year old female who presents to the spine clinic in follow up with right foot drop limiting her activities of daily living and causing recurrent falls. Today I am ordering her a AFO metal brace to attach to the bottom of her foot and shin as well. This is prescribed to be worn when she is ambulatory. It will give her ankle support and prevent falls. She is to wear this when walking for distance. Will be needed indefinitely moving forward for her right foot drop. I counseled her that she needs home PT orders today. Her  is having triple bypass surgery so we will transition PT to her home because she is still unable to drive. I asked her if she needs any refills on medication today. She will continue using her bone stimulator.       I have reviewed all prior documentation and reviewed the electronic medical record since admission. I have personally have reviewed all advanced imaging not just the reports and used my interpretation as documented as the relevant findings. I have reviewed the risks and benefits of all treatment recommendations listed in this note with the patient and family.       The above clinical summary has been dictated with voice recognition software. It has not been proofread for grammatical errors, typographical mistakes, or other semantic inconsistencies.    Thank you for visiting our office today. It was our pleasure to take part in your healthcare.      Do not hesitate to call with any questions regarding your plan of care after leaving at [phone] M-F 8am-4pm.     To clinicians, thank you very much for this kind referral. It is a privilege to partner with you in the care of your patients. My office would be delighted to assist you with any further consultations or with questions regarding the plan of care outlined. Do not hesitate to call the office or contact me directly.       Sincerely,      Justin Lu MD, [facility]  Spine , [facility]  [place] Art Chair in Spinal Neurosurgery  Complex Spine Surgery Fellowship Director   of Neurological Surgery  [employer]  Phone: [phone]  Fax: [phone]        Scribe Attestation  By signing my name below, HARI Maliha Aliza , Colette   attest that this documentation has been prepared under the direction and in the presence of Justin Lu MD.

## 2025-01-28 ENCOUNTER — APPOINTMENT (OUTPATIENT)
Dept: PHYSICAL THERAPY | Facility: CLINIC | Age: 75
End: 2025-01-28
Payer: MEDICARE

## 2025-01-28 DIAGNOSIS — R26.81 UNSTEADINESS ON FEET: ICD-10-CM

## 2025-01-28 DIAGNOSIS — R26.2 DIFFICULTY WALKING: Primary | ICD-10-CM

## 2025-01-28 DIAGNOSIS — M21.371 RIGHT FOOT DROP: ICD-10-CM

## 2025-01-28 DIAGNOSIS — M25.611 SHOULDER STIFFNESS, RIGHT: ICD-10-CM

## 2025-01-28 DIAGNOSIS — R29.6 FALLS FREQUENTLY: ICD-10-CM

## 2025-01-29 ENCOUNTER — TREATMENT (OUTPATIENT)
Dept: PHYSICAL THERAPY | Facility: CLINIC | Age: 75
End: 2025-01-29
Payer: MEDICARE

## 2025-01-29 DIAGNOSIS — M25.611 SHOULDER STIFFNESS, RIGHT: ICD-10-CM

## 2025-01-29 DIAGNOSIS — R26.81 UNSTEADINESS ON FEET: ICD-10-CM

## 2025-01-29 DIAGNOSIS — M21.371 RIGHT FOOT DROP: ICD-10-CM

## 2025-01-29 DIAGNOSIS — R26.2 DIFFICULTY WALKING: Primary | ICD-10-CM

## 2025-01-29 DIAGNOSIS — R29.6 FALLS FREQUENTLY: ICD-10-CM

## 2025-01-29 PROCEDURE — 97112 NEUROMUSCULAR REEDUCATION: CPT | Mod: GP,CQ

## 2025-01-29 NOTE — PROGRESS NOTES
"Physical Therapy    Physical Therapy Treatment    Patient Name: Khalida Barker  MRN: [id number]  Today's Date: 1/29/2025    Time Entry:   Time Calculation  Start Time: 0945  Stop Time: 1030  Time Calculation (min): 45 min     PT Therapeutic Procedures Time Entry  Neuromuscular Re-Education Time Entry: 45                 Visit #34 (3 of 10 new)  Visit #7 in 2025  [employer] BMN PT OT COPAY 30 DED 0 COVERAGE 100 OOP 0 NO AUTH REQ, REF# [id number]     Assessment:   Pt tolerated session well without adverse effect and is making progress toward goals.  Pt. With higher pain levels today but she really wanted to work on her balance and walking.  Her pain levels did not increase or decrease during session.  Having pressure from the vest on her back was helpful. Pt will continue to benefit from PT to address her R shoulder function, R dropfoot & general mobility following her spine surgery.     Plan:   Continue with similar session. Continue working with SPC then without device.   As able:   --Eventually river rocks for obstacle courses  --Hurdles     Current Problem  1. Difficulty walking        2. Falls frequently        3. Right foot drop        4. Shoulder stiffness, right        5. Unsteadiness on feet          General   Pt reports increased back pain today and rates it 8/10, she did not wake up with this pain, it just started.   Pt. Agreeable to try balance drills in the harness this session.      Subjective    Precautions   Precautions  STEADI Fall Risk Score (The score of 4 or more indicates an increased risk of falling): 9  Pain   8/10 in right thoracic region.    Objective      Treatments:  Neuromuscular Re-education (02335): 45 minutes    Harnessed: (with SPC then without device)   --Walking fwd  --Resisted walking x6 laps  --Multi-directional walking: fwd/bwd, side stepping, turns on command. Then without cane.   --Carrying: pt carried overturned 4.5\" step NOT TODAY  --Stepping over: hurdles " in non-reciprocating pattern NOT TODAY  --stepping on and off one foam pad using the SPC multiple trials  --standing with NBOS and mod tandem stand both with eyes open and instructed she can try this at home at sink.  NOT TODAY:  Cable column walkouts with SPC and 7.5# x5 reps walking fwd/bwd & x3 reps each direction laterally.     Reviewed Current HEP:  Pulleys flexion/scaption in sitting  Supine AAROM flexion with dowel rogelio   Shoulder extension + scapular retraction with yellow TB   Scapular retraction + GHJ ER with red TB  Shoulder flexion with yellow looped TB for serratus activation  Supine scapular protraction/retraction     From HHCPT HEP:   Seated marching  Seated hip ABD with TB   Side stepping seated  Standing squats  Side stepping standing  Standing hip flexion  Standing hip EXT   Standing HS curl  Arm lifts with 1# weight  Comments: does have adjustable ankle weights up to 5#. Currently using a 1# ankle weight    Access Code: [email or web address]  URL: [email or web address]  Date: 12/17/2024  Prepared by: Lara Hsieh    Exercises  - Shoulder External Rotation and Scapular Retraction with Resistance  - 1-2 x daily - 7 x weekly - 3 sets - 10 reps - 2 seconds hold  - Shoulder Flexion Serratus Activation with Resistance  - 1-3 x daily - 7 x weekly - 2 sets - 10 reps  - Supine Figure 4 Piriformis Stretch  - 1 x daily - 7 x weekly - 1 sets - 3-4 reps - 30 seconds hold  - Supine Lower Trapezius Strengthening  - 1-2 x daily - 7 x weekly - 3 sets - 10 reps - 5-10 seconds hold  - Snow [place]  - 1-2 x daily - 7 x weekly - 2-3 sets - 10 reps    Goals:  By discharge, pt will meet the following revised goals established 1/21/25:      Pt will demo decrease in TUG score by 5 to decrease fall risk.  Pt will increase score of BBS by 4 points to meet cutoff for falls risk assessment.   Pt will ambulate with LRAD- preferably a cane- 350ft+ MOD I.  Pt will increase ROM of B hips to WFL for improved ability to don  socks & shoes in sitting.  Pt will increase strength of R ankle DF to 3/5.  Pt will improve score of LEFS by 9 more points from this re-assessment (to reach 42 points).   Pt will perform DGI at or above cutoff for falls risk assessment.

## 2025-01-30 NOTE — PROGRESS NOTES
Physical Therapy    Physical Therapy Treatment    Patient Name: Khalida Barker  MRN: [id number]  Today's Date: 1/31/2025    Time Entry:   Time Calculation  Start Time: 0919  Stop Time: 1000  Time Calculation (min): 41 min     PT Therapeutic Procedures Time Entry  Neuromuscular Re-Education Time Entry: 41                 Visit #35 (4 of 10 new)  Visit #9 in 2025  [employer] BMN PT OT COPAY 30 DED 0 COVERAGE 100 OOP 0 NO AUTH REQ, REF# [id number]     Assessment:   Pt tolerated session well without adverse effect and is making progress toward goals. Per subjective, pt's trunk pain improved compared to last visit. Plans try her spine brace again as the compression feels helpful as well as managing with her oral pain medication. Pt tolerates faster pacing of balance work today without major LOB. Challenged appropriately with hurdles with R LE in stance. Pt will continue to benefit from PT to address her R shoulder function, R dropfoot & general mobility following her spine surgery.     Plan:   Continue with similar session. Continue working with SPC then without device.   As able:   --Eventually [email or web address] for obstacle courses  --Hurdles     Current Problem  1. Difficulty walking        2. Falls frequently        3. Right foot drop        4. Shoulder stiffness, right        5. Unsteadiness on feet          General   Pt arrives at session with her sister in law's daughter in law, Yoselin, however attends session independently. Her  is to have a CABG x3, and he plans to time his surgery around when she would be ~6 weeks post-op from shoulder surgery.   Reports her trunk pain from last visit has improved. Ice has been better than heat and did take a pain pill prior to PT today.   Saw spine surgeon since last visit, and was recommended HHCPT to address balance & shoulder in post-op once her  has his surgery.   Concerned about transportation as she would likely need to take an uber or city  "transport and was told that no one will get out of the car to help with transporting FWW down her stairs. Plans to look at her insurance to see if she qualifies for any medical transport coverage.   Would like to be able to transition to a cane once she has her AFO- which she may be getting next week.        Subjective    Precautions  Precautions  STEADI Fall Risk Score (The score of 4 or more indicates an increased risk of falling): 9  Vital Signs     Pain  Pain Assessment  Pain Assessment: 0-10  0-10 (Numeric) Pain Score: 5 - Moderate pain  Pain Location: Back  Pain Orientation: Right  Pain Descriptors: Other (Comment) (aggravating)    Objective      Treatments:  Neuromuscular Re-education (05591): 41 minutes  Elastic therapeutic tape applied to pt's R ankle for ankle DF facilitation. \"I\" strip applied with no tension at end anchors, with paper off tension in therapeutic zone. Anchors applied at base of 2-3rd metatarsals & at anterior lateral aspect of shin. Anchors applied with R ankle in DF, followed by therapeutic zone of tape applied in ankle PF.   Instructed pt in precautions/contraindications, and grounds to discontinue use.     Harnessed: (with SPC then without device)   --Walking fwd  --Resisted walking x6 laps--NOT TODAY  --Multi-directional walking: fwd/bwd, side stepping, turns on command. Then without cane.   --Carrying: pt carried deflated ball on foam pad  --Stepping over: hurdles in non-reciprocating pattern with cane then without device  --Obstacle course: blue & gray foams: cues to improve fwd weight shift when stepping onto foam.--NOT TODAY  Comments: requires 1 seated rest break during session due to trunk fatigue    NOT TODAY:  Cable column walkouts with SPC and 7.5# x5 reps walking fwd/bwd & x3 reps each direction laterally.     Reviewed Current HEP:  Pulleys flexion/scaption in sitting  Supine AAROM flexion with dowel rogelio   Shoulder extension + scapular retraction with yellow TB   Scapular " retraction + GHJ ER with red TB  Shoulder flexion with yellow looped TB for serratus activation  Supine scapular protraction/retraction     From [facility]CPT HEP:   Seated marching  Seated hip ABD with TB   Side stepping seated  Standing squats  Side stepping standing  Standing hip flexion  Standing hip EXT   Standing HS curl  Arm lifts with 1# weight  Comments: does have adjustable ankle weights up to 5#. Currently using a 1# ankle weight    Access Code: [email or web address]  URL: [email or web address]  Date: 12/17/2024  Prepared by: Lara Hsieh    Exercises  - Shoulder External Rotation and Scapular Retraction with Resistance  - 1-2 x daily - 7 x weekly - 3 sets - 10 reps - 2 seconds hold  - Shoulder Flexion Serratus Activation with Resistance  - 1-3 x daily - 7 x weekly - 2 sets - 10 reps  - Supine Figure 4 Piriformis Stretch  - 1 x daily - 7 x weekly - 1 sets - 3-4 reps - 30 seconds hold  - Supine Lower Trapezius Strengthening  - 1-2 x daily - 7 x weekly - 3 sets - 10 reps - 5-10 seconds hold  - Snow [place]  - 1-2 x daily - 7 x weekly - 2-3 sets - 10 reps    Goals:  By discharge, pt will meet the following revised goals established 1/21/25:      Pt will demo decrease in TUG score by 5 to decrease fall risk.  Pt will increase score of BBS by 4 points to meet cutoff for falls risk assessment.   Pt will ambulate with LRAD- preferably a cane- 350ft+ MOD I.  Pt will increase ROM of B hips to WFL for improved ability to don socks & shoes in sitting.  Pt will increase strength of R ankle DF to 3/5.  Pt will improve score of LEFS by 9 more points from this re-assessment (to reach 42 points).   Pt will perform DGI at or above cutoff for falls risk assessment.

## 2025-01-31 ENCOUNTER — APPOINTMENT (OUTPATIENT)
Dept: PHYSICAL THERAPY | Facility: CLINIC | Age: 75
End: 2025-01-31
Payer: MEDICARE

## 2025-01-31 DIAGNOSIS — R26.2 DIFFICULTY WALKING: Primary | ICD-10-CM

## 2025-01-31 DIAGNOSIS — M25.611 SHOULDER STIFFNESS, RIGHT: ICD-10-CM

## 2025-01-31 DIAGNOSIS — R26.81 UNSTEADINESS ON FEET: ICD-10-CM

## 2025-01-31 DIAGNOSIS — R29.6 FALLS FREQUENTLY: ICD-10-CM

## 2025-01-31 DIAGNOSIS — M21.371 RIGHT FOOT DROP: ICD-10-CM

## 2025-01-31 PROCEDURE — 97112 NEUROMUSCULAR REEDUCATION: CPT | Mod: GP

## 2025-01-31 ASSESSMENT — PAIN DESCRIPTION - DESCRIPTORS: DESCRIPTORS: OTHER (COMMENT)

## 2025-01-31 ASSESSMENT — PAIN SCALES - GENERAL: PAINLEVEL_OUTOF10: 5 - MODERATE PAIN

## 2025-01-31 ASSESSMENT — PAIN - FUNCTIONAL ASSESSMENT: PAIN_FUNCTIONAL_ASSESSMENT: 0-10

## 2025-02-02 NOTE — PROGRESS NOTES
Physical Therapy    Physical Therapy Treatment    Patient Name: Khalida Barker  MRN: [id number]  Today's Date: 2/4/2025    Time Entry:   Time Calculation  Start Time: 1149  Stop Time: 1230  Time Calculation (min): 41 min     PT Therapeutic Procedures Time Entry  Neuromuscular Re-Education Time Entry: 41                 Visit #36 (5 of 10 new)  Visit #10 in 2025  [employer] BMN PT OT COPAY 30 DED 0 COVERAGE 100 OOP 0 NO AUTH REQ, REF# [id number]     Assessment:   Pt tolerated session well without adverse effect and is making progress toward goals. Per subjective, pt reports fatigue at start of session, however did not request any seated rest breaks, only periodic standing rest breaks with hand support. Challenged with stepping onto compliant surfaces with pause for EC. Working on AP weight shift control on level & compliant surfaces. Pt will continue to benefit from PT to address her R shoulder function, R dropfoot & general mobility following her spine surgery.     Plan:   Continue with similar session. Continue working with SPC then without device.   As able:   --Eventually [employer] for obstacle courses  --Hurdles     Current Problem  1. Difficulty walking        2. Falls frequently        3. Right foot drop        4. Shoulder stiffness, right        5. Unsteadiness on feet          General   Pt arrives at session with her , Quinn, however attends session independently. Is fatigued today. Has been using ice to help with her back pain still- which helps however she does not enjoy it. Sinuses are draining today.   Feels the stress of everything medically between herself and her  is getting to them. Reports she did schedule for PT into 3/2025 and held further scheduling until determining shoulder surgery date with surgeon, which she is to see in ~2 weeks.   Did take a pain pill this morning. When seated at rest she is without pain, however reports 5/10 pain with palpation. May see  her spine surgeon again in 4/2025.        Subjective    Precautions  Precautions  STEADI Fall Risk Score (The score of 4 or more indicates an increased risk of falling): 9  Vital Signs     Pain  Pain Assessment  Pain Assessment: 0-10  0-10 (Numeric) Pain Score: 0 - No pain (when seated at rest)    Objective      Treatments:  Neuromuscular Re-education (80603): 41 minutes  Harnessed: (with SPC then without device)   --Multi-directional walking: fwd/bwd, side stepping, turns on command.   --Obstacle course: blue & gray foams: cues to improve fwd weight shift when stepping onto foam. Also performed standing pause on compliant surfaces with EC ~10 sec each. Added river rocks, and pt had difficulty due to her feet sliding in her slip on boots  --Carrying: pt carried deflated ball on foam pad--NOT TODAY  --Stepping over: hurdles in non-reciprocating pattern with cane then without device--NOT TODAY  Comments: Periodic standing rest breaks throughout session. No seated rest breaks taken today.    NOT TODAY:   Cable column walkouts with SPC and 7.5# x5 reps walking fwd/bwd & x3 reps each direction laterally.     Reviewed Current HEP:  Pulleys flexion/scaption in sitting  Supine AAROM flexion with dowel rogelio   Shoulder extension + scapular retraction with yellow TB   Scapular retraction + GHJ ER with red TB  Shoulder flexion with yellow looped TB for serratus activation  Supine scapular protraction/retraction     From [facility]T HEP:   Seated marching  Seated hip ABD with TB   Side stepping seated  Standing squats  Side stepping standing  Standing hip flexion  Standing hip EXT   Standing HS curl  Arm lifts with 1# weight  Comments: does have adjustable ankle weights up to 5#. Currently using a 1# ankle weight    Access Code: [email or web address]  URL: [email or web address]  Date: 12/17/2024  Prepared by: Lara Hsieh    Exercises  - Shoulder External Rotation and Scapular Retraction with Resistance  - 1-2 x daily - 7 x  weekly - 3 sets - 10 reps - 2 seconds hold  - Shoulder Flexion Serratus Activation with Resistance  - 1-3 x daily - 7 x weekly - 2 sets - 10 reps  - Supine Figure 4 Piriformis Stretch  - 1 x daily - 7 x weekly - 1 sets - 3-4 reps - 30 seconds hold  - Supine Lower Trapezius Strengthening  - 1-2 x daily - 7 x weekly - 3 sets - 10 reps - 5-10 seconds hold  - Snow [place]  - 1-2 x daily - 7 x weekly - 2-3 sets - 10 reps    Goals:  By discharge, pt will meet the following revised goals established 1/21/25:      Pt will demo decrease in TUG score by 5 to decrease fall risk.  Pt will increase score of BBS by 4 points to meet cutoff for falls risk assessment.   Pt will ambulate with LRAD- preferably a cane- 350ft+ MOD I.  Pt will increase ROM of B hips to WFL for improved ability to don socks & shoes in sitting.  Pt will increase strength of R ankle DF to 3/5.  Pt will improve score of LEFS by 9 more points from this re-assessment (to reach 42 points).   Pt will perform DGI at or above cutoff for falls risk assessment.

## 2025-02-04 ENCOUNTER — APPOINTMENT (OUTPATIENT)
Dept: PHYSICAL THERAPY | Facility: CLINIC | Age: 75
End: 2025-02-04
Payer: MEDICARE

## 2025-02-04 DIAGNOSIS — M25.611 SHOULDER STIFFNESS, RIGHT: ICD-10-CM

## 2025-02-04 DIAGNOSIS — R29.6 FALLS FREQUENTLY: ICD-10-CM

## 2025-02-04 DIAGNOSIS — M21.371 RIGHT FOOT DROP: ICD-10-CM

## 2025-02-04 DIAGNOSIS — R26.2 DIFFICULTY WALKING: Primary | ICD-10-CM

## 2025-02-04 DIAGNOSIS — R26.81 UNSTEADINESS ON FEET: ICD-10-CM

## 2025-02-04 PROCEDURE — 97112 NEUROMUSCULAR REEDUCATION: CPT | Mod: GP

## 2025-02-04 ASSESSMENT — PAIN - FUNCTIONAL ASSESSMENT: PAIN_FUNCTIONAL_ASSESSMENT: 0-10

## 2025-02-04 ASSESSMENT — PAIN SCALES - GENERAL: PAINLEVEL_OUTOF10: 0 - NO PAIN

## 2025-02-06 NOTE — PROGRESS NOTES
"Physical Therapy    Physical Therapy Treatment    Patient Name: Khalida Barker  MRN: [id number]  Today's Date: 2/7/2025    Time Entry:   Time Calculation  Start Time: 0920  Stop Time: 1000  Time Calculation (min): 40 min     PT Therapeutic Procedures Time Entry  Neuromuscular Re-Education Time Entry: 40                 Visit #37 (6 of 10 new)  Visit #11 in 2025  [employer] BMN PT OT COPAY 30 DED 0 COVERAGE 100 OOP 0 NO AUTH REQ, REF# [id number]/ALL     Assessment:   Pt tolerated session well without adverse effect and is making progress toward goals. Tolerated challenges with uneven surfaces underneath foldout mat well today-although this was challenging. Improved AP weight shift control with retrowalking today. Pt will continue to benefit from PT to address her R shoulder function, R dropfoot & general mobility following her spine surgery.     Plan:   Continue with similar session. Continue working with SPC then without device.   As able:   --Eventually river rocks for obstacle courses  --Hurdles     Current Problem  1. Difficulty walking        2. Falls frequently        3. Right foot drop        4. Shoulder stiffness, right        5. Unsteadiness on feet          General   Pt arrives at session with her , Quinn, however attends session independently. She reports her energy level is better today than compared to last appt. Arrives at session wearing shoes with laces for the first time in \"60 years\". Plans to call orthotists to figure out what is going on with her R AFO. Reports current back pain at 5/10.        Subjective    Precautions  Precautions  STEADI Fall Risk Score (The score of 4 or more indicates an increased risk of falling): 9  Vital Signs     Pain  Pain Assessment  Pain Assessment: 0-10  0-10 (Numeric) Pain Score: 5 - Moderate pain  Pain Location: Back  Pain Orientation: Right    Objective      Treatments:  Neuromuscular Re-education (68479): 40 minutes  Elastic therapeutic tape " "applied to pt's R ankle for ankle DF facilitation. \"I\" strip applied with no tension at end anchors, with paper off tension in therapeutic zone. Anchors applied at base of 2-3rd metatarsals & at anterior lateral aspect of shin. Anchors applied with R ankle in DF, followed by therapeutic zone of tape applied in ankle PF.   Instructed pt in precautions/contraindications, and grounds to discontinue use.    Harnessed: (with SPC then without device)   --Multi-directional walking: fwd/bwd, side stepping, turns on command.   --Obstacle course: blue & gray foams: cues to improve fwd weight shift when stepping onto foam. Also performed standing pause on compliant surfaces with EC ~10 sec each. Added river rocks, and pt had difficulty due to her feet sliding in her slip on boots  --Carrying: pt carried playground ball on foam pad  --Walking over foldout mats with 2\" sticks underneath without device, x2-3 laps  --Stepping over: hurdles in non-reciprocating pattern with cane then without device--NOT TODAY  Comments: Periodic standing rest breaks throughout session. No seated rest breaks taken today.    NOT TODAY:   Cable column walkouts with SPC and 7.5# x5 reps walking fwd/bwd & x3 reps each direction laterally.     Reviewed Current HEP:  Pulleys flexion/scaption in sitting  Supine AAROM flexion with dowel rogelio   Shoulder extension + scapular retraction with yellow TB   Scapular retraction + GHJ ER with red TB  Shoulder flexion with yellow looped TB for serratus activation  Supine scapular protraction/retraction     From HHCPT HEP:   Seated marching  Seated hip ABD with TB   Side stepping seated  Standing squats  Side stepping standing  Standing hip flexion  Standing hip EXT   Standing HS curl  Arm lifts with 1# weight  Comments: does have adjustable ankle weights up to 5#. Currently using a 1# ankle weight    Access Code: [email or web address]  URL: [email or web address]  Date: 12/17/2024  Prepared by: Lara" Jori    Exercises  - Shoulder External Rotation and Scapular Retraction with Resistance  - 1-2 x daily - 7 x weekly - 3 sets - 10 reps - 2 seconds hold  - Shoulder Flexion Serratus Activation with Resistance  - 1-3 x daily - 7 x weekly - 2 sets - 10 reps  - Supine Figure 4 Piriformis Stretch  - 1 x daily - 7 x weekly - 1 sets - 3-4 reps - 30 seconds hold  - Supine Lower Trapezius Strengthening  - 1-2 x daily - 7 x weekly - 3 sets - 10 reps - 5-10 seconds hold  - Snow [place]  - 1-2 x daily - 7 x weekly - 2-3 sets - 10 reps    Goals:  By discharge, pt will meet the following revised goals established 1/21/25:      Pt will demo decrease in TUG score by 5 to decrease fall risk.  Pt will increase score of BBS by 4 points to meet cutoff for falls risk assessment.   Pt will ambulate with LRAD- preferably a cane- 350ft+ MOD I.  Pt will increase ROM of B hips to WFL for improved ability to don socks & shoes in sitting.  Pt will increase strength of R ankle DF to 3/5.  Pt will improve score of LEFS by 9 more points from this re-assessment (to reach 42 points).   Pt will perform DGI at or above cutoff for falls risk assessment.

## 2025-02-07 ENCOUNTER — APPOINTMENT (OUTPATIENT)
Dept: PHYSICAL THERAPY | Facility: CLINIC | Age: 75
End: 2025-02-07
Payer: MEDICARE

## 2025-02-07 DIAGNOSIS — R26.81 UNSTEADINESS ON FEET: ICD-10-CM

## 2025-02-07 DIAGNOSIS — R29.6 FALLS FREQUENTLY: ICD-10-CM

## 2025-02-07 DIAGNOSIS — M21.371 RIGHT FOOT DROP: ICD-10-CM

## 2025-02-07 DIAGNOSIS — M25.611 SHOULDER STIFFNESS, RIGHT: ICD-10-CM

## 2025-02-07 DIAGNOSIS — R26.2 DIFFICULTY WALKING: Primary | ICD-10-CM

## 2025-02-07 PROCEDURE — 97112 NEUROMUSCULAR REEDUCATION: CPT | Mod: GP

## 2025-02-07 ASSESSMENT — PAIN - FUNCTIONAL ASSESSMENT: PAIN_FUNCTIONAL_ASSESSMENT: 0-10

## 2025-02-07 ASSESSMENT — PAIN SCALES - GENERAL: PAINLEVEL_OUTOF10: 5 - MODERATE PAIN

## 2025-02-10 NOTE — PROGRESS NOTES
Physical Therapy    Physical Therapy Treatment    Patient Name: Khalida Barker  MRN: [id number]  Today's Date: 2/11/2025    Time Entry:   Time Calculation  Start Time: 0950  Stop Time: 1045  Time Calculation (min): 55 min     PT Therapeutic Procedures Time Entry  Neuromuscular Re-Education Time Entry: 55                 Visit #38 (7 of 10 new)  Visit #12 in 2025  [employer] BMN PT OT COPAY 30 DED 0 COVERAGE 100 OOP 0 NO AUTH REQ, REF# [id number]     Assessment:   Pt tolerated session well without adverse effect and is making progress toward goals. Pt ambulated with SPC with CGA of therapist around dept x8' before requiring seated rest break. Challenged by alt toe tapping with weight shift over R LE, which also was seen with lateral stepping on/off gray foam. Improved with practice. Pt may have AFO R side next appt. Pt will continue to benefit from PT to address her R shoulder function, R dropfoot & general mobility following her spine surgery.     Plan:   If has AFO, work on gait training & balance with next visit.     Continue with similar session. Continue working with SPC then without device.   As able:   --Hurdles   --Eventually river rocks for obstacle courses    Current Problem  1. Difficulty walking        2. Falls frequently        3. Right foot drop        4. Shoulder stiffness, right        5. Unsteadiness on feet          General   Pt arrives at session with her , Quinn, however attends session independently. She reports she may be obtaining her AFO today. Reports she is a little anxious with the timeline of upcoming shoulder surgery and her 's procedure.        Subjective    Precautions  Precautions  STEADI Fall Risk Score (The score of 4 or more indicates an increased risk of falling): 9  Vital Signs     Pain  Pain Assessment  Pain Assessment: 0-10  0-10 (Numeric) Pain Score: 4  Pain Location: Back  Pain Orientation: Right    Objective      Treatments:  Neuromuscular  "Re-education (32126): 55 minutes  Pt ambulated around dept with SPC with various dynamic balance challenges including eye spy, change in gait speed, etc. X8' with CGA of therapist    In // bars:   --Stepping on/off blue & gray foams fwd & laterally, multiple reps with SPC then without device  --Alt toe tapping 2x10 with unilat UE support to without device  --Ball rolling drill with R LE in stance 2x30 reps     NOT TODAY:    Cable column walkouts with SPC and 7.5# x5 reps walking fwd/bwd & x3 reps each direction laterally.     NOT TODAY:   Harnessed: (with SPC then without device)   --Multi-directional walking: fwd/bwd, side stepping, turns on command.   --Obstacle course: blue & gray foams: cues to improve fwd weight shift when stepping onto foam. Also performed standing pause on compliant surfaces with EC ~10 sec each. Added river rocks, and pt had difficulty due to her feet sliding in her slip on boots  --Carrying: pt carried playground ball on foam pad  --Walking over foldout mats with 2\" sticks underneath without device, x2-3 laps  --Stepping over: hurdles in non-reciprocating pattern with cane then without device--NOT TODAY  Comments: Periodic standing rest breaks throughout session. No seated rest breaks taken today.      Reviewed Current HEP:  Pulleys flexion/scaption in sitting  Supine AAROM flexion with dowel rogelio   Shoulder extension + scapular retraction with yellow TB   Scapular retraction + GHJ ER with red TB  Shoulder flexion with yellow looped TB for serratus activation  Supine scapular protraction/retraction     From HHCPT HEP:   Seated marching  Seated hip ABD with TB   Side stepping seated  Standing squats  Side stepping standing  Standing hip flexion  Standing hip EXT   Standing HS curl  Arm lifts with 1# weight  Comments: does have adjustable ankle weights up to 5#. Currently using a 1# ankle weight    Access Code: [email or web address]  URL: [email or web address]  Date: " 12/17/2024  Prepared by: Lara Hsieh    Exercises  - Shoulder External Rotation and Scapular Retraction with Resistance  - 1-2 x daily - 7 x weekly - 3 sets - 10 reps - 2 seconds hold  - Shoulder Flexion Serratus Activation with Resistance  - 1-3 x daily - 7 x weekly - 2 sets - 10 reps  - Supine Figure 4 Piriformis Stretch  - 1 x daily - 7 x weekly - 1 sets - 3-4 reps - 30 seconds hold  - Supine Lower Trapezius Strengthening  - 1-2 x daily - 7 x weekly - 3 sets - 10 reps - 5-10 seconds hold  - Snow [place]  - 1-2 x daily - 7 x weekly - 2-3 sets - 10 reps    Goals:  By discharge, pt will meet the following revised goals established 1/21/25:      Pt will demo decrease in TUG score by 5 to decrease fall risk.  Pt will increase score of BBS by 4 points to meet cutoff for falls risk assessment.   Pt will ambulate with LRAD- preferably a cane- 350ft+ MOD I.  Pt will increase ROM of B hips to WFL for improved ability to don socks & shoes in sitting.  Pt will increase strength of R ankle DF to 3/5.  Pt will improve score of LEFS by 9 more points from this re-assessment (to reach 42 points).   Pt will perform DGI at or above cutoff for falls risk assessment.

## 2025-02-11 ENCOUNTER — APPOINTMENT (OUTPATIENT)
Dept: PHYSICAL THERAPY | Facility: CLINIC | Age: 75
End: 2025-02-11
Payer: MEDICARE

## 2025-02-11 DIAGNOSIS — R26.81 UNSTEADINESS ON FEET: ICD-10-CM

## 2025-02-11 DIAGNOSIS — M25.611 SHOULDER STIFFNESS, RIGHT: ICD-10-CM

## 2025-02-11 DIAGNOSIS — R26.2 DIFFICULTY WALKING: Primary | ICD-10-CM

## 2025-02-11 DIAGNOSIS — M21.371 RIGHT FOOT DROP: ICD-10-CM

## 2025-02-11 DIAGNOSIS — R29.6 FALLS FREQUENTLY: ICD-10-CM

## 2025-02-11 PROCEDURE — 97112 NEUROMUSCULAR REEDUCATION: CPT | Mod: GP

## 2025-02-11 ASSESSMENT — PAIN - FUNCTIONAL ASSESSMENT: PAIN_FUNCTIONAL_ASSESSMENT: 0-10

## 2025-02-11 ASSESSMENT — PAIN SCALES - GENERAL: PAINLEVEL_OUTOF10: 4

## 2025-02-12 NOTE — PROGRESS NOTES
Physical Therapy    Physical Therapy Treatment    Patient Name: Khalida Barker  MRN: [id number]  Today's Date: 2/13/2025    Time Entry:   Time Calculation  Start Time: 1418  Stop Time: 1503  Time Calculation (min): 45 min     PT Therapeutic Procedures Time Entry  Neuromuscular Re-Education Time Entry: 45                 Visit #39 (8 of 10 new)  Visit #13 in 2025  [employer] BMN PT OT COPAY 30 DED 0 COVERAGE 100 OOP 0 NO AUTH REQ, REF# [id number]     Assessment:   Pt tolerated session well without adverse effect and is making progress toward goals. Pt now has her AFO R side and was able to ambulate with largely CGA with SPC. Pt will continue to benefit from PT to address her R shoulder function, R dropfoot & general mobility following her spine surgery.     Plan:   Continue working with AFO R foot.     Continue with similar session. Continue working with SPC then without device.   As able:   --Hurdles   --Eventually river rocks for obstacle courses    Current Problem  1. Difficulty walking        2. Falls frequently        3. Right foot drop        4. Shoulder stiffness, right        5. Unsteadiness on feet          General   Pt arrives at session with her , Quinn, however attends session independently. She reports she obtained her AFO R side and plans to get a shoe horn to help with donning. Does feel this is helpful and can walk better.   Saw her shoulder surgeon earlier this morning and plans to pursue this surgery upcoming. Would like to discuss how to handle R shoulder surgery.   Has her bone stimulator on her spine today.   Surgery scheduled for 3/11/25.        Subjective    Precautions  Precautions  STEADI Fall Risk Score (The score of 4 or more indicates an increased risk of falling): 9  Vital Signs     Pain  Pain Assessment  Pain Assessment: 0-10  0-10 (Numeric) Pain Score: 4  Pain Location: Back  Pain Orientation: Right    Objective      Treatments:  Neuromuscular Re-education  "(94189): 45 minutes  Pt ambulated around dept with SPC with various dynamic balance challenges including eye spy, change in gait speed, & multi-directional walking etc. X15' with CGA of therapist. Performed while wearing AFO R side.     In // bars:   --Stepping on/off blue & gray foams fwd & laterally, multiple reps with SPC then without device    NOT TODAY:   --Alt toe tapping 2x10 with unilat UE support to without device  --Ball rolling drill with R LE in stance 2x30 reps     NOT TODAY:    Cable column walkouts with SPC and 7.5# x5 reps walking fwd/bwd & x3 reps each direction laterally.     NOT TODAY:   Harnessed: (with SPC then without device)   --Multi-directional walking: fwd/bwd, side stepping, turns on command.   --Obstacle course: blue & gray foams: cues to improve fwd weight shift when stepping onto foam. Also performed standing pause on compliant surfaces with EC ~10 sec each. Added river rocks, and pt had difficulty due to her feet sliding in her slip on boots  --Carrying: pt carried playground ball on foam pad  --Walking over foldout mats with 2\" sticks underneath without device, x2-3 laps  --Stepping over: hurdles in non-reciprocating pattern with cane then without device--NOT TODAY  Comments: Periodic standing rest breaks throughout session. No seated rest breaks taken today.      Reviewed Current HEP:  Pulleys flexion/scaption in sitting  Supine AAROM flexion with dowel rogelio   Shoulder extension + scapular retraction with yellow TB   Scapular retraction + GHJ ER with red TB  Shoulder flexion with yellow looped TB for serratus activation  Supine scapular protraction/retraction     From [facility]CPT HEP:   Seated marching  Seated hip ABD with TB   Side stepping seated  Standing squats  Side stepping standing  Standing hip flexion  Standing hip EXT   Standing HS curl  Arm lifts with 1# weight  Comments: does have adjustable ankle weights up to 5#. Currently using a 1# ankle weight    Access Code: [email or web address]  URL: " [email or web address]  Date: 12/17/2024  Prepared by: Lara Hsieh    Exercises  - Shoulder External Rotation and Scapular Retraction with Resistance  - 1-2 x daily - 7 x weekly - 3 sets - 10 reps - 2 seconds hold  - Shoulder Flexion Serratus Activation with Resistance  - 1-3 x daily - 7 x weekly - 2 sets - 10 reps  - Supine Figure 4 Piriformis Stretch  - 1 x daily - 7 x weekly - 1 sets - 3-4 reps - 30 seconds hold  - Supine Lower Trapezius Strengthening  - 1-2 x daily - 7 x weekly - 3 sets - 10 reps - 5-10 seconds hold  - Snow [employer]  - 1-2 x daily - 7 x weekly - 2-3 sets - 10 reps    Goals:  By discharge, pt will meet the following revised goals established 1/21/25:      Pt will demo decrease in TUG score by 5 to decrease fall risk.  Pt will increase score of BBS by 4 points to meet cutoff for falls risk assessment.   Pt will ambulate with LRAD- preferably a cane- 350ft+ MOD I.  Pt will increase ROM of B hips to WFL for improved ability to don socks & shoes in sitting.  Pt will increase strength of R ankle DF to 3/5.  Pt will improve score of LEFS by 9 more points from this re-assessment (to reach 42 points).   Pt will perform DGI at or above cutoff for falls risk assessment.

## 2025-02-13 ENCOUNTER — APPOINTMENT (OUTPATIENT)
Dept: PHYSICAL THERAPY | Facility: CLINIC | Age: 75
End: 2025-02-13
Payer: MEDICARE

## 2025-02-13 DIAGNOSIS — R29.6 FALLS FREQUENTLY: ICD-10-CM

## 2025-02-13 DIAGNOSIS — R26.81 UNSTEADINESS ON FEET: ICD-10-CM

## 2025-02-13 DIAGNOSIS — R26.2 DIFFICULTY WALKING: Primary | ICD-10-CM

## 2025-02-13 DIAGNOSIS — M21.371 RIGHT FOOT DROP: ICD-10-CM

## 2025-02-13 DIAGNOSIS — M25.611 SHOULDER STIFFNESS, RIGHT: ICD-10-CM

## 2025-02-13 PROCEDURE — 97112 NEUROMUSCULAR REEDUCATION: CPT | Mod: GP

## 2025-02-13 ASSESSMENT — PAIN - FUNCTIONAL ASSESSMENT: PAIN_FUNCTIONAL_ASSESSMENT: 0-10

## 2025-02-13 ASSESSMENT — PAIN SCALES - GENERAL: PAINLEVEL_OUTOF10: 4

## 2025-02-14 ENCOUNTER — APPOINTMENT (OUTPATIENT)
Dept: PHYSICAL THERAPY | Facility: CLINIC | Age: 75
End: 2025-02-14
Payer: MEDICARE

## 2025-02-14 DIAGNOSIS — R29.6 FALLS FREQUENTLY: ICD-10-CM

## 2025-02-14 DIAGNOSIS — R26.81 UNSTEADINESS ON FEET: ICD-10-CM

## 2025-02-14 DIAGNOSIS — M25.611 SHOULDER STIFFNESS, RIGHT: ICD-10-CM

## 2025-02-14 DIAGNOSIS — M21.371 RIGHT FOOT DROP: ICD-10-CM

## 2025-02-14 DIAGNOSIS — R26.2 DIFFICULTY WALKING: Primary | ICD-10-CM

## 2025-02-16 NOTE — PROGRESS NOTES
Physical Therapy    Physical Therapy Treatment    Patient Name: Khalida Barker  MRN: [id number]  Today's Date: 2/17/2025    Time Entry:   Time Calculation  Start Time: 0834  Stop Time: 0919  Time Calculation (min): 45 min     PT Therapeutic Procedures Time Entry  Neuromuscular Re-Education Time Entry: 45                 Visit #40 (9 of 10 new)  Visit #14 in 2025  [employer] BMN PT OT COPAY 30 DED 0 COVERAGE 100 OOP 0 NO AUTH REQ, REF# [id number]     Assessment:   Pt tolerated session well without adverse effect and is making progress toward goals. Pt performs navigating over various surfaces with foldout mat over it well, good independent recovery of balance with SPC and without device today. She met her TUG goal, improving by >5 seconds since initial evaluation with SPC and without device today. Pt will continue to benefit from PT to address her R shoulder function, R dropfoot & general mobility following her spine surgery.     Plan:   Re-assessment next visit.   Continue working with AFO R foot.     Continue with similar session. Continue working with SPC then without device.   As able:   --Hurdles   --Eventually river [employer] for obstacle courses    Current Problem  1. Difficulty walking        2. Falls frequently        3. Right foot drop        4. Shoulder stiffness, right        5. Unsteadiness on feet          General   Pt arrives at session with her , Quinn, however attends session independently. She reports she stood for prolonged periods over the weekend without difficulty. Reports she wore her AFO during this which helped her stand longer. Does feel her balance is better with her AFO too, as she is not concerned about tripping.   Has been able to don/doff AFO independently with shoe horn.   Does feel her back pain is improving.  to touch but the higher intensity pain is improving.        Subjective    Precautions  Precautions  STEADI Fall Risk Score (The score of 4 or  "more indicates an increased risk of falling): 9  Vital Signs     Pain  Pain Assessment  Pain Assessment: 0-10  0-10 (Numeric) Pain Score: 0 - No pain (when seated at rest)  Pain Location: Back  Pain Orientation: Right    Objective      Treatments:  Neuromuscular Re-education (97550): 45 minutes  Practice TUG with SPC then without device     Harnessed: (with SPC then without device)   --Multi-directional walking: fwd/bwd, side stepping, turns on command.   --Walking over foldout mats with 2\" sticks and small river rocks underneath without device, x2-3 laps. Performed walking forward as well as laterally  NOT TODAY:   --Obstacle course: blue & gray foams: cues to improve fwd weight shift when stepping onto foam. Also performed standing pause on compliant surfaces with EC ~10 sec each. Added river rocks, and pt had difficulty due to her feet sliding in her slip on boots  --Carrying: pt carried playground ball on foam pad  Comments: Periodic standing rest breaks throughout session. No seated rest breaks taken today.    --Stepping over: hurdles in non-reciprocating pattern with cane then without device--NOT TODAY    NOT TODAY:    Cable column walkouts with SPC and 7.5# x5 reps walking fwd/bwd & x3 reps each direction laterally.     Current HEP:  Pulleys flexion/scaption in sitting  Supine AAROM flexion with dowel rogelio   Shoulder extension + scapular retraction with yellow TB   Scapular retraction + GHJ ER with red TB  Shoulder flexion with yellow looped TB for serratus activation  Supine scapular protraction/retraction     From HHCPT HEP:   Seated marching  Seated hip ABD with TB   Side stepping seated  Standing squats  Side stepping standing  Standing hip flexion  Standing hip EXT   Standing HS curl  Arm lifts with 1# weight  Comments: does have adjustable ankle weights up to 5#. Currently using a 1# ankle weight    Access Code: [email or web address]  URL: [email or web address]  Date: 12/17/2024  Prepared by: " Lara Hsieh    Exercises  - Shoulder External Rotation and Scapular Retraction with Resistance  - 1-2 x daily - 7 x weekly - 3 sets - 10 reps - 2 seconds hold  - Shoulder Flexion Serratus Activation with Resistance  - 1-3 x daily - 7 x weekly - 2 sets - 10 reps  - Supine Figure 4 Piriformis Stretch  - 1 x daily - 7 x weekly - 1 sets - 3-4 reps - 30 seconds hold  - Supine Lower Trapezius Strengthening  - 1-2 x daily - 7 x weekly - 3 sets - 10 reps - 5-10 seconds hold  - [place]  - 1-2 x daily - 7 x weekly - 2-3 sets - 10 reps    Goals:  By discharge, pt will meet the following revised goals established 1/21/25:      Pt will demo decrease in TUG score by 5 to decrease fall risk.--GOAL MET 2/17/25  Pt will increase score of BBS by 4 points to meet cutoff for falls risk assessment.   Pt will ambulate with LRAD- preferably a cane- 350ft+ MOD I.  Pt will increase ROM of B hips to WFL for improved ability to don socks & shoes in sitting.  Pt will increase strength of R ankle DF to 3/5.  Pt will improve score of LEFS by 9 more points from this re-assessment (to reach 42 points).   Pt will perform DGI at or above cutoff for falls risk assessment.

## 2025-02-17 ENCOUNTER — APPOINTMENT (OUTPATIENT)
Dept: PHYSICAL THERAPY | Facility: CLINIC | Age: 75
End: 2025-02-17
Payer: MEDICARE

## 2025-02-17 DIAGNOSIS — M21.371 RIGHT FOOT DROP: ICD-10-CM

## 2025-02-17 DIAGNOSIS — R26.2 DIFFICULTY WALKING: Primary | ICD-10-CM

## 2025-02-17 DIAGNOSIS — M25.611 SHOULDER STIFFNESS, RIGHT: ICD-10-CM

## 2025-02-17 DIAGNOSIS — R26.81 UNSTEADINESS ON FEET: ICD-10-CM

## 2025-02-17 DIAGNOSIS — R29.6 FALLS FREQUENTLY: ICD-10-CM

## 2025-02-17 PROCEDURE — 97112 NEUROMUSCULAR REEDUCATION: CPT | Mod: GP

## 2025-02-17 ASSESSMENT — PAIN - FUNCTIONAL ASSESSMENT: PAIN_FUNCTIONAL_ASSESSMENT: 0-10

## 2025-02-17 ASSESSMENT — PAIN SCALES - GENERAL: PAINLEVEL_OUTOF10: 0 - NO PAIN

## 2025-02-18 ENCOUNTER — APPOINTMENT (OUTPATIENT)
Dept: PHYSICAL THERAPY | Facility: CLINIC | Age: 75
End: 2025-02-18
Payer: MEDICARE

## 2025-02-18 DIAGNOSIS — M25.611 SHOULDER STIFFNESS, RIGHT: ICD-10-CM

## 2025-02-18 DIAGNOSIS — R26.2 DIFFICULTY WALKING: Primary | ICD-10-CM

## 2025-02-18 DIAGNOSIS — R26.81 UNSTEADINESS ON FEET: ICD-10-CM

## 2025-02-18 DIAGNOSIS — R29.6 FALLS FREQUENTLY: ICD-10-CM

## 2025-02-18 DIAGNOSIS — M21.371 RIGHT FOOT DROP: ICD-10-CM

## 2025-02-21 ENCOUNTER — APPOINTMENT (OUTPATIENT)
Dept: PHYSICAL THERAPY | Facility: CLINIC | Age: 75
End: 2025-02-21
Payer: MEDICARE

## 2025-02-21 DIAGNOSIS — M25.611 SHOULDER STIFFNESS, RIGHT: ICD-10-CM

## 2025-02-21 DIAGNOSIS — R26.2 DIFFICULTY WALKING: Primary | ICD-10-CM

## 2025-02-21 DIAGNOSIS — M21.371 RIGHT FOOT DROP: ICD-10-CM

## 2025-02-21 DIAGNOSIS — R26.81 UNSTEADINESS ON FEET: ICD-10-CM

## 2025-02-21 DIAGNOSIS — R29.6 FALLS FREQUENTLY: ICD-10-CM

## 2025-02-23 NOTE — PROGRESS NOTES
Physical Therapy    Physical Therapy Progress Note    Patient Name: Khalida Barker  MRN: [id number]  Today's Date: 2/25/2025    Time Entry:   Time Calculation  Start Time: 1037  Stop Time: 1122  Time Calculation (min): 45 min     PT Therapeutic Procedures Time Entry  Therapeutic Exercise Time Entry: 45                 Visit #41 (10 of 10 new) RE-ASSESSMENT   Visit #15 in 2025  [employer] BMN PT OT COPAY 30 DED 0 COVERAGE 100 OOP 0 NO AUTH REQ, REF# [id number]/ALL     Assessment:   Pt has met 3 of 7 goals and has made good progress on most remaining goals. Since last re-assessment, pt has become more proficient walking with SPC, now MOD I for household distances and performs with SBA to supervision level within PT today with SPC. Her TUG test score improved by over 5 seconds. Her BBS is now at the cutoff of falls risk assessment, improved by 4 points from last re-assessment, and by 22 points overall. She has been donning/doffing socks when seated EOB independently now. LEFS score improved by 7 points from last re-assessment, and by 18 points overall. Pt's DGI score at 18/24 points, which is 1 point below cutoff for falls risk assessment. Pt will continue to benefit from PT to meet remaining goals at 2x/week frequency for up to 5 more weeks, or up until pt has reverse total shoulder surgery R shoulder.     Goals:  By discharge, pt will meet the following revised goals established 1/21/25:      Pt will demo decrease in TUG score by 5 to decrease fall risk.--GOAL MET 2/17/25  Pt will increase score of BBS by 4 points to meet cutoff for falls risk assessment.--GOAL MET 2/25/25  Pt will ambulate with LRAD- preferably a cane- 350ft+ MOD I.--MOSTLY MET  Pt will increase ROM of B hips to WFL for improved ability to don socks & shoes in sitting.--GOAL MET  Pt will increase strength of R ankle DF to 3/5.--NOT MET  Pt will improve score of LEFS by 9 more points from this re-assessment (to reach 42 points).    Pt will perform DGI at or above cutoff for falls risk assessment.--MOSTLY MET    Plan:   Continue PT at 2x/week frequency for up to 5 more weeks, or up until pt has reverse total shoulder surgery R shoulder.      Next visit:   Work with SPC on stairs.   Eventually floor <> stand transfers.   Continue with similar session. Continue working with SPC then without device.   As able:   --Hurdles   --Eventually river rocks for obstacle courses    Current Problem  1. Difficulty walking        2. Falls frequently        3. Right foot drop        4. Shoulder stiffness, right        5. Unsteadiness on feet          General   Pt arrives at session with her , Quinn, however attends session independently. She reports her R side of her back continues to bother her and took a pain pill this morning. Continues to wear her AFO and shoes going well. Has been using the cane indoors MOD I. Uses her FWW when not using her AFO. Wonders if she can use her cane outside with AFO.   Reports she would like to be able to walk without a device one she has her shoulder surgery.   Is to see her orthotist on 3/7/25 and plans to discuss other shoe options that she can wear her AFO with.        Subjective    Precautions  Precautions  STEADI Fall Risk Score (The score of 4 or more indicates an increased risk of falling): 9  Vital Signs     Pain  Pain Assessment  Pain Assessment: 0-10  0-10 (Numeric) Pain Score: 6  Pain Location: Back  Pain Orientation: Right  Pain Descriptors: Tender (& grabbing)    Objective   Ball Balance Scale  1. Sitting to Standing: Able to stand without using hands and stabilize independently  2. Standing Unsupported: Able to stand safely for 2 minutes  3. Sitting with Back Unsupported but Feet Supported on Floor or on a Stool: Able to sit safely and securely for 2 minutes  4. Standing to Sitting: Sits safely with minimal use of hands  5.  Transfers: Able to transfer safely definite need of hands  6. Standing Unsupported  with Eyes Closed: Able to stand 10 seconds safely  7. Standing Unsupported with Feet Together: Able to place feet together independently and stand 1 minute safely  8. Reach Forward with Outstretched Arm While Standing: Can reach forward 12 cm (5 inches)  9.  Object from Floor from a Standing Position: Able to  slipper safely and easily  10. Turning to Look Behind Over Left and Right Shoulders While Standing: Turns sideways only but maintains balance  11. Turn 360 Degrees: Able to turn 360 degrees safely but slowly  12. Place Alternate Foot on Step or Stool While Standing Unsupported: Able to stand independently and safely and complete 8 steps in 20 seconds  13. Standing Unsupported One Foot in Front: Able to take small step independently and hold 30 seconds  14. Standing on One Leg: Tries to lift leg unable to hold 3 seconds but remains standing independently  Ball Balance Score: 45    Other Measures  Activities - Specific Balance Confidence Scale: 1000 (62.5% of self confidence)  Dynamic Gait Index (DGI): 18/24  Lower Extremity Funtional Score (LEFS): 40    Treatments:  Therapeutic Exercise (67052): 45 minutes  Objective measures for re-assessment, as above.     Current HEP:  Pulleys flexion/scaption in sitting  Supine AAROM flexion with dowel rogelio   Shoulder extension + scapular retraction with yellow TB   Scapular retraction + GHJ ER with red TB  Shoulder flexion with yellow looped TB for serratus activation  Supine scapular protraction/retraction     From HHCPT HEP:   Seated marching  Seated hip ABD with TB   Side stepping seated  Standing squats  Side stepping standing  Standing hip flexion  Standing hip EXT   Standing HS curl  Arm lifts with 1# weight  Comments: does have adjustable ankle weights up to 5#. Currently using a 1# ankle weight    Access Code: [email or web address]  URL: [email or web address]  Date: 12/17/2024  Prepared by: Lara Hsieh    Exercises  - Shoulder External  Rotation and Scapular Retraction with Resistance  - 1-2 x daily - 7 x weekly - 3 sets - 10 reps - 2 seconds hold  - Shoulder Flexion Serratus Activation with Resistance  - 1-3 x daily - 7 x weekly - 2 sets - 10 reps  - Supine Figure 4 Piriformis Stretch  - 1 x daily - 7 x weekly - 1 sets - 3-4 reps - 30 seconds hold  - Supine Lower Trapezius Strengthening  - 1-2 x daily - 7 x weekly - 3 sets - 10 reps - 5-10 seconds hold  - Snow [place]  - 1-2 x daily - 7 x weekly - 2-3 sets - 10 reps

## 2025-02-25 ENCOUNTER — APPOINTMENT (OUTPATIENT)
Dept: PHYSICAL THERAPY | Facility: CLINIC | Age: 75
End: 2025-02-25
Payer: MEDICARE

## 2025-02-25 DIAGNOSIS — R26.2 DIFFICULTY WALKING: Primary | ICD-10-CM

## 2025-02-25 DIAGNOSIS — R29.6 FALLS FREQUENTLY: ICD-10-CM

## 2025-02-25 DIAGNOSIS — M25.611 SHOULDER STIFFNESS, RIGHT: ICD-10-CM

## 2025-02-25 DIAGNOSIS — M21.371 RIGHT FOOT DROP: ICD-10-CM

## 2025-02-25 DIAGNOSIS — R26.81 UNSTEADINESS ON FEET: ICD-10-CM

## 2025-02-25 PROCEDURE — 97110 THERAPEUTIC EXERCISES: CPT | Mod: GP

## 2025-02-25 ASSESSMENT — BALANCE ASSESSMENTS
STANDING UNSUPPORTED ONE FOOT IN FRONT: ABLE TO TAKE SMALL STEP INDEPENDENTLY AND HOLD 30 SECONDS
PLACE ALTERNATE FOOT ON STEP OR STOOL WHILE STANDING UNSUPPORTED: ABLE TO STAND INDEPENDENTLY AND SAFELY AND COMPLETE 8 STEPS IN 20 SECONDS
PICK UP OBJECT FROM THE FLOOR FROM A STANDING POSITION: ABLE TO PICK UP SLIPPER SAFELY AND EASILY
STANDING TO SITTING: ABLE TO STAND WITHOUT USING HANDS AND STABILIZE INDEPENDENTLY
TURN 360 DEGREES: ABLE TO TURN 360 DEGREES SAFELY BUT SLOWLY
SITTING WITH BACK UNSUPPORTED BUT FEET SUPPORTED ON FLOOR OR ON A STOOL: ABLE TO SIT SAFELY AND SECURELY FOR 2 MINUTES
STANDING UNSUPPORTED WITH FEET TOGETHER: ABLE TO PLACE FEET TOGETHER INDEPENDENTLY AND STAND 1 MINUTE SAFELY
TRANSFERS: ABLE TO TRANSFER SAFELY DEFINITE NEED OF HANDS
STANDING ON ONE LEG: TRIES TO LIFT LEG UNABLE TO HOLD 3 SECONDS BUT REMAINS STANDING INDEPENDENTLY
LONG VERSION TOTAL SCORE (MAX 56): 45
STANDING UNSUPPORTED: ABLE TO STAND SAFELY FOR 2 MINUTES
STANDING UNSUPPORTED WITH EYES CLOSED: ABLE TO STAND 10 SECONDS SAFELY
STANDING TO SITTING: SITS SAFELY WITH MINIMAL USE OF HANDS
REACHING FORWARD WITH OUTSTRETCHED ARM WHILE STANDING: CAN REACH FORWARD 12 CM (5 INCHES)
PLACE ALTERNATE FOOT ON STEP OR STOOL WHILE STANDING UNSUPPORTED: TURNS SIDEWAYS ONLY BUT MAINTAINS BALANCE

## 2025-02-25 ASSESSMENT — PAIN DESCRIPTION - DESCRIPTORS: DESCRIPTORS: TENDER

## 2025-02-25 ASSESSMENT — PAIN - FUNCTIONAL ASSESSMENT: PAIN_FUNCTIONAL_ASSESSMENT: 0-10

## 2025-02-25 ASSESSMENT — PAIN SCALES - GENERAL: PAINLEVEL_OUTOF10: 6

## 2025-02-26 ASSESSMENT — DUKE ACTIVITY SCORE INDEX (DASI)
CAN YOU HAVE SEXUAL RELATIONS: YES
CAN YOU WALK INDOORS, SUCH AS AROUND YOUR HOUSE: YES
CAN YOU PARTICIPATE IN MODERATE RECREATIONAL ACTIVITIES LIKE GOLF, BOWLING, DANCING, DOUBLES TENNIS OR THROWING A BASEBALL OR FOOTBALL: NO
CAN YOU DO MODERATE WORK AROUND THE HOUSE LIKE VACUUMING, SWEEPING FLOORS OR CARRYING GROCERIES: NO
CAN YOU CLIMB A FLIGHT OF STAIRS OR WALK UP A HILL: YES
CAN YOU WALK A BLOCK OR TWO ON LEVEL GROUND: NO
DASI METS SCORE: 4.9
TOTAL_SCORE: 17.95
CAN YOU DO YARD WORK LIKE RAKING LEAVES, WEEDING OR PUSHING A MOWER: NO
CAN YOU DO LIGHT WORK AROUND THE HOUSE LIKE DUSTING OR WASHING DISHES: YES
CAN YOU RUN A SHORT DISTANCE: NO
CAN YOU TAKE CARE OF YOURSELF (EAT, DRESS, BATHE, OR USE TOILET): YES
CAN YOU PARTICIPATE IN STRENOUS SPORTS LIKE SWIMMING, SINGLES TENNIS, FOOTBALL, BASKETBALL, OR SKIING: NO
CAN YOU DO HEAVY WORK AROUND THE HOUSE LIKE SCRUBBING FLOORS OR LIFTING AND MOVING HEAVY FURNITURE: NO

## 2025-02-26 ASSESSMENT — LIFESTYLE VARIABLES: SMOKING_STATUS: NONSMOKER

## 2025-02-26 ASSESSMENT — ACTIVITIES OF DAILY LIVING (ADL): ADL_SCORE: 2

## 2025-02-27 ENCOUNTER — LAB (OUTPATIENT)
Dept: LAB | Facility: HOSPITAL | Age: 75
End: 2025-02-27
Payer: MEDICARE

## 2025-02-27 ENCOUNTER — PRE-ADMISSION TESTING (OUTPATIENT)
Dept: PREADMISSION TESTING | Facility: HOSPITAL | Age: 75
End: 2025-02-27
Payer: MEDICARE

## 2025-02-27 VITALS
HEIGHT: 65 IN | SYSTOLIC BLOOD PRESSURE: 128 MMHG | BODY MASS INDEX: 25.09 KG/M2 | DIASTOLIC BLOOD PRESSURE: 52 MMHG | RESPIRATION RATE: 16 BRPM | TEMPERATURE: 97.7 F | OXYGEN SATURATION: 98 % | WEIGHT: 150.57 LBS | HEART RATE: 76 BPM

## 2025-02-27 DIAGNOSIS — Z01.818 PRE-OP TESTING: ICD-10-CM

## 2025-02-27 DIAGNOSIS — R79.9 ABNORMAL FINDING OF BLOOD CHEMISTRY, UNSPECIFIED: ICD-10-CM

## 2025-02-27 DIAGNOSIS — Z01.818 PREOP EXAMINATION: Primary | ICD-10-CM

## 2025-02-27 DIAGNOSIS — Z01.818 ENCOUNTER FOR OTHER PREPROCEDURAL EXAMINATION: Primary | ICD-10-CM

## 2025-02-27 LAB
ANION GAP SERPL CALC-SCNC: 14 MMOL/L (ref 10–20)
BUN SERPL-MCNC: 20 MG/DL (ref 6–23)
CALCIUM SERPL-MCNC: 9.7 MG/DL (ref 8.6–10.3)
CHLORIDE SERPL-SCNC: 106 MMOL/L (ref 98–107)
CO2 SERPL-SCNC: 28 MMOL/L (ref 21–32)
CREAT SERPL-MCNC: 0.78 MG/DL (ref 0.5–1.05)
EGFRCR SERPLBLD CKD-EPI 2021: 81 ML/MIN/1.73M*2
ERYTHROCYTE [DISTWIDTH] IN BLOOD BY AUTOMATED COUNT: 13.7 % (ref 11.5–14.5)
EST. AVERAGE GLUCOSE BLD GHB EST-MCNC: 100 MG/DL
GLUCOSE SERPL-MCNC: 89 MG/DL (ref 74–99)
HBA1C MFR BLD: 5.1 %
HCT VFR BLD AUTO: 41.6 % (ref 36–46)
HGB BLD-MCNC: 13.8 G/DL (ref 12–16)
MCH RBC QN AUTO: 31 PG (ref 26–34)
MCHC RBC AUTO-ENTMCNC: 33.2 G/DL (ref 32–36)
MCV RBC AUTO: 94 FL (ref 80–100)
NRBC BLD-RTO: 0 /100 WBCS (ref 0–0)
PLATELET # BLD AUTO: 230 X10*3/UL (ref 150–450)
POTASSIUM SERPL-SCNC: 4.5 MMOL/L (ref 3.5–5.3)
RBC # BLD AUTO: 4.45 X10*6/UL (ref 4–5.2)
SODIUM SERPL-SCNC: 143 MMOL/L (ref 136–145)
WBC # BLD AUTO: 5.4 X10*3/UL (ref 4.4–11.3)

## 2025-02-27 PROCEDURE — 83036 HEMOGLOBIN GLYCOSYLATED A1C: CPT

## 2025-02-27 PROCEDURE — 87081 CULTURE SCREEN ONLY: CPT | Mod: STJLAB

## 2025-02-27 PROCEDURE — 80048 BASIC METABOLIC PNL TOTAL CA: CPT

## 2025-02-27 PROCEDURE — 85027 COMPLETE CBC AUTOMATED: CPT

## 2025-02-27 PROCEDURE — 99202 OFFICE O/P NEW SF 15 MIN: CPT | Performed by: NURSE PRACTITIONER

## 2025-02-27 RX ORDER — MENTHOL 40 MG/ML
GEL TOPICAL AS NEEDED
COMMUNITY

## 2025-02-27 RX ORDER — NAPROXEN 250 MG/1
500 TABLET ORAL
COMMUNITY

## 2025-02-27 RX ORDER — CHLORHEXIDINE GLUCONATE ORAL RINSE 1.2 MG/ML
SOLUTION DENTAL
Qty: 473 ML | Refills: 0 | Status: SHIPPED | OUTPATIENT
Start: 2025-02-27

## 2025-02-27 RX ORDER — IBUPROFEN 200 MG
400 TABLET ORAL EVERY 6 HOURS PRN
COMMUNITY

## 2025-02-27 RX ORDER — ACETAMINOPHEN 500 MG
1000 TABLET ORAL EVERY 6 HOURS PRN
COMMUNITY

## 2025-02-27 ASSESSMENT — PAIN SCALES - GENERAL: PAINLEVEL_OUTOF10: 0 - NO PAIN

## 2025-02-27 ASSESSMENT — PAIN - FUNCTIONAL ASSESSMENT: PAIN_FUNCTIONAL_ASSESSMENT: 0-10

## 2025-02-27 NOTE — H&P (VIEW-ONLY)
CPM/PAT Evaluation       Name: Khalida Barker (Khalida Barker)  /Age: 1950/74 y.o.     In-Person       Chief Complaint: pre op appointment for upcoming shoulder surgery    HPI    FLAQUITA is a 75 yo female who had a fall this past year and underwent a right wrist surgery involving hardware.  During her recovery she was experiencing right shoulder pains as well and was eventually sent for MRI imaging which showed right rotator cuff tear.  Treatment options discussed with orthopedic surgeon and subsequently she is scheduled for right reverse shoulder arthroplasty. Presents to CPM today for perioperative risk stratification and optimization.  She denies any recent steroid injections to this limb.  Skin is intact to shoulder. Otherwise denies any recent illness, fever/chills, chest pains or shortness of breath. Of note, patient has had a complicated year with a lumbar spine surgery this past May 2024 complicated by MRSA wound infection during rehab which required a washout and long-term IV antibiotics.    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     OA (osteoarthritis) of knee     Painful scar 2017    Peripheral neuropathy     Scoliosis     Skin cancer     Spinal stenosis     Traumatic ecchymosis of foot 2013    Trigger finger of right hand     Vision loss     wears glasses    Vitamin D deficiency        Past Surgical History:   Procedure Laterality Date    ADENOIDECTOMY      BACK SURGERY  2024    spine up to T10    BREAST BIOPSY      COLONOSCOPY      KNEE ARTHROPLASTY Right     MOUTH SURGERY  2016    Back left molar removed    MOUTH SURGERY  2021    right molar    OTHER SURGICAL HISTORY      Skin lesion excision    SPINAL  FUSION  06/2010    C4-5    TONSILLECTOMY  1956    WISDOM TOOTH EXTRACTION  1978    lower    WRIST SURGERY Right 06/2024    WRIST SURGERY Right 12/03/2024    Hardware removal and tenolysis right wrist       Patient Sexual activity questions deferred to the physician.    Family History   Problem Relation Name Age of Onset    Alzheimer's disease Mother      Heart disease Father      Polymyositis Father      Hypertension Brother      Skin cancer Brother      Other (peripheral artery disease) Brother         Allergies   Allergen Reactions    Oxycodone Itching     Previously tolerated Percocet        Prior to Admission medications    Medication Sig Start Date End Date Taking? Authorizing Provider   acetaminophen (Tylenol) 325 mg tablet Take 2 tablets (650 mg) by mouth every 6 hours if needed for mild pain (1 - 3) or fever (temp greater than 38.0 C).    Historical Provider, MD   aspirin-acetaminophen-caffeine (Excedrin Migraine) 250-250-65 mg tablet Take 1 tablet by mouth every 6 hours if needed for headaches.    Historical Provider, MD   biotin 10 mg tablet Take 1 tablet (10 mg) by mouth once daily.    Historical Provider, MD   chlorhexidine (Peridex) 0.12 % solution Swish and spit 15 ml for 2 doses, 15mL the night before surgery and 15 ml morning of surgery - swish for 30 seconds -DO NOT SWALLOW, SPIT OUT 2/27/25   SHAMIKA Buenrostro-CNP   cholecalciferol (Vitamin D3) 50 mcg (2,000 unit) capsule Take 1 capsule (50 mcg) by mouth once daily. 9/5/24   Rocco Rushing MD   docusate sodium (Colace) 100 mg capsule Take 1 capsule (100 mg) by mouth 2 times a day as needed (constipation).    Historical Provider, MD   fluticasone (Flonase) 50 mcg/actuation nasal spray Administer 1 spray into each nostril once daily as needed for allergies.    Historical Provider, MD   hydrocortisone (Anusol-HC) 25 mg suppository Insert 1 suppository (25 mg) into the rectum once daily. 10/10/24   Rocco Rushing MD   hyoscyamine (Anaspaz)  0.125 mg disintegrating tablet Take 1 tablet (0.125 mg) by mouth 3 times a day.  Patient not taking: Reported on 2/26/2025 7/24/24   Ron Leung MD   multivitamin (One Daily Multivitamin) tablet Take 1 tablet by mouth once daily. 9/5/24   Rocco Rushing MD   psyllium (Metamucil) powder Take 1 Dose (5.8 g) by mouth once daily.    Historical Provider, MD   traMADol (Ultram) 50 mg tablet Take 1 tablet (50 mg) by mouth every 6 hours if needed for severe pain (7 - 10).  Patient not taking: Reported on 2/26/2025 10/11/24   Justin Lu MD        PAT ROS   Constitutional: Negative for fever, chills, or sweats   ENMT: Negative for nasal discharge, congestion, ear pain, mouth pain, throat pain   Respiratory: Negative for cough, wheezing, shortness of breath   Cardiac: Negative for chest pain, dyspnea on exertion, palpitations   Gastrointestinal: Negative for nausea, vomiting, diarrhea, constipation, abdominal pain  Genitourinary: Negative for dysuria, flank pain, frequency, hematuria     Musculoskeletal: Positive for decreased ROM, pain, weakness in right shoulder     Neurological: Negative for dizziness, confusion, headache  + right foot drop, using a cane    Psychiatric: Negative for mood changes   Skin: Negative for itching, rash, ulcer : + healed up right wrist scar     Hematologic/Lymph: Negative for bruising, easy bleeding  Allergic/Immunologic: Negative itching, sneezing, swelling    Physical Exam  Constitutional:       Appearance: Normal appearance.   HENT:      Head: Normocephalic.      Mouth/Throat:      Mouth: Mucous membranes are moist.   Eyes:      Extraocular Movements: Extraocular movements intact.   Cardiovascular:      Rate and Rhythm: Normal rate and regular rhythm.   Pulmonary:      Effort: Pulmonary effort is normal.      Breath sounds: Normal breath sounds.   Abdominal:      General: Abdomen is flat.      Palpations: Abdomen is soft.   Musculoskeletal:      Right shoulder: Decreased range of  "motion.      Cervical back: Normal range of motion.      Right foot: Foot drop present.   Skin:     General: Skin is warm and dry.   Neurological:      General: No focal deficit present.      Mental Status: She is alert.   Psychiatric:         Mood and Affect: Mood normal.        PAT AIRWAY:   Airway:     Mallampati::  II    Neck ROM::  Full  normal      Testing/Diagnostic:   Lab Results   Component Value Date    WBC 4.4 08/26/2024    HGB 11.5 (L) 08/26/2024    HCT 36.1 08/26/2024    MCV 90 08/26/2024     08/26/2024     Lab Results   Component Value Date    GLUCOSE 101 (H) 08/26/2024    CALCIUM 9.7 08/26/2024     08/26/2024    K 3.9 08/26/2024    CO2 23 08/26/2024     08/26/2024    BUN 15 08/26/2024    CREATININE 0.68 08/26/2024     Hemoglobin A1C   Date Value Ref Range Status   04/30/2024 5.1 see below % Final        Patient Specialist/PCP: Dr. Rushing    Visit Vitals  /52   Pulse 76   Temp 36.5 °C (97.7 °F) (Temporal)   Resp 16   Ht 1.651 m (5' 5\")   Wt 68.3 kg (150 lb 9.2 oz)   SpO2 98%   BMI 25.06 kg/m²   OB Status Postmenopausal   Smoking Status Never   BSA 1.77 m²       DASI Risk Score      Flowsheet Row [place] [facility] [place] [facility] [place]   Can you take care of yourself (eat, dress, bathe, or use toilet)?  2.75 filed at 02/26/2025 1140 2.75 filed at 11/25/2024 1026   Can you walk indoors, such as around your house? 1.75 filed at 02/26/2025 1140 1.75 filed at 11/25/2024 1026   Can you walk a block or two on level ground?  0 filed at 02/26/2025 1140 0 filed at 11/25/2024 1026   Can you climb a flight of stairs or walk up a hill? 5.5 filed at 02/26/2025 1140 5.5 filed at 11/25/2024 1026   Can you run a short distance? 0 filed at 02/26/2025 1140 0 filed at 11/25/2024 1026   Can you do light work around the house like dusting or washing dishes? 2.7 filed at 02/26/2025 1140 2.7 filed at " 11/25/2024 1026   Can you do moderate work around the house like vacuuming, sweeping floors or carrying groceries? 0 filed at 02/26/2025 1140 0 filed at 11/25/2024 1026   Can you do heavy work around the house like scrubbing floors or lifting and moving heavy furniture?  0 filed at 02/26/2025 1140 0 filed at 11/25/2024 1026   Can you do yard work like raking leaves, weeding or pushing a mower? 0 filed at 02/26/2025 1140 0 filed at 11/25/2024 1026   Can you have sexual relations? 5.25 filed at 02/26/2025 1140 5.25 filed at 11/25/2024 1026   Can you participate in moderate recreational activities like golf, bowling, dancing, doubles tennis or throwing a baseball or football? 0 filed at 02/26/2025 1140 0 filed at 11/25/2024 1026   Can you participate in strenous sports like swimming, singles tennis, football, basketball, or skiing? 0 filed at 02/26/2025 1140 0 filed at 11/25/2024 1026   DASI SCORE 17.95 filed at 02/26/2025 1140 17.95 filed at 11/25/2024 1026   METS Score (Will be calculated only when all the questions are answered) 4.9 filed at 02/26/2025 1140 4.9 filed at 11/25/2024 1026          Caprameshi DVT Assessment      Flowsheet Row Pre-Admission Testing from 2/27/2025 in [facility] Pre-Admission Testing from 11/25/2024 in [facility]   DVT Score (IF A SCORE IS NOT CALCULATING, MUST SELECT A BMI TO COMPLETE) 5 filed at 02/26/2025 1142 4 filed at 11/25/2024 1025   Surgical Factors Major surgery planned, including arthroscopic and laproscopic (1-2 hours) filed at 02/26/2025 1142 Minor surgery planned filed at 11/25/2024 1025   BMI (BMI MUST BE CHOSEN) 30 or less filed at 02/26/2025 1142 30 or less filed at 11/25/2024 1025          Modified Frailty Index      Flowsheet Row Pre-Admission Testing from 2/27/2025 in [facility] Pre-Admission Testing from 11/25/2024 in [facility]   Non-independent functional status (problems with dressing, bathing, personal  grooming, or cooking) 0 filed at 02/26/2025 1140 0 filed at 11/25/2024 1027   History of diabetes mellitus  0 filed at 02/26/2025 1140 0 filed at 11/25/2024 1027   History of COPD 0 filed at 02/26/2025 1140 0 filed at 11/25/2024 1027   History of CHF No filed at 02/26/2025 1140 No filed at 11/25/2024 1027   History of MI 0 filed at 02/26/2025 1140 0 filed at 11/25/2024 1027   History of Percutaneous Coronary Intervention, Cardiac Surgery, or Angina No filed at 02/26/2025 1140 No filed at 11/25/2024 1027   Hypertension requiring the use of medication  0 filed at 02/26/2025 1140 0 filed at 11/25/2024 1027   Peripheral vascular disease 0 filed at 02/26/2025 1140 0 filed at 11/25/2024 1027   Impaired sensorium (cognitive impairement or loss, clouding, or delirium) 0 filed at 02/26/2025 1140 0 filed at 11/25/2024 1027   TIA or CVA withouy residual deficit 0 filed at 02/26/2025 1140 0 filed at 11/25/2024 1027   Cerebrovascular accident with deficit 0 filed at 02/26/2025 1140 0 filed at 11/25/2024 1027   Modified Frailty Index Calculator 0 filed at 02/26/2025 1140 0 filed at 11/25/2024 1027          CHADS2 Stroke Risk  Current as of yesterday        N/A 3 to 100%: High Risk   2 to < 3%: Medium Risk   0 to < 2%: Low Risk     Last Change: N/A          This score determines the patient's risk of having a stroke if the patient has atrial fibrillation.        This score is not applicable to this patient. Components are not calculated.          Revised Cardiac Risk Index      Flowsheet Row Pre-Admission Testing from 2/27/2025 in [facility] Pre-Admission Testing from 11/25/2024 in [facility]   High-Risk Surgery (Intraperitoneal, Intrathoracic,Suprainguinal vascular) 0 filed at 02/26/2025 1140 0 filed at 11/25/2024 1027   History of ischemic heart disease (History of MI, History of positive exercuse test, Current chest paint considered due to myocardial ischemia, Use of nitrate therapy, ECG with  pathological Q Waves) 0 filed at 02/26/2025 1140 0 filed at 11/25/2024 1027   History of congestive heart failure (pulmonary edemia, bilateral rales or S3 gallop, Paroxysmal nocturnal dyspnea, CXR showing pulmonary vascular redistribution) 0 filed at 02/26/2025 1140 0 filed at 11/25/2024 1027   History of cerebrovascular disease (Prior TIA or stroke) 0 filed at 02/26/2025 1140 0 filed at 11/25/2024 1027   Pre-operative insulin treatment 0 filed at 02/26/2025 1140 0 filed at 11/25/2024 1027   Pre-operative creatinine>2 mg/dl 0 filed at 02/26/2025 1140 0 filed at 11/25/2024 1027   Revised Cardiac Risk Calculator 0 filed at 02/26/2025 1140 0 filed at 11/25/2024 1027          Apfel Simplified Score      Flowsheet Row Pre-Admission Testing from 2/27/2025 in [facility] Pre-Admission Testing from 11/25/2024 in [facility]   Smoking status 1 filed at 02/26/2025 1140 1 filed at 11/25/2024 1027   History of motion sickness or PONV  0 filed at 02/26/2025 1140 0 filed at 11/25/2024 1027   Use of postoperative opioids 1 filed at 02/26/2025 1140 1 filed at 11/25/2024 1027   Gender - Female 1=Yes filed at 02/26/2025 1140 1=Yes filed at 11/25/2024 1027   Apfel Simplified Score Calculator 3 filed at 02/26/2025 1140 3 filed at 11/25/2024 1027          Risk Analysis Index Results This Encounter         2/26/2025  1141             Do you live in a place other than your own home?: 0    When did you begin living in the place you are currently residing?: Greater than one year ago    Any kidney failure, kidney not working well, or seeing a kidney doctor (nephrologist)? If yes, was this for kidney stones or another problem?: 0 No    Any history of chronic (long-term) congestive heart failure (CHF)?: 0 No    Any shortness of breath when resting?: 0 No    In the past five years, have you been diagnosed with or treated for cancer?: No    During the last 3 months has it become difficult for you to remember things  or organize your thoughts?: 0 No    Have you lost weight of 10 pounds or more in the past 3 months without trying?: 0 No    Do you have any loss of appetitie?: 0 No    Getting Around (Mobility): 1 Needs help from cane, walker, or scooter    Eatin Needs help planning meals    Toiletin Can use toilet without any help    Personal Hygiene (Bathing, Hand Washing, Changing Clothes): 0 Can shower or bathe without any help    NICOLE Cancer History: Patient does not indicate history of cancer    Total Risk Analysis Index Score Without Cancer: 24    Total Risk Analysis Index Score: 24          Stop Bang Score      Flowsheet [place] [facility] [facility]   Do you snore loudly? 1 filed at 2025 1139 0 filed at 2024 1026   Do you often feel tired or fatigued after your sleep? 0 filed at 2025 1139 1 filed at 2024 1026   Has anyone ever observed you stop breathing in your sleep? 0 filed at 2025 1139 0 filed at 2024 1026   Do you have or are you being treated for high blood pressure? 0 filed at 2025 1139 0 filed at 2024 1026   Recent BMI (Calculated) 25.7 filed at 2025 1139 24.7 filed at 2024 1026   Is BMI greater than 35 kg/m2? 0=No filed at 2025 1139 0=No filed at 2024 1026   Age older than 50 years old? 1=Yes filed at 2025 1139 1=Yes filed at 2024 1026   Is your neck circumference greater than 17 inches (Male) or 16 inches (Female)? 0 filed at 2025 1139 0 filed at 2024 1026   Gender - Male 0=No filed at 2025 1139 0=No filed at 2024 1026   STOP-BANG Total Score 2 filed at 2025 1139 2 filed at 2024 1026          Prodigy: High Risk  Total Score: 15              Prodigy Age Score      Prodigy Previous Opioid Use Score           ARISCAT Score for Postoperative Pulmonary Complications      Flowsheet Row  [place] [facility] [place] [facility] [place]   Age Calculated Score 3 filed at 02/26/2025 1144 3 filed at 11/25/2024 1028   Preoperative SpO2 0 filed at 02/26/2025 1144 0 filed at 11/25/2024 1028   Respiratory infection in the last month Either upper or lower (i.e., URI, bronchitis, pneumonia), with fever and antibiotic treatment 0 filed at 02/26/2025 1144 0 filed at 11/25/2024 1028   Preoperative anemia (Hgb less than 10 g/dl) 0 filed at 02/26/2025 1144 0 filed at 11/25/2024 1028   Surgical incision  0 filed at 02/26/2025 1144 0 filed at 11/25/2024 1028   Duration of surgery  16 filed at 02/26/2025 1144 0 filed at 11/25/2024 1028   Emergency Procedure  0 filed at 02/26/2025 1144 0 filed at 11/25/2024 1028   ARISCAT Total Score  19 filed at 02/26/2025 1144 3 filed at 11/25/2024 1028          [place] Perioperative Risk for Myocardial Infarction or Cardiac Arrest (RAMONITA)      Flowsheet Row [place] [facility] [place] [facility] [place]   Calculated Age Score 1.44 filed at 02/26/2025 1144 1.44 filed at 11/25/2024 1028   Functional Status  0 filed at 02/26/2025 1144 0 filed at 11/25/2024 1028   ASA Class  -3.29 filed at 02/26/2025 1144 -3.29 filed at 11/25/2024 1028   Creatinine 0 filed at 02/26/2025 1144 0 filed at 11/25/2024 1028   Type of Procedure  0.80 filed at 02/26/2025 1144 0.80 filed at 11/25/2024 1028   RAMONITA Total Score  -6.3 filed at 02/26/2025 1144 -6.3 filed at 11/25/2024 1028   RAMONITA % 0.18 filed at 02/26/2025 1144 0.18 filed at 11/25/2024 1028            Assessment and Plan:     Pre-Op  74-year-old female scheduled for right reverse shoulder arthroplasty on 3/11/2025 with Dr. Franklin. Blood work and MRSA ordered- oral chlorahexidine prescribed.  Previous EKG on file from 2024. Otherwise no further orders indicated.     Cardiac  No  diagnosis or significant findings on chart review or clinical presentation and evaluation.    DASI Score: 17.95   MET Score: 4.9  RCRI  0 which is 3.9% 30 day risk of MACE (risk for cardiac death, nonfatal myocardial infarction, and nonfactal cardiac arrest)  RAMONITA score which indicates a  0.18 % risk of intraoperative or 30-day postoperative MACE    Pulmonary  No diagnosis or significant findings on chart review or clinical presentation and evaluation.    Preoperative deep breathing educational handout provided to patient.  STOP BAN   points which is a low risk for moderate to severe WILL  ARISCAT:    19  points which is a low (1.6%) risk of in-hospital post-op pulmonary complications     Neuro  -Carpal tunnel syndrome  -Peripheral neuropathy  -Cervical spine, history of spinal surgery with hardware in    -Lumbar spine surgery, s/p history May 2024; complicated with wound infection  -Right foot drop, secondary to recent lumbar spine surgery  -Follows with neurosurgery    Endocrine  BMI 25.06    GI  Diverticulosis  Colon polyps  Apfel: 3 points 61% risk for post operative N/V    /Renal  Counseled on avoiding NSAIDs, adequate hydration    Musculoskeletal   -Right wrist fracture, s/p surgery this past Dec 2024  -Strain of tendon of right rotator cuff, initial encounter  -Pain in right shoulder    Hematologic  No hematological medical history, however due to high Caprini score of  5 patient is at HIGH risk for perioperative DVT, informative education handout provided    Skin    -History of MRSA infection in surgical spine wound this past year in , requiring surgical washout and long-term IV antibiotics  -Patient was instructed to make surgeon aware of any skin changes and concerns prior to surgery.     Anesthesia:  Patient denies any anesthesia complications.   -Patient underwent right wrist surgery this past 2024, no acute anesthesia events documented  ASA 2    See risk scores as previously  documented.

## 2025-02-27 NOTE — PREPROCEDURE INSTRUCTIONS
Medication List            Accurate as of February 27, 2025 10:54 AM. Always use your most recent med list.                acetaminophen 500 mg tablet  Commonly known as: Tylenol  Medication Adjustments for Surgery: Take/Use as prescribed  Notes to patient: Tylenol is okay to take up until the morning of surgery for pain or headache if needed      aspirin-acetaminophen-caffeine 250-250-65 mg tablet  Commonly known as: Excedrin Migraine  Medication Adjustments for Surgery: Do Not take on the morning of surgery     Biofreeze (menthol) 4 % gel gel  Generic drug: menthol  Medication Adjustments for Surgery: Do Not take on the morning of surgery     biotin 10 mg tablet  Additional Medication Adjustments for Surgery: Take last dose 7 days before surgery  Notes to patient: STOP all NSAIDS (Ibuprofen, Motrin, Aleve), vitamins, herbals, supplements, and all over the counter medications for 7 days prior to surgery    Tylenol is okay to take up until the morning of surgery for pain or headache if needed      chlorhexidine 0.12 % solution  Commonly known as: Peridex  Swish and spit 15 ml for 2 doses, 15mL the night before surgery and 15 ml morning of surgery - swish for 30 seconds -DO NOT SWALLOW, SPIT OUT  Medication Adjustments for Surgery: Take/Use as prescribed     cholecalciferol 50 mcg (2,000 unit) capsule  Commonly known as: Vitamin D3  Take 1 capsule (50 mcg) by mouth once daily.  Additional Medication Adjustments for Surgery: Take last dose 7 days before surgery     docusate sodium 100 mg capsule  Commonly known as: Colace  Medication Adjustments for Surgery: Do Not take on the morning of surgery     fluticasone 50 mcg/actuation nasal spray  Commonly known as: Flonase  Medication Adjustments for Surgery: Take/Use as prescribed     hydrocortisone 25 mg suppository  Commonly known as: Anusol-HC  Insert 1 suppository (25 mg) into the rectum once daily.  Medication Adjustments for Surgery: Do Not take on the morning of  surgery     hyoscyamine 0.125 mg disintegrating tablet  Commonly known as: Anaspaz  Take 1 tablet (0.125 mg) by mouth 3 times a day.  Medication Adjustments for Surgery: Do Not take on the morning of surgery     ibuprofen 200 mg tablet  Additional Medication Adjustments for Surgery: Take last dose 7 days before surgery  Notes to patient: STOP all NSAIDS (Ibuprofen, Motrin, Aleve), vitamins, herbals, supplements, and all over the counter medications for 7 days prior to surgery    Tylenol is okay to take up until the morning of surgery for pain or headache if needed      LIQUID TEARS OPHT  Medication Adjustments for Surgery: Take/Use as prescribed     naproxen 250 mg tablet  Commonly known as: Naprosyn  Additional Medication Adjustments for Surgery: Take last dose 7 days before surgery     One Daily Multivitamin tablet  Generic drug: multivitamin  Take 1 tablet by mouth once daily.  Additional Medication Adjustments for Surgery: Take last dose 7 days before surgery     psyllium powder  Commonly known as: Metamucil  Medication Adjustments for Surgery: Do Not take on the morning of surgery     traMADol 50 mg tablet  Commonly known as: Ultram  Take 1 tablet (50 mg) by mouth every 6 hours if needed for severe pain (7 - 10).  Medication Adjustments for Surgery: Take/Use as prescribed     UNABLE TO FIND  Additional Medication Adjustments for Surgery: Take last dose 7 days before surgery                          PRE-OPERATIVE INSTRUCTIONS    You will receive notification one business day prior to your procedure to confirm your arrival time. It is important that you answer your phone and/or check your messages during this time. If you do not hear from the surgery center by 5 pm. the day before your procedure, please call [phone].     Please enter the building through the Outpatient entrance and take the elevator off the lobby to the 2nd floor then check in at the Outpatient Surgery desk on the 2nd  floor.    INSTRUCTIONS:  Talk to your surgeon for instructions if you should stop your aspirin, blood thinner, or diabetes medicines.  DO NOT take any multivitamins or over the counter supplements for 7-10 days before surgery.  If not being admitted, you must have an adult immediately available to drive you home after surgery. We also highly recommend you have someone stay with you for the entire day and night of your surgery.  For children having surgery, a parent or legal guardian must accompany them to the surgery center. If this is not possible, please call [phone] to make additional arrangements.  For adults who are unable to consent or make medical decisions for themselves, a legal guardian or Power of  must accompany them to the surgery center. If this is not possible, please call [phone] to make additional arrangements.  Wear comfortable, loose fitting clothing.  All jewelry and piercings must be removed. If you are unable to remove an item or have a dermal piercing, please be sure to tell the nurse when you arrive for surgery.  Nail polish and make-up must be removed.  Avoid smoking or consuming alcohol for 24 hours before surgery.  To help prevent infection, please take a shower/bath and wash your hair the night before and/or morning of surgery (or follow other specific bathing instructions provided).    Preoperative Fasting Guidelines    Why must I stop eating and drinking near surgery time?  With sedation, food or liquid in your stomach can enter your lungs causing serious complications  Increases nausea and vomiting    When do I need to stop eating and drinking before my surgery?  Do not eat any solid food after midnight the night before your surgery/procedure unless otherwise instructed by your surgeon.   You may have up to 13.5 ounces of clear liquid until TWO hours before your instructed arrival time to the hospital.  This includes water, black tea/coffee, (no milk or cream) apple  juice, and electrolyte drinks (Gatorade).   You may chew gum until TWO hours before your surgery/procedure      If applicable, notify your surgeons office immediately of any new skin changes that occur to the surgical limb.      If you have any questions or concerns, please call Pre-Admission Testing at [phone].

## 2025-02-27 NOTE — PREPROCEDURE INSTRUCTIONS
Thank you for visiting Preadmission Testing at [facility]. If you have any changes to your health condition, please call the SURGEON's office to alert them and give them details of your symptoms.        Preoperative Brain Exercises    What are brain exercises?  A brain exercise is any activity that engages your thinking (cognitive) skills.    What types of activities are considered brain exercises?  Jigsaw puzzles, crossword puzzles, word jumble, memory games, word search, and many more.  Many can be found free online or on your phone via a mobile ayaz.    Why should I do brain exercises before my surgery?  More recent research has shown brain exercise before surgery can lower the risk of postoperative delirium (confusion) which can be especially important for older adults.  Patients who did brain exercises for 5 to 10 hours the days before surgery, cut their risk of postoperative delirium in half up to 1 week after surgery.      Preoperative Deep Breathing Exercises    Why it is important to do deep breathing exercises before my surgery?  Deep breathing exercises strengthen your breathing muscles.  This helps you to recover after your surgery and decreases the chance of breathing complications.    How are the deep breathing exercises done?  Sit straight with your back supported.  Breathe in deeply and slowly through your nose. Your lower rib cage should expand and your abdomen may move forward.  Hold that breath for 3 to 5 seconds.  Breathe out through pursed lips, slowly and completely.  Rest and repeat 10 times every hour while awake.  Rest longer if you become dizzy or lightheaded.      Patient and Family Education   Ways You Can Help Prevent Blood Clots     This handout explains some simple things you can do to help prevent blood clots.      Blood clots are blockages that can form in the body's veins. When a blood clot forms in your deep veins, it may be called a deep vein thrombosis, or DVT for short. Blood clots can  happen in any part of the body where blood flows, but they are most common in the arms and legs. If a piece of a blood clot breaks free and travels to the lungs, it is called a pulmonary embolus (PE). A PE can be a very serious problem.      Being in the hospital or having surgery can raise your chances of getting a blood clot because you may not be well enough to move around as much as you normally do.      Ways you can help prevent blood clots in the hospital         Wearing SCDs. SCDs stands for Sequential Compression Devices.   SCDs are special sleeves that wrap around your legs  They attach to a pump that fills them with air to gently squeeze your legs every few minutes.   This helps return the blood in your legs to your heart.   SCDs should only be taken off when walking or bathing.   SCDs may not be comfortable, but they can help save your life.               Wearing compression stockings - if your doctor orders them. These special snug fitting stockings gently squeeze your legs to help blood flow.       Walking. Walking helps move the blood in your legs.   If your doctor says it is ok, try walking the halls at least   5 times a day. Ask us to help you get up, so you don't fall.      Taking any blood thinning medicines your doctor orders.          ©[facility]; 3/23        Ways you can help prevent blood clots at home       Wearing compression stockings - if your doctor orders them. ? Walking - to help move the blood in your legs.       Taking any blood thinning medicines your doctor orders.      Signs of a blood clot or PE      Tell your doctor or nurse know right away if you have of the problems listed below.    If you are at home, seek medical care right away. Call 911 for chest pain or problems breathing.          Signs of a blood clot (DVT) - such as pain,  swelling, redness or warmth in your arm or leg      Signs of a pulmonary embolism (PE) - such as chest     pain or feeling short of breath

## 2025-02-27 NOTE — PROGRESS NOTES
Physical Therapy    Physical Therapy Treatment    Patient Name: Khalida Barker  MRN: [id number]  Today's Date: 2/28/2025    Time Entry:   Time Calculation  Start Time: 0947  Stop Time: 1032  Time Calculation (min): 45 min     PT Therapeutic Procedures Time Entry  Neuromuscular Re-Education Time Entry: 15  Gait Training Time Entry: 15  Therapeutic Activity Time Entry: 15                 Visit #42 (1 of 10 new)  Visit #14 in 2025  [employer] [facility] PT OT COPAY 30 DED 0 COVERAGE 100 OOP 0 NO AUTH REQ, REF# [id number]     Assessment:   Pt tolerated session well without adverse effect and is making progress toward goals. Pt arrives at session with hurry cane today. She was able to go out with a friend while using hurry cane as well. Pt was able to ascend/descend 5 steps with CGA to SBA with hurry cane today. Challenged by floor > stand transfer today and requires MOD A of therapist. Adequately challenged with reactive balance drills, frequently requiring small posterior steps to widen MARLA. Pt will continue to benefit from PT to address her R shoulder function, R dropfoot & general mobility following her spine surgery.     Plan:   Continue with similar session. Continue working with SPC then without device.   As able:   --Hurdles   --Eventually river rocks for obstacle courses    Current Problem  1. Difficulty walking        2. Falls frequently        3. Right foot drop        4. Shoulder stiffness, right        5. Unsteadiness on feet          General   Pt arrives at session with her , Quinn, however attends session independently. She reports she was able to go out to eat with a friend and took her cane. Used a cross body bag to assist with carrying. Reports she was able to stand for an hour while scheduling an appt on the phone. Reports she was able to use the SPC on her stairs and a railing.   Reports she did take a pain pill earlier today for back pain, but does not rank on VAS.         Subjective    Precautions  Precautions  STEADI Fall Risk Score (The score of 4 or more indicates an increased risk of falling): 9  Vital Signs     Pain  Pain Assessment  Pain Assessment: 0-10  0-10 (Numeric) Pain Score:  (does not rank on VAS)    Objective      Treatments:  Current HEP:  Pulleys flexion/scaption in sitting  Supine AAROM flexion with dowel rogelio   Shoulder extension + scapular retraction with yellow TB   Scapular retraction + GHJ ER with red TB  Shoulder flexion with yellow looped TB for serratus activation  Supine scapular protraction/retraction     From [facility] HEP:   Seated marching  Seated hip ABD with TB   Side stepping seated  Standing squats  Side stepping standing  Standing hip flexion  Standing hip EXT   Standing HS curl  Arm lifts with 1# weight  Comments: does have adjustable ankle weights up to 5#. Currently using a 1# ankle weight    Access Code: [email or web address]  URL: [email or web address]  Date: 12/17/2024  Prepared by: Lara Hsieh    Exercises  - Shoulder External Rotation and Scapular Retraction with Resistance  - 1-2 x daily - 7 x weekly - 3 sets - 10 reps - 2 seconds hold  - Shoulder Flexion Serratus Activation with Resistance  - 1-3 x daily - 7 x weekly - 2 sets - 10 reps  - Supine Figure 4 Piriformis Stretch  - 1 x daily - 7 x weekly - 1 sets - 3-4 reps - 30 seconds hold  - Supine Lower Trapezius Strengthening  - 1-2 x daily - 7 x weekly - 3 sets - 10 reps - 5-10 seconds hold  - [place]  - 1-2 x daily - 7 x weekly - 2-3 sets - 10 reps    Neuromuscular Re-education (12825): 15 minutes  Harnessed: (with SPC then without device)   --Reactive balance: balloon volley with racquet. Multiple reps in L>R arm.   --Reactive balance: Ball bunting with physioball and dowel rogelio x10 reps  Comments: pt with frequent posterior LOB and requires posterior step to widen MARLA.     NOT TODAY:    --Multi-directional walking: fwd/bwd, side stepping, turns on command.   --Walking over  "foldout mats with 2\" sticks and small river rocks underneath without device, x2-3 laps. Performed walking forward as well as laterally  NOT TODAY:   --Obstacle course: blue & gray foams: cues to improve fwd weight shift when stepping onto foam. Also performed standing pause on compliant surfaces with EC ~10 sec each. Added river rocks, and pt had difficulty due to her feet sliding in her slip on boots  --Carrying: pt carried playground ball on foam pad    --Stepping over: hurdles in non-reciprocating pattern with cane then without device--NOT TODAY  Comments: Periodic standing rest breaks throughout session. No seated rest breaks taken today.    NOT TODAY:    Cable column walkouts with SPC and 7.5# x5 reps walking fwd/bwd & x3 reps each direction laterally.     Therapeutic Activity (34739): 15 minutes  Worked with pt on floor <> stand transfers. Use of elevated mat table and simulated pt being in sling R UE in prep for upcoming shoulder surgery.   Provided MOD A for boost to assist floor > stand transfer, and pt able to perform stand > floor transfer with supervision.     Gait Training (57571): 15 minutes  Pt ascended/descended 5 steps with SPC. Cues for sequencing. Attempting to perform without rail as pt will be having upcoming shoulder surgery and practiced using cane on stairs vs railing.      Goals:  By discharge, pt will meet the following revised goals established 1/21/25:      Pt will demo decrease in TUG score by 5 to decrease fall risk.--GOAL MET 2/17/25  Pt will increase score of BBS by 4 points to meet cutoff for falls risk assessment.--GOAL MET 2/25/25  Pt will ambulate with LRAD- preferably a cane- 350ft+ MOD I.--MOSTLY MET  Pt will increase ROM of B hips to WFL for improved ability to don socks & shoes in sitting.--GOAL MET  Pt will increase strength of R ankle DF to 3/5.--NOT MET  Pt will improve score of LEFS by 9 more points from this re-assessment (to reach 42 points).   Pt will perform DGI at or " above cutoff for falls risk assessment.--MOSTLY MET

## 2025-02-27 NOTE — CPM/PAT H&P
CPM/PAT Evaluation       Name: Khalida Barker (Khalida Barker)  /Age: 1950/74 y.o.     In-Person       Chief Complaint: pre op appointment for upcoming shoulder surgery    HPI    FLAQUITA is a 73 yo female who had a fall this past year and underwent a right wrist surgery involving hardware.  During her recovery she was experiencing right shoulder pains as well and was eventually sent for MRI imaging which showed right rotator cuff tear.  Treatment options discussed with orthopedic surgeon and subsequently she is scheduled for right reverse shoulder arthroplasty. Presents to CPM today for perioperative risk stratification and optimization.  She denies any recent steroid injections to this limb.  Skin is intact to shoulder. Otherwise denies any recent illness, fever/chills, chest pains or shortness of breath. Of note, patient has had a complicated year with a lumbar spine surgery this past May 2024 complicated by MRSA wound infection during rehab which required a washout and long-term IV antibiotics.    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     OA (osteoarthritis) of knee     Painful scar 2017    Peripheral neuropathy     Scoliosis     Skin cancer     Spinal stenosis     Traumatic ecchymosis of foot 2013    Trigger finger of right hand     Vision loss     wears glasses    Vitamin D deficiency        Past Surgical History:   Procedure Laterality Date    ADENOIDECTOMY      BACK SURGERY  2024    spine up to T10    BREAST BIOPSY      COLONOSCOPY      KNEE ARTHROPLASTY Right     MOUTH SURGERY  2016    Back left molar removed    MOUTH SURGERY  2021    right molar    OTHER SURGICAL HISTORY      Skin lesion excision    SPINAL  FUSION  06/2010    C4-5    TONSILLECTOMY  1956    WISDOM TOOTH EXTRACTION  1978    lower    WRIST SURGERY Right 06/2024    WRIST SURGERY Right 12/03/2024    Hardware removal and tenolysis right wrist       Patient Sexual activity questions deferred to the physician.    Family History   Problem Relation Name Age of Onset    Alzheimer's disease Mother      Heart disease Father      Polymyositis Father      Hypertension Brother      Skin cancer Brother      Other (peripheral artery disease) Brother         Allergies   Allergen Reactions    Oxycodone Itching     Previously tolerated Percocet        Prior to Admission medications    Medication Sig Start Date End Date Taking? Authorizing Provider   acetaminophen (Tylenol) 325 mg tablet Take 2 tablets (650 mg) by mouth every 6 hours if needed for mild pain (1 - 3) or fever (temp greater than 38.0 C).    Historical Provider, MD   aspirin-acetaminophen-caffeine (Excedrin Migraine) 250-250-65 mg tablet Take 1 tablet by mouth every 6 hours if needed for headaches.    Historical Provider, MD   biotin 10 mg tablet Take 1 tablet (10 mg) by mouth once daily.    Historical Provider, MD   chlorhexidine (Peridex) 0.12 % solution Swish and spit 15 ml for 2 doses, 15mL the night before surgery and 15 ml morning of surgery - swish for 30 seconds -DO NOT SWALLOW, SPIT OUT 2/27/25   SHAMIKA Buenrostro-CNP   cholecalciferol (Vitamin D3) 50 mcg (2,000 unit) capsule Take 1 capsule (50 mcg) by mouth once daily. 9/5/24   Rocco Rushing MD   docusate sodium (Colace) 100 mg capsule Take 1 capsule (100 mg) by mouth 2 times a day as needed (constipation).    Historical Provider, MD   fluticasone (Flonase) 50 mcg/actuation nasal spray Administer 1 spray into each nostril once daily as needed for allergies.    Historical Provider, MD   hydrocortisone (Anusol-HC) 25 mg suppository Insert 1 suppository (25 mg) into the rectum once daily. 10/10/24   Rocco Rushing MD   hyoscyamine (Anaspaz)  0.125 mg disintegrating tablet Take 1 tablet (0.125 mg) by mouth 3 times a day.  Patient not taking: Reported on 2/26/2025 7/24/24   Ron Leung MD   multivitamin (One Daily Multivitamin) tablet Take 1 tablet by mouth once daily. 9/5/24   Rocco Rushing MD   psyllium (Metamucil) powder Take 1 Dose (5.8 g) by mouth once daily.    Historical Provider, MD   traMADol (Ultram) 50 mg tablet Take 1 tablet (50 mg) by mouth every 6 hours if needed for severe pain (7 - 10).  Patient not taking: Reported on 2/26/2025 10/11/24   Justin Lu MD        PAT ROS   Constitutional: Negative for fever, chills, or sweats   ENMT: Negative for nasal discharge, congestion, ear pain, mouth pain, throat pain   Respiratory: Negative for cough, wheezing, shortness of breath   Cardiac: Negative for chest pain, dyspnea on exertion, palpitations   Gastrointestinal: Negative for nausea, vomiting, diarrhea, constipation, abdominal pain  Genitourinary: Negative for dysuria, flank pain, frequency, hematuria     Musculoskeletal: Positive for decreased ROM, pain, weakness in right shoulder     Neurological: Negative for dizziness, confusion, headache  + right foot drop, using a cane    Psychiatric: Negative for mood changes   Skin: Negative for itching, rash, ulcer : + healed up right wrist scar     Hematologic/Lymph: Negative for bruising, easy bleeding  Allergic/Immunologic: Negative itching, sneezing, swelling    Physical Exam  Constitutional:       Appearance: Normal appearance.   HENT:      Head: Normocephalic.      Mouth/Throat:      Mouth: Mucous membranes are moist.   Eyes:      Extraocular Movements: Extraocular movements intact.   Cardiovascular:      Rate and Rhythm: Normal rate and regular rhythm.   Pulmonary:      Effort: Pulmonary effort is normal.      Breath sounds: Normal breath sounds.   Abdominal:      General: Abdomen is flat.      Palpations: Abdomen is soft.   Musculoskeletal:      Right shoulder: Decreased range of  "motion.      Cervical back: Normal range of motion.      Right foot: Foot drop present.   Skin:     General: Skin is warm and dry.   Neurological:      General: No focal deficit present.      Mental Status: She is alert.   Psychiatric:         Mood and Affect: Mood normal.        PAT AIRWAY:   Airway:     Mallampati::  II    Neck ROM::  Full  normal      Testing/Diagnostic:   Lab Results   Component Value Date    WBC 4.4 08/26/2024    HGB 11.5 (L) 08/26/2024    HCT 36.1 08/26/2024    MCV 90 08/26/2024     08/26/2024     Lab Results   Component Value Date    GLUCOSE 101 (H) 08/26/2024    CALCIUM 9.7 08/26/2024     08/26/2024    K 3.9 08/26/2024    CO2 23 08/26/2024     08/26/2024    BUN 15 08/26/2024    CREATININE 0.68 08/26/2024     Hemoglobin A1C   Date Value Ref Range Status   04/30/2024 5.1 see below % Final        Patient Specialist/PCP: Dr. Rushing    Visit Vitals  /52   Pulse 76   Temp 36.5 °C (97.7 °F) (Temporal)   Resp 16   Ht 1.651 m (5' 5\")   Wt 68.3 kg (150 lb 9.2 oz)   SpO2 98%   BMI 25.06 kg/m²   OB Status Postmenopausal   Smoking Status Never   BSA 1.77 m²       DASI Risk Score      Flowsheet Row Pre-Admission Testing from 2/27/2025 in [facility] Pre-Admission Testing from 11/25/2024 in [facility]   Can you take care of yourself (eat, dress, bathe, or use toilet)?  2.75 filed at 02/26/2025 1140 2.75 filed at 11/25/2024 1026   Can you walk indoors, such as around your house? 1.75 filed at 02/26/2025 1140 1.75 filed at 11/25/2024 1026   Can you walk a block or two on level ground?  0 filed at 02/26/2025 1140 0 filed at 11/25/2024 1026   Can you climb a flight of stairs or walk up a hill? 5.5 filed at 02/26/2025 1140 5.5 filed at 11/25/2024 1026   Can you run a short distance? 0 filed at 02/26/2025 1140 0 filed at 11/25/2024 1026   Can you do light work around the house like dusting or washing dishes? 2.7 filed at 02/26/2025 1140 2.7 filed at " 11/25/2024 1026   Can you do moderate work around the house like vacuuming, sweeping floors or carrying groceries? 0 filed at 02/26/2025 1140 0 filed at 11/25/2024 1026   Can you do heavy work around the house like scrubbing floors or lifting and moving heavy furniture?  0 filed at 02/26/2025 1140 0 filed at 11/25/2024 1026   Can you do yard work like raking leaves, weeding or pushing a mower? 0 filed at 02/26/2025 1140 0 filed at 11/25/2024 1026   Can you have sexual relations? 5.25 filed at 02/26/2025 1140 5.25 filed at 11/25/2024 1026   Can you participate in moderate recreational activities like golf, bowling, dancing, doubles tennis or throwing a baseball or football? 0 filed at 02/26/2025 1140 0 filed at 11/25/2024 1026   Can you participate in strenous sports like swimming, singles tennis, football, basketball, or skiing? 0 filed at 02/26/2025 1140 0 filed at 11/25/2024 1026   DASI SCORE 17.95 filed at 02/26/2025 1140 17.95 filed at 11/25/2024 1026   METS Score (Will be calculated only when all the questions are answered) 4.9 filed at 02/26/2025 1140 4.9 filed at 11/25/2024 1026          Caprameshi DVT Assessment      Flowsheet Row [place] [facility] [place] [facility] [place]   DVT Score (IF A SCORE IS NOT CALCULATING, MUST SELECT A BMI TO COMPLETE) 5 filed at 02/26/2025 1142 4 filed at 11/25/2024 1025   Surgical Factors Major surgery planned, including arthroscopic and laproscopic (1-2 hours) filed at 02/26/2025 1142 Minor surgery planned filed at 11/25/2024 1025   BMI (BMI MUST BE CHOSEN) 30 or less filed at 02/26/2025 1142 30 or less filed at 11/25/2024 1025          Modified Frailty Index      Flowsheet Row [place] [facility] [place] [facility] [place]   Non-independent functional status (problems with dressing, bathing, personal  grooming, or cooking) 0 filed at 02/26/2025 1140 0 filed at 11/25/2024 1027   History of diabetes mellitus  0 filed at 02/26/2025 1140 0 filed at 11/25/2024 1027   History of COPD 0 filed at 02/26/2025 1140 0 filed at 11/25/2024 1027   History of CHF No filed at 02/26/2025 1140 No filed at 11/25/2024 1027   History of MI 0 filed at 02/26/2025 1140 0 filed at 11/25/2024 1027   History of Percutaneous Coronary Intervention, Cardiac Surgery, or Angina No filed at 02/26/2025 1140 No filed at 11/25/2024 1027   Hypertension requiring the use of medication  0 filed at 02/26/2025 1140 0 filed at 11/25/2024 1027   Peripheral vascular disease 0 filed at 02/26/2025 1140 0 filed at 11/25/2024 1027   Impaired sensorium (cognitive impairement or loss, clouding, or delirium) 0 filed at 02/26/2025 1140 0 filed at 11/25/2024 1027   TIA or CVA withouy residual deficit 0 filed at 02/26/2025 1140 0 filed at 11/25/2024 1027   Cerebrovascular accident with deficit 0 filed at 02/26/2025 1140 0 filed at 11/25/2024 1027   Modified Frailty Index Calculator 0 filed at 02/26/2025 1140 0 filed at 11/25/2024 1027          CHADS2 Stroke Risk  Current as of yesterday        N/A 3 to 100%: High Risk   2 to < 3%: Medium Risk   0 to < 2%: Low Risk     Last Change: N/A          This score determines the patient's risk of having a stroke if the patient has atrial fibrillation.        This score is not applicable to this patient. Components are not calculated.          Revised Cardiac Risk Index      Flowsheet Row [place] [facility] [place] [facility] [place]   High-Risk Surgery (Intraperitoneal, Intrathoracic,Suprainguinal vascular) 0 filed at 02/26/2025 1140 0 filed at 11/25/2024 1027   History of ischemic heart disease (History of MI, History of positive exercuse test, Current chest paint considered due to myocardial ischemia, Use of nitrate therapy, ECG with  pathological Q Waves) 0 filed at 02/26/2025 1140 0 filed at 11/25/2024 1027   History of congestive heart failure (pulmonary edemia, bilateral rales or S3 gallop, Paroxysmal nocturnal dyspnea, CXR showing pulmonary vascular redistribution) 0 filed at 02/26/2025 1140 0 filed at 11/25/2024 1027   History of cerebrovascular disease (Prior TIA or stroke) 0 filed at 02/26/2025 1140 0 filed at 11/25/2024 1027   Pre-operative insulin treatment 0 filed at 02/26/2025 1140 0 filed at 11/25/2024 1027   Pre-operative creatinine>2 mg/dl 0 filed at 02/26/2025 1140 0 filed at 11/25/2024 1027   Revised Cardiac Risk Calculator 0 filed at 02/26/2025 1140 0 filed at 11/25/2024 1027          Apfel Simplified Score      Flowsheet Row Pre-Admission Testing from 2/27/2025 in [facility] Pre-Admission Testing from 11/25/2024 in [facility]   Smoking status 1 filed at 02/26/2025 1140 1 filed at 11/25/2024 1027   History of motion sickness or PONV  0 filed at 02/26/2025 1140 0 filed at 11/25/2024 1027   Use of postoperative opioids 1 filed at 02/26/2025 1140 1 filed at 11/25/2024 1027   Gender - Female 1=Yes filed at 02/26/2025 1140 1=Yes filed at 11/25/2024 1027   Apfel Simplified Score Calculator 3 filed at 02/26/2025 1140 3 filed at 11/25/2024 1027          Risk Analysis Index Results This Encounter         2/26/2025  1141             Do you live in a place other than your own home?: 0    When did you begin living in the place you are currently residing?: Greater than one year ago    Any kidney failure, kidney not working well, or seeing a kidney doctor (nephrologist)? If yes, was this for kidney stones or another problem?: 0 No    Any history of chronic (long-term) congestive heart failure (CHF)?: 0 No    Any shortness of breath when resting?: 0 No    In the past five years, have you been diagnosed with or treated for cancer?: No    During the last 3 months has it become difficult for you to remember things  or organize your thoughts?: 0 No    Have you lost weight of 10 pounds or more in the past 3 months without trying?: 0 No    Do you have any loss of appetitie?: 0 No    Getting Around (Mobility): 1 Needs help from cane, walker, or scooter    Eatin Needs help planning meals    Toiletin Can use toilet without any help    Personal Hygiene (Bathing, Hand Washing, Changing Clothes): 0 Can shower or bathe without any help    NICOLE Cancer History: Patient does not indicate history of cancer    Total Risk Analysis Index Score Without Cancer: 24    Total Risk Analysis Index Score: 24          Stop Bang Score      Flowsheet [place] [facility] [place] [facility] [place]   Do you snore loudly? 1 filed at 2025 1139 0 filed at 2024 1026   Do you often feel tired or fatigued after your sleep? 0 filed at 2025 1139 1 filed at 2024 1026   Has anyone ever observed you stop breathing in your sleep? 0 filed at 2025 1139 0 filed at 2024 1026   Do you have or are you being treated for high blood pressure? 0 filed at 2025 1139 0 filed at 2024 1026   Recent BMI (Calculated) 25.7 filed at 2025 1139 24.7 filed at 2024 1026   Is BMI greater than 35 kg/m2? 0=No filed at 2025 1139 0=No filed at 2024 1026   Age older than 50 years old? 1=Yes filed at 2025 1139 1=Yes filed at 2024 1026   Is your neck circumference greater than 17 inches (Male) or 16 inches (Female)? 0 filed at 2025 1139 0 filed at 2024 1026   Gender - Male 0=No filed at 2025 1139 0=No filed at 2024 1026   STOP-BANG Total Score 2 filed at 2025 1139 2 filed at 2024 1026          Prodigy: High Risk  Total Score: 15              Prodigy Age Score      Prodigy Previous Opioid Use Score           ARISCAT Score for Postoperative Pulmonary Complications      Flowsheet Row  Pre-Admission Testing from 2/27/2025 in [facility] [place] Testing from 11/25/2024 in [facility] [place]   Age Calculated Score 3 filed at 02/26/2025 1144 3 filed at 11/25/2024 1028   Preoperative SpO2 0 filed at 02/26/2025 1144 0 filed at 11/25/2024 1028   Respiratory infection in the last month Either upper or lower (i.e., URI, bronchitis, pneumonia), with fever and antibiotic treatment 0 filed at 02/26/2025 1144 0 filed at 11/25/2024 1028   Preoperative anemia (Hgb less than 10 g/dl) 0 filed at 02/26/2025 1144 0 filed at 11/25/2024 1028   Surgical incision  0 filed at 02/26/2025 1144 0 filed at 11/25/2024 1028   Duration of surgery  16 filed at 02/26/2025 1144 0 filed at 11/25/2024 1028   Emergency Procedure  0 filed at 02/26/2025 1144 0 filed at 11/25/2024 1028   ARISCAT Total Score  19 filed at 02/26/2025 1144 3 filed at 11/25/2024 1028          [place] Perioperative Risk for Myocardial Infarction or Cardiac Arrest (RAMONITA)      Flowsheet Row Pre-Admission Testing from 2/27/2025 in [facility] [place]Admission Testing from 11/25/2024 in [facility] [place]   Calculated Age Score 1.44 filed at 02/26/2025 1144 1.44 filed at 11/25/2024 1028   Functional Status  0 filed at 02/26/2025 1144 0 filed at 11/25/2024 1028   ASA Class  -3.29 filed at 02/26/2025 1144 -3.29 filed at 11/25/2024 1028   Creatinine 0 filed at 02/26/2025 1144 0 filed at 11/25/2024 1028   Type of Procedure  0.80 filed at 02/26/2025 1144 0.80 filed at 11/25/2024 1028   RAMONITA Total Score  -6.3 filed at 02/26/2025 1144 -6.3 filed at 11/25/2024 1028   RAMONITA % 0.18 filed at 02/26/2025 1144 0.18 filed at 11/25/2024 1028            Assessment and Plan:     Pre-Op  74-year-old female scheduled for right reverse shoulder arthroplasty on 3/11/2025 with Dr. Franklin. Blood work and MRSA ordered- oral chlorahexidine prescribed.  Previous EKG on file from 2024. Otherwise no further orders indicated.     Cardiac  No  diagnosis or significant findings on chart review or clinical presentation and evaluation.    DASI Score: 17.95   MET Score: 4.9  RCRI  0 which is 3.9% 30 day risk of MACE (risk for cardiac death, nonfatal myocardial infarction, and nonfactal cardiac arrest)  RAMONITA score which indicates a  0.18 % risk of intraoperative or 30-day postoperative MACE    Pulmonary  No diagnosis or significant findings on chart review or clinical presentation and evaluation.    Preoperative deep breathing educational handout provided to patient.  STOP BAN   points which is a low risk for moderate to severe WILL  ARISCAT:    19  points which is a low (1.6%) risk of in-hospital post-op pulmonary complications     Neuro  -Carpal tunnel syndrome  -Peripheral neuropathy  -Cervical spine, history of spinal surgery with hardware in    -Lumbar spine surgery, s/p history May 2024; complicated with wound infection  -Right foot drop, secondary to recent lumbar spine surgery  -Follows with neurosurgery    Endocrine  BMI 25.06    GI  Diverticulosis  Colon polyps  Apfel: 3 points 61% risk for post operative N/V    /Renal  Counseled on avoiding NSAIDs, adequate hydration    Musculoskeletal   -Right wrist fracture, s/p surgery this past Dec 2024  -Strain of tendon of right rotator cuff, initial encounter  -Pain in right shoulder    Hematologic  No hematological medical history, however due to high Caprini score of  5 patient is at HIGH risk for perioperative DVT, informative education handout provided    Skin    -History of MRSA infection in surgical spine wound this past year in , requiring surgical washout and long-term IV antibiotics  -Patient was instructed to make surgeon aware of any skin changes and concerns prior to surgery.     Anesthesia:  Patient denies any anesthesia complications.   -Patient underwent right wrist surgery this past 2024, no acute anesthesia events documented  ASA 2    See risk scores as previously  documented.

## 2025-02-28 ENCOUNTER — APPOINTMENT (OUTPATIENT)
Dept: PHYSICAL THERAPY | Facility: CLINIC | Age: 75
End: 2025-02-28
Payer: MEDICARE

## 2025-02-28 DIAGNOSIS — R26.81 UNSTEADINESS ON FEET: ICD-10-CM

## 2025-02-28 DIAGNOSIS — R26.2 DIFFICULTY WALKING: Primary | ICD-10-CM

## 2025-02-28 DIAGNOSIS — M25.611 SHOULDER STIFFNESS, RIGHT: ICD-10-CM

## 2025-02-28 DIAGNOSIS — M21.371 RIGHT FOOT DROP: ICD-10-CM

## 2025-02-28 DIAGNOSIS — R29.6 FALLS FREQUENTLY: ICD-10-CM

## 2025-02-28 PROCEDURE — 97112 NEUROMUSCULAR REEDUCATION: CPT | Mod: GP

## 2025-02-28 PROCEDURE — 97530 THERAPEUTIC ACTIVITIES: CPT | Mod: GP

## 2025-02-28 PROCEDURE — 97116 GAIT TRAINING THERAPY: CPT | Mod: GP

## 2025-02-28 ASSESSMENT — PAIN - FUNCTIONAL ASSESSMENT: PAIN_FUNCTIONAL_ASSESSMENT: 0-10

## 2025-03-01 LAB — STAPHYLOCOCCUS SPEC CULT: ABNORMAL

## 2025-03-03 NOTE — PROGRESS NOTES
Physical Therapy    Physical Therapy Treatment    Patient Name: Khalida Barker  MRN: [id number]  Today's Date: 3/4/2025    Time Entry:   Time Calculation  Start Time: 0933  Stop Time: 1018  Time Calculation (min): 45 min     PT Therapeutic Procedures Time Entry  Neuromuscular Re-Education Time Entry: 25  Gait Training Time Entry: 20                 Visit #43 (2 of 10 new)  Visit #15 in 2025  [employer] BMN PT OT COPAY 30 DED 0 COVERAGE 100 OOP 0 NO AUTH REQ, REF# [id number]     Assessment:   Pt tolerated session well without adverse effect and is making progress toward goals. Pt was able to ascend/descend FOS using SPC with only 1-2 episodes of MIN A when descending steps, and with CGA when ascending. Occasional cues for sequencing, and occasional AP LOB. Focus of remainder of session on placement of cane for optimal balance recovery and to develop hip & ankle strategy recovery for balance, and pt with tendency to use stepping strategy with small steps for recovery. Pt will continue to benefit from PT to address her R shoulder function, R dropfoot & general mobility following her spine surgery.     Plan:   Continue with similar session.   Continue to develop use of hip/ankle strategy    Current Problem  1. Difficulty walking        2. Falls frequently        3. Right foot drop        4. Shoulder stiffness, right        5. Unsteadiness on feet          General   Pt arrives at session with her , Quinn, however attends session independently. She reports she got a call back from her insurance and found out her shoulder surgery was denied, however can appeal. Reports she still feels her stamina is impacted- reporting the pain in her back can slow her down.   Reports she would like to try walking up/down stairs with SPC again.        Subjective    Precautions  Precautions  STEADI Fall Risk Score (The score of 4 or more indicates an increased risk of falling): 9  Vital Signs     Pain  Pain  "Assessment  Pain Assessment: 0-10  0-10 (Numeric) Pain Score:  (does not rank on VAS)    Objective      Treatments:  Current HEP:  Pulleys flexion/scaption in sitting  Supine AAROM flexion with dowel rogelio   Shoulder extension + scapular retraction with yellow TB   Scapular retraction + GHJ ER with red TB  Shoulder flexion with yellow looped TB for serratus activation  Supine scapular protraction/retraction     From HHCPT HEP:   Seated marching  Seated hip ABD with TB   Side stepping seated  Standing squats  Side stepping standing  Standing hip flexion  Standing hip EXT   Standing HS curl  Arm lifts with 1# weight  Comments: does have adjustable ankle weights up to 5#. Currently using a 1# ankle weight    Access Code: [email or web address]  URL: [email or web address]  Date: 12/17/2024  Prepared by: Lara Hsieh    Exercises  - Shoulder External Rotation and Scapular Retraction with Resistance  - 1-2 x daily - 7 x weekly - 3 sets - 10 reps - 2 seconds hold  - Shoulder Flexion Serratus Activation with Resistance  - 1-3 x daily - 7 x weekly - 2 sets - 10 reps  - Supine Figure 4 Piriformis Stretch  - 1 x daily - 7 x weekly - 1 sets - 3-4 reps - 30 seconds hold  - Supine Lower Trapezius Strengthening  - 1-2 x daily - 7 x weekly - 3 sets - 10 reps - 5-10 seconds hold  - Snow [place]  - 1-2 x daily - 7 x weekly - 2-3 sets - 10 reps    Neuromuscular Re-education (00206): 25 minutes  Harnessed: (with SPC)   --Obstacle course involving: blue & gray foams & river rocks. Performed anteriorly. Cues for cane placement for optimal balance recovery and use of hip/ankle strategies over stepping strategy.   --Pt stood on 2, 2\" sticks together to increase use of hip/ankle strategy with SPC. Practiced stepping on/off of the sticks and maintaining balance prior to stepping down. Pt with tendency for posterior LOB    NOT TODAY:   --Reactive balance: balloon volley with racquet. Multiple reps in L>R arm.   --Reactive balance: Ball " "bunting with physioball and dowel rogelio x10 reps  Comments: pt with frequent posterior LOB and requires posterior step to widen MARLA.     NOT TODAY:   --Multi-directional walking: fwd/bwd, side stepping, turns on command.   --Walking over foldout mats with 2\" sticks and small river rocks underneath without device, x2-3 laps. Performed walking forward as well as laterally  NOT TODAY:   --Carrying: pt carried playground ball on foam pad  --Stepping over: hurdles in non-reciprocating pattern with cane then without device--NOT TODAY  Comments: Periodic standing rest breaks throughout session. No seated rest breaks taken today.    NOT TODAY:    Cable column walkouts with SPC and 7.5# x5 reps walking fwd/bwd & x3 reps each direction laterally.     Gait Training (70121): 20 minutes  Pt ascended/descended 5 steps with SPC. Cues for sequencing. Attempting to perform without rail as pt will be having upcoming shoulder surgery and practiced using cane on stairs vs railing.  Performed both anteriorly facing & laterally.     Pt ascended/descended FOS with CGA to infrequent MIN A when descending, and CGA ascending with cane and without rail.       Goals:  By discharge, pt will meet the following revised goals established 1/21/25:      Pt will demo decrease in TUG score by 5 to decrease fall risk.--GOAL MET 2/17/25  Pt will increase score of BBS by 4 points to meet cutoff for falls risk assessment.--GOAL MET 2/25/25  Pt will ambulate with LRAD- preferably a cane- 350ft+ MOD I.--MOSTLY MET  Pt will increase ROM of B hips to WFL for improved ability to don socks & shoes in sitting.--GOAL MET  Pt will increase strength of R ankle DF to 3/5.--NOT MET  Pt will improve score of LEFS by 9 more points from this re-assessment (to reach 42 points).   Pt will perform DGI at or above cutoff for falls risk assessment.--MOSTLY MET    "

## 2025-03-04 ENCOUNTER — APPOINTMENT (OUTPATIENT)
Dept: PHYSICAL THERAPY | Facility: CLINIC | Age: 75
End: 2025-03-04
Payer: MEDICARE

## 2025-03-04 DIAGNOSIS — M21.371 RIGHT FOOT DROP: ICD-10-CM

## 2025-03-04 DIAGNOSIS — R29.6 FALLS FREQUENTLY: ICD-10-CM

## 2025-03-04 DIAGNOSIS — R26.2 DIFFICULTY WALKING: Primary | ICD-10-CM

## 2025-03-04 DIAGNOSIS — R26.81 UNSTEADINESS ON FEET: ICD-10-CM

## 2025-03-04 DIAGNOSIS — M25.611 SHOULDER STIFFNESS, RIGHT: ICD-10-CM

## 2025-03-04 PROCEDURE — 97112 NEUROMUSCULAR REEDUCATION: CPT | Mod: GP

## 2025-03-04 PROCEDURE — 97116 GAIT TRAINING THERAPY: CPT | Mod: GP

## 2025-03-04 ASSESSMENT — PAIN - FUNCTIONAL ASSESSMENT: PAIN_FUNCTIONAL_ASSESSMENT: 0-10

## 2025-03-04 NOTE — PROGRESS NOTES
Physical Therapy    Physical Therapy Treatment    Patient Name: Khalida Barker  MRN: [id number]  Today's Date: 3/7/2025    Time Entry:   Time Calculation  Start Time: 1003  Stop Time: 1045  Time Calculation (min): 42 min     PT Therapeutic Procedures Time Entry  Neuromuscular Re-Education Time Entry: 42                 Visit #44 (3 of 10 new)  Visit #16 in 2025  [employer] BMN PT OT COPAY 30 DED 0 COVERAGE 100 OOP 0 NO AUTH REQ, REF# [id number]     Assessment:   Pt tolerated session well without adverse effect and is making progress toward goals. Focus of session on use of hip/ankle strategy once stepping up onto a narrow surface as well as compliant surface. Occasional MIN LOB requiring stepping strategy to increase LOB. Pt will continue to benefit from PT to address her R shoulder function, R dropfoot & general mobility following her spine surgery.     Plan:   Re-assessment for DC next visit due to upcoming shoulder surgery.   Continue with similar session. Reactive balance activities next visit.   Continue to develop use of hip/ankle strategy    Current Problem  1. Difficulty walking        2. Falls frequently        3. Right foot drop        4. Shoulder stiffness, right        5. Unsteadiness on feet          General   Pt arrives at session with her , Quinn, however attends session independently. She reports her shoulder surgery was approved and plans to have R reverse total shoulder on 3/11/25. Reports she continues to have some concerns of balance, as her R shoulder will be in a sling.        Subjective    Precautions  Precautions  STEADI Fall Risk Score (The score of 4 or more indicates an increased risk of falling): 9  Vital Signs     Pain  Pain Assessment  Pain Assessment: 0-10  0-10 (Numeric) Pain Score:  (does not rank on VAS)    Objective      Treatments:  Current HEP:  Pulleys flexion/scaption in sitting  Supine AAROM flexion with dowel rogelio   Shoulder extension + scapular  "retraction with yellow TB   Scapular retraction + GHJ ER with red TB  Shoulder flexion with yellow looped TB for serratus activation  Supine scapular protraction/retraction     From [facility]CPT HEP:   Seated marching  Seated hip ABD with TB   Side stepping seated  Standing squats  Side stepping standing  Standing hip flexion  Standing hip EXT   Standing HS curl  Arm lifts with 1# weight  Comments: does have adjustable ankle weights up to 5#. Currently using a 1# ankle weight    Access Code: [email or web address]  URL: [email or web address]  Date: 12/17/2024  Prepared by: Lara Hsieh    Exercises  - Shoulder External Rotation and Scapular Retraction with Resistance  - 1-2 x daily - 7 x weekly - 3 sets - 10 reps - 2 seconds hold  - Shoulder Flexion Serratus Activation with Resistance  - 1-3 x daily - 7 x weekly - 2 sets - 10 reps  - Supine Figure 4 Piriformis Stretch  - 1 x daily - 7 x weekly - 1 sets - 3-4 reps - 30 seconds hold  - Supine Lower Trapezius Strengthening  - 1-2 x daily - 7 x weekly - 3 sets - 10 reps - 5-10 seconds hold  - Snow [place]  - 1-2 x daily - 7 x weekly - 2-3 sets - 10 reps    Neuromuscular Re-education (15073): 42 minutes  Harnessed: (with SPC)   --Obstacle course involving: blue & gray foams & river rocks. Performed anteriorly. Cues for cane placement for optimal balance recovery and use of hip/ankle strategies over stepping strategy.   --Pt stood on 2, 2\" sticks together to increase use of hip/ankle strategy with SPC. Practiced stepping on/off of the sticks and maintaining balance prior to stepping down. Pt with tendency for posterior LOB  --Progressed to head movements  and EC standing without cane on this  NOT TODAY:   --Reactive balance: balloon volley with racquet. Multiple reps in L>R arm.   --Reactive balance: Ball bunting with physioball and dowel rogelio x10 reps  Comments: pt with frequent posterior LOB and requires posterior step to widen MARLA.     NOT TODAY:   --Multi-directional " "walking: fwd/bwd, side stepping, turns on command.   --Walking over foldout mats with 2\" sticks and small river rocks underneath without device, x2-3 laps. Performed walking forward as well as laterally  NOT TODAY:   --Carrying: pt carried playground ball on foam pad  --Stepping over: hurdles in non-reciprocating pattern with cane then without device--NOT TODAY  Comments: Periodic standing rest breaks throughout session. No seated rest breaks taken today.    NOT TODAY:    Cable column walkouts with SPC and 7.5# x5 reps walking fwd/bwd & x3 reps each direction laterally.     Goals:  By discharge, pt will meet the following revised goals established 1/21/25:      Pt will demo decrease in TUG score by 5 to decrease fall risk.--GOAL MET 2/17/25  Pt will increase score of BBS by 4 points to meet cutoff for falls risk assessment.--GOAL MET 2/25/25  Pt will ambulate with LRAD- preferably a cane- 350ft+ MOD I.--MOSTLY MET  Pt will increase ROM of B hips to WFL for improved ability to don socks & shoes in sitting.--GOAL MET  Pt will increase strength of R ankle DF to 3/5.--NOT MET  Pt will improve score of LEFS by 9 more points from this re-assessment (to reach 42 points).   Pt will perform DGI at or above cutoff for falls risk assessment.--MOSTLY MET    "

## 2025-03-07 ENCOUNTER — APPOINTMENT (OUTPATIENT)
Dept: PHYSICAL THERAPY | Facility: CLINIC | Age: 75
End: 2025-03-07
Payer: MEDICARE

## 2025-03-07 DIAGNOSIS — M25.611 SHOULDER STIFFNESS, RIGHT: ICD-10-CM

## 2025-03-07 DIAGNOSIS — R26.2 DIFFICULTY WALKING: Primary | ICD-10-CM

## 2025-03-07 DIAGNOSIS — R26.81 UNSTEADINESS ON FEET: ICD-10-CM

## 2025-03-07 DIAGNOSIS — M21.371 RIGHT FOOT DROP: ICD-10-CM

## 2025-03-07 DIAGNOSIS — R29.6 FALLS FREQUENTLY: ICD-10-CM

## 2025-03-07 PROCEDURE — 97112 NEUROMUSCULAR REEDUCATION: CPT | Mod: GP

## 2025-03-07 ASSESSMENT — PAIN - FUNCTIONAL ASSESSMENT: PAIN_FUNCTIONAL_ASSESSMENT: 0-10

## 2025-03-09 NOTE — PROGRESS NOTES
Physical Therapy    Physical Therapy Treatment & Discharge    Patient Name: Khalida Barker  MRN: [id number]  Today's Date: 3/10/2025    Time Entry:   Time Calculation  Start Time: 1552  Stop Time: 1632  Time Calculation (min): 40 min     PT Therapeutic Procedures Time Entry  Therapeutic Exercise Time Entry: 40                 Visit #45 (4 of 10 new) DISCHARGE  Visit #17 in 2025  [employer] BMN PT OT COPAY 30 DED 0 COVERAGE 100 OOP 0 NO AUTH REQ, REF# [id number]     Assessment:   Pt has met 5 of 6 goals. Since last re-assessment, pt was able to walk at least 480ft with hurry cane MOD I around dept. Her LEFS score improved by 9 points. Her DGI improved by 1 point and is now at the cutoff for falls risk assessment, indicating that she is less at risk for falls. Pt is scheduled for a R reverse total shoulder surgery on 3/11/25 and therefore will be Dc'd at this time for balance goals due to near optimal goal status. Pt is welcome to return to balance and/or back pain related PT at this facility once she has reached her goals from upcoming R shoulder surgery. Pt in agreement.     Goals:  By discharge, pt will meet the following revised goals established 1/21/25:      Pt will demo decrease in TUG score by 5 to decrease fall risk.--GOAL MET 2/17/25  Pt will increase score of BBS by 4 points to meet cutoff for falls risk assessment.--GOAL MET 2/25/25  Pt will ambulate with LRAD- preferably a cane- 350ft+ MOD I.--GOAL MET 3/10/25  Pt will increase ROM of B hips to WFL for improved ability to don socks & shoes in sitting.--GOAL MET  Pt will increase strength of R ankle DF to 3/5.--NOT MET  Pt will improve score of LEFS by 9 more points from this re-assessment (to reach 42 points).--GOAL MET 3/10/25  Pt will perform DGI at or above cutoff for falls risk assessment.--GOAL MET 3/10/25    Plan:   Re-assessment for DC next visit due to upcoming shoulder surgery.   Continue with similar session. Reactive  balance activities next visit.   Continue to develop use of hip/ankle strategy    Current Problem  1. Difficulty walking        2. Falls frequently        3. Right foot drop        4. Shoulder stiffness, right        5. Unsteadiness on feet          General   Pt arrives at session with her , Quinn, however attends session independently. She reports she is ready to have her shoulder surgery. Reports she overall has better balance and more comfortable walking with SPC and feels her R AFO has helped out the most. Does feel her stamina and leg strength has improved. Reports the pain in her back is the most limiting factor. Plans to discuss this with Dr. Lu.        Subjective    Precautions  Precautions  STEADI Fall Risk Score (The score of 4 or more indicates an increased risk of falling): 9  Vital Signs     Pain  Pain Assessment  Pain Assessment: 0-10  0-10 (Numeric) Pain Score: 6  Pain Location: Back  Pain Orientation: Right    Objective   Other Measures  Activities - Specific Balance Confidence Scale: 1055 (65.9% of self confidence)  Lower Extremity Funtional Score (LEFS): 43    Treatments:  Therapeutic Exercise (10091): 40 minutes  Objective measures for re-assessment & discharge, as above.       Current HEP:  Pulleys flexion/scaption in sitting  Supine AAROM flexion with dowel rogelio   Shoulder extension + scapular retraction with yellow TB   Scapular retraction + GHJ ER with red TB  Shoulder flexion with yellow looped TB for serratus activation  Supine scapular protraction/retraction     From HHCPT HEP:   Seated marching  Seated hip ABD with TB   Side stepping seated  Standing squats  Side stepping standing  Standing hip flexion  Standing hip EXT   Standing HS curl  Arm lifts with 1# weight  Comments: does have adjustable ankle weights up to 5#. Currently using a 1# ankle weight    Access Code: [email or web address]  URL: [email or web address]  Date: 12/17/2024  Prepared by: Lara  Jori    Exercises  - Shoulder External Rotation and Scapular Retraction with Resistance  - 1-2 x daily - 7 x weekly - 3 sets - 10 reps - 2 seconds hold  - Shoulder Flexion Serratus Activation with Resistance  - 1-3 x daily - 7 x weekly - 2 sets - 10 reps  - Supine Figure 4 Piriformis Stretch  - 1 x daily - 7 x weekly - 1 sets - 3-4 reps - 30 seconds hold  - Supine Lower Trapezius Strengthening  - 1-2 x daily - 7 x weekly - 3 sets - 10 reps - 5-10 seconds hold  - Snow [place]  - 1-2 x daily - 7 x weekly - 2-3 sets - 10 reps

## 2025-03-10 ENCOUNTER — TREATMENT (OUTPATIENT)
Dept: PHYSICAL THERAPY | Facility: CLINIC | Age: 75
End: 2025-03-10
Payer: MEDICARE

## 2025-03-10 DIAGNOSIS — R29.6 FALLS FREQUENTLY: ICD-10-CM

## 2025-03-10 DIAGNOSIS — M25.611 SHOULDER STIFFNESS, RIGHT: ICD-10-CM

## 2025-03-10 DIAGNOSIS — R26.81 UNSTEADINESS ON FEET: ICD-10-CM

## 2025-03-10 DIAGNOSIS — R26.2 DIFFICULTY WALKING: Primary | ICD-10-CM

## 2025-03-10 DIAGNOSIS — M21.371 RIGHT FOOT DROP: ICD-10-CM

## 2025-03-10 PROCEDURE — 97110 THERAPEUTIC EXERCISES: CPT | Mod: GP

## 2025-03-10 ASSESSMENT — PAIN SCALES - GENERAL: PAINLEVEL_OUTOF10: 6

## 2025-03-10 ASSESSMENT — PAIN - FUNCTIONAL ASSESSMENT: PAIN_FUNCTIONAL_ASSESSMENT: 0-10

## 2025-03-11 ENCOUNTER — HOSPITAL ENCOUNTER (OUTPATIENT)
Facility: HOSPITAL | Age: 75
Discharge: HOME | End: 2025-03-12
Attending: ORTHOPAEDIC SURGERY | Admitting: ORTHOPAEDIC SURGERY
Payer: MEDICARE

## 2025-03-11 ENCOUNTER — ANESTHESIA (OUTPATIENT)
Dept: OPERATING ROOM | Facility: HOSPITAL | Age: 75
End: 2025-03-11
Payer: MEDICARE

## 2025-03-11 ENCOUNTER — ANESTHESIA EVENT (OUTPATIENT)
Dept: OPERATING ROOM | Facility: HOSPITAL | Age: 75
End: 2025-03-11
Payer: MEDICARE

## 2025-03-11 DIAGNOSIS — Z96.611 STATUS POST SHOULDER REPLACEMENT, RIGHT: Primary | ICD-10-CM

## 2025-03-11 DIAGNOSIS — M41.35 THORACOGENIC SCOLIOSIS OF THORACOLUMBAR REGION: ICD-10-CM

## 2025-03-11 PROCEDURE — 99100 ANES PT EXTEME AGE<1 YR&>70: CPT | Performed by: STUDENT IN AN ORGANIZED HEALTH CARE EDUCATION/TRAINING PROGRAM

## 2025-03-11 PROCEDURE — 7100000002 HC RECOVERY ROOM TIME - EACH INCREMENTAL 1 MINUTE: Performed by: ORTHOPAEDIC SURGERY

## 2025-03-11 PROCEDURE — A23472 PR RECONSTR TOTAL SHOULDER IMPLANT: Performed by: NURSE ANESTHETIST, CERTIFIED REGISTERED

## 2025-03-11 PROCEDURE — 64415 NJX AA&/STRD BRCH PLXS IMG: CPT | Performed by: STUDENT IN AN ORGANIZED HEALTH CARE EDUCATION/TRAINING PROGRAM

## 2025-03-11 PROCEDURE — C1769 GUIDE WIRE: HCPCS | Performed by: ORTHOPAEDIC SURGERY

## 2025-03-11 PROCEDURE — A23472 PR RECONSTR TOTAL SHOULDER IMPLANT: Performed by: STUDENT IN AN ORGANIZED HEALTH CARE EDUCATION/TRAINING PROGRAM

## 2025-03-11 PROCEDURE — 2500000005 HC RX 250 GENERAL PHARMACY W/O HCPCS: Performed by: ORTHOPAEDIC SURGERY

## 2025-03-11 PROCEDURE — 3600000009 HC OR TIME - EACH INCREMENTAL 1 MINUTE - PROCEDURE LEVEL FOUR: Performed by: ORTHOPAEDIC SURGERY

## 2025-03-11 PROCEDURE — 2500000001 HC RX 250 WO HCPCS SELF ADMINISTERED DRUGS (ALT 637 FOR MEDICARE OP): Performed by: ORTHOPAEDIC SURGERY

## 2025-03-11 PROCEDURE — 2500000004 HC RX 250 GENERAL PHARMACY W/ HCPCS (ALT 636 FOR OP/ED): Performed by: STUDENT IN AN ORGANIZED HEALTH CARE EDUCATION/TRAINING PROGRAM

## 2025-03-11 PROCEDURE — 2500000004 HC RX 250 GENERAL PHARMACY W/ HCPCS (ALT 636 FOR OP/ED): Performed by: NURSE ANESTHETIST, CERTIFIED REGISTERED

## 2025-03-11 PROCEDURE — 3700000002 HC GENERAL ANESTHESIA TIME - EACH INCREMENTAL 1 MINUTE: Performed by: ORTHOPAEDIC SURGERY

## 2025-03-11 PROCEDURE — 2500000004 HC RX 250 GENERAL PHARMACY W/ HCPCS (ALT 636 FOR OP/ED): Performed by: ORTHOPAEDIC SURGERY

## 2025-03-11 PROCEDURE — 3600000004 HC OR TIME - INITIAL BASE CHARGE - PROCEDURE LEVEL FOUR: Performed by: ORTHOPAEDIC SURGERY

## 2025-03-11 PROCEDURE — 2500000001 HC RX 250 WO HCPCS SELF ADMINISTERED DRUGS (ALT 637 FOR MEDICARE OP): Performed by: PHYSICIAN ASSISTANT

## 2025-03-11 PROCEDURE — 2780000003 HC OR 278 NO HCPCS: Performed by: ORTHOPAEDIC SURGERY

## 2025-03-11 PROCEDURE — C1713 ANCHOR/SCREW BN/BN,TIS/BN: HCPCS | Performed by: ORTHOPAEDIC SURGERY

## 2025-03-11 PROCEDURE — 2500000004 HC RX 250 GENERAL PHARMACY W/ HCPCS (ALT 636 FOR OP/ED): Mod: JZ | Performed by: PHYSICIAN ASSISTANT

## 2025-03-11 PROCEDURE — 3700000001 HC GENERAL ANESTHESIA TIME - INITIAL BASE CHARGE: Performed by: ORTHOPAEDIC SURGERY

## 2025-03-11 PROCEDURE — A9999 DME SUPPLY OR ACCESSORY, NOS: HCPCS | Mod: MUE | Performed by: ORTHOPAEDIC SURGERY

## 2025-03-11 PROCEDURE — 2720000007 HC OR 272 NO HCPCS: Performed by: ORTHOPAEDIC SURGERY

## 2025-03-11 PROCEDURE — 7100000001 HC RECOVERY ROOM TIME - INITIAL BASE CHARGE: Performed by: ORTHOPAEDIC SURGERY

## 2025-03-11 PROCEDURE — 2500000004 HC RX 250 GENERAL PHARMACY W/ HCPCS (ALT 636 FOR OP/ED)

## 2025-03-11 PROCEDURE — 2500000005 HC RX 250 GENERAL PHARMACY W/O HCPCS: Performed by: NURSE ANESTHETIST, CERTIFIED REGISTERED

## 2025-03-11 PROCEDURE — 7100000011 HC EXTENDED STAY RECOVERY HOURLY - NURSING UNIT

## 2025-03-11 PROCEDURE — C1776 JOINT DEVICE (IMPLANTABLE): HCPCS | Performed by: ORTHOPAEDIC SURGERY

## 2025-03-11 PROCEDURE — A6213 FOAM DRG >16<=48 SQ IN W/BDR: HCPCS | Performed by: ORTHOPAEDIC SURGERY

## 2025-03-11 DEVICE — IMPLANTABLE DEVICE
Type: IMPLANTABLE DEVICE | Site: SHOULDER | Status: FUNCTIONAL
Brand: AEQUALIS™ PERFORM REVERSED

## 2025-03-11 DEVICE — GUIDE PIN
Type: IMPLANTABLE DEVICE | Site: SHOULDER | Status: NON-FUNCTIONAL
Brand: TORNIER PERFORM

## 2025-03-11 DEVICE — IMPLANTABLE DEVICE
Type: IMPLANTABLE DEVICE | Site: ARM | Status: FUNCTIONAL
Brand: AEQUALIS™ PERFORM+ REVERSED

## 2025-03-11 DEVICE — AEQUALIS PERFORM+ GUIDE PIN, 2.5 X 220MM: Type: IMPLANTABLE DEVICE | Site: SHOULDER | Status: NON-FUNCTIONAL

## 2025-03-11 DEVICE — GUIDE PIN, 3 X 75MM, STERILE: Type: IMPLANTABLE DEVICE | Site: SHOULDER | Status: NON-FUNCTIONAL

## 2025-03-11 RX ORDER — FENTANYL CITRATE 50 UG/ML
INJECTION, SOLUTION INTRAMUSCULAR; INTRAVENOUS AS NEEDED
Status: DISCONTINUED | OUTPATIENT
Start: 2025-03-11 | End: 2025-03-11

## 2025-03-11 RX ORDER — MIDAZOLAM HYDROCHLORIDE 1 MG/ML
1 INJECTION, SOLUTION INTRAMUSCULAR; INTRAVENOUS ONCE AS NEEDED
Status: DISCONTINUED | OUTPATIENT
Start: 2025-03-11 | End: 2025-03-11 | Stop reason: HOSPADM

## 2025-03-11 RX ORDER — METOCLOPRAMIDE HYDROCHLORIDE 5 MG/ML
10 INJECTION INTRAMUSCULAR; INTRAVENOUS ONCE AS NEEDED
Status: COMPLETED | OUTPATIENT
Start: 2025-03-11 | End: 2025-03-11

## 2025-03-11 RX ORDER — SODIUM CHLORIDE, SODIUM LACTATE, POTASSIUM CHLORIDE, CALCIUM CHLORIDE 600; 310; 30; 20 MG/100ML; MG/100ML; MG/100ML; MG/100ML
100 INJECTION, SOLUTION INTRAVENOUS CONTINUOUS
Status: ACTIVE | OUTPATIENT
Start: 2025-03-11 | End: 2025-03-11

## 2025-03-11 RX ORDER — SODIUM CHLORIDE, SODIUM LACTATE, POTASSIUM CHLORIDE, CALCIUM CHLORIDE 600; 310; 30; 20 MG/100ML; MG/100ML; MG/100ML; MG/100ML
50 INJECTION, SOLUTION INTRAVENOUS CONTINUOUS
Status: DISCONTINUED | OUTPATIENT
Start: 2025-03-11 | End: 2025-03-12 | Stop reason: HOSPADM

## 2025-03-11 RX ORDER — OXYCODONE HYDROCHLORIDE 10 MG/1
10 TABLET ORAL EVERY 4 HOURS PRN
Status: DISCONTINUED | OUTPATIENT
Start: 2025-03-11 | End: 2025-03-12 | Stop reason: HOSPADM

## 2025-03-11 RX ORDER — ROCURONIUM BROMIDE 50 MG/5 ML
SYRINGE (ML) INTRAVENOUS AS NEEDED
Status: DISCONTINUED | OUTPATIENT
Start: 2025-03-11 | End: 2025-03-11

## 2025-03-11 RX ORDER — VANCOMYCIN HYDROCHLORIDE 1 G/200ML
1000 INJECTION, SOLUTION INTRAVENOUS ONCE
Status: COMPLETED | OUTPATIENT
Start: 2025-03-11 | End: 2025-03-11

## 2025-03-11 RX ORDER — CYCLOBENZAPRINE HCL 10 MG
10 TABLET ORAL 3 TIMES DAILY PRN
Status: DISCONTINUED | OUTPATIENT
Start: 2025-03-11 | End: 2025-03-12 | Stop reason: HOSPADM

## 2025-03-11 RX ORDER — OXYCODONE HYDROCHLORIDE 5 MG/1
5 TABLET ORAL EVERY 4 HOURS PRN
Status: DISCONTINUED | OUTPATIENT
Start: 2025-03-11 | End: 2025-03-12 | Stop reason: HOSPADM

## 2025-03-11 RX ORDER — POLYETHYLENE GLYCOL 3350 17 G/17G
17 POWDER, FOR SOLUTION ORAL DAILY
Status: DISCONTINUED | OUTPATIENT
Start: 2025-03-11 | End: 2025-03-12 | Stop reason: HOSPADM

## 2025-03-11 RX ORDER — ACETAMINOPHEN 325 MG/1
650 TABLET ORAL EVERY 6 HOURS SCHEDULED
Status: DISCONTINUED | OUTPATIENT
Start: 2025-03-11 | End: 2025-03-12 | Stop reason: HOSPADM

## 2025-03-11 RX ORDER — FENTANYL CITRATE 50 UG/ML
25 INJECTION, SOLUTION INTRAMUSCULAR; INTRAVENOUS EVERY 5 MIN PRN
Status: DISCONTINUED | OUTPATIENT
Start: 2025-03-11 | End: 2025-03-11 | Stop reason: HOSPADM

## 2025-03-11 RX ORDER — SODIUM CHLORIDE 0.9 G/100ML
INJECTION, SOLUTION IRRIGATION AS NEEDED
Status: DISCONTINUED | OUTPATIENT
Start: 2025-03-11 | End: 2025-03-11 | Stop reason: HOSPADM

## 2025-03-11 RX ORDER — PROCHLORPERAZINE EDISYLATE 5 MG/ML
10 INJECTION INTRAMUSCULAR; INTRAVENOUS EVERY 6 HOURS PRN
Status: DISCONTINUED | OUTPATIENT
Start: 2025-03-11 | End: 2025-03-12 | Stop reason: HOSPADM

## 2025-03-11 RX ORDER — BISACODYL 5 MG
10 TABLET, DELAYED RELEASE (ENTERIC COATED) ORAL DAILY PRN
Status: DISCONTINUED | OUTPATIENT
Start: 2025-03-11 | End: 2025-03-12 | Stop reason: HOSPADM

## 2025-03-11 RX ORDER — PROPOFOL 10 MG/ML
INJECTION, EMULSION INTRAVENOUS AS NEEDED
Status: DISCONTINUED | OUTPATIENT
Start: 2025-03-11 | End: 2025-03-11

## 2025-03-11 RX ORDER — DOCUSATE SODIUM 100 MG/1
100 CAPSULE, LIQUID FILLED ORAL 2 TIMES DAILY PRN
Status: DISCONTINUED | OUTPATIENT
Start: 2025-03-11 | End: 2025-03-11

## 2025-03-11 RX ORDER — TRAMADOL HYDROCHLORIDE 50 MG/1
50 TABLET ORAL EVERY 8 HOURS PRN
Status: DISCONTINUED | OUTPATIENT
Start: 2025-03-11 | End: 2025-03-11

## 2025-03-11 RX ORDER — PROCHLORPERAZINE MALEATE 10 MG
10 TABLET ORAL EVERY 6 HOURS PRN
Status: DISCONTINUED | OUTPATIENT
Start: 2025-03-11 | End: 2025-03-12 | Stop reason: HOSPADM

## 2025-03-11 RX ORDER — CEFAZOLIN SODIUM 2 G/100ML
2 INJECTION, SOLUTION INTRAVENOUS ONCE
Status: COMPLETED | OUTPATIENT
Start: 2025-03-11 | End: 2025-03-11

## 2025-03-11 RX ORDER — ONDANSETRON 4 MG/1
4 TABLET, FILM COATED ORAL EVERY 8 HOURS PRN
Status: DISCONTINUED | OUTPATIENT
Start: 2025-03-11 | End: 2025-03-12 | Stop reason: HOSPADM

## 2025-03-11 RX ORDER — ACETAMINOPHEN 325 MG/1
650 TABLET ORAL EVERY 4 HOURS PRN
Status: DISCONTINUED | OUTPATIENT
Start: 2025-03-11 | End: 2025-03-11 | Stop reason: HOSPADM

## 2025-03-11 RX ORDER — ONDANSETRON HYDROCHLORIDE 2 MG/ML
4 INJECTION, SOLUTION INTRAVENOUS ONCE AS NEEDED
Status: DISCONTINUED | OUTPATIENT
Start: 2025-03-11 | End: 2025-03-11 | Stop reason: HOSPADM

## 2025-03-11 RX ORDER — ASPIRIN 81 MG/1
81 TABLET ORAL 2 TIMES DAILY
Status: DISCONTINUED | OUTPATIENT
Start: 2025-03-11 | End: 2025-03-12 | Stop reason: HOSPADM

## 2025-03-11 RX ORDER — HYDROMORPHONE HYDROCHLORIDE 0.2 MG/ML
0.2 INJECTION INTRAMUSCULAR; INTRAVENOUS; SUBCUTANEOUS EVERY 4 HOURS PRN
Status: DISCONTINUED | OUTPATIENT
Start: 2025-03-11 | End: 2025-03-12 | Stop reason: HOSPADM

## 2025-03-11 RX ORDER — LIDOCAINE HYDROCHLORIDE 20 MG/ML
INJECTION, SOLUTION EPIDURAL; INFILTRATION; INTRACAUDAL; PERINEURAL AS NEEDED
Status: DISCONTINUED | OUTPATIENT
Start: 2025-03-11 | End: 2025-03-11

## 2025-03-11 RX ORDER — ONDANSETRON HYDROCHLORIDE 2 MG/ML
4 INJECTION, SOLUTION INTRAVENOUS EVERY 8 HOURS PRN
Status: DISCONTINUED | OUTPATIENT
Start: 2025-03-11 | End: 2025-03-12 | Stop reason: HOSPADM

## 2025-03-11 RX ORDER — DIPHENHYDRAMINE HCL 25 MG
25 TABLET ORAL EVERY 6 HOURS PRN
Status: DISCONTINUED | OUTPATIENT
Start: 2025-03-11 | End: 2025-03-12 | Stop reason: HOSPADM

## 2025-03-11 RX ORDER — PROCHLORPERAZINE 25 MG/1
25 SUPPOSITORY RECTAL EVERY 12 HOURS PRN
Status: DISCONTINUED | OUTPATIENT
Start: 2025-03-11 | End: 2025-03-12 | Stop reason: HOSPADM

## 2025-03-11 RX ORDER — VANCOMYCIN HYDROCHLORIDE 1 G/200ML
1 INJECTION, SOLUTION INTRAVENOUS EVERY 12 HOURS
Status: COMPLETED | OUTPATIENT
Start: 2025-03-11 | End: 2025-03-11

## 2025-03-11 RX ORDER — MEPERIDINE HYDROCHLORIDE 50 MG/ML
12.5 INJECTION INTRAMUSCULAR; INTRAVENOUS; SUBCUTANEOUS EVERY 10 MIN PRN
Status: DISCONTINUED | OUTPATIENT
Start: 2025-03-11 | End: 2025-03-11 | Stop reason: HOSPADM

## 2025-03-11 RX ORDER — PHENYLEPHRINE HCL IN 0.9% NACL 1 MG/10 ML
SYRINGE (ML) INTRAVENOUS AS NEEDED
Status: DISCONTINUED | OUTPATIENT
Start: 2025-03-11 | End: 2025-03-11

## 2025-03-11 RX ORDER — FLUTICASONE PROPIONATE 50 MCG
1 SPRAY, SUSPENSION (ML) NASAL DAILY PRN
Status: DISCONTINUED | OUTPATIENT
Start: 2025-03-11 | End: 2025-03-12 | Stop reason: HOSPADM

## 2025-03-11 RX ORDER — LIDOCAINE HYDROCHLORIDE 10 MG/ML
0.1 INJECTION, SOLUTION INFILTRATION; PERINEURAL ONCE
Status: DISCONTINUED | OUTPATIENT
Start: 2025-03-11 | End: 2025-03-11 | Stop reason: HOSPADM

## 2025-03-11 RX ORDER — MIDAZOLAM HYDROCHLORIDE 1 MG/ML
INJECTION, SOLUTION INTRAMUSCULAR; INTRAVENOUS AS NEEDED
Status: DISCONTINUED | OUTPATIENT
Start: 2025-03-11 | End: 2025-03-11

## 2025-03-11 RX ORDER — VANCOMYCIN HYDROCHLORIDE 1 G/20ML
INJECTION, POWDER, LYOPHILIZED, FOR SOLUTION INTRAVENOUS DAILY PRN
Status: DISCONTINUED | OUTPATIENT
Start: 2025-03-11 | End: 2025-03-11

## 2025-03-11 RX ORDER — LABETALOL HYDROCHLORIDE 5 MG/ML
5 INJECTION, SOLUTION INTRAVENOUS ONCE AS NEEDED
Status: DISCONTINUED | OUTPATIENT
Start: 2025-03-11 | End: 2025-03-11 | Stop reason: HOSPADM

## 2025-03-11 RX ORDER — ONDANSETRON HYDROCHLORIDE 2 MG/ML
INJECTION, SOLUTION INTRAVENOUS AS NEEDED
Status: DISCONTINUED | OUTPATIENT
Start: 2025-03-11 | End: 2025-03-11

## 2025-03-11 RX ORDER — ALBUTEROL SULFATE 0.83 MG/ML
2.5 SOLUTION RESPIRATORY (INHALATION) ONCE AS NEEDED
Status: DISCONTINUED | OUTPATIENT
Start: 2025-03-11 | End: 2025-03-11 | Stop reason: HOSPADM

## 2025-03-11 RX ADMIN — LIDOCAINE HYDROCHLORIDE 60 MG: 20 INJECTION, SOLUTION EPIDURAL; INFILTRATION; INTRACAUDAL; PERINEURAL at 09:13

## 2025-03-11 RX ADMIN — PROPOFOL 100 MG: 10 INJECTION, EMULSION INTRAVENOUS at 09:13

## 2025-03-11 RX ADMIN — VANCOMYCIN HYDROCHLORIDE 1 G: 1 INJECTION, SOLUTION INTRAVENOUS at 22:24

## 2025-03-11 RX ADMIN — Medication 10 MG: at 09:40

## 2025-03-11 RX ADMIN — Medication 10 MG: at 10:00

## 2025-03-11 RX ADMIN — SODIUM CHLORIDE, POTASSIUM CHLORIDE, SODIUM LACTATE AND CALCIUM CHLORIDE: 600; 310; 30; 20 INJECTION, SOLUTION INTRAVENOUS at 09:05

## 2025-03-11 RX ADMIN — OXYCODONE HYDROCHLORIDE 10 MG: 10 TABLET ORAL at 23:07

## 2025-03-11 RX ADMIN — Medication 100 MCG: at 10:25

## 2025-03-11 RX ADMIN — FENTANYL CITRATE 50 MCG: 50 INJECTION, SOLUTION INTRAMUSCULAR; INTRAVENOUS at 09:13

## 2025-03-11 RX ADMIN — Medication 10 MG: at 10:25

## 2025-03-11 RX ADMIN — VANCOMYCIN HYDROCHLORIDE 1000 MG: 1 INJECTION, SOLUTION INTRAVENOUS at 07:43

## 2025-03-11 RX ADMIN — Medication 50 MG: at 09:14

## 2025-03-11 RX ADMIN — SODIUM CHLORIDE, POTASSIUM CHLORIDE, SODIUM LACTATE AND CALCIUM CHLORIDE 50 ML/HR: 600; 310; 30; 20 INJECTION, SOLUTION INTRAVENOUS at 17:33

## 2025-03-11 RX ADMIN — ACETAMINOPHEN 650 MG: 325 TABLET ORAL at 17:26

## 2025-03-11 RX ADMIN — ASPIRIN 81 MG: 81 TABLET, COATED ORAL at 20:11

## 2025-03-11 RX ADMIN — HYDROMORPHONE HYDROCHLORIDE 0.2 MG: 0.2 INJECTION, SOLUTION INTRAMUSCULAR; INTRAVENOUS; SUBCUTANEOUS at 18:44

## 2025-03-11 RX ADMIN — Medication 100 MCG: at 10:14

## 2025-03-11 RX ADMIN — DIPHENHYDRAMINE HYDROCHLORIDE 25 MG: 25 TABLET ORAL at 23:07

## 2025-03-11 RX ADMIN — PROPOFOL 30 MG: 10 INJECTION, EMULSION INTRAVENOUS at 11:27

## 2025-03-11 RX ADMIN — Medication 60 MG: at 09:13

## 2025-03-11 RX ADMIN — Medication 100 MCG: at 09:58

## 2025-03-11 RX ADMIN — SUGAMMADEX 200 MG: 100 INJECTION, SOLUTION INTRAVENOUS at 11:20

## 2025-03-11 RX ADMIN — Medication 100 MCG: at 09:45

## 2025-03-11 RX ADMIN — MIDAZOLAM 2 MG: 1 INJECTION INTRAMUSCULAR; INTRAVENOUS at 08:35

## 2025-03-11 RX ADMIN — DEXAMETHASONE SODIUM PHOSPHATE 4 MG: 4 INJECTION, SOLUTION INTRAMUSCULAR; INTRAVENOUS at 09:18

## 2025-03-11 RX ADMIN — METOCLOPRAMIDE 10 MG: 5 INJECTION, SOLUTION INTRAMUSCULAR; INTRAVENOUS at 12:04

## 2025-03-11 RX ADMIN — POVIDONE-IODINE 1 APPLICATION: 5 SOLUTION TOPICAL at 07:44

## 2025-03-11 RX ADMIN — CEFAZOLIN SODIUM 2 G: 2 INJECTION, SOLUTION INTRAVENOUS at 09:18

## 2025-03-11 RX ADMIN — PROPOFOL 20 MG: 10 INJECTION, EMULSION INTRAVENOUS at 11:32

## 2025-03-11 RX ADMIN — ONDANSETRON 4 MG: 2 INJECTION INTRAMUSCULAR; INTRAVENOUS at 10:54

## 2025-03-11 SDOH — ECONOMIC STABILITY: FOOD INSECURITY: WITHIN THE PAST 12 MONTHS, YOU WORRIED THAT YOUR FOOD WOULD RUN OUT BEFORE YOU GOT THE MONEY TO BUY MORE.: NEVER TRUE

## 2025-03-11 SDOH — SOCIAL STABILITY: SOCIAL INSECURITY: WITHIN THE LAST YEAR, HAVE YOU BEEN HUMILIATED OR EMOTIONALLY ABUSED IN OTHER WAYS BY YOUR PARTNER OR EX-PARTNER?: NO

## 2025-03-11 SDOH — ECONOMIC STABILITY: HOUSING INSECURITY: IN THE PAST 12 MONTHS, HOW MANY TIMES HAVE YOU MOVED WHERE YOU WERE LIVING?: 0

## 2025-03-11 SDOH — ECONOMIC STABILITY: TRANSPORTATION INSECURITY: IN THE PAST 12 MONTHS, HAS LACK OF TRANSPORTATION KEPT YOU FROM MEDICAL APPOINTMENTS OR FROM GETTING MEDICATIONS?: NO

## 2025-03-11 SDOH — ECONOMIC STABILITY: FOOD INSECURITY: WITHIN THE PAST 12 MONTHS, THE FOOD YOU BOUGHT JUST DIDN'T LAST AND YOU DIDN'T HAVE MONEY TO GET MORE.: NEVER TRUE

## 2025-03-11 SDOH — ECONOMIC STABILITY: HOUSING INSECURITY: AT ANY TIME IN THE PAST 12 MONTHS, WERE YOU HOMELESS OR LIVING IN A SHELTER (INCLUDING NOW)?: NO

## 2025-03-11 SDOH — HEALTH STABILITY: MENTAL HEALTH: CURRENT SMOKER: 0

## 2025-03-11 SDOH — SOCIAL STABILITY: SOCIAL INSECURITY: ARE THERE ANY APPARENT SIGNS OF INJURIES/BEHAVIORS THAT COULD BE RELATED TO ABUSE/NEGLECT?: NO

## 2025-03-11 SDOH — SOCIAL STABILITY: SOCIAL INSECURITY
WITHIN THE LAST YEAR, HAVE YOU BEEN RAPED OR FORCED TO HAVE ANY KIND OF SEXUAL ACTIVITY BY YOUR PARTNER OR EX-PARTNER?: NO

## 2025-03-11 SDOH — ECONOMIC STABILITY: HOUSING INSECURITY: IN THE LAST 12 MONTHS, WAS THERE A TIME WHEN YOU WERE NOT ABLE TO PAY THE MORTGAGE OR RENT ON TIME?: NO

## 2025-03-11 SDOH — SOCIAL STABILITY: SOCIAL INSECURITY: HAVE YOU HAD THOUGHTS OF HARMING ANYONE ELSE?: NO

## 2025-03-11 SDOH — ECONOMIC STABILITY: INCOME INSECURITY: IN THE PAST 12 MONTHS HAS THE ELECTRIC, GAS, OIL, OR WATER COMPANY THREATENED TO SHUT OFF SERVICES IN YOUR HOME?: NO

## 2025-03-11 SDOH — SOCIAL STABILITY: SOCIAL INSECURITY: WITHIN THE LAST YEAR, HAVE YOU BEEN AFRAID OF YOUR PARTNER OR EX-PARTNER?: NO

## 2025-03-11 SDOH — SOCIAL STABILITY: SOCIAL INSECURITY
WITHIN THE LAST YEAR, HAVE YOU BEEN KICKED, HIT, SLAPPED, OR OTHERWISE PHYSICALLY HURT BY YOUR PARTNER OR EX-PARTNER?: NO

## 2025-03-11 SDOH — SOCIAL STABILITY: SOCIAL INSECURITY: HAVE YOU HAD ANY THOUGHTS OF HARMING ANYONE ELSE?: NO

## 2025-03-11 SDOH — SOCIAL STABILITY: SOCIAL INSECURITY: DO YOU FEEL UNSAFE GOING BACK TO THE PLACE WHERE YOU ARE LIVING?: NO

## 2025-03-11 SDOH — SOCIAL STABILITY: SOCIAL INSECURITY: WERE YOU ABLE TO COMPLETE ALL THE BEHAVIORAL HEALTH SCREENINGS?: YES

## 2025-03-11 SDOH — ECONOMIC STABILITY: HOUSING INSECURITY: DO YOU FEEL UNSAFE GOING BACK TO THE PLACE WHERE YOU LIVE?: NO

## 2025-03-11 SDOH — SOCIAL STABILITY: SOCIAL INSECURITY: DO YOU FEEL ANYONE HAS EXPLOITED OR TAKEN ADVANTAGE OF YOU FINANCIALLY OR OF YOUR PERSONAL PROPERTY?: NO

## 2025-03-11 SDOH — SOCIAL STABILITY: SOCIAL INSECURITY: DOES ANYONE TRY TO KEEP YOU FROM HAVING/CONTACTING OTHER FRIENDS OR DOING THINGS OUTSIDE YOUR HOME?: NO

## 2025-03-11 SDOH — SOCIAL STABILITY: SOCIAL INSECURITY: HAS ANYONE EVER THREATENED TO HURT YOUR FAMILY OR YOUR PETS?: NO

## 2025-03-11 SDOH — SOCIAL STABILITY: SOCIAL INSECURITY: ABUSE: ADULT

## 2025-03-11 SDOH — SOCIAL STABILITY: SOCIAL INSECURITY: ARE YOU OR HAVE YOU BEEN THREATENED OR ABUSED PHYSICALLY, EMOTIONALLY, OR SEXUALLY BY ANYONE?: NO

## 2025-03-11 ASSESSMENT — PAIN - FUNCTIONAL ASSESSMENT
PAIN_FUNCTIONAL_ASSESSMENT: 0-10

## 2025-03-11 ASSESSMENT — PAIN SCALES - GENERAL
PAINLEVEL_OUTOF10: 0 - NO PAIN
PAINLEVEL_OUTOF10: 0 - NO PAIN
PAINLEVEL_OUTOF10: 5 - MODERATE PAIN
PAINLEVEL_OUTOF10: 0 - NO PAIN
PAINLEVEL_OUTOF10: 9
PAINLEVEL_OUTOF10: 0 - NO PAIN
PAINLEVEL_OUTOF10: 6
PAINLEVEL_OUTOF10: 0 - NO PAIN

## 2025-03-11 ASSESSMENT — LIFESTYLE VARIABLES
HOW MANY STANDARD DRINKS CONTAINING ALCOHOL DO YOU HAVE ON A TYPICAL DAY: PATIENT DOES NOT DRINK
SKIP TO QUESTIONS 9-10: 1
AUDIT-C TOTAL SCORE: 0
HOW OFTEN DO YOU HAVE 6 OR MORE DRINKS ON ONE OCCASION: NEVER
AUDIT-C TOTAL SCORE: 0
HOW OFTEN DO YOU HAVE A DRINK CONTAINING ALCOHOL: NEVER

## 2025-03-11 ASSESSMENT — COGNITIVE AND FUNCTIONAL STATUS - GENERAL: PATIENT BASELINE BEDBOUND: UNABLE TO ASSESS AT THIS TIME

## 2025-03-11 ASSESSMENT — ACTIVITIES OF DAILY LIVING (ADL)
PATIENT'S MEMORY ADEQUATE TO SAFELY COMPLETE DAILY ACTIVITIES?: YES
ADEQUATE_TO_COMPLETE_ADL: YES
WALKS IN HOME: INDEPENDENT
BATHING: INDEPENDENT
GROOMING: INDEPENDENT
LACK_OF_TRANSPORTATION: NO
HEARING - LEFT EAR: FUNCTIONAL
LACK_OF_TRANSPORTATION: NO
LACK_OF_TRANSPORTATION: NO
TOILETING: INDEPENDENT
FEEDING YOURSELF: INDEPENDENT
HEARING - RIGHT EAR: FUNCTIONAL
DRESSING YOURSELF: INDEPENDENT
ASSISTIVE_DEVICE: CANE;WALKER
JUDGMENT_ADEQUATE_SAFELY_COMPLETE_DAILY_ACTIVITIES: YES

## 2025-03-11 ASSESSMENT — PAIN DESCRIPTION - LOCATION: LOCATION: SHOULDER

## 2025-03-11 ASSESSMENT — PAIN DESCRIPTION - ORIENTATION: ORIENTATION: RIGHT

## 2025-03-11 NOTE — ANESTHESIA PREPROCEDURE EVALUATION
Patient: Khalida Barker    Procedure Information       Date/Time: 03/11/25 0910    Procedure: Right reverse shoulder arthroplasty (Right: Shoulder)    Location: [facility] OR 04 / [facility]    Surgeons: Florin Franklin MD            Relevant Problems   Neuro   (+) Anxiety   (+) Bilateral carpal tunnel syndrome   (+) C6 radiculopathy   (+) Carpal tunnel syndrome      GI   (+) Dysphagia   (+) Dysphagia, unspecified      Hematology   (+) Acute blood loss as cause of postoperative anemia      Musculoskeletal   (+) Bilateral carpal tunnel syndrome   (+) Carpal tunnel syndrome   (+) Primary osteoarthritis of right knee   (+) Scoliosis deformity of spine   (+) Thoracogenic scoliosis of thoracolumbar region       Clinical information reviewed:   Tobacco  Allergies  Meds   Med Hx  Surg Hx  OB Status  Fam Hx  Soc   Hx        NPO Detail:  NPO/Void Status  Carbohydrate Drink Given Prior to Surgery? : N  Date of Last Liquid: 03/10/25  Time of Last Liquid: 0510  Date of Last Solid: 03/10/25  Time of Last Solid: 2100  Last Intake Type: Clear fluids  Time of Last Void: 0600         Physical Exam    Airway  Mallampati: II  TM distance: >3 FB     Cardiovascular - normal exam  Rhythm: regular  Rate: normal     Dental - normal exam     Pulmonary - normal exam     Abdominal - normal exam  Abdomen: soft             Anesthesia Plan    History of general anesthesia?: yes  History of complications of general anesthesia?: no    ASA 2     regional and general     The patient is not a current smoker.  Patient was previously instructed to abstain from smoking on day of procedure.  Patient did not smoke on day of procedure.  Education provided regarding risk of obstructive sleep apnea.  intravenous induction   Postoperative administration of opioids is intended.  Anesthetic plan and risks discussed with patient.  Use of blood products discussed with patient who.    Plan discussed with CRNA.

## 2025-03-11 NOTE — ANESTHESIA PROCEDURE NOTES
Peripheral Block    Patient location during procedure: pre-op  Start time: 3/11/2025 8:36 AM  End time: 3/11/2025 8:41 AM  Reason for block: at surgeon's request and post-op pain management  Staffing  Performed: resident and attending   Authorized by: Everett Guzman DO    Performed by: Everett Guzman DO  Preanesthetic Checklist  Completed: patient identified, IV checked, site marked, risks and benefits discussed, surgical consent, monitors and equipment checked, pre-op evaluation and timeout performed   Timeout performed at: 3/11/2025 8:35 AM  Peripheral Block  Patient position: laying flat  Prep: ChloraPrep  Patient monitoring: heart rate, continuous pulse ox and cardiac monitor  Block type: interscalene  Injection technique: single-shot  Guidance: ultrasound guided  Local infiltration: lidocaine  Infiltration strength: 1 %  Dose: 3 mL  Needle  Needle type: short-bevel   Needle gauge: 22 G  Needle length: 5 cm  Needle localization: ultrasound guidance     image stored in chart  Assessment  Injection assessment: negative aspiration for heme, incremental injection and local visualized surrounding nerve on ultrasound  Heart rate change: no  Slow fractionated injection: yes  Additional Notes  Interscalene brachial plexus block:     Prior to procedure: Following a focused history, procedure-related and patient-specific complications were discussed. Risks, benefits, and alternatives were explained. Informed, written consent was provided by the patient and/or surrogate decision maker for the block. Anticoagulation (if any) was held per DONNY guidelines. ASA monitors were applied. Patient was positioned, prepped with chlorhexidine, and draped with sterile towels.      Ultrasound guidance used to visualize the brachial plexus and surrounding structures with visualization of the needle throughout duration of the procedure. Skin was numbed with 1% lidocaine. Needle was inserted and advanced towards target with  visualization of the needle throughout duration of the procedure. A total of 20 MLS of half percent ropivicaine was divided and injected unilaterally. Patient tolerated procedure well.

## 2025-03-11 NOTE — OP NOTE
Right reverse shoulder arthroplasty (R) Operative Note     Date: 3/11/2025  OR Location: [facility] OR    Name: Khalida Barker, : 1950, Age: 74 y.o., MRN: [id number], Sex: female    Diagnosis  Pre-op Diagnosis      * Strain of tendon of right rotator cuff, initial encounter [S46.011A]     * Pain in right shoulder [M25.511] Post-op Diagnosis     * Strain of tendon of right rotator cuff, initial encounter [S46.011A]     * Pain in right shoulder [M25.511]     Procedures  Right reverse shoulder arthroplasty  81178 - [place] ARTHROPLASTY GLENOHUMERAL JOINT TOTAL SHOULDER      Surgeons      * Florin Franklin - Primary    Resident/Fellow/Other Assistant:  Surgeons and Role:  * No surgeons found with a matching role *    Staff:   Surgical Assistant:   Harvinder Person: Sumi Blanton Person: Nagi Blanton Person: Kendal  Circulator: Papo    Anesthesia Staff: Anesthesiologist: Everett Guzman DO  CRNA: CRIS Sebastian    Procedure Summary  Anesthesia: Anesthesia type not filed in the log.  ASA: II  Estimated Blood Loss: 50mL  Intra-op Medications:   Administrations occurring from 0910 to 1205 on 25:   Medication Name Total Dose   sodium chloride 0.9 % irrigation solution 1,000 mL   dexAMETHasone (Decadron) injection 4 mg/mL 4 mg   fentaNYL (Sublimaze) injection 50 mcg/mL 50 mcg   lidocaine PF (Xylocaine-MPF) local injection 2 % 60 mg   ondansetron (Zofran) 2 mg/mL injection 4 mg   phenylephrine 100 mcg/mL syringe 10 mL (prefilled) 400 mcg   propofol (Diprivan) injection 10 mg/mL 100 mg   rocuronium (Zemuron) 50 mg/5 mL prefilled syringe 140 mg   ceFAZolin (Ancef) 2 g in dextrose (iso)  mL 2 g              Anesthesia Record               Intraprocedure I/O Totals       None           Specimen: No specimens collected              Drains and/or Catheters: * None in log *    Tourniquet Times:         Implants:  Implants       Type Name Action Serial No.       3MM X 100MM PERFORM HUMERAL GUIDE PIN Implanted       Joint AEQUALIS PERFORM+ GUIDE PIN, 2.5 X 220MM - [id number] Implanted      Joint GUIDE PIN, 3 X 75MM, STERILE - [id number] Implanted      Joint BASEPLATE, LATERALIZED, +3MM, 25MM - [id number] Implanted       36MM X +3MM TORNIER PERFORM SYMMETRIC RETENTIVE REVERSED INSERT, SIZE 1/2 Implanted [id number]      SIZE 2+, AEQUALIS PERFORM HUMERAL STEM, PLUS, LONG, [id number] TI Implanted       6.5MM X 35MM AEQUALIS PERFORM REVERSED SCREW Implanted       5.0MM X 26MM AEQUALIS PERFORM REVERSED SCREW Implanted       5.0MM X 18MM PERFORM PERIPHERAL SCREW Implanted      Joint SCREW, PERFORM REVERSED PERIPHERAL, 5.0 X 14MM - [id number] Implanted               Findings: Right massive rotator cuff tear and rotator cuff arthropathy.    Indications: Khalida Barker is an 74 y.o. female who is having surgery for S46.011A  M25.511.     The patient was seen in the preoperative area. The risks, benefits, complications, treatment options, non-operative alternatives, expected recovery and outcomes were discussed with the patient. The possibilities of reaction to medication, pulmonary aspiration, injury to surrounding structures, bleeding, recurrent infection, the need for additional procedures, failure to diagnose a condition, and creating a complication requiring transfusion or operation were discussed with the patient. The patient concurred with the proposed plan, giving informed consent.  The site of surgery was properly noted/marked if necessary per policy. The patient has been actively warmed in preoperative area. Preoperative antibiotics have been ordered and given within 1 hours of incision. Venous thrombosis prophylaxis have been ordered including bilateral sequential compression devices and chemical prophylaxis    Procedure Details: Right reverse shoulder arthroplasty      Complications:  None; patient tolerated the procedure well.    Disposition: PACU - hemodynamically stable.  Condition: stable       74-year of age female referred  by Dr. Gonzalez for evaluation of right shoulder pain.  Patient had a massive rotator cuff tear and rotator cuff arthropathy.  Patient also had significant history of MRSA with spine surgery by Dr. Justin Lu.  Risks and benefits of nonoperative and operative treatment reviewed with the patient.  She had failed nonoperative treatment including physical therapy and medical management.  Risks and benefits of continued nonoperative versus operative approach were reviewed.  Patient wanted proceed with operative procedure as it was affecting her actives of daily living and lifestyle.  Right reverse shoulder arthroplasty was reviewed.  Patient was informed of the incision and the recovery.  Patient was performed of risk to include but not limited to infection both perioperative and delayed, wound complications, arthrofibrosis, implant loosening, periprosthetic fracture, shoulder instability, need for revision surgery, DVT, pulmonary embolus, neurovascular injury, complex regional pain serum, medical risk, anesthetic risk, catastrophic complications including potential death.  Was discussed with the patient given her history of MRSA plus she tested positive for MRSA that we would proceed with vancomycin antibiotics.  We also reviewed her higher risk for potential infection.    Patient was brought to the operative where after induction of general and regional anesthesia she was placed in a beachchair position and shoulder arthroscopy chair.  Examiner anesthesia demonstrated range of motion of 160/50/60.  Right upper extremities then prepped and draped in usual sterile fashion.  Patient was given 1 g of vancomycin for prophylactic antibiotics.  Timeout was taken confirming surgical site, procedure, instrumentation, fire risk, and antibiotics.    15 blade was used to make a anterior incision for a standard deltopectoral approach to the shoulder.  Sharp dissection was carried down through skin subcutaneous tissue.  Hemostasis  maintained with electrocautery.  Parth retractor was placed for the deltoid.  Appendiceal placed medially.  Deltopectoral fascia was incised.  There was evidence of long head biceps rupture.  Biceps was debrided proximally.  capsular incision was taken down.  This was tagged with #2 Ethibond suture.  Shoulder was subluxed.  There is a massive rotator cuff tear which included the entirety of the supraspinatus, infraspinatus, teres minor as well as the subscapularis.  Using the intramedullary guide humeral osteotomy was performed.  Proximal humeral protector was placed.  Attention was then drawn to the glenoid.  Retractors were placed.  Labrum was excised.  Using the Tornier perform system reversed augmented glenoid guide was utilized at this was drilled.  Reaming was performed and then the centralized screw was drilled.  A 25+3 mm reversed augmented glenoid was placed.  Centralize screw was placed.  Next 2 compression screws were placed anteriorly and posteriorly.  Locking screws were placed superiorly and inferiorly.  Next the 36 mm glenosphere was placed.  Humerus was then subluxed anteriorly.  Guidepin was placed followed by reaming for a size 2 perform humeral Tornier stem.  Sequential broaching was performed up for a 2+ long humeral stem.  This is impacted into position.  The size 1/2 36 mm reversed +3 mm thickness was then utilized.  This had been trialed and was felt to be most stable.  Humerus was reduced.  Taken through range of motion was stable through range of motion.  Copious irrigation was then performed with surgery for, saline and Irrisept irrigation.  The capsule was then repaired through bone tunnels in the greater tuberosity with #2 Orthocord sutures.  The deltopectoral interval was loosely closed with 0 Vicryl.  Dermis approximated 2-0 Vicryl the skin was approximated strata fix.  Sterile silver impregnated dressing was applied.  All sponges were counts correct at the end of the case.  Patient  was placed in a shoulder immobilizer.            Florin Franklin  Phone Number: [phone]

## 2025-03-11 NOTE — DISCHARGE INSTR - ACTIVITY
Dr. Franklin Left shoulder  Call Dr. Franklin for any problems and/or concerns.  *Non-weight bearing right upper extremity  *Wear arm sling as instructed:  Remove 3 times/day to move elbow, wrist, and hand  *Apply ice to shoulder to minimize swelling, on 20 minutes, off 20 minutes  *You may shower, do not soak or scrub incision; pat dry  *Keep dressing clean and dry.    Call  right away for:  *Fever of 100.4 or above/shaking chills  *Increased swelling in your arm  *Increased redness and/or tenderness around the incision  *Persistent drainage from the incision  *Chest pain/shortness of breath-call [phone]

## 2025-03-11 NOTE — ANESTHESIA PROCEDURE NOTES
Airway  Date/Time: 3/11/2025 9:17 AM  Urgency: elective    Airway not difficult    Staffing  Performed: CRNA   Authorized by: Everett Guzman DO    Performed by: SHAMIKA Sebastian-NIVIA  Patient location during procedure: OR    Indications and Patient Condition  Indications for airway management: anesthesia  Spontaneous Ventilation: absent  Sedation level: deep  Preoxygenated: yes  Patient position: sniffing  Mask difficulty assessment: 1 - vent by mask  Planned trial extubation    Final Airway Details  Final airway type: endotracheal airway      Successful airway: ETT  Cuffed: yes   Successful intubation technique: direct laryngoscopy  Facilitating devices/methods: intubating stylet  Endotracheal tube insertion site: oral  Blade: Crystal  Blade size: #3  ETT size (mm): 7.0  Cormack-Lehane Classification: grade IIa - partial view of glottis  Placement verified by: capnometry   Measured from: teeth  ETT to teeth (cm): 21  Number of attempts at approach: 1

## 2025-03-11 NOTE — CARE PLAN
The patient's goals for the shift include      The clinical goals for the shift include gain sensation to rt hand  Pt alert and oriented. Denies pain. Silver mepilex cdi to rt shoulder, sling on. Assisted to bsc to void. Pt. Tolerating diet no nausea. C/o rt hand feeling numb. Fingers warm and mobile.

## 2025-03-11 NOTE — DISCHARGE INSTR - OTHER ORDERS
If you have any questions or concerns about your discharge instructions, please call the unit at  and ask to speak with the charge nurse. [place] you [place] [facility] [place] was our pleasure to care for you.

## 2025-03-12 ENCOUNTER — PHARMACY VISIT (OUTPATIENT)
Dept: PHARMACY | Facility: CLINIC | Age: 75
End: 2025-03-12
Payer: COMMERCIAL

## 2025-03-12 VITALS
OXYGEN SATURATION: 94 % | RESPIRATION RATE: 16 BRPM | SYSTOLIC BLOOD PRESSURE: 122 MMHG | DIASTOLIC BLOOD PRESSURE: 77 MMHG | HEART RATE: 102 BPM | HEIGHT: 65 IN | TEMPERATURE: 98.6 F | WEIGHT: 150 LBS | BODY MASS INDEX: 24.99 KG/M2

## 2025-03-12 LAB
ANION GAP SERPL CALC-SCNC: 13 MMOL/L (ref 10–20)
BUN SERPL-MCNC: 15 MG/DL (ref 6–23)
CALCIUM SERPL-MCNC: 8.7 MG/DL (ref 8.6–10.3)
CHLORIDE SERPL-SCNC: 106 MMOL/L (ref 98–107)
CO2 SERPL-SCNC: 24 MMOL/L (ref 21–32)
CREAT SERPL-MCNC: 0.68 MG/DL (ref 0.5–1.05)
EGFRCR SERPLBLD CKD-EPI 2021: >90 ML/MIN/1.73M*2
ERYTHROCYTE [DISTWIDTH] IN BLOOD BY AUTOMATED COUNT: 13.6 % (ref 11.5–14.5)
GLUCOSE SERPL-MCNC: 125 MG/DL (ref 74–99)
HCT VFR BLD AUTO: 34.5 % (ref 36–46)
HGB BLD-MCNC: 11.3 G/DL (ref 12–16)
MCH RBC QN AUTO: 31.5 PG (ref 26–34)
MCHC RBC AUTO-ENTMCNC: 32.8 G/DL (ref 32–36)
MCV RBC AUTO: 96 FL (ref 80–100)
NRBC BLD-RTO: 0 /100 WBCS (ref 0–0)
PLATELET # BLD AUTO: 210 X10*3/UL (ref 150–450)
POTASSIUM SERPL-SCNC: 3.7 MMOL/L (ref 3.5–5.3)
RBC # BLD AUTO: 3.59 X10*6/UL (ref 4–5.2)
SODIUM SERPL-SCNC: 139 MMOL/L (ref 136–145)
WBC # BLD AUTO: 6.7 X10*3/UL (ref 4.4–11.3)

## 2025-03-12 PROCEDURE — 97161 PT EVAL LOW COMPLEX 20 MIN: CPT | Mod: GP

## 2025-03-12 PROCEDURE — 2500000001 HC RX 250 WO HCPCS SELF ADMINISTERED DRUGS (ALT 637 FOR MEDICARE OP): Performed by: PHYSICIAN ASSISTANT

## 2025-03-12 PROCEDURE — 85027 COMPLETE CBC AUTOMATED: CPT | Performed by: ORTHOPAEDIC SURGERY

## 2025-03-12 PROCEDURE — 2500000004 HC RX 250 GENERAL PHARMACY W/ HCPCS (ALT 636 FOR OP/ED): Performed by: ORTHOPAEDIC SURGERY

## 2025-03-12 PROCEDURE — 7100000011 HC EXTENDED STAY RECOVERY HOURLY - NURSING UNIT

## 2025-03-12 PROCEDURE — 36415 COLL VENOUS BLD VENIPUNCTURE: CPT | Performed by: ORTHOPAEDIC SURGERY

## 2025-03-12 PROCEDURE — RXMED WILLOW AMBULATORY MEDICATION CHARGE

## 2025-03-12 PROCEDURE — 97535 SELF CARE MNGMENT TRAINING: CPT | Mod: GO | Performed by: OCCUPATIONAL THERAPIST

## 2025-03-12 PROCEDURE — 80048 BASIC METABOLIC PNL TOTAL CA: CPT | Performed by: ORTHOPAEDIC SURGERY

## 2025-03-12 PROCEDURE — 2500000001 HC RX 250 WO HCPCS SELF ADMINISTERED DRUGS (ALT 637 FOR MEDICARE OP): Performed by: ORTHOPAEDIC SURGERY

## 2025-03-12 PROCEDURE — 97165 OT EVAL LOW COMPLEX 30 MIN: CPT | Mod: GO | Performed by: OCCUPATIONAL THERAPIST

## 2025-03-12 RX ORDER — ASPIRIN 81 MG/1
81 TABLET ORAL 2 TIMES DAILY
Qty: 60 TABLET | Refills: 0 | Status: SHIPPED | OUTPATIENT
Start: 2025-03-12 | End: 2025-04-11

## 2025-03-12 RX ORDER — TRAMADOL HYDROCHLORIDE 50 MG/1
50 TABLET ORAL EVERY 6 HOURS PRN
Qty: 28 TABLET | Refills: 0 | Status: SHIPPED | OUTPATIENT
Start: 2025-03-12

## 2025-03-12 RX ORDER — ACETAMINOPHEN 325 MG/1
650 TABLET ORAL EVERY 6 HOURS PRN
Qty: 240 TABLET | Refills: 0 | Status: SHIPPED | OUTPATIENT
Start: 2025-03-12 | End: 2025-04-11

## 2025-03-12 RX ORDER — POLYETHYLENE GLYCOL 3350 17 G/17G
17 POWDER, FOR SOLUTION ORAL DAILY
Qty: 7 PACKET | Refills: 0 | Status: SHIPPED | OUTPATIENT
Start: 2025-03-13 | End: 2025-03-20

## 2025-03-12 RX ADMIN — ACETAMINOPHEN 650 MG: 325 TABLET ORAL at 00:30

## 2025-03-12 RX ADMIN — POLYETHYLENE GLYCOL 3350 17 G: 17 POWDER, FOR SOLUTION ORAL at 09:06

## 2025-03-12 RX ADMIN — CYCLOBENZAPRINE 10 MG: 10 TABLET, FILM COATED ORAL at 09:06

## 2025-03-12 RX ADMIN — DIPHENHYDRAMINE HYDROCHLORIDE 25 MG: 25 TABLET ORAL at 14:20

## 2025-03-12 RX ADMIN — OXYCODONE HYDROCHLORIDE 10 MG: 10 TABLET ORAL at 09:06

## 2025-03-12 RX ADMIN — ASPIRIN 81 MG: 81 TABLET, COATED ORAL at 09:06

## 2025-03-12 RX ADMIN — ACETAMINOPHEN 650 MG: 325 TABLET ORAL at 05:32

## 2025-03-12 RX ADMIN — DIPHENHYDRAMINE HYDROCHLORIDE 25 MG: 25 TABLET ORAL at 05:32

## 2025-03-12 RX ADMIN — OXYCODONE HYDROCHLORIDE 10 MG: 10 TABLET ORAL at 04:43

## 2025-03-12 RX ADMIN — ACETAMINOPHEN 650 MG: 325 TABLET ORAL at 12:34

## 2025-03-12 RX ADMIN — OXYCODONE HYDROCHLORIDE 10 MG: 10 TABLET ORAL at 14:20

## 2025-03-12 ASSESSMENT — COGNITIVE AND FUNCTIONAL STATUS - GENERAL
HELP NEEDED FOR BATHING: A LOT
WALKING IN HOSPITAL ROOM: A LITTLE
MOVING FROM LYING ON BACK TO SITTING ON SIDE OF FLAT BED WITH BEDRAILS: A LITTLE
MOVING TO AND FROM BED TO CHAIR: A LITTLE
DRESSING REGULAR UPPER BODY CLOTHING: A LOT
TOILETING: A LITTLE
STANDING UP FROM CHAIR USING ARMS: A LITTLE
MOVING FROM LYING ON BACK TO SITTING ON SIDE OF FLAT BED WITH BEDRAILS: A LITTLE
WALKING IN HOSPITAL ROOM: A LITTLE
DAILY ACTIVITIY SCORE: 19
MOBILITY SCORE: 18
PERSONAL GROOMING: A LITTLE
MOVING TO AND FROM BED TO CHAIR: A LITTLE
DRESSING REGULAR LOWER BODY CLOTHING: A LITTLE
HELP NEEDED FOR BATHING: A LITTLE
CLIMB 3 TO 5 STEPS WITH RAILING: A LITTLE
TURNING FROM BACK TO SIDE WHILE IN FLAT BAD: A LITTLE
TOILETING: A LITTLE
TURNING FROM BACK TO SIDE WHILE IN FLAT BAD: A LITTLE
STANDING UP FROM CHAIR USING ARMS: A LITTLE
DAILY ACTIVITIY SCORE: 16
DRESSING REGULAR UPPER BODY CLOTHING: A LOT
CLIMB 3 TO 5 STEPS WITH RAILING: A LITTLE
DRESSING REGULAR LOWER BODY CLOTHING: A LOT
MOBILITY SCORE: 18

## 2025-03-12 ASSESSMENT — PAIN SCALES - GENERAL
PAIN_LEVEL: 0
PAINLEVEL_OUTOF10: 8
PAINLEVEL_OUTOF10: 9
PAINLEVEL_OUTOF10: 10 - WORST POSSIBLE PAIN
PAINLEVEL_OUTOF10: 9
PAINLEVEL_OUTOF10: 9
PAINLEVEL_OUTOF10: 8

## 2025-03-12 ASSESSMENT — PAIN - FUNCTIONAL ASSESSMENT
PAIN_FUNCTIONAL_ASSESSMENT: 0-10

## 2025-03-12 ASSESSMENT — PAIN DESCRIPTION - DESCRIPTORS: DESCRIPTORS: ACHING

## 2025-03-12 ASSESSMENT — ACTIVITIES OF DAILY LIVING (ADL)
ADL_ASSISTANCE: INDEPENDENT
HOME_MANAGEMENT_TIME_ENTRY: 15
ADL_ASSISTANCE: INDEPENDENT

## 2025-03-12 NOTE — ANESTHESIA POSTPROCEDURE EVALUATION
Patient: Khalida Barker    Procedure Summary       Date: 03/11/25 Room / Location: [facility] OR 04 / Virtual [facility] OR    Anesthesia Start: 0905 Anesthesia Stop: 1145    Procedure: Right reverse shoulder arthroplasty (Right: Shoulder) Diagnosis:       Strain of tendon of right rotator cuff, initial encounter      Pain in right shoulder      (S46.011A)      (M25.511)    Surgeons: Florin Franklin MD Responsible Provider: Everett Guzman DO    Anesthesia Type: regional, general ASA Status: 2            Anesthesia Type: regional, general    Vitals Value Taken Time   /73 03/11/25 1345   Temp 36.1 °C (97 °F) 03/11/25 1345   Pulse 89 03/11/25 1347   Resp 21 03/11/25 1347   SpO2 95 % 03/11/25 1345   Vitals shown include unfiled device data.    Anesthesia Post Evaluation    Patient location during evaluation: PACU  Patient participation: complete - patient participated  Level of consciousness: awake  Pain score: 0  Pain management: adequate  Multimodal analgesia pain management approach  Airway patency: patent  Two or more strategies used to mitigate risk of obstructive sleep apnea  Cardiovascular status: acceptable  Respiratory status: acceptable  Hydration status: acceptable  Postoperative Nausea and Vomiting: none        No notable events documented.

## 2025-03-12 NOTE — PROGRESS NOTES
"Khalida Barker is a 74 y.o. female on day 0 of admission presenting with No Principal Problem: There is no principal problem currently on the Problem List. Please update the Problem List and refresh..    Subjective   Doing well.  Tolerating oral diet.  Describes good pain control.  Denies any chest pain, shortness of breath or lightheadedness.       Objective     Physical Exam  Sitting up in chair comfortable no acute distress.  Alert and oriented x 3.  Right upper extremity dressing clean dry and intact.  Axillary patch intact.  Medial and lateral antebrachial cutaneous nerve divisions are intact.  Radial, ulnar, median nerve motor and sensory distribution are intact bilaterally.  Radial pulse palpable symmetric bilaterally.  Breath sounds equal bilaterally.  Regular rate and rhythm.  Abdomen soft nontender nondistended.    Last Recorded Vitals  Blood pressure (!) 120/47, pulse 77, temperature 37 °C (98.6 °F), temperature source Temporal, resp. rate 16, height 1.651 m (5' 5\"), weight 68 kg (150 lb), SpO2 95%.  Intake/Output last 3 Shifts:  I/O last 3 completed shifts:  In: 2180 (32 mL/kg) [I.V.:1280 (18.8 mL/kg); IV Piggyback:900]  Out: - (0 mL/kg)   Weight: 68 kg     Relevant Results      Scheduled medications  acetaminophen, 650 mg, oral, q6h NUNU  aspirin, 81 mg, oral, BID  polyethylene glycol, 17 g, oral, Daily      Continuous medications  lactated Ringer's, 50 mL/hr, Last Rate: 50 mL/hr (03/12/25 1018)      PRN medications  PRN medications: bisacodyl, cyclobenzaprine, diphenhydrAMINE, fluticasone, HYDROmorphone, ondansetron **OR** ondansetron, oxyCODONE, oxyCODONE, prochlorperazine **OR** prochlorperazine **OR** prochlorperazine  Results for orders placed or performed during the hospital encounter of 03/11/25 (from the past 24 hours)   Basic metabolic panel   Result Value Ref Range    Glucose 125 (H) 74 - 99 mg/dL    Sodium 139 136 - 145 mmol/L    Potassium 3.7 3.5 - 5.3 mmol/L    Chloride 106 98 - 107 mmol/L    " Bicarbonate 24 21 - 32 mmol/L    Anion Gap 13 10 - 20 mmol/L    Urea Nitrogen 15 6 - 23 mg/dL    Creatinine 0.68 0.50 - 1.05 mg/dL    eGFR >90 >60 mL/min/1.73m*2    Calcium 8.7 8.6 - 10.3 mg/dL   CBC POD 1   Result Value Ref Range    WBC 6.7 4.4 - 11.3 x10*3/uL    nRBC 0.0 0.0 - 0.0 /100 WBCs    RBC 3.59 (L) 4.00 - 5.20 x10*6/uL    Hemoglobin 11.3 (L) 12.0 - 16.0 g/dL    Hematocrit 34.5 (L) 36.0 - 46.0 %    MCV 96 80 - 100 fL    MCH 31.5 26.0 - 34.0 pg    MCHC 32.8 32.0 - 36.0 g/dL    RDW 13.6 11.5 - 14.5 %    Platelets 210 150 - 450 x10*3/uL     *Note: Due to a large number of results and/or encounters for the requested time period, some results have not been displayed. A complete set of results can be found in Results Review.                            Assessment/Plan   Assessment & Plan    Postoperative day #1 status post reverse shoulder arthroplasty    Discharge home today.  Hand, elbow, wrist range of motion and pendulum exercises.  Change dressing postoperative day #7 to a new silver impregnated dressing.  Use sling and cold therapy.  Follow-up my office in 2 to 3 weeks.           Florin Franklin MD

## 2025-03-12 NOTE — PROGRESS NOTES
Occupational Therapy  Evaluation    Patient Name: Khalida Barker  MRN: [id number]  Today's Date: 3/12/2025  Time Calculation  Start Time: 0945  Stop Time: 1023  Time Calculation (min): 38 min  4124/4124-A    Assessment  IP OT Assessment  OT Assessment: Patient demonstrates decreased independence with self care, decreased independence with functional transfers, decreased balance, decreased endurance and decreased strength.  Patient will benefit from skilled OT to address deficits.  Anticipate safe discharge to prior level of living with low intensity therapy.  Prognosis: Good  Barriers to Discharge Home: No anticipated barriers  Evaluation/Treatment Tolerance: Patient tolerated treatment well  Medical Staff Made Aware: Yes  End of Session Communication: Bedside nurse  End of Session Patient Position: Up in chair, Alarm on    Plan:  Treatment Interventions: ADL retraining, Functional transfer training, UE strengthening/ROM, Endurance training, Cognitive reorientation, Patient/family training  OT Frequency: Daily  OT Discharge Recommendations: Low intensity level of continued care  Equipment Recommended upon Discharge: Straight cane  OT Recommended Transfer Status: Minimal assist  OT - OK to Discharge: Yes (to next level of care when medically cleared by physician)    Subjective     Current Problem:  No diagnosis found.    General:  General  Reason for Referral: reverse TSA  Referred By: Florin Franklin MD  Past Medical History Relevant to Rehab: Pt admitted 3/11/25 following completion of right reverse TSA. PMH: bilateral carpal tunnel syndrome, cervicalgia, anxiety, back surgery, right foot drop, ankylosing spondylitis  Family/Caregiver Present: No  Co-Treatment: PT  Co-Treatment Reason: for safety and dc planning  Prior to Session Communication: Bedside nurse  Patient Position Received: Up in chair  Preferred Learning Style: verbal, visual  General Comment: Pt agreeable to OT, nursing cleared for  "treatment.    Precautions:  UE Weight Bearing Status: Right Non-Weight Bearing  Medical Precautions: Fall precautions  Braces Applied: sling donned for all activity  Prosthesis/Orthosis Used: Ankle/Foot orthosis (right)  Precautions Comment: No active movement of operative shoulder. Patient to remain in sling at all times unless with therapy. Patient can come out of the sling for active range of motion of elbow, wrist, hand and fingers with therapy only. NWB to operative extremity. For anatomic total shoulder replacement, PROM and AAROM for external rotation not passed 30 degrees.    Pain:  Pain Assessment  Pain Assessment: 0-10  0-10 (Numeric) Pain Score: 9  Pain Type: Surgical pain  Pain Location: Shoulder  Pain Orientation: Right  Pain Interventions: Repositioned, Cold applied    Objective     Cognition:  Overall Cognitive Status: Within Functional Limits  Orientation Level: Oriented X4        Home Living:  Type of Home: House  Lives With: Spouse  Home Adaptive Equipment: Cane  Home Layout: Able to live on main level with bedroom/bathroom, Full bath main level  Home Access: Stairs to enter with rails  Entrance Stairs-Rails: Both  Entrance Stairs-Number of Steps: 3  Bathroom Shower/Tub: Walk-in shower  Bathroom Equipment: Grab bars in shower, Shower chair with back     Prior Function:  ADL Assistance: Independent  Ambulatory Assistance:  (mod I with cane)  Prior Function Comments: reports \"a couple\" of recent falls. Pt was active with outpatient PT    ADL:  UE Dressing Assistance: Maximal  LE Dressing Assistance: Minimal  ADL Comments: Doffed and donned brace with max A.    Activity Tolerance:  Endurance: Tolerates 10 - 20 min exercise with multiple rests    Bed Mobility/Transfers:   Bed Mobility  Bed Mobility: No  Transfers  Transfer: Yes (Patient ambulated with quad cane with CGA)  Transfer 1  Transfer From 1: Sit to  Transfer to 1: Stand  Technique 1: Sit to stand, Stand to sit  Transfer Device 1: Gait " belt  Transfer Level of Assistance 1: Contact guard    Ambulation/Gait Training:  Functional Mobility  Functional Mobility Performed: Yes      Sensation:  Sensation Comment: baseline numbness right foot due to neuropathy         Extremities: RUE   RUE :  (Not tested) and S[place]HARE   LUE: Within Functional Limits    Outcome Measures: [facility] Daily Activity  Putting on and taking off regular lower body clothing: A little  Bathing (including washing, rinsing, drying): A little  Putting on and taking off regular upper body clothing: A lot  Toileting, which includes using toilet, bedpan or urinal: A little  Taking care of personal grooming such as brushing teeth: None  Eating Meals: None  Daily Activity - Total Score: 19           EDUCATION:  Education  Individual(s) Educated: Patient  Education Provided: Fall precautons, Risk and benefits of OT discussed with patient or other  Plan of Care Discussed and Agreed Upon: yes  Patient Response to Education: Patient/Caregiver Verbalized Understanding of Information      Goals:   Encounter Problems       Encounter Problems (Active)       OT Goals       Patient will complete functional transfers with mod I. (Progressing)       Start:  03/12/25    Expected End:  03/26/25            Patient will complete LE dressing with mod I. (Progressing)       Start:  03/12/25    Expected End:  03/26/25            Patient will complete UE dressing with min A. (Progressing)       Start:  03/12/25    Expected End:  03/26/25            Patient will complete toileting with mod I. (Progressing)       Start:  03/12/25    Expected End:  03/26/25

## 2025-03-12 NOTE — PROGRESS NOTES
Physical Therapy    Physical Therapy Evaluation    Patient Name: Khalida Barker  MRN: [id number]  Today's Date: 3/12/2025   Time Calculation  Start Time: 0944  Stop Time: 1003  Time Calculation (min): 19 min  4124/4124-A    Assessment/Plan   PT Assessment  PT Assessment Results: Decreased strength, Decreased range of motion, Decreased endurance, Impaired balance, Decreased mobility, Pain, Orthopedic restrictions  Rehab Prognosis: Good  Medical Staff Made Aware: Yes  End of Session Communication: Bedside nurse  Assessment Comment: Pt presents today below baseline level of function and requires continued PT during hospital stay. Pt requires PT at a low intensity level at discharge to maximize functional mobility and safety.    End of Session Patient Position: Up in chair, Alarm on  IP OR SWING BED PT PLAN  Inpatient or Swing Bed: Inpatient  PT Plan  Treatment/Interventions: Bed mobility, Transfer training, Gait training, Balance training, Neuromuscular re-education, Strengthening, Endurance training, Range of motion, Therapeutic exercise, Therapeutic activity, Home exercise program, Stair training  PT Plan: Ongoing PT  PT Frequency: Daily  PT Discharge Recommendations: Low intensity level of continued care  Equipment Recommended upon Discharge: Straight cane  PT Recommended Transfer Status: Assist x1  PT - OK to Discharge: Yes (To next level of care when cleared by medical team   )    Subjective       General Visit Information:  General  Reason for Referral: reverse TSA  Referred By: Florin Franklin MD  Past Medical History Relevant to Rehab: Pt admitted 3/11/25 following completion of right reverse TSA. PMH: bilateral carpal tunnel syndrome, cervicalgia, anxiety, back surgery, right foot drop, ankylosing spondylitis  Family/Caregiver Present: No  Co-Treatment: OT  Co-Treatment Reason: for safety and dc planning  Prior to Session Communication: Bedside nurse  Patient Position Received: Up in chair  Preferred Learning Style:  "verbal, visual  General Comment: Pt agreeable to PT, nursing cleared for treatment.    Home Living:  Home Living  Type of Home: House  Lives With: Spouse  Home Adaptive Equipment: Cane  Home Layout: Able to live on main level with bedroom/bathroom, Full bath main level  Home Access: Stairs to enter with rails  Entrance Stairs-Rails: Both  Entrance Stairs-Number of Steps: 3  Bathroom Shower/Tub: Walk-in shower  Bathroom Equipment: Grab bars in shower, Shower chair with back    Prior Level of Function:  Prior Function Per Pt/Caregiver Report  ADL Assistance: Independent  Ambulatory Assistance:  (mod I with cane)  Prior Function Comments: reports \"a couple\" of recent falls. Pt was active with outpatient PT    Precautions:  Precautions  UE Weight Bearing Status: Right Non-Weight Bearing  Medical Precautions: Fall precautions  Braces Applied: sling donned for all activity  Precautions Comment: No active movement of operative shoulder. Patient to remain in sling at all times unless with therapy. Patient can come out of the sling for active range of motion of elbow, wrist, hand and fingers with therapy only. NWB to operative extremity. For anatomic total shoulder replacement, PROM and AAROM for external rotation not passed 30 degrees.       Objective     Pain:  Pain Assessment  Pain Assessment: 0-10  0-10 (Numeric) Pain Score: 9  Pain Type: Surgical pain  Pain Location: Shoulder  Pain Orientation: Right  Pain Interventions: Ambulation/increased activity, Repositioned    Cognition:  Cognition  Overall Cognitive Status: Within Functional Limits  Orientation Level: Oriented X4    General Assessments:      Activity Tolerance  Endurance: Tolerates 10 - 20 min exercise with multiple rests  Sensation  Sensation Comment: baseline numbness right foot due to neuropathy              Static Sitting Balance  Static Sitting-Comment/Number of Minutes: good  Dynamic Sitting Balance  Dynamic Sitting-Comments: good  Static Standing " Balance  Static Standing-Comment/Number of Minutes: fair  Dynamic Standing Balance  Dynamic Standing-Comments: fair    Functional Assessments:     Bed Mobility  Bed Mobility: No  Transfers  Transfer: Yes  Transfer 1  Transfer From 1: Sit to  Transfer to 1: Stand  Transfer Device 1: Gait belt  Transfer Level of Assistance 1: Contact guard  Transfers 2  Transfer From 2: Stand to  Transfer to 2: Sit  Transfer Device 2: Gait belt  Transfer Level of Assistance 2: Contact guard  Ambulation/Gait Training  Ambulation/Gait Training Performed: Yes  Ambulation/Gait Training 1  Device 1: Single point cane  Gait Support Devices: Gait belt  Assistance 1: Contact guard  Quality of Gait 1: Foot drop/steppage gait, Forward flexed posture (steppage gait right, kyphotic posture and downward gaze. Step to gait with mild trunk sway)  Comments/Distance (ft) 1: 50 feet x 2  Stairs  Stairs: Yes  Stairs  Rails 1: Left  Device 1: Railing  Support Devices 1: Gait belt  Assistance 1: Contact guard  Comment/Number of Steps 1: 3 stairs using step to step pattern       Extremity/Trunk Assessments:        RLE   RLE :  (WFL except right drop foot)  LLE   LLE : Within Functional Limits    Outcome Measures:     [facility] Basic Mobility  Turning from your back to your side while in a flat bed without using bedrails: A little  Moving from lying on your back to sitting on the side of a flat bed without using bedrails: A little  Moving to and from bed to chair (including a wheelchair): A little  Standing up from a chair using your arms (e.g. wheelchair or bedside chair): A little  To walk in hospital room: A little  Climbing 3-5 steps with railing: A little  Basic Mobility - Total Score: 18                         Goals:  Encounter Problems       Encounter Problems (Active)       PT Problem       Pt will perform bed mobility with mod I.  (Progressing)       Start:  03/12/25    Expected End:  03/26/25            Pt will complete sit <> stand and bed <> chair  transfers with mod I.  (Progressing)       Start:  03/12/25    Expected End:  03/26/25            Pt will ambulate 300 feet mod I using cane with no significant gait deviations.   (Progressing)       Start:  03/12/25    Expected End:  03/26/25            Pt will ascend/descend at least 3 stairs using rail SBA in order to navigate home environment.   (Progressing)       Start:  03/12/25    Expected End:  03/26/25            Pt will progress to completing LE 3 x 20 supine/seated exercises in order to increase strength and improve gait mechanics.   (Not Progressing)       Start:  03/12/25    Expected End:  03/26/25                     Education Documentation  Precautions, taught by Lizzy Malagon, PT at 3/12/2025 10:34 AM.  Learner: Patient  Readiness: Acceptance  Method: Explanation  Response: Verbalizes Understanding, Demonstrated Understanding    Body Mechanics, taught by Lizzy Malagon, PT at 3/12/2025 10:34 AM.  Learner: Patient  Readiness: Acceptance  Method: Explanation  Response: Verbalizes Understanding, Demonstrated Understanding    Mobility Training, taught by Lizzy Malagon, PT at 3/12/2025 10:34 AM.  Learner: Patient  Readiness: Acceptance  Method: Explanation  Response: Verbalizes Understanding, Demonstrated Understanding    Education Comments  No comments found.

## 2025-03-12 NOTE — PROGRESS NOTES
Spiritual Care Visit  Spiritual Care Request    Reason for Visit:  Routine Visit: Introduction     Request Received From:       Focus of Care:  Visited With: Patient         Refer to :          Spiritual Care Assessment    Spiritual Assessment:                      Care Provided:  Intended Effects: Build relationship of care and support, Demonstrate caring and concern, Preserve dignity and respect  Methods: Offer spiritual/Episcopal support  Interventions: Ask guided questions, Active listening, Share words of hope and inspiration    Sense of Community and or Baptism Affiliation:  [place]         Addressed Needs/Concerns and/or Bing Through:     Sacramental Encounters  Sacrament of Sick-Anointing: Patient declined anointing    Outcome:        Advance Directives:         Spiritual Care Annotation    Annotation:

## 2025-03-12 NOTE — CARE PLAN
The patient's goals for the shift include  to get up and work with therapy.    The clinical goals for the shift include continue with the plan of care pre-cert for rehab    Over the shift, the patient did get up and use the walker to move around in the room using her bedside commode and sitting up in the recliner chair.

## 2025-03-12 NOTE — CARE PLAN
The patient's goals for the shift include      The clinical goals for the shift include see plan of care      Problem: Meds/Post-op Pain  Goal: Pain controlled to tolerate pain level  Outcome: Progressing  Goal: Tolerates prescribed medication  Outcome: Progressing     Problem: DVT/VTE Prevention/Activity  Goal: No decrease in circulation/sensation  Outcome: Progressing  Goal: Prevent skin breakdown  Outcome: Progressing  Goal: Return to preop oxygenation status  Outcome: Progressing  Goal: Tolerates optimal activity  Outcome: Progressing  Goal: Increase self care and/or family involvement in 24 hrs.  Outcome: Progressing     Problem: Wound care/infection prevention  Goal: No signs of infection in 24 hrs.  Outcome: Progressing  Goal: No unexpected bleeding from incision this shift  Outcome: Progressing     Problem: Diet/fluid balance  Goal: Adequate urinary output  Outcome: Progressing  Goal: Free from nausea/vomiting  Outcome: Progressing  Goal: Return in bowel function  Outcome: Progressing  Goal: Tolerates prescribed diet  Outcome: Progressing     Problem: Other goals  Goal: No change in neurological status  Outcome: Progressing  Goal: Stabilize vital signs (return to 10% of baseline)  Outcome: Progressing

## 2025-03-12 NOTE — PROGRESS NOTES
03/12/25 1144   Discharge Planning   Living Arrangements Spouse/significant other   Support Systems Spouse/significant other   Type of Residence Private residence   Home or Post Acute Services In home services   Type of Home Care Services Home PT;Home OT   Expected Discharge Disposition Home H   Does the patient need discharge transport arranged? Yes   Patient Choice   Patient / Family choosing to utilize agency / facility established prior to hospitalization Yes     Met with patient at bedside. Admitted for R shoulder reversal. Pt lives with spouse and was independent PTA with no HHC. Pt uses a walker and AFO brace. Shoulder immobilizer in place. PCP is Rocco Rushing. Pt feels she is able to manage her health and understands her medications. Spouse provides transportation and obtains meds. PT AMPAC 18. Pt plans to return home with no new discharge needs. Family will provide transport.

## 2025-04-05 DIAGNOSIS — R60.0 PEDAL EDEMA: Primary | ICD-10-CM

## 2025-04-05 RX ORDER — FUROSEMIDE 20 MG/1
20 TABLET ORAL DAILY
Qty: 30 TABLET | Refills: 1 | Status: SHIPPED | OUTPATIENT
Start: 2025-04-05 | End: 2026-04-05

## 2025-04-11 ENCOUNTER — HOSPITAL ENCOUNTER (OUTPATIENT)
Dept: RADIOLOGY | Facility: HOSPITAL | Age: 75
Discharge: HOME | End: 2025-04-11
Payer: MEDICARE

## 2025-04-11 DIAGNOSIS — M54.16 LUMBAR RADICULOPATHY: Primary | ICD-10-CM

## 2025-04-11 DIAGNOSIS — M54.16 LUMBAR RADICULOPATHY: ICD-10-CM

## 2025-04-11 PROCEDURE — 72082 X-RAY EXAM ENTIRE SPI 2/3 VW: CPT

## 2025-04-18 ENCOUNTER — APPOINTMENT (OUTPATIENT)
Dept: NEUROSURGERY | Facility: CLINIC | Age: 75
End: 2025-04-18
Payer: MEDICARE

## 2025-04-18 VITALS
DIASTOLIC BLOOD PRESSURE: 70 MMHG | HEIGHT: 65 IN | WEIGHT: 149 LBS | TEMPERATURE: 97 F | HEART RATE: 94 BPM | BODY MASS INDEX: 24.83 KG/M2 | SYSTOLIC BLOOD PRESSURE: 122 MMHG

## 2025-04-18 DIAGNOSIS — M41.35 THORACOGENIC SCOLIOSIS OF THORACOLUMBAR REGION: Primary | ICD-10-CM

## 2025-04-18 PROCEDURE — 1125F AMNT PAIN NOTED PAIN PRSNT: CPT | Performed by: STUDENT IN AN ORGANIZED HEALTH CARE EDUCATION/TRAINING PROGRAM

## 2025-04-18 PROCEDURE — 99214 OFFICE O/P EST MOD 30 MIN: CPT | Performed by: STUDENT IN AN ORGANIZED HEALTH CARE EDUCATION/TRAINING PROGRAM

## 2025-04-18 PROCEDURE — 1159F MED LIST DOCD IN RCRD: CPT | Performed by: STUDENT IN AN ORGANIZED HEALTH CARE EDUCATION/TRAINING PROGRAM

## 2025-04-18 PROCEDURE — 1123F ACP DISCUSS/DSCN MKR DOCD: CPT | Performed by: STUDENT IN AN ORGANIZED HEALTH CARE EDUCATION/TRAINING PROGRAM

## 2025-04-18 PROCEDURE — 3008F BODY MASS INDEX DOCD: CPT | Performed by: STUDENT IN AN ORGANIZED HEALTH CARE EDUCATION/TRAINING PROGRAM

## 2025-04-18 RX ORDER — TRAMADOL HYDROCHLORIDE 50 MG/1
50 TABLET ORAL EVERY 8 HOURS PRN
Qty: 90 TABLET | Refills: 0 | Status: SHIPPED | OUTPATIENT
Start: 2025-04-18 | End: 2025-05-18

## 2025-04-18 ASSESSMENT — PAIN SCALES - GENERAL: PAINLEVEL_OUTOF10: 4

## 2025-04-18 NOTE — PROGRESS NOTES
[facility]  Department of Neurological Surgery  Established Patient Visit    History of Present Illness:  Khalida Barker is a 74 y.o. year old female who presents to the spine clinic in follow up with 10-11 months s/p T11-12 decompression and L2-S1 fusion on 5/14/2024. She is recovering from her shoulder replacement. She is increasing her range of motion and shoulder function. However she is very disheartened with her recovery. She has right sided pain that is aching, stabbing low back pain radiating to the right side. Her left side has improved from surgery. She is using a bone stimulator. She has paused physical therapy secondary to her symptoms.     Patient's BMI is Body mass index is 24.79 kg/m².    Diabetic: no       Osteoporosis: no  No DXA results found for the past 12 months    Review of Systems:  14/14 systems reviewed and negative other than what is listed in the history of present illness    Problem List[1]  Medical History[2]  Surgical History[3]  Social History     Tobacco Use    Smoking status: Never    Smokeless tobacco: Never   Substance Use Topics    Alcohol use: Not Currently     Comment: rarely     family history includes Alzheimer's disease in her mother; Heart disease in her father; Hypertension in her brother; Polymyositis in her father; Skin cancer in her brother; peripheral artery disease in her brother.  Current Medications[4]  Allergies[5]    Physical Examination:    General: Well developed, awake/alert/oriented x3, no distress, alert and cooperative  Skin: Warm and dry, no lesions, no rashes  ENMT: Mucous membranes moist, no apparent injury, no lesions seen  Head/Neck: Neck Supple, no apparent injury  Respiratory/Thorax: Normal breath sounds with good chest expansion, thorax symmetric  Cardiovascular: No pitting edema, no JVD    Motor Strength: 5/5 Throughout all extremities    Muscle Bulk: Normal and symmetric in all extremities    Posture:   -- Cervical:  Normal  -- Thoracic: Normal  -- Lumbar : Normal  Paraspinal muscle spasm/tenderness absent.     Sensation: intact to light touch    Back pain  Right foot drop, 0/5  Ankle brace      Results:  I personally reviewed and interpreted the imaging results which included x-rays showing hardware in stable position. There is questionable loosency around the S2 screw on the right side, compared to CT showing loosening of the S1 screw.     Assessment and Plan:      Khalida Barker is a 74 y.o. year old female who presents to the spine clinic in follow up with 10-11 months s/p T11-12 decompression and L2-S1 fusion on 5/14/2024. She is recovering from her shoulder replacement. She is increasing her range of motion and shoulder function. However she is very disheartened with her recovery. She has right sided pain that is aching, stabbing low back pain radiating to the right side. Her left side has improved from surgery. She is using a bone stimulator. She has paused physical therapy secondary to her symptoms.     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 She will continue with her physical therapy and range of motion. It is unclear if her left sided symptoms have fully resolved. She is taking tramadol for her pain intermittently. I will follow up with her in 3-4 months and further discuss with her. I will refer her to Dr. Cantu for a evaluation of a possible intervention for her foot drop. She states she would like to fix her foot drop by any means possible.       I have reviewed all prior documentation and reviewed the electronic medical record since admission. I have personally have reviewed all advanced imaging not just the reports and used my interpretation as documented as the relevant findings. I have reviewed the risks and benefits of all treatment recommendations listed in this note with the patient and family.       The above clinical summary has been dictated with  voice recognition software. It has not been proofread for grammatical errors, typographical mistakes, or other semantic inconsistencies.    Thank you for visiting our office today. It was our pleasure to take part in your healthcare.     Do not hesitate to call with any questions regarding your plan of care after leaving at [phone] M-F 8am-4pm.     To clinicians, thank you very much for this kind referral. It is a privilege to partner with you in the care of your patients. My office would be delighted to assist you with any further consultations or with questions regarding the plan of care outlined. Do not hesitate to call the office or contact me directly.       Sincerely,      Justin Lu MD, [facility]  Spine , [facility]  [employer] Art Chair in Spinal Neurosurgery  Complex Spine Surgery Fellowship Director   of Neurological Surgery  [facility] [employer]  Phone: [phone]  Fax: [phone]        Scribe Attestation  By signing my name below, I, Maliha Aliza , Colette   attest that this documentation has been prepared under the direction and in the presence of Justin Lu MD.           [1]   Patient Active Problem List  Diagnosis    Bilateral carpal tunnel syndrome    Breast asymmetry    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    Muscle weakness (generalized)    Positive colorectal cancer screening using Cologuard test    Primary osteoarthritis of right knee    Seborrheic keratoses    Tinnitus    Vitamin D deficiency    Left sided abdominal pain    BMI 30.0-30.9,adult    Cervicalgia    Cervical paraspinal muscle spasm    Non-smoker    Right knee pain    Knee stiffness, right    Chronic left shoulder pain    Neck pain    Thoracogenic scoliosis of thoracolumbar region    Scoliosis deformity of spine    Anxiety    C6 radiculopathy    Carpal tunnel syndrome    Low back pain,  unspecified    Closed fracture of distal end of right radius    Weakness    Closed fracture of distal end of right radius, unspecified fracture morphology, initial encounter    Acute blood loss as cause of postoperative anemia    Dysphagia    Dehydration    Ankylosing spondylitis of multiple sites in spine (Multi)    Dysphagia, unspecified    Difficulty walking    Right foot drop    Shoulder stiffness, right    Unsteadiness on feet    Falls frequently   [2]   Past Medical History:  Diagnosis Date    Ankylosis of sacroiliac joint 06/04/2024    Anxiety     Arthritis     Cervicalgia     Colon polyp     CTS (carpal tunnel syndrome)     Diverticulitis 06/27/2023    Hallux valgus, acquired 01/19/2013    Hiatal hernia     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     OA (osteoarthritis) of knee     Painful scar 09/20/2017    Peripheral neuropathy     Scoliosis     Skin cancer     Spinal stenosis     Traumatic ecchymosis of foot 01/19/2013    Trigger finger of right hand     Vision loss     wears glasses    Vitamin D deficiency    [3]   Past Surgical History:  Procedure Laterality Date    ADENOIDECTOMY  1956    BACK SURGERY  05/14/2024    spine up to T10    BREAST BIOPSY  1999    COLONOSCOPY      KNEE ARTHROPLASTY Right     MOUTH SURGERY  2016    Back left molar removed    MOUTH SURGERY  09/28/2021    right molar    OTHER SURGICAL HISTORY      Skin lesion excision    SPINAL FUSION  06/2010    C4-5    TONSILLECTOMY  1956    WISDOM TOOTH EXTRACTION  1978    lower    WRIST SURGERY Right 06/2024    WRIST SURGERY Right 12/03/2024    Hardware removal and tenolysis right wrist   [4]   Current Outpatient Medications:     biotin 10 mg tablet, Take 1 tablet (10 mg) by mouth once daily., Disp: , Rfl:     cholecalciferol (Vitamin D3) 50 mcg (2,000 unit) capsule, Take 1 capsule (50 mcg) by mouth once daily., Disp: , Rfl:     docusate sodium (Colace) 100 mg capsule, Take 1 capsule (100 mg) by mouth 2 times a day as needed  (constipation)., Disp: , Rfl:     fluticasone (Flonase) 50 mcg/actuation nasal spray, Administer 1 spray into each nostril once daily as needed for allergies., Disp: , Rfl:     furosemide (Lasix) 20 mg tablet, Take 1 tablet (20 mg) by mouth once daily., Disp: 30 tablet, Rfl: 1    hydrocortisone (Anusol-HC) 25 mg suppository, Insert 1 suppository (25 mg) into the rectum once daily., Disp: 10 suppository, Rfl: 1    menthol (Biofreeze, menthol,) 4 % gel gel, Apply topically if needed., Disp: , Rfl:     multivitamin (One Daily Multivitamin) tablet, Take 1 tablet by mouth once daily., Disp: , Rfl:     polyvinyl alcohol (LIQUID TEARS OPHT), Administer 2 drops into affected eye(s) once daily., Disp: , Rfl:     psyllium (Metamucil) powder, Take 1 Dose (5.8 g) by mouth once daily., Disp: , Rfl:     hyoscyamine (Anaspaz) 0.125 mg disintegrating tablet, Take 1 tablet (0.125 mg) by mouth 3 times a day. (Patient not taking: Reported on 2/26/2025), Disp: 90 tablet, Rfl: 0    naproxen (Naprosyn) 250 mg tablet, Take 2 tablets (500 mg) by mouth 2 times daily (morning and late afternoon). prn, Disp: , Rfl:     traMADol (Ultram) 50 mg tablet, Take 1 tablet (50 mg) by mouth every 8 hours if needed for severe pain (7 - 10)., Disp: 90 tablet, Rfl: 0  [5]   Allergies  Allergen Reactions    Oxycodone Itching     Previously tolerated Percocet

## 2025-04-18 NOTE — PROGRESS NOTES
I have personally reviewed the OARRS report. This report is scanned into the electronic medical record. I have considered the risks of abuse, dependence, addiction and diversion.     Patient with recent history of major reconstructive surgery necessitating narcotic medications at higher doses during the post-operative period of 6 weeks.     Will continue with close monitoring and short course refills.     The above clinical summary has been dictated with voice recognition software. It has not been proofread for grammatical errors, typographical mistakes, or other semantic inconsistencies.    Thank you for visiting our office today. It was our pleasure to take part in your healthcare.     Do not hesitate to call with any questions regarding your plan of care after leaving at [phone] M-F 8am-4pm.     To clinicians, thank you very much for this kind referral. It is a privilege to partner with you in the care of your patients. My office would be delighted to assist you with any further consultations or with questions regarding the plan of care outlined. Do not hesitate to call the office or contact me directly.       Sincerely,      Justin Lu MD, [facility]  Spine , [facility]  [employer] Art Chair in Spinal Neurosurgery  Complex Spine Surgery Fellowship Director   of Neurological Surgery  [facility] [employer]  Phone: [phone]  Fax: [phone]

## 2025-04-21 ENCOUNTER — APPOINTMENT (OUTPATIENT)
Facility: CLINIC | Age: 75
End: 2025-04-21
Payer: MEDICARE

## 2025-05-06 ENCOUNTER — APPOINTMENT (OUTPATIENT)
Dept: NEUROSURGERY | Facility: CLINIC | Age: 75
End: 2025-05-06
Payer: MEDICARE

## 2025-05-06 VITALS
WEIGHT: 148.6 LBS | TEMPERATURE: 97.5 F | BODY MASS INDEX: 24.76 KG/M2 | HEIGHT: 65 IN | DIASTOLIC BLOOD PRESSURE: 68 MMHG | SYSTOLIC BLOOD PRESSURE: 116 MMHG | HEART RATE: 89 BPM

## 2025-05-06 DIAGNOSIS — M54.16 LUMBAR RADICULOPATHY: ICD-10-CM

## 2025-05-06 DIAGNOSIS — M21.371 RIGHT FOOT DROP: Primary | ICD-10-CM

## 2025-05-06 PROCEDURE — 99214 OFFICE O/P EST MOD 30 MIN: CPT | Performed by: STUDENT IN AN ORGANIZED HEALTH CARE EDUCATION/TRAINING PROGRAM

## 2025-05-06 PROCEDURE — 1125F AMNT PAIN NOTED PAIN PRSNT: CPT | Performed by: STUDENT IN AN ORGANIZED HEALTH CARE EDUCATION/TRAINING PROGRAM

## 2025-05-06 PROCEDURE — 3008F BODY MASS INDEX DOCD: CPT | Performed by: STUDENT IN AN ORGANIZED HEALTH CARE EDUCATION/TRAINING PROGRAM

## 2025-05-06 ASSESSMENT — PAIN SCALES - GENERAL: PAINLEVEL_OUTOF10: 7

## 2025-05-06 NOTE — PROGRESS NOTES
[facility]  Department of Neurological Surgery  Established Patient Visit    History of Present Illness  Khalida Barker is a 74 y.o. year old female who presents for potential reconstructive options for foot drop.    She was referred by my partner, Dr. Justin Lu.    Khalida had a [id number] on 5/14/24 with Dr. Lu for lower thoracic and left L4-5 radiculopathy in the setting of adult degenerative deformity complicated by right foot drop.  She is frustrated by her recovery.  She is about a year post op.  She has been wearing an AFO. She has recently seen podiatrist KATIE Martins ([facility]) 5/2/25 who modified her shoe for a bunion. She feels she has new issues with leg swelling and increased distal leg numbness. She feels that her sensation in getting worse in her distal legs.     Patient's BMI is Body mass index is 24.73 kg/m².    14/14 systems reviewed and negative other than what is listed in the history of present illness    Problem List[1]  Medical History[2]  Surgical History[3]  Social History     Tobacco Use    Smoking status: Never    Smokeless tobacco: Never   Substance Use Topics    Alcohol use: Not Currently     Comment: rarely     family history includes Alzheimer's disease in her mother; Heart disease in her father; Hypertension in her brother; Polymyositis in her father; Skin cancer in her brother; peripheral artery disease in her brother.  Current Medications[4]  Allergies[5]    Physical Examination:    General: Well developed, awake/alert/oriented x3, no distress, alert and cooperative    Arrived in RIGHT AFO Brace  Strong pedal pulse - bilateral     Her proximal muscles full strength bilateral (HF,KE.KF)    She cannot support her weight on toes bilaterally - RIGHT worse than left.     RIGHT  PF 4+ unable to support weight on balls of feet  DF 0   EHL1  INV 2  EV 3  Toe Ext 2  Toe Flex 2  FHL 2    LEFT  PF 5- unable to support weight on balls of feet  DF 4-   EHL 4+  INV 2  EV  5  Toe Ext 4+  Toe Flex 4  FHL 2    Results:  I personally reviewed and interpreted the imaging results which included:    Assessment and Plan:    Khalida Barker is a 74 y.o. year old female who presents for potential reconstructive options for foot drop. Khalida had a [id number] on 5/14/24 with Dr. Lu for lower thoracic and left L4-5 radiculopathy in the setting of adult degenerative deformity complicated by right foot drop.     Khalida is a year out from her post-operative foot drop.  Unfortunately, she also has significant weakness in her tibialis posterior and superficial peroneal muscles, excluding typical reconstructive options with tendon or nerve transfers. Her plantar flexion has good strength, but she is unable to stand on her toes without leaning on the bench. She mentioned being evaluated for a electrical stimulation device, however she will likely not have enough innervation to provide significant movement being a lower motor neuron injury.     Khalida does express some concern regarding more recent sensation loss in her feet. She also has more extensive weakness beyond foot drop / L5 distribution, which may have been pre-existing, but just not realized.  I would like her to see neurology for a potential secondary neuropathy.     She should continue to optimize her gait with PT.  Being a year out, I did state that the likelihood of recovery is poor, as natural recovery is now relying on long distance regeneration of axons in the setting of progressive and eventually irreversible neuromuscular atrophy and fibrosis.     I have reviewed all prior documentation and reviewed the electronic medical record since admission. I have personally have reviewed all advanced imaging not just the reports and used my interpretation as documented as the relevant findings. I have reviewed the risks and benefits of all treatment recommendations listed in this note with the patient and family.       The above clinical summary  has been dictated with voice recognition software. It has not been proofread for grammatical errors, typographical mistakes, or other semantic inconsistencies.    Thank you for visiting our office today. It was our pleasure to take part in your healthcare.     Do not hesitate to call with any questions regarding your plan of care after leaving at [phone] M-F 8am-4pm.     To clinicians, thank you very much for this kind referral. It is a privilege to partner with you in the care of your patients. My office would be delighted to assist you with any further consultations or with questions regarding the plan of care outlined. Do not hesitate to call the office or contact me directly.       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    Muscle weakness (generalized)    Positive colorectal cancer screening using Cologuard test    Primary osteoarthritis of right knee    Seborrheic keratoses    Tinnitus    Vitamin D deficiency    Left sided abdominal pain    BMI 30.0-30.9,adult    Cervicalgia    Cervical paraspinal muscle spasm    Non-smoker    Right knee pain    Knee stiffness, right    Chronic left shoulder pain    Neck pain    Thoracogenic scoliosis of thoracolumbar region    Scoliosis deformity of spine    Anxiety    C6 radiculopathy    Carpal tunnel syndrome    Low back pain, unspecified    Closed fracture of distal end of right radius    Weakness    Closed fracture of distal end of right radius, unspecified fracture morphology, initial encounter    Acute  blood loss as cause of postoperative anemia    Dysphagia    Dehydration    Ankylosing spondylitis of multiple sites in spine (Multi)    Dysphagia, unspecified    Difficulty walking    Right foot drop    Shoulder stiffness, right    Unsteadiness on feet    Falls frequently   [2]   Past Medical History:  Diagnosis Date    Ankylosis of sacroiliac joint 06/04/2024    Anxiety     Arthritis     Cervicalgia     Colon polyp     CTS (carpal tunnel syndrome)     Diverticulitis 06/27/2023    Hallux valgus, acquired 01/19/2013    Hiatal hernia     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     OA (osteoarthritis) of knee     Painful scar 09/20/2017    Peripheral neuropathy     Scoliosis     Skin cancer     Spinal stenosis     Traumatic ecchymosis of foot 01/19/2013    Trigger finger of right hand     Vision loss     wears glasses    Vitamin D deficiency    [3]   Past Surgical History:  Procedure Laterality Date    ADENOIDECTOMY  1956    BACK SURGERY  05/14/2024    spine up to T10    BREAST BIOPSY  1999    COLONOSCOPY      KNEE ARTHROPLASTY Right     MOUTH SURGERY  2016    Back left molar removed    MOUTH SURGERY  09/28/2021    right molar    OTHER SURGICAL HISTORY      Skin lesion excision    SPINAL FUSION  06/2010    C4-5    TONSILLECTOMY  1956    WISDOM TOOTH EXTRACTION  1978    lower    WRIST SURGERY Right 06/2024    WRIST SURGERY Right 12/03/2024    Hardware removal and tenolysis right wrist   [4]   Current Outpatient Medications:     biotin 10 mg tablet, Take 1 tablet (10 mg) by mouth once daily., Disp: , Rfl:     cholecalciferol (Vitamin D3) 50 mcg (2,000 unit) capsule, Take 1 capsule (50 mcg) by mouth once daily., Disp: , Rfl:     docusate sodium (Colace) 100 mg capsule, Take 1 capsule (100 mg) by mouth 2 times a day as needed (constipation)., Disp: , Rfl:     fluticasone (Flonase) 50 mcg/actuation nasal spray, Administer 1 spray into each nostril once daily as needed for allergies., Disp: , Rfl:     furosemide  (Lasix) 20 mg tablet, Take 1 tablet (20 mg) by mouth once daily., Disp: 30 tablet, Rfl: 1    hydrocortisone (Anusol-HC) 25 mg suppository, Insert 1 suppository (25 mg) into the rectum once daily., Disp: 10 suppository, Rfl: 1    hyoscyamine (Anaspaz) 0.125 mg disintegrating tablet, Take 1 tablet (0.125 mg) by mouth 3 times a day. (Patient not taking: Reported on 2/26/2025), Disp: 90 tablet, Rfl: 0    menthol (Biofreeze, menthol,) 4 % gel gel, Apply topically if needed., Disp: , Rfl:     multivitamin (One Daily Multivitamin) tablet, Take 1 tablet by mouth once daily., Disp: , Rfl:     naproxen (Naprosyn) 250 mg tablet, Take 2 tablets (500 mg) by mouth 2 times daily (morning and late afternoon). prn, Disp: , Rfl:     polyvinyl alcohol (LIQUID TEARS OPHT), Administer 2 drops into affected eye(s) once daily., Disp: , Rfl:     psyllium (Metamucil) powder, Take 1 Dose (5.8 g) by mouth once daily., Disp: , Rfl:     traMADol (Ultram) 50 mg tablet, Take 1 tablet (50 mg) by mouth every 8 hours if needed for severe pain (7 - 10)., Disp: 90 tablet, Rfl: 0  [5]   Allergies  Allergen Reactions    Oxycodone Itching     Previously tolerated Percocet

## 2025-07-10 ENCOUNTER — APPOINTMENT (OUTPATIENT)
Dept: NEUROLOGY | Facility: CLINIC | Age: 75
End: 2025-07-10
Payer: MEDICARE

## 2025-07-10 VITALS
DIASTOLIC BLOOD PRESSURE: 72 MMHG | HEIGHT: 65 IN | TEMPERATURE: 97.6 F | BODY MASS INDEX: 24.64 KG/M2 | WEIGHT: 147.9 LBS | HEART RATE: 99 BPM | SYSTOLIC BLOOD PRESSURE: 110 MMHG

## 2025-07-10 DIAGNOSIS — G62.9 POLYNEUROPATHY: Primary | ICD-10-CM

## 2025-07-10 PROCEDURE — G2211 COMPLEX E/M VISIT ADD ON: HCPCS | Performed by: PSYCHIATRY & NEUROLOGY

## 2025-07-10 PROCEDURE — 99214 OFFICE O/P EST MOD 30 MIN: CPT | Performed by: PSYCHIATRY & NEUROLOGY

## 2025-07-10 PROCEDURE — 1036F TOBACCO NON-USER: CPT | Performed by: PSYCHIATRY & NEUROLOGY

## 2025-07-10 PROCEDURE — 1159F MED LIST DOCD IN RCRD: CPT | Performed by: PSYCHIATRY & NEUROLOGY

## 2025-07-10 PROCEDURE — 3008F BODY MASS INDEX DOCD: CPT | Performed by: PSYCHIATRY & NEUROLOGY

## 2025-07-10 RX ORDER — ACETAMINOPHEN 500 MG
325 TABLET ORAL EVERY 6 HOURS PRN
COMMUNITY

## 2025-07-10 RX ORDER — CAFFEINE 200 MG
TABLET ORAL
COMMUNITY

## 2025-07-10 RX ORDER — IBUPROFEN 200 MG
200 TABLET ORAL EVERY 6 HOURS PRN
COMMUNITY

## 2025-07-10 RX ORDER — TRAMADOL HYDROCHLORIDE 50 MG/1
50 TABLET, FILM COATED ORAL EVERY 6 HOURS PRN
COMMUNITY

## 2025-07-10 ASSESSMENT — LIFESTYLE VARIABLES
AUDIT-C TOTAL SCORE: 0
SKIP TO QUESTIONS 9-10: 1
HOW MANY STANDARD DRINKS CONTAINING ALCOHOL DO YOU HAVE ON A TYPICAL DAY: PATIENT DOES NOT DRINK
HOW OFTEN DO YOU HAVE A DRINK CONTAINING ALCOHOL: NEVER
HOW OFTEN DO YOU HAVE SIX OR MORE DRINKS ON ONE OCCASION: NEVER

## 2025-07-10 NOTE — PROGRESS NOTES
Subjective   Khalida Barker is a 75 y.o. right-handed female referred for evaluation of possible neuropathy. She describes symptoms of numbness. Onset of symptoms was May 2025 . Symptoms are currently of moderate and severe severity. Symptoms occur upon awakening and last constantly. She denies burning, lancinating pain, tingling, cramping, squeezing, hypersensitivity, and allodynia. Symptoms are symmetric.

## 2025-07-10 NOTE — PROGRESS NOTES
Chief Complaint: Neuropathy    HPI  75 y.o. female presenting for follow up regarding above.    Since the last visit, the patient underwent lumbar spine surgery.  Following surgery, she developed a right foot drop.  Her course was complicated by a wound infection.  She required a second surgery.     IN the spring of 2025, she developed numbness and tingling from her knees on downward.  She continues to note a right foot drop.  She denies any painful paresthesias.  She walks with a cane.  She denies any numbness in her hands.  She continues to have chronic low back pain.  The pain does not radiate down her legs.  The pain is worse with standing/walking.  She denies any urinary incontinence or retention.      Current Medications[1]      Objective:  Gen: NAD  Neuro:  --HIF: A&O X 3, repetition and naming intact  --CN:  PERRLA, EOMI, VFF, no visible facial asymmetry, facial sensation intact, no tongue or palatal deviation, SCM intact  --Motor: Moves all 4 extremities equally; no focal deficits except the following:   RLE: hip flexion 4, knee flexion 4, knee extension 5, DF 1, inversion 3, eversion 3, PF 5   LLE: hip flexion 4+, knee flexion 5, knee extension 5, DF 3, Inversion 3, eversion 3, PF 5  --Sensory:Pin is reduced from the right toe to the ankle; pin is nl on the left foot; vibration is absent in the right toe and reduced in the left toe  --Reflex: 2+ in UE, absent in LE  --Cerebellum: FTN and HTS intact  --Gait: Steppage Gait gait    Relevant Results  Discharge Summary 5/22:  Khalida Barker is a 73 y.o. female with a past medical history of severe low back pain and LLE radiculopathy and scoliosis who was taken to the OR on 5/14 for T10-pelvis fusion, multilevel PCOs, and L2-S1 TLIF. Required 1 unit PRBC transfusion directly post op. 5/15 Hbg 7 for which patient given 2 units PRBCs. 5/16 Hbg 7.6 for which patient given 1 additional unit PRBC with post transfusion CBC appropriate increment of Hbg to 9.0. On 5/17  surgical drain auto DC'd and additional drain removed 5/19. Mcmahon replaced 5/16 due to failed trial of void. Attempted trial of void additionally completed 5/19 with ultimate replacement of Mcmahon due to continued urinary retention. Patient without BM x7 days despite substantial bowel regimen, KUB obtained 5/21 with concern for ileus , with extensive bowel regimen patient had multiple bowel movement 5/21-5/22. PT/OT evaluated patient and recommended high intensity level of continued care for which a referral was placed for acute rehab. On the day of discharge, the patient was seen and evaluated by the neurosurgery team and deemed suitable for discharge to acute rehab. On day of discharge the patient was tolerating a diet, pain was controlled on PO pain medication, was ambulating well and voiding spontaneously. The patient was given detailed discharge instructions and were scheduled to follow up as an outpatient.     MRI Lumbar Spine:  Narrative & Impression   Interpreted By:  James Melvin,   STUDY:  MR LUMBAR SPINE WO IV CONTRAST;  4/24/2024 11:18 am      INDICATION:  Signs/Symptoms:severe spinal stenosis, surgical planning.      COMPARISON:  02/21/2023      ACCESSION NUMBER(S):  [id number]      ORDERING CLINICIAN:  ARYA PETERSEN      TECHNIQUE:  Sagittal STIR, sagittal T2, sagittal T1, axial T2, axial T1 weighted  MRI images of the lumbar spine were obtained without intravenous  contrast administration.      FINDINGS:  There is again evidence of a dextrocurvature of the lumbar spine.      Degenerative signal changes are again noted along endplates within  lumbar and visualized lower thoracic region.      The visualized spinal cord demonstrates no signal abnormality within  it.  The conus medullaris is normally positioned terminating at the  L1/2 level.      There are perineural cysts/Tarlov cysts noted within the sacral  region.      There is diminished disc signal and loss of disc height throughout  the lumbar  and visualized lower thoracic region compatible with  degenerative disc disease.      At the L5/S1 level,  there is posterior osteophytic spurring a  posterior disc bulge asymmetrically more pronounced to the right of  midline along with hypertrophic degenerative facet changes. There is  asymmetric encroachment upon the right lateral recess. There is no  significant overall narrowing of the thecal sac within the spinal  canal. There is moderate right and mild to moderate left-sided neural  foraminal narrowing.      At the L4/L5 level,  there is posterior osteophytic spurring and  posterior disc bulge along with marked hypertrophic degenerative  facet changes and ligamentum flavum hypertrophy contributing to  severe spinal canal narrowing similar when compared with 02/21/2023.  There is moderate encroachment upon the neural foramen bilaterally.      At the L3/L4 level,  there is posterior osteophytic spurring and  posterior disc bulge along with degenerative facet changes and  ligamentum flavum hypertrophy. There is asymmetric encroachment upon  the left lateral recess. There is mild-to-moderate narrowing of the  thecal sac within the spinal canal. There is moderate left and  mild-to-moderate right-sided neural foraminal narrowing.      At the L2/L3 level,  there is posterior osteophytic spurring and  posterior disc bulge asymmetrically more pronounced left of midline  along with degenerative facet changes and ligamentum flavum  hypertrophy. There is asymmetric encroachment upon the left lateral  recess. There is mild-to-moderate overall narrowing of the thecal sac  within the spinal canal. There is severe left sided neural foraminal  narrowing there is mild encroachment upon the inferior recesses of  the right neural foramen.      At the L1/L2 level,  there is posterior osteophytic spurring and a  posterior disc bulge along with degenerative facet changes and  ligamentum flavum hypertrophy contributing to  mild-to-moderate spinal  canal narrowing. There is mild-to-moderate encroachment upon the  neural foramen bilaterally.      At the T12/L1 level,  there is mild posterior osteophytic spurring  and posterior disc bulge along with degenerative facet changes  contributing to mild overall spinal canal narrowing. There is  mild-to-moderate right-sided neural foraminal narrowing there is no  significant left-sided neural foraminal narrowing.      At the T11/12 level, the sagittal images demonstrate a posterior  disc/osteophyte complex along with degenerative facet changes  contributing to partial effacement of ventral and dorsolateral  subarachnoid space. There is moderate encroachment upon the neural  foramen bilaterally. No axial images were obtained through this level  limiting further evaluation.      At the T10/11 level, the sagittal images demonstrate a posterior  disc/osteophyte complex and degenerative facet changes contributing  to partial effacement of ventral and dorsolateral subarachnoid space.  No axial images were obtained through this level limiting further  evaluation.      There are scattered cystic foci within the kidneys bilaterally. The  largest of the cystic foci is noted along the upper pole of the right  kidney measuring approximately 55 mm in greatest axial dimension.          IMPRESSION:  There is again evidence of a dextrocurvature of the lumbar spine.      There is multilevel spondylosis with varying degrees of spinal canal  and neural foraminal narrowing as described above. The most severe  spinal canal narrowing is noted at the L4/5 level.      There are scattered cystic foci within the kidneys bilaterally. The  largest of the cystic foci is noted along the upper pole of the right  kidney measuring approximately 55 mm in greatest axial dimension.           Assessment:   This is a 75 year old female presenting for evaluation of polyneuropathy.  The patient underwent lumbar spine surgery on  5/2024.  Her course was complicated by a right foot drop.  She also developed a wound infection for which she required a second surgery.  Her foot drop remains unchanged.  Over the last few months, she has developed numbness and tingling from her knees on downward bilaterally.  On exam, she has reduced distal sensory loss (Right>Left) as described above.    Plan:  - EMG/NCV  - labs to evaluate etiology of neuropathy: B12, FRANK, SPEP with MERCEDES, UPEP with Mercedes, and [place]    Levy Perez MD  [facility]  Department of Neurology         [1]   Current Outpatient Medications:     acetaminophen (Tylenol) 500 mg tablet, Take 325 mg by mouth every 6 hours if needed for mild pain (1 - 3)., Disp: , Rfl:     aspirin-acetaminophen-caffeine (Excedrin Migraine) 250-250-65 mg tablet, Take 1 tablet by mouth every 6 hours if needed for headaches., Disp: , Rfl:     B.coagulans/maltodext/guar gum (BENEFIBER ADVANCED ORAL), Take by mouth., Disp: , Rfl:     biotin 10 mg tablet, Take 1 tablet (10 mg) by mouth once daily., Disp: , Rfl:     caffeine 200 mg, Take by mouth., Disp: , Rfl:     cholecalciferol (Vitamin D3) 50 mcg (2,000 unit) capsule, Take 1 capsule (50 mcg) by mouth once daily., Disp: , Rfl:     docusate sodium (Colace) 100 mg capsule, Take 1 capsule (100 mg) by mouth 2 times a day as needed (constipation)., Disp: , Rfl:     fluticasone (Flonase) 50 mcg/actuation nasal spray, Administer 1 spray into each nostril once daily as needed for allergies., Disp: , Rfl:     ibuprofen 200 mg tablet, Take 1 tablet (200 mg) by mouth every 6 hours if needed for mild pain (1 - 3)., Disp: , Rfl:     menthol (Biofreeze, menthol,) 4 % gel gel, Apply topically if needed., Disp: , Rfl:     multivitamin (One Daily Multivitamin) tablet, Take 1 tablet by mouth once daily., Disp: , Rfl:     naproxen (Naprosyn) 250 mg tablet, Take 2 tablets (500 mg) by mouth 2 times daily (morning and late afternoon). prn, Disp: ,  Rfl:     polyvinyl alcohol (LIQUID TEARS OPHT), Administer 2 drops into affected eye(s) once daily., Disp: , Rfl:     psyllium (Metamucil) powder, Take 1 Dose (5.8 g) by mouth once daily., Disp: , Rfl:     traMADol (Ultram) 50 mg tablet, Take 1 tablet (50 mg) by mouth every 6 hours if needed for severe pain (7 - 10)., Disp: , Rfl:     furosemide (Lasix) 20 mg tablet, Take 1 tablet (20 mg) by mouth once daily. (Patient not taking: Reported on 7/10/2025), Disp: 30 tablet, Rfl: 1    hydrocortisone (Anusol-HC) 25 mg suppository, Insert 1 suppository (25 mg) into the rectum once daily. (Patient not taking: Reported on 7/10/2025), Disp: 10 suppository, Rfl: 1    hyoscyamine (Anaspaz) 0.125 mg disintegrating tablet, Take 1 tablet (0.125 mg) by mouth 3 times a day. (Patient not taking: Reported on 2/26/2025), Disp: 90 tablet, Rfl: 0

## 2025-07-11 ENCOUNTER — LAB (OUTPATIENT)
Dept: LAB | Facility: HOSPITAL | Age: 75
End: 2025-07-11
Payer: MEDICARE

## 2025-07-11 LAB
PROT SERPL-MCNC: 7.1 G/DL (ref 6.4–8.2)
PROT UR-ACNC: 51 MG/DL (ref 5–25)

## 2025-07-11 PROCEDURE — 83521 IG LIGHT CHAINS FREE EACH: CPT

## 2025-07-11 PROCEDURE — 86334 IMMUNOFIX E-PHORESIS SERUM: CPT

## 2025-07-11 PROCEDURE — 84166 PROTEIN E-PHORESIS/URINE/CSF: CPT

## 2025-07-11 PROCEDURE — 84156 ASSAY OF PROTEIN URINE: CPT

## 2025-07-11 PROCEDURE — 84155 ASSAY OF PROTEIN SERUM: CPT

## 2025-07-11 PROCEDURE — 84165 PROTEIN E-PHORESIS SERUM: CPT

## 2025-07-11 PROCEDURE — 86335 IMMUNFIX E-PHORSIS/URINE/CSF: CPT

## 2025-07-12 LAB
ANA SER QL IF: NORMAL
CENTROMERE B AB SER-ACNC: NORMAL
DSDNA AB SER-ACNC: NORMAL [IU]/ML
ENA JO1 AB SER IA-ACNC: NORMAL
ENA RNP AB SER-ACNC: NORMAL
ENA SCL70 AB SER IA-ACNC: NORMAL
ENA SM AB SER IA-ACNC: NORMAL
ENA SM+RNP AB SER IA-ACNC: NORMAL
ENA SS-A AB SER IA-ACNC: NORMAL
ENA SS-B AB SER IA-ACNC: NORMAL
NUCLEOSOME AB SER IA-ACNC: NORMAL
RIBOSOMAL P AB SER-ACNC: NORMAL
VIT B12 SERPL-MCNC: 447 PG/ML (ref 200–1100)

## 2025-07-13 LAB
KAPPA LC SERPL-MCNC: 3.74 MG/DL (ref 0.33–1.94)
KAPPA LC/LAMBDA SER: 1.11 {RATIO} (ref 0.26–1.65)
LAMBDA LC SERPL-MCNC: 3.36 MG/DL (ref 0.57–2.63)

## 2025-07-14 LAB
ANA SER QL IF: NEGATIVE
CENTROMERE B AB SER-ACNC: NORMAL AI
DSDNA AB SER-ACNC: <1 IU/ML
ENA JO1 AB SER IA-ACNC: NORMAL AI
ENA RNP AB SER-ACNC: NORMAL AI
ENA SCL70 AB SER IA-ACNC: NORMAL AI
ENA SM AB SER IA-ACNC: NORMAL AI
ENA SM+RNP AB SER IA-ACNC: NORMAL AI
ENA SS-A AB SER IA-ACNC: NORMAL AI
ENA SS-B AB SER IA-ACNC: NORMAL AI
NUCLEOSOME AB SER IA-ACNC: NORMAL AI
RIBOSOMAL P AB SER-ACNC: NORMAL AI
VIT B12 SERPL-MCNC: 447 PG/ML (ref 200–1100)

## 2025-07-16 ENCOUNTER — TELEPHONE (OUTPATIENT)
Dept: NEUROLOGY | Facility: CLINIC | Age: 75
End: 2025-07-16
Payer: MEDICARE

## 2025-07-17 LAB
ALBUMIN MFR UR ELPH: 38.3 %
ALBUMIN: 3.2 G/DL (ref 3.4–5)
ALPHA 1 GLOBULIN: 0.7 G/DL (ref 0.2–0.6)
ALPHA 2 GLOBULIN: 1.1 G/DL (ref 0.4–1.1)
ALPHA1 GLOB MFR UR ELPH: 12.8 %
ALPHA2 GLOB MFR UR ELPH: 19.5 %
B-GLOBULIN MFR UR ELPH: 16.5 %
BETA GLOBULIN: 0.9 G/DL (ref 0.5–1.2)
GAMMA GLOB MFR UR ELPH: 12.9 %
GAMMA GLOBULIN: 1.2 G/DL (ref 0.5–1.4)
IMMUNOFIXATION COMMENT: ABNORMAL
IMMUNOFIXATION COMMENT: NORMAL
PATH REVIEW - SERUM IMMUNOFIXATION: ABNORMAL
PATH REVIEW - URINE IMMUNOFIXATION: NORMAL
PATH REVIEW-SERUM PROTEIN ELECTROPHORESIS: ABNORMAL
PATH REVIEW-URINE PROTEIN ELECTROPHORESIS: NORMAL
PROTEIN ELECTROPHORESIS COMMENT: ABNORMAL
URINE ELECTROPHORESIS COMMENT: NORMAL

## 2025-07-18 ENCOUNTER — TELEPHONE (OUTPATIENT)
Dept: NEUROLOGY | Facility: CLINIC | Age: 75
End: 2025-07-18
Payer: MEDICARE

## 2025-07-18 ENCOUNTER — TELEPHONE (OUTPATIENT)
Dept: PRIMARY CARE | Facility: CLINIC | Age: 75
End: 2025-07-18
Payer: MEDICARE

## 2025-07-18 DIAGNOSIS — R77.8 ABNORMAL SERUM PROTEIN ELECTROPHORESIS: Primary | ICD-10-CM

## 2025-07-18 NOTE — TELEPHONE ENCOUNTER
I spoke to the patient and reviewed her labs.    Recommend evaluation by hematology/oncology for elevated kappa and lambda labs.    Also, will send a msg to PCP about proteinuria.

## 2025-07-18 NOTE — TELEPHONE ENCOUNTER
----- Message from Rocco Rushing sent at 7/18/2025 11:01 AM EDT -----  Regarding: RE: re: [place] Patient  Tell pt evaluation by dr Perez shows protein in urine.  Will discuss at next OV  ----- Message -----  From: Levy Perez MD  Sent: 7/18/2025   8:10 AM EDT  To: Rocco Rushing MD  Subject: re: [place] Patient                               Hi Dr. Rushing  Just wanted to touch base with you regarding a mutual patinet.  I saw Khalida for neuropathy.  I did labs to evaluate for this.  Her labs included a urine protein electrophoresis.  This study showed marked proteinuria.    Just wanted to let you know .  Her kidney function is normal.  I will defer to you for any further workup    Thanks  MK

## 2025-07-31 ENCOUNTER — HOSPITAL ENCOUNTER (OUTPATIENT)
Dept: RADIOLOGY | Facility: HOSPITAL | Age: 75
Discharge: HOME | End: 2025-07-31
Payer: MEDICARE

## 2025-07-31 ENCOUNTER — HOSPITAL ENCOUNTER (OUTPATIENT)
Dept: NEUROLOGY | Facility: HOSPITAL | Age: 75
Discharge: HOME | End: 2025-07-31
Payer: MEDICARE

## 2025-07-31 DIAGNOSIS — M41.35 THORACOGENIC SCOLIOSIS OF THORACOLUMBAR REGION: Primary | ICD-10-CM

## 2025-07-31 DIAGNOSIS — G62.9 POLYNEUROPATHY: ICD-10-CM

## 2025-07-31 DIAGNOSIS — M41.35 THORACOGENIC SCOLIOSIS OF THORACOLUMBAR REGION: ICD-10-CM

## 2025-07-31 LAB
ANION GAP SERPL CALCULATED.4IONS-SCNC: 13 MMOL/L (CALC) (ref 7–17)
BUN SERPL-MCNC: 25 MG/DL (ref 7–25)
BUN/CREAT SERPL: NORMAL (CALC) (ref 6–22)
CALCIUM SERPL-MCNC: 9.3 MG/DL (ref 8.6–10.4)
CHLORIDE SERPL-SCNC: 105 MMOL/L (ref 98–110)
CO2 SERPL-SCNC: 24 MMOL/L (ref 20–32)
CREAT SERPL-MCNC: 0.78 MG/DL (ref 0.6–1)
EGFRCR SERPLBLD CKD-EPI 2021: 80 ML/MIN/1.73M2
GLUCOSE SERPL-MCNC: 85 MG/DL (ref 65–99)
POTASSIUM SERPL-SCNC: 4.5 MMOL/L (ref 3.5–5.3)
SODIUM SERPL-SCNC: 142 MMOL/L (ref 135–146)

## 2025-07-31 PROCEDURE — 95912 NRV CNDJ TEST 11-12 STUDIES: CPT | Performed by: PSYCHIATRY & NEUROLOGY

## 2025-07-31 PROCEDURE — 72082 X-RAY EXAM ENTIRE SPI 2/3 VW: CPT

## 2025-07-31 PROCEDURE — 95912 NRV CNDJ TEST 11-12 STUDIES: CPT | Mod: GC | Performed by: PSYCHIATRY & NEUROLOGY

## 2025-07-31 PROCEDURE — 95886 MUSC TEST DONE W/N TEST COMP: CPT | Performed by: PSYCHIATRY & NEUROLOGY

## 2025-07-31 PROCEDURE — 72082 X-RAY EXAM ENTIRE SPI 2/3 VW: CPT | Performed by: STUDENT IN AN ORGANIZED HEALTH CARE EDUCATION/TRAINING PROGRAM

## 2025-08-01 ENCOUNTER — EVALUATION (OUTPATIENT)
Dept: PHYSICAL THERAPY | Facility: CLINIC | Age: 75
End: 2025-08-01
Payer: MEDICARE

## 2025-08-01 DIAGNOSIS — R53.1 GENERALIZED WEAKNESS: ICD-10-CM

## 2025-08-01 DIAGNOSIS — Z96.611 STATUS POST REVERSE TOTAL REPLACEMENT OF RIGHT SHOULDER: ICD-10-CM

## 2025-08-01 DIAGNOSIS — Z98.1 S/P LUMBAR FUSION: ICD-10-CM

## 2025-08-01 DIAGNOSIS — R26.9 ABNORMALITY OF GAIT: Primary | ICD-10-CM

## 2025-08-01 PROCEDURE — 97162 PT EVAL MOD COMPLEX 30 MIN: CPT | Mod: GP | Performed by: PHYSICAL THERAPIST

## 2025-08-01 PROCEDURE — 97110 THERAPEUTIC EXERCISES: CPT | Mod: GP | Performed by: PHYSICAL THERAPIST

## 2025-08-01 ASSESSMENT — ENCOUNTER SYMPTOMS
DEPRESSION: 0
OCCASIONAL FEELINGS OF UNSTEADINESS: 1
LOSS OF SENSATION IN FEET: 1

## 2025-08-01 ASSESSMENT — PAIN SCALES - GENERAL: PAINLEVEL_OUTOF10: 5 - MODERATE PAIN

## 2025-08-01 ASSESSMENT — PATIENT HEALTH QUESTIONNAIRE - PHQ9
2. FEELING DOWN, DEPRESSED OR HOPELESS: SEVERAL DAYS
SUM OF ALL RESPONSES TO PHQ9 QUESTIONS 1 AND 2: 1
1. LITTLE INTEREST OR PLEASURE IN DOING THINGS: NOT AT ALL

## 2025-08-01 ASSESSMENT — PAIN - FUNCTIONAL ASSESSMENT: PAIN_FUNCTIONAL_ASSESSMENT: 0-10

## 2025-08-01 NOTE — PROGRESS NOTES
Physical Therapy Evaluation and Treatment      Patient Name: Khalida Barker  MRN: [id number]  Today's Date: 8/1/2025  Visit #1  Time Calculation  Start Time: 0900  Stop Time: 1000  Time Calculation (min): 60 min    Insurance:  Visit Limit: Med nec  Date Range: 10/30/25  Co-Pay: $30    Assessment:  Patient is presenting today with concerns for gait and balance impairments, s/p R reverse TSA 3/2025, and T10-pelvis fusion 5/2024. Examination reveals below impairments. Standardized tests and measures reveals the need for skilled physical therapy services. Patient will benefit from physical therapy services to improve listed impairments. Initiated treatment today to address these impairments.    Patient with the following impairments: decreased muscle performance, decreased ROM, decreased activity tolerance, pain, participation restrictions, impaired balance/gait, and difficulty with ADL completion    Patient's response to session: No change in pain, Increased ROM/joint mobility, Increase motor control, and Increased knowledge and understanding    Plan:  Treatment/Interventions: Biofeedback, Cryotherapy, Education/ Instruction, Electrical stimulation, Hot pack, Manual therapy, Neuromuscular re-education, Self care/ home management, Taping techniques, Therapeutic activities, Therapeutic exercises  PT Plan: Skilled PT  PT Frequency: 2 times per week  Duration: 12 weeks  Onset Date: 04/18/25  Certification Period Start Date: 08/01/25  Certification Period End Date: 10/30/25  Rehab Potential: Fair  Plan of Care Agreement: Patient    Current Problem:   1. Abnormality of gait  Follow Up In Physical Therapy      2. Generalized weakness  Follow Up In Physical Therapy      3. S/P lumbar fusion  Follow Up In Physical Therapy      4. Status post reverse total replacement of right shoulder  Follow Up In Physical Therapy          Subjective   Patient presenting today with concerns for gait and balance impairments, s/p R reverse TSA  3/2025, and T10-pelvis fusion 5/2024. Patient has quite the history of the last couple years, see chart for further details. Today, she continues to experience limited shoulder mobility, low back pain, weakness in BLE, drop foot on R, and numbness/tingling in stocking pattern in BLE. She ambulates with cane in R hand and AFO on RLE. She will use a FWW for ambulation in the house over night or early in the morning. She recently underwent EMG and awaiting results. Patient denies falls. Patient wishes to improve gait and functional mobility, R shoulder strength and mobility, and overall quality of life.    Pain Assessment: 0-10  0-10 (Numeric) Pain Score: 5 - Moderate pain    General  Referred By: Dr. Lu    Precautions  STEADI Fall Risk Score (The score of 4 or more indicates an increased risk of falling): 7  Precautions Comment: History of T10-pelvis fusion, mod fall risk    Objective   Shoulder AROM (L/R)  Flexion: 140°/110°  Abduction: 140°/110°  Extension: 50°/40°  External Rotation: 70°/50°  Internal rotation: 60°/40°    Shoulder MMT (L/R)  Upper trapezius: 4+/5, 4/5  Middle trapezius: 4/5, 3+/5  Lower trapezius: 4-/5, 3/5    Scapular position: downward, protracted    Dermatomes intact BLE    mCTSIB:  -Flat ground, EO: 30 sec, mild sway  -Flat ground, EC: 8 sec, LOB  -Airex, EO: 30 sec, mod sway  -Airex, EC: 4 sec, LOB    Unable to floor transfer without using external object and BUE    Unable to rise from standard chair without UE use    Outcome Measures:  FGA - Functional Gait Assessment  Gait level surface: 2  Change in gait speed: 3  Gait with horizontal head turns: 2  Gait with vertical head turns: 2  Gait and pivot turn: 2  Step over obstacle: 2  Gait with narrow base of support: 0  Gait with eyes closed: 1  Ambulating backwards: 2  Steps: 1  FGA Total Score: 17    Timed Up and Go Test  How many seconds did it take to complete the 5 tasks?: 11.47 seconds    Other Measures  5x Sit to Stand: 13.70  Oswestry  Disablity Index (ALDO): 17     Treatments:  Therapeutic Exercise (36485): 24 minutes  HEP review  Wall slides*  Wand IR with ext*  Bridges*  Floor transfer and discussion on this    Education and discussion on HEP and treatment regarding the benefits related to current condition, POC, pathophysiology, and precautions    *added to HEP    Goals:  By discharge,    Patient will improve Modified Oswestry Low Back Pain Disability Index score to meet minimal detectable change of improvement to improve performance of ADLs.    Patient will be independent with home exercise program for proper self-management of condition.    Patient will improve active range of motion to in R shoulder for efficient ADL completion.    Patient will improve strength in R shoulder so patient can work with less pain.    Time on 5xSTS will improve to <12s to indicate improved functional LE strength and decreased risk of falls (Times >/= 12s = further need to assess fall risk in community dwelling older adults).    Score on FGA will improve to >22/30 to indicate decreased risk of falls and improved dynamic gait stability (Score </= 22/30 = risk of falls).    Patient will demonstrate adequate ankle, hip, and reactive stepping strategies to reduce fall risk and promote safety of mobility tasks.    Patient will complete 5xSTS without UE use to indicate improved LE strength and reduced fall risk.    Patient will complete floor transfer without using external object to indicate improved LE strength and reduced fall risk.

## 2025-08-04 ENCOUNTER — APPOINTMENT (OUTPATIENT)
Dept: NEUROLOGY | Facility: CLINIC | Age: 75
End: 2025-08-04
Payer: MEDICARE

## 2025-08-04 VITALS
SYSTOLIC BLOOD PRESSURE: 112 MMHG | HEIGHT: 65 IN | HEART RATE: 82 BPM | TEMPERATURE: 96.7 F | WEIGHT: 148.6 LBS | DIASTOLIC BLOOD PRESSURE: 80 MMHG | BODY MASS INDEX: 24.76 KG/M2

## 2025-08-04 DIAGNOSIS — R20.0 LOWER EXTREMITY NUMBNESS: Primary | ICD-10-CM

## 2025-08-04 PROCEDURE — 99215 OFFICE O/P EST HI 40 MIN: CPT | Performed by: PSYCHIATRY & NEUROLOGY

## 2025-08-04 PROCEDURE — G2211 COMPLEX E/M VISIT ADD ON: HCPCS | Performed by: PSYCHIATRY & NEUROLOGY

## 2025-08-04 PROCEDURE — 1036F TOBACCO NON-USER: CPT | Performed by: PSYCHIATRY & NEUROLOGY

## 2025-08-04 PROCEDURE — 3008F BODY MASS INDEX DOCD: CPT | Performed by: PSYCHIATRY & NEUROLOGY

## 2025-08-04 RX ORDER — DIAZEPAM 10 MG/1
TABLET ORAL
Qty: 1 TABLET | Refills: 0 | Status: SHIPPED | OUTPATIENT
Start: 2025-08-04

## 2025-08-04 ASSESSMENT — LIFESTYLE VARIABLES
HOW OFTEN DO YOU HAVE A DRINK CONTAINING ALCOHOL: NEVER
SKIP TO QUESTIONS 9-10: 1
HOW MANY STANDARD DRINKS CONTAINING ALCOHOL DO YOU HAVE ON A TYPICAL DAY: PATIENT DOES NOT DRINK
HOW OFTEN DO YOU HAVE SIX OR MORE DRINKS ON ONE OCCASION: NEVER
AUDIT-C TOTAL SCORE: 0

## 2025-08-04 NOTE — PROGRESS NOTES
Chief Complaint: Neuropathy Evaluation    HPI  75 y.o. female presenting for follow up regarding neuropath.    Since the last visit, she notes constant numbness from the knee on downward.  This numbness started after a shoulder surgery in April 2025.  She denies any painful paresthesias.  She notes continued lower extremity weakness.  She uses a brace and cane.        Current Medications[1]      Objective:  Gen: NAD  Neuro:  --HIF: A&O X 3, repetition and naming intact  --CN:  PERRLA, EOMI, VFF, no visible facial asymmetry, facial sensation intact, no tongue or palatal deviation, SCM intact  --Motor: Moves all 4 extremities equally; no focal deficits except the following:              RLE: hip flexion 4+, knee flexion 5, knee extension 5, DF 1, inversion 3, eversion 3, PF 5              LLE: hip flexion 4+, knee flexion 5, knee extension 5, DF 3, Inversion 3, eversion 3, PF 5  --Sensory: vibration is reduced in the toes (right>left); pin is reduced in the lower extremities from the toes to the knee b/l  --Reflex: 2+ in UE, absent in lower extremities  --Cerebellum: FTN and HTS intact  --Gait: Steppage Gait, Stooped posture    Relevant Results  EMG/NCV (I personally reviewed the images/tracings with the following interpretation)  Chronic left L5-S1 radiculopathy (increased    Assessment:   This is a 75 year old female returning to clinic for follow up.  She has a history of lumbar spine surgery.  Since April 2025, she has noted lower extremity numbness from the knees on downward.  On exam, she has lower extremity weakness (right>left) in an L5 distribution.  She does have sensory loss to pinprick and vibration as described above bilaterally and symmetric.  I reviewed her EMG/NCV.  Her sural sensory response was slightly reduced (which is likely more due to aging rather than reflective of a neuropathy).  Her laboratory workup is notable for an abnormal SPEP with MERCEDES and proteinuira.       Lower Extremity Numbness -  started on April 2025  - EMG/NCV does not reveal a clear explanation  - other considerations include lumbar spinal stenosis  - recommend MRI Lumbar Spine    2.  Proteinuria - noted on UPEP  - I sent a message to patient's PCP  - patient is to follow up with her PCP regarding this    3.  Abnormal SPEP   - referral for hematology/oncology    Levy Perez MD  [facility]  Department of Neurology    I spent a total of 40 minutes on the date of the service which included preparing to see the patient, face-to-face patient care, completing clinical documentation, obtaining and/or reviewing separately obtained history, performing a medically appropriate examination, counseling and educating the patient/family/caregiver, ordering medications, tests, or procedures, independently interpreting results (not separately reported), communicating results to the patient/family/caregiver, and care coordination (not separately reported).            [1]   Current Outpatient Medications:     acetaminophen (Tylenol) 500 mg tablet, Take 325 mg by mouth every 6 hours if needed for mild pain (1 - 3)., Disp: , Rfl:     aspirin-acetaminophen-caffeine (Excedrin Migraine) 250-250-65 mg tablet, Take 1 tablet by mouth every 6 hours if needed for headaches., Disp: , Rfl:     B.coagulans/maltodext/guar gum (BENEFIBER ADVANCED ORAL), Take by mouth., Disp: , Rfl:     biotin 10 mg tablet, Take 1 tablet (10 mg) by mouth once daily., Disp: , Rfl:     caffeine 200 mg, Take by mouth., Disp: , Rfl:     cholecalciferol (Vitamin D3) 50 mcg (2,000 unit) capsule, Take 1 capsule (50 mcg) by mouth once daily., Disp: , Rfl:     docusate sodium (Colace) 100 mg capsule, Take 1 capsule (100 mg) by mouth 2 times a day as needed (constipation)., Disp: , Rfl:     fluticasone (Flonase) 50 mcg/actuation nasal spray, Administer 1 spray into each nostril once daily as needed for allergies., Disp: , Rfl:     furosemide (Lasix) 20 mg tablet, Take 1 tablet (20  mg) by mouth once daily., Disp: 30 tablet, Rfl: 1    hydrocortisone (Anusol-HC) 25 mg suppository, Insert 1 suppository (25 mg) into the rectum once daily., Disp: 10 suppository, Rfl: 1    ibuprofen 200 mg tablet, Take 1 tablet (200 mg) by mouth every 6 hours if needed for mild pain (1 - 3)., Disp: , Rfl:     menthol (Biofreeze, menthol,) 4 % gel gel, Apply topically if needed., Disp: , Rfl:     multivitamin (One Daily Multivitamin) tablet, Take 1 tablet by mouth once daily., Disp: , Rfl:     polyvinyl alcohol (LIQUID TEARS OPHT), Administer 2 drops into affected eye(s) once daily., Disp: , Rfl:     psyllium (Metamucil) powder, Take 1 Dose (5.8 g) by mouth once daily., Disp: , Rfl:     traMADol (Ultram) 50 mg tablet, Take 1 tablet (50 mg) by mouth every 6 hours if needed for severe pain (7 - 10)., Disp: , Rfl:     hyoscyamine (Anaspaz) 0.125 mg disintegrating tablet, Take 1 tablet (0.125 mg) by mouth 3 times a day., Disp: 90 tablet, Rfl: 0

## 2025-08-07 ENCOUNTER — APPOINTMENT (OUTPATIENT)
Dept: PRIMARY CARE | Facility: CLINIC | Age: 75
End: 2025-08-07
Payer: MEDICARE

## 2025-08-07 VITALS
HEART RATE: 98 BPM | BODY MASS INDEX: 25.74 KG/M2 | DIASTOLIC BLOOD PRESSURE: 72 MMHG | SYSTOLIC BLOOD PRESSURE: 116 MMHG | TEMPERATURE: 98.4 F | HEIGHT: 64 IN | WEIGHT: 150.8 LBS

## 2025-08-07 DIAGNOSIS — Z00.00 MEDICARE ANNUAL WELLNESS VISIT, SUBSEQUENT: ICD-10-CM

## 2025-08-07 DIAGNOSIS — E66.3 OVERWEIGHT WITH BODY MASS INDEX (BMI) OF 25 TO 25.9 IN ADULT: ICD-10-CM

## 2025-08-07 DIAGNOSIS — R60.0 BILATERAL LOWER EXTREMITY EDEMA: ICD-10-CM

## 2025-08-07 DIAGNOSIS — Z12.11 ENCOUNTER FOR SCREENING COLONOSCOPY: ICD-10-CM

## 2025-08-07 DIAGNOSIS — R60.0 PEDAL EDEMA: ICD-10-CM

## 2025-08-07 DIAGNOSIS — Z78.9 NEVER SMOKED CIGARETTES: ICD-10-CM

## 2025-08-07 DIAGNOSIS — Z12.31 ENCOUNTER FOR SCREENING MAMMOGRAM FOR BREAST CANCER: ICD-10-CM

## 2025-08-07 DIAGNOSIS — R45.4 ANGER: ICD-10-CM

## 2025-08-07 DIAGNOSIS — M45.0 ANKYLOSING SPONDYLITIS OF MULTIPLE SITES IN SPINE (MULTI): ICD-10-CM

## 2025-08-07 PROCEDURE — 1159F MED LIST DOCD IN RCRD: CPT | Performed by: FAMILY MEDICINE

## 2025-08-07 PROCEDURE — G0439 PPPS, SUBSEQ VISIT: HCPCS | Performed by: FAMILY MEDICINE

## 2025-08-07 PROCEDURE — 3008F BODY MASS INDEX DOCD: CPT | Performed by: FAMILY MEDICINE

## 2025-08-07 PROCEDURE — 1160F RVW MEDS BY RX/DR IN RCRD: CPT | Performed by: FAMILY MEDICINE

## 2025-08-07 PROCEDURE — 1170F FXNL STATUS ASSESSED: CPT | Performed by: FAMILY MEDICINE

## 2025-08-07 PROCEDURE — 1036F TOBACCO NON-USER: CPT | Performed by: FAMILY MEDICINE

## 2025-08-07 PROCEDURE — 1158F ADVNC CARE PLAN TLK DOCD: CPT | Performed by: FAMILY MEDICINE

## 2025-08-07 RX ORDER — FUROSEMIDE 20 MG/1
20 TABLET ORAL DAILY
Qty: 90 TABLET | Refills: 3 | Status: SHIPPED | OUTPATIENT
Start: 2025-08-07 | End: 2026-08-07

## 2025-08-07 ASSESSMENT — PATIENT HEALTH QUESTIONNAIRE - PHQ9
1. LITTLE INTEREST OR PLEASURE IN DOING THINGS: NOT AT ALL
2. FEELING DOWN, DEPRESSED OR HOPELESS: SEVERAL DAYS
SUM OF ALL RESPONSES TO PHQ9 QUESTIONS 1 AND 2: 1

## 2025-08-07 ASSESSMENT — ACTIVITIES OF DAILY LIVING (ADL)
TAKING_MEDICATION: INDEPENDENT
DOING_HOUSEWORK: INDEPENDENT
DRESSING: INDEPENDENT
MANAGING_FINANCES: INDEPENDENT
GROCERY_SHOPPING: INDEPENDENT
BATHING: INDEPENDENT

## 2025-08-07 NOTE — PROGRESS NOTES
Physical Therapy Treatment    Patient Name: Khalida Barker  MRN: [id number]  YOB: 1950  Encounter Date: 8/8/2025    Time Entry:  Time Calculation  Start Time: 0822  Stop Time: 0905  Time Calculation (min): 43 min     PT Therapeutic Procedures Time Entry  Therapeutic Exercise Time Entry: 30  Therapeutic Activity Time Entry: 13                 Rehab Insurance Information:   Visit Count: 2  POC Visits: 24 (Re-assessment on 10th visit)     Medicare cert. dates: 08/01/25  Through: 10/30/25     Additional Authorization/Insurance Information: [employer],  90 Day 10/30/25, Co-Pay: $30     Rehab Falls Risk Assessment:  Fall Risk Indicated: Yes  Factors for Low Risk for Falls: Age 65 or older, Multiple current diagnoses, Recent invasive procedure or hospitalization  Factors for Moderate Risk For Falls: Unsteady gait/use of assistive devices/apparent weakness or functionally challenged, Fear of falling  Moderate Fall Risk Interventions: Fall prevention education provided, Optimize clinic environment ensuring safe and accessible pathways, Use of gait belt in clinic as indicated      Problem List Items Addressed This Visit           ICD-10-CM    Generalized weakness R53.1    Abnormality of gait - Primary R26.9    S/P lumbar fusion Z98.1    Status post reverse total replacement of right shoulder Z96.611          Subjective   she is in worse shape currently due to her back and shoulder compared to prior episode of care with this therapist. Also new onset of neuropathy of B LE's as well as LE edema. Can no longer fit into her shoes she was using for her AFO. Underwent an EMG and results were inconclusive. Neurology is sending for a spine MRI. Also plans to discuss with her surgeon. Also to see vascular doctor to rule out vascular contribution. Has a wound on her R foot at 1st MTP after wearing her shoes to bed and not taking her AFO off- due to being home alone after shoulder surgery and was fearful getting  around without her walker or AFO. She has the following personal goals: To stand straighter, To walk better, To be able to perform floor <> stand transfers, & for the back pain to go away. Due to drop foot R side and balance impairments more profound in the morning, does not feel comfortable walking without the walker in the morning and fearful of using cane in the morning until she dons her AFO..  Pain reported as 6. R>L sided low back pain QL to paraspinals         Objective      Posture                 TTP over R>L lumbar paraspinals & QL. Hypertonicity of R>L lumbar paraspinals. Atrophy of R QL.    Shoulder Range of Motion  Right Shoulder   Active (deg) Passive (deg) Pain   Flexion 90 (in sitting; 130 deg in supine)       Extension         Scaption         ABduction         ADduction         Horizontal ABduction         Horizontal ADduction         External Rotation (Shoulder ABducted 0 degrees)         External Rotation (Shoulder ABducted 45 degrees)         External Rotation (Shoulder ABducted 90 degrees)         Internal Rotation (Shoulder ABducted 0 degrees)         Internal Rotation (Shoulder ABducted 45 degrees)         Internal Rotation (Shoulder ABducted 90 degrees)           Left Shoulder   Active (deg) Passive (deg) Pain   Flexion 111 (in sitting; 138 deg in supine)       Extension         Scaption         ABduction         ADduction         Horizontal ABduction         Horizontal ADduction         External Rotation (Shoulder ABducted 0 degrees)         External Rotation (Shoulder ABducted 45 degrees)         External Rotation (Shoulder ABducted 90 degrees)         Internal Rotation (Shoulder ABducted 0 degrees)         Internal Rotation (Shoulder ABducted 45 degrees)         Internal Rotation (Shoulder ABducted 90 degrees)                        Activities   Therapeutic Exercise  Therapeutic Exercise Performed: Yes  Therapeutic Exercise Activity 1: Seated thoracic extension  Therapeutic Exercise  Activity 2: Seated ankle DF  Therapeutic Exercise Activity 3: Standing ankle DF       Therapeutic Activity  Therapeutic Activity Performed: Yes  Therapeutic Activity 1: Discussed logistics of PT POC regarding goals & activities.                                                  Access Code: [email or web address]  URL: [email or web address]  Date: 08/08/2025  Prepared by: Lara Hsieh    Exercises  - Seated Thoracic Lumbar Extension with Pectoralis Stretch  - 1 x daily - 7 x weekly - 1-2 sets - 10 reps - 2-3 seconds hold  - Seated Ankle Dorsiflexion AROM  - 2 x daily - 7 x weekly - 3 sets - 10 reps - 2 seconds hold  - Standing Unilateral Ankle Dorsiflexion AROM (Mirrored)  - 1 x daily - 7 x weekly - 1-3 sets - 10 reps - 2 seconds hold    Assessment/Plan   Assessment: Focus of session on review of goals and educating pt on expectations of recovery following her surgeries and medical comorbidities. Provided pt with initial home exercise program with focus on thoracic extension and ankle DF. Will continue to work on balance & LE strengthening and thoracic extension mobility in upcoming treatment visits. Patient tolerated treatment well      Plan: Review HEP. LE strengthening: cybex equipment --> split squats  Balance: harnessed activities  Check hip flexor mobility

## 2025-08-07 NOTE — PROGRESS NOTES
Javier Gaixola is here for a annual wellness visit.  She continues to have significant medical concerns.  At this point she has been evaluated for bilateral lower leg numbness and swelling that began about 5 to 6 months ago.  She continues to have weakness in her lower legs as well.  She uses a cane for ambulation.  Dr. Lu referred her to Dr Cantu at [facility] for a another neurosurgical opinion.  She was then referred to neurology who performed EMG studies of her legs revealing chronic probably radiculopathy in her legs.  Serum protein levels were abnormal prompting referral to hematology for evaluation of this.  This is pending as well as MRI of the lumbar spine as ordered by neurology.  She otherwise has been feeling well.  She does note symptoms of depression and anger.  Her  has also been ill during this time with increased stress regarding that as well.  Patient is requesting to begin counseling.  She continues on her meds noted.  She has never smoked and denies alcohol use.    Patient ID: Khalida Barker is a 75 y.o. female who presents for Medicare Annual Wellness Visit Subsequent (awv):    Problem List Items Addressed This Visit    None     Medical History[1]   Surgical History[2]   Family History[3]   Social History     Socioeconomic History    Marital status:      Spouse name: Not on file    Number of children: Not on file    Years of education: Not on file    Highest education level: Not on file   Occupational History    Not on file   Tobacco Use    Smoking status: Never    Smokeless tobacco: Never   Vaping Use    Vaping status: Never Used   Substance and Sexual Activity    Alcohol use: Not Currently     Comment: rarely    Drug use: Never    Sexual activity: Defer   Other Topics Concern    Not on file   Social History Narrative    Not on file     Social Drivers of Health     Financial Resource Strain: Low Risk  (3/11/2025)    Overall Financial Resource Strain (CARDIA)      Difficulty of Paying Living Expenses: Not hard at all   Food Insecurity: No Food Insecurity (3/11/2025)    Hunger Vital Sign     Worried About Running Out of Food in the Last Year: Never true     Ran Out of Food in the Last Year: Never true   Transportation Needs: No Transportation Needs (3/11/2025)    PRAPARE - Transportation     Lack of Transportation (Medical): No     Lack of Transportation (Non-Medical): No   Physical Activity: Not on file   Stress: No Stress Concern Present (7/1/2024)    Received from [facility] - Occupational Stress Questionnaire     Feeling of Stress : Not at all   Social Connections: Patient Declined (6/18/2024)    Received from [facility]    Social Connection and Isolation Panel     In a typical week, how many times do you talk on the phone with family, friends, or neighbors?: Patient declined     How often do you get together with friends or relatives?: Patient declined     How often do you attend [employer] or [employer] services?: Patient declined     Do you belong to any clubs or organizations such as [employer] groups, unions, fraternal or athletic groups, or school groups?: Patient declined     How often do you attend meetings of the clubs or organizations you belong to?: Patient declined     Are you , , , , never , or living with a partner?: Patient declined   Intimate Partner Violence: Not At Risk (3/11/2025)    Humiliation, Afraid, Rape, and Kick questionnaire     Fear of Current or Ex-Partner: No     Emotionally Abused: No     Physically Abused: No     Sexually Abused: No   Housing Stability: Low Risk  (3/11/2025)    Housing Stability Vital Sign     Unable to Pay for Housing in the Last Year: No     Number of Times Moved in the Last Year: 0     Homeless in the Last Year: No      Oxycodone   Current Medications[4]    Immunization History   Administered Date(s) Administered    Flu vaccine, quadrivalent,  high-dose, preservative free, age 65y+ (FLUZONE) 12/07/2020    Flu vaccine, trivalent, preservative free, HIGH-DOSE, age 65y+ (Fluzone) 11/16/2017, 10/11/2022    Influenza, Seasonal, Quadrivalent, Adjuvanted 10/20/2023    Influenza, Unspecified 10/24/2019, 11/08/2021, 10/11/2022    Influenza, seasonal, injectable 11/04/2016    Influenza, trivalent, adjuvanted 11/02/2024    Moderna COVID-19 vaccine, 12 years and older (50mcg/0.5mL)(Spikevax) 10/06/2023, 11/21/2024    Moderna COVID-19 vaccine, bivalent, blue cap/gray label *Check age/dose* 09/08/2022    Moderna SARS-CoV-2 Vaccination 02/11/2021, 03/11/2021, 11/02/2021, 04/14/2022    Pneumococcal Conjugate PCV 7 09/24/2018    Pneumococcal polysaccharide vaccine, 23-valent, age 2 years and older (PNEUMOVAX 23) 10/24/2019    Zoster vaccine, recombinant, adult (SHINGRIX) 09/23/2021, 12/10/2021        Review of Systems   Constitutional: Negative.    HENT: Negative.     Eyes: Negative.    Respiratory: Negative.     Cardiovascular: Negative.    Gastrointestinal: Negative.    Endocrine: Negative.    Genitourinary: Negative.    Musculoskeletal:  Positive for arthralgias and gait problem.   Skin: Negative.    Allergic/Immunologic: Negative.    Neurological:  Positive for weakness and numbness.   Hematological: Negative.    Psychiatric/Behavioral:  Positive for dysphoric mood.    All other systems reviewed and are negative.       Vitals:    08/07/25 1404   BP: 116/72   Pulse: 98   Temp: 36.9 °C (98.4 °F)     Vitals:    08/07/25 1404   Weight: 68.4 kg (150 lb 12.8 oz)      Physical Exam  Constitutional:       General: She is not in acute distress.     Appearance: Normal appearance.     Cardiovascular:      Rate and Rhythm: Normal rate and regular rhythm.      Pulses: Normal pulses.      Heart sounds: Normal heart sounds. No murmur heard.     No friction rub. No gallop.   Pulmonary:      Effort: Pulmonary effort is normal. No respiratory distress.      Breath sounds: Normal breath  sounds. No wheezing or rales.     Musculoskeletal:      Cervical back: Neck supple.     Neurological:      Mental Status: She is alert and oriented to person, place, and time.     Lower legs-there is 1-2+ pitting edema in both lower legs.  There is no significant redness or induration.    ASSESSMENT/PLAN: Annual wellness visit.  Check CMP lipid profile and serum magnesium.  Recommended mammograms and screening colonoscopy.  Eye exams are up-to-date.    Nonspecific lower extremity weakness and numbness.  Proceed with neurological evaluation including MRI of lumbar spine.  Discussed the importance of fall prevention.    Lower leg edema.  Schedule for bilateral venous duplex scan.  Retry Lasix 20 mg daily.  Discussed potential side effects.  Call in 2 weeks if edema is not improving    Elevated free light chain proteins.  Proceed with hematology evaluation as scheduled    Depression secondary to stress from personal and family illnesses..  Patient is referred to  behavioral health or [facility] for counseling.  Recommended  consideration of Lexapro or Zoloft as well but patient defers medication at the present time.    Follow-up in 2 months and call as needed       Scribe Attestation  By signing my name below, I, Patricia Martinez LPN, Scribe   attest that this documentation has been prepared under the direction and in the presence of Rocco Rushing MD.         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     OA (osteoarthritis) of knee     Painful scar 09/20/2017    Peripheral neuropathy     Scoliosis     Skin cancer     Spinal stenosis     Traumatic ecchymosis of foot 01/19/2013    Trigger finger of right hand     Vision loss      wears glasses    Vitamin D deficiency    [2]   Past Surgical History:  Procedure Laterality Date    ADENOIDECTOMY  1956    BACK SURGERY  05/14/2024    spine up to T10    BREAST BIOPSY  1999    COLONOSCOPY      KNEE ARTHROPLASTY Right     MOUTH SURGERY  2016    Back left molar removed    MOUTH SURGERY  09/28/2021    right molar    OTHER SURGICAL HISTORY      Skin lesion excision    SPINAL FUSION  06/2010    C4-5    TONSILLECTOMY  1956    TOTAL SHOULDER ARTHROPLASTY Right 03/11/2025    reverse replacement    WISDOM TOOTH EXTRACTION  1978    lower    WRIST SURGERY Right 06/2024    WRIST SURGERY Right 12/03/2024    Hardware removal and tenolysis right wrist   [3]   Family History  Problem Relation Name Age of Onset    Alzheimer's disease Mother      Heart disease Father      Polymyositis Father      Hypertension Brother      Skin cancer Brother      Other (peripheral artery disease) Brother     [4]   Current Outpatient Medications   Medication Sig Dispense Refill    acetaminophen (Tylenol) 500 mg tablet Take 325 mg by mouth every 6 hours if needed for mild pain (1 - 3).      aspirin-acetaminophen-caffeine (Excedrin Migraine) 250-250-65 mg tablet Take 1 tablet by mouth every 6 hours if needed for headaches.      B.coagulans/maltodext/guar gum (BENEFIBER ADVANCED ORAL) Take by mouth.      biotin 10 mg tablet Take 1 tablet (10 mg) by mouth once daily.      caffeine 200 mg Take by mouth.      cholecalciferol (Vitamin D3) 50 mcg (2,000 unit) capsule Take 1 capsule (50 mcg) by mouth once daily.      diazePAM (Valium) 10 mg tablet Please take 10 mg (1 tab) 30 minutes before MRI.  Please make sure someone drives you to and from the MRI 1 tablet 0    docusate sodium (Colace) 100 mg capsule Take 1 capsule (100 mg) by mouth 2 times a day as needed (constipation).      fluticasone (Flonase) 50 mcg/actuation nasal spray Administer 1 spray into each nostril once daily as needed for allergies.      furosemide (Lasix) 20 mg tablet Take  1 tablet (20 mg) by mouth once daily. 30 tablet 1    hydrocortisone (Anusol-HC) 25 mg suppository Insert 1 suppository (25 mg) into the rectum once daily. 10 suppository 1    ibuprofen 200 mg tablet Take 1 tablet (200 mg) by mouth every 6 hours if needed for mild pain (1 - 3).      menthol (Biofreeze, menthol,) 4 % gel gel Apply topically if needed.      multivitamin (One Daily Multivitamin) tablet Take 1 tablet by mouth once daily.      polyvinyl alcohol (LIQUID TEARS OPHT) Administer 2 drops into affected eye(s) once daily.      psyllium (Metamucil) powder Take 1 Dose (5.8 g) by mouth once daily.      traMADol (Ultram) 50 mg tablet Take 1 tablet (50 mg) by mouth every 6 hours if needed for severe pain (7 - 10).      hyoscyamine (Anaspaz) 0.125 mg disintegrating tablet Take 1 tablet (0.125 mg) by mouth 3 times a day. 90 tablet 0     No current facility-administered medications for this visit.

## 2025-08-07 NOTE — PATIENT INSTRUCTIONS
Resume furosemide daily  Schedule for venous duplex  Recommend screening colonoscopy  Recommend screening mammogram  Referral for Psychology  Obtain labs  Return 2 months

## 2025-08-08 ENCOUNTER — TREATMENT (OUTPATIENT)
Dept: PHYSICAL THERAPY | Facility: CLINIC | Age: 75
End: 2025-08-08
Payer: MEDICARE

## 2025-08-08 DIAGNOSIS — R26.9 ABNORMALITY OF GAIT: Primary | ICD-10-CM

## 2025-08-08 DIAGNOSIS — Z98.1 S/P LUMBAR FUSION: ICD-10-CM

## 2025-08-08 DIAGNOSIS — Z96.611 STATUS POST REVERSE TOTAL REPLACEMENT OF RIGHT SHOULDER: ICD-10-CM

## 2025-08-08 DIAGNOSIS — R53.1 GENERALIZED WEAKNESS: ICD-10-CM

## 2025-08-08 PROCEDURE — 97530 THERAPEUTIC ACTIVITIES: CPT | Mod: GP

## 2025-08-08 PROCEDURE — 97110 THERAPEUTIC EXERCISES: CPT | Mod: GP

## 2025-08-08 ASSESSMENT — ENCOUNTER SYMPTOMS
WEAKNESS: 1
ALLERGIC/IMMUNOLOGIC NEGATIVE: 1
HEMATOLOGIC/LYMPHATIC NEGATIVE: 1
RESPIRATORY NEGATIVE: 1
ARTHRALGIAS: 1
NUMBNESS: 1
CARDIOVASCULAR NEGATIVE: 1
DYSPHORIC MOOD: 1
CONSTITUTIONAL NEGATIVE: 1
GASTROINTESTINAL NEGATIVE: 1
ENDOCRINE NEGATIVE: 1
EYES NEGATIVE: 1

## 2025-08-09 NOTE — PROGRESS NOTES
Assumed care.  Patient sitting on the chair asleep with chair alarm on.  No distress or discomfort noted.  1L oxygen on.  Call light in reach.  Plan of care ongoing.   [facility] [place]  Department of Neurological Surgery  Post Operative Patient Visit      History of Present Illness:  Khalida Barker is a 74 y.o. year old female who presents to the spine clinic in a post operative visit. Since surgery they are 3 months s/p T11-12 decompression and L2-S1 fusion on 5/14/2024. Post op course complicated by wound infection. History of a fall and fracture of the arm. Since surgery, she has lost 40 pounds in 2 months. She is unable to eat solid foods and went to the hospital for it and was diagnosed with esophageal motility disorder. She continues with back pain, lower extremity weakness, deconditioning, and foot drop. Back pain appears to be paraspinal, likely related to deconditioning. I will get X-rays and CT scan for evaluation of the hardware and fusion. I put in a prescription for PT. I will have her continue with home PT exercises. We discussed an ankle brace for the right foot but will hold off on it for now to see how she recovers. I will see her back in 6-12 weeks.      The above clinical summary has been dictated with voice recognition software. It has not been proofread for grammatical errors, typographical mistakes, or other semantic inconsistencies.    Thank you for visiting our office today. It was our pleasure to take part in your healthcare.     Do not hesitate to call with any questions regarding your plan of care after leaving at [phone] M-F 8am-4pm.     To clinicians, thank you very much for this kind referral. It is a privilege to partner with you in the care of your patients. My office would be delighted to assist you with any further consultations or with questions regarding the plan of care outlined. Do not hesitate to call the office or contact me directly.       Sincerely,      Justin Lu MD, [facility]  Spine , [facility]  [employer] Chair in Spinal  Neurosurgery  Neurosurgery , [facility]  Complex Spine Surgery Fellowship Director   of Neurological Surgery  [employer]    [facility]  [address]  [address]  [place], [place] [id number] [place] [address]  [address] [facility] [address]  [address]  [place] [address]    Phone: [phone]  Fax: [phone]        Scribe Attestation  By signing my name below, I, Maliha Abrams , Colette   attest that this documentation has been prepared under the direction and in the presence of Justin Lu MD.

## 2025-08-11 ENCOUNTER — PATIENT OUTREACH (OUTPATIENT)
Dept: HEMATOLOGY/ONCOLOGY | Facility: HOSPITAL | Age: 75
End: 2025-08-11
Payer: MEDICARE

## 2025-08-12 ENCOUNTER — OFFICE VISIT (OUTPATIENT)
Dept: HEMATOLOGY/ONCOLOGY | Facility: HOSPITAL | Age: 75
End: 2025-08-12
Payer: MEDICARE

## 2025-08-12 ENCOUNTER — LAB (OUTPATIENT)
Dept: LAB | Facility: HOSPITAL | Age: 75
End: 2025-08-12
Payer: MEDICARE

## 2025-08-12 VITALS
BODY MASS INDEX: 24.24 KG/M2 | TEMPERATURE: 97.9 F | HEIGHT: 65 IN | RESPIRATION RATE: 14 BRPM | WEIGHT: 145.5 LBS | HEART RATE: 88 BPM | OXYGEN SATURATION: 100 % | DIASTOLIC BLOOD PRESSURE: 60 MMHG | SYSTOLIC BLOOD PRESSURE: 111 MMHG

## 2025-08-12 DIAGNOSIS — D50.8 OTHER IRON DEFICIENCY ANEMIA: ICD-10-CM

## 2025-08-12 DIAGNOSIS — R77.9 ELEVATED SERUM PROTEIN LEVEL: ICD-10-CM

## 2025-08-12 DIAGNOSIS — R77.9 ELEVATED SERUM PROTEIN LEVEL: Primary | ICD-10-CM

## 2025-08-12 LAB
ALBUMIN SERPL BCP-MCNC: 4 G/DL (ref 3.4–5)
ALBUMIN SERPL-MCNC: 3.6 G/DL (ref 3.6–5.1)
ALP SERPL-CCNC: 128 U/L (ref 37–153)
ALP SERPL-CCNC: 134 U/L (ref 33–136)
ALT SERPL W P-5'-P-CCNC: 17 U/L (ref 7–45)
ALT SERPL-CCNC: 15 U/L (ref 6–29)
ANION GAP SERPL CALC-SCNC: 15 MMOL/L (ref 10–20)
ANION GAP SERPL CALCULATED.4IONS-SCNC: 15 MMOL/L (CALC) (ref 7–17)
AST SERPL W P-5'-P-CCNC: 15 U/L (ref 9–39)
AST SERPL-CCNC: 19 U/L (ref 10–35)
BASOPHILS # BLD AUTO: 0.04 X10*3/UL (ref 0–0.1)
BASOPHILS NFR BLD AUTO: 0.3 %
BILIRUB SERPL-MCNC: 0.5 MG/DL (ref 0–1.2)
BILIRUB SERPL-MCNC: 0.6 MG/DL (ref 0.2–1.2)
BUN SERPL-MCNC: 24 MG/DL (ref 6–23)
BUN SERPL-MCNC: 25 MG/DL (ref 7–25)
CALCIUM SERPL-MCNC: 10.1 MG/DL (ref 8.6–10.3)
CALCIUM SERPL-MCNC: 8.8 MG/DL (ref 8.6–10.4)
CHLORIDE SERPL-SCNC: 101 MMOL/L (ref 98–107)
CHLORIDE SERPL-SCNC: 102 MMOL/L (ref 98–110)
CHOLEST SERPL-MCNC: 108 MG/DL
CHOLEST/HDLC SERPL: 2.6 (CALC)
CO2 SERPL-SCNC: 22 MMOL/L (ref 20–32)
CO2 SERPL-SCNC: 28 MMOL/L (ref 21–32)
CREAT SERPL-MCNC: 0.83 MG/DL (ref 0.6–1)
CREAT SERPL-MCNC: 0.9 MG/DL (ref 0.5–1.05)
CRP SERPL-MCNC: 13.97 MG/DL
EGFRCR SERPLBLD CKD-EPI 2021: 68 ML/MIN/1.73M*2
EGFRCR SERPLBLD CKD-EPI 2021: 74 ML/MIN/1.73M2
EOSINOPHIL # BLD AUTO: 0.09 X10*3/UL (ref 0–0.4)
EOSINOPHIL NFR BLD AUTO: 0.7 %
ERYTHROCYTE [DISTWIDTH] IN BLOOD BY AUTOMATED COUNT: 14.4 % (ref 11.5–14.5)
ERYTHROCYTE [SEDIMENTATION RATE] IN BLOOD BY WESTERGREN METHOD: 35 MM/H (ref 0–30)
FERRITIN SERPL-MCNC: 616 NG/ML (ref 8–150)
GLUCOSE SERPL-MCNC: 91 MG/DL (ref 65–99)
GLUCOSE SERPL-MCNC: 92 MG/DL (ref 74–99)
HCT VFR BLD AUTO: 37.4 % (ref 36–46)
HDLC SERPL-MCNC: 42 MG/DL
HGB BLD-MCNC: 11.7 G/DL (ref 12–16)
IGA SERPL-MCNC: 258 MG/DL (ref 70–400)
IGG SERPL-MCNC: 1560 MG/DL (ref 700–1600)
IGM SERPL-MCNC: 145 MG/DL (ref 40–230)
IMM GRANULOCYTES # BLD AUTO: 0.07 X10*3/UL (ref 0–0.5)
IMM GRANULOCYTES NFR BLD AUTO: 0.6 % (ref 0–0.9)
IRON SATN MFR SERPL: 9 % (ref 25–45)
IRON SERPL-MCNC: 21 UG/DL (ref 35–150)
LDH SERPL L TO P-CCNC: 131 U/L (ref 84–246)
LDLC SERPL CALC-MCNC: 52 MG/DL (CALC)
LYMPHOCYTES # BLD AUTO: 2.05 X10*3/UL (ref 0.8–3)
LYMPHOCYTES NFR BLD AUTO: 16.3 %
MAGNESIUM SERPL-MCNC: 2.3 MG/DL (ref 1.5–2.5)
MCH RBC QN AUTO: 27.7 PG (ref 26–34)
MCHC RBC AUTO-ENTMCNC: 31.3 G/DL (ref 32–36)
MCV RBC AUTO: 89 FL (ref 80–100)
MONOCYTES # BLD AUTO: 0.84 X10*3/UL (ref 0.05–0.8)
MONOCYTES NFR BLD AUTO: 6.7 %
NEUTROPHILS # BLD AUTO: 9.5 X10*3/UL (ref 1.6–5.5)
NEUTROPHILS NFR BLD AUTO: 75.4 %
NONHDLC SERPL-MCNC: 66 MG/DL (CALC)
NRBC BLD-RTO: 0 /100 WBCS (ref 0–0)
PLATELET # BLD AUTO: 476 X10*3/UL (ref 150–450)
POTASSIUM SERPL-SCNC: 3.9 MMOL/L (ref 3.5–5.3)
POTASSIUM SERPL-SCNC: 4.6 MMOL/L (ref 3.5–5.3)
PROT SERPL-MCNC: 7.1 G/DL (ref 6.1–8.1)
PROT SERPL-MCNC: 7.5 G/DL (ref 6.4–8.2)
PROT SERPL-MCNC: 8.4 G/DL (ref 6.4–8.2)
RBC # BLD AUTO: 4.22 X10*6/UL (ref 4–5.2)
SODIUM SERPL-SCNC: 139 MMOL/L (ref 135–146)
SODIUM SERPL-SCNC: 140 MMOL/L (ref 136–145)
TIBC SERPL-MCNC: 233 UG/DL (ref 240–445)
TRIGL SERPL-MCNC: 48 MG/DL
UIBC SERPL-MCNC: 212 UG/DL (ref 110–370)
VIT B12 SERPL-MCNC: 564 PG/ML (ref 211–911)
WBC # BLD AUTO: 12.6 X10*3/UL (ref 4.4–11.3)

## 2025-08-12 PROCEDURE — 1125F AMNT PAIN NOTED PAIN PRSNT: CPT

## 2025-08-12 PROCEDURE — 85025 COMPLETE CBC W/AUTO DIFF WBC: CPT

## 2025-08-12 PROCEDURE — 83521 IG LIGHT CHAINS FREE EACH: CPT

## 2025-08-12 PROCEDURE — 86140 C-REACTIVE PROTEIN: CPT

## 2025-08-12 PROCEDURE — 99205 OFFICE O/P NEW HI 60 MIN: CPT

## 2025-08-12 PROCEDURE — 83615 LACTATE (LD) (LDH) ENZYME: CPT

## 2025-08-12 PROCEDURE — 36415 COLL VENOUS BLD VENIPUNCTURE: CPT

## 2025-08-12 PROCEDURE — 83540 ASSAY OF IRON: CPT

## 2025-08-12 PROCEDURE — 82607 VITAMIN B-12: CPT

## 2025-08-12 PROCEDURE — 84155 ASSAY OF PROTEIN SERUM: CPT

## 2025-08-12 PROCEDURE — 85652 RBC SED RATE AUTOMATED: CPT

## 2025-08-12 PROCEDURE — 1159F MED LIST DOCD IN RCRD: CPT

## 2025-08-12 PROCEDURE — 3008F BODY MASS INDEX DOCD: CPT

## 2025-08-12 PROCEDURE — 82784 ASSAY IGA/IGD/IGG/IGM EACH: CPT

## 2025-08-12 PROCEDURE — 99215 OFFICE O/P EST HI 40 MIN: CPT | Mod: 25

## 2025-08-12 PROCEDURE — 82728 ASSAY OF FERRITIN: CPT

## 2025-08-12 PROCEDURE — 86334 IMMUNOFIX E-PHORESIS SERUM: CPT

## 2025-08-12 PROCEDURE — 80053 COMPREHEN METABOLIC PANEL: CPT

## 2025-08-12 ASSESSMENT — ENCOUNTER SYMPTOMS
HEMATOLOGIC/LYMPHATIC NEGATIVE: 1
EYES NEGATIVE: 1
GASTROINTESTINAL NEGATIVE: 1
BACK PAIN: 1
PSYCHIATRIC NEGATIVE: 1
RESPIRATORY NEGATIVE: 1
CONSTITUTIONAL NEGATIVE: 1
CARDIOVASCULAR NEGATIVE: 1
ENDOCRINE NEGATIVE: 1
NUMBNESS: 1

## 2025-08-12 ASSESSMENT — PAIN SCALES - GENERAL: PAINLEVEL_OUTOF10: 6

## 2025-08-13 ENCOUNTER — RESULTS FOLLOW-UP (OUTPATIENT)
Dept: PRIMARY CARE | Facility: CLINIC | Age: 75
End: 2025-08-13

## 2025-08-13 ENCOUNTER — TREATMENT (OUTPATIENT)
Dept: PHYSICAL THERAPY | Facility: CLINIC | Age: 75
End: 2025-08-13
Payer: MEDICARE

## 2025-08-13 DIAGNOSIS — Z96.611 STATUS POST REVERSE TOTAL REPLACEMENT OF RIGHT SHOULDER: ICD-10-CM

## 2025-08-13 DIAGNOSIS — R53.1 GENERALIZED WEAKNESS: Primary | ICD-10-CM

## 2025-08-13 DIAGNOSIS — Z98.1 S/P LUMBAR FUSION: ICD-10-CM

## 2025-08-13 DIAGNOSIS — R26.9 ABNORMALITY OF GAIT: ICD-10-CM

## 2025-08-13 LAB
KAPPA LC SERPL-MCNC: 4.47 MG/DL (ref 0.33–1.94)
KAPPA LC/LAMBDA SER: 1.03 {RATIO} (ref 0.26–1.65)
LAMBDA LC SERPL-MCNC: 4.33 MG/DL (ref 0.57–2.63)

## 2025-08-13 PROCEDURE — 97110 THERAPEUTIC EXERCISES: CPT | Mod: GP,CQ

## 2025-08-13 PROCEDURE — 97112 NEUROMUSCULAR REEDUCATION: CPT | Mod: GP,CQ

## 2025-08-15 ENCOUNTER — TREATMENT (OUTPATIENT)
Dept: PHYSICAL THERAPY | Facility: CLINIC | Age: 75
End: 2025-08-15
Payer: MEDICARE

## 2025-08-15 ENCOUNTER — APPOINTMENT (OUTPATIENT)
Dept: NEUROSURGERY | Facility: CLINIC | Age: 75
End: 2025-08-15
Payer: MEDICARE

## 2025-08-15 VITALS
WEIGHT: 148.1 LBS | SYSTOLIC BLOOD PRESSURE: 122 MMHG | DIASTOLIC BLOOD PRESSURE: 70 MMHG | RESPIRATION RATE: 12 BRPM | BODY MASS INDEX: 25.29 KG/M2 | TEMPERATURE: 97.7 F | HEIGHT: 64 IN | HEART RATE: 92 BPM

## 2025-08-15 DIAGNOSIS — M41.25 OTHER IDIOPATHIC SCOLIOSIS, THORACOLUMBAR REGION: ICD-10-CM

## 2025-08-15 DIAGNOSIS — Z98.1 S/P LUMBAR FUSION: ICD-10-CM

## 2025-08-15 DIAGNOSIS — R26.9 ABNORMALITY OF GAIT: ICD-10-CM

## 2025-08-15 DIAGNOSIS — M54.16 LUMBAR RADICULOPATHY: Primary | ICD-10-CM

## 2025-08-15 DIAGNOSIS — R53.1 GENERALIZED WEAKNESS: Primary | ICD-10-CM

## 2025-08-15 DIAGNOSIS — Z96.611 STATUS POST REVERSE TOTAL REPLACEMENT OF RIGHT SHOULDER: ICD-10-CM

## 2025-08-15 LAB
ALBUMIN: 3.1 G/DL (ref 3.4–5)
ALPHA 1 GLOBULIN: 0.8 G/DL (ref 0.2–0.6)
ALPHA 2 GLOBULIN: 1.2 G/DL (ref 0.4–1.1)
BETA GLOBULIN: 1 G/DL (ref 0.5–1.2)
GAMMA GLOBULIN: 1.4 G/DL (ref 0.5–1.4)
IMMUNOFIXATION COMMENT: ABNORMAL
PATH REVIEW - SERUM IMMUNOFIXATION: ABNORMAL
PATH REVIEW-SERUM PROTEIN ELECTROPHORESIS: ABNORMAL
PROTEIN ELECTROPHORESIS COMMENT: ABNORMAL

## 2025-08-15 PROCEDURE — 97110 THERAPEUTIC EXERCISES: CPT | Mod: GP

## 2025-08-15 PROCEDURE — 1125F AMNT PAIN NOTED PAIN PRSNT: CPT | Performed by: STUDENT IN AN ORGANIZED HEALTH CARE EDUCATION/TRAINING PROGRAM

## 2025-08-15 PROCEDURE — 1159F MED LIST DOCD IN RCRD: CPT | Performed by: STUDENT IN AN ORGANIZED HEALTH CARE EDUCATION/TRAINING PROGRAM

## 2025-08-15 PROCEDURE — 3008F BODY MASS INDEX DOCD: CPT | Performed by: STUDENT IN AN ORGANIZED HEALTH CARE EDUCATION/TRAINING PROGRAM

## 2025-08-15 PROCEDURE — 99214 OFFICE O/P EST MOD 30 MIN: CPT | Performed by: STUDENT IN AN ORGANIZED HEALTH CARE EDUCATION/TRAINING PROGRAM

## 2025-08-15 ASSESSMENT — PATIENT HEALTH QUESTIONNAIRE - PHQ9
1. LITTLE INTEREST OR PLEASURE IN DOING THINGS: NOT AT ALL
2. FEELING DOWN, DEPRESSED OR HOPELESS: NOT AT ALL
SUM OF ALL RESPONSES TO PHQ9 QUESTIONS 1 AND 2: 0

## 2025-08-15 ASSESSMENT — PAIN SCALES - GENERAL: PAINLEVEL_OUTOF10: 6

## 2025-08-16 ENCOUNTER — HOSPITAL ENCOUNTER (OUTPATIENT)
Dept: RADIOLOGY | Facility: HOSPITAL | Age: 75
Discharge: HOME | End: 2025-08-16
Payer: MEDICARE

## 2025-08-16 DIAGNOSIS — R20.0 LOWER EXTREMITY NUMBNESS: ICD-10-CM

## 2025-08-16 PROCEDURE — 72148 MRI LUMBAR SPINE W/O DYE: CPT | Performed by: RADIOLOGY

## 2025-08-16 PROCEDURE — 72148 MRI LUMBAR SPINE W/O DYE: CPT

## 2025-08-18 ENCOUNTER — TELEPHONE (OUTPATIENT)
Dept: GASTROENTEROLOGY | Facility: EXTERNAL LOCATION | Age: 75
End: 2025-08-18
Payer: MEDICARE

## 2025-08-18 ENCOUNTER — SOCIAL WORK (OUTPATIENT)
Dept: HEMATOLOGY/ONCOLOGY | Facility: HOSPITAL | Age: 75
End: 2025-08-18
Payer: MEDICARE

## 2025-08-20 ENCOUNTER — TREATMENT (OUTPATIENT)
Dept: PHYSICAL THERAPY | Facility: CLINIC | Age: 75
End: 2025-08-20
Payer: MEDICARE

## 2025-08-20 DIAGNOSIS — R53.1 GENERALIZED WEAKNESS: Primary | ICD-10-CM

## 2025-08-20 DIAGNOSIS — R26.9 ABNORMALITY OF GAIT: ICD-10-CM

## 2025-08-20 DIAGNOSIS — Z98.1 S/P LUMBAR FUSION: ICD-10-CM

## 2025-08-20 DIAGNOSIS — Z96.611 STATUS POST REVERSE TOTAL REPLACEMENT OF RIGHT SHOULDER: ICD-10-CM

## 2025-08-20 PROCEDURE — 97110 THERAPEUTIC EXERCISES: CPT | Mod: GP

## 2025-08-20 PROCEDURE — 97140 MANUAL THERAPY 1/> REGIONS: CPT | Mod: GP

## 2025-08-21 DIAGNOSIS — R79.1 ABNORMAL COAGULATION PROFILE: ICD-10-CM

## 2025-08-21 DIAGNOSIS — M41.35 THORACOGENIC SCOLIOSIS OF THORACOLUMBAR REGION: Primary | ICD-10-CM

## 2025-08-22 ENCOUNTER — TREATMENT (OUTPATIENT)
Dept: PHYSICAL THERAPY | Facility: CLINIC | Age: 75
End: 2025-08-22
Payer: MEDICARE

## 2025-08-22 DIAGNOSIS — R26.9 ABNORMALITY OF GAIT: ICD-10-CM

## 2025-08-22 DIAGNOSIS — Z96.611 STATUS POST REVERSE TOTAL REPLACEMENT OF RIGHT SHOULDER: ICD-10-CM

## 2025-08-22 DIAGNOSIS — Z98.1 S/P LUMBAR FUSION: ICD-10-CM

## 2025-08-22 DIAGNOSIS — R53.1 GENERALIZED WEAKNESS: ICD-10-CM

## 2025-08-22 PROCEDURE — 97140 MANUAL THERAPY 1/> REGIONS: CPT | Mod: GP

## 2025-08-22 PROCEDURE — 97110 THERAPEUTIC EXERCISES: CPT | Mod: GP

## 2025-08-26 ENCOUNTER — TELEMEDICINE (OUTPATIENT)
Dept: HEMATOLOGY/ONCOLOGY | Facility: HOSPITAL | Age: 75
End: 2025-08-26
Payer: MEDICARE

## 2025-08-26 DIAGNOSIS — D50.8 OTHER IRON DEFICIENCY ANEMIA: Primary | ICD-10-CM

## 2025-08-26 PROCEDURE — 99215 OFFICE O/P EST HI 40 MIN: CPT

## 2025-08-26 PROCEDURE — 1036F TOBACCO NON-USER: CPT

## 2025-08-26 ASSESSMENT — ENCOUNTER SYMPTOMS
CONSTITUTIONAL NEGATIVE: 1
RESPIRATORY NEGATIVE: 1
ENDOCRINE NEGATIVE: 1
BACK PAIN: 1
CARDIOVASCULAR NEGATIVE: 1
PSYCHIATRIC NEGATIVE: 1
HEMATOLOGIC/LYMPHATIC NEGATIVE: 1
EYES NEGATIVE: 1
GASTROINTESTINAL NEGATIVE: 1
NUMBNESS: 1

## 2025-08-27 ENCOUNTER — TREATMENT (OUTPATIENT)
Dept: PHYSICAL THERAPY | Facility: CLINIC | Age: 75
End: 2025-08-27
Payer: MEDICARE

## 2025-08-27 DIAGNOSIS — R26.9 ABNORMALITY OF GAIT: ICD-10-CM

## 2025-08-27 DIAGNOSIS — Z96.611 STATUS POST REVERSE TOTAL REPLACEMENT OF RIGHT SHOULDER: ICD-10-CM

## 2025-08-27 DIAGNOSIS — Z98.1 S/P LUMBAR FUSION: ICD-10-CM

## 2025-08-27 DIAGNOSIS — R53.1 GENERALIZED WEAKNESS: ICD-10-CM

## 2025-08-27 PROCEDURE — 97110 THERAPEUTIC EXERCISES: CPT | Mod: GP

## 2025-08-27 PROCEDURE — 97140 MANUAL THERAPY 1/> REGIONS: CPT | Mod: GP

## 2025-08-28 LAB
CRP SERPL-MCNC: 84.1 MG/L
ERYTHROCYTE [DISTWIDTH] IN BLOOD BY AUTOMATED COUNT: 14.3 % (ref 11–15)
ERYTHROCYTE [SEDIMENTATION RATE] IN BLOOD BY WESTERGREN METHOD: 77 MM/H
HCT VFR BLD AUTO: 36.3 % (ref 35–45)
HGB BLD-MCNC: 11.2 G/DL (ref 11.7–15.5)
MCH RBC QN AUTO: 27.1 PG (ref 27–33)
MCHC RBC AUTO-ENTMCNC: 30.9 G/DL (ref 32–36)
MCV RBC AUTO: 87.9 FL (ref 80–100)
PLATELET # BLD AUTO: 421 THOUSAND/UL (ref 140–400)
PMV BLD REES-ECKER: 9.8 FL (ref 7.5–12.5)
RBC # BLD AUTO: 4.13 MILLION/UL (ref 3.8–5.1)
WBC # BLD AUTO: 9.9 THOUSAND/UL (ref 3.8–10.8)

## 2025-08-29 ENCOUNTER — HOSPITAL ENCOUNTER (OUTPATIENT)
Dept: CARDIOLOGY | Facility: HOSPITAL | Age: 75
Discharge: HOME | End: 2025-08-29
Payer: MEDICARE

## 2025-08-29 ENCOUNTER — TREATMENT (OUTPATIENT)
Dept: PHYSICAL THERAPY | Facility: CLINIC | Age: 75
End: 2025-08-29
Payer: MEDICARE

## 2025-08-29 DIAGNOSIS — R26.9 ABNORMALITY OF GAIT: ICD-10-CM

## 2025-08-29 DIAGNOSIS — R60.0 BILATERAL LOWER EXTREMITY EDEMA: ICD-10-CM

## 2025-08-29 DIAGNOSIS — Z96.611 STATUS POST REVERSE TOTAL REPLACEMENT OF RIGHT SHOULDER: ICD-10-CM

## 2025-08-29 DIAGNOSIS — R22.43 LOCALIZED SWELLING, MASS AND LUMP, LOWER LIMB, BILATERAL: ICD-10-CM

## 2025-08-29 DIAGNOSIS — Z98.1 S/P LUMBAR FUSION: ICD-10-CM

## 2025-08-29 DIAGNOSIS — R53.1 GENERALIZED WEAKNESS: ICD-10-CM

## 2025-08-29 PROCEDURE — 93970 EXTREMITY STUDY: CPT

## 2025-08-29 PROCEDURE — 97140 MANUAL THERAPY 1/> REGIONS: CPT | Mod: GP

## 2025-08-29 PROCEDURE — 97110 THERAPEUTIC EXERCISES: CPT | Mod: GP

## 2025-08-29 PROCEDURE — 93970 EXTREMITY STUDY: CPT | Performed by: INTERNAL MEDICINE

## 2025-09-02 ENCOUNTER — TREATMENT (OUTPATIENT)
Dept: PHYSICAL THERAPY | Facility: CLINIC | Age: 75
End: 2025-09-02
Payer: MEDICARE

## 2025-09-02 ENCOUNTER — APPOINTMENT (OUTPATIENT)
Dept: NEUROLOGY | Facility: HOSPITAL | Age: 75
End: 2025-09-02
Payer: MEDICARE

## 2025-09-02 ENCOUNTER — TELEPHONE (OUTPATIENT)
Dept: PRIMARY CARE | Facility: CLINIC | Age: 75
End: 2025-09-02
Payer: MEDICARE

## 2025-09-02 DIAGNOSIS — R26.9 ABNORMALITY OF GAIT: ICD-10-CM

## 2025-09-02 DIAGNOSIS — R53.1 GENERALIZED WEAKNESS: ICD-10-CM

## 2025-09-02 DIAGNOSIS — Z96.611 STATUS POST REVERSE TOTAL REPLACEMENT OF RIGHT SHOULDER: ICD-10-CM

## 2025-09-02 DIAGNOSIS — Z98.1 S/P LUMBAR FUSION: ICD-10-CM

## 2025-09-02 PROCEDURE — 97140 MANUAL THERAPY 1/> REGIONS: CPT | Mod: GP

## 2025-09-02 PROCEDURE — 97110 THERAPEUTIC EXERCISES: CPT | Mod: GP

## 2025-09-04 ENCOUNTER — HOSPITAL ENCOUNTER (OUTPATIENT)
Dept: RADIOLOGY | Facility: HOSPITAL | Age: 75
Discharge: HOME | End: 2025-09-04
Payer: MEDICARE

## 2025-09-04 VITALS
RESPIRATION RATE: 20 BRPM | HEART RATE: 85 BPM | TEMPERATURE: 98.6 F | SYSTOLIC BLOOD PRESSURE: 132 MMHG | DIASTOLIC BLOOD PRESSURE: 70 MMHG | OXYGEN SATURATION: 98 %

## 2025-09-04 DIAGNOSIS — M41.35 THORACOGENIC SCOLIOSIS OF THORACOLUMBAR REGION: ICD-10-CM

## 2025-09-04 PROCEDURE — C1729 CATH, DRAINAGE: HCPCS

## 2025-09-04 PROCEDURE — 2500000005 HC RX 250 GENERAL PHARMACY W/O HCPCS: Performed by: RADIOLOGY

## 2025-09-04 PROCEDURE — 10160 PNXR ASPIR ABSC HMTMA BULLA: CPT

## 2025-09-04 PROCEDURE — 87075 CULTR BACTERIA EXCEPT BLOOD: CPT | Mod: PARLAB | Performed by: STUDENT IN AN ORGANIZED HEALTH CARE EDUCATION/TRAINING PROGRAM

## 2025-09-04 PROCEDURE — 7100000010 HC PHASE TWO TIME - EACH INCREMENTAL 1 MINUTE

## 2025-09-04 PROCEDURE — 2500000004 HC RX 250 GENERAL PHARMACY W/ HCPCS (ALT 636 FOR OP/ED): Performed by: RADIOLOGY

## 2025-09-04 PROCEDURE — 2720000007 HC OR 272 NO HCPCS

## 2025-09-04 PROCEDURE — 10006 FNA BX W/US GDN EA ADDL: CPT

## 2025-09-04 PROCEDURE — 7100000009 HC PHASE TWO TIME - INITIAL BASE CHARGE

## 2025-09-04 RX ORDER — LIDOCAINE HYDROCHLORIDE 10 MG/ML
INJECTION, SOLUTION EPIDURAL; INFILTRATION; INTRACAUDAL; PERINEURAL
Status: COMPLETED | OUTPATIENT
Start: 2025-09-04 | End: 2025-09-04

## 2025-09-04 RX ADMIN — LIDOCAINE HYDROCHLORIDE 5 ML: 10 INJECTION, SOLUTION EPIDURAL; INFILTRATION; INTRACAUDAL; PERINEURAL at 15:07

## 2025-09-04 RX ADMIN — Medication: at 14:36

## 2025-09-04 ASSESSMENT — PAIN SCALES - GENERAL
PAINLEVEL_OUTOF10: 5 - MODERATE PAIN
PAINLEVEL_OUTOF10: 5 - MODERATE PAIN
PAINLEVEL_OUTOF10: 6
PAINLEVEL_OUTOF10: 5 - MODERATE PAIN
PAINLEVEL_OUTOF10: 5 - MODERATE PAIN

## 2025-09-04 ASSESSMENT — PAIN DESCRIPTION - DESCRIPTORS
DESCRIPTORS: TENDER
DESCRIPTORS: SORE

## 2025-09-04 ASSESSMENT — PAIN - FUNCTIONAL ASSESSMENT
PAIN_FUNCTIONAL_ASSESSMENT: 0-10

## 2025-09-05 ENCOUNTER — TREATMENT (OUTPATIENT)
Dept: PHYSICAL THERAPY | Facility: CLINIC | Age: 75
End: 2025-09-05
Payer: MEDICARE

## 2025-09-05 DIAGNOSIS — Z96.611 STATUS POST REVERSE TOTAL REPLACEMENT OF RIGHT SHOULDER: ICD-10-CM

## 2025-09-05 DIAGNOSIS — R26.9 ABNORMALITY OF GAIT: ICD-10-CM

## 2025-09-05 DIAGNOSIS — Z98.1 S/P LUMBAR FUSION: ICD-10-CM

## 2025-09-05 DIAGNOSIS — R53.1 GENERALIZED WEAKNESS: ICD-10-CM

## 2025-09-05 PROCEDURE — 97110 THERAPEUTIC EXERCISES: CPT | Mod: GP

## 2025-09-07 LAB
BACTERIA FLD CULT: NORMAL
GRAM STN SPEC: NORMAL
GRAM STN SPEC: NORMAL

## 2025-09-08 ENCOUNTER — APPOINTMENT (OUTPATIENT)
Dept: RADIOLOGY | Facility: HOSPITAL | Age: 75
End: 2025-09-08
Payer: MEDICARE

## 2025-10-13 ENCOUNTER — APPOINTMENT (OUTPATIENT)
Dept: PRIMARY CARE | Facility: CLINIC | Age: 75
End: 2025-10-13
Payer: MEDICARE

## 2025-11-21 ENCOUNTER — APPOINTMENT (OUTPATIENT)
Dept: NEUROSURGERY | Facility: CLINIC | Age: 75
End: 2025-11-21
Payer: MEDICARE

## (undated) DEVICE — Device

## (undated) DEVICE — PADDING, UNDERCAST, WEBRIL, 3 IN X 4 YD, REG, NS

## (undated) DEVICE — DRAPE, C-ARM IMAGE

## (undated) DEVICE — PADDING, WEBRIL, UNDERCAST, STERILE, 2IN

## (undated) DEVICE — DRILL BIT, 1.8MM W/DEPTH MARKER/QC/110MM

## (undated) DEVICE — THERAPY UNIT, PREVENA PLUS 125

## (undated) DEVICE — APPLICATOR, CHLORAPREP, W/ORANGE TINT, 26ML

## (undated) DEVICE — DRAPE, C-ARM 16 X 85

## (undated) DEVICE — DRESSING, TEGADERM, CHG, 4 X 4.5 IN

## (undated) DEVICE — DRESSING, ADHESIVE, ISLAND, TELFA, 4 X 14 IN

## (undated) DEVICE — PROTECTOR, NERVE, ULNAR, PINK

## (undated) DEVICE — DRESSING, GAUZE, 16 PLY, 4 X 4 IN, STERILE

## (undated) DEVICE — ELECTRODE, ELECTROSURGICAL, BLADE, INSULATED, ENT/IMA, STERILE

## (undated) DEVICE — BANDAGE, ELASTIC, SELF-CLOSE, 4 IN, HONEYCOMB, STERILE

## (undated) DEVICE — BUR, 3MM PRECISION MATCH HEAD, 16CM

## (undated) DEVICE — DRAPE, SHEET, U, STERI DRAPE, 47 X 51 IN, DISPOSABLE, STERILE

## (undated) DEVICE — WOUND SYSTEM, DEBRIDEMENT & CLEANING, O.R DUOPAK

## (undated) DEVICE — SUTURE, STRATAFIX, SPIRAL MONOCRYL PLUS, 3-0, PS-2 45CM, UNDYED

## (undated) DEVICE — ELECTRODE, ELECTROSURGICAL, BLADE EXT 4 INCH, INSULATED

## (undated) DEVICE — SPHERE, STEALTHSTATION, 5-PK

## (undated) DEVICE — SPLINT, FAST SETTING, 3 X 15 IN, PLASTER, BLUE

## (undated) DEVICE — STAPLER, SKIN, PLUS, WIDE, 35

## (undated) DEVICE — DRESSING, MEPILEX BORDER, POST-OP AG, 4 X 10 IN

## (undated) DEVICE — SWAB, CULTURE, MINI-TIP, W/STUART LIQUID, SOFTWIRE

## (undated) DEVICE — TOWEL PACK, STERILE, 4/PACK, BLUE

## (undated) DEVICE — SCREW, CORTEX SELF TAP W/T8 RECESS 2.4MM X 16MM: Type: IMPLANTABLE DEVICE | Site: WRIST | Status: NON-FUNCTIONAL

## (undated) DEVICE — DRESSING, ABDOMINAL, WET PRUF, TENDERSORB, 5 X 9 IN, STERILE

## (undated) DEVICE — DRESSING, NON-ADHERENT, 3 X 3 IN, STERILE

## (undated) DEVICE — COVER, TABLE, 54X90

## (undated) DEVICE — HEAD POSITIONER, UNIVERSAL, DISP

## (undated) DEVICE — GLOVE, SURGICAL, PROTEXIS PI W/NEU-THERA, 7.5, PF, LF

## (undated) DEVICE — SEALANT, HEMOSTATIC, FLOSEAL, 10 ML

## (undated) DEVICE — SUTURE, VICYL 2-0, TAPER POINT, SH VIOLET 8-18 INCH

## (undated) DEVICE — TUBING, SMOKE EVAC, 3/8 X 10 FT

## (undated) DEVICE — DRAPE, INSTRUMENT, W/POUCH, STERI DRAPE, 7 X 11 IN, DISPOSABLE, STERILE

## (undated) DEVICE — PAD, GROUNDING, ELECTROSURGICAL, W/9 FT CABLE, POLYHESIVE II, ADULT, LF

## (undated) DEVICE — SUTURE, VICRYL, 0, 18 IN, CT-1, UNDYED

## (undated) DEVICE — TAPE, SURGICAL, FOAM, MICROFOAM, HYPOALLERGENIC, 3 IN X 5.5 YD

## (undated) DEVICE — GLOVE, SURGICAL, PROTEXIS PI , 7.5, PF, LF

## (undated) DEVICE — BANDAGE, ELASTIC, SELF-CLOSE, 3 IN, HONEYCOMB, STERILE

## (undated) DEVICE — GLOVE, SURGICAL, PROTEXIS PI BLUE W/NEUTHERA, 7.5, PF, LF

## (undated) DEVICE — SUTURE, CTD, VICRYL, 2-0, UND, BR, CT-2

## (undated) DEVICE — SOLUTION, IRRIGATION, STERILE WATER, 1000 ML, POUR BOTTLE

## (undated) DEVICE — SCREW, CORTEX SELF TAP W/T8 RECESS 2.4MM X 14MM: Type: IMPLANTABLE DEVICE | Site: WRIST | Status: NON-FUNCTIONAL

## (undated) DEVICE — SEALANT, DURASEAL, 5ML

## (undated) DEVICE — SPONGE, LAP, XRAY DECT, 18IN X 18IN, W/MASTER DMT, STERILE

## (undated) DEVICE — INTERPULSE HANDPIECE SET W/ 10FT SUCTION TUBING

## (undated) DEVICE — DRAPE, C-ARMOR KIT

## (undated) DEVICE — INSTRUMENT, DISSECTING, ENDOSCOPIC, PEANUT, 5 MM, STERILE

## (undated) DEVICE — TIP, SUCTION, YANKAUER, FLEXIBLE

## (undated) DEVICE — GLOVE, SURGICAL, PROTEXIS PI MICRO, 7.5, PF, LF

## (undated) DEVICE — DRAPE, SHEET, THREE QUARTER, FAN FOLD, 57 X 77 IN

## (undated) DEVICE — RESERVOIR, WOUND, W/TROCAR, PVC, MEDIUM, 400CC, DAVOL, 1/8 IN, 10FR

## (undated) DEVICE — COVER, TABLE, UHC

## (undated) DEVICE — SOLUTION, IRRIGATION, SODIUM CHLORIDE 0.9%, 1000 ML, POUR BOTTLE

## (undated) DEVICE — GLOVE, SURGICAL, PROTEXIS PI MICRO, 8.5, PF, LF

## (undated) DEVICE — BONE, MILL, MIDAS REX, DUAL BLADE, ELECTRIC

## (undated) DEVICE — SUTURE, PROLENE, 6-0, 24 IN, BV1, DA, BLUE

## (undated) DEVICE — TAPE, SILK, DURAPORE, 3 IN X 10 YD, LF

## (undated) DEVICE — IRRIGATION SYSTEM, WOUND, SURGIPHOR, 450ML, STERILE

## (undated) DEVICE — DRAPE, SHEET, FAN FOLDED, HALF, 44 X 58 IN, DISPOSABLE, LF, STERILE

## (undated) DEVICE — DRESSING, PREVENA, PEEL AND PLACE, 35CM

## (undated) DEVICE — MARKER, SKIN, RULER AND LABEL PACK, CUSTOM

## (undated) DEVICE — KIT, BEACH CHAIR TRIMANO

## (undated) DEVICE — KIT, PATIENT CARE, JACKSON TABLE W/PRONE-SAFE HEADREST

## (undated) DEVICE — BLADE, SAGITTAL, THIN, SHORT, LF

## (undated) DEVICE — CATHETER TRAY, SURESTEP, 16FR, URINE METER W/STATLOCK

## (undated) DEVICE — SPONGE, HEMOSTATIC, GELATIN, SURGIFOAM, 8 X 12.5 CM X 10 MM

## (undated) DEVICE — COVER, CART, 45 X 27 X 48 IN, CLEAR

## (undated) DEVICE — TOOL, MR8 14CM MATCH 3MM

## (undated) DEVICE — SEALER, BIPOLAR, AQUA MANTYS 6.0

## (undated) DEVICE — SUTURE, POLYSORB 2/0  36  UNDYED  GS-21  209P"

## (undated) DEVICE — MANIFOLD, 4 PORT NEPTUNE STANDARD

## (undated) DEVICE — DRESSING, GAUZE, SUPER KERLIX, 6X6

## (undated) DEVICE — ORTHOCORD P2, VIOLET/BLUE, W/NEEDLES